# Patient Record
Sex: MALE | Race: WHITE | NOT HISPANIC OR LATINO
[De-identification: names, ages, dates, MRNs, and addresses within clinical notes are randomized per-mention and may not be internally consistent; named-entity substitution may affect disease eponyms.]

---

## 2017-01-06 ENCOUNTER — APPOINTMENT (OUTPATIENT)
Dept: FAMILY MEDICINE | Facility: CLINIC | Age: 67
End: 2017-01-06

## 2017-01-06 VITALS
BODY MASS INDEX: 37.77 KG/M2 | HEIGHT: 66 IN | WEIGHT: 235 LBS | DIASTOLIC BLOOD PRESSURE: 80 MMHG | SYSTOLIC BLOOD PRESSURE: 116 MMHG

## 2017-01-11 ENCOUNTER — MEDICATION RENEWAL (OUTPATIENT)
Age: 67
End: 2017-01-11

## 2017-01-27 ENCOUNTER — NON-APPOINTMENT (OUTPATIENT)
Age: 67
End: 2017-01-27

## 2017-01-27 ENCOUNTER — APPOINTMENT (OUTPATIENT)
Dept: CARDIOLOGY | Facility: CLINIC | Age: 67
End: 2017-01-27

## 2017-01-27 VITALS — SYSTOLIC BLOOD PRESSURE: 105 MMHG | HEART RATE: 75 BPM | DIASTOLIC BLOOD PRESSURE: 76 MMHG | OXYGEN SATURATION: 95 %

## 2017-01-27 VITALS — HEIGHT: 66 IN

## 2017-03-13 ENCOUNTER — APPOINTMENT (OUTPATIENT)
Dept: FAMILY MEDICINE | Facility: CLINIC | Age: 67
End: 2017-03-13

## 2017-03-13 VITALS
DIASTOLIC BLOOD PRESSURE: 68 MMHG | BODY MASS INDEX: 37.77 KG/M2 | HEIGHT: 66 IN | HEART RATE: 88 BPM | WEIGHT: 235 LBS | SYSTOLIC BLOOD PRESSURE: 100 MMHG

## 2017-03-20 ENCOUNTER — MEDICATION RENEWAL (OUTPATIENT)
Age: 67
End: 2017-03-20

## 2017-04-11 ENCOUNTER — NON-APPOINTMENT (OUTPATIENT)
Age: 67
End: 2017-04-11

## 2017-04-11 ENCOUNTER — APPOINTMENT (OUTPATIENT)
Dept: CARDIOLOGY | Facility: CLINIC | Age: 67
End: 2017-04-11

## 2017-04-11 ENCOUNTER — MEDICATION RENEWAL (OUTPATIENT)
Age: 67
End: 2017-04-11

## 2017-04-11 VITALS — BODY MASS INDEX: 35.52 KG/M2 | HEIGHT: 66 IN | WEIGHT: 221 LBS

## 2017-04-11 VITALS — HEART RATE: 73 BPM | SYSTOLIC BLOOD PRESSURE: 126 MMHG | DIASTOLIC BLOOD PRESSURE: 83 MMHG

## 2017-04-11 VITALS — OXYGEN SATURATION: 99 % | HEART RATE: 65 BPM

## 2017-04-12 ENCOUNTER — MEDICATION RENEWAL (OUTPATIENT)
Age: 67
End: 2017-04-12

## 2017-04-13 ENCOUNTER — APPOINTMENT (OUTPATIENT)
Dept: FAMILY MEDICINE | Facility: CLINIC | Age: 67
End: 2017-04-13

## 2017-04-13 VITALS
HEIGHT: 66 IN | SYSTOLIC BLOOD PRESSURE: 110 MMHG | DIASTOLIC BLOOD PRESSURE: 70 MMHG | BODY MASS INDEX: 35.52 KG/M2 | WEIGHT: 221 LBS

## 2017-04-18 ENCOUNTER — MEDICATION RENEWAL (OUTPATIENT)
Age: 67
End: 2017-04-18

## 2017-04-19 ENCOUNTER — MEDICATION RENEWAL (OUTPATIENT)
Age: 67
End: 2017-04-19

## 2017-04-25 ENCOUNTER — MEDICATION RENEWAL (OUTPATIENT)
Age: 67
End: 2017-04-25

## 2017-05-16 ENCOUNTER — MEDICATION RENEWAL (OUTPATIENT)
Age: 67
End: 2017-05-16

## 2017-05-24 ENCOUNTER — MEDICATION RENEWAL (OUTPATIENT)
Age: 67
End: 2017-05-24

## 2017-05-26 ENCOUNTER — APPOINTMENT (OUTPATIENT)
Dept: CARDIOLOGY | Facility: CLINIC | Age: 67
End: 2017-05-26

## 2017-05-26 ENCOUNTER — NON-APPOINTMENT (OUTPATIENT)
Age: 67
End: 2017-05-26

## 2017-05-26 VITALS
WEIGHT: 226 LBS | DIASTOLIC BLOOD PRESSURE: 66 MMHG | OXYGEN SATURATION: 96 % | SYSTOLIC BLOOD PRESSURE: 117 MMHG | HEIGHT: 66 IN | BODY MASS INDEX: 36.32 KG/M2 | HEART RATE: 70 BPM

## 2017-06-09 ENCOUNTER — APPOINTMENT (OUTPATIENT)
Dept: FAMILY MEDICINE | Facility: CLINIC | Age: 67
End: 2017-06-09

## 2017-06-12 ENCOUNTER — APPOINTMENT (OUTPATIENT)
Dept: FAMILY MEDICINE | Facility: CLINIC | Age: 67
End: 2017-06-12

## 2017-06-12 VITALS
BODY MASS INDEX: 34.07 KG/M2 | WEIGHT: 212 LBS | SYSTOLIC BLOOD PRESSURE: 118 MMHG | HEIGHT: 66 IN | DIASTOLIC BLOOD PRESSURE: 68 MMHG

## 2017-06-12 DIAGNOSIS — Z23 ENCOUNTER FOR IMMUNIZATION: ICD-10-CM

## 2017-06-15 ENCOUNTER — NON-APPOINTMENT (OUTPATIENT)
Age: 67
End: 2017-06-15

## 2017-06-15 ENCOUNTER — APPOINTMENT (OUTPATIENT)
Dept: CARDIOLOGY | Facility: CLINIC | Age: 67
End: 2017-06-15

## 2017-06-15 VITALS — HEIGHT: 66 IN | WEIGHT: 215 LBS | OXYGEN SATURATION: 96 % | BODY MASS INDEX: 34.55 KG/M2 | HEART RATE: 47 BPM

## 2017-06-15 VITALS — SYSTOLIC BLOOD PRESSURE: 105 MMHG | OXYGEN SATURATION: 96 % | DIASTOLIC BLOOD PRESSURE: 61 MMHG | HEART RATE: 47 BPM

## 2017-07-03 ENCOUNTER — MEDICATION RENEWAL (OUTPATIENT)
Age: 67
End: 2017-07-03

## 2017-07-25 ENCOUNTER — APPOINTMENT (OUTPATIENT)
Dept: CARDIOLOGY | Facility: CLINIC | Age: 67
End: 2017-07-25

## 2017-07-25 VITALS — HEART RATE: 82 BPM | SYSTOLIC BLOOD PRESSURE: 89 MMHG | DIASTOLIC BLOOD PRESSURE: 61 MMHG | OXYGEN SATURATION: 94 %

## 2017-07-25 VITALS — HEART RATE: 84 BPM | DIASTOLIC BLOOD PRESSURE: 64 MMHG | OXYGEN SATURATION: 98 % | SYSTOLIC BLOOD PRESSURE: 89 MMHG

## 2017-07-25 VITALS — BODY MASS INDEX: 35.03 KG/M2 | HEIGHT: 66 IN | WEIGHT: 218 LBS

## 2017-08-25 ENCOUNTER — APPOINTMENT (OUTPATIENT)
Dept: CARDIOLOGY | Facility: CLINIC | Age: 67
End: 2017-08-25

## 2017-08-28 ENCOUNTER — APPOINTMENT (OUTPATIENT)
Dept: FAMILY MEDICINE | Facility: CLINIC | Age: 67
End: 2017-08-28
Payer: MEDICARE

## 2017-08-28 VITALS
DIASTOLIC BLOOD PRESSURE: 76 MMHG | RESPIRATION RATE: 16 BRPM | SYSTOLIC BLOOD PRESSURE: 104 MMHG | HEART RATE: 64 BPM | WEIGHT: 237 LBS | BODY MASS INDEX: 38.25 KG/M2

## 2017-08-28 DIAGNOSIS — Z87.09 PERSONAL HISTORY OF OTHER DISEASES OF THE RESPIRATORY SYSTEM: ICD-10-CM

## 2017-08-28 DIAGNOSIS — H26.9 UNSPECIFIED CATARACT: ICD-10-CM

## 2017-08-28 LAB
BASOPHILS # BLD AUTO: 0.01 K/UL
BASOPHILS NFR BLD AUTO: 0.2 %
EOSINOPHIL # BLD AUTO: 0.1 K/UL
EOSINOPHIL NFR BLD AUTO: 1.7 %
HCT VFR BLD CALC: 46.4 %
HGB BLD-MCNC: 14.3 G/DL
IMM GRANULOCYTES NFR BLD AUTO: 0.2 %
LYMPHOCYTES # BLD AUTO: 1.08 K/UL
LYMPHOCYTES NFR BLD AUTO: 18.5 %
MAN DIFF?: NORMAL
MCHC RBC-ENTMCNC: 28.7 PG
MCHC RBC-ENTMCNC: 30.8 GM/DL
MCV RBC AUTO: 93 FL
MONOCYTES # BLD AUTO: 0.45 K/UL
MONOCYTES NFR BLD AUTO: 7.7 %
NEUTROPHILS # BLD AUTO: 4.19 K/UL
NEUTROPHILS NFR BLD AUTO: 71.7 %
PLATELET # BLD AUTO: 191 K/UL
RBC # BLD: 4.99 M/UL
RBC # FLD: 16.2 %
WBC # FLD AUTO: 5.84 K/UL

## 2017-08-28 PROCEDURE — 36415 COLL VENOUS BLD VENIPUNCTURE: CPT

## 2017-08-28 PROCEDURE — 99215 OFFICE O/P EST HI 40 MIN: CPT | Mod: 25

## 2017-08-29 LAB
25(OH)D3 SERPL-MCNC: 35.1 NG/ML
ALBUMIN SERPL ELPH-MCNC: 3.8 G/DL
ALP BLD-CCNC: 76 U/L
ALT SERPL-CCNC: 12 U/L
ANION GAP SERPL CALC-SCNC: 15 MMOL/L
AST SERPL-CCNC: 16 U/L
BILIRUB SERPL-MCNC: 0.4 MG/DL
BUN SERPL-MCNC: 15 MG/DL
CALCIUM SERPL-MCNC: 9.4 MG/DL
CHLORIDE SERPL-SCNC: 102 MMOL/L
CO2 SERPL-SCNC: 25 MMOL/L
CREAT SERPL-MCNC: 1.07 MG/DL
FRUCTOSAMINE SERPL-MCNC: 240 UMOL/L
GLUCOSE SERPL-MCNC: 126 MG/DL
HBV SURFACE AB SER QL: NONREACTIVE
POTASSIUM SERPL-SCNC: 4.7 MMOL/L
PROT SERPL-MCNC: 6.9 G/DL
SODIUM SERPL-SCNC: 142 MMOL/L
T PALLIDUM AB SER QL IA: NEGATIVE

## 2017-08-29 RX ORDER — TAMSULOSIN HYDROCHLORIDE 0.4 MG/1
0.4 CAPSULE ORAL
Qty: 90 | Refills: 1 | Status: DISCONTINUED | COMMUNITY
End: 2017-08-29

## 2017-08-29 RX ORDER — AMOXICILLIN AND CLAVULANATE POTASSIUM 875; 125 MG/1; MG/1
875-125 TABLET, COATED ORAL
Qty: 20 | Refills: 0 | Status: COMPLETED | COMMUNITY
Start: 2017-03-08

## 2017-08-29 RX ORDER — TORSEMIDE 20 MG/1
20 TABLET ORAL
Qty: 2 | Refills: 5 | Status: DISCONTINUED | COMMUNITY
End: 2017-08-29

## 2017-08-30 ENCOUNTER — MEDICATION RENEWAL (OUTPATIENT)
Age: 67
End: 2017-08-30

## 2017-09-07 ENCOUNTER — APPOINTMENT (OUTPATIENT)
Dept: CARDIOLOGY | Facility: CLINIC | Age: 67
End: 2017-09-07

## 2017-09-14 ENCOUNTER — APPOINTMENT (OUTPATIENT)
Dept: CARDIOLOGY | Facility: CLINIC | Age: 67
End: 2017-09-14
Payer: MEDICARE

## 2017-09-14 ENCOUNTER — NON-APPOINTMENT (OUTPATIENT)
Age: 67
End: 2017-09-14

## 2017-09-14 VITALS
BODY MASS INDEX: 39.53 KG/M2 | DIASTOLIC BLOOD PRESSURE: 73 MMHG | SYSTOLIC BLOOD PRESSURE: 117 MMHG | WEIGHT: 246 LBS | HEART RATE: 96 BPM | OXYGEN SATURATION: 96 % | HEIGHT: 66 IN

## 2017-09-14 PROCEDURE — 93000 ELECTROCARDIOGRAM COMPLETE: CPT

## 2017-09-14 PROCEDURE — 99214 OFFICE O/P EST MOD 30 MIN: CPT | Mod: 25

## 2017-09-18 ENCOUNTER — APPOINTMENT (OUTPATIENT)
Dept: FAMILY MEDICINE | Facility: CLINIC | Age: 67
End: 2017-09-18
Payer: MEDICARE

## 2017-09-18 VITALS
DIASTOLIC BLOOD PRESSURE: 72 MMHG | BODY MASS INDEX: 39.53 KG/M2 | SYSTOLIC BLOOD PRESSURE: 100 MMHG | WEIGHT: 246 LBS | HEIGHT: 66 IN

## 2017-09-18 PROCEDURE — 99213 OFFICE O/P EST LOW 20 MIN: CPT

## 2017-09-18 RX ORDER — MULTIVITAMIN/IRON/FOLIC ACID 18MG-0.4MG
600-400 TABLET ORAL DAILY
Qty: 30 | Refills: 5 | Status: ACTIVE | OUTPATIENT
Start: 2017-09-18

## 2017-09-26 ENCOUNTER — APPOINTMENT (OUTPATIENT)
Dept: CARDIOLOGY | Facility: CLINIC | Age: 67
End: 2017-09-26
Payer: MEDICARE

## 2017-09-26 ENCOUNTER — NON-APPOINTMENT (OUTPATIENT)
Age: 67
End: 2017-09-26

## 2017-09-26 VITALS
HEART RATE: 75 BPM | DIASTOLIC BLOOD PRESSURE: 83 MMHG | BODY MASS INDEX: 38.57 KG/M2 | OXYGEN SATURATION: 96 % | HEIGHT: 66 IN | SYSTOLIC BLOOD PRESSURE: 126 MMHG | WEIGHT: 240 LBS

## 2017-09-26 DIAGNOSIS — R63.5 ABNORMAL WEIGHT GAIN: ICD-10-CM

## 2017-09-26 PROCEDURE — 99214 OFFICE O/P EST MOD 30 MIN: CPT | Mod: 25

## 2017-09-26 PROCEDURE — 93000 ELECTROCARDIOGRAM COMPLETE: CPT

## 2017-11-13 ENCOUNTER — APPOINTMENT (OUTPATIENT)
Dept: FAMILY MEDICINE | Facility: CLINIC | Age: 67
End: 2017-11-13
Payer: MEDICARE

## 2017-11-13 VITALS
BODY MASS INDEX: 38.57 KG/M2 | HEIGHT: 66 IN | OXYGEN SATURATION: 97 % | DIASTOLIC BLOOD PRESSURE: 70 MMHG | WEIGHT: 240 LBS | SYSTOLIC BLOOD PRESSURE: 112 MMHG | HEART RATE: 82 BPM

## 2017-11-13 VITALS — BODY MASS INDEX: 38.57 KG/M2 | HEIGHT: 66 IN | WEIGHT: 240 LBS

## 2017-11-13 DIAGNOSIS — F15.90 OTHER STIMULANT USE, UNSPECIFIED, UNCOMPLICATED: ICD-10-CM

## 2017-11-13 PROCEDURE — 36415 COLL VENOUS BLD VENIPUNCTURE: CPT

## 2017-11-13 PROCEDURE — 99214 OFFICE O/P EST MOD 30 MIN: CPT | Mod: 25

## 2017-11-13 RX ORDER — CANAGLIFLOZIN 300 MG/1
300 TABLET, FILM COATED ORAL
Qty: 90 | Refills: 0 | Status: DISCONTINUED | COMMUNITY
Start: 2017-09-18 | End: 2017-11-13

## 2017-11-16 ENCOUNTER — NON-APPOINTMENT (OUTPATIENT)
Age: 67
End: 2017-11-16

## 2017-11-16 ENCOUNTER — APPOINTMENT (OUTPATIENT)
Dept: CARDIOLOGY | Facility: CLINIC | Age: 67
End: 2017-11-16
Payer: MEDICARE

## 2017-11-16 VITALS
BODY MASS INDEX: 38.57 KG/M2 | OXYGEN SATURATION: 96 % | SYSTOLIC BLOOD PRESSURE: 103 MMHG | DIASTOLIC BLOOD PRESSURE: 68 MMHG | HEART RATE: 82 BPM | HEIGHT: 66 IN | WEIGHT: 240 LBS

## 2017-11-16 PROCEDURE — 99214 OFFICE O/P EST MOD 30 MIN: CPT | Mod: 25

## 2017-11-16 PROCEDURE — 93000 ELECTROCARDIOGRAM COMPLETE: CPT

## 2017-11-18 ENCOUNTER — MEDICATION RENEWAL (OUTPATIENT)
Age: 67
End: 2017-11-18

## 2017-11-20 ENCOUNTER — APPOINTMENT (OUTPATIENT)
Dept: CARDIOLOGY | Facility: CLINIC | Age: 67
End: 2017-11-20
Payer: MEDICARE

## 2017-11-20 ENCOUNTER — NON-APPOINTMENT (OUTPATIENT)
Age: 67
End: 2017-11-20

## 2017-11-20 ENCOUNTER — APPOINTMENT (OUTPATIENT)
Dept: FAMILY MEDICINE | Facility: CLINIC | Age: 67
End: 2017-11-20
Payer: MEDICARE

## 2017-11-20 VITALS
WEIGHT: 240 LBS | BODY MASS INDEX: 38.57 KG/M2 | DIASTOLIC BLOOD PRESSURE: 70 MMHG | SYSTOLIC BLOOD PRESSURE: 110 MMHG | HEIGHT: 66 IN

## 2017-11-20 VITALS — DIASTOLIC BLOOD PRESSURE: 67 MMHG | HEART RATE: 75 BPM | SYSTOLIC BLOOD PRESSURE: 103 MMHG

## 2017-11-20 DIAGNOSIS — D64.9 ANEMIA, UNSPECIFIED: ICD-10-CM

## 2017-11-20 DIAGNOSIS — E66.9 OBESITY, UNSPECIFIED: ICD-10-CM

## 2017-11-20 PROCEDURE — 93000 ELECTROCARDIOGRAM COMPLETE: CPT

## 2017-11-20 PROCEDURE — 99214 OFFICE O/P EST MOD 30 MIN: CPT | Mod: 25

## 2017-11-20 PROCEDURE — 99213 OFFICE O/P EST LOW 20 MIN: CPT | Mod: 25

## 2017-11-20 PROCEDURE — 36415 COLL VENOUS BLD VENIPUNCTURE: CPT

## 2017-11-20 RX ORDER — APIXABAN 5 MG/1
5 TABLET, FILM COATED ORAL
Qty: 180 | Refills: 2 | Status: DISCONTINUED | COMMUNITY
Start: 2017-11-13 | End: 2017-11-20

## 2017-11-21 LAB
BASOPHILS # BLD AUTO: 0.02 K/UL
BASOPHILS NFR BLD AUTO: 0.3 %
EOSINOPHIL # BLD AUTO: 0.11 K/UL
EOSINOPHIL NFR BLD AUTO: 1.6 %
HBA1C MFR BLD HPLC: 6.5 %
HCT VFR BLD CALC: 38.9 %
HGB BLD-MCNC: 11.9 G/DL
IMM GRANULOCYTES NFR BLD AUTO: 0.1 %
LYMPHOCYTES # BLD AUTO: 0.98 K/UL
LYMPHOCYTES NFR BLD AUTO: 14.2 %
MAN DIFF?: NORMAL
MCHC RBC-ENTMCNC: 28.1 PG
MCHC RBC-ENTMCNC: 30.6 GM/DL
MCV RBC AUTO: 92 FL
MONOCYTES # BLD AUTO: 0.43 K/UL
MONOCYTES NFR BLD AUTO: 6.2 %
NEUTROPHILS # BLD AUTO: 5.34 K/UL
NEUTROPHILS NFR BLD AUTO: 77.6 %
PLATELET # BLD AUTO: 174 K/UL
RBC # BLD: 4.23 M/UL
RBC # FLD: 15.9 %
WBC # FLD AUTO: 6.89 K/UL

## 2017-12-05 ENCOUNTER — NON-APPOINTMENT (OUTPATIENT)
Age: 67
End: 2017-12-05

## 2017-12-05 ENCOUNTER — APPOINTMENT (OUTPATIENT)
Dept: CARDIOLOGY | Facility: CLINIC | Age: 67
End: 2017-12-05
Payer: MEDICARE

## 2017-12-05 VITALS — WEIGHT: 229 LBS | HEIGHT: 66 IN | BODY MASS INDEX: 36.8 KG/M2

## 2017-12-05 VITALS — OXYGEN SATURATION: 91 % | HEART RATE: 79 BPM | SYSTOLIC BLOOD PRESSURE: 111 MMHG | DIASTOLIC BLOOD PRESSURE: 65 MMHG

## 2017-12-05 PROCEDURE — 93000 ELECTROCARDIOGRAM COMPLETE: CPT

## 2017-12-05 PROCEDURE — 99214 OFFICE O/P EST MOD 30 MIN: CPT | Mod: 25

## 2017-12-12 ENCOUNTER — MEDICATION RENEWAL (OUTPATIENT)
Age: 67
End: 2017-12-12

## 2018-01-22 ENCOUNTER — MEDICATION RENEWAL (OUTPATIENT)
Age: 68
End: 2018-01-22

## 2018-02-05 ENCOUNTER — APPOINTMENT (OUTPATIENT)
Dept: FAMILY MEDICINE | Facility: CLINIC | Age: 68
End: 2018-02-05
Payer: MEDICARE

## 2018-02-05 VITALS
HEIGHT: 63 IN | WEIGHT: 311.03 LBS | SYSTOLIC BLOOD PRESSURE: 92 MMHG | BODY MASS INDEX: 55.11 KG/M2 | HEART RATE: 68 BPM | DIASTOLIC BLOOD PRESSURE: 60 MMHG | OXYGEN SATURATION: 92 %

## 2018-02-05 PROCEDURE — 99214 OFFICE O/P EST MOD 30 MIN: CPT

## 2018-02-05 RX ORDER — MULTIVITAMIN
TABLET ORAL DAILY
Refills: 0 | Status: DISCONTINUED | COMMUNITY
End: 2018-02-05

## 2018-03-06 ENCOUNTER — NON-APPOINTMENT (OUTPATIENT)
Age: 68
End: 2018-03-06

## 2018-03-06 ENCOUNTER — APPOINTMENT (OUTPATIENT)
Dept: CARDIOLOGY | Facility: CLINIC | Age: 68
End: 2018-03-06
Payer: MEDICARE

## 2018-03-06 VITALS — WEIGHT: 236 LBS | HEIGHT: 63 IN | BODY MASS INDEX: 41.82 KG/M2

## 2018-03-06 VITALS — SYSTOLIC BLOOD PRESSURE: 136 MMHG | OXYGEN SATURATION: 94 % | DIASTOLIC BLOOD PRESSURE: 93 MMHG | HEART RATE: 75 BPM

## 2018-03-06 VITALS — SYSTOLIC BLOOD PRESSURE: 110 MMHG | DIASTOLIC BLOOD PRESSURE: 80 MMHG

## 2018-03-06 PROCEDURE — 93000 ELECTROCARDIOGRAM COMPLETE: CPT

## 2018-03-06 PROCEDURE — 99214 OFFICE O/P EST MOD 30 MIN: CPT | Mod: 25

## 2018-05-22 ENCOUNTER — MEDICATION RENEWAL (OUTPATIENT)
Age: 68
End: 2018-05-22

## 2018-05-23 ENCOUNTER — APPOINTMENT (OUTPATIENT)
Dept: FAMILY MEDICINE | Facility: CLINIC | Age: 68
End: 2018-05-23
Payer: MEDICARE

## 2018-05-23 VITALS
WEIGHT: 241 LBS | OXYGEN SATURATION: 97 % | BODY MASS INDEX: 34.5 KG/M2 | DIASTOLIC BLOOD PRESSURE: 80 MMHG | TEMPERATURE: 97.9 F | HEART RATE: 86 BPM | HEIGHT: 70 IN | SYSTOLIC BLOOD PRESSURE: 120 MMHG

## 2018-05-23 PROCEDURE — 99214 OFFICE O/P EST MOD 30 MIN: CPT

## 2018-05-23 NOTE — PHYSICAL EXAM
[Well Nourished] : well nourished [Well Developed] : well developed [Regular Rhythm] : with a regular rhythm [Normal S1, S2] : normal S1 and S2 [Non Tender] : non-tender [de-identified] : uses rolling walker [de-identified] : upper lip abrasion, left cheek abrasion

## 2018-05-23 NOTE — ASSESSMENT
[FreeTextEntry1] : reviewed blood work from er\par reviewed CT\par patient shows no signs of neurological issues/deficits\par return to program as previous\par return to program

## 2018-05-23 NOTE — HISTORY OF PRESENT ILLNESS
[FreeTextEntry8] : 68 year old male here after a trip over his walker, went to the ER and was given a CT scan. Patient feeling okay, and reports his muscles hurt a little]. Patients active medications, allergies and issues were all reviewed with the patient at time of visit.\par  [Formal Caregiver] : formal caregiver

## 2018-05-23 NOTE — HEALTH RISK ASSESSMENT
[] : No [No falls in past year] : Patient reported no falls in the past year [0] : 2) Feeling down, depressed, or hopeless: Not at all (0) [[id number]] : 0

## 2018-06-05 ENCOUNTER — APPOINTMENT (OUTPATIENT)
Dept: CARDIOLOGY | Facility: CLINIC | Age: 68
End: 2018-06-05
Payer: MEDICARE

## 2018-06-05 VITALS
SYSTOLIC BLOOD PRESSURE: 120 MMHG | BODY MASS INDEX: 34.07 KG/M2 | OXYGEN SATURATION: 99 % | HEART RATE: 85 BPM | DIASTOLIC BLOOD PRESSURE: 78 MMHG | WEIGHT: 238 LBS | HEIGHT: 70 IN

## 2018-06-05 PROCEDURE — 99214 OFFICE O/P EST MOD 30 MIN: CPT

## 2018-06-26 ENCOUNTER — APPOINTMENT (OUTPATIENT)
Dept: FAMILY MEDICINE | Facility: CLINIC | Age: 68
End: 2018-06-26
Payer: MEDICARE

## 2018-06-26 VITALS
WEIGHT: 237 LBS | DIASTOLIC BLOOD PRESSURE: 80 MMHG | BODY MASS INDEX: 33.93 KG/M2 | SYSTOLIC BLOOD PRESSURE: 120 MMHG | TEMPERATURE: 98.2 F | HEIGHT: 70 IN

## 2018-06-26 PROCEDURE — 99213 OFFICE O/P EST LOW 20 MIN: CPT

## 2018-06-26 NOTE — ASSESSMENT
[FreeTextEntry1] : Patient was advised to take all medications and complete the full course of medications. Patient was advised to take medications with food and water. Patient was advised to hydrate.  Patient was also advised to watch for any signs of infection including increased redness, pain, discharge, fever or vomiting. If any worsening of symptoms occur or no improvement was noticed, return to the office immediately or go directly to the ER.\par fu with surgeon\par finish antibioics\par return to program

## 2018-06-26 NOTE — HISTORY OF PRESENT ILLNESS
[FreeTextEntry8] : 68 year old male here with complaints of right buttock pain. Patients active medications, allergies and issues were all reviewed with the patient at time of visit.\par

## 2018-06-26 NOTE — PHYSICAL EXAM
[Well Nourished] : well nourished [Well Developed] : well developed [Regular Rhythm] : with a regular rhythm [Normal S1, S2] : normal S1 and S2 [Non Tender] : non-tender [de-identified] : uses rolling walker [de-identified] : 1 cm nodule on left inner buttock cheek with surrounding redness, no discharge

## 2018-06-26 NOTE — COUNSELING
[Limited decision making ability] : Limited decision making ability [Good understanding] : Patient has a good understanding of lifestyle changes and the steps needed to achieve self management goals

## 2018-07-17 ENCOUNTER — MEDICATION RENEWAL (OUTPATIENT)
Age: 68
End: 2018-07-17

## 2018-07-25 ENCOUNTER — MEDICATION RENEWAL (OUTPATIENT)
Age: 68
End: 2018-07-25

## 2018-08-30 ENCOUNTER — APPOINTMENT (OUTPATIENT)
Dept: FAMILY MEDICINE | Facility: CLINIC | Age: 68
End: 2018-08-30
Payer: MEDICARE

## 2018-08-30 VITALS
BODY MASS INDEX: 39.37 KG/M2 | TEMPERATURE: 97.3 F | SYSTOLIC BLOOD PRESSURE: 120 MMHG | HEIGHT: 70 IN | DIASTOLIC BLOOD PRESSURE: 80 MMHG | WEIGHT: 275 LBS

## 2018-08-30 PROCEDURE — G0438: CPT

## 2018-08-30 PROCEDURE — 36415 COLL VENOUS BLD VENIPUNCTURE: CPT

## 2018-08-31 LAB
ALBUMIN SERPL ELPH-MCNC: 4.1 G/DL
ALP BLD-CCNC: 89 U/L
ALT SERPL-CCNC: 22 U/L
ANION GAP SERPL CALC-SCNC: 14 MMOL/L
APPEARANCE: ABNORMAL
AST SERPL-CCNC: 28 U/L
BACTERIA: NEGATIVE
BASOPHILS # BLD AUTO: 0.02 K/UL
BASOPHILS NFR BLD AUTO: 0.3 %
BILIRUB SERPL-MCNC: 0.7 MG/DL
BILIRUBIN URINE: NEGATIVE
BLOOD URINE: ABNORMAL
BUN SERPL-MCNC: 23 MG/DL
CALCIUM SERPL-MCNC: 9.9 MG/DL
CHLORIDE SERPL-SCNC: 100 MMOL/L
CHOLEST SERPL-MCNC: 121 MG/DL
CHOLEST/HDLC SERPL: 2.5 RATIO
CO2 SERPL-SCNC: 28 MMOL/L
COLOR: YELLOW
CREAT SERPL-MCNC: 0.9 MG/DL
EOSINOPHIL # BLD AUTO: 0.06 K/UL
EOSINOPHIL NFR BLD AUTO: 0.9 %
GLUCOSE QUALITATIVE U: NEGATIVE MG/DL
GLUCOSE SERPL-MCNC: 161 MG/DL
HBA1C MFR BLD HPLC: 6.6 %
HCT VFR BLD CALC: 42.9 %
HDLC SERPL-MCNC: 49 MG/DL
HGB BLD-MCNC: 13.6 G/DL
HYALINE CASTS: 2 /LPF
IMM GRANULOCYTES NFR BLD AUTO: 0.2 %
KETONES URINE: NEGATIVE
LDLC SERPL CALC-MCNC: 62 MG/DL
LEUKOCYTE ESTERASE URINE: ABNORMAL
LYMPHOCYTES # BLD AUTO: 1.02 K/UL
LYMPHOCYTES NFR BLD AUTO: 15.8 %
MAN DIFF?: NORMAL
MCHC RBC-ENTMCNC: 28.4 PG
MCHC RBC-ENTMCNC: 31.7 GM/DL
MCV RBC AUTO: 89.6 FL
MICROSCOPIC-UA: NORMAL
MONOCYTES # BLD AUTO: 0.45 K/UL
MONOCYTES NFR BLD AUTO: 7 %
NEUTROPHILS # BLD AUTO: 4.88 K/UL
NEUTROPHILS NFR BLD AUTO: 75.8 %
NITRITE URINE: NEGATIVE
PH URINE: 6.5
PLATELET # BLD AUTO: 141 K/UL
POTASSIUM SERPL-SCNC: 4.8 MMOL/L
PROT SERPL-MCNC: 7.4 G/DL
PROTEIN URINE: ABNORMAL MG/DL
RBC # BLD: 4.79 M/UL
RBC # FLD: 16.6 %
RED BLOOD CELLS URINE: 6 /HPF
SODIUM SERPL-SCNC: 142 MMOL/L
SPECIFIC GRAVITY URINE: 1.01
SQUAMOUS EPITHELIAL CELLS: 1 /HPF
TRIGL SERPL-MCNC: 50 MG/DL
TSH SERPL-ACNC: 1 UIU/ML
UROBILINOGEN URINE: NEGATIVE MG/DL
WBC # FLD AUTO: 6.44 K/UL
WHITE BLOOD CELLS URINE: 206 /HPF

## 2018-09-10 ENCOUNTER — MEDICATION RENEWAL (OUTPATIENT)
Age: 68
End: 2018-09-10

## 2018-10-03 ENCOUNTER — MEDICATION RENEWAL (OUTPATIENT)
Age: 68
End: 2018-10-03

## 2018-10-04 ENCOUNTER — MEDICATION RENEWAL (OUTPATIENT)
Age: 68
End: 2018-10-04

## 2018-10-26 ENCOUNTER — APPOINTMENT (OUTPATIENT)
Dept: FAMILY MEDICINE | Facility: CLINIC | Age: 68
End: 2018-10-26
Payer: MEDICARE

## 2018-10-26 VITALS
RESPIRATION RATE: 16 BRPM | SYSTOLIC BLOOD PRESSURE: 122 MMHG | BODY MASS INDEX: 39.8 KG/M2 | OXYGEN SATURATION: 97 % | DIASTOLIC BLOOD PRESSURE: 70 MMHG | HEART RATE: 67 BPM | WEIGHT: 278 LBS | HEIGHT: 70 IN

## 2018-10-26 DIAGNOSIS — S69.90XA UNSPECIFIED INJURY OF UNSPECIFIED WRIST, HAND AND FINGER(S), INITIAL ENCOUNTER: ICD-10-CM

## 2018-10-26 DIAGNOSIS — Z87.898 PERSONAL HISTORY OF OTHER SPECIFIED CONDITIONS: ICD-10-CM

## 2018-10-26 DIAGNOSIS — Z87.2 PERSONAL HISTORY OF DISEASES OF THE SKIN AND SUBCUTANEOUS TISSUE: ICD-10-CM

## 2018-10-26 PROCEDURE — 99213 OFFICE O/P EST LOW 20 MIN: CPT | Mod: 25

## 2018-10-26 PROCEDURE — G0008: CPT

## 2018-10-26 PROCEDURE — 90686 IIV4 VACC NO PRSV 0.5 ML IM: CPT

## 2018-10-26 NOTE — PHYSICAL EXAM
[No Acute Distress] : no acute distress [Well Nourished] : well nourished [Normal Voice/Communication] : normal voice/communication [Normal Sclera/Conjunctiva] : normal sclera/conjunctiva [EOMI] : extraocular movements intact [Normal Outer Ear/Nose] : the outer ears and nose were normal in appearance [No JVD] : no jugular venous distention [No Respiratory Distress] : no respiratory distress  [Clear to Auscultation] : lungs were clear to auscultation bilaterally [No Accessory Muscle Use] : no accessory muscle use [Normal Rate] : normal rate  [Regular Rhythm] : with a regular rhythm [Normal S1, S2] : normal S1 and S2 [Normal Affect] : the affect was normal [Normal Insight/Judgement] : insight and judgment were intact [de-identified] : obese [de-identified] : ambulates w/ walker [de-identified] : no change from baseline

## 2018-11-08 ENCOUNTER — NON-APPOINTMENT (OUTPATIENT)
Age: 68
End: 2018-11-08

## 2018-11-08 ENCOUNTER — APPOINTMENT (OUTPATIENT)
Dept: CARDIOLOGY | Facility: CLINIC | Age: 68
End: 2018-11-08
Payer: MEDICARE

## 2018-11-08 VITALS — WEIGHT: 245 LBS | HEIGHT: 70 IN | BODY MASS INDEX: 35.07 KG/M2

## 2018-11-08 VITALS — DIASTOLIC BLOOD PRESSURE: 82 MMHG | OXYGEN SATURATION: 95 % | HEART RATE: 65 BPM | SYSTOLIC BLOOD PRESSURE: 146 MMHG

## 2018-11-08 VITALS — DIASTOLIC BLOOD PRESSURE: 76 MMHG | SYSTOLIC BLOOD PRESSURE: 130 MMHG

## 2018-11-08 PROCEDURE — 99214 OFFICE O/P EST MOD 30 MIN: CPT | Mod: 25

## 2018-11-08 PROCEDURE — 93000 ELECTROCARDIOGRAM COMPLETE: CPT

## 2018-11-08 RX ORDER — CARBOXYMETHYLCELLULOSE SODIUM 10 MG/ML
SOLUTION/ DROPS OPHTHALMIC
Refills: 0 | Status: ACTIVE | COMMUNITY

## 2018-11-08 RX ORDER — FLUTICASONE PROPIONATE 50 MCG
50 SPRAY, SUSPENSION NASAL DAILY
Refills: 0 | Status: ACTIVE | COMMUNITY

## 2018-11-08 RX ORDER — SITAGLIPTIN 100 MG/1
100 TABLET, FILM COATED ORAL DAILY
Refills: 0 | Status: ACTIVE | COMMUNITY

## 2018-11-08 NOTE — REVIEW OF SYSTEMS
[Chills] : no chills [Eyeglasses] : not currently wearing eyeglasses [Earache] : no earache [Mouth Sores] : no mouth sores [Shortness Of Breath] : no shortness of breath [Chest Pain] : no chest pain [Palpitations] : no palpitations [Cough] : no cough [Abdominal Pain] : no abdominal pain [Urinary Frequency] : no change in urinary frequency [Hematuria] : hematuria [Impotence] : no impotence [Joint Pain] : no joint pain [see HPI] : see HPI [Dizziness] : no dizziness [Confusion] : no confusion was observed [Excessive Thirst] : no polydipsia [Negative] : Heme/Lymph

## 2018-11-08 NOTE — DISCUSSION/SUMMARY
[Atrial Fibrillation] : atrial fibrillation [Cardiomyopathy] : cardiomyopathy [Improving] : improving [Medication Changes Per Orders] : as documented in orders [Hyperlipidemia] : hyperlipidemia [None] : none [Diet Modification] : diet modification [Exercise] : exercise [Hypertension] : hypertension [Stable] : stable [Exercise Regimen] : an exercise regimen [Weight Loss] : weight loss [Sodium Restriction] : sodium restriction [FreeTextEntry1] : \par

## 2018-11-08 NOTE — REASON FOR VISIT
[Follow-Up - Clinic] : a clinic follow-up of [Abnormal ECG] : an abnormal ECG [Atrial Fibrillation] : atrial fibrillation [Cardiomyopathy] : cardiomyopathy [Hyperlipidemia] : hyperlipidemia [Hypertension] : hypertension [Medication Management] : Medication management [Syncope] : syncope [FreeTextEntry1] : anemia ,

## 2018-11-08 NOTE — PHYSICAL EXAM
[General Appearance - Well Developed] : well developed [Normal Conjunctiva] : the conjunctiva exhibited no abnormalities [Normal Oral Mucosa] : normal oral mucosa [] : no respiratory distress [Respiration, Rhythm And Depth] : normal respiratory rhythm and effort [Auscultation Breath Sounds / Voice Sounds] : lungs were clear to auscultation bilaterally [Chest Palpation] : palpation of the chest revealed no abnormalities [Heart Sounds] : normal S1 and S2 [Normal] : the heart rate was normal [Irregularly Irregular] : the rhythm was irregularly irregular [Bowel Sounds] : normal bowel sounds [Abdomen Mass (___ Cm)] : no abdominal mass palpated [Abnormal Walk] : normal gait [Cyanosis, Localized] : no localized cyanosis [FreeTextEntry1] : chronic pigmentation  swollen with pressure stockings on , right foot bandage ,  [No Anxiety] : not feeling anxious [Normal Appearance] : was normal in appearance

## 2018-11-08 NOTE — HISTORY OF PRESENT ILLNESS
[FreeTextEntry1] : Patient with obesity, hypertension, chronic atrial fibrillation , chronic systolic heart failure, chronic right ventricular failure , moderate pulmonary hypertension  morbid  obesity came for follow up  says  he is doing well ,  walks with walker \par \par Patient is not using cpap , now he is using night time oxygen.  patient  able to sleep supine  ,using three pillows . his home blood pressure readings \par \par Edema present in BLLE, and home care has been treating his skin. He is non compliant with edema boots. \par could not go for sleep study as patient is claustrophobic \par Patient denies  chest pain or shortness of breath. \par \par lipid profile within normal range done 10/2018  HbA1c 6.3  ,   normal lipid profile \par \par \par \par \par \par

## 2018-11-19 ENCOUNTER — MEDICATION RENEWAL (OUTPATIENT)
Age: 68
End: 2018-11-19

## 2018-11-27 ENCOUNTER — MEDICATION RENEWAL (OUTPATIENT)
Age: 68
End: 2018-11-27

## 2018-11-27 ENCOUNTER — APPOINTMENT (OUTPATIENT)
Dept: CARDIOLOGY | Facility: CLINIC | Age: 68
End: 2018-11-27
Payer: MEDICARE

## 2018-11-27 PROCEDURE — 93306 TTE W/DOPPLER COMPLETE: CPT

## 2019-02-26 ENCOUNTER — APPOINTMENT (OUTPATIENT)
Dept: FAMILY MEDICINE | Facility: CLINIC | Age: 69
End: 2019-02-26
Payer: MEDICARE

## 2019-02-26 VITALS — SYSTOLIC BLOOD PRESSURE: 120 MMHG | DIASTOLIC BLOOD PRESSURE: 70 MMHG

## 2019-02-26 PROCEDURE — 99213 OFFICE O/P EST LOW 20 MIN: CPT

## 2019-03-01 ENCOUNTER — APPOINTMENT (OUTPATIENT)
Dept: CARDIOLOGY | Facility: CLINIC | Age: 69
End: 2019-03-01
Payer: MEDICARE

## 2019-03-01 ENCOUNTER — NON-APPOINTMENT (OUTPATIENT)
Age: 69
End: 2019-03-01

## 2019-03-01 VITALS — WEIGHT: 238 LBS | BODY MASS INDEX: 34.07 KG/M2 | HEIGHT: 70 IN

## 2019-03-01 VITALS — OXYGEN SATURATION: 96 % | HEART RATE: 71 BPM

## 2019-03-01 VITALS — DIASTOLIC BLOOD PRESSURE: 85 MMHG | SYSTOLIC BLOOD PRESSURE: 130 MMHG

## 2019-03-01 PROCEDURE — 93000 ELECTROCARDIOGRAM COMPLETE: CPT

## 2019-03-01 PROCEDURE — 99214 OFFICE O/P EST MOD 30 MIN: CPT | Mod: 25

## 2019-03-01 RX ORDER — NYSTATIN 100000 [USP'U]/G
100000 CREAM TOPICAL 3 TIMES DAILY
Qty: 2 | Refills: 3 | Status: ACTIVE | COMMUNITY

## 2019-03-01 RX ORDER — TAMSULOSIN HYDROCHLORIDE 0.4 MG/1
0.4 CAPSULE ORAL
Qty: 90 | Refills: 3 | Status: ACTIVE | COMMUNITY

## 2019-03-01 NOTE — CARDIOLOGY SUMMARY
[___] : [unfilled] [LVEF ___%] : LVEF [unfilled]% [Moderate] : moderate pulmonary hypertension [Enlarged] : enlarged LA size [Mild] : mild mitral regurgitation

## 2019-03-01 NOTE — PHYSICAL EXAM
[General Appearance - Well Developed] : well developed [Normal Conjunctiva] : the conjunctiva exhibited no abnormalities [Normal Oral Mucosa] : normal oral mucosa [] : no respiratory distress [Respiration, Rhythm And Depth] : normal respiratory rhythm and effort [Auscultation Breath Sounds / Voice Sounds] : lungs were clear to auscultation bilaterally [Chest Palpation] : palpation of the chest revealed no abnormalities [Heart Sounds] : normal S1 and S2 [Normal] : the heart rate was normal [Irregularly Irregular] : the rhythm was irregularly irregular [Bowel Sounds] : normal bowel sounds [Abdomen Mass (___ Cm)] : no abdominal mass palpated [Abnormal Walk] : normal gait [Cyanosis, Localized] : no localized cyanosis [No Anxiety] : not feeling anxious [Normal Appearance] : was normal in appearance [FreeTextEntry1] : chronic pigmentation  swollen with pressure stockings on , right foot bandage ,

## 2019-03-01 NOTE — REVIEW OF SYSTEMS
[Hematuria] : hematuria [see HPI] : see HPI [Negative] : Heme/Lymph [Chills] : no chills [Eyeglasses] : not currently wearing eyeglasses [Earache] : no earache [Mouth Sores] : no mouth sores [Shortness Of Breath] : no shortness of breath [Chest Pain] : no chest pain [Palpitations] : no palpitations [Cough] : no cough [Abdominal Pain] : no abdominal pain [Urinary Frequency] : no change in urinary frequency [Impotence] : no impotence [Joint Pain] : no joint pain [Dizziness] : no dizziness [Confusion] : no confusion was observed [Excessive Thirst] : no polydipsia

## 2019-03-01 NOTE — HISTORY OF PRESENT ILLNESS
[FreeTextEntry1] : Patient with obesity, hypertension, chronic atrial fibrillation , chronic systolic heart failure, chronic right ventricular failure , moderate pulmonary hypertension  morbid  obesity came for  routine follow up  says  he is doing well ,  walks with walker \par \par Patient is not using cpap , now he is using night time oxygen.  patient  able to sleep supine  ,using two pillows to sleep  .\par  blood pressure readings stable\par \par Edema present in BLLE, and home care has been treating his skin. He is non compliant with edema boots. \par could not go for sleep study as patient is claustrophobic \par Patient denies  chest pain or shortness of breath. \par \par lipid profile within normal range done 10/2018  HbA1c 6.3  ,   normal lipid profile \par \par \par \par \par \par

## 2019-03-04 ENCOUNTER — MEDICATION RENEWAL (OUTPATIENT)
Age: 69
End: 2019-03-04

## 2019-03-04 ENCOUNTER — RX RENEWAL (OUTPATIENT)
Age: 69
End: 2019-03-04

## 2019-03-09 ENCOUNTER — TRANSCRIPTION ENCOUNTER (OUTPATIENT)
Age: 69
End: 2019-03-09

## 2019-03-11 ENCOUNTER — APPOINTMENT (OUTPATIENT)
Dept: FAMILY MEDICINE | Facility: CLINIC | Age: 69
End: 2019-03-11
Payer: MEDICARE

## 2019-03-11 VITALS
SYSTOLIC BLOOD PRESSURE: 130 MMHG | BODY MASS INDEX: 34.36 KG/M2 | OXYGEN SATURATION: 93 % | HEART RATE: 70 BPM | DIASTOLIC BLOOD PRESSURE: 70 MMHG | WEIGHT: 240 LBS | HEIGHT: 70 IN

## 2019-03-11 DIAGNOSIS — Z87.09 PERSONAL HISTORY OF OTHER DISEASES OF THE RESPIRATORY SYSTEM: ICD-10-CM

## 2019-03-11 PROCEDURE — 99213 OFFICE O/P EST LOW 20 MIN: CPT

## 2019-03-11 NOTE — HISTORY OF PRESENT ILLNESS
[FreeTextEntry8] : 68 year old male is here for a followup visit after being diagnosed with the flu 3/5/2019 at urgent care.  brought in by aid.\par  Medications and allergies were reviewed and assessed. \par

## 2019-03-11 NOTE — PHYSICAL EXAM
[Well Nourished] : well nourished [Well Developed] : well developed [Regular Rhythm] : with a regular rhythm [Normal S1, S2] : normal S1 and S2 [Non Tender] : non-tender [de-identified] : uses rolling walker [de-identified] : 1 cm nodule on left inner buttock cheek with surrounding redness, no discharge

## 2019-03-11 NOTE — ASSESSMENT
[FreeTextEntry1] : patient diagnosed with flu on 3/5/2019\par completed tamiflu\par \par was quarinteened for five days\par feeling much better\par \par may return to program

## 2019-03-25 ENCOUNTER — APPOINTMENT (OUTPATIENT)
Dept: FAMILY MEDICINE | Facility: CLINIC | Age: 69
End: 2019-03-25
Payer: MEDICARE

## 2019-03-25 VITALS
HEART RATE: 66 BPM | SYSTOLIC BLOOD PRESSURE: 114 MMHG | RESPIRATION RATE: 18 BRPM | HEIGHT: 70 IN | OXYGEN SATURATION: 98 % | DIASTOLIC BLOOD PRESSURE: 66 MMHG | WEIGHT: 240 LBS | BODY MASS INDEX: 34.36 KG/M2

## 2019-03-25 PROCEDURE — 99213 OFFICE O/P EST LOW 20 MIN: CPT

## 2019-04-16 ENCOUNTER — RX RENEWAL (OUTPATIENT)
Age: 69
End: 2019-04-16

## 2019-04-16 ENCOUNTER — MEDICATION RENEWAL (OUTPATIENT)
Age: 69
End: 2019-04-16

## 2019-06-24 ENCOUNTER — MEDICATION RENEWAL (OUTPATIENT)
Age: 69
End: 2019-06-24

## 2019-07-26 ENCOUNTER — APPOINTMENT (OUTPATIENT)
Dept: CARDIOLOGY | Facility: CLINIC | Age: 69
End: 2019-07-26
Payer: MEDICARE

## 2019-07-26 ENCOUNTER — NON-APPOINTMENT (OUTPATIENT)
Age: 69
End: 2019-07-26

## 2019-07-26 VITALS
SYSTOLIC BLOOD PRESSURE: 120 MMHG | BODY MASS INDEX: 33.64 KG/M2 | HEART RATE: 69 BPM | WEIGHT: 235 LBS | HEIGHT: 70 IN | OXYGEN SATURATION: 95 % | DIASTOLIC BLOOD PRESSURE: 79 MMHG

## 2019-07-26 DIAGNOSIS — E78.5 HYPERLIPIDEMIA, UNSPECIFIED: ICD-10-CM

## 2019-07-26 PROCEDURE — 99214 OFFICE O/P EST MOD 30 MIN: CPT | Mod: 25

## 2019-07-26 PROCEDURE — 93000 ELECTROCARDIOGRAM COMPLETE: CPT

## 2019-07-26 NOTE — REASON FOR VISIT
[Follow-Up - Clinic] : a clinic follow-up of [Abnormal ECG] : an abnormal ECG [Cardiomyopathy] : cardiomyopathy [Atrial Fibrillation] : atrial fibrillation [Hyperlipidemia] : hyperlipidemia [Hypertension] : hypertension [Medication Management] : Medication management [FreeTextEntry1] : anemia , unsteady gait falls  [Syncope] : syncope

## 2019-07-26 NOTE — REVIEW OF SYSTEMS
[Chills] : no chills [Eyeglasses] : not currently wearing eyeglasses [Shortness Of Breath] : no shortness of breath [Earache] : no earache [Mouth Sores] : no mouth sores [Palpitations] : no palpitations [Chest Pain] : no chest pain [Abdominal Pain] : no abdominal pain [Cough] : no cough [Urinary Frequency] : no change in urinary frequency [Impotence] : no impotence [Hematuria] : hematuria [Joint Pain] : no joint pain [see HPI] : see HPI [Excessive Thirst] : no polydipsia [Dizziness] : no dizziness [Confusion] : no confusion was observed [Negative] : Heme/Lymph

## 2019-07-26 NOTE — HISTORY OF PRESENT ILLNESS
[FreeTextEntry1] : Patient with obesity, hypertension, chronic atrial fibrillation , chronic systolic heart failure, chronic right ventricular failure , moderate pulmonary hypertension  morbid  obesity came for  routine follow up  says  he is doing well ,  walks with walker \par \par Patient is not using cpap , now he is using night time oxygen.  patient  able to sleep supine  ,using two pillows to sleep  .\par \par \par  blood pressure readings stable  denies any dizziness \par \par Edema present in BLE, and home care has been treating his skin. He is non compliant with edema boots. \par could not go for sleep study as patient is claustrophobic \par Patient denies  chest pain or shortness of breath. \par \par lipid profile within normal range done 3/2019  Hb A1c 6.7 \par \par \par \par \par \par

## 2019-07-26 NOTE — PHYSICAL EXAM
[General Appearance - Well Developed] : well developed [Normal Oral Mucosa] : normal oral mucosa [Normal Conjunctiva] : the conjunctiva exhibited no abnormalities [] : no respiratory distress [Auscultation Breath Sounds / Voice Sounds] : lungs were clear to auscultation bilaterally [Respiration, Rhythm And Depth] : normal respiratory rhythm and effort [Normal] : the heart rate was normal [Heart Sounds] : normal S1 and S2 [Chest Palpation] : palpation of the chest revealed no abnormalities [Irregularly Irregular] : the rhythm was irregularly irregular [Abdomen Mass (___ Cm)] : no abdominal mass palpated [Bowel Sounds] : normal bowel sounds [Abnormal Walk] : normal gait [Cyanosis, Localized] : no localized cyanosis [FreeTextEntry1] : chronic pigmentation  swollen with pressure stockings on , right foot bandage ,  [Normal Appearance] : was normal in appearance [No Anxiety] : not feeling anxious

## 2019-07-30 ENCOUNTER — MEDICATION RENEWAL (OUTPATIENT)
Age: 69
End: 2019-07-30

## 2019-08-13 ENCOUNTER — APPOINTMENT (OUTPATIENT)
Dept: FAMILY MEDICINE | Facility: CLINIC | Age: 69
End: 2019-08-13
Payer: MEDICARE

## 2019-08-13 VITALS
HEART RATE: 67 BPM | SYSTOLIC BLOOD PRESSURE: 120 MMHG | RESPIRATION RATE: 16 BRPM | WEIGHT: 240 LBS | DIASTOLIC BLOOD PRESSURE: 70 MMHG | BODY MASS INDEX: 34.36 KG/M2 | OXYGEN SATURATION: 95 % | HEIGHT: 70 IN

## 2019-08-13 PROCEDURE — 99213 OFFICE O/P EST LOW 20 MIN: CPT

## 2019-08-13 RX ORDER — OSELTAMIVIR PHOSPHATE 75 MG/1
75 CAPSULE ORAL
Qty: 10 | Refills: 0 | Status: DISCONTINUED | COMMUNITY
Start: 2019-03-05

## 2019-08-13 RX ORDER — MUPIROCIN 20 MG/G
2 OINTMENT TOPICAL
Qty: 22 | Refills: 0 | Status: DISCONTINUED | COMMUNITY
Start: 2019-05-21

## 2019-08-13 RX ORDER — CEFADROXIL 500 MG/1
500 CAPSULE ORAL
Qty: 20 | Refills: 0 | Status: DISCONTINUED | COMMUNITY
Start: 2019-05-21

## 2019-08-13 RX ORDER — CLOTRIMAZOLE AND BETAMETHASONE DIPROPIONATE 10; .5 MG/G; MG/G
1-0.05 CREAM TOPICAL
Qty: 90 | Refills: 0 | Status: DISCONTINUED | COMMUNITY
Start: 2019-04-24

## 2019-08-13 RX ORDER — SILVER SULFADIAZINE 10 G/1000G
1 CREAM TOPICAL
Qty: 400 | Refills: 0 | Status: DISCONTINUED | COMMUNITY
Start: 2019-04-24

## 2019-08-13 RX ORDER — NYSTATIN 100000 [USP'U]/G
100000 POWDER TOPICAL
Qty: 120 | Refills: 0 | Status: DISCONTINUED | COMMUNITY
Start: 2019-04-24

## 2019-08-15 LAB — BACTERIA UR CULT: NORMAL

## 2019-09-11 ENCOUNTER — APPOINTMENT (OUTPATIENT)
Dept: FAMILY MEDICINE | Facility: CLINIC | Age: 69
End: 2019-09-11
Payer: MEDICARE

## 2019-09-11 VITALS
HEIGHT: 70 IN | HEART RATE: 57 BPM | DIASTOLIC BLOOD PRESSURE: 70 MMHG | BODY MASS INDEX: 34.36 KG/M2 | SYSTOLIC BLOOD PRESSURE: 100 MMHG | OXYGEN SATURATION: 100 % | RESPIRATION RATE: 16 BRPM | WEIGHT: 240 LBS

## 2019-09-11 DIAGNOSIS — I89.0 LYMPHEDEMA, NOT ELSEWHERE CLASSIFIED: ICD-10-CM

## 2019-09-11 DIAGNOSIS — Z91.81 HISTORY OF FALLING: ICD-10-CM

## 2019-09-11 DIAGNOSIS — R39.9 UNSPECIFIED SYMPTOMS AND SIGNS INVOLVING THE GENITOURINARY SYSTEM: ICD-10-CM

## 2019-09-11 DIAGNOSIS — R26.81 UNSTEADINESS ON FEET: ICD-10-CM

## 2019-09-11 DIAGNOSIS — Z86.79 PERSONAL HISTORY OF OTHER DISEASES OF THE CIRCULATORY SYSTEM: ICD-10-CM

## 2019-09-11 DIAGNOSIS — M79.89 OTHER SPECIFIED SOFT TISSUE DISORDERS: ICD-10-CM

## 2019-09-11 DIAGNOSIS — Z87.898 PERSONAL HISTORY OF OTHER SPECIFIED CONDITIONS: ICD-10-CM

## 2019-09-11 DIAGNOSIS — Z23 ENCOUNTER FOR IMMUNIZATION: ICD-10-CM

## 2019-09-11 DIAGNOSIS — J31.0 CHRONIC RHINITIS: ICD-10-CM

## 2019-09-11 DIAGNOSIS — Z87.39 PERSONAL HISTORY OF OTHER DISEASES OF THE MUSCULOSKELETAL SYSTEM AND CONNECTIVE TISSUE: ICD-10-CM

## 2019-09-11 PROCEDURE — 90670 PCV13 VACCINE IM: CPT

## 2019-09-11 PROCEDURE — G0009: CPT

## 2019-09-11 PROCEDURE — G0439: CPT

## 2019-09-11 RX ORDER — CIPROFLOXACIN HYDROCHLORIDE 250 MG/1
250 TABLET, FILM COATED ORAL
Qty: 10 | Refills: 0 | Status: DISCONTINUED | COMMUNITY
Start: 2019-08-13 | End: 2019-09-11

## 2019-09-11 NOTE — HISTORY OF PRESENT ILLNESS
[Formal Caregiver] : formal caregiver [FreeTextEntry1] : feels fine [de-identified] : 69 year old male who presents today for a complete physical exam.\par had psa recently drawn\par

## 2019-09-11 NOTE — PHYSICAL EXAM
[No Acute Distress] : no acute distress [Well Nourished] : well nourished [Well Developed] : well developed [Well-Appearing] : well-appearing [Normal Sclera/Conjunctiva] : normal sclera/conjunctiva [PERRL] : pupils equal round and reactive to light [EOMI] : extraocular movements intact [Normal Outer Ear/Nose] : the outer ears and nose were normal in appearance [Normal Oropharynx] : the oropharynx was normal [No Lymphadenopathy] : no lymphadenopathy [No JVD] : no jugular venous distention [Thyroid Normal, No Nodules] : the thyroid was normal and there were no nodules present [No Respiratory Distress] : no respiratory distress  [Supple] : supple [No Accessory Muscle Use] : no accessory muscle use [Clear to Auscultation] : lungs were clear to auscultation bilaterally [Normal Rate] : normal rate  [Normal S1, S2] : normal S1 and S2 [No Abdominal Bruit] : a ~M bruit was not heard ~T in the abdomen [No Murmur] : no murmur heard [No Varicosities] : no varicosities [Pedal Pulses Present] : the pedal pulses are present [No Extremity Clubbing/Cyanosis] : no extremity clubbing/cyanosis [No Palpable Aorta] : no palpable aorta [Soft] : abdomen soft [Non Tender] : non-tender [Non-distended] : non-distended [No Masses] : no abdominal mass palpated [No HSM] : no HSM [Normal Bowel Sounds] : normal bowel sounds [Normal Posterior Cervical Nodes] : no posterior cervical lymphadenopathy [Normal Anterior Cervical Nodes] : no anterior cervical lymphadenopathy [No Spinal Tenderness] : no spinal tenderness [No CVA Tenderness] : no CVA  tenderness [No Joint Swelling] : no joint swelling [Grossly Normal Strength/Tone] : grossly normal strength/tone [No Rash] : no rash [Coordination Grossly Intact] : coordination grossly intact [Normal Gait] : normal gait [No Focal Deficits] : no focal deficits [Normal Affect] : the affect was normal [Normal Insight/Judgement] : insight and judgment were intact [Alert and Oriented x3] : oriented to person, place, and time [de-identified] : morbidly obese [de-identified] : irreg hr [de-identified] : naida [de-identified] : left leg edematous and discolored [de-identified] : protuberant [de-identified] : ambulates w/ walker [FreeTextEntry1] : sees uro

## 2019-09-11 NOTE — HEALTH RISK ASSESSMENT
[Good] : ~his/her~  mood as  good [No] : No [0] : 1) Little interest or pleasure doing things: Not at all (0) [Single] : single [Fully functional (bathing, dressing, toileting, transferring, walking, feeding)] : Fully functional (bathing, dressing, toileting, transferring, walking, feeding) [Feels Safe at Home] : Feels safe at home [Smoke Detector] : smoke detector [Carbon Monoxide Detector] : carbon monoxide detector [Patient declined colonoscopy] : Patient declined colonoscopy [HIV test declined] : HIV test declined [Hepatitis C test declined] : Hepatitis C test declined [Seat Belt] :  uses seat belt [Sunscreen] : uses sunscreen [] : No [de-identified] : cardio [de-identified] : hx of social smoker [Change in mental status noted] : No change in mental status noted [Language] : denies difficulty with language [Sexually Active] : not sexually active [Reports changes in vision] : Reports no changes in vision [High Risk Behavior] : no high risk behavior [Reports changes in dental health] : Reports no changes in dental health [Guns at Home] : no guns at home [Travel to Developing Areas] : does not  travel to developing areas [TB Exposure] : is not being exposed to tuberculosis [de-identified] : receives assistance at the group home [de-identified] : group home

## 2019-09-12 ENCOUNTER — MEDICATION RENEWAL (OUTPATIENT)
Age: 69
End: 2019-09-12

## 2019-10-07 ENCOUNTER — APPOINTMENT (OUTPATIENT)
Dept: FAMILY MEDICINE | Facility: CLINIC | Age: 69
End: 2019-10-07
Payer: MEDICARE

## 2019-10-07 VITALS
HEIGHT: 70 IN | OXYGEN SATURATION: 94 % | HEART RATE: 78 BPM | SYSTOLIC BLOOD PRESSURE: 82 MMHG | RESPIRATION RATE: 18 BRPM | DIASTOLIC BLOOD PRESSURE: 54 MMHG | BODY MASS INDEX: 34.22 KG/M2 | WEIGHT: 239 LBS

## 2019-10-07 DIAGNOSIS — Z23 ENCOUNTER FOR IMMUNIZATION: ICD-10-CM

## 2019-10-07 PROCEDURE — G0008: CPT

## 2019-10-07 PROCEDURE — 90686 IIV4 VACC NO PRSV 0.5 ML IM: CPT

## 2019-10-07 PROCEDURE — 99213 OFFICE O/P EST LOW 20 MIN: CPT | Mod: 25

## 2019-10-07 NOTE — PHYSICAL EXAM
[Well Nourished] : well nourished [Well Developed] : well developed [Regular Rhythm] : with a regular rhythm [Normal S1, S2] : normal S1 and S2 [de-identified] : uses rolling walker [Non Tender] : non-tender [de-identified] : 1 cm nodule on left inner buttock cheek with surrounding redness, no discharge

## 2019-10-07 NOTE — ASSESSMENT
[FreeTextEntry1] : Patient was given a vaccine and was counseled regarding all side affects. Patient was advised to ice the area if necessary if it is tender or red. Patient was told to return to office if has any fever, nausea, vomiting or increased pain.\par flu shot given\par \par right arm discloated - going to PT next week

## 2019-10-07 NOTE — HISTORY OF PRESENT ILLNESS
[FreeTextEntry8] : 68 year old male is here for a followup visit and for a flu shot.  brought in by aid.\par  Medications and allergies were reviewed and assessed. \par

## 2019-12-05 ENCOUNTER — APPOINTMENT (OUTPATIENT)
Dept: CARDIOLOGY | Facility: CLINIC | Age: 69
End: 2019-12-05
Payer: MEDICARE

## 2019-12-05 ENCOUNTER — NON-APPOINTMENT (OUTPATIENT)
Age: 69
End: 2019-12-05

## 2019-12-05 VITALS — OXYGEN SATURATION: 95 % | DIASTOLIC BLOOD PRESSURE: 77 MMHG | SYSTOLIC BLOOD PRESSURE: 118 MMHG | HEART RATE: 90 BPM

## 2019-12-05 VITALS — HEIGHT: 70 IN | BODY MASS INDEX: 34.36 KG/M2 | WEIGHT: 240 LBS

## 2019-12-05 PROCEDURE — 99214 OFFICE O/P EST MOD 30 MIN: CPT

## 2019-12-05 PROCEDURE — 93000 ELECTROCARDIOGRAM COMPLETE: CPT

## 2019-12-05 NOTE — DISCUSSION/SUMMARY
[Atrial Fibrillation] : atrial fibrillation [Cardiomyopathy] : cardiomyopathy [Improving] : improving [Medication Changes Per Orders] : as documented in orders [Hyperlipidemia] : hyperlipidemia [None] : none [Diet Modification] : diet modification [Exercise] : exercise [Hypertension] : hypertension [Stable] : stable [Weight Loss] : weight loss [Exercise Regimen] : an exercise regimen [Sodium Restriction] : sodium restriction [FreeTextEntry1] : \par

## 2019-12-05 NOTE — REVIEW OF SYSTEMS
[Eyeglasses] : currently wearing eyeglasses [see HPI] : see HPI [Negative] : Psychiatric [Earache] : no earache [Chills] : no chills [Chest Pain] : no chest pain [Shortness Of Breath] : no shortness of breath [Mouth Sores] : no mouth sores [Palpitations] : no palpitations [Cough] : no cough [Abdominal Pain] : no abdominal pain [Urinary Frequency] : no change in urinary frequency [Hematuria] : no hematuria [Joint Pain] : no joint pain [Impotence] : no impotence [Dizziness] : no dizziness [Confusion] : no confusion was observed [Excessive Thirst] : no polydipsia

## 2019-12-05 NOTE — REASON FOR VISIT
[Follow-Up - Clinic] : a clinic follow-up of [Cardiomyopathy] : cardiomyopathy [Atrial Fibrillation] : atrial fibrillation [Abnormal ECG] : an abnormal ECG [Hypertension] : hypertension [Medication Management] : Medication management [Hyperlipidemia] : hyperlipidemia [Syncope] : syncope [FreeTextEntry1] : anemia , unsteady gait falls

## 2019-12-05 NOTE — HISTORY OF PRESENT ILLNESS
[FreeTextEntry1] : Patient with obesity, hypertension, chronic atrial fibrillation , chronic systolic heart failure, chronic right ventricular failure , moderate pulmonary hypertension  morbid  obesity came for  routine follow up  says  he is doing well ,  walks with walker \par \par Patient is not using cpap , now he is using night time oxygen PRN  patient  able to sleep supine ,  still using two pillows to sleep  .\par \par  blood pressure readings stable  denies any dizziness \par \par Edema present in BLE, and home care has been treating his skin. He is non compliant with edema boots. Uses the compression stocking \par could not go for sleep study as patient is claustrophobic \par Patient denies  chest pain or shortness of breath. \par \par lipid profile within normal range done 9/2019  Hb A1c 6.6, follows up with endocrine Dr Son\par \par \par \par \par \par

## 2019-12-05 NOTE — PHYSICAL EXAM
[General Appearance - Well Developed] : well developed [Respiration, Rhythm And Depth] : normal respiratory rhythm and effort [] : no respiratory distress [Normal Conjunctiva] : the conjunctiva exhibited no abnormalities [Normal Oral Mucosa] : normal oral mucosa [Heart Sounds] : normal S1 and S2 [Chest Palpation] : palpation of the chest revealed no abnormalities [Auscultation Breath Sounds / Voice Sounds] : lungs were clear to auscultation bilaterally [Irregularly Irregular] : the rhythm was irregularly irregular [Normal] : the heart rate was normal [Abdomen Mass (___ Cm)] : no abdominal mass palpated [Bowel Sounds] : normal bowel sounds [Cyanosis, Localized] : no localized cyanosis [Abnormal Walk] : normal gait [No Anxiety] : not feeling anxious [Normal Appearance] : was normal in appearance [FreeTextEntry1] : chronic pigmentation  swollen with pressure stockings on ,

## 2020-01-01 ENCOUNTER — TRANSCRIPTION ENCOUNTER (OUTPATIENT)
Age: 70
End: 2020-01-01

## 2020-01-01 ENCOUNTER — APPOINTMENT (OUTPATIENT)
Dept: NEPHROLOGY | Facility: CLINIC | Age: 70
End: 2020-01-01
Payer: MEDICARE

## 2020-01-01 ENCOUNTER — APPOINTMENT (OUTPATIENT)
Dept: FAMILY MEDICINE | Facility: CLINIC | Age: 70
End: 2020-01-01

## 2020-01-01 ENCOUNTER — INPATIENT (INPATIENT)
Facility: HOSPITAL | Age: 70
LOS: 5 days | Discharge: HOME | DRG: 872 | End: 2020-11-02
Attending: HOSPITALIST | Admitting: FAMILY MEDICINE
Payer: MEDICARE

## 2020-01-01 ENCOUNTER — RESULT REVIEW (OUTPATIENT)
Age: 70
End: 2020-01-01

## 2020-01-01 ENCOUNTER — APPOINTMENT (OUTPATIENT)
Dept: FAMILY MEDICINE | Facility: CLINIC | Age: 70
End: 2020-01-01
Payer: MEDICARE

## 2020-01-01 ENCOUNTER — APPOINTMENT (OUTPATIENT)
Dept: CARDIOLOGY | Facility: CLINIC | Age: 70
End: 2020-01-01

## 2020-01-01 ENCOUNTER — INPATIENT (INPATIENT)
Facility: HOSPITAL | Age: 70
LOS: 20 days | Discharge: NURSING, REHAB OR LONG-TERM CARE | DRG: 871 | End: 2020-12-29
Attending: FAMILY MEDICINE | Admitting: FAMILY MEDICINE
Payer: MEDICARE

## 2020-01-01 ENCOUNTER — OUTPATIENT (OUTPATIENT)
Dept: OUTPATIENT SERVICES | Facility: HOSPITAL | Age: 70
LOS: 1 days | End: 2020-01-01
Payer: MEDICARE

## 2020-01-01 ENCOUNTER — INPATIENT (INPATIENT)
Facility: HOSPITAL | Age: 70
LOS: 6 days | Discharge: HOME | DRG: 644 | End: 2020-11-12
Attending: FAMILY MEDICINE | Admitting: INTERNAL MEDICINE
Payer: MEDICARE

## 2020-01-01 ENCOUNTER — OUTPATIENT (OUTPATIENT)
Dept: OUTPATIENT SERVICES | Facility: HOSPITAL | Age: 70
LOS: 1 days | End: 2020-01-01

## 2020-01-01 ENCOUNTER — APPOINTMENT (OUTPATIENT)
Dept: CARDIOLOGY | Facility: CLINIC | Age: 70
End: 2020-01-01
Payer: MEDICARE

## 2020-01-01 ENCOUNTER — INPATIENT (INPATIENT)
Facility: HOSPITAL | Age: 70
LOS: 8 days | Discharge: NURSING, REHAB OR LONG-TERM CARE | DRG: 871 | End: 2020-04-24
Attending: FAMILY MEDICINE | Admitting: FAMILY MEDICINE
Payer: MEDICARE

## 2020-01-01 ENCOUNTER — EMERGENCY (EMERGENCY)
Facility: HOSPITAL | Age: 70
LOS: 1 days | Discharge: HOME | End: 2020-01-01
Attending: EMERGENCY MEDICINE
Payer: MEDICARE

## 2020-01-01 ENCOUNTER — INPATIENT (INPATIENT)
Facility: HOSPITAL | Age: 70
LOS: 13 days | Discharge: HOME | DRG: 871 | End: 2020-09-03
Attending: STUDENT IN AN ORGANIZED HEALTH CARE EDUCATION/TRAINING PROGRAM | Admitting: STUDENT IN AN ORGANIZED HEALTH CARE EDUCATION/TRAINING PROGRAM
Payer: MEDICARE

## 2020-01-01 ENCOUNTER — NON-APPOINTMENT (OUTPATIENT)
Age: 70
End: 2020-01-01

## 2020-01-01 ENCOUNTER — INPATIENT (INPATIENT)
Facility: HOSPITAL | Age: 70
LOS: 3 days | Discharge: HOME | DRG: 177 | End: 2020-11-18
Attending: INTERNAL MEDICINE | Admitting: INTERNAL MEDICINE
Payer: MEDICARE

## 2020-01-01 ENCOUNTER — INPATIENT (INPATIENT)
Facility: HOSPITAL | Age: 70
LOS: 10 days | Discharge: NURSING, REHAB OR LONG-TERM CARE | DRG: 682 | End: 2020-09-28
Admitting: INTERNAL MEDICINE
Payer: MEDICARE

## 2020-01-01 ENCOUNTER — LABORATORY RESULT (OUTPATIENT)
Age: 70
End: 2020-01-01

## 2020-01-01 VITALS
OXYGEN SATURATION: 96 % | TEMPERATURE: 98 F | HEART RATE: 94 BPM | RESPIRATION RATE: 18 BRPM | SYSTOLIC BLOOD PRESSURE: 121 MMHG | DIASTOLIC BLOOD PRESSURE: 79 MMHG

## 2020-01-01 VITALS
OXYGEN SATURATION: 98 % | HEART RATE: 67 BPM | WEIGHT: 238 LBS | DIASTOLIC BLOOD PRESSURE: 70 MMHG | HEIGHT: 70 IN | TEMPERATURE: 97.6 F | SYSTOLIC BLOOD PRESSURE: 100 MMHG | BODY MASS INDEX: 34.07 KG/M2

## 2020-01-01 VITALS
WEIGHT: 229.94 LBS | HEIGHT: 69 IN | RESPIRATION RATE: 20 BRPM | OXYGEN SATURATION: 96 % | DIASTOLIC BLOOD PRESSURE: 67 MMHG | HEART RATE: 78 BPM | SYSTOLIC BLOOD PRESSURE: 133 MMHG | TEMPERATURE: 98 F

## 2020-01-01 VITALS
HEIGHT: 69 IN | TEMPERATURE: 98 F | RESPIRATION RATE: 18 BRPM | SYSTOLIC BLOOD PRESSURE: 123 MMHG | OXYGEN SATURATION: 97 % | HEART RATE: 77 BPM | DIASTOLIC BLOOD PRESSURE: 82 MMHG

## 2020-01-01 VITALS
HEART RATE: 78 BPM | OXYGEN SATURATION: 97 % | HEIGHT: 69 IN | TEMPERATURE: 91 F | DIASTOLIC BLOOD PRESSURE: 74 MMHG | RESPIRATION RATE: 19 BRPM | SYSTOLIC BLOOD PRESSURE: 117 MMHG | WEIGHT: 229.94 LBS

## 2020-01-01 VITALS
OXYGEN SATURATION: 98 % | DIASTOLIC BLOOD PRESSURE: 81 MMHG | SYSTOLIC BLOOD PRESSURE: 140 MMHG | HEART RATE: 75 BPM | WEIGHT: 184.97 LBS | HEIGHT: 69 IN | RESPIRATION RATE: 18 BRPM | TEMPERATURE: 98 F

## 2020-01-01 VITALS
WEIGHT: 229.94 LBS | SYSTOLIC BLOOD PRESSURE: 130 MMHG | DIASTOLIC BLOOD PRESSURE: 80 MMHG | RESPIRATION RATE: 16 BRPM | HEIGHT: 69 IN | TEMPERATURE: 97 F | OXYGEN SATURATION: 94 % | HEART RATE: 68 BPM

## 2020-01-01 VITALS
OXYGEN SATURATION: 97 % | HEART RATE: 87 BPM | SYSTOLIC BLOOD PRESSURE: 107 MMHG | TEMPERATURE: 98 F | DIASTOLIC BLOOD PRESSURE: 63 MMHG | RESPIRATION RATE: 18 BRPM

## 2020-01-01 VITALS
TEMPERATURE: 85 F | WEIGHT: 231.93 LBS | SYSTOLIC BLOOD PRESSURE: 73 MMHG | RESPIRATION RATE: 18 BRPM | DIASTOLIC BLOOD PRESSURE: 46 MMHG | HEART RATE: 47 BPM

## 2020-01-01 VITALS — HEIGHT: 70 IN | DIASTOLIC BLOOD PRESSURE: 76 MMHG | SYSTOLIC BLOOD PRESSURE: 102 MMHG

## 2020-01-01 VITALS
DIASTOLIC BLOOD PRESSURE: 79 MMHG | TEMPERATURE: 98 F | SYSTOLIC BLOOD PRESSURE: 126 MMHG | RESPIRATION RATE: 20 BRPM | HEART RATE: 101 BPM

## 2020-01-01 VITALS
OXYGEN SATURATION: 96 % | RESPIRATION RATE: 20 BRPM | HEART RATE: 82 BPM | DIASTOLIC BLOOD PRESSURE: 68 MMHG | TEMPERATURE: 98 F | SYSTOLIC BLOOD PRESSURE: 104 MMHG

## 2020-01-01 VITALS
RESPIRATION RATE: 20 BRPM | HEIGHT: 70 IN | HEART RATE: 85 BPM | WEIGHT: 240 LBS | DIASTOLIC BLOOD PRESSURE: 60 MMHG | OXYGEN SATURATION: 97 % | SYSTOLIC BLOOD PRESSURE: 88 MMHG | BODY MASS INDEX: 34.36 KG/M2

## 2020-01-01 VITALS
OXYGEN SATURATION: 97 % | SYSTOLIC BLOOD PRESSURE: 96 MMHG | DIASTOLIC BLOOD PRESSURE: 63 MMHG | RESPIRATION RATE: 17 BRPM | TEMPERATURE: 98 F | HEART RATE: 65 BPM

## 2020-01-01 VITALS
HEART RATE: 74 BPM | DIASTOLIC BLOOD PRESSURE: 84 MMHG | RESPIRATION RATE: 19 BRPM | TEMPERATURE: 98 F | OXYGEN SATURATION: 92 % | SYSTOLIC BLOOD PRESSURE: 131 MMHG

## 2020-01-01 VITALS
HEART RATE: 64 BPM | DIASTOLIC BLOOD PRESSURE: 82 MMHG | RESPIRATION RATE: 20 BRPM | OXYGEN SATURATION: 95 % | TEMPERATURE: 98 F | SYSTOLIC BLOOD PRESSURE: 130 MMHG

## 2020-01-01 VITALS
BODY MASS INDEX: 34.07 KG/M2 | HEART RATE: 75 BPM | HEIGHT: 70 IN | WEIGHT: 238 LBS | SYSTOLIC BLOOD PRESSURE: 137 MMHG | DIASTOLIC BLOOD PRESSURE: 74 MMHG | OXYGEN SATURATION: 99 %

## 2020-01-01 VITALS
DIASTOLIC BLOOD PRESSURE: 74 MMHG | HEART RATE: 69 BPM | TEMPERATURE: 98 F | RESPIRATION RATE: 18 BRPM | SYSTOLIC BLOOD PRESSURE: 114 MMHG

## 2020-01-01 VITALS
HEART RATE: 84 BPM | TEMPERATURE: 90 F | OXYGEN SATURATION: 100 % | WEIGHT: 184.97 LBS | RESPIRATION RATE: 18 BRPM | DIASTOLIC BLOOD PRESSURE: 84 MMHG | HEIGHT: 69 IN | SYSTOLIC BLOOD PRESSURE: 131 MMHG

## 2020-01-01 VITALS
OXYGEN SATURATION: 96 % | HEART RATE: 91 BPM | WEIGHT: 238 LBS | BODY MASS INDEX: 34.07 KG/M2 | SYSTOLIC BLOOD PRESSURE: 102 MMHG | TEMPERATURE: 98.4 F | DIASTOLIC BLOOD PRESSURE: 82 MMHG | HEIGHT: 70 IN | RESPIRATION RATE: 16 BRPM

## 2020-01-01 VITALS
DIASTOLIC BLOOD PRESSURE: 61 MMHG | TEMPERATURE: 98 F | SYSTOLIC BLOOD PRESSURE: 115 MMHG | RESPIRATION RATE: 18 BRPM | HEART RATE: 85 BPM

## 2020-01-01 VITALS
HEART RATE: 90 BPM | TEMPERATURE: 97.6 F | SYSTOLIC BLOOD PRESSURE: 110 MMHG | OXYGEN SATURATION: 96 % | DIASTOLIC BLOOD PRESSURE: 70 MMHG

## 2020-01-01 VITALS
SYSTOLIC BLOOD PRESSURE: 114 MMHG | RESPIRATION RATE: 16 BRPM | OXYGEN SATURATION: 96 % | DIASTOLIC BLOOD PRESSURE: 82 MMHG | TEMPERATURE: 98 F | HEART RATE: 90 BPM

## 2020-01-01 VITALS
OXYGEN SATURATION: 96 % | HEART RATE: 104 BPM | SYSTOLIC BLOOD PRESSURE: 121 MMHG | DIASTOLIC BLOOD PRESSURE: 89 MMHG | RESPIRATION RATE: 16 BRPM | TEMPERATURE: 98 F

## 2020-01-01 VITALS
OXYGEN SATURATION: 96 % | DIASTOLIC BLOOD PRESSURE: 60 MMHG | SYSTOLIC BLOOD PRESSURE: 105 MMHG | HEART RATE: 61 BPM | RESPIRATION RATE: 16 BRPM | HEIGHT: 70 IN

## 2020-01-01 VITALS
SYSTOLIC BLOOD PRESSURE: 119 MMHG | RESPIRATION RATE: 18 BRPM | TEMPERATURE: 98 F | DIASTOLIC BLOOD PRESSURE: 78 MMHG | OXYGEN SATURATION: 97 % | HEART RATE: 73 BPM

## 2020-01-01 VITALS
RESPIRATION RATE: 16 BRPM | HEIGHT: 69 IN | DIASTOLIC BLOOD PRESSURE: 62 MMHG | HEART RATE: 88 BPM | OXYGEN SATURATION: 99 % | SYSTOLIC BLOOD PRESSURE: 109 MMHG

## 2020-01-01 DIAGNOSIS — I50.9 HEART FAILURE, UNSPECIFIED: ICD-10-CM

## 2020-01-01 DIAGNOSIS — J12.81 PNEUMONIA DUE TO SARS-ASSOCIATED CORONAVIRUS: ICD-10-CM

## 2020-01-01 DIAGNOSIS — D64.9 ANEMIA, UNSPECIFIED: ICD-10-CM

## 2020-01-01 DIAGNOSIS — I48.91 UNSPECIFIED ATRIAL FIBRILLATION: ICD-10-CM

## 2020-01-01 DIAGNOSIS — T68.XXXA HYPOTHERMIA, INITIAL ENCOUNTER: ICD-10-CM

## 2020-01-01 DIAGNOSIS — R78.81 BACTEREMIA: ICD-10-CM

## 2020-01-01 DIAGNOSIS — I10 ESSENTIAL (PRIMARY) HYPERTENSION: ICD-10-CM

## 2020-01-01 DIAGNOSIS — Z09 ENCOUNTER FOR FOLLOW-UP EXAMINATION AFTER COMPLETED TREATMENT FOR CONDITIONS OTHER THAN MALIGNANT NEOPLASM: ICD-10-CM

## 2020-01-01 DIAGNOSIS — E11.65 TYPE 2 DIABETES MELLITUS WITH HYPERGLYCEMIA: ICD-10-CM

## 2020-01-01 DIAGNOSIS — R39.81 FUNCTIONAL URINARY INCONTINENCE: ICD-10-CM

## 2020-01-01 DIAGNOSIS — Z90.49 ACQUIRED ABSENCE OF OTHER SPECIFIED PARTS OF DIGESTIVE TRACT: Chronic | ICD-10-CM

## 2020-01-01 DIAGNOSIS — R79.89 OTHER SPECIFIED ABNORMAL FINDINGS OF BLOOD CHEMISTRY: ICD-10-CM

## 2020-01-01 DIAGNOSIS — D63.8 ANEMIA IN OTHER CHRONIC DISEASES CLASSIFIED ELSEWHERE: ICD-10-CM

## 2020-01-01 DIAGNOSIS — A41.9 SEPSIS, UNSPECIFIED ORGANISM: ICD-10-CM

## 2020-01-01 DIAGNOSIS — I42.9 CARDIOMYOPATHY, UNSPECIFIED: ICD-10-CM

## 2020-01-01 DIAGNOSIS — N39.0 URINARY TRACT INFECTION, SITE NOT SPECIFIED: ICD-10-CM

## 2020-01-01 DIAGNOSIS — E11.9 TYPE 2 DIABETES MELLITUS WITHOUT COMPLICATIONS: ICD-10-CM

## 2020-01-01 DIAGNOSIS — S00.81XA ABRASION OF OTHER PART OF HEAD, INITIAL ENCOUNTER: ICD-10-CM

## 2020-01-01 DIAGNOSIS — N40.0 BENIGN PROSTATIC HYPERPLASIA WITHOUT LOWER URINARY TRACT SYMPMS: ICD-10-CM

## 2020-01-01 DIAGNOSIS — R19.5 OTHER FECAL ABNORMALITIES: ICD-10-CM

## 2020-01-01 DIAGNOSIS — J18.9 PNEUMONIA, UNSPECIFIED ORGANISM: ICD-10-CM

## 2020-01-01 DIAGNOSIS — N19 UNSPECIFIED KIDNEY FAILURE: ICD-10-CM

## 2020-01-01 DIAGNOSIS — D50.9 IRON DEFICIENCY ANEMIA, UNSPECIFIED: ICD-10-CM

## 2020-01-01 DIAGNOSIS — S31.809A UNSPECIFIED OPEN WOUND OF UNSPECIFIED BUTTOCK, INITIAL ENCOUNTER: ICD-10-CM

## 2020-01-01 DIAGNOSIS — N17.9 ACUTE KIDNEY FAILURE, UNSPECIFIED: ICD-10-CM
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
## 2020-01-01 DIAGNOSIS — R53.81 OTHER MALAISE: ICD-10-CM

## 2020-01-01 DIAGNOSIS — Z01.818 ENCOUNTER FOR OTHER PREPROCEDURAL EXAMINATION: ICD-10-CM

## 2020-01-01 DIAGNOSIS — R71.0 PRECIPITOUS DROP IN HEMATOCRIT: ICD-10-CM

## 2020-01-01 DIAGNOSIS — G90.9 DISORDER OF THE AUTONOMIC NERVOUS SYSTEM, UNSPECIFIED: ICD-10-CM

## 2020-01-01 DIAGNOSIS — N40.0 BENIGN PROSTATIC HYPERPLASIA WITHOUT LOWER URINARY TRACT SYMPTOMS: ICD-10-CM

## 2020-01-01 DIAGNOSIS — D41.4 NEOPLASM OF UNCERTAIN BEHAVIOR OF BLADDER: ICD-10-CM

## 2020-01-01 DIAGNOSIS — U07.1 COVID-19: ICD-10-CM

## 2020-01-01 DIAGNOSIS — Z82.49 FAMILY HISTORY OF ISCHEMIC HEART DISEASE AND OTHER DISEASES OF THE CIRCULATORY SYSTEM: ICD-10-CM

## 2020-01-01 DIAGNOSIS — R21 RASH AND OTHER NONSPECIFIC SKIN ERUPTION: ICD-10-CM

## 2020-01-01 DIAGNOSIS — L97.909 VARICOSE VEINS OF UNSPECIFIED LOWER EXTREMITY WITH ULCER OF UNSPECIFIED SITE: ICD-10-CM

## 2020-01-01 DIAGNOSIS — I50.22 CHRONIC SYSTOLIC (CONGESTIVE) HEART FAILURE: ICD-10-CM

## 2020-01-01 DIAGNOSIS — I95.9 HYPOTENSION, UNSPECIFIED: ICD-10-CM

## 2020-01-01 DIAGNOSIS — R31.9 HEMATURIA, UNSPECIFIED: ICD-10-CM

## 2020-01-01 DIAGNOSIS — B34.2 CORONAVIRUS INFECTION, UNSPECIFIED: ICD-10-CM

## 2020-01-01 DIAGNOSIS — L97.509 NON-PRESSURE CHRONIC ULCER OF OTHER PART OF UNSPECIFIED FOOT WITH UNSPECIFIED SEVERITY: ICD-10-CM

## 2020-01-01 DIAGNOSIS — L03.115 CELLULITIS OF RIGHT LOWER LIMB: ICD-10-CM

## 2020-01-01 DIAGNOSIS — Z80.1 FAMILY HISTORY OF MALIGNANT NEOPLASM OF TRACHEA, BRONCHUS AND LUNG: ICD-10-CM

## 2020-01-01 DIAGNOSIS — E11.59 TYPE 2 DIABETES MELLITUS WITH OTHER CIRCULATORY COMPLICATIONS: ICD-10-CM

## 2020-01-01 DIAGNOSIS — N18.32 CHRONIC KIDNEY DISEASE, STAGE 3B: ICD-10-CM

## 2020-01-01 DIAGNOSIS — E11.9 TYPE 2 DIABETES MELLITUS W/OUT COMPLICATIONS: ICD-10-CM

## 2020-01-01 DIAGNOSIS — D69.6 THROMBOCYTOPENIA, UNSPECIFIED: ICD-10-CM

## 2020-01-01 DIAGNOSIS — R31.0 GROSS HEMATURIA: ICD-10-CM

## 2020-01-01 DIAGNOSIS — E87.5 HYPERKALEMIA: ICD-10-CM

## 2020-01-01 DIAGNOSIS — I87.2 VENOUS INSUFFICIENCY (CHRONIC) (PERIPHERAL): ICD-10-CM

## 2020-01-01 DIAGNOSIS — Z00.00 ENCOUNTER FOR GENERAL ADULT MEDICAL EXAMINATION W/OUT ABNORMAL FINDINGS: ICD-10-CM

## 2020-01-01 DIAGNOSIS — E87.2 ACIDOSIS: ICD-10-CM

## 2020-01-01 DIAGNOSIS — S91.109A UNSPECIFIED OPEN WOUND OF UNSPECIFIED TOE(S) W/OUT DAMAGE TO NAIL, INITIAL ENCOUNTER: ICD-10-CM

## 2020-01-01 DIAGNOSIS — I83.009 VARICOSE VEINS OF UNSPECIFIED LOWER EXTREMITY WITH ULCER OF UNSPECIFIED SITE: ICD-10-CM

## 2020-01-01 DIAGNOSIS — N30.90 CYSTITIS, UNSPECIFIED WITHOUT HEMATURIA: ICD-10-CM

## 2020-01-01 LAB
-  AMIKACIN: SIGNIFICANT CHANGE UP
-  AMOXICILLIN/CLAVULANIC ACID: SIGNIFICANT CHANGE UP
-  AMPICILLIN/SULBACTAM: SIGNIFICANT CHANGE UP
-  AMPICILLIN: SIGNIFICANT CHANGE UP
-  AZTREONAM: SIGNIFICANT CHANGE UP
-  CEFAZOLIN: SIGNIFICANT CHANGE UP
-  CEFEPIME: SIGNIFICANT CHANGE UP
-  CEFOXITIN: SIGNIFICANT CHANGE UP
-  CEFTAZIDIME: SIGNIFICANT CHANGE UP
-  CEFTRIAXONE: SIGNIFICANT CHANGE UP
-  CIPROFLOXACIN: SIGNIFICANT CHANGE UP
-  ERTAPENEM: SIGNIFICANT CHANGE UP
-  GENTAMICIN: SIGNIFICANT CHANGE UP
-  IMIPENEM: SIGNIFICANT CHANGE UP
-  IMIPENEM: SIGNIFICANT CHANGE UP
-  LEVOFLOXACIN: SIGNIFICANT CHANGE UP
-  MEROPENEM: SIGNIFICANT CHANGE UP
-  NITROFURANTOIN: SIGNIFICANT CHANGE UP
-  PIPERACILLIN/TAZOBACTAM: SIGNIFICANT CHANGE UP
-  STREPTOCOCCUS SP. (NOT GRP A, B OR S PNEUMONIAE): SIGNIFICANT CHANGE UP
-  TETRACYCLINE: SIGNIFICANT CHANGE UP
-  TOBRAMYCIN: SIGNIFICANT CHANGE UP
-  TRIMETHOPRIM/SULFAMETHOXAZOLE: SIGNIFICANT CHANGE UP
-  VANCOMYCIN: SIGNIFICANT CHANGE UP
A1C WITH ESTIMATED AVERAGE GLUCOSE RESULT: 6.1 % — HIGH (ref 4–5.6)
A1C WITH ESTIMATED AVERAGE GLUCOSE RESULT: 6.2 % — HIGH (ref 4–5.6)
A1C WITH ESTIMATED AVERAGE GLUCOSE RESULT: 7.4 % — HIGH (ref 4–5.6)
A1C WITH ESTIMATED AVERAGE GLUCOSE RESULT: 7.7 % — HIGH (ref 4–5.6)
ACANTHOCYTES BLD QL SMEAR: SLIGHT — SIGNIFICANT CHANGE UP
ACTH SER-ACNC: 24.1 PG/ML — SIGNIFICANT CHANGE UP (ref 7.2–63.3)
ACTH STIM ACTH BASELINE: 15.3 PG/ML — SIGNIFICANT CHANGE UP (ref 7.2–63.3)
ALBUMIN SERPL ELPH-MCNC: 2 G/DL — LOW (ref 3.3–5.2)
ALBUMIN SERPL ELPH-MCNC: 2.1 G/DL — LOW (ref 3.3–5)
ALBUMIN SERPL ELPH-MCNC: 2.1 G/DL — LOW (ref 3.3–5)
ALBUMIN SERPL ELPH-MCNC: 2.3 G/DL — LOW (ref 3.3–5)
ALBUMIN SERPL ELPH-MCNC: 2.3 G/DL — LOW (ref 3.3–5.2)
ALBUMIN SERPL ELPH-MCNC: 2.4 G/DL — LOW (ref 3.3–5)
ALBUMIN SERPL ELPH-MCNC: 2.5 G/DL — LOW (ref 3.3–5)
ALBUMIN SERPL ELPH-MCNC: 2.5 G/DL — LOW (ref 3.3–5.2)
ALBUMIN SERPL ELPH-MCNC: 2.7 G/DL — LOW (ref 3.3–5)
ALBUMIN SERPL ELPH-MCNC: 2.7 G/DL — LOW (ref 3.3–5.2)
ALBUMIN SERPL ELPH-MCNC: 2.8 G/DL — LOW (ref 3.3–5)
ALBUMIN SERPL ELPH-MCNC: 2.9 G/DL — LOW (ref 3.3–5.2)
ALBUMIN SERPL ELPH-MCNC: 2.9 G/DL — LOW (ref 3.3–5.2)
ALBUMIN SERPL ELPH-MCNC: 3 G/DL — LOW (ref 3.3–5.2)
ALBUMIN SERPL ELPH-MCNC: 3.2 G/DL
ALBUMIN SERPL ELPH-MCNC: 3.2 G/DL — LOW (ref 3.3–5.2)
ALP BLD-CCNC: 113 U/L
ALP SERPL-CCNC: 102 U/L — SIGNIFICANT CHANGE UP (ref 40–120)
ALP SERPL-CCNC: 104 U/L — SIGNIFICANT CHANGE UP (ref 40–120)
ALP SERPL-CCNC: 106 U/L — SIGNIFICANT CHANGE UP (ref 40–120)
ALP SERPL-CCNC: 108 U/L — SIGNIFICANT CHANGE UP (ref 40–120)
ALP SERPL-CCNC: 108 U/L — SIGNIFICANT CHANGE UP (ref 40–120)
ALP SERPL-CCNC: 109 U/L — SIGNIFICANT CHANGE UP (ref 40–120)
ALP SERPL-CCNC: 112 U/L — SIGNIFICANT CHANGE UP (ref 40–120)
ALP SERPL-CCNC: 115 U/L — SIGNIFICANT CHANGE UP (ref 40–120)
ALP SERPL-CCNC: 135 U/L — HIGH (ref 40–120)
ALP SERPL-CCNC: 151 U/L — HIGH (ref 40–120)
ALP SERPL-CCNC: 71 U/L — SIGNIFICANT CHANGE UP (ref 40–120)
ALP SERPL-CCNC: 73 U/L — SIGNIFICANT CHANGE UP (ref 40–120)
ALP SERPL-CCNC: 73 U/L — SIGNIFICANT CHANGE UP (ref 40–120)
ALP SERPL-CCNC: 81 U/L — SIGNIFICANT CHANGE UP (ref 40–120)
ALP SERPL-CCNC: 86 U/L — SIGNIFICANT CHANGE UP (ref 40–120)
ALP SERPL-CCNC: 92 U/L — SIGNIFICANT CHANGE UP (ref 40–120)
ALP SERPL-CCNC: 92 U/L — SIGNIFICANT CHANGE UP (ref 40–120)
ALP SERPL-CCNC: 95 U/L — SIGNIFICANT CHANGE UP (ref 40–120)
ALP SERPL-CCNC: 95 U/L — SIGNIFICANT CHANGE UP (ref 40–120)
ALP SERPL-CCNC: 97 U/L — SIGNIFICANT CHANGE UP (ref 40–120)
ALP SERPL-CCNC: 97 U/L — SIGNIFICANT CHANGE UP (ref 40–120)
ALT FLD-CCNC: 14 U/L — SIGNIFICANT CHANGE UP
ALT FLD-CCNC: 16 U/L — SIGNIFICANT CHANGE UP
ALT FLD-CCNC: 16 U/L — SIGNIFICANT CHANGE UP
ALT FLD-CCNC: 17 U/L — SIGNIFICANT CHANGE UP
ALT FLD-CCNC: 21 U/L — SIGNIFICANT CHANGE UP
ALT FLD-CCNC: 22 U/L — SIGNIFICANT CHANGE UP
ALT FLD-CCNC: 22 U/L — SIGNIFICANT CHANGE UP
ALT FLD-CCNC: 24 U/L — SIGNIFICANT CHANGE UP
ALT FLD-CCNC: 24 U/L — SIGNIFICANT CHANGE UP
ALT FLD-CCNC: 27 U/L — SIGNIFICANT CHANGE UP (ref 12–78)
ALT FLD-CCNC: 29 U/L — SIGNIFICANT CHANGE UP
ALT FLD-CCNC: 31 U/L — SIGNIFICANT CHANGE UP (ref 12–78)
ALT FLD-CCNC: 34 U/L — SIGNIFICANT CHANGE UP (ref 12–78)
ALT FLD-CCNC: 45 U/L — SIGNIFICANT CHANGE UP (ref 12–78)
ALT FLD-CCNC: 50 U/L — SIGNIFICANT CHANGE UP (ref 12–78)
ALT FLD-CCNC: 56 U/L — SIGNIFICANT CHANGE UP (ref 12–78)
ALT FLD-CCNC: 56 U/L — SIGNIFICANT CHANGE UP (ref 12–78)
ALT FLD-CCNC: 58 U/L — SIGNIFICANT CHANGE UP (ref 12–78)
ALT FLD-CCNC: 59 U/L — SIGNIFICANT CHANGE UP (ref 12–78)
ALT FLD-CCNC: 8 U/L — SIGNIFICANT CHANGE UP
ALT FLD-CCNC: 9 U/L — SIGNIFICANT CHANGE UP
ALT SERPL-CCNC: 11 U/L
ANION GAP SERPL CALC-SCNC: 10 MMOL/L — SIGNIFICANT CHANGE UP (ref 5–17)
ANION GAP SERPL CALC-SCNC: 11 MMOL/L — SIGNIFICANT CHANGE UP (ref 5–17)
ANION GAP SERPL CALC-SCNC: 12 MMOL/L — SIGNIFICANT CHANGE UP (ref 5–17)
ANION GAP SERPL CALC-SCNC: 13 MMOL/L — SIGNIFICANT CHANGE UP (ref 5–17)
ANION GAP SERPL CALC-SCNC: 14 MMOL/L — SIGNIFICANT CHANGE UP (ref 5–17)
ANION GAP SERPL CALC-SCNC: 15 MMOL/L — SIGNIFICANT CHANGE UP (ref 5–17)
ANION GAP SERPL CALC-SCNC: 16 MMOL/L
ANION GAP SERPL CALC-SCNC: 17 MMOL/L — SIGNIFICANT CHANGE UP (ref 5–17)
ANION GAP SERPL CALC-SCNC: 21 MMOL/L — HIGH (ref 5–17)
ANION GAP SERPL CALC-SCNC: 22 MMOL/L — HIGH (ref 5–17)
ANION GAP SERPL CALC-SCNC: 4 MMOL/L — LOW (ref 5–17)
ANION GAP SERPL CALC-SCNC: 5 MMOL/L — SIGNIFICANT CHANGE UP (ref 5–17)
ANION GAP SERPL CALC-SCNC: 5 MMOL/L — SIGNIFICANT CHANGE UP (ref 5–17)
ANION GAP SERPL CALC-SCNC: 6 MMOL/L — SIGNIFICANT CHANGE UP (ref 5–17)
ANION GAP SERPL CALC-SCNC: 7 MMOL/L — SIGNIFICANT CHANGE UP (ref 5–17)
ANION GAP SERPL CALC-SCNC: 7 MMOL/L — SIGNIFICANT CHANGE UP (ref 5–17)
ANION GAP SERPL CALC-SCNC: 8 MMOL/L — SIGNIFICANT CHANGE UP (ref 5–17)
ANION GAP SERPL CALC-SCNC: 9 MMOL/L — SIGNIFICANT CHANGE UP (ref 5–17)
ANISOCYTOSIS BLD QL: SIGNIFICANT CHANGE UP
ANISOCYTOSIS BLD QL: SIGNIFICANT CHANGE UP
ANISOCYTOSIS BLD QL: SLIGHT — SIGNIFICANT CHANGE UP
APPEARANCE UR: ABNORMAL
APPEARANCE UR: CLEAR — SIGNIFICANT CHANGE UP
APPEARANCE UR: SIGNIFICANT CHANGE UP
APTT BLD: 42 SEC — HIGH (ref 27.5–35.5)
APTT BLD: 45 SEC — HIGH (ref 27.5–35.5)
APTT BLD: 47.1 SEC — HIGH (ref 27.5–35.5)
APTT BLD: 54.4 SEC — HIGH (ref 27.5–35.5)
APTT BLD: 54.5 SEC — HIGH (ref 27.5–35.5)
APTT BLD: 56.2 SEC — HIGH (ref 28.5–37)
AST SERPL-CCNC: 16 U/L — SIGNIFICANT CHANGE UP (ref 15–37)
AST SERPL-CCNC: 17 U/L
AST SERPL-CCNC: 18 U/L — SIGNIFICANT CHANGE UP
AST SERPL-CCNC: 18 U/L — SIGNIFICANT CHANGE UP (ref 15–37)
AST SERPL-CCNC: 19 U/L — SIGNIFICANT CHANGE UP
AST SERPL-CCNC: 21 U/L — SIGNIFICANT CHANGE UP
AST SERPL-CCNC: 21 U/L — SIGNIFICANT CHANGE UP
AST SERPL-CCNC: 21 U/L — SIGNIFICANT CHANGE UP (ref 15–37)
AST SERPL-CCNC: 23 U/L — SIGNIFICANT CHANGE UP
AST SERPL-CCNC: 26 U/L — SIGNIFICANT CHANGE UP (ref 15–37)
AST SERPL-CCNC: 27 U/L — SIGNIFICANT CHANGE UP
AST SERPL-CCNC: 32 U/L — SIGNIFICANT CHANGE UP
AST SERPL-CCNC: 32 U/L — SIGNIFICANT CHANGE UP
AST SERPL-CCNC: 32 U/L — SIGNIFICANT CHANGE UP (ref 15–37)
AST SERPL-CCNC: 39 U/L — SIGNIFICANT CHANGE UP
AST SERPL-CCNC: 42 U/L — HIGH
AST SERPL-CCNC: 54 U/L — HIGH (ref 15–37)
AST SERPL-CCNC: 61 U/L — HIGH (ref 15–37)
AST SERPL-CCNC: 63 U/L — HIGH (ref 15–37)
AST SERPL-CCNC: 64 U/L — HIGH (ref 15–37)
BACTERIA # UR AUTO: ABNORMAL
BASE EXCESS BLDV CALC-SCNC: -0.5 MMOL/L — SIGNIFICANT CHANGE UP (ref -2–2)
BASE EXCESS BLDV CALC-SCNC: -11 MMOL/L — LOW (ref -2–2)
BASOPHILS # BLD AUTO: 0 K/UL — SIGNIFICANT CHANGE UP (ref 0–0.2)
BASOPHILS # BLD AUTO: 0.01 K/UL — SIGNIFICANT CHANGE UP (ref 0–0.2)
BASOPHILS # BLD AUTO: 0.02 K/UL
BASOPHILS # BLD AUTO: 0.02 K/UL — SIGNIFICANT CHANGE UP (ref 0–0.2)
BASOPHILS # BLD AUTO: 0.03 K/UL — SIGNIFICANT CHANGE UP (ref 0–0.2)
BASOPHILS # BLD AUTO: 0.03 K/UL — SIGNIFICANT CHANGE UP (ref 0–0.2)
BASOPHILS # BLD AUTO: 0.04 K/UL — SIGNIFICANT CHANGE UP (ref 0–0.2)
BASOPHILS # BLD AUTO: 0.05 K/UL — SIGNIFICANT CHANGE UP (ref 0–0.2)
BASOPHILS # BLD AUTO: 0.07 K/UL — SIGNIFICANT CHANGE UP (ref 0–0.2)
BASOPHILS # BLD AUTO: 0.1 K/UL — SIGNIFICANT CHANGE UP (ref 0–0.2)
BASOPHILS NFR BLD AUTO: 0 % — SIGNIFICANT CHANGE UP (ref 0–2)
BASOPHILS NFR BLD AUTO: 0.1 % — SIGNIFICANT CHANGE UP (ref 0–2)
BASOPHILS NFR BLD AUTO: 0.2 % — SIGNIFICANT CHANGE UP (ref 0–2)
BASOPHILS NFR BLD AUTO: 0.3 % — SIGNIFICANT CHANGE UP (ref 0–2)
BASOPHILS NFR BLD AUTO: 0.3 % — SIGNIFICANT CHANGE UP (ref 0–2)
BASOPHILS NFR BLD AUTO: 0.4 %
BASOPHILS NFR BLD AUTO: 0.4 % — SIGNIFICANT CHANGE UP (ref 0–2)
BASOPHILS NFR BLD AUTO: 0.5 % — SIGNIFICANT CHANGE UP (ref 0–2)
BASOPHILS NFR BLD AUTO: 0.6 % — SIGNIFICANT CHANGE UP (ref 0–2)
BASOPHILS NFR BLD AUTO: 0.8 % — SIGNIFICANT CHANGE UP (ref 0–2)
BASOPHILS NFR BLD AUTO: 0.9 % — SIGNIFICANT CHANGE UP (ref 0–2)
BASOPHILS NFR BLD AUTO: 0.9 % — SIGNIFICANT CHANGE UP (ref 0–2)
BASOPHILS NFR BLD AUTO: 1 % — SIGNIFICANT CHANGE UP (ref 0–2)
BASOPHILS NFR BLD AUTO: 1.7 % — SIGNIFICANT CHANGE UP (ref 0–2)
BILIRUB DIRECT SERPL-MCNC: 0.1 MG/DL — SIGNIFICANT CHANGE UP (ref 0–0.3)
BILIRUB INDIRECT FLD-MCNC: 0.2 MG/DL — SIGNIFICANT CHANGE UP (ref 0.2–1)
BILIRUB SERPL-MCNC: 0.2 MG/DL — LOW (ref 0.4–2)
BILIRUB SERPL-MCNC: 0.3 MG/DL
BILIRUB SERPL-MCNC: 0.3 MG/DL — LOW (ref 0.4–2)
BILIRUB SERPL-MCNC: 0.3 MG/DL — SIGNIFICANT CHANGE UP (ref 0.2–1.2)
BILIRUB SERPL-MCNC: 0.4 MG/DL — SIGNIFICANT CHANGE UP (ref 0.2–1.2)
BILIRUB SERPL-MCNC: 0.4 MG/DL — SIGNIFICANT CHANGE UP (ref 0.4–2)
BILIRUB SERPL-MCNC: 0.5 MG/DL — SIGNIFICANT CHANGE UP (ref 0.2–1.2)
BILIRUB SERPL-MCNC: 0.6 MG/DL — SIGNIFICANT CHANGE UP (ref 0.2–1.2)
BILIRUB SERPL-MCNC: 0.6 MG/DL — SIGNIFICANT CHANGE UP (ref 0.2–1.2)
BILIRUB SERPL-MCNC: 0.7 MG/DL — SIGNIFICANT CHANGE UP (ref 0.2–1.2)
BILIRUB UR-MCNC: NEGATIVE — SIGNIFICANT CHANGE UP
BLD GP AB SCN SERPL QL: SIGNIFICANT CHANGE UP
BUN SERPL-MCNC: 14 MG/DL — SIGNIFICANT CHANGE UP (ref 7–23)
BUN SERPL-MCNC: 17 MG/DL — SIGNIFICANT CHANGE UP (ref 8–20)
BUN SERPL-MCNC: 17 MG/DL — SIGNIFICANT CHANGE UP (ref 8–20)
BUN SERPL-MCNC: 18 MG/DL — SIGNIFICANT CHANGE UP (ref 7–23)
BUN SERPL-MCNC: 19 MG/DL — SIGNIFICANT CHANGE UP (ref 8–20)
BUN SERPL-MCNC: 21 MG/DL — HIGH (ref 8–20)
BUN SERPL-MCNC: 21 MG/DL — HIGH (ref 8–20)
BUN SERPL-MCNC: 22 MG/DL — HIGH (ref 8–20)
BUN SERPL-MCNC: 25 MG/DL — HIGH (ref 7–23)
BUN SERPL-MCNC: 29 MG/DL — HIGH (ref 8–20)
BUN SERPL-MCNC: 30 MG/DL
BUN SERPL-MCNC: 30 MG/DL — HIGH (ref 8–20)
BUN SERPL-MCNC: 31 MG/DL — HIGH (ref 8–20)
BUN SERPL-MCNC: 31 MG/DL — HIGH (ref 8–20)
BUN SERPL-MCNC: 32 MG/DL — HIGH (ref 8–20)
BUN SERPL-MCNC: 32 MG/DL — HIGH (ref 8–20)
BUN SERPL-MCNC: 34 MG/DL — HIGH (ref 7–23)
BUN SERPL-MCNC: 34 MG/DL — HIGH (ref 8–20)
BUN SERPL-MCNC: 35 MG/DL — HIGH (ref 8–20)
BUN SERPL-MCNC: 36 MG/DL — HIGH (ref 8–20)
BUN SERPL-MCNC: 37 MG/DL — HIGH (ref 8–20)
BUN SERPL-MCNC: 38 MG/DL — HIGH (ref 8–20)
BUN SERPL-MCNC: 39 MG/DL — HIGH (ref 8–20)
BUN SERPL-MCNC: 40 MG/DL — HIGH (ref 7–23)
BUN SERPL-MCNC: 40 MG/DL — HIGH (ref 7–23)
BUN SERPL-MCNC: 40 MG/DL — HIGH (ref 8–20)
BUN SERPL-MCNC: 40 MG/DL — HIGH (ref 8–20)
BUN SERPL-MCNC: 42 MG/DL — HIGH (ref 8–20)
BUN SERPL-MCNC: 42 MG/DL — HIGH (ref 8–20)
BUN SERPL-MCNC: 44 MG/DL — HIGH (ref 8–20)
BUN SERPL-MCNC: 44 MG/DL — HIGH (ref 8–20)
BUN SERPL-MCNC: 45 MG/DL — HIGH (ref 8–20)
BUN SERPL-MCNC: 46 MG/DL — HIGH (ref 8–20)
BUN SERPL-MCNC: 47 MG/DL — HIGH (ref 7–23)
BUN SERPL-MCNC: 47 MG/DL — HIGH (ref 8–20)
BUN SERPL-MCNC: 48 MG/DL — HIGH (ref 7–23)
BUN SERPL-MCNC: 48 MG/DL — HIGH (ref 8–20)
BUN SERPL-MCNC: 49 MG/DL — HIGH (ref 8–20)
BUN SERPL-MCNC: 49 MG/DL — HIGH (ref 8–20)
BUN SERPL-MCNC: 50 MG/DL — HIGH (ref 8–20)
BUN SERPL-MCNC: 51 MG/DL — HIGH (ref 7–23)
BUN SERPL-MCNC: 51 MG/DL — HIGH (ref 8–20)
BUN SERPL-MCNC: 52 MG/DL — HIGH (ref 8–20)
BUN SERPL-MCNC: 52 MG/DL — HIGH (ref 8–20)
BUN SERPL-MCNC: 53 MG/DL — HIGH (ref 7–23)
BUN SERPL-MCNC: 54 MG/DL — HIGH (ref 8–20)
BUN SERPL-MCNC: 55 MG/DL — HIGH (ref 7–23)
BUN SERPL-MCNC: 55 MG/DL — HIGH (ref 8–20)
BUN SERPL-MCNC: 55 MG/DL — HIGH (ref 8–20)
BUN SERPL-MCNC: 58 MG/DL — HIGH (ref 7–23)
BUN SERPL-MCNC: 61 MG/DL — HIGH (ref 8–20)
BUN SERPL-MCNC: 62 MG/DL — HIGH (ref 7–23)
BUN SERPL-MCNC: 62 MG/DL — HIGH (ref 8–20)
BUN SERPL-MCNC: 63 MG/DL — HIGH (ref 7–23)
BUN SERPL-MCNC: 63 MG/DL — HIGH (ref 7–23)
BUN SERPL-MCNC: 65 MG/DL — HIGH (ref 8–20)
BUN SERPL-MCNC: 66 MG/DL — HIGH (ref 8–20)
BUN SERPL-MCNC: 66 MG/DL — HIGH (ref 8–20)
BUN SERPL-MCNC: 70 MG/DL — HIGH (ref 8–20)
BUN SERPL-MCNC: 71 MG/DL — HIGH (ref 8–20)
BUN SERPL-MCNC: 71 MG/DL — HIGH (ref 8–20)
BURR CELLS BLD QL SMEAR: PRESENT — SIGNIFICANT CHANGE UP
CA-I SERPL-SCNC: 1.07 MMOL/L — LOW (ref 1.15–1.33)
CA-I SERPL-SCNC: 1.13 MMOL/L — LOW (ref 1.15–1.33)
CALCIUM SERPL-MCNC: 7.7 MG/DL — LOW (ref 8.6–10.2)
CALCIUM SERPL-MCNC: 7.9 MG/DL — LOW (ref 8.5–10.1)
CALCIUM SERPL-MCNC: 7.9 MG/DL — LOW (ref 8.6–10.2)
CALCIUM SERPL-MCNC: 8 MG/DL — LOW (ref 8.5–10.1)
CALCIUM SERPL-MCNC: 8 MG/DL — LOW (ref 8.5–10.1)
CALCIUM SERPL-MCNC: 8 MG/DL — LOW (ref 8.6–10.2)
CALCIUM SERPL-MCNC: 8.1 MG/DL — LOW (ref 8.5–10.1)
CALCIUM SERPL-MCNC: 8.1 MG/DL — LOW (ref 8.5–10.1)
CALCIUM SERPL-MCNC: 8.1 MG/DL — LOW (ref 8.6–10.2)
CALCIUM SERPL-MCNC: 8.2 MG/DL — LOW (ref 8.5–10.1)
CALCIUM SERPL-MCNC: 8.2 MG/DL — LOW (ref 8.5–10.1)
CALCIUM SERPL-MCNC: 8.2 MG/DL — LOW (ref 8.6–10.2)
CALCIUM SERPL-MCNC: 8.3 MG/DL — LOW (ref 8.5–10.1)
CALCIUM SERPL-MCNC: 8.3 MG/DL — LOW (ref 8.5–10.1)
CALCIUM SERPL-MCNC: 8.3 MG/DL — LOW (ref 8.6–10.2)
CALCIUM SERPL-MCNC: 8.4 MG/DL — LOW (ref 8.5–10.1)
CALCIUM SERPL-MCNC: 8.4 MG/DL — LOW (ref 8.6–10.2)
CALCIUM SERPL-MCNC: 8.5 MG/DL — LOW (ref 8.6–10.2)
CALCIUM SERPL-MCNC: 8.5 MG/DL — SIGNIFICANT CHANGE UP (ref 8.5–10.1)
CALCIUM SERPL-MCNC: 8.5 MG/DL — SIGNIFICANT CHANGE UP (ref 8.5–10.1)
CALCIUM SERPL-MCNC: 8.6 MG/DL — SIGNIFICANT CHANGE UP (ref 8.5–10.1)
CALCIUM SERPL-MCNC: 8.6 MG/DL — SIGNIFICANT CHANGE UP (ref 8.6–10.2)
CALCIUM SERPL-MCNC: 8.7 MG/DL — SIGNIFICANT CHANGE UP (ref 8.5–10.1)
CALCIUM SERPL-MCNC: 8.7 MG/DL — SIGNIFICANT CHANGE UP (ref 8.6–10.2)
CALCIUM SERPL-MCNC: 8.8 MG/DL — SIGNIFICANT CHANGE UP (ref 8.6–10.2)
CALCIUM SERPL-MCNC: 8.9 MG/DL — SIGNIFICANT CHANGE UP (ref 8.6–10.2)
CALCIUM SERPL-MCNC: 9 MG/DL — SIGNIFICANT CHANGE UP (ref 8.6–10.2)
CALCIUM SERPL-MCNC: 9.1 MG/DL — SIGNIFICANT CHANGE UP (ref 8.6–10.2)
CALCIUM SERPL-MCNC: 9.2 MG/DL
CALCIUM SERPL-MCNC: 9.2 MG/DL — SIGNIFICANT CHANGE UP (ref 8.6–10.2)
CALCIUM SERPL-MCNC: 9.3 MG/DL — SIGNIFICANT CHANGE UP (ref 8.5–10.1)
CALCIUM SERPL-MCNC: 9.3 MG/DL — SIGNIFICANT CHANGE UP (ref 8.6–10.2)
CALCIUM SERPL-MCNC: 9.3 MG/DL — SIGNIFICANT CHANGE UP (ref 8.6–10.2)
CALCIUM SERPL-MCNC: 9.4 MG/DL — SIGNIFICANT CHANGE UP (ref 8.6–10.2)
CALCIUM SERPL-MCNC: 9.6 MG/DL — SIGNIFICANT CHANGE UP (ref 8.6–10.2)
CHLORIDE BLDV-SCNC: 107 MMOL/L — SIGNIFICANT CHANGE UP (ref 98–107)
CHLORIDE BLDV-SCNC: 109 MMOL/L — HIGH (ref 98–107)
CHLORIDE SERPL-SCNC: 101 MMOL/L — SIGNIFICANT CHANGE UP (ref 98–107)
CHLORIDE SERPL-SCNC: 101 MMOL/L — SIGNIFICANT CHANGE UP (ref 98–107)
CHLORIDE SERPL-SCNC: 102 MMOL/L
CHLORIDE SERPL-SCNC: 103 MMOL/L — SIGNIFICANT CHANGE UP (ref 98–107)
CHLORIDE SERPL-SCNC: 104 MMOL/L — SIGNIFICANT CHANGE UP (ref 98–107)
CHLORIDE SERPL-SCNC: 105 MMOL/L — SIGNIFICANT CHANGE UP (ref 98–107)
CHLORIDE SERPL-SCNC: 106 MMOL/L — SIGNIFICANT CHANGE UP (ref 98–107)
CHLORIDE SERPL-SCNC: 107 MMOL/L — SIGNIFICANT CHANGE UP (ref 96–108)
CHLORIDE SERPL-SCNC: 107 MMOL/L — SIGNIFICANT CHANGE UP (ref 98–107)
CHLORIDE SERPL-SCNC: 108 MMOL/L — HIGH (ref 98–107)
CHLORIDE SERPL-SCNC: 108 MMOL/L — HIGH (ref 98–107)
CHLORIDE SERPL-SCNC: 109 MMOL/L — HIGH (ref 96–108)
CHLORIDE SERPL-SCNC: 109 MMOL/L — HIGH (ref 98–107)
CHLORIDE SERPL-SCNC: 110 MMOL/L — HIGH (ref 96–108)
CHLORIDE SERPL-SCNC: 110 MMOL/L — HIGH (ref 98–107)
CHLORIDE SERPL-SCNC: 110 MMOL/L — HIGH (ref 98–107)
CHLORIDE SERPL-SCNC: 111 MMOL/L — HIGH (ref 96–108)
CHLORIDE SERPL-SCNC: 111 MMOL/L — HIGH (ref 98–107)
CHLORIDE SERPL-SCNC: 112 MMOL/L — HIGH (ref 96–108)
CHLORIDE SERPL-SCNC: 112 MMOL/L — HIGH (ref 98–107)
CHLORIDE SERPL-SCNC: 112 MMOL/L — HIGH (ref 98–107)
CHLORIDE SERPL-SCNC: 113 MMOL/L — HIGH (ref 96–108)
CHLORIDE SERPL-SCNC: 113 MMOL/L — HIGH (ref 98–107)
CHLORIDE SERPL-SCNC: 114 MMOL/L — HIGH (ref 96–108)
CHLORIDE SERPL-SCNC: 114 MMOL/L — HIGH (ref 98–107)
CHLORIDE SERPL-SCNC: 115 MMOL/L — HIGH (ref 96–108)
CHLORIDE SERPL-SCNC: 115 MMOL/L — HIGH (ref 98–107)
CHLORIDE SERPL-SCNC: 116 MMOL/L — HIGH (ref 98–107)
CHLORIDE SERPL-SCNC: 118 MMOL/L — HIGH (ref 98–107)
CHLORIDE SERPL-SCNC: 119 MMOL/L — HIGH (ref 98–107)
CHLORIDE SERPL-SCNC: 120 MMOL/L — HIGH (ref 98–107)
CHLORIDE SERPL-SCNC: 121 MMOL/L — HIGH (ref 98–107)
CHLORIDE SERPL-SCNC: 124 MMOL/L — HIGH (ref 98–107)
CHOLEST SERPL-MCNC: 100 MG/DL
CHOLEST/HDLC SERPL: 2.5 RATIO
CK SERPL-CCNC: 372 U/L — HIGH (ref 26–308)
CO2 SERPL-SCNC: 12 MMOL/L — LOW (ref 22–29)
CO2 SERPL-SCNC: 13 MMOL/L — LOW (ref 22–29)
CO2 SERPL-SCNC: 15 MMOL/L — LOW (ref 22–29)
CO2 SERPL-SCNC: 18 MMOL/L — LOW (ref 22–29)
CO2 SERPL-SCNC: 19 MMOL/L — LOW (ref 22–29)
CO2 SERPL-SCNC: 19 MMOL/L — LOW (ref 22–29)
CO2 SERPL-SCNC: 20 MMOL/L — LOW (ref 22–29)
CO2 SERPL-SCNC: 21 MMOL/L — LOW (ref 22–29)
CO2 SERPL-SCNC: 22 MMOL/L — SIGNIFICANT CHANGE UP (ref 22–29)
CO2 SERPL-SCNC: 22 MMOL/L — SIGNIFICANT CHANGE UP (ref 22–31)
CO2 SERPL-SCNC: 23 MMOL/L — SIGNIFICANT CHANGE UP (ref 22–29)
CO2 SERPL-SCNC: 23 MMOL/L — SIGNIFICANT CHANGE UP (ref 22–31)
CO2 SERPL-SCNC: 24 MMOL/L
CO2 SERPL-SCNC: 24 MMOL/L — SIGNIFICANT CHANGE UP (ref 22–29)
CO2 SERPL-SCNC: 25 MMOL/L — SIGNIFICANT CHANGE UP (ref 22–29)
CO2 SERPL-SCNC: 25 MMOL/L — SIGNIFICANT CHANGE UP (ref 22–31)
CO2 SERPL-SCNC: 25 MMOL/L — SIGNIFICANT CHANGE UP (ref 22–31)
CO2 SERPL-SCNC: 26 MMOL/L — SIGNIFICANT CHANGE UP (ref 22–29)
CO2 SERPL-SCNC: 26 MMOL/L — SIGNIFICANT CHANGE UP (ref 22–29)
CO2 SERPL-SCNC: 26 MMOL/L — SIGNIFICANT CHANGE UP (ref 22–31)
CO2 SERPL-SCNC: 27 MMOL/L — SIGNIFICANT CHANGE UP (ref 22–29)
CO2 SERPL-SCNC: 27 MMOL/L — SIGNIFICANT CHANGE UP (ref 22–29)
CO2 SERPL-SCNC: 28 MMOL/L — SIGNIFICANT CHANGE UP (ref 22–29)
CO2 SERPL-SCNC: 28 MMOL/L — SIGNIFICANT CHANGE UP (ref 22–31)
CO2 SERPL-SCNC: 29 MMOL/L — SIGNIFICANT CHANGE UP (ref 22–29)
CO2 SERPL-SCNC: 29 MMOL/L — SIGNIFICANT CHANGE UP (ref 22–29)
CO2 SERPL-SCNC: 29 MMOL/L — SIGNIFICANT CHANGE UP (ref 22–31)
CO2 SERPL-SCNC: 30 MMOL/L — HIGH (ref 22–29)
CO2 SERPL-SCNC: 30 MMOL/L — HIGH (ref 22–29)
COLOR SPEC: ABNORMAL
COLOR SPEC: ABNORMAL
COLOR SPEC: SIGNIFICANT CHANGE UP
COLOR SPEC: YELLOW — SIGNIFICANT CHANGE UP
COMMENT - URINE: SIGNIFICANT CHANGE UP
CORTIS AM PEAK SERPL-MCNC: 11 UG/DL — SIGNIFICANT CHANGE UP (ref 6–18.4)
CORTIS AM PEAK SERPL-MCNC: 22.9 UG/DL — HIGH (ref 6–18.4)
CREAT SERPL-MCNC: 0.8 MG/DL — SIGNIFICANT CHANGE UP (ref 0.5–1.3)
CREAT SERPL-MCNC: 1 MG/DL — SIGNIFICANT CHANGE UP (ref 0.5–1.3)
CREAT SERPL-MCNC: 1.13 MG/DL — SIGNIFICANT CHANGE UP (ref 0.5–1.3)
CREAT SERPL-MCNC: 1.14 MG/DL — SIGNIFICANT CHANGE UP (ref 0.5–1.3)
CREAT SERPL-MCNC: 1.21 MG/DL — SIGNIFICANT CHANGE UP (ref 0.5–1.3)
CREAT SERPL-MCNC: 1.23 MG/DL — SIGNIFICANT CHANGE UP (ref 0.5–1.3)
CREAT SERPL-MCNC: 1.3 MG/DL — SIGNIFICANT CHANGE UP (ref 0.5–1.3)
CREAT SERPL-MCNC: 1.4 MG/DL — HIGH (ref 0.5–1.3)
CREAT SERPL-MCNC: 1.42 MG/DL — HIGH (ref 0.5–1.3)
CREAT SERPL-MCNC: 1.43 MG/DL — HIGH (ref 0.5–1.3)
CREAT SERPL-MCNC: 1.47 MG/DL — HIGH (ref 0.5–1.3)
CREAT SERPL-MCNC: 1.52 MG/DL — HIGH (ref 0.5–1.3)
CREAT SERPL-MCNC: 1.55 MG/DL — HIGH (ref 0.5–1.3)
CREAT SERPL-MCNC: 1.57 MG/DL — HIGH (ref 0.5–1.3)
CREAT SERPL-MCNC: 1.6 MG/DL — HIGH (ref 0.5–1.3)
CREAT SERPL-MCNC: 1.64 MG/DL — HIGH (ref 0.5–1.3)
CREAT SERPL-MCNC: 1.67 MG/DL — HIGH (ref 0.5–1.3)
CREAT SERPL-MCNC: 1.7 MG/DL — HIGH (ref 0.5–1.3)
CREAT SERPL-MCNC: 1.75 MG/DL — HIGH (ref 0.5–1.3)
CREAT SERPL-MCNC: 1.75 MG/DL — HIGH (ref 0.5–1.3)
CREAT SERPL-MCNC: 1.8 MG/DL — HIGH (ref 0.5–1.3)
CREAT SERPL-MCNC: 1.83 MG/DL — HIGH (ref 0.5–1.3)
CREAT SERPL-MCNC: 1.85 MG/DL — HIGH (ref 0.5–1.3)
CREAT SERPL-MCNC: 1.85 MG/DL — HIGH (ref 0.5–1.3)
CREAT SERPL-MCNC: 1.9 MG/DL — HIGH (ref 0.5–1.3)
CREAT SERPL-MCNC: 1.95 MG/DL — HIGH (ref 0.5–1.3)
CREAT SERPL-MCNC: 1.95 MG/DL — HIGH (ref 0.5–1.3)
CREAT SERPL-MCNC: 1.98 MG/DL — HIGH (ref 0.5–1.3)
CREAT SERPL-MCNC: 2.03 MG/DL — HIGH (ref 0.5–1.3)
CREAT SERPL-MCNC: 2.07 MG/DL — HIGH (ref 0.5–1.3)
CREAT SERPL-MCNC: 2.08 MG/DL — HIGH (ref 0.5–1.3)
CREAT SERPL-MCNC: 2.1 MG/DL — HIGH (ref 0.5–1.3)
CREAT SERPL-MCNC: 2.13 MG/DL — HIGH (ref 0.5–1.3)
CREAT SERPL-MCNC: 2.15 MG/DL — HIGH (ref 0.5–1.3)
CREAT SERPL-MCNC: 2.17 MG/DL — HIGH (ref 0.5–1.3)
CREAT SERPL-MCNC: 2.18 MG/DL — HIGH (ref 0.5–1.3)
CREAT SERPL-MCNC: 2.21 MG/DL — HIGH (ref 0.5–1.3)
CREAT SERPL-MCNC: 2.28 MG/DL — HIGH (ref 0.5–1.3)
CREAT SERPL-MCNC: 2.29 MG/DL — HIGH (ref 0.5–1.3)
CREAT SERPL-MCNC: 2.3 MG/DL — HIGH (ref 0.5–1.3)
CREAT SERPL-MCNC: 2.31 MG/DL
CREAT SERPL-MCNC: 2.31 MG/DL — HIGH (ref 0.5–1.3)
CREAT SERPL-MCNC: 2.32 MG/DL — HIGH (ref 0.5–1.3)
CREAT SERPL-MCNC: 2.33 MG/DL — HIGH (ref 0.5–1.3)
CREAT SERPL-MCNC: 2.34 MG/DL — HIGH (ref 0.5–1.3)
CREAT SERPL-MCNC: 2.34 MG/DL — HIGH (ref 0.5–1.3)
CREAT SERPL-MCNC: 2.36 MG/DL — HIGH (ref 0.5–1.3)
CREAT SERPL-MCNC: 2.36 MG/DL — HIGH (ref 0.5–1.3)
CREAT SERPL-MCNC: 2.37 MG/DL — HIGH (ref 0.5–1.3)
CREAT SERPL-MCNC: 2.38 MG/DL — HIGH (ref 0.5–1.3)
CREAT SERPL-MCNC: 2.4 MG/DL — HIGH (ref 0.5–1.3)
CREAT SERPL-MCNC: 2.43 MG/DL — HIGH (ref 0.5–1.3)
CREAT SERPL-MCNC: 2.44 MG/DL — HIGH (ref 0.5–1.3)
CREAT SERPL-MCNC: 2.44 MG/DL — HIGH (ref 0.5–1.3)
CREAT SERPL-MCNC: 2.49 MG/DL — HIGH (ref 0.5–1.3)
CREAT SERPL-MCNC: 2.5 MG/DL — HIGH (ref 0.5–1.3)
CREAT SERPL-MCNC: 2.53 MG/DL — HIGH (ref 0.5–1.3)
CREAT SERPL-MCNC: 2.57 MG/DL — HIGH (ref 0.5–1.3)
CREAT SERPL-MCNC: 2.61 MG/DL — HIGH (ref 0.5–1.3)
CREAT SERPL-MCNC: 2.7 MG/DL — HIGH (ref 0.5–1.3)
CREAT SERPL-MCNC: 2.76 MG/DL — HIGH (ref 0.5–1.3)
CREAT SERPL-MCNC: 2.8 MG/DL — HIGH (ref 0.5–1.3)
CREAT SERPL-MCNC: 2.81 MG/DL — HIGH (ref 0.5–1.3)
CREAT SERPL-MCNC: 2.82 MG/DL — HIGH (ref 0.5–1.3)
CREAT SERPL-MCNC: 2.9 MG/DL — HIGH (ref 0.5–1.3)
CREAT SERPL-MCNC: 2.93 MG/DL — HIGH (ref 0.5–1.3)
CREAT SERPL-MCNC: 3.03 MG/DL — HIGH (ref 0.5–1.3)
CREAT SERPL-MCNC: 3.05 MG/DL — HIGH (ref 0.5–1.3)
CREAT SERPL-MCNC: 3.05 MG/DL — HIGH (ref 0.5–1.3)
CREAT SERPL-MCNC: 3.1 MG/DL — HIGH (ref 0.5–1.3)
CREAT SERPL-MCNC: 3.16 MG/DL — HIGH (ref 0.5–1.3)
CREAT SERPL-MCNC: 3.22 MG/DL — HIGH (ref 0.5–1.3)
CREAT SERPL-MCNC: 3.23 MG/DL — HIGH (ref 0.5–1.3)
CREAT SERPL-MCNC: 3.42 MG/DL — HIGH (ref 0.5–1.3)
CREAT SERPL-MCNC: 3.62 MG/DL — HIGH (ref 0.5–1.3)
CREAT SERPL-MCNC: 3.87 MG/DL — HIGH (ref 0.5–1.3)
CREAT SERPL-MCNC: 3.9 MG/DL — HIGH (ref 0.5–1.3)
CRP SERPL-MCNC: 4.72 MG/DL — HIGH (ref 0–0.4)
CULTURE RESULTS: SIGNIFICANT CHANGE UP
D DIMER BLD IA.RAPID-MCNC: 155 NG/ML DDU — SIGNIFICANT CHANGE UP
DACRYOCYTES BLD QL SMEAR: SLIGHT — SIGNIFICANT CHANGE UP
DIFF PNL FLD: ABNORMAL
ELLIPTOCYTES BLD QL SMEAR: SIGNIFICANT CHANGE UP
ELLIPTOCYTES BLD QL SMEAR: SIGNIFICANT CHANGE UP
ELLIPTOCYTES BLD QL SMEAR: SLIGHT — SIGNIFICANT CHANGE UP
EOSINOPHIL # BLD AUTO: 0 K/UL — SIGNIFICANT CHANGE UP (ref 0–0.5)
EOSINOPHIL # BLD AUTO: 0.01 K/UL
EOSINOPHIL # BLD AUTO: 0.03 K/UL — SIGNIFICANT CHANGE UP (ref 0–0.5)
EOSINOPHIL # BLD AUTO: 0.04 K/UL — SIGNIFICANT CHANGE UP (ref 0–0.5)
EOSINOPHIL # BLD AUTO: 0.06 K/UL — SIGNIFICANT CHANGE UP (ref 0–0.5)
EOSINOPHIL # BLD AUTO: 0.07 K/UL — SIGNIFICANT CHANGE UP (ref 0–0.5)
EOSINOPHIL # BLD AUTO: 0.08 K/UL — SIGNIFICANT CHANGE UP (ref 0–0.5)
EOSINOPHIL # BLD AUTO: 0.09 K/UL — SIGNIFICANT CHANGE UP (ref 0–0.5)
EOSINOPHIL # BLD AUTO: 0.1 K/UL — SIGNIFICANT CHANGE UP (ref 0–0.5)
EOSINOPHIL # BLD AUTO: 0.11 K/UL — SIGNIFICANT CHANGE UP (ref 0–0.5)
EOSINOPHIL # BLD AUTO: 0.12 K/UL — SIGNIFICANT CHANGE UP (ref 0–0.5)
EOSINOPHIL # BLD AUTO: 0.12 K/UL — SIGNIFICANT CHANGE UP (ref 0–0.5)
EOSINOPHIL # BLD AUTO: 0.13 K/UL — SIGNIFICANT CHANGE UP (ref 0–0.5)
EOSINOPHIL # BLD AUTO: 0.14 K/UL — SIGNIFICANT CHANGE UP (ref 0–0.5)
EOSINOPHIL # BLD AUTO: 0.15 K/UL — SIGNIFICANT CHANGE UP (ref 0–0.5)
EOSINOPHIL NFR BLD AUTO: 0 % — SIGNIFICANT CHANGE UP (ref 0–6)
EOSINOPHIL NFR BLD AUTO: 0.2 %
EOSINOPHIL NFR BLD AUTO: 0.4 % — SIGNIFICANT CHANGE UP (ref 0–6)
EOSINOPHIL NFR BLD AUTO: 0.5 % — SIGNIFICANT CHANGE UP (ref 0–6)
EOSINOPHIL NFR BLD AUTO: 0.6 % — SIGNIFICANT CHANGE UP (ref 0–6)
EOSINOPHIL NFR BLD AUTO: 0.8 % — SIGNIFICANT CHANGE UP (ref 0–6)
EOSINOPHIL NFR BLD AUTO: 1 % — SIGNIFICANT CHANGE UP (ref 0–6)
EOSINOPHIL NFR BLD AUTO: 1.1 % — SIGNIFICANT CHANGE UP (ref 0–6)
EOSINOPHIL NFR BLD AUTO: 1.2 % — SIGNIFICANT CHANGE UP (ref 0–6)
EOSINOPHIL NFR BLD AUTO: 1.3 % — SIGNIFICANT CHANGE UP (ref 0–6)
EOSINOPHIL NFR BLD AUTO: 1.7 % — SIGNIFICANT CHANGE UP (ref 0–6)
EOSINOPHIL NFR BLD AUTO: 1.7 % — SIGNIFICANT CHANGE UP (ref 0–6)
EOSINOPHIL NFR BLD AUTO: 1.9 % — SIGNIFICANT CHANGE UP (ref 0–6)
EOSINOPHIL NFR BLD AUTO: 2.4 % — SIGNIFICANT CHANGE UP (ref 0–6)
EOSINOPHIL NFR BLD AUTO: 2.5 % — SIGNIFICANT CHANGE UP (ref 0–6)
EOSINOPHIL NFR BLD AUTO: 2.6 % — SIGNIFICANT CHANGE UP (ref 0–6)
EOSINOPHIL NFR BLD AUTO: 2.7 % — SIGNIFICANT CHANGE UP (ref 0–6)
EOSINOPHIL NFR BLD AUTO: 3 % — SIGNIFICANT CHANGE UP (ref 0–6)
EOSINOPHIL NFR BLD AUTO: 3 % — SIGNIFICANT CHANGE UP (ref 0–6)
EPI CELLS # UR: NEGATIVE — SIGNIFICANT CHANGE UP
EPI CELLS # UR: NEGATIVE — SIGNIFICANT CHANGE UP
EPI CELLS # UR: SIGNIFICANT CHANGE UP
ESTIMATED AVERAGE GLUCOSE: 128 MG/DL — HIGH (ref 68–114)
ESTIMATED AVERAGE GLUCOSE: 131 MG/DL — HIGH (ref 68–114)
ESTIMATED AVERAGE GLUCOSE: 148 MG/DL
ESTIMATED AVERAGE GLUCOSE: 166 MG/DL — HIGH (ref 68–114)
ESTIMATED AVERAGE GLUCOSE: 174 MG/DL — HIGH (ref 68–114)
FERRITIN SERPL-MCNC: 164 NG/ML — SIGNIFICANT CHANGE UP (ref 30–400)
FERRITIN SERPL-MCNC: 217 NG/ML — SIGNIFICANT CHANGE UP (ref 30–400)
FERRITIN SERPL-MCNC: 343 NG/ML — SIGNIFICANT CHANGE UP (ref 30–400)
FERRITIN SERPL-MCNC: 911 NG/ML — HIGH (ref 30–400)
GAS PNL BLDV: 137 MMOL/L — SIGNIFICANT CHANGE UP (ref 135–145)
GAS PNL BLDV: 145 MMOL/L — SIGNIFICANT CHANGE UP (ref 135–145)
GAS PNL BLDV: SIGNIFICANT CHANGE UP
GIANT PLATELETS BLD QL SMEAR: PRESENT — SIGNIFICANT CHANGE UP
GLUCOSE BLDC GLUCOMTR-MCNC: 100 MG/DL — HIGH (ref 70–99)
GLUCOSE BLDC GLUCOMTR-MCNC: 101 MG/DL — HIGH (ref 70–99)
GLUCOSE BLDC GLUCOMTR-MCNC: 102 MG/DL — HIGH (ref 70–99)
GLUCOSE BLDC GLUCOMTR-MCNC: 102 MG/DL — HIGH (ref 70–99)
GLUCOSE BLDC GLUCOMTR-MCNC: 103 MG/DL — HIGH (ref 70–99)
GLUCOSE BLDC GLUCOMTR-MCNC: 104 MG/DL — HIGH (ref 70–99)
GLUCOSE BLDC GLUCOMTR-MCNC: 105 MG/DL — HIGH (ref 70–99)
GLUCOSE BLDC GLUCOMTR-MCNC: 106 MG/DL — HIGH (ref 70–99)
GLUCOSE BLDC GLUCOMTR-MCNC: 106 MG/DL — HIGH (ref 70–99)
GLUCOSE BLDC GLUCOMTR-MCNC: 107 MG/DL — HIGH (ref 70–99)
GLUCOSE BLDC GLUCOMTR-MCNC: 109 MG/DL — HIGH (ref 70–99)
GLUCOSE BLDC GLUCOMTR-MCNC: 110 MG/DL — HIGH (ref 70–99)
GLUCOSE BLDC GLUCOMTR-MCNC: 111 MG/DL — HIGH (ref 70–99)
GLUCOSE BLDC GLUCOMTR-MCNC: 112 MG/DL — HIGH (ref 70–99)
GLUCOSE BLDC GLUCOMTR-MCNC: 112 MG/DL — HIGH (ref 70–99)
GLUCOSE BLDC GLUCOMTR-MCNC: 113 MG/DL — HIGH (ref 70–99)
GLUCOSE BLDC GLUCOMTR-MCNC: 114 MG/DL — HIGH (ref 70–99)
GLUCOSE BLDC GLUCOMTR-MCNC: 115 MG/DL — HIGH (ref 70–99)
GLUCOSE BLDC GLUCOMTR-MCNC: 117 MG/DL — HIGH (ref 70–99)
GLUCOSE BLDC GLUCOMTR-MCNC: 118 MG/DL — HIGH (ref 70–99)
GLUCOSE BLDC GLUCOMTR-MCNC: 118 MG/DL — HIGH (ref 70–99)
GLUCOSE BLDC GLUCOMTR-MCNC: 119 MG/DL — HIGH (ref 70–99)
GLUCOSE BLDC GLUCOMTR-MCNC: 120 MG/DL — HIGH (ref 70–99)
GLUCOSE BLDC GLUCOMTR-MCNC: 120 MG/DL — HIGH (ref 70–99)
GLUCOSE BLDC GLUCOMTR-MCNC: 123 MG/DL — HIGH (ref 70–99)
GLUCOSE BLDC GLUCOMTR-MCNC: 124 MG/DL — HIGH (ref 70–99)
GLUCOSE BLDC GLUCOMTR-MCNC: 124 MG/DL — HIGH (ref 70–99)
GLUCOSE BLDC GLUCOMTR-MCNC: 125 MG/DL — HIGH (ref 70–99)
GLUCOSE BLDC GLUCOMTR-MCNC: 126 MG/DL — HIGH (ref 70–99)
GLUCOSE BLDC GLUCOMTR-MCNC: 127 MG/DL — HIGH (ref 70–99)
GLUCOSE BLDC GLUCOMTR-MCNC: 128 MG/DL — HIGH (ref 70–99)
GLUCOSE BLDC GLUCOMTR-MCNC: 129 MG/DL — HIGH (ref 70–99)
GLUCOSE BLDC GLUCOMTR-MCNC: 130 MG/DL — HIGH (ref 70–99)
GLUCOSE BLDC GLUCOMTR-MCNC: 130 MG/DL — HIGH (ref 70–99)
GLUCOSE BLDC GLUCOMTR-MCNC: 131 MG/DL — HIGH (ref 70–99)
GLUCOSE BLDC GLUCOMTR-MCNC: 133 MG/DL — HIGH (ref 70–99)
GLUCOSE BLDC GLUCOMTR-MCNC: 133 MG/DL — HIGH (ref 70–99)
GLUCOSE BLDC GLUCOMTR-MCNC: 134 MG/DL — HIGH (ref 70–99)
GLUCOSE BLDC GLUCOMTR-MCNC: 135 MG/DL — HIGH (ref 70–99)
GLUCOSE BLDC GLUCOMTR-MCNC: 136 MG/DL — HIGH (ref 70–99)
GLUCOSE BLDC GLUCOMTR-MCNC: 137 MG/DL — HIGH (ref 70–99)
GLUCOSE BLDC GLUCOMTR-MCNC: 138 MG/DL — HIGH (ref 70–99)
GLUCOSE BLDC GLUCOMTR-MCNC: 138 MG/DL — HIGH (ref 70–99)
GLUCOSE BLDC GLUCOMTR-MCNC: 139 MG/DL — HIGH (ref 70–99)
GLUCOSE BLDC GLUCOMTR-MCNC: 139 MG/DL — HIGH (ref 70–99)
GLUCOSE BLDC GLUCOMTR-MCNC: 140 MG/DL — HIGH (ref 70–99)
GLUCOSE BLDC GLUCOMTR-MCNC: 141 MG/DL — HIGH (ref 70–99)
GLUCOSE BLDC GLUCOMTR-MCNC: 142 MG/DL — HIGH (ref 70–99)
GLUCOSE BLDC GLUCOMTR-MCNC: 143 MG/DL — HIGH (ref 70–99)
GLUCOSE BLDC GLUCOMTR-MCNC: 144 MG/DL — HIGH (ref 70–99)
GLUCOSE BLDC GLUCOMTR-MCNC: 145 MG/DL — HIGH (ref 70–99)
GLUCOSE BLDC GLUCOMTR-MCNC: 147 MG/DL — HIGH (ref 70–99)
GLUCOSE BLDC GLUCOMTR-MCNC: 148 MG/DL — HIGH (ref 70–99)
GLUCOSE BLDC GLUCOMTR-MCNC: 151 MG/DL — HIGH (ref 70–99)
GLUCOSE BLDC GLUCOMTR-MCNC: 152 MG/DL — HIGH (ref 70–99)
GLUCOSE BLDC GLUCOMTR-MCNC: 153 MG/DL — HIGH (ref 70–99)
GLUCOSE BLDC GLUCOMTR-MCNC: 155 MG/DL — HIGH (ref 70–99)
GLUCOSE BLDC GLUCOMTR-MCNC: 156 MG/DL — HIGH (ref 70–99)
GLUCOSE BLDC GLUCOMTR-MCNC: 157 MG/DL — HIGH (ref 70–99)
GLUCOSE BLDC GLUCOMTR-MCNC: 158 MG/DL — HIGH (ref 70–99)
GLUCOSE BLDC GLUCOMTR-MCNC: 158 MG/DL — HIGH (ref 70–99)
GLUCOSE BLDC GLUCOMTR-MCNC: 159 MG/DL — HIGH (ref 70–99)
GLUCOSE BLDC GLUCOMTR-MCNC: 159 MG/DL — HIGH (ref 70–99)
GLUCOSE BLDC GLUCOMTR-MCNC: 160 MG/DL — HIGH (ref 70–99)
GLUCOSE BLDC GLUCOMTR-MCNC: 162 MG/DL — HIGH (ref 70–99)
GLUCOSE BLDC GLUCOMTR-MCNC: 164 MG/DL — HIGH (ref 70–99)
GLUCOSE BLDC GLUCOMTR-MCNC: 165 MG/DL — HIGH (ref 70–99)
GLUCOSE BLDC GLUCOMTR-MCNC: 166 MG/DL — HIGH (ref 70–99)
GLUCOSE BLDC GLUCOMTR-MCNC: 166 MG/DL — HIGH (ref 70–99)
GLUCOSE BLDC GLUCOMTR-MCNC: 168 MG/DL — HIGH (ref 70–99)
GLUCOSE BLDC GLUCOMTR-MCNC: 170 MG/DL — HIGH (ref 70–99)
GLUCOSE BLDC GLUCOMTR-MCNC: 170 MG/DL — HIGH (ref 70–99)
GLUCOSE BLDC GLUCOMTR-MCNC: 171 MG/DL — HIGH (ref 70–99)
GLUCOSE BLDC GLUCOMTR-MCNC: 174 MG/DL — HIGH (ref 70–99)
GLUCOSE BLDC GLUCOMTR-MCNC: 178 MG/DL — HIGH (ref 70–99)
GLUCOSE BLDC GLUCOMTR-MCNC: 180 MG/DL — HIGH (ref 70–99)
GLUCOSE BLDC GLUCOMTR-MCNC: 180 MG/DL — HIGH (ref 70–99)
GLUCOSE BLDC GLUCOMTR-MCNC: 182 MG/DL — HIGH (ref 70–99)
GLUCOSE BLDC GLUCOMTR-MCNC: 183 MG/DL — HIGH (ref 70–99)
GLUCOSE BLDC GLUCOMTR-MCNC: 184 MG/DL — HIGH (ref 70–99)
GLUCOSE BLDC GLUCOMTR-MCNC: 186 MG/DL — HIGH (ref 70–99)
GLUCOSE BLDC GLUCOMTR-MCNC: 188 MG/DL — HIGH (ref 70–99)
GLUCOSE BLDC GLUCOMTR-MCNC: 189 MG/DL — HIGH (ref 70–99)
GLUCOSE BLDC GLUCOMTR-MCNC: 189 MG/DL — HIGH (ref 70–99)
GLUCOSE BLDC GLUCOMTR-MCNC: 194 MG/DL — HIGH (ref 70–99)
GLUCOSE BLDC GLUCOMTR-MCNC: 194 MG/DL — HIGH (ref 70–99)
GLUCOSE BLDC GLUCOMTR-MCNC: 195 MG/DL — HIGH (ref 70–99)
GLUCOSE BLDC GLUCOMTR-MCNC: 196 MG/DL — HIGH (ref 70–99)
GLUCOSE BLDC GLUCOMTR-MCNC: 199 MG/DL — HIGH (ref 70–99)
GLUCOSE BLDC GLUCOMTR-MCNC: 202 MG/DL — HIGH (ref 70–99)
GLUCOSE BLDC GLUCOMTR-MCNC: 205 MG/DL — HIGH (ref 70–99)
GLUCOSE BLDC GLUCOMTR-MCNC: 209 MG/DL — HIGH (ref 70–99)
GLUCOSE BLDC GLUCOMTR-MCNC: 210 MG/DL — HIGH (ref 70–99)
GLUCOSE BLDC GLUCOMTR-MCNC: 211 MG/DL — HIGH (ref 70–99)
GLUCOSE BLDC GLUCOMTR-MCNC: 211 MG/DL — HIGH (ref 70–99)
GLUCOSE BLDC GLUCOMTR-MCNC: 213 MG/DL — HIGH (ref 70–99)
GLUCOSE BLDC GLUCOMTR-MCNC: 217 MG/DL — HIGH (ref 70–99)
GLUCOSE BLDC GLUCOMTR-MCNC: 218 MG/DL — HIGH (ref 70–99)
GLUCOSE BLDC GLUCOMTR-MCNC: 226 MG/DL — HIGH (ref 70–99)
GLUCOSE BLDC GLUCOMTR-MCNC: 230 MG/DL — HIGH (ref 70–99)
GLUCOSE BLDC GLUCOMTR-MCNC: 231 MG/DL — HIGH (ref 70–99)
GLUCOSE BLDC GLUCOMTR-MCNC: 233 MG/DL — HIGH (ref 70–99)
GLUCOSE BLDC GLUCOMTR-MCNC: 238 MG/DL — HIGH (ref 70–99)
GLUCOSE BLDC GLUCOMTR-MCNC: 242 MG/DL — HIGH (ref 70–99)
GLUCOSE BLDC GLUCOMTR-MCNC: 245 MG/DL — HIGH (ref 70–99)
GLUCOSE BLDC GLUCOMTR-MCNC: 258 MG/DL — HIGH (ref 70–99)
GLUCOSE BLDC GLUCOMTR-MCNC: 260 MG/DL — HIGH (ref 70–99)
GLUCOSE BLDC GLUCOMTR-MCNC: 270 MG/DL — HIGH (ref 70–99)
GLUCOSE BLDC GLUCOMTR-MCNC: 271 MG/DL — HIGH (ref 70–99)
GLUCOSE BLDC GLUCOMTR-MCNC: 277 MG/DL — HIGH (ref 70–99)
GLUCOSE BLDC GLUCOMTR-MCNC: 282 MG/DL — HIGH (ref 70–99)
GLUCOSE BLDC GLUCOMTR-MCNC: 283 MG/DL — HIGH (ref 70–99)
GLUCOSE BLDC GLUCOMTR-MCNC: 286 MG/DL — HIGH (ref 70–99)
GLUCOSE BLDC GLUCOMTR-MCNC: 304 MG/DL — HIGH (ref 70–99)
GLUCOSE BLDC GLUCOMTR-MCNC: 306 MG/DL — HIGH (ref 70–99)
GLUCOSE BLDC GLUCOMTR-MCNC: 309 MG/DL — HIGH (ref 70–99)
GLUCOSE BLDC GLUCOMTR-MCNC: 343 MG/DL — HIGH (ref 70–99)
GLUCOSE BLDC GLUCOMTR-MCNC: 345 MG/DL — HIGH (ref 70–99)
GLUCOSE BLDC GLUCOMTR-MCNC: 352 MG/DL — HIGH (ref 70–99)
GLUCOSE BLDC GLUCOMTR-MCNC: 379 MG/DL — HIGH (ref 70–99)
GLUCOSE BLDC GLUCOMTR-MCNC: 74 MG/DL — SIGNIFICANT CHANGE UP (ref 70–99)
GLUCOSE BLDC GLUCOMTR-MCNC: 76 MG/DL — SIGNIFICANT CHANGE UP (ref 70–99)
GLUCOSE BLDC GLUCOMTR-MCNC: 78 MG/DL — SIGNIFICANT CHANGE UP (ref 70–99)
GLUCOSE BLDC GLUCOMTR-MCNC: 79 MG/DL — SIGNIFICANT CHANGE UP (ref 70–99)
GLUCOSE BLDC GLUCOMTR-MCNC: 79 MG/DL — SIGNIFICANT CHANGE UP (ref 70–99)
GLUCOSE BLDC GLUCOMTR-MCNC: 80 MG/DL — SIGNIFICANT CHANGE UP (ref 70–99)
GLUCOSE BLDC GLUCOMTR-MCNC: 80 MG/DL — SIGNIFICANT CHANGE UP (ref 70–99)
GLUCOSE BLDC GLUCOMTR-MCNC: 81 MG/DL — SIGNIFICANT CHANGE UP (ref 70–99)
GLUCOSE BLDC GLUCOMTR-MCNC: 82 MG/DL — SIGNIFICANT CHANGE UP (ref 70–99)
GLUCOSE BLDC GLUCOMTR-MCNC: 82 MG/DL — SIGNIFICANT CHANGE UP (ref 70–99)
GLUCOSE BLDC GLUCOMTR-MCNC: 86 MG/DL — SIGNIFICANT CHANGE UP (ref 70–99)
GLUCOSE BLDC GLUCOMTR-MCNC: 86 MG/DL — SIGNIFICANT CHANGE UP (ref 70–99)
GLUCOSE BLDC GLUCOMTR-MCNC: 87 MG/DL — SIGNIFICANT CHANGE UP (ref 70–99)
GLUCOSE BLDC GLUCOMTR-MCNC: 89 MG/DL — SIGNIFICANT CHANGE UP (ref 70–99)
GLUCOSE BLDC GLUCOMTR-MCNC: 89 MG/DL — SIGNIFICANT CHANGE UP (ref 70–99)
GLUCOSE BLDC GLUCOMTR-MCNC: 90 MG/DL — SIGNIFICANT CHANGE UP (ref 70–99)
GLUCOSE BLDC GLUCOMTR-MCNC: 90 MG/DL — SIGNIFICANT CHANGE UP (ref 70–99)
GLUCOSE BLDC GLUCOMTR-MCNC: 92 MG/DL — SIGNIFICANT CHANGE UP (ref 70–99)
GLUCOSE BLDC GLUCOMTR-MCNC: 92 MG/DL — SIGNIFICANT CHANGE UP (ref 70–99)
GLUCOSE BLDC GLUCOMTR-MCNC: 93 MG/DL — SIGNIFICANT CHANGE UP (ref 70–99)
GLUCOSE BLDC GLUCOMTR-MCNC: 94 MG/DL — SIGNIFICANT CHANGE UP (ref 70–99)
GLUCOSE BLDC GLUCOMTR-MCNC: 96 MG/DL — SIGNIFICANT CHANGE UP (ref 70–99)
GLUCOSE BLDC GLUCOMTR-MCNC: 97 MG/DL — SIGNIFICANT CHANGE UP (ref 70–99)
GLUCOSE BLDC GLUCOMTR-MCNC: 97 MG/DL — SIGNIFICANT CHANGE UP (ref 70–99)
GLUCOSE BLDC GLUCOMTR-MCNC: 99 MG/DL — SIGNIFICANT CHANGE UP (ref 70–99)
GLUCOSE BLDC GLUCOMTR-MCNC: 99 MG/DL — SIGNIFICANT CHANGE UP (ref 70–99)
GLUCOSE BLDV-MCNC: 100 MG/DL — HIGH (ref 70–99)
GLUCOSE BLDV-MCNC: 168 MG/DL — HIGH (ref 70–99)
GLUCOSE SERPL-MCNC: 100 MG/DL — HIGH (ref 70–99)
GLUCOSE SERPL-MCNC: 101 MG/DL — HIGH (ref 70–99)
GLUCOSE SERPL-MCNC: 103 MG/DL — HIGH (ref 70–99)
GLUCOSE SERPL-MCNC: 104 MG/DL — HIGH (ref 70–99)
GLUCOSE SERPL-MCNC: 105 MG/DL — HIGH (ref 70–99)
GLUCOSE SERPL-MCNC: 107 MG/DL — HIGH (ref 70–99)
GLUCOSE SERPL-MCNC: 108 MG/DL — HIGH (ref 70–99)
GLUCOSE SERPL-MCNC: 111 MG/DL — HIGH (ref 70–99)
GLUCOSE SERPL-MCNC: 115 MG/DL — HIGH (ref 70–99)
GLUCOSE SERPL-MCNC: 116 MG/DL — HIGH (ref 70–99)
GLUCOSE SERPL-MCNC: 118 MG/DL — HIGH (ref 70–99)
GLUCOSE SERPL-MCNC: 120 MG/DL — HIGH (ref 70–99)
GLUCOSE SERPL-MCNC: 123 MG/DL — HIGH (ref 70–99)
GLUCOSE SERPL-MCNC: 123 MG/DL — HIGH (ref 70–99)
GLUCOSE SERPL-MCNC: 126 MG/DL — HIGH (ref 70–99)
GLUCOSE SERPL-MCNC: 126 MG/DL — HIGH (ref 70–99)
GLUCOSE SERPL-MCNC: 127 MG/DL — HIGH (ref 70–99)
GLUCOSE SERPL-MCNC: 128 MG/DL — HIGH (ref 70–99)
GLUCOSE SERPL-MCNC: 129 MG/DL — HIGH (ref 70–99)
GLUCOSE SERPL-MCNC: 130 MG/DL — HIGH (ref 70–99)
GLUCOSE SERPL-MCNC: 134 MG/DL — HIGH (ref 70–99)
GLUCOSE SERPL-MCNC: 134 MG/DL — HIGH (ref 70–99)
GLUCOSE SERPL-MCNC: 139 MG/DL — HIGH (ref 70–99)
GLUCOSE SERPL-MCNC: 142 MG/DL — HIGH (ref 70–99)
GLUCOSE SERPL-MCNC: 147 MG/DL — HIGH (ref 70–99)
GLUCOSE SERPL-MCNC: 149 MG/DL — HIGH (ref 70–99)
GLUCOSE SERPL-MCNC: 154 MG/DL — HIGH (ref 70–99)
GLUCOSE SERPL-MCNC: 161 MG/DL — HIGH (ref 70–99)
GLUCOSE SERPL-MCNC: 162 MG/DL
GLUCOSE SERPL-MCNC: 165 MG/DL — HIGH (ref 70–99)
GLUCOSE SERPL-MCNC: 165 MG/DL — HIGH (ref 70–99)
GLUCOSE SERPL-MCNC: 172 MG/DL — HIGH (ref 70–99)
GLUCOSE SERPL-MCNC: 175 MG/DL — HIGH (ref 70–99)
GLUCOSE SERPL-MCNC: 178 MG/DL — HIGH (ref 70–99)
GLUCOSE SERPL-MCNC: 180 MG/DL — HIGH (ref 70–99)
GLUCOSE SERPL-MCNC: 183 MG/DL — HIGH (ref 70–99)
GLUCOSE SERPL-MCNC: 189 MG/DL — HIGH (ref 70–99)
GLUCOSE SERPL-MCNC: 190 MG/DL — HIGH (ref 70–99)
GLUCOSE SERPL-MCNC: 194 MG/DL — HIGH (ref 70–99)
GLUCOSE SERPL-MCNC: 195 MG/DL — HIGH (ref 70–99)
GLUCOSE SERPL-MCNC: 196 MG/DL — HIGH (ref 70–99)
GLUCOSE SERPL-MCNC: 197 MG/DL — HIGH (ref 70–99)
GLUCOSE SERPL-MCNC: 199 MG/DL — HIGH (ref 70–99)
GLUCOSE SERPL-MCNC: 200 MG/DL — HIGH (ref 70–99)
GLUCOSE SERPL-MCNC: 207 MG/DL — HIGH (ref 70–99)
GLUCOSE SERPL-MCNC: 207 MG/DL — HIGH (ref 70–99)
GLUCOSE SERPL-MCNC: 214 MG/DL — HIGH (ref 70–99)
GLUCOSE SERPL-MCNC: 218 MG/DL — HIGH (ref 70–99)
GLUCOSE SERPL-MCNC: 224 MG/DL — HIGH (ref 70–99)
GLUCOSE SERPL-MCNC: 226 MG/DL — HIGH (ref 70–99)
GLUCOSE SERPL-MCNC: 229 MG/DL — HIGH (ref 70–99)
GLUCOSE SERPL-MCNC: 241 MG/DL — HIGH (ref 70–99)
GLUCOSE SERPL-MCNC: 341 MG/DL — HIGH (ref 70–99)
GLUCOSE SERPL-MCNC: 350 MG/DL — HIGH (ref 70–99)
GLUCOSE SERPL-MCNC: 370 MG/DL — HIGH (ref 70–99)
GLUCOSE SERPL-MCNC: 55 MG/DL — LOW (ref 70–99)
GLUCOSE SERPL-MCNC: 61 MG/DL — LOW (ref 70–99)
GLUCOSE SERPL-MCNC: 64 MG/DL — LOW (ref 70–99)
GLUCOSE SERPL-MCNC: 66 MG/DL — LOW (ref 70–99)
GLUCOSE SERPL-MCNC: 68 MG/DL — LOW (ref 70–99)
GLUCOSE SERPL-MCNC: 69 MG/DL — LOW (ref 70–99)
GLUCOSE SERPL-MCNC: 70 MG/DL — SIGNIFICANT CHANGE UP (ref 70–99)
GLUCOSE SERPL-MCNC: 77 MG/DL — SIGNIFICANT CHANGE UP (ref 70–99)
GLUCOSE SERPL-MCNC: 78 MG/DL — SIGNIFICANT CHANGE UP (ref 70–99)
GLUCOSE SERPL-MCNC: 85 MG/DL — SIGNIFICANT CHANGE UP (ref 70–99)
GLUCOSE SERPL-MCNC: 87 MG/DL — SIGNIFICANT CHANGE UP (ref 70–99)
GLUCOSE SERPL-MCNC: 87 MG/DL — SIGNIFICANT CHANGE UP (ref 70–99)
GLUCOSE SERPL-MCNC: 88 MG/DL — SIGNIFICANT CHANGE UP (ref 70–99)
GLUCOSE SERPL-MCNC: 89 MG/DL — SIGNIFICANT CHANGE UP (ref 70–99)
GLUCOSE SERPL-MCNC: 90 MG/DL — SIGNIFICANT CHANGE UP (ref 70–99)
GLUCOSE SERPL-MCNC: 93 MG/DL — SIGNIFICANT CHANGE UP (ref 70–99)
GLUCOSE SERPL-MCNC: 94 MG/DL — SIGNIFICANT CHANGE UP (ref 70–99)
GLUCOSE SERPL-MCNC: 94 MG/DL — SIGNIFICANT CHANGE UP (ref 70–99)
GLUCOSE SERPL-MCNC: 95 MG/DL — SIGNIFICANT CHANGE UP (ref 70–99)
GLUCOSE SERPL-MCNC: 96 MG/DL — SIGNIFICANT CHANGE UP (ref 70–99)
GLUCOSE SERPL-MCNC: 97 MG/DL — SIGNIFICANT CHANGE UP (ref 70–99)
GLUCOSE SERPL-MCNC: 97 MG/DL — SIGNIFICANT CHANGE UP (ref 70–99)
GLUCOSE SERPL-MCNC: 98 MG/DL — SIGNIFICANT CHANGE UP (ref 70–99)
GLUCOSE SERPL-MCNC: 98 MG/DL — SIGNIFICANT CHANGE UP (ref 70–99)
GLUCOSE UR QL: NEGATIVE MG/DL — SIGNIFICANT CHANGE UP
GLUCOSE UR QL: NEGATIVE — SIGNIFICANT CHANGE UP
GRAM STN FLD: SIGNIFICANT CHANGE UP
GRAM STN FLD: SIGNIFICANT CHANGE UP
HBA1C MFR BLD HPLC: 6.8 %
HCO3 BLDV-SCNC: 16 MMOL/L — LOW (ref 21–29)
HCO3 BLDV-SCNC: 24 MMOL/L — SIGNIFICANT CHANGE UP (ref 21–29)
HCT VFR BLD CALC: 21.8 % — LOW (ref 39–50)
HCT VFR BLD CALC: 23.2 % — LOW (ref 39–50)
HCT VFR BLD CALC: 23.5 % — LOW (ref 39–50)
HCT VFR BLD CALC: 23.8 % — LOW (ref 39–50)
HCT VFR BLD CALC: 23.8 % — LOW (ref 39–50)
HCT VFR BLD CALC: 24.4 % — LOW (ref 39–50)
HCT VFR BLD CALC: 24.5 % — LOW (ref 39–50)
HCT VFR BLD CALC: 24.6 % — LOW (ref 39–50)
HCT VFR BLD CALC: 25 % — LOW (ref 39–50)
HCT VFR BLD CALC: 25 % — LOW (ref 39–50)
HCT VFR BLD CALC: 25.1 % — LOW (ref 39–50)
HCT VFR BLD CALC: 25.3 % — LOW (ref 39–50)
HCT VFR BLD CALC: 25.5 % — LOW (ref 39–50)
HCT VFR BLD CALC: 25.5 % — LOW (ref 39–50)
HCT VFR BLD CALC: 25.6 % — LOW (ref 39–50)
HCT VFR BLD CALC: 25.7 % — LOW (ref 39–50)
HCT VFR BLD CALC: 25.7 % — LOW (ref 39–50)
HCT VFR BLD CALC: 25.8 % — LOW (ref 39–50)
HCT VFR BLD CALC: 25.8 % — LOW (ref 39–50)
HCT VFR BLD CALC: 25.9 % — LOW (ref 39–50)
HCT VFR BLD CALC: 26 % — LOW (ref 39–50)
HCT VFR BLD CALC: 26 % — LOW (ref 39–50)
HCT VFR BLD CALC: 26.1 % — LOW (ref 39–50)
HCT VFR BLD CALC: 26.2 % — LOW (ref 39–50)
HCT VFR BLD CALC: 26.3 % — LOW (ref 39–50)
HCT VFR BLD CALC: 26.4 % — LOW (ref 39–50)
HCT VFR BLD CALC: 26.4 % — LOW (ref 39–50)
HCT VFR BLD CALC: 26.6 % — LOW (ref 39–50)
HCT VFR BLD CALC: 26.7 % — LOW (ref 39–50)
HCT VFR BLD CALC: 26.8 % — LOW (ref 39–50)
HCT VFR BLD CALC: 26.8 % — LOW (ref 39–50)
HCT VFR BLD CALC: 26.9 % — LOW (ref 39–50)
HCT VFR BLD CALC: 27 % — LOW (ref 39–50)
HCT VFR BLD CALC: 27.1 % — LOW (ref 39–50)
HCT VFR BLD CALC: 27.1 % — LOW (ref 39–50)
HCT VFR BLD CALC: 27.2 % — LOW (ref 39–50)
HCT VFR BLD CALC: 27.2 % — LOW (ref 39–50)
HCT VFR BLD CALC: 27.3 % — LOW (ref 39–50)
HCT VFR BLD CALC: 27.4 % — LOW (ref 39–50)
HCT VFR BLD CALC: 27.5 % — LOW (ref 39–50)
HCT VFR BLD CALC: 27.5 % — LOW (ref 39–50)
HCT VFR BLD CALC: 27.7 % — LOW (ref 39–50)
HCT VFR BLD CALC: 27.8 % — LOW (ref 39–50)
HCT VFR BLD CALC: 27.9 % — LOW (ref 39–50)
HCT VFR BLD CALC: 28 % — LOW (ref 39–50)
HCT VFR BLD CALC: 28.1 % — LOW (ref 39–50)
HCT VFR BLD CALC: 28.2 % — LOW (ref 39–50)
HCT VFR BLD CALC: 28.2 % — LOW (ref 39–50)
HCT VFR BLD CALC: 28.3 % — LOW (ref 39–50)
HCT VFR BLD CALC: 28.4 % — LOW (ref 39–50)
HCT VFR BLD CALC: 28.7 % — LOW (ref 39–50)
HCT VFR BLD CALC: 28.8 % — LOW (ref 39–50)
HCT VFR BLD CALC: 28.8 % — LOW (ref 39–50)
HCT VFR BLD CALC: 29 % — LOW (ref 39–50)
HCT VFR BLD CALC: 29 % — LOW (ref 39–50)
HCT VFR BLD CALC: 29.5 % — LOW (ref 39–50)
HCT VFR BLD CALC: 29.9 %
HCT VFR BLD CALC: 30 % — LOW (ref 39–50)
HCT VFR BLD CALC: 30.1 % — LOW (ref 39–50)
HCT VFR BLD CALC: 30.2 % — LOW (ref 39–50)
HCT VFR BLD CALC: 30.2 % — LOW (ref 39–50)
HCT VFR BLD CALC: 30.5 % — LOW (ref 39–50)
HCT VFR BLDA CALC: 25 — LOW (ref 39–50)
HCT VFR BLDA CALC: 25 — LOW (ref 39–50)
HCV AB S/CO SERPL IA: 0.39 S/CO — SIGNIFICANT CHANGE UP (ref 0–0.99)
HCV AB SERPL-IMP: SIGNIFICANT CHANGE UP
HDLC SERPL-MCNC: 41 MG/DL
HGB BLD CALC-MCNC: 8.2 G/DL — LOW (ref 13–17)
HGB BLD CALC-MCNC: 8.2 G/DL — LOW (ref 13–17)
HGB BLD-MCNC: 6.8 G/DL — CRITICAL LOW (ref 13–17)
HGB BLD-MCNC: 7.2 G/DL — LOW (ref 13–17)
HGB BLD-MCNC: 7.4 G/DL — LOW (ref 13–17)
HGB BLD-MCNC: 7.5 G/DL — LOW (ref 13–17)
HGB BLD-MCNC: 7.6 G/DL — LOW (ref 13–17)
HGB BLD-MCNC: 7.7 G/DL — LOW (ref 13–17)
HGB BLD-MCNC: 7.8 G/DL — LOW (ref 13–17)
HGB BLD-MCNC: 7.9 G/DL — LOW (ref 13–17)
HGB BLD-MCNC: 8 G/DL — LOW (ref 13–17)
HGB BLD-MCNC: 8.1 G/DL — LOW (ref 13–17)
HGB BLD-MCNC: 8.2 G/DL — LOW (ref 13–17)
HGB BLD-MCNC: 8.2 G/DL — LOW (ref 13–17)
HGB BLD-MCNC: 8.3 G/DL — LOW (ref 13–17)
HGB BLD-MCNC: 8.4 G/DL — LOW (ref 13–17)
HGB BLD-MCNC: 8.5 G/DL — LOW (ref 13–17)
HGB BLD-MCNC: 8.6 G/DL
HGB BLD-MCNC: 8.6 G/DL — LOW (ref 13–17)
HGB BLD-MCNC: 8.7 G/DL — LOW (ref 13–17)
HGB BLD-MCNC: 8.8 G/DL — LOW (ref 13–17)
HGB BLD-MCNC: 9 G/DL — LOW (ref 13–17)
HGB BLD-MCNC: 9.4 G/DL — LOW (ref 13–17)
HYPOCHROMIA BLD QL: SIGNIFICANT CHANGE UP
HYPOCHROMIA BLD QL: SLIGHT — SIGNIFICANT CHANGE UP
HYPOSEGMENTATION: SIGNIFICANT CHANGE UP
IMM GRANULOCYTES NFR BLD AUTO: 0 %
IMM GRANULOCYTES NFR BLD AUTO: 0.3 % — SIGNIFICANT CHANGE UP (ref 0–1.5)
IMM GRANULOCYTES NFR BLD AUTO: 0.4 % — SIGNIFICANT CHANGE UP (ref 0–1.5)
IMM GRANULOCYTES NFR BLD AUTO: 0.5 % — SIGNIFICANT CHANGE UP (ref 0–1.5)
IMM GRANULOCYTES NFR BLD AUTO: 0.7 % — SIGNIFICANT CHANGE UP (ref 0–1.5)
IMM GRANULOCYTES NFR BLD AUTO: 0.8 % — SIGNIFICANT CHANGE UP (ref 0–1.5)
IMM GRANULOCYTES NFR BLD AUTO: 1.1 % — SIGNIFICANT CHANGE UP (ref 0–1.5)
IMM GRANULOCYTES NFR BLD AUTO: 1.1 % — SIGNIFICANT CHANGE UP (ref 0–1.5)
IMM GRANULOCYTES NFR BLD AUTO: 1.2 % — SIGNIFICANT CHANGE UP (ref 0–1.5)
IMM GRANULOCYTES NFR BLD AUTO: 1.4 % — SIGNIFICANT CHANGE UP (ref 0–1.5)
IMM GRANULOCYTES NFR BLD AUTO: 1.5 % — SIGNIFICANT CHANGE UP (ref 0–1.5)
INR BLD: 1.51 RATIO — HIGH (ref 0.88–1.16)
INR BLD: 1.59 RATIO — HIGH (ref 0.88–1.16)
INR BLD: 1.6 RATIO — HIGH (ref 0.88–1.16)
INR BLD: 1.69 RATIO — HIGH (ref 0.88–1.16)
INR BLD: 1.69 RATIO — HIGH (ref 0.88–1.16)
INR BLD: 1.75 RATIO — HIGH (ref 0.88–1.16)
INR BLD: 2.05 RATIO — HIGH (ref 0.88–1.16)
INR BLD: 2.09 RATIO — HIGH (ref 0.88–1.16)
INR BLD: 2.35 RATIO — HIGH (ref 0.88–1.16)
IRON SATN MFR SERPL: 10 % — LOW (ref 16–55)
IRON SATN MFR SERPL: 18 UG/DL — LOW (ref 59–158)
IRON SATN MFR SERPL: 19 % — SIGNIFICANT CHANGE UP (ref 16–55)
IRON SATN MFR SERPL: 25 UG/DL — LOW (ref 59–158)
IRON SATN MFR SERPL: 37 UG/DL — LOW (ref 59–158)
IRON SATN MFR SERPL: 6 % — LOW (ref 16–55)
KETONES UR-MCNC: ABNORMAL
KETONES UR-MCNC: NEGATIVE — SIGNIFICANT CHANGE UP
LACTATE BLDV-MCNC: 0.8 MMOL/L — SIGNIFICANT CHANGE UP (ref 0.5–2)
LACTATE BLDV-MCNC: 1 MMOL/L — SIGNIFICANT CHANGE UP (ref 0.5–2)
LACTATE BLDV-MCNC: 1.1 MMOL/L — SIGNIFICANT CHANGE UP (ref 0.5–2)
LACTATE BLDV-MCNC: 1.8 MMOL/L — SIGNIFICANT CHANGE UP (ref 0.5–2)
LACTATE BLDV-MCNC: 1.9 MMOL/L — SIGNIFICANT CHANGE UP (ref 0.5–2)
LACTATE BLDV-MCNC: 2 MMOL/L — SIGNIFICANT CHANGE UP (ref 0.5–2)
LACTATE BLDV-MCNC: 2.5 MMOL/L — HIGH (ref 0.5–2)
LACTATE SERPL-SCNC: 1.7 MMOL/L — SIGNIFICANT CHANGE UP (ref 0.5–2)
LACTATE SERPL-SCNC: 2.5 MMOL/L — HIGH (ref 0.7–2)
LACTATE SERPL-SCNC: 4.2 MMOL/L — CRITICAL HIGH (ref 0.7–2)
LDLC SERPL CALC-MCNC: 52 MG/DL
LEUKOCYTE ESTERASE UR-ACNC: ABNORMAL
LG PLATELETS BLD QL AUTO: SLIGHT — SIGNIFICANT CHANGE UP
LG PLATELETS BLD QL AUTO: SLIGHT — SIGNIFICANT CHANGE UP
LYMPHOCYTES # BLD AUTO: 0.17 K/UL — LOW (ref 1–3.3)
LYMPHOCYTES # BLD AUTO: 0.2 K/UL — LOW (ref 1–3.3)
LYMPHOCYTES # BLD AUTO: 0.3 K/UL — LOW (ref 1–3.3)
LYMPHOCYTES # BLD AUTO: 0.31 K/UL — LOW (ref 1–3.3)
LYMPHOCYTES # BLD AUTO: 0.34 K/UL — LOW (ref 1–3.3)
LYMPHOCYTES # BLD AUTO: 0.36 K/UL — LOW (ref 1–3.3)
LYMPHOCYTES # BLD AUTO: 0.38 K/UL — LOW (ref 1–3.3)
LYMPHOCYTES # BLD AUTO: 0.39 K/UL — LOW (ref 1–3.3)
LYMPHOCYTES # BLD AUTO: 0.39 K/UL — LOW (ref 1–3.3)
LYMPHOCYTES # BLD AUTO: 0.42 K/UL — LOW (ref 1–3.3)
LYMPHOCYTES # BLD AUTO: 0.43 K/UL — LOW (ref 1–3.3)
LYMPHOCYTES # BLD AUTO: 0.43 K/UL — LOW (ref 1–3.3)
LYMPHOCYTES # BLD AUTO: 0.44 K/UL
LYMPHOCYTES # BLD AUTO: 0.49 K/UL — LOW (ref 1–3.3)
LYMPHOCYTES # BLD AUTO: 0.5 K/UL — LOW (ref 1–3.3)
LYMPHOCYTES # BLD AUTO: 0.52 K/UL — LOW (ref 1–3.3)
LYMPHOCYTES # BLD AUTO: 0.53 K/UL — LOW (ref 1–3.3)
LYMPHOCYTES # BLD AUTO: 0.53 K/UL — LOW (ref 1–3.3)
LYMPHOCYTES # BLD AUTO: 0.55 K/UL — LOW (ref 1–3.3)
LYMPHOCYTES # BLD AUTO: 0.56 K/UL — LOW (ref 1–3.3)
LYMPHOCYTES # BLD AUTO: 0.56 K/UL — LOW (ref 1–3.3)
LYMPHOCYTES # BLD AUTO: 0.57 K/UL — LOW (ref 1–3.3)
LYMPHOCYTES # BLD AUTO: 0.59 K/UL — LOW (ref 1–3.3)
LYMPHOCYTES # BLD AUTO: 0.65 K/UL — LOW (ref 1–3.3)
LYMPHOCYTES # BLD AUTO: 0.66 K/UL — LOW (ref 1–3.3)
LYMPHOCYTES # BLD AUTO: 0.67 K/UL — LOW (ref 1–3.3)
LYMPHOCYTES # BLD AUTO: 0.79 K/UL — LOW (ref 1–3.3)
LYMPHOCYTES # BLD AUTO: 0.9 K/UL — LOW (ref 1–3.3)
LYMPHOCYTES # BLD AUTO: 10 % — LOW (ref 13–44)
LYMPHOCYTES # BLD AUTO: 10.4 % — LOW (ref 13–44)
LYMPHOCYTES # BLD AUTO: 10.8 % — LOW (ref 13–44)
LYMPHOCYTES # BLD AUTO: 11.3 % — LOW (ref 13–44)
LYMPHOCYTES # BLD AUTO: 11.9 % — LOW (ref 13–44)
LYMPHOCYTES # BLD AUTO: 15 % — SIGNIFICANT CHANGE UP (ref 13–44)
LYMPHOCYTES # BLD AUTO: 16.8 % — SIGNIFICANT CHANGE UP (ref 13–44)
LYMPHOCYTES # BLD AUTO: 17.7 % — SIGNIFICANT CHANGE UP (ref 13–44)
LYMPHOCYTES # BLD AUTO: 2 % — LOW (ref 13–44)
LYMPHOCYTES # BLD AUTO: 2.6 % — LOW (ref 13–44)
LYMPHOCYTES # BLD AUTO: 3.7 % — LOW (ref 13–44)
LYMPHOCYTES # BLD AUTO: 4 % — LOW (ref 13–44)
LYMPHOCYTES # BLD AUTO: 4.2 % — LOW (ref 13–44)
LYMPHOCYTES # BLD AUTO: 4.3 % — LOW (ref 13–44)
LYMPHOCYTES # BLD AUTO: 4.4 % — LOW (ref 13–44)
LYMPHOCYTES # BLD AUTO: 4.4 % — LOW (ref 13–44)
LYMPHOCYTES # BLD AUTO: 4.6 % — LOW (ref 13–44)
LYMPHOCYTES # BLD AUTO: 4.9 % — LOW (ref 13–44)
LYMPHOCYTES # BLD AUTO: 6.5 % — LOW (ref 13–44)
LYMPHOCYTES # BLD AUTO: 6.5 % — LOW (ref 13–44)
LYMPHOCYTES # BLD AUTO: 6.7 % — LOW (ref 13–44)
LYMPHOCYTES # BLD AUTO: 6.8 % — LOW (ref 13–44)
LYMPHOCYTES # BLD AUTO: 7.4 % — LOW (ref 13–44)
LYMPHOCYTES # BLD AUTO: 7.7 % — LOW (ref 13–44)
LYMPHOCYTES # BLD AUTO: 7.8 % — LOW (ref 13–44)
LYMPHOCYTES # BLD AUTO: 8.6 % — LOW (ref 13–44)
LYMPHOCYTES # BLD AUTO: 8.8 % — LOW (ref 13–44)
LYMPHOCYTES # BLD AUTO: 9.2 % — LOW (ref 13–44)
LYMPHOCYTES # BLD AUTO: 9.9 % — LOW (ref 13–44)
LYMPHOCYTES NFR BLD AUTO: 9.7 %
MACROCYTES BLD QL: SIGNIFICANT CHANGE UP
MACROCYTES BLD QL: SLIGHT — SIGNIFICANT CHANGE UP
MAGNESIUM SERPL-MCNC: 1.2 MG/DL — LOW (ref 1.8–2.6)
MAGNESIUM SERPL-MCNC: 1.2 MG/DL — LOW (ref 1.8–2.6)
MAGNESIUM SERPL-MCNC: 1.3 MG/DL — LOW (ref 1.6–2.6)
MAGNESIUM SERPL-MCNC: 1.5 MG/DL — LOW (ref 1.8–2.6)
MAGNESIUM SERPL-MCNC: 1.6 MG/DL — SIGNIFICANT CHANGE UP (ref 1.6–2.6)
MAGNESIUM SERPL-MCNC: 1.7 MG/DL — SIGNIFICANT CHANGE UP (ref 1.6–2.6)
MAGNESIUM SERPL-MCNC: 1.8 MG/DL — SIGNIFICANT CHANGE UP (ref 1.6–2.6)
MAGNESIUM SERPL-MCNC: 1.9 MG/DL — SIGNIFICANT CHANGE UP (ref 1.6–2.6)
MAGNESIUM SERPL-MCNC: 2 MG/DL — SIGNIFICANT CHANGE UP (ref 1.6–2.6)
MAGNESIUM SERPL-MCNC: 2 MG/DL — SIGNIFICANT CHANGE UP (ref 1.8–2.6)
MAGNESIUM SERPL-MCNC: 2.2 MG/DL — SIGNIFICANT CHANGE UP (ref 1.6–2.6)
MAN DIFF?: NORMAL
MANUAL SMEAR VERIFICATION: SIGNIFICANT CHANGE UP
MCHC RBC-ENTMCNC: 25.3 PG — LOW (ref 27–34)
MCHC RBC-ENTMCNC: 25.4 PG — LOW (ref 27–34)
MCHC RBC-ENTMCNC: 25.4 PG — LOW (ref 27–34)
MCHC RBC-ENTMCNC: 25.5 PG — LOW (ref 27–34)
MCHC RBC-ENTMCNC: 25.7 PG — LOW (ref 27–34)
MCHC RBC-ENTMCNC: 25.7 PG — LOW (ref 27–34)
MCHC RBC-ENTMCNC: 25.9 PG — LOW (ref 27–34)
MCHC RBC-ENTMCNC: 26 PG — LOW (ref 27–34)
MCHC RBC-ENTMCNC: 26.1 PG — LOW (ref 27–34)
MCHC RBC-ENTMCNC: 26.2 PG
MCHC RBC-ENTMCNC: 26.2 PG — LOW (ref 27–34)
MCHC RBC-ENTMCNC: 26.3 PG — LOW (ref 27–34)
MCHC RBC-ENTMCNC: 26.4 PG — LOW (ref 27–34)
MCHC RBC-ENTMCNC: 26.5 PG — LOW (ref 27–34)
MCHC RBC-ENTMCNC: 26.6 PG — LOW (ref 27–34)
MCHC RBC-ENTMCNC: 26.7 PG — LOW (ref 27–34)
MCHC RBC-ENTMCNC: 26.8 PG — LOW (ref 27–34)
MCHC RBC-ENTMCNC: 26.9 PG — LOW (ref 27–34)
MCHC RBC-ENTMCNC: 27 PG — SIGNIFICANT CHANGE UP (ref 27–34)
MCHC RBC-ENTMCNC: 27.1 PG — SIGNIFICANT CHANGE UP (ref 27–34)
MCHC RBC-ENTMCNC: 27.2 PG — SIGNIFICANT CHANGE UP (ref 27–34)
MCHC RBC-ENTMCNC: 27.2 PG — SIGNIFICANT CHANGE UP (ref 27–34)
MCHC RBC-ENTMCNC: 27.3 PG — SIGNIFICANT CHANGE UP (ref 27–34)
MCHC RBC-ENTMCNC: 27.3 PG — SIGNIFICANT CHANGE UP (ref 27–34)
MCHC RBC-ENTMCNC: 27.4 PG — SIGNIFICANT CHANGE UP (ref 27–34)
MCHC RBC-ENTMCNC: 27.4 PG — SIGNIFICANT CHANGE UP (ref 27–34)
MCHC RBC-ENTMCNC: 27.5 PG — SIGNIFICANT CHANGE UP (ref 27–34)
MCHC RBC-ENTMCNC: 27.5 PG — SIGNIFICANT CHANGE UP (ref 27–34)
MCHC RBC-ENTMCNC: 27.7 PG — SIGNIFICANT CHANGE UP (ref 27–34)
MCHC RBC-ENTMCNC: 27.8 PG — SIGNIFICANT CHANGE UP (ref 27–34)
MCHC RBC-ENTMCNC: 28 PG — SIGNIFICANT CHANGE UP (ref 27–34)
MCHC RBC-ENTMCNC: 28 PG — SIGNIFICANT CHANGE UP (ref 27–34)
MCHC RBC-ENTMCNC: 28.1 PG — SIGNIFICANT CHANGE UP (ref 27–34)
MCHC RBC-ENTMCNC: 28.2 PG — SIGNIFICANT CHANGE UP (ref 27–34)
MCHC RBC-ENTMCNC: 28.2 PG — SIGNIFICANT CHANGE UP (ref 27–34)
MCHC RBC-ENTMCNC: 28.3 PG — SIGNIFICANT CHANGE UP (ref 27–34)
MCHC RBC-ENTMCNC: 28.8 GM/DL
MCHC RBC-ENTMCNC: 28.8 GM/DL — LOW (ref 32–36)
MCHC RBC-ENTMCNC: 29.1 GM/DL — LOW (ref 32–36)
MCHC RBC-ENTMCNC: 29.2 GM/DL — LOW (ref 32–36)
MCHC RBC-ENTMCNC: 29.2 GM/DL — LOW (ref 32–36)
MCHC RBC-ENTMCNC: 29.4 GM/DL — LOW (ref 32–36)
MCHC RBC-ENTMCNC: 29.5 GM/DL — LOW (ref 32–36)
MCHC RBC-ENTMCNC: 29.6 GM/DL — LOW (ref 32–36)
MCHC RBC-ENTMCNC: 29.6 GM/DL — LOW (ref 32–36)
MCHC RBC-ENTMCNC: 29.7 GM/DL — LOW (ref 32–36)
MCHC RBC-ENTMCNC: 29.8 GM/DL — LOW (ref 32–36)
MCHC RBC-ENTMCNC: 29.9 GM/DL — LOW (ref 32–36)
MCHC RBC-ENTMCNC: 30 GM/DL — LOW (ref 32–36)
MCHC RBC-ENTMCNC: 30.1 GM/DL — LOW (ref 32–36)
MCHC RBC-ENTMCNC: 30.2 GM/DL — LOW (ref 32–36)
MCHC RBC-ENTMCNC: 30.3 GM/DL — LOW (ref 32–36)
MCHC RBC-ENTMCNC: 30.4 GM/DL — LOW (ref 32–36)
MCHC RBC-ENTMCNC: 30.5 GM/DL — LOW (ref 32–36)
MCHC RBC-ENTMCNC: 30.6 GM/DL — LOW (ref 32–36)
MCHC RBC-ENTMCNC: 30.6 GM/DL — LOW (ref 32–36)
MCHC RBC-ENTMCNC: 30.7 GM/DL — LOW (ref 32–36)
MCHC RBC-ENTMCNC: 30.7 GM/DL — LOW (ref 32–36)
MCHC RBC-ENTMCNC: 30.9 GM/DL — LOW (ref 32–36)
MCHC RBC-ENTMCNC: 31 GM/DL — LOW (ref 32–36)
MCHC RBC-ENTMCNC: 31.1 GM/DL — LOW (ref 32–36)
MCHC RBC-ENTMCNC: 31.1 GM/DL — LOW (ref 32–36)
MCHC RBC-ENTMCNC: 31.2 GM/DL — LOW (ref 32–36)
MCHC RBC-ENTMCNC: 31.3 GM/DL — LOW (ref 32–36)
MCHC RBC-ENTMCNC: 31.3 GM/DL — LOW (ref 32–36)
MCHC RBC-ENTMCNC: 31.8 GM/DL — LOW (ref 32–36)
MCHC RBC-ENTMCNC: 31.9 GM/DL — LOW (ref 32–36)
MCHC RBC-ENTMCNC: 32 GM/DL — SIGNIFICANT CHANGE UP (ref 32–36)
MCV RBC AUTO: 81.9 FL — SIGNIFICANT CHANGE UP (ref 80–100)
MCV RBC AUTO: 83 FL — SIGNIFICANT CHANGE UP (ref 80–100)
MCV RBC AUTO: 83.1 FL — SIGNIFICANT CHANGE UP (ref 80–100)
MCV RBC AUTO: 83.8 FL — SIGNIFICANT CHANGE UP (ref 80–100)
MCV RBC AUTO: 84.3 FL — SIGNIFICANT CHANGE UP (ref 80–100)
MCV RBC AUTO: 84.6 FL — SIGNIFICANT CHANGE UP (ref 80–100)
MCV RBC AUTO: 84.6 FL — SIGNIFICANT CHANGE UP (ref 80–100)
MCV RBC AUTO: 84.7 FL — SIGNIFICANT CHANGE UP (ref 80–100)
MCV RBC AUTO: 85.2 FL — SIGNIFICANT CHANGE UP (ref 80–100)
MCV RBC AUTO: 85.8 FL — SIGNIFICANT CHANGE UP (ref 80–100)
MCV RBC AUTO: 86 FL — SIGNIFICANT CHANGE UP (ref 80–100)
MCV RBC AUTO: 86.2 FL — SIGNIFICANT CHANGE UP (ref 80–100)
MCV RBC AUTO: 86.4 FL — SIGNIFICANT CHANGE UP (ref 80–100)
MCV RBC AUTO: 86.6 FL — SIGNIFICANT CHANGE UP (ref 80–100)
MCV RBC AUTO: 86.6 FL — SIGNIFICANT CHANGE UP (ref 80–100)
MCV RBC AUTO: 86.7 FL — SIGNIFICANT CHANGE UP (ref 80–100)
MCV RBC AUTO: 86.9 FL — SIGNIFICANT CHANGE UP (ref 80–100)
MCV RBC AUTO: 86.9 FL — SIGNIFICANT CHANGE UP (ref 80–100)
MCV RBC AUTO: 87.2 FL — SIGNIFICANT CHANGE UP (ref 80–100)
MCV RBC AUTO: 87.3 FL — SIGNIFICANT CHANGE UP (ref 80–100)
MCV RBC AUTO: 87.7 FL — SIGNIFICANT CHANGE UP (ref 80–100)
MCV RBC AUTO: 87.7 FL — SIGNIFICANT CHANGE UP (ref 80–100)
MCV RBC AUTO: 87.8 FL — SIGNIFICANT CHANGE UP (ref 80–100)
MCV RBC AUTO: 88 FL — SIGNIFICANT CHANGE UP (ref 80–100)
MCV RBC AUTO: 88 FL — SIGNIFICANT CHANGE UP (ref 80–100)
MCV RBC AUTO: 88.1 FL — SIGNIFICANT CHANGE UP (ref 80–100)
MCV RBC AUTO: 88.3 FL — SIGNIFICANT CHANGE UP (ref 80–100)
MCV RBC AUTO: 88.3 FL — SIGNIFICANT CHANGE UP (ref 80–100)
MCV RBC AUTO: 88.4 FL — SIGNIFICANT CHANGE UP (ref 80–100)
MCV RBC AUTO: 88.5 FL — SIGNIFICANT CHANGE UP (ref 80–100)
MCV RBC AUTO: 88.5 FL — SIGNIFICANT CHANGE UP (ref 80–100)
MCV RBC AUTO: 88.7 FL — SIGNIFICANT CHANGE UP (ref 80–100)
MCV RBC AUTO: 88.8 FL — SIGNIFICANT CHANGE UP (ref 80–100)
MCV RBC AUTO: 88.8 FL — SIGNIFICANT CHANGE UP (ref 80–100)
MCV RBC AUTO: 88.9 FL — SIGNIFICANT CHANGE UP (ref 80–100)
MCV RBC AUTO: 88.9 FL — SIGNIFICANT CHANGE UP (ref 80–100)
MCV RBC AUTO: 89 FL — SIGNIFICANT CHANGE UP (ref 80–100)
MCV RBC AUTO: 89 FL — SIGNIFICANT CHANGE UP (ref 80–100)
MCV RBC AUTO: 89.1 FL — SIGNIFICANT CHANGE UP (ref 80–100)
MCV RBC AUTO: 89.1 FL — SIGNIFICANT CHANGE UP (ref 80–100)
MCV RBC AUTO: 89.2 FL — SIGNIFICANT CHANGE UP (ref 80–100)
MCV RBC AUTO: 89.2 FL — SIGNIFICANT CHANGE UP (ref 80–100)
MCV RBC AUTO: 89.3 FL — SIGNIFICANT CHANGE UP (ref 80–100)
MCV RBC AUTO: 89.4 FL — SIGNIFICANT CHANGE UP (ref 80–100)
MCV RBC AUTO: 89.5 FL — SIGNIFICANT CHANGE UP (ref 80–100)
MCV RBC AUTO: 89.6 FL — SIGNIFICANT CHANGE UP (ref 80–100)
MCV RBC AUTO: 89.7 FL — SIGNIFICANT CHANGE UP (ref 80–100)
MCV RBC AUTO: 89.7 FL — SIGNIFICANT CHANGE UP (ref 80–100)
MCV RBC AUTO: 90 FL — SIGNIFICANT CHANGE UP (ref 80–100)
MCV RBC AUTO: 90.3 FL — SIGNIFICANT CHANGE UP (ref 80–100)
MCV RBC AUTO: 90.5 FL — SIGNIFICANT CHANGE UP (ref 80–100)
MCV RBC AUTO: 90.9 FL — SIGNIFICANT CHANGE UP (ref 80–100)
MCV RBC AUTO: 91 FL — SIGNIFICANT CHANGE UP (ref 80–100)
MCV RBC AUTO: 91.1 FL — SIGNIFICANT CHANGE UP (ref 80–100)
MCV RBC AUTO: 91.2 FL
MCV RBC AUTO: 91.5 FL — SIGNIFICANT CHANGE UP (ref 80–100)
MCV RBC AUTO: 91.8 FL — SIGNIFICANT CHANGE UP (ref 80–100)
MCV RBC AUTO: 92.1 FL — SIGNIFICANT CHANGE UP (ref 80–100)
MCV RBC AUTO: 92.4 FL — SIGNIFICANT CHANGE UP (ref 80–100)
MCV RBC AUTO: 92.6 FL — SIGNIFICANT CHANGE UP (ref 80–100)
MCV RBC AUTO: 92.8 FL — SIGNIFICANT CHANGE UP (ref 80–100)
MCV RBC AUTO: 93.1 FL — SIGNIFICANT CHANGE UP (ref 80–100)
MCV RBC AUTO: 93.3 FL — SIGNIFICANT CHANGE UP (ref 80–100)
MCV RBC AUTO: 94.4 FL — SIGNIFICANT CHANGE UP (ref 80–100)
METHOD TYPE: SIGNIFICANT CHANGE UP
MICROCYTES BLD QL: SLIGHT — SIGNIFICANT CHANGE UP
MONOCYTES # BLD AUTO: 0.07 K/UL — SIGNIFICANT CHANGE UP (ref 0–0.9)
MONOCYTES # BLD AUTO: 0.07 K/UL — SIGNIFICANT CHANGE UP (ref 0–0.9)
MONOCYTES # BLD AUTO: 0.09 K/UL — SIGNIFICANT CHANGE UP (ref 0–0.9)
MONOCYTES # BLD AUTO: 0.14 K/UL — SIGNIFICANT CHANGE UP (ref 0–0.9)
MONOCYTES # BLD AUTO: 0.17 K/UL — SIGNIFICANT CHANGE UP (ref 0–0.9)
MONOCYTES # BLD AUTO: 0.18 K/UL
MONOCYTES # BLD AUTO: 0.2 K/UL — SIGNIFICANT CHANGE UP (ref 0–0.9)
MONOCYTES # BLD AUTO: 0.24 K/UL — SIGNIFICANT CHANGE UP (ref 0–0.9)
MONOCYTES # BLD AUTO: 0.24 K/UL — SIGNIFICANT CHANGE UP (ref 0–0.9)
MONOCYTES # BLD AUTO: 0.29 K/UL — SIGNIFICANT CHANGE UP (ref 0–0.9)
MONOCYTES # BLD AUTO: 0.3 K/UL — SIGNIFICANT CHANGE UP (ref 0–0.9)
MONOCYTES # BLD AUTO: 0.3 K/UL — SIGNIFICANT CHANGE UP (ref 0–0.9)
MONOCYTES # BLD AUTO: 0.31 K/UL — SIGNIFICANT CHANGE UP (ref 0–0.9)
MONOCYTES # BLD AUTO: 0.31 K/UL — SIGNIFICANT CHANGE UP (ref 0–0.9)
MONOCYTES # BLD AUTO: 0.33 K/UL — SIGNIFICANT CHANGE UP (ref 0–0.9)
MONOCYTES # BLD AUTO: 0.33 K/UL — SIGNIFICANT CHANGE UP (ref 0–0.9)
MONOCYTES # BLD AUTO: 0.34 K/UL — SIGNIFICANT CHANGE UP (ref 0–0.9)
MONOCYTES # BLD AUTO: 0.34 K/UL — SIGNIFICANT CHANGE UP (ref 0–0.9)
MONOCYTES # BLD AUTO: 0.36 K/UL — SIGNIFICANT CHANGE UP (ref 0–0.9)
MONOCYTES # BLD AUTO: 0.36 K/UL — SIGNIFICANT CHANGE UP (ref 0–0.9)
MONOCYTES # BLD AUTO: 0.38 K/UL — SIGNIFICANT CHANGE UP (ref 0–0.9)
MONOCYTES # BLD AUTO: 0.38 K/UL — SIGNIFICANT CHANGE UP (ref 0–0.9)
MONOCYTES # BLD AUTO: 0.43 K/UL — SIGNIFICANT CHANGE UP (ref 0–0.9)
MONOCYTES # BLD AUTO: 0.45 K/UL — SIGNIFICANT CHANGE UP (ref 0–0.9)
MONOCYTES # BLD AUTO: 0.46 K/UL — SIGNIFICANT CHANGE UP (ref 0–0.9)
MONOCYTES # BLD AUTO: 0.52 K/UL — SIGNIFICANT CHANGE UP (ref 0–0.9)
MONOCYTES # BLD AUTO: 0.61 K/UL — SIGNIFICANT CHANGE UP (ref 0–0.9)
MONOCYTES # BLD AUTO: 0.62 K/UL — SIGNIFICANT CHANGE UP (ref 0–0.9)
MONOCYTES # BLD AUTO: 0.68 K/UL — SIGNIFICANT CHANGE UP (ref 0–0.9)
MONOCYTES # BLD AUTO: 0.69 K/UL — SIGNIFICANT CHANGE UP (ref 0–0.9)
MONOCYTES NFR BLD AUTO: 0.9 % — LOW (ref 2–14)
MONOCYTES NFR BLD AUTO: 1 % — LOW (ref 2–14)
MONOCYTES NFR BLD AUTO: 1.7 % — LOW (ref 2–14)
MONOCYTES NFR BLD AUTO: 10 % — SIGNIFICANT CHANGE UP (ref 2–14)
MONOCYTES NFR BLD AUTO: 2.6 % — SIGNIFICANT CHANGE UP (ref 2–14)
MONOCYTES NFR BLD AUTO: 2.8 % — SIGNIFICANT CHANGE UP (ref 2–14)
MONOCYTES NFR BLD AUTO: 3.5 % — SIGNIFICANT CHANGE UP (ref 2–14)
MONOCYTES NFR BLD AUTO: 4 %
MONOCYTES NFR BLD AUTO: 4 % — SIGNIFICANT CHANGE UP (ref 2–14)
MONOCYTES NFR BLD AUTO: 4 % — SIGNIFICANT CHANGE UP (ref 2–14)
MONOCYTES NFR BLD AUTO: 4.3 % — SIGNIFICANT CHANGE UP (ref 2–14)
MONOCYTES NFR BLD AUTO: 4.8 % — SIGNIFICANT CHANGE UP (ref 2–14)
MONOCYTES NFR BLD AUTO: 5.6 % — SIGNIFICANT CHANGE UP (ref 2–14)
MONOCYTES NFR BLD AUTO: 5.8 % — SIGNIFICANT CHANGE UP (ref 2–14)
MONOCYTES NFR BLD AUTO: 5.9 % — SIGNIFICANT CHANGE UP (ref 2–14)
MONOCYTES NFR BLD AUTO: 6 % — SIGNIFICANT CHANGE UP (ref 2–14)
MONOCYTES NFR BLD AUTO: 6.2 % — SIGNIFICANT CHANGE UP (ref 2–14)
MONOCYTES NFR BLD AUTO: 6.3 % — SIGNIFICANT CHANGE UP (ref 2–14)
MONOCYTES NFR BLD AUTO: 6.4 % — SIGNIFICANT CHANGE UP (ref 2–14)
MONOCYTES NFR BLD AUTO: 6.4 % — SIGNIFICANT CHANGE UP (ref 2–14)
MONOCYTES NFR BLD AUTO: 6.7 % — SIGNIFICANT CHANGE UP (ref 2–14)
MONOCYTES NFR BLD AUTO: 6.8 % — SIGNIFICANT CHANGE UP (ref 2–14)
MONOCYTES NFR BLD AUTO: 7.9 % — SIGNIFICANT CHANGE UP (ref 2–14)
MONOCYTES NFR BLD AUTO: 7.9 % — SIGNIFICANT CHANGE UP (ref 2–14)
MONOCYTES NFR BLD AUTO: 8.5 % — SIGNIFICANT CHANGE UP (ref 2–14)
MONOCYTES NFR BLD AUTO: 9.1 % — SIGNIFICANT CHANGE UP (ref 2–14)
MRSA PCR RESULT.: DETECTED
MRSA PCR RESULT.: SIGNIFICANT CHANGE UP
MYELOCYTES NFR BLD: 0.9 % — HIGH (ref 0–0)
MYOGLOBIN UR-MCNC: 401 MCG/L — HIGH
NEUTROPHILS # BLD AUTO: 10.62 K/UL — HIGH (ref 1.8–7.4)
NEUTROPHILS # BLD AUTO: 15.73 K/UL — HIGH (ref 1.8–7.4)
NEUTROPHILS # BLD AUTO: 2.69 K/UL — SIGNIFICANT CHANGE UP (ref 1.8–7.4)
NEUTROPHILS # BLD AUTO: 2.83 K/UL — SIGNIFICANT CHANGE UP (ref 1.8–7.4)
NEUTROPHILS # BLD AUTO: 3.64 K/UL — SIGNIFICANT CHANGE UP (ref 1.8–7.4)
NEUTROPHILS # BLD AUTO: 3.66 K/UL — SIGNIFICANT CHANGE UP (ref 1.8–7.4)
NEUTROPHILS # BLD AUTO: 3.7 K/UL — SIGNIFICANT CHANGE UP (ref 1.8–7.4)
NEUTROPHILS # BLD AUTO: 3.87 K/UL
NEUTROPHILS # BLD AUTO: 4.01 K/UL — SIGNIFICANT CHANGE UP (ref 1.8–7.4)
NEUTROPHILS # BLD AUTO: 4.03 K/UL — SIGNIFICANT CHANGE UP (ref 1.8–7.4)
NEUTROPHILS # BLD AUTO: 4.11 K/UL — SIGNIFICANT CHANGE UP (ref 1.8–7.4)
NEUTROPHILS # BLD AUTO: 4.16 K/UL — SIGNIFICANT CHANGE UP (ref 1.8–7.4)
NEUTROPHILS # BLD AUTO: 4.2 K/UL — SIGNIFICANT CHANGE UP (ref 1.8–7.4)
NEUTROPHILS # BLD AUTO: 4.56 K/UL — SIGNIFICANT CHANGE UP (ref 1.8–7.4)
NEUTROPHILS # BLD AUTO: 4.77 K/UL — SIGNIFICANT CHANGE UP (ref 1.8–7.4)
NEUTROPHILS # BLD AUTO: 4.79 K/UL — SIGNIFICANT CHANGE UP (ref 1.8–7.4)
NEUTROPHILS # BLD AUTO: 4.94 K/UL — SIGNIFICANT CHANGE UP (ref 1.8–7.4)
NEUTROPHILS # BLD AUTO: 4.98 K/UL — SIGNIFICANT CHANGE UP (ref 1.8–7.4)
NEUTROPHILS # BLD AUTO: 5.31 K/UL — SIGNIFICANT CHANGE UP (ref 1.8–7.4)
NEUTROPHILS # BLD AUTO: 5.62 K/UL — SIGNIFICANT CHANGE UP (ref 1.8–7.4)
NEUTROPHILS # BLD AUTO: 6.53 K/UL — SIGNIFICANT CHANGE UP (ref 1.8–7.4)
NEUTROPHILS # BLD AUTO: 6.83 K/UL — SIGNIFICANT CHANGE UP (ref 1.8–7.4)
NEUTROPHILS # BLD AUTO: 6.85 K/UL — SIGNIFICANT CHANGE UP (ref 1.8–7.4)
NEUTROPHILS # BLD AUTO: 7.21 K/UL — SIGNIFICANT CHANGE UP (ref 1.8–7.4)
NEUTROPHILS # BLD AUTO: 7.53 K/UL — HIGH (ref 1.8–7.4)
NEUTROPHILS # BLD AUTO: 7.75 K/UL — HIGH (ref 1.8–7.4)
NEUTROPHILS # BLD AUTO: 7.99 K/UL — HIGH (ref 1.8–7.4)
NEUTROPHILS # BLD AUTO: 8.52 K/UL — HIGH (ref 1.8–7.4)
NEUTROPHILS # BLD AUTO: 9.06 K/UL — HIGH (ref 1.8–7.4)
NEUTROPHILS # BLD AUTO: 9.61 K/UL — HIGH (ref 1.8–7.4)
NEUTROPHILS NFR BLD AUTO: 71 % — SIGNIFICANT CHANGE UP (ref 43–77)
NEUTROPHILS NFR BLD AUTO: 71.8 % — SIGNIFICANT CHANGE UP (ref 43–77)
NEUTROPHILS NFR BLD AUTO: 74.9 % — SIGNIFICANT CHANGE UP (ref 43–77)
NEUTROPHILS NFR BLD AUTO: 76.9 % — SIGNIFICANT CHANGE UP (ref 43–77)
NEUTROPHILS NFR BLD AUTO: 77.4 % — HIGH (ref 43–77)
NEUTROPHILS NFR BLD AUTO: 78.8 % — HIGH (ref 43–77)
NEUTROPHILS NFR BLD AUTO: 79.2 % — HIGH (ref 43–77)
NEUTROPHILS NFR BLD AUTO: 80.2 % — HIGH (ref 43–77)
NEUTROPHILS NFR BLD AUTO: 80.7 % — HIGH (ref 43–77)
NEUTROPHILS NFR BLD AUTO: 83.5 % — HIGH (ref 43–77)
NEUTROPHILS NFR BLD AUTO: 84.3 % — HIGH (ref 43–77)
NEUTROPHILS NFR BLD AUTO: 84.4 % — HIGH (ref 43–77)
NEUTROPHILS NFR BLD AUTO: 84.5 % — HIGH (ref 43–77)
NEUTROPHILS NFR BLD AUTO: 85.1 % — HIGH (ref 43–77)
NEUTROPHILS NFR BLD AUTO: 85.5 % — HIGH (ref 43–77)
NEUTROPHILS NFR BLD AUTO: 85.6 % — HIGH (ref 43–77)
NEUTROPHILS NFR BLD AUTO: 85.7 %
NEUTROPHILS NFR BLD AUTO: 85.7 % — HIGH (ref 43–77)
NEUTROPHILS NFR BLD AUTO: 86.8 % — HIGH (ref 43–77)
NEUTROPHILS NFR BLD AUTO: 87.2 % — HIGH (ref 43–77)
NEUTROPHILS NFR BLD AUTO: 87.4 % — HIGH (ref 43–77)
NEUTROPHILS NFR BLD AUTO: 87.8 % — HIGH (ref 43–77)
NEUTROPHILS NFR BLD AUTO: 89.6 % — HIGH (ref 43–77)
NEUTROPHILS NFR BLD AUTO: 90 % — HIGH (ref 43–77)
NEUTROPHILS NFR BLD AUTO: 90.4 % — HIGH (ref 43–77)
NEUTROPHILS NFR BLD AUTO: 90.4 % — HIGH (ref 43–77)
NEUTROPHILS NFR BLD AUTO: 91 % — HIGH (ref 43–77)
NEUTROPHILS NFR BLD AUTO: 91.2 % — HIGH (ref 43–77)
NEUTROPHILS NFR BLD AUTO: 91.3 % — HIGH (ref 43–77)
NEUTROPHILS NFR BLD AUTO: 91.8 % — HIGH (ref 43–77)
NEUTS BAND # BLD: 1.7 % — SIGNIFICANT CHANGE UP (ref 0–8)
NEUTS BAND # BLD: 2 % — SIGNIFICANT CHANGE UP (ref 0–8)
NEUTS BAND # BLD: 4.4 % — SIGNIFICANT CHANGE UP (ref 0–8)
NEUTS BAND # BLD: 6.1 % — SIGNIFICANT CHANGE UP (ref 0–8)
NITRITE UR-MCNC: NEGATIVE — SIGNIFICANT CHANGE UP
NON-GYNECOLOGICAL CYTOLOGY STUDY: SIGNIFICANT CHANGE UP
NRBC # BLD: 0 /100 WBCS — SIGNIFICANT CHANGE UP (ref 0–0)
NRBC # BLD: 0 — SIGNIFICANT CHANGE UP
NRBC # BLD: SIGNIFICANT CHANGE UP /100 WBCS (ref 0–0)
NRBC # BLD: SIGNIFICANT CHANGE UP /100 WBCS (ref 0–0)
NT-PROBNP SERPL-SCNC: 3063 PG/ML — HIGH (ref 0–125)
NT-PROBNP SERPL-SCNC: 4635 PG/ML — HIGH (ref 0–300)
NT-PROBNP SERPL-SCNC: 4650 PG/ML — HIGH (ref 0–125)
NT-PROBNP SERPL-SCNC: 5492 PG/ML — HIGH (ref 0–300)
NT-PROBNP SERPL-SCNC: 6725 PG/ML — HIGH (ref 0–300)
OB PNL STL: NEGATIVE — SIGNIFICANT CHANGE UP
OB PNL STL: POSITIVE
ORGANISM # SPEC MICROSCOPIC CNT: SIGNIFICANT CHANGE UP
OSMOLALITY UR: 270 MOSM/KG — LOW (ref 300–1000)
OSMOLALITY UR: 316 MOSM/KG — SIGNIFICANT CHANGE UP (ref 300–1000)
OTHER CELLS CSF MANUAL: 11 ML/DL — LOW (ref 18–22)
OTHER CELLS CSF MANUAL: 11 ML/DL — LOW (ref 18–23)
OVALOCYTES BLD QL SMEAR: SIGNIFICANT CHANGE UP
OVALOCYTES BLD QL SMEAR: SLIGHT — SIGNIFICANT CHANGE UP
P AERUGINOSA DNA BLD POS NAA+NON-PROBE: SIGNIFICANT CHANGE UP
PCO2 BLDV: 36 MMHG — SIGNIFICANT CHANGE UP (ref 35–50)
PCO2 BLDV: 39 MMHG — SIGNIFICANT CHANGE UP (ref 35–50)
PH BLDV: 7.22 — LOW (ref 7.32–7.43)
PH BLDV: 7.42 — SIGNIFICANT CHANGE UP (ref 7.32–7.43)
PH UR: 5 — SIGNIFICANT CHANGE UP (ref 5–8)
PH UR: 6 — SIGNIFICANT CHANGE UP (ref 5–8)
PH UR: 7 — SIGNIFICANT CHANGE UP (ref 5–8)
PHOSPHATE SERPL-MCNC: 2.6 MG/DL — SIGNIFICANT CHANGE UP (ref 2.4–4.7)
PHOSPHATE SERPL-MCNC: 3.2 MG/DL — SIGNIFICANT CHANGE UP (ref 2.4–4.7)
PHOSPHATE SERPL-MCNC: 3.3 MG/DL — SIGNIFICANT CHANGE UP (ref 2.4–4.7)
PHOSPHATE SERPL-MCNC: 3.7 MG/DL — SIGNIFICANT CHANGE UP (ref 2.4–4.7)
PHOSPHATE SERPL-MCNC: 3.7 MG/DL — SIGNIFICANT CHANGE UP (ref 2.4–4.7)
PHOSPHATE SERPL-MCNC: 3.9 MG/DL — SIGNIFICANT CHANGE UP (ref 2.4–4.7)
PHOSPHATE SERPL-MCNC: 4 MG/DL — SIGNIFICANT CHANGE UP (ref 2.4–4.7)
PHOSPHATE SERPL-MCNC: 4.6 MG/DL — SIGNIFICANT CHANGE UP (ref 2.4–4.7)
PHOSPHATE SERPL-MCNC: 5.4 MG/DL — HIGH (ref 2.4–4.7)
PHOSPHATE SERPL-MCNC: 5.6 MG/DL — HIGH (ref 2.4–4.7)
PHOSPHATE SERPL-MCNC: 6.1 MG/DL — HIGH (ref 2.5–4.5)
PLAT MORPH BLD: NORMAL — SIGNIFICANT CHANGE UP
PLATELET # BLD AUTO: 100 K/UL — LOW (ref 150–400)
PLATELET # BLD AUTO: 101 K/UL — LOW (ref 150–400)
PLATELET # BLD AUTO: 102 K/UL — LOW (ref 150–400)
PLATELET # BLD AUTO: 102 K/UL — LOW (ref 150–400)
PLATELET # BLD AUTO: 103 K/UL — LOW (ref 150–400)
PLATELET # BLD AUTO: 103 K/UL — LOW (ref 150–400)
PLATELET # BLD AUTO: 104 K/UL — LOW (ref 150–400)
PLATELET # BLD AUTO: 107 K/UL — LOW (ref 150–400)
PLATELET # BLD AUTO: 108 K/UL — LOW (ref 150–400)
PLATELET # BLD AUTO: 110 K/UL — LOW (ref 150–400)
PLATELET # BLD AUTO: 115 K/UL
PLATELET # BLD AUTO: 115 K/UL — LOW (ref 150–400)
PLATELET # BLD AUTO: 118 K/UL — LOW (ref 150–400)
PLATELET # BLD AUTO: 118 K/UL — LOW (ref 150–400)
PLATELET # BLD AUTO: 119 K/UL — LOW (ref 150–400)
PLATELET # BLD AUTO: 119 K/UL — LOW (ref 150–400)
PLATELET # BLD AUTO: 122 K/UL — LOW (ref 150–400)
PLATELET # BLD AUTO: 122 K/UL — LOW (ref 150–400)
PLATELET # BLD AUTO: 125 K/UL — LOW (ref 150–400)
PLATELET # BLD AUTO: 127 K/UL — LOW (ref 150–400)
PLATELET # BLD AUTO: 129 K/UL — LOW (ref 150–400)
PLATELET # BLD AUTO: 133 K/UL — LOW (ref 150–400)
PLATELET # BLD AUTO: 134 K/UL — LOW (ref 150–400)
PLATELET # BLD AUTO: 136 K/UL — LOW (ref 150–400)
PLATELET # BLD AUTO: 136 K/UL — LOW (ref 150–400)
PLATELET # BLD AUTO: 137 K/UL — LOW (ref 150–400)
PLATELET # BLD AUTO: 141 K/UL — LOW (ref 150–400)
PLATELET # BLD AUTO: 145 K/UL — LOW (ref 150–400)
PLATELET # BLD AUTO: 156 K/UL — SIGNIFICANT CHANGE UP (ref 150–400)
PLATELET # BLD AUTO: 162 K/UL — SIGNIFICANT CHANGE UP (ref 150–400)
PLATELET # BLD AUTO: 168 K/UL — SIGNIFICANT CHANGE UP (ref 150–400)
PLATELET # BLD AUTO: 168 K/UL — SIGNIFICANT CHANGE UP (ref 150–400)
PLATELET # BLD AUTO: 173 K/UL — SIGNIFICANT CHANGE UP (ref 150–400)
PLATELET # BLD AUTO: 179 K/UL — SIGNIFICANT CHANGE UP (ref 150–400)
PLATELET # BLD AUTO: 188 K/UL — SIGNIFICANT CHANGE UP (ref 150–400)
PLATELET # BLD AUTO: 192 K/UL — SIGNIFICANT CHANGE UP (ref 150–400)
PLATELET # BLD AUTO: 193 K/UL — SIGNIFICANT CHANGE UP (ref 150–400)
PLATELET # BLD AUTO: 195 K/UL — SIGNIFICANT CHANGE UP (ref 150–400)
PLATELET # BLD AUTO: 196 K/UL — SIGNIFICANT CHANGE UP (ref 150–400)
PLATELET # BLD AUTO: 203 K/UL — SIGNIFICANT CHANGE UP (ref 150–400)
PLATELET # BLD AUTO: 205 K/UL — SIGNIFICANT CHANGE UP (ref 150–400)
PLATELET # BLD AUTO: 223 K/UL — SIGNIFICANT CHANGE UP (ref 150–400)
PLATELET # BLD AUTO: 223 K/UL — SIGNIFICANT CHANGE UP (ref 150–400)
PLATELET # BLD AUTO: 264 K/UL — SIGNIFICANT CHANGE UP (ref 150–400)
PLATELET # BLD AUTO: 65 K/UL — LOW (ref 150–400)
PLATELET # BLD AUTO: 69 K/UL — LOW (ref 150–400)
PLATELET # BLD AUTO: 70 K/UL — LOW (ref 150–400)
PLATELET # BLD AUTO: 72 K/UL — LOW (ref 150–400)
PLATELET # BLD AUTO: 72 K/UL — LOW (ref 150–400)
PLATELET # BLD AUTO: 73 K/UL — LOW (ref 150–400)
PLATELET # BLD AUTO: 73 K/UL — LOW (ref 150–400)
PLATELET # BLD AUTO: 76 K/UL — LOW (ref 150–400)
PLATELET # BLD AUTO: 76 K/UL — LOW (ref 150–400)
PLATELET # BLD AUTO: 78 K/UL — LOW (ref 150–400)
PLATELET # BLD AUTO: 79 K/UL — LOW (ref 150–400)
PLATELET # BLD AUTO: 81 K/UL — LOW (ref 150–400)
PLATELET # BLD AUTO: 82 K/UL — LOW (ref 150–400)
PLATELET # BLD AUTO: 84 K/UL — LOW (ref 150–400)
PLATELET # BLD AUTO: 86 K/UL — LOW (ref 150–400)
PLATELET # BLD AUTO: 86 K/UL — LOW (ref 150–400)
PLATELET # BLD AUTO: 88 K/UL — LOW (ref 150–400)
PLATELET # BLD AUTO: 90 K/UL — LOW (ref 150–400)
PLATELET # BLD AUTO: 91 K/UL — LOW (ref 150–400)
PLATELET # BLD AUTO: 94 K/UL — LOW (ref 150–400)
PLATELET # BLD AUTO: 95 K/UL — LOW (ref 150–400)
PO2 BLDV: 126 MMHG — HIGH (ref 25–45)
PO2 BLDV: 95 MMHG — HIGH (ref 25–45)
POIKILOCYTOSIS BLD QL AUTO: SIGNIFICANT CHANGE UP
POIKILOCYTOSIS BLD QL AUTO: SLIGHT — SIGNIFICANT CHANGE UP
POLYCHROMASIA BLD QL SMEAR: SIGNIFICANT CHANGE UP
POLYCHROMASIA BLD QL SMEAR: SIGNIFICANT CHANGE UP
POLYCHROMASIA BLD QL SMEAR: SLIGHT — SIGNIFICANT CHANGE UP
POTASSIUM BLDV-SCNC: 3.8 MMOL/L — SIGNIFICANT CHANGE UP (ref 3.4–4.5)
POTASSIUM BLDV-SCNC: 4.4 MMOL/L — SIGNIFICANT CHANGE UP (ref 3.4–4.5)
POTASSIUM SERPL-MCNC: 3.1 MMOL/L — LOW (ref 3.5–5.3)
POTASSIUM SERPL-MCNC: 3.2 MMOL/L — LOW (ref 3.5–5.3)
POTASSIUM SERPL-MCNC: 3.3 MMOL/L — LOW (ref 3.5–5.3)
POTASSIUM SERPL-MCNC: 3.3 MMOL/L — LOW (ref 3.5–5.3)
POTASSIUM SERPL-MCNC: 3.4 MMOL/L — LOW (ref 3.5–5.3)
POTASSIUM SERPL-MCNC: 3.5 MMOL/L — SIGNIFICANT CHANGE UP (ref 3.5–5.3)
POTASSIUM SERPL-MCNC: 3.5 MMOL/L — SIGNIFICANT CHANGE UP (ref 3.5–5.3)
POTASSIUM SERPL-MCNC: 3.6 MMOL/L — SIGNIFICANT CHANGE UP (ref 3.5–5.3)
POTASSIUM SERPL-MCNC: 3.7 MMOL/L — SIGNIFICANT CHANGE UP (ref 3.5–5.3)
POTASSIUM SERPL-MCNC: 3.8 MMOL/L — SIGNIFICANT CHANGE UP (ref 3.5–5.3)
POTASSIUM SERPL-MCNC: 3.9 MMOL/L — SIGNIFICANT CHANGE UP (ref 3.5–5.3)
POTASSIUM SERPL-MCNC: 4 MMOL/L — SIGNIFICANT CHANGE UP (ref 3.5–5.3)
POTASSIUM SERPL-MCNC: 4.1 MMOL/L — SIGNIFICANT CHANGE UP (ref 3.5–5.3)
POTASSIUM SERPL-MCNC: 4.2 MMOL/L — SIGNIFICANT CHANGE UP (ref 3.5–5.3)
POTASSIUM SERPL-MCNC: 4.3 MMOL/L — SIGNIFICANT CHANGE UP (ref 3.5–5.3)
POTASSIUM SERPL-MCNC: 4.4 MMOL/L — SIGNIFICANT CHANGE UP (ref 3.5–5.3)
POTASSIUM SERPL-MCNC: 4.5 MMOL/L — SIGNIFICANT CHANGE UP (ref 3.5–5.3)
POTASSIUM SERPL-MCNC: 4.6 MMOL/L — SIGNIFICANT CHANGE UP (ref 3.5–5.3)
POTASSIUM SERPL-MCNC: 4.7 MMOL/L — SIGNIFICANT CHANGE UP (ref 3.5–5.3)
POTASSIUM SERPL-MCNC: 4.8 MMOL/L — SIGNIFICANT CHANGE UP (ref 3.5–5.3)
POTASSIUM SERPL-MCNC: 4.9 MMOL/L — SIGNIFICANT CHANGE UP (ref 3.5–5.3)
POTASSIUM SERPL-MCNC: 5 MMOL/L — SIGNIFICANT CHANGE UP (ref 3.5–5.3)
POTASSIUM SERPL-MCNC: 5.1 MMOL/L — SIGNIFICANT CHANGE UP (ref 3.5–5.3)
POTASSIUM SERPL-MCNC: 5.2 MMOL/L — SIGNIFICANT CHANGE UP (ref 3.5–5.3)
POTASSIUM SERPL-MCNC: 5.2 MMOL/L — SIGNIFICANT CHANGE UP (ref 3.5–5.3)
POTASSIUM SERPL-MCNC: 5.3 MMOL/L — SIGNIFICANT CHANGE UP (ref 3.5–5.3)
POTASSIUM SERPL-MCNC: 5.3 MMOL/L — SIGNIFICANT CHANGE UP (ref 3.5–5.3)
POTASSIUM SERPL-MCNC: 5.4 MMOL/L — HIGH (ref 3.5–5.3)
POTASSIUM SERPL-MCNC: 5.4 MMOL/L — HIGH (ref 3.5–5.3)
POTASSIUM SERPL-MCNC: 5.5 MMOL/L — HIGH (ref 3.5–5.3)
POTASSIUM SERPL-MCNC: 5.8 MMOL/L — HIGH (ref 3.5–5.3)
POTASSIUM SERPL-MCNC: 6 MMOL/L — HIGH (ref 3.5–5.3)
POTASSIUM SERPL-MCNC: 6.1 MMOL/L — HIGH (ref 3.5–5.3)
POTASSIUM SERPL-MCNC: 6.2 MMOL/L — CRITICAL HIGH (ref 3.5–5.3)
POTASSIUM SERPL-MCNC: 6.3 MMOL/L — CRITICAL HIGH (ref 3.5–5.3)
POTASSIUM SERPL-MCNC: 6.5 MMOL/L — CRITICAL HIGH (ref 3.5–5.3)
POTASSIUM SERPL-MCNC: 6.5 MMOL/L — CRITICAL HIGH (ref 3.5–5.3)
POTASSIUM SERPL-MCNC: 6.8 MMOL/L — CRITICAL HIGH (ref 3.5–5.3)
POTASSIUM SERPL-SCNC: 3.1 MMOL/L — LOW (ref 3.5–5.3)
POTASSIUM SERPL-SCNC: 3.2 MMOL/L — LOW (ref 3.5–5.3)
POTASSIUM SERPL-SCNC: 3.3 MMOL/L — LOW (ref 3.5–5.3)
POTASSIUM SERPL-SCNC: 3.3 MMOL/L — LOW (ref 3.5–5.3)
POTASSIUM SERPL-SCNC: 3.4 MMOL/L — LOW (ref 3.5–5.3)
POTASSIUM SERPL-SCNC: 3.5 MMOL/L — SIGNIFICANT CHANGE UP (ref 3.5–5.3)
POTASSIUM SERPL-SCNC: 3.5 MMOL/L — SIGNIFICANT CHANGE UP (ref 3.5–5.3)
POTASSIUM SERPL-SCNC: 3.6 MMOL/L — SIGNIFICANT CHANGE UP (ref 3.5–5.3)
POTASSIUM SERPL-SCNC: 3.7 MMOL/L — SIGNIFICANT CHANGE UP (ref 3.5–5.3)
POTASSIUM SERPL-SCNC: 3.8 MMOL/L — SIGNIFICANT CHANGE UP (ref 3.5–5.3)
POTASSIUM SERPL-SCNC: 3.9 MMOL/L — SIGNIFICANT CHANGE UP (ref 3.5–5.3)
POTASSIUM SERPL-SCNC: 4 MMOL/L — SIGNIFICANT CHANGE UP (ref 3.5–5.3)
POTASSIUM SERPL-SCNC: 4.1 MMOL/L — SIGNIFICANT CHANGE UP (ref 3.5–5.3)
POTASSIUM SERPL-SCNC: 4.2 MMOL/L — SIGNIFICANT CHANGE UP (ref 3.5–5.3)
POTASSIUM SERPL-SCNC: 4.3 MMOL/L — SIGNIFICANT CHANGE UP (ref 3.5–5.3)
POTASSIUM SERPL-SCNC: 4.4 MMOL/L — SIGNIFICANT CHANGE UP (ref 3.5–5.3)
POTASSIUM SERPL-SCNC: 4.5 MMOL/L — SIGNIFICANT CHANGE UP (ref 3.5–5.3)
POTASSIUM SERPL-SCNC: 4.6 MMOL/L — SIGNIFICANT CHANGE UP (ref 3.5–5.3)
POTASSIUM SERPL-SCNC: 4.7 MMOL/L — SIGNIFICANT CHANGE UP (ref 3.5–5.3)
POTASSIUM SERPL-SCNC: 4.8 MMOL/L — SIGNIFICANT CHANGE UP (ref 3.5–5.3)
POTASSIUM SERPL-SCNC: 4.9 MMOL/L — SIGNIFICANT CHANGE UP (ref 3.5–5.3)
POTASSIUM SERPL-SCNC: 5 MMOL/L — SIGNIFICANT CHANGE UP (ref 3.5–5.3)
POTASSIUM SERPL-SCNC: 5.1 MMOL/L
POTASSIUM SERPL-SCNC: 5.1 MMOL/L — SIGNIFICANT CHANGE UP (ref 3.5–5.3)
POTASSIUM SERPL-SCNC: 5.2 MMOL/L — SIGNIFICANT CHANGE UP (ref 3.5–5.3)
POTASSIUM SERPL-SCNC: 5.2 MMOL/L — SIGNIFICANT CHANGE UP (ref 3.5–5.3)
POTASSIUM SERPL-SCNC: 5.3 MMOL/L — SIGNIFICANT CHANGE UP (ref 3.5–5.3)
POTASSIUM SERPL-SCNC: 5.3 MMOL/L — SIGNIFICANT CHANGE UP (ref 3.5–5.3)
POTASSIUM SERPL-SCNC: 5.4 MMOL/L — HIGH (ref 3.5–5.3)
POTASSIUM SERPL-SCNC: 5.4 MMOL/L — HIGH (ref 3.5–5.3)
POTASSIUM SERPL-SCNC: 5.5 MMOL/L — HIGH (ref 3.5–5.3)
POTASSIUM SERPL-SCNC: 5.8 MMOL/L — HIGH (ref 3.5–5.3)
POTASSIUM SERPL-SCNC: 6 MMOL/L — HIGH (ref 3.5–5.3)
POTASSIUM SERPL-SCNC: 6.1 MMOL/L — HIGH (ref 3.5–5.3)
POTASSIUM SERPL-SCNC: 6.2 MMOL/L — CRITICAL HIGH (ref 3.5–5.3)
POTASSIUM SERPL-SCNC: 6.3 MMOL/L — CRITICAL HIGH (ref 3.5–5.3)
POTASSIUM SERPL-SCNC: 6.5 MMOL/L — CRITICAL HIGH (ref 3.5–5.3)
POTASSIUM SERPL-SCNC: 6.5 MMOL/L — CRITICAL HIGH (ref 3.5–5.3)
POTASSIUM SERPL-SCNC: 6.8 MMOL/L — CRITICAL HIGH (ref 3.5–5.3)
POTASSIUM UR-SCNC: 13 MMOL/L — SIGNIFICANT CHANGE UP
PROCALCITONIN SERPL-MCNC: 0.1 NG/ML — HIGH (ref 0–0.04)
PROCALCITONIN SERPL-MCNC: 0.26 NG/ML — HIGH (ref 0.02–0.1)
PROT SERPL-MCNC: 5.6 G/DL — LOW (ref 6.6–8.7)
PROT SERPL-MCNC: 5.7 G/DL — LOW (ref 6.6–8.7)
PROT SERPL-MCNC: 5.8 G/DL — LOW (ref 6.6–8.7)
PROT SERPL-MCNC: 5.8 G/DL — LOW (ref 6.6–8.7)
PROT SERPL-MCNC: 6 G/DL — LOW (ref 6.6–8.7)
PROT SERPL-MCNC: 6.2 G/DL — SIGNIFICANT CHANGE UP (ref 6–8.3)
PROT SERPL-MCNC: 6.3 G/DL
PROT SERPL-MCNC: 6.3 G/DL — LOW (ref 6.6–8.7)
PROT SERPL-MCNC: 6.3 G/DL — SIGNIFICANT CHANGE UP (ref 6–8.3)
PROT SERPL-MCNC: 6.3 G/DL — SIGNIFICANT CHANGE UP (ref 6–8.3)
PROT SERPL-MCNC: 6.4 G/DL — LOW (ref 6.6–8.7)
PROT SERPL-MCNC: 6.4 G/DL — LOW (ref 6.6–8.7)
PROT SERPL-MCNC: 6.4 G/DL — SIGNIFICANT CHANGE UP (ref 6–8.3)
PROT SERPL-MCNC: 6.5 G/DL — SIGNIFICANT CHANGE UP (ref 6–8.3)
PROT SERPL-MCNC: 6.6 G/DL — SIGNIFICANT CHANGE UP (ref 6.6–8.7)
PROT SERPL-MCNC: 6.6 G/DL — SIGNIFICANT CHANGE UP (ref 6–8.3)
PROT SERPL-MCNC: 6.6 G/DL — SIGNIFICANT CHANGE UP (ref 6–8.3)
PROT SERPL-MCNC: 6.7 G/DL — SIGNIFICANT CHANGE UP (ref 6.6–8.7)
PROT SERPL-MCNC: 6.9 G/DL — SIGNIFICANT CHANGE UP (ref 6–8.3)
PROT SERPL-MCNC: 7 G/DL — SIGNIFICANT CHANGE UP (ref 6.6–8.7)
PROT SERPL-MCNC: 7.1 G/DL — SIGNIFICANT CHANGE UP (ref 6.6–8.7)
PROT SERPL-MCNC: 7.3 G/DL — SIGNIFICANT CHANGE UP (ref 6–8.3)
PROT UR-MCNC: 100
PROT UR-MCNC: 100 MG/DL
PROT UR-MCNC: 30 MG/DL
PROT UR-MCNC: 500 MG/DL
PROT UR-MCNC: 500 MG/DL
PROTHROM AB SERPL-ACNC: 17.2 SEC — HIGH (ref 10.6–13.6)
PROTHROM AB SERPL-ACNC: 18 SEC — HIGH (ref 10.6–13.6)
PROTHROM AB SERPL-ACNC: 18.2 SEC — HIGH (ref 10.6–13.6)
PROTHROM AB SERPL-ACNC: 19.1 SEC — HIGH (ref 10.6–13.6)
PROTHROM AB SERPL-ACNC: 19.2 SEC — HIGH (ref 10–12.9)
PROTHROM AB SERPL-ACNC: 19.8 SEC — HIGH (ref 10.6–13.6)
PROTHROM AB SERPL-ACNC: 23 SEC — HIGH (ref 10.6–13.6)
PROTHROM AB SERPL-ACNC: 23.4 SEC — HIGH (ref 10.6–13.6)
PROTHROM AB SERPL-ACNC: 26.2 SEC — HIGH (ref 10.6–13.6)
PSA FLD-MCNC: 0.99 NG/ML — SIGNIFICANT CHANGE UP (ref 0–4)
RAPID RVP RESULT: SIGNIFICANT CHANGE UP
RBC # BLD: 2.56 M/UL — LOW (ref 4.2–5.8)
RBC # BLD: 2.6 M/UL — LOW (ref 4.2–5.8)
RBC # BLD: 2.66 M/UL — LOW (ref 4.2–5.8)
RBC # BLD: 2.68 M/UL — LOW (ref 4.2–5.8)
RBC # BLD: 2.69 M/UL — LOW (ref 4.2–5.8)
RBC # BLD: 2.7 M/UL — LOW (ref 4.2–5.8)
RBC # BLD: 2.77 M/UL — LOW (ref 4.2–5.8)
RBC # BLD: 2.78 M/UL — LOW (ref 4.2–5.8)
RBC # BLD: 2.78 M/UL — LOW (ref 4.2–5.8)
RBC # BLD: 2.8 M/UL — LOW (ref 4.2–5.8)
RBC # BLD: 2.81 M/UL — LOW (ref 4.2–5.8)
RBC # BLD: 2.81 M/UL — LOW (ref 4.2–5.8)
RBC # BLD: 2.83 M/UL — LOW (ref 4.2–5.8)
RBC # BLD: 2.84 M/UL — LOW (ref 4.2–5.8)
RBC # BLD: 2.85 M/UL — LOW (ref 4.2–5.8)
RBC # BLD: 2.85 M/UL — LOW (ref 4.2–5.8)
RBC # BLD: 2.86 M/UL — LOW (ref 4.2–5.8)
RBC # BLD: 2.87 M/UL — LOW (ref 4.2–5.8)
RBC # BLD: 2.89 M/UL — LOW (ref 4.2–5.8)
RBC # BLD: 2.9 M/UL — LOW (ref 4.2–5.8)
RBC # BLD: 2.91 M/UL — LOW (ref 4.2–5.8)
RBC # BLD: 2.91 M/UL — LOW (ref 4.2–5.8)
RBC # BLD: 2.92 M/UL — LOW (ref 4.2–5.8)
RBC # BLD: 2.92 M/UL — LOW (ref 4.2–5.8)
RBC # BLD: 2.96 M/UL — LOW (ref 4.2–5.8)
RBC # BLD: 2.98 M/UL — LOW (ref 4.2–5.8)
RBC # BLD: 2.98 M/UL — LOW (ref 4.2–5.8)
RBC # BLD: 2.99 M/UL — LOW (ref 4.2–5.8)
RBC # BLD: 2.99 M/UL — LOW (ref 4.2–5.8)
RBC # BLD: 3 M/UL — LOW (ref 4.2–5.8)
RBC # BLD: 3.02 M/UL — LOW (ref 4.2–5.8)
RBC # BLD: 3.03 M/UL — LOW (ref 4.2–5.8)
RBC # BLD: 3.04 M/UL — LOW (ref 4.2–5.8)
RBC # BLD: 3.05 M/UL — LOW (ref 4.2–5.8)
RBC # BLD: 3.07 M/UL — LOW (ref 4.2–5.8)
RBC # BLD: 3.08 M/UL — LOW (ref 4.2–5.8)
RBC # BLD: 3.09 M/UL — LOW (ref 4.2–5.8)
RBC # BLD: 3.1 M/UL — LOW (ref 4.2–5.8)
RBC # BLD: 3.12 M/UL — LOW (ref 4.2–5.8)
RBC # BLD: 3.13 M/UL — LOW (ref 4.2–5.8)
RBC # BLD: 3.16 M/UL — LOW (ref 4.2–5.8)
RBC # BLD: 3.16 M/UL — LOW (ref 4.2–5.8)
RBC # BLD: 3.17 M/UL — LOW (ref 4.2–5.8)
RBC # BLD: 3.17 M/UL — LOW (ref 4.2–5.8)
RBC # BLD: 3.19 M/UL — LOW (ref 4.2–5.8)
RBC # BLD: 3.2 M/UL — LOW (ref 4.2–5.8)
RBC # BLD: 3.23 M/UL — LOW (ref 4.2–5.8)
RBC # BLD: 3.27 M/UL — LOW (ref 4.2–5.8)
RBC # BLD: 3.28 M/UL
RBC # BLD: 3.32 M/UL — LOW (ref 4.2–5.8)
RBC # BLD: 3.37 M/UL — LOW (ref 4.2–5.8)
RBC # BLD: 3.39 M/UL — LOW (ref 4.2–5.8)
RBC # BLD: 3.43 M/UL — LOW (ref 4.2–5.8)
RBC # BLD: 3.45 M/UL — LOW (ref 4.2–5.8)
RBC # FLD: 17.2 % — HIGH (ref 10.3–14.5)
RBC # FLD: 17.5 % — HIGH (ref 10.3–14.5)
RBC # FLD: 17.5 % — HIGH (ref 10.3–14.5)
RBC # FLD: 17.7 % — HIGH (ref 10.3–14.5)
RBC # FLD: 17.8 % — HIGH (ref 10.3–14.5)
RBC # FLD: 17.9 % — HIGH (ref 10.3–14.5)
RBC # FLD: 18.1 % — HIGH (ref 10.3–14.5)
RBC # FLD: 18.2 % — HIGH (ref 10.3–14.5)
RBC # FLD: 18.3 % — HIGH (ref 10.3–14.5)
RBC # FLD: 18.4 % — HIGH (ref 10.3–14.5)
RBC # FLD: 18.5 % — HIGH (ref 10.3–14.5)
RBC # FLD: 18.6 % — HIGH (ref 10.3–14.5)
RBC # FLD: 18.7 % — HIGH (ref 10.3–14.5)
RBC # FLD: 18.7 % — HIGH (ref 10.3–14.5)
RBC # FLD: 18.8 % — HIGH (ref 10.3–14.5)
RBC # FLD: 18.9 % — HIGH (ref 10.3–14.5)
RBC # FLD: 19 % — HIGH (ref 10.3–14.5)
RBC # FLD: 19.1 % — HIGH (ref 10.3–14.5)
RBC # FLD: 19.1 % — HIGH (ref 10.3–14.5)
RBC # FLD: 19.2 % — HIGH (ref 10.3–14.5)
RBC # FLD: 19.3 % — HIGH (ref 10.3–14.5)
RBC # FLD: 19.5 % — HIGH (ref 10.3–14.5)
RBC # FLD: 19.6 % — HIGH (ref 10.3–14.5)
RBC # FLD: 19.7 %
RBC # FLD: 19.7 % — HIGH (ref 10.3–14.5)
RBC # FLD: 19.8 % — HIGH (ref 10.3–14.5)
RBC # FLD: 19.8 % — HIGH (ref 10.3–14.5)
RBC # FLD: 19.9 % — HIGH (ref 10.3–14.5)
RBC # FLD: 19.9 % — HIGH (ref 10.3–14.5)
RBC # FLD: 20.1 % — HIGH (ref 10.3–14.5)
RBC # FLD: 20.2 % — HIGH (ref 10.3–14.5)
RBC # FLD: 20.6 % — HIGH (ref 10.3–14.5)
RBC # FLD: 20.9 % — HIGH (ref 10.3–14.5)
RBC # FLD: 21.1 % — HIGH (ref 10.3–14.5)
RBC # FLD: 21.2 % — HIGH (ref 10.3–14.5)
RBC # FLD: 21.6 % — HIGH (ref 10.3–14.5)
RBC # FLD: 21.9 % — HIGH (ref 10.3–14.5)
RBC # FLD: 22.1 % — HIGH (ref 10.3–14.5)
RBC # FLD: 22.3 % — HIGH (ref 10.3–14.5)
RBC # FLD: 23.3 % — HIGH (ref 10.3–14.5)
RBC # FLD: 23.8 % — HIGH (ref 10.3–14.5)
RBC BLD AUTO: ABNORMAL
RBC BLD AUTO: SIGNIFICANT CHANGE UP
RBC CASTS # UR COMP ASSIST: >50 /HPF (ref 0–4)
RBC CASTS # UR COMP ASSIST: >50 /HPF (ref 0–4)
RBC CASTS # UR COMP ASSIST: ABNORMAL /HPF (ref 0–4)
RBC CASTS # UR COMP ASSIST: SIGNIFICANT CHANGE UP /HPF (ref 0–4)
RETICS #: 38.9 K/UL — SIGNIFICANT CHANGE UP (ref 25–125)
RETICS/RBC NFR: 1.3 % — SIGNIFICANT CHANGE UP (ref 0.5–2.5)
S AUREUS DNA NOSE QL NAA+PROBE: DETECTED
S AUREUS DNA NOSE QL NAA+PROBE: SIGNIFICANT CHANGE UP
SAO2 % BLDV: 98 % — SIGNIFICANT CHANGE UP
SAO2 % BLDV: 98 % — SIGNIFICANT CHANGE UP
SARS-COV-2 IGG SERPL QL IA: POSITIVE
SARS-COV-2 IGM SERPL IA-ACNC: 1.48 INDEX — HIGH
SARS-COV-2 IGM SERPL IA-ACNC: 1.73 INDEX — HIGH
SARS-COV-2 IGM SERPL IA-ACNC: 128 INDEX — HIGH
SARS-COV-2 IGM SERPL IA-ACNC: 78.7 INDEX — HIGH
SARS-COV-2 IGM SERPL IA-ACNC: 86 INDEX — HIGH
SARS-COV-2 RNA SPEC QL NAA+PROBE: SIGNIFICANT CHANGE UP
SCHISTOCYTES BLD QL AUTO: SLIGHT — SIGNIFICANT CHANGE UP
SODIUM SERPL-SCNC: 136 MMOL/L — SIGNIFICANT CHANGE UP (ref 135–145)
SODIUM SERPL-SCNC: 137 MMOL/L — SIGNIFICANT CHANGE UP (ref 135–145)
SODIUM SERPL-SCNC: 138 MMOL/L — SIGNIFICANT CHANGE UP (ref 135–145)
SODIUM SERPL-SCNC: 139 MMOL/L — SIGNIFICANT CHANGE UP (ref 135–145)
SODIUM SERPL-SCNC: 140 MMOL/L — SIGNIFICANT CHANGE UP (ref 135–145)
SODIUM SERPL-SCNC: 141 MMOL/L — SIGNIFICANT CHANGE UP (ref 135–145)
SODIUM SERPL-SCNC: 142 MMOL/L
SODIUM SERPL-SCNC: 142 MMOL/L — SIGNIFICANT CHANGE UP (ref 135–145)
SODIUM SERPL-SCNC: 143 MMOL/L — SIGNIFICANT CHANGE UP (ref 135–145)
SODIUM SERPL-SCNC: 144 MMOL/L — SIGNIFICANT CHANGE UP (ref 135–145)
SODIUM SERPL-SCNC: 145 MMOL/L — SIGNIFICANT CHANGE UP (ref 135–145)
SODIUM SERPL-SCNC: 146 MMOL/L — HIGH (ref 135–145)
SODIUM SERPL-SCNC: 147 MMOL/L — HIGH (ref 135–145)
SODIUM SERPL-SCNC: 148 MMOL/L — HIGH (ref 135–145)
SODIUM SERPL-SCNC: 149 MMOL/L — HIGH (ref 135–145)
SODIUM SERPL-SCNC: 152 MMOL/L — HIGH (ref 135–145)
SODIUM SERPL-SCNC: 156 MMOL/L — HIGH (ref 135–145)
SODIUM SERPL-SCNC: 157 MMOL/L — HIGH (ref 135–145)
SODIUM UR-SCNC: 116 MMOL/L — SIGNIFICANT CHANGE UP
SODIUM UR-SCNC: 88 MMOL/L — SIGNIFICANT CHANGE UP
SP GR SPEC: 1 — LOW (ref 1.01–1.02)
SP GR SPEC: 1.01 — SIGNIFICANT CHANGE UP (ref 1.01–1.02)
SPECIMEN SOURCE: SIGNIFICANT CHANGE UP
STOMATOCYTES BLD QL SMEAR: SLIGHT — SIGNIFICANT CHANGE UP
T3 SERPL-MCNC: 64 NG/DL — LOW (ref 80–200)
T3 SERPL-MCNC: 81 NG/DL — SIGNIFICANT CHANGE UP (ref 80–200)
T4 AB SER-ACNC: 6.1 UG/DL — SIGNIFICANT CHANGE UP (ref 4.5–12)
T4 AB SER-ACNC: 6.6 UG/DL — SIGNIFICANT CHANGE UP (ref 4.5–12)
T4 AB SER-ACNC: 6.7 UG/DL — SIGNIFICANT CHANGE UP (ref 4.5–12)
T4 FREE SERPL-MCNC: 1.3 NG/DL — SIGNIFICANT CHANGE UP (ref 0.9–1.8)
TARGETS BLD QL SMEAR: SIGNIFICANT CHANGE UP
TARGETS BLD QL SMEAR: SLIGHT — SIGNIFICANT CHANGE UP
THYROGLOB AB FLD IA-ACNC: <20 IU/ML — SIGNIFICANT CHANGE UP
THYROGLOB AB SERPL-ACNC: <20 IU/ML — SIGNIFICANT CHANGE UP
THYROPEROXIDASE AB SERPL-ACNC: 57.7 IU/ML — HIGH
TIBC SERPL-MCNC: 193 UG/DL — LOW (ref 220–430)
TIBC SERPL-MCNC: 249 UG/DL — SIGNIFICANT CHANGE UP (ref 220–430)
TIBC SERPL-MCNC: 289 UG/DL — SIGNIFICANT CHANGE UP (ref 220–430)
TRANSFERRIN SERPL-MCNC: 135 MG/DL — LOW (ref 180–329)
TRANSFERRIN SERPL-MCNC: 174 MG/DL — LOW (ref 180–329)
TRANSFERRIN SERPL-MCNC: 202 MG/DL — SIGNIFICANT CHANGE UP (ref 180–329)
TRIGL SERPL-MCNC: 41 MG/DL
TROPONIN I SERPL-MCNC: 0.02 NG/ML — SIGNIFICANT CHANGE UP (ref 0.01–0.04)
TROPONIN T SERPL-MCNC: 0.05 NG/ML — SIGNIFICANT CHANGE UP (ref 0–0.06)
TROPONIN T SERPL-MCNC: 0.06 NG/ML — SIGNIFICANT CHANGE UP (ref 0–0.06)
TROPONIN T SERPL-MCNC: 0.08 NG/ML — HIGH (ref 0–0.06)
TSH SERPL-ACNC: 3.64 UIU/ML
TSH SERPL-MCNC: 0.74 UIU/ML — SIGNIFICANT CHANGE UP (ref 0.36–3.74)
TSH SERPL-MCNC: 1.8 UIU/ML — SIGNIFICANT CHANGE UP (ref 0.27–4.2)
TSH SERPL-MCNC: 3.24 UIU/ML — SIGNIFICANT CHANGE UP (ref 0.27–4.2)
TSH SERPL-MCNC: 3.38 UIU/ML — SIGNIFICANT CHANGE UP (ref 0.27–4.2)
TSH SERPL-MCNC: 4.82 UIU/ML — HIGH (ref 0.27–4.2)
URATE SERPL-MCNC: 5.1 MG/DL
URATE SERPL-MCNC: 5.9 MG/DL — SIGNIFICANT CHANGE UP (ref 3.4–8.8)
URATE SERPL-MCNC: 6.7 MG/DL — SIGNIFICANT CHANGE UP (ref 3.4–8.8)
UROBILINOGEN FLD QL: NEGATIVE MG/DL — SIGNIFICANT CHANGE UP
UROBILINOGEN FLD QL: NEGATIVE — SIGNIFICANT CHANGE UP
UUN UR-MCNC: 94 MG/DL — SIGNIFICANT CHANGE UP
VANCOMYCIN FLD-MCNC: 21.9 UG/ML — SIGNIFICANT CHANGE UP
VANCOMYCIN TROUGH SERPL-MCNC: 11.9 UG/ML — SIGNIFICANT CHANGE UP (ref 10–20)
VANCOMYCIN TROUGH SERPL-MCNC: 16.5 UG/ML — SIGNIFICANT CHANGE UP (ref 10–20)
VANCOMYCIN TROUGH SERPL-MCNC: 17.3 UG/ML — SIGNIFICANT CHANGE UP (ref 10–20)
VANCOMYCIN TROUGH SERPL-MCNC: 17.4 UG/ML — SIGNIFICANT CHANGE UP (ref 10–20)
VANCOMYCIN TROUGH SERPL-MCNC: 21 UG/ML — HIGH (ref 10–20)
VARIANT LYMPHS # BLD: 1 % — SIGNIFICANT CHANGE UP (ref 0–6)
VIT B12 SERPL-MCNC: 1380 PG/ML — HIGH (ref 232–1245)
WBC # BLD: 10.44 K/UL — SIGNIFICANT CHANGE UP (ref 3.8–10.5)
WBC # BLD: 10.72 K/UL — HIGH (ref 3.8–10.5)
WBC # BLD: 10.94 K/UL — HIGH (ref 3.8–10.5)
WBC # BLD: 11.58 K/UL — HIGH (ref 3.8–10.5)
WBC # BLD: 11.9 K/UL — HIGH (ref 3.8–10.5)
WBC # BLD: 12.39 K/UL — HIGH (ref 3.8–10.5)
WBC # BLD: 16.3 K/UL — HIGH (ref 3.8–10.5)
WBC # BLD: 16.82 K/UL — HIGH (ref 3.8–10.5)
WBC # BLD: 16.91 K/UL — HIGH (ref 3.8–10.5)
WBC # BLD: 3.31 K/UL — LOW (ref 3.8–10.5)
WBC # BLD: 3.45 K/UL — LOW (ref 3.8–10.5)
WBC # BLD: 3.79 K/UL — LOW (ref 3.8–10.5)
WBC # BLD: 3.85 K/UL — SIGNIFICANT CHANGE UP (ref 3.8–10.5)
WBC # BLD: 3.94 K/UL — SIGNIFICANT CHANGE UP (ref 3.8–10.5)
WBC # BLD: 4.05 K/UL — SIGNIFICANT CHANGE UP (ref 3.8–10.5)
WBC # BLD: 4.24 K/UL — SIGNIFICANT CHANGE UP (ref 3.8–10.5)
WBC # BLD: 4.25 K/UL — SIGNIFICANT CHANGE UP (ref 3.8–10.5)
WBC # BLD: 4.34 K/UL — SIGNIFICANT CHANGE UP (ref 3.8–10.5)
WBC # BLD: 4.44 K/UL — SIGNIFICANT CHANGE UP (ref 3.8–10.5)
WBC # BLD: 4.45 K/UL — SIGNIFICANT CHANGE UP (ref 3.8–10.5)
WBC # BLD: 4.61 K/UL — SIGNIFICANT CHANGE UP (ref 3.8–10.5)
WBC # BLD: 4.62 K/UL — SIGNIFICANT CHANGE UP (ref 3.8–10.5)
WBC # BLD: 4.62 K/UL — SIGNIFICANT CHANGE UP (ref 3.8–10.5)
WBC # BLD: 4.69 K/UL — SIGNIFICANT CHANGE UP (ref 3.8–10.5)
WBC # BLD: 4.69 K/UL — SIGNIFICANT CHANGE UP (ref 3.8–10.5)
WBC # BLD: 4.81 K/UL — SIGNIFICANT CHANGE UP (ref 3.8–10.5)
WBC # BLD: 4.89 K/UL — SIGNIFICANT CHANGE UP (ref 3.8–10.5)
WBC # BLD: 4.89 K/UL — SIGNIFICANT CHANGE UP (ref 3.8–10.5)
WBC # BLD: 4.99 K/UL — SIGNIFICANT CHANGE UP (ref 3.8–10.5)
WBC # BLD: 5.12 K/UL — SIGNIFICANT CHANGE UP (ref 3.8–10.5)
WBC # BLD: 5.14 K/UL — SIGNIFICANT CHANGE UP (ref 3.8–10.5)
WBC # BLD: 5.2 K/UL — SIGNIFICANT CHANGE UP (ref 3.8–10.5)
WBC # BLD: 5.22 K/UL — SIGNIFICANT CHANGE UP (ref 3.8–10.5)
WBC # BLD: 5.3 K/UL — SIGNIFICANT CHANGE UP (ref 3.8–10.5)
WBC # BLD: 5.35 K/UL — SIGNIFICANT CHANGE UP (ref 3.8–10.5)
WBC # BLD: 5.57 K/UL — SIGNIFICANT CHANGE UP (ref 3.8–10.5)
WBC # BLD: 5.59 K/UL — SIGNIFICANT CHANGE UP (ref 3.8–10.5)
WBC # BLD: 5.62 K/UL — SIGNIFICANT CHANGE UP (ref 3.8–10.5)
WBC # BLD: 5.65 K/UL — SIGNIFICANT CHANGE UP (ref 3.8–10.5)
WBC # BLD: 5.66 K/UL — SIGNIFICANT CHANGE UP (ref 3.8–10.5)
WBC # BLD: 5.76 K/UL — SIGNIFICANT CHANGE UP (ref 3.8–10.5)
WBC # BLD: 5.87 K/UL — SIGNIFICANT CHANGE UP (ref 3.8–10.5)
WBC # BLD: 5.97 K/UL — SIGNIFICANT CHANGE UP (ref 3.8–10.5)
WBC # BLD: 5.99 K/UL — SIGNIFICANT CHANGE UP (ref 3.8–10.5)
WBC # BLD: 6.04 K/UL — SIGNIFICANT CHANGE UP (ref 3.8–10.5)
WBC # BLD: 6.07 K/UL — SIGNIFICANT CHANGE UP (ref 3.8–10.5)
WBC # BLD: 6.15 K/UL — SIGNIFICANT CHANGE UP (ref 3.8–10.5)
WBC # BLD: 6.29 K/UL — SIGNIFICANT CHANGE UP (ref 3.8–10.5)
WBC # BLD: 6.33 K/UL — SIGNIFICANT CHANGE UP (ref 3.8–10.5)
WBC # BLD: 6.33 K/UL — SIGNIFICANT CHANGE UP (ref 3.8–10.5)
WBC # BLD: 6.43 K/UL — SIGNIFICANT CHANGE UP (ref 3.8–10.5)
WBC # BLD: 6.65 K/UL — SIGNIFICANT CHANGE UP (ref 3.8–10.5)
WBC # BLD: 6.74 K/UL — SIGNIFICANT CHANGE UP (ref 3.8–10.5)
WBC # BLD: 6.75 K/UL — SIGNIFICANT CHANGE UP (ref 3.8–10.5)
WBC # BLD: 6.94 K/UL — SIGNIFICANT CHANGE UP (ref 3.8–10.5)
WBC # BLD: 6.97 K/UL — SIGNIFICANT CHANGE UP (ref 3.8–10.5)
WBC # BLD: 7.07 K/UL — SIGNIFICANT CHANGE UP (ref 3.8–10.5)
WBC # BLD: 7.12 K/UL — SIGNIFICANT CHANGE UP (ref 3.8–10.5)
WBC # BLD: 7.45 K/UL — SIGNIFICANT CHANGE UP (ref 3.8–10.5)
WBC # BLD: 7.61 K/UL — SIGNIFICANT CHANGE UP (ref 3.8–10.5)
WBC # BLD: 7.64 K/UL — SIGNIFICANT CHANGE UP (ref 3.8–10.5)
WBC # BLD: 7.68 K/UL — SIGNIFICANT CHANGE UP (ref 3.8–10.5)
WBC # BLD: 7.88 K/UL — SIGNIFICANT CHANGE UP (ref 3.8–10.5)
WBC # BLD: 7.93 K/UL — SIGNIFICANT CHANGE UP (ref 3.8–10.5)
WBC # BLD: 8.09 K/UL — SIGNIFICANT CHANGE UP (ref 3.8–10.5)
WBC # BLD: 8.1 K/UL — SIGNIFICANT CHANGE UP (ref 3.8–10.5)
WBC # BLD: 8.36 K/UL — SIGNIFICANT CHANGE UP (ref 3.8–10.5)
WBC # BLD: 8.49 K/UL — SIGNIFICANT CHANGE UP (ref 3.8–10.5)
WBC # BLD: 8.84 K/UL — SIGNIFICANT CHANGE UP (ref 3.8–10.5)
WBC # BLD: 9.95 K/UL — SIGNIFICANT CHANGE UP (ref 3.8–10.5)
WBC # FLD AUTO: 10.44 K/UL — SIGNIFICANT CHANGE UP (ref 3.8–10.5)
WBC # FLD AUTO: 10.72 K/UL — HIGH (ref 3.8–10.5)
WBC # FLD AUTO: 10.94 K/UL — HIGH (ref 3.8–10.5)
WBC # FLD AUTO: 11.58 K/UL — HIGH (ref 3.8–10.5)
WBC # FLD AUTO: 11.9 K/UL — HIGH (ref 3.8–10.5)
WBC # FLD AUTO: 12.39 K/UL — HIGH (ref 3.8–10.5)
WBC # FLD AUTO: 16.3 K/UL — HIGH (ref 3.8–10.5)
WBC # FLD AUTO: 16.82 K/UL — HIGH (ref 3.8–10.5)
WBC # FLD AUTO: 16.91 K/UL — HIGH (ref 3.8–10.5)
WBC # FLD AUTO: 3.31 K/UL — LOW (ref 3.8–10.5)
WBC # FLD AUTO: 3.45 K/UL — LOW (ref 3.8–10.5)
WBC # FLD AUTO: 3.79 K/UL — LOW (ref 3.8–10.5)
WBC # FLD AUTO: 3.85 K/UL — SIGNIFICANT CHANGE UP (ref 3.8–10.5)
WBC # FLD AUTO: 3.94 K/UL — SIGNIFICANT CHANGE UP (ref 3.8–10.5)
WBC # FLD AUTO: 4.05 K/UL — SIGNIFICANT CHANGE UP (ref 3.8–10.5)
WBC # FLD AUTO: 4.24 K/UL — SIGNIFICANT CHANGE UP (ref 3.8–10.5)
WBC # FLD AUTO: 4.25 K/UL — SIGNIFICANT CHANGE UP (ref 3.8–10.5)
WBC # FLD AUTO: 4.34 K/UL — SIGNIFICANT CHANGE UP (ref 3.8–10.5)
WBC # FLD AUTO: 4.44 K/UL — SIGNIFICANT CHANGE UP (ref 3.8–10.5)
WBC # FLD AUTO: 4.45 K/UL — SIGNIFICANT CHANGE UP (ref 3.8–10.5)
WBC # FLD AUTO: 4.52 K/UL
WBC # FLD AUTO: 4.61 K/UL — SIGNIFICANT CHANGE UP (ref 3.8–10.5)
WBC # FLD AUTO: 4.62 K/UL — SIGNIFICANT CHANGE UP (ref 3.8–10.5)
WBC # FLD AUTO: 4.62 K/UL — SIGNIFICANT CHANGE UP (ref 3.8–10.5)
WBC # FLD AUTO: 4.69 K/UL — SIGNIFICANT CHANGE UP (ref 3.8–10.5)
WBC # FLD AUTO: 4.69 K/UL — SIGNIFICANT CHANGE UP (ref 3.8–10.5)
WBC # FLD AUTO: 4.81 K/UL — SIGNIFICANT CHANGE UP (ref 3.8–10.5)
WBC # FLD AUTO: 4.89 K/UL — SIGNIFICANT CHANGE UP (ref 3.8–10.5)
WBC # FLD AUTO: 4.89 K/UL — SIGNIFICANT CHANGE UP (ref 3.8–10.5)
WBC # FLD AUTO: 4.99 K/UL — SIGNIFICANT CHANGE UP (ref 3.8–10.5)
WBC # FLD AUTO: 5.12 K/UL — SIGNIFICANT CHANGE UP (ref 3.8–10.5)
WBC # FLD AUTO: 5.14 K/UL — SIGNIFICANT CHANGE UP (ref 3.8–10.5)
WBC # FLD AUTO: 5.2 K/UL — SIGNIFICANT CHANGE UP (ref 3.8–10.5)
WBC # FLD AUTO: 5.22 K/UL — SIGNIFICANT CHANGE UP (ref 3.8–10.5)
WBC # FLD AUTO: 5.3 K/UL — SIGNIFICANT CHANGE UP (ref 3.8–10.5)
WBC # FLD AUTO: 5.35 K/UL — SIGNIFICANT CHANGE UP (ref 3.8–10.5)
WBC # FLD AUTO: 5.57 K/UL — SIGNIFICANT CHANGE UP (ref 3.8–10.5)
WBC # FLD AUTO: 5.59 K/UL — SIGNIFICANT CHANGE UP (ref 3.8–10.5)
WBC # FLD AUTO: 5.62 K/UL — SIGNIFICANT CHANGE UP (ref 3.8–10.5)
WBC # FLD AUTO: 5.65 K/UL — SIGNIFICANT CHANGE UP (ref 3.8–10.5)
WBC # FLD AUTO: 5.66 K/UL — SIGNIFICANT CHANGE UP (ref 3.8–10.5)
WBC # FLD AUTO: 5.76 K/UL — SIGNIFICANT CHANGE UP (ref 3.8–10.5)
WBC # FLD AUTO: 5.87 K/UL — SIGNIFICANT CHANGE UP (ref 3.8–10.5)
WBC # FLD AUTO: 5.97 K/UL — SIGNIFICANT CHANGE UP (ref 3.8–10.5)
WBC # FLD AUTO: 5.99 K/UL — SIGNIFICANT CHANGE UP (ref 3.8–10.5)
WBC # FLD AUTO: 6.04 K/UL — SIGNIFICANT CHANGE UP (ref 3.8–10.5)
WBC # FLD AUTO: 6.07 K/UL — SIGNIFICANT CHANGE UP (ref 3.8–10.5)
WBC # FLD AUTO: 6.15 K/UL — SIGNIFICANT CHANGE UP (ref 3.8–10.5)
WBC # FLD AUTO: 6.29 K/UL — SIGNIFICANT CHANGE UP (ref 3.8–10.5)
WBC # FLD AUTO: 6.33 K/UL — SIGNIFICANT CHANGE UP (ref 3.8–10.5)
WBC # FLD AUTO: 6.33 K/UL — SIGNIFICANT CHANGE UP (ref 3.8–10.5)
WBC # FLD AUTO: 6.43 K/UL — SIGNIFICANT CHANGE UP (ref 3.8–10.5)
WBC # FLD AUTO: 6.65 K/UL — SIGNIFICANT CHANGE UP (ref 3.8–10.5)
WBC # FLD AUTO: 6.74 K/UL — SIGNIFICANT CHANGE UP (ref 3.8–10.5)
WBC # FLD AUTO: 6.75 K/UL — SIGNIFICANT CHANGE UP (ref 3.8–10.5)
WBC # FLD AUTO: 6.94 K/UL — SIGNIFICANT CHANGE UP (ref 3.8–10.5)
WBC # FLD AUTO: 6.97 K/UL — SIGNIFICANT CHANGE UP (ref 3.8–10.5)
WBC # FLD AUTO: 7.07 K/UL — SIGNIFICANT CHANGE UP (ref 3.8–10.5)
WBC # FLD AUTO: 7.12 K/UL — SIGNIFICANT CHANGE UP (ref 3.8–10.5)
WBC # FLD AUTO: 7.45 K/UL — SIGNIFICANT CHANGE UP (ref 3.8–10.5)
WBC # FLD AUTO: 7.61 K/UL — SIGNIFICANT CHANGE UP (ref 3.8–10.5)
WBC # FLD AUTO: 7.64 K/UL — SIGNIFICANT CHANGE UP (ref 3.8–10.5)
WBC # FLD AUTO: 7.68 K/UL — SIGNIFICANT CHANGE UP (ref 3.8–10.5)
WBC # FLD AUTO: 7.88 K/UL — SIGNIFICANT CHANGE UP (ref 3.8–10.5)
WBC # FLD AUTO: 7.93 K/UL — SIGNIFICANT CHANGE UP (ref 3.8–10.5)
WBC # FLD AUTO: 8.09 K/UL — SIGNIFICANT CHANGE UP (ref 3.8–10.5)
WBC # FLD AUTO: 8.1 K/UL — SIGNIFICANT CHANGE UP (ref 3.8–10.5)
WBC # FLD AUTO: 8.36 K/UL — SIGNIFICANT CHANGE UP (ref 3.8–10.5)
WBC # FLD AUTO: 8.49 K/UL — SIGNIFICANT CHANGE UP (ref 3.8–10.5)
WBC # FLD AUTO: 8.84 K/UL — SIGNIFICANT CHANGE UP (ref 3.8–10.5)
WBC # FLD AUTO: 9.95 K/UL — SIGNIFICANT CHANGE UP (ref 3.8–10.5)
WBC UR QL: >100 /HPF — SIGNIFICANT CHANGE UP (ref 0–5)
WBC UR QL: >50
WBC UR QL: ABNORMAL
WBC UR QL: SIGNIFICANT CHANGE UP /HPF (ref 0–5)

## 2020-01-01 PROCEDURE — 93970 EXTREMITY STUDY: CPT

## 2020-01-01 PROCEDURE — 99232 SBSQ HOSP IP/OBS MODERATE 35: CPT

## 2020-01-01 PROCEDURE — 86901 BLOOD TYPING SEROLOGIC RH(D): CPT

## 2020-01-01 PROCEDURE — 97163 PT EVAL HIGH COMPLEX 45 MIN: CPT

## 2020-01-01 PROCEDURE — 99233 SBSQ HOSP IP/OBS HIGH 50: CPT

## 2020-01-01 PROCEDURE — 84145 PROCALCITONIN (PCT): CPT

## 2020-01-01 PROCEDURE — 73700 CT LOWER EXTREMITY W/O DYE: CPT | Mod: 26,RT

## 2020-01-01 PROCEDURE — 85027 COMPLETE CBC AUTOMATED: CPT

## 2020-01-01 PROCEDURE — 86923 COMPATIBILITY TEST ELECTRIC: CPT

## 2020-01-01 PROCEDURE — 99223 1ST HOSP IP/OBS HIGH 75: CPT

## 2020-01-01 PROCEDURE — 97110 THERAPEUTIC EXERCISES: CPT

## 2020-01-01 PROCEDURE — 36415 COLL VENOUS BLD VENIPUNCTURE: CPT

## 2020-01-01 PROCEDURE — 86769 SARS-COV-2 COVID-19 ANTIBODY: CPT

## 2020-01-01 PROCEDURE — G0103: CPT

## 2020-01-01 PROCEDURE — 87040 BLOOD CULTURE FOR BACTERIA: CPT

## 2020-01-01 PROCEDURE — 36000 PLACE NEEDLE IN VEIN: CPT

## 2020-01-01 PROCEDURE — 99239 HOSP IP/OBS DSCHRG MGMT >30: CPT

## 2020-01-01 PROCEDURE — 82024 ASSAY OF ACTH: CPT

## 2020-01-01 PROCEDURE — 76770 US EXAM ABDO BACK WALL COMP: CPT | Mod: 26

## 2020-01-01 PROCEDURE — 93005 ELECTROCARDIOGRAM TRACING: CPT

## 2020-01-01 PROCEDURE — 99222 1ST HOSP IP/OBS MODERATE 55: CPT

## 2020-01-01 PROCEDURE — 84443 ASSAY THYROID STIM HORMONE: CPT

## 2020-01-01 PROCEDURE — 86900 BLOOD TYPING SEROLOGIC ABO: CPT

## 2020-01-01 PROCEDURE — 70496 CT ANGIOGRAPHY HEAD: CPT | Mod: 26

## 2020-01-01 PROCEDURE — 82533 TOTAL CORTISOL: CPT

## 2020-01-01 PROCEDURE — 83880 ASSAY OF NATRIURETIC PEPTIDE: CPT

## 2020-01-01 PROCEDURE — 74176 CT ABD & PELVIS W/O CONTRAST: CPT

## 2020-01-01 PROCEDURE — 84466 ASSAY OF TRANSFERRIN: CPT

## 2020-01-01 PROCEDURE — 84295 ASSAY OF SERUM SODIUM: CPT

## 2020-01-01 PROCEDURE — 99285 EMERGENCY DEPT VISIT HI MDM: CPT | Mod: CS

## 2020-01-01 PROCEDURE — 76775 US EXAM ABDO BACK WALL LIM: CPT | Mod: 26

## 2020-01-01 PROCEDURE — 96375 TX/PRO/DX INJ NEW DRUG ADDON: CPT

## 2020-01-01 PROCEDURE — 99213 OFFICE O/P EST LOW 20 MIN: CPT

## 2020-01-01 PROCEDURE — 81001 URINALYSIS AUTO W/SCOPE: CPT

## 2020-01-01 PROCEDURE — 70450 CT HEAD/BRAIN W/O DYE: CPT

## 2020-01-01 PROCEDURE — 87070 CULTURE OTHR SPECIMN AEROBIC: CPT

## 2020-01-01 PROCEDURE — 85018 HEMOGLOBIN: CPT

## 2020-01-01 PROCEDURE — U0003: CPT

## 2020-01-01 PROCEDURE — 71045 X-RAY EXAM CHEST 1 VIEW: CPT | Mod: 26

## 2020-01-01 PROCEDURE — 82272 OCCULT BLD FECES 1-3 TESTS: CPT

## 2020-01-01 PROCEDURE — 87186 SC STD MICRODIL/AGAR DIL: CPT

## 2020-01-01 PROCEDURE — 84484 ASSAY OF TROPONIN QUANT: CPT

## 2020-01-01 PROCEDURE — 70498 CT ANGIOGRAPHY NECK: CPT | Mod: 26

## 2020-01-01 PROCEDURE — 87635 SARS-COV-2 COVID-19 AMP PRB: CPT

## 2020-01-01 PROCEDURE — 83735 ASSAY OF MAGNESIUM: CPT

## 2020-01-01 PROCEDURE — 74230 X-RAY XM SWLNG FUNCJ C+: CPT

## 2020-01-01 PROCEDURE — 51702 INSERT TEMP BLADDER CATH: CPT

## 2020-01-01 PROCEDURE — 36430 TRANSFUSION BLD/BLD COMPNT: CPT

## 2020-01-01 PROCEDURE — 83605 ASSAY OF LACTIC ACID: CPT

## 2020-01-01 PROCEDURE — 87086 URINE CULTURE/COLONY COUNT: CPT

## 2020-01-01 PROCEDURE — 70498 CT ANGIOGRAPHY NECK: CPT

## 2020-01-01 PROCEDURE — 82947 ASSAY GLUCOSE BLOOD QUANT: CPT

## 2020-01-01 PROCEDURE — 71045 X-RAY EXAM CHEST 1 VIEW: CPT

## 2020-01-01 PROCEDURE — 83935 ASSAY OF URINE OSMOLALITY: CPT

## 2020-01-01 PROCEDURE — A9540: CPT

## 2020-01-01 PROCEDURE — 99285 EMERGENCY DEPT VISIT HI MDM: CPT | Mod: 25

## 2020-01-01 PROCEDURE — 86850 RBC ANTIBODY SCREEN: CPT

## 2020-01-01 PROCEDURE — P9016: CPT

## 2020-01-01 PROCEDURE — 84100 ASSAY OF PHOSPHORUS: CPT

## 2020-01-01 PROCEDURE — 99232 SBSQ HOSP IP/OBS MODERATE 35: CPT | Mod: CS

## 2020-01-01 PROCEDURE — 99223 1ST HOSP IP/OBS HIGH 75: CPT | Mod: CS

## 2020-01-01 PROCEDURE — 93925 LOWER EXTREMITY STUDY: CPT

## 2020-01-01 PROCEDURE — 73630 X-RAY EXAM OF FOOT: CPT

## 2020-01-01 PROCEDURE — 83615 LACTATE (LD) (LDH) ENZYME: CPT

## 2020-01-01 PROCEDURE — 80048 BASIC METABOLIC PNL TOTAL CA: CPT

## 2020-01-01 PROCEDURE — 82962 GLUCOSE BLOOD TEST: CPT

## 2020-01-01 PROCEDURE — 87150 DNA/RNA AMPLIFIED PROBE: CPT

## 2020-01-01 PROCEDURE — 76775 US EXAM ABDO BACK WALL LIM: CPT

## 2020-01-01 PROCEDURE — 92611 MOTION FLUOROSCOPY/SWALLOW: CPT

## 2020-01-01 PROCEDURE — 70450 CT HEAD/BRAIN W/O DYE: CPT | Mod: 26,59

## 2020-01-01 PROCEDURE — 83550 IRON BINDING TEST: CPT

## 2020-01-01 PROCEDURE — 82435 ASSAY OF BLOOD CHLORIDE: CPT

## 2020-01-01 PROCEDURE — 99223 1ST HOSP IP/OBS HIGH 75: CPT | Mod: AI

## 2020-01-01 PROCEDURE — 84300 ASSAY OF URINE SODIUM: CPT

## 2020-01-01 PROCEDURE — 96374 THER/PROPH/DIAG INJ IV PUSH: CPT | Mod: XU

## 2020-01-01 PROCEDURE — 94660 CPAP INITIATION&MGMT: CPT

## 2020-01-01 PROCEDURE — 87641 MR-STAPH DNA AMP PROBE: CPT

## 2020-01-01 PROCEDURE — 85025 COMPLETE CBC W/AUTO DIFF WBC: CPT

## 2020-01-01 PROCEDURE — G0439: CPT

## 2020-01-01 PROCEDURE — 84436 ASSAY OF TOTAL THYROXINE: CPT

## 2020-01-01 PROCEDURE — 92526 ORAL FUNCTION THERAPY: CPT

## 2020-01-01 PROCEDURE — 97116 GAIT TRAINING THERAPY: CPT

## 2020-01-01 PROCEDURE — 99285 EMERGENCY DEPT VISIT HI MDM: CPT | Mod: GC

## 2020-01-01 PROCEDURE — 71250 CT THORAX DX C-: CPT

## 2020-01-01 PROCEDURE — 86140 C-REACTIVE PROTEIN: CPT

## 2020-01-01 PROCEDURE — 94640 AIRWAY INHALATION TREATMENT: CPT

## 2020-01-01 PROCEDURE — 83036 HEMOGLOBIN GLYCOSYLATED A1C: CPT

## 2020-01-01 PROCEDURE — 86803 HEPATITIS C AB TEST: CPT

## 2020-01-01 PROCEDURE — 82803 BLOOD GASES ANY COMBINATION: CPT

## 2020-01-01 PROCEDURE — 84132 ASSAY OF SERUM POTASSIUM: CPT

## 2020-01-01 PROCEDURE — 85610 PROTHROMBIN TIME: CPT

## 2020-01-01 PROCEDURE — 93010 ELECTROCARDIOGRAM REPORT: CPT

## 2020-01-01 PROCEDURE — 88112 CYTOPATH CELL ENHANCE TECH: CPT | Mod: 26

## 2020-01-01 PROCEDURE — 0225U NFCT DS DNA&RNA 21 SARSCOV2: CPT

## 2020-01-01 PROCEDURE — 83540 ASSAY OF IRON: CPT

## 2020-01-01 PROCEDURE — 78580 LUNG PERFUSION IMAGING: CPT

## 2020-01-01 PROCEDURE — 73630 X-RAY EXAM OF FOOT: CPT | Mod: 26,RT

## 2020-01-01 PROCEDURE — 82728 ASSAY OF FERRITIN: CPT

## 2020-01-01 PROCEDURE — 96374 THER/PROPH/DIAG INJ IV PUSH: CPT

## 2020-01-01 PROCEDURE — 93000 ELECTROCARDIOGRAM COMPLETE: CPT

## 2020-01-01 PROCEDURE — 99214 OFFICE O/P EST MOD 30 MIN: CPT

## 2020-01-01 PROCEDURE — 96372 THER/PROPH/DIAG INJ SC/IM: CPT

## 2020-01-01 PROCEDURE — 96375 TX/PRO/DX INJ NEW DRUG ADDON: CPT | Mod: XU

## 2020-01-01 PROCEDURE — 99283 EMERGENCY DEPT VISIT LOW MDM: CPT | Mod: 25

## 2020-01-01 PROCEDURE — 92610 EVALUATE SWALLOWING FUNCTION: CPT

## 2020-01-01 PROCEDURE — 80053 COMPREHEN METABOLIC PANEL: CPT

## 2020-01-01 PROCEDURE — 87640 STAPH A DNA AMP PROBE: CPT

## 2020-01-01 PROCEDURE — 84439 ASSAY OF FREE THYROXINE: CPT

## 2020-01-01 PROCEDURE — 74230 X-RAY XM SWLNG FUNCJ C+: CPT | Mod: 26

## 2020-01-01 PROCEDURE — 85045 AUTOMATED RETICULOCYTE COUNT: CPT

## 2020-01-01 PROCEDURE — 99291 CRITICAL CARE FIRST HOUR: CPT | Mod: GC

## 2020-01-01 PROCEDURE — 99231 SBSQ HOSP IP/OBS SF/LOW 25: CPT

## 2020-01-01 PROCEDURE — 96365 THER/PROPH/DIAG IV INF INIT: CPT

## 2020-01-01 PROCEDURE — 84550 ASSAY OF BLOOD/URIC ACID: CPT

## 2020-01-01 PROCEDURE — 93306 TTE W/DOPPLER COMPLETE: CPT | Mod: 26

## 2020-01-01 PROCEDURE — 99291 CRITICAL CARE FIRST HOUR: CPT

## 2020-01-01 PROCEDURE — 82330 ASSAY OF CALCIUM: CPT

## 2020-01-01 PROCEDURE — 76770 US EXAM ABDO BACK WALL COMP: CPT

## 2020-01-01 PROCEDURE — 85730 THROMBOPLASTIN TIME PARTIAL: CPT

## 2020-01-01 PROCEDURE — 0042T: CPT

## 2020-01-01 PROCEDURE — 93970 EXTREMITY STUDY: CPT | Mod: 26

## 2020-01-01 PROCEDURE — 78580 LUNG PERFUSION IMAGING: CPT | Mod: 26

## 2020-01-01 PROCEDURE — 74176 CT ABD & PELVIS W/O CONTRAST: CPT | Mod: 26

## 2020-01-01 PROCEDURE — 97162 PT EVAL MOD COMPLEX 30 MIN: CPT

## 2020-01-01 PROCEDURE — 99284 EMERGENCY DEPT VISIT MOD MDM: CPT | Mod: CS

## 2020-01-01 PROCEDURE — 99233 SBSQ HOSP IP/OBS HIGH 50: CPT | Mod: GC

## 2020-01-01 PROCEDURE — 85014 HEMATOCRIT: CPT

## 2020-01-01 PROCEDURE — 97530 THERAPEUTIC ACTIVITIES: CPT

## 2020-01-01 PROCEDURE — 99496 TRANSJ CARE MGMT HIGH F2F 7D: CPT

## 2020-01-01 PROCEDURE — 80202 ASSAY OF VANCOMYCIN: CPT

## 2020-01-01 PROCEDURE — 70496 CT ANGIOGRAPHY HEAD: CPT

## 2020-01-01 PROCEDURE — 73630 X-RAY EXAM OF FOOT: CPT | Mod: 26,50

## 2020-01-01 PROCEDURE — 90686 IIV4 VACC NO PRSV 0.5 ML IM: CPT

## 2020-01-01 PROCEDURE — 71250 CT THORAX DX C-: CPT | Mod: 26

## 2020-01-01 PROCEDURE — 93925 LOWER EXTREMITY STUDY: CPT | Mod: 26

## 2020-01-01 PROCEDURE — 82607 VITAMIN B-12: CPT

## 2020-01-01 PROCEDURE — 85379 FIBRIN DEGRADATION QUANT: CPT

## 2020-01-01 PROCEDURE — 71045 X-RAY EXAM CHEST 1 VIEW: CPT | Mod: 26,77

## 2020-01-01 PROCEDURE — 83874 ASSAY OF MYOGLOBIN: CPT

## 2020-01-01 PROCEDURE — 99232 SBSQ HOSP IP/OBS MODERATE 35: CPT | Mod: GC

## 2020-01-01 PROCEDURE — 82550 ASSAY OF CK (CPK): CPT

## 2020-01-01 PROCEDURE — 96365 THER/PROPH/DIAG IV INF INIT: CPT | Mod: XU

## 2020-01-01 PROCEDURE — 93923 UPR/LXTR ART STDY 3+ LVLS: CPT

## 2020-01-01 PROCEDURE — 80076 HEPATIC FUNCTION PANEL: CPT

## 2020-01-01 PROCEDURE — 99495 TRANSJ CARE MGMT MOD F2F 14D: CPT | Mod: 25

## 2020-01-01 PROCEDURE — 73700 CT LOWER EXTREMITY W/O DYE: CPT

## 2020-01-01 PROCEDURE — 99205 OFFICE O/P NEW HI 60 MIN: CPT

## 2020-01-01 PROCEDURE — 99497 ADVNCD CARE PLAN 30 MIN: CPT

## 2020-01-01 PROCEDURE — 93923 UPR/LXTR ART STDY 3+ LVLS: CPT | Mod: 26

## 2020-01-01 PROCEDURE — 70450 CT HEAD/BRAIN W/O DYE: CPT | Mod: 26

## 2020-01-01 PROCEDURE — 99285 EMERGENCY DEPT VISIT HI MDM: CPT

## 2020-01-01 PROCEDURE — 74176 CT ABD & PELVIS W/O CONTRAST: CPT | Mod: 26,CS

## 2020-01-01 PROCEDURE — 88112 CYTOPATH CELL ENHANCE TECH: CPT

## 2020-01-01 PROCEDURE — 84480 ASSAY TRIIODOTHYRONINE (T3): CPT

## 2020-01-01 PROCEDURE — C8929: CPT

## 2020-01-01 PROCEDURE — 99221 1ST HOSP IP/OBS SF/LOW 40: CPT

## 2020-01-01 PROCEDURE — 12345: CPT | Mod: NC

## 2020-01-01 PROCEDURE — 86376 MICROSOMAL ANTIBODY EACH: CPT

## 2020-01-01 PROCEDURE — 93306 TTE W/DOPPLER COMPLETE: CPT

## 2020-01-01 PROCEDURE — 84540 ASSAY OF URINE/UREA-N: CPT

## 2020-01-01 PROCEDURE — G0444 DEPRESSION SCREEN ANNUAL: CPT | Mod: 59

## 2020-01-01 PROCEDURE — 84133 ASSAY OF URINE POTASSIUM: CPT

## 2020-01-01 PROCEDURE — 93922 UPR/L XTREMITY ART 2 LEVELS: CPT | Mod: 26

## 2020-01-01 PROCEDURE — 87075 CULTR BACTERIA EXCEPT BLOOD: CPT

## 2020-01-01 RX ORDER — GLUCAGON INJECTION, SOLUTION 0.5 MG/.1ML
1 INJECTION, SOLUTION SUBCUTANEOUS ONCE
Refills: 0 | Status: DISCONTINUED | OUTPATIENT
Start: 2020-01-01 | End: 2020-01-01

## 2020-01-01 RX ORDER — MIDODRINE HYDROCHLORIDE 2.5 MG/1
10 TABLET ORAL THREE TIMES A DAY
Refills: 0 | Status: DISCONTINUED | OUTPATIENT
Start: 2020-01-01 | End: 2020-01-01

## 2020-01-01 RX ORDER — POLYETHYLENE GLYCOL 3350 17 G/17G
17 POWDER, FOR SOLUTION ORAL
Qty: 1 | Refills: 0
Start: 2020-01-01 | End: 2020-01-01

## 2020-01-01 RX ORDER — DEXTROSE 50 % IN WATER 50 %
50 SYRINGE (ML) INTRAVENOUS ONCE
Refills: 0 | Status: COMPLETED | OUTPATIENT
Start: 2020-01-01 | End: 2020-01-01

## 2020-01-01 RX ORDER — DEXTROSE 50 % IN WATER 50 %
25 SYRINGE (ML) INTRAVENOUS ONCE
Refills: 0 | Status: DISCONTINUED | OUTPATIENT
Start: 2020-01-01 | End: 2020-01-01

## 2020-01-01 RX ORDER — DEXTROSE 50 % IN WATER 50 %
12.5 SYRINGE (ML) INTRAVENOUS ONCE
Refills: 0 | Status: DISCONTINUED | OUTPATIENT
Start: 2020-01-01 | End: 2020-01-01

## 2020-01-01 RX ORDER — SODIUM CHLORIDE 9 MG/ML
1000 INJECTION, SOLUTION INTRAVENOUS
Refills: 0 | Status: DISCONTINUED | OUTPATIENT
Start: 2020-01-01 | End: 2020-01-01

## 2020-01-01 RX ORDER — NYSTATIN CREAM 100000 [USP'U]/G
1 CREAM TOPICAL
Qty: 1 | Refills: 0
Start: 2020-01-01

## 2020-01-01 RX ORDER — ALPRAZOLAM 0.25 MG
0.5 TABLET ORAL EVERY 8 HOURS
Refills: 0 | Status: DISCONTINUED | OUTPATIENT
Start: 2020-01-01 | End: 2020-01-01

## 2020-01-01 RX ORDER — VANCOMYCIN HCL 1 G
1000 VIAL (EA) INTRAVENOUS ONCE
Refills: 0 | Status: COMPLETED | OUTPATIENT
Start: 2020-01-01 | End: 2020-01-01

## 2020-01-01 RX ORDER — METFORMIN HYDROCHLORIDE 850 MG/1
1 TABLET ORAL
Qty: 0 | Refills: 0 | DISCHARGE

## 2020-01-01 RX ORDER — FINASTERIDE 5 MG/1
1 TABLET, FILM COATED ORAL
Qty: 0 | Refills: 0 | DISCHARGE
Start: 2020-01-01

## 2020-01-01 RX ORDER — FERROUS SULFATE 325(65) MG
325 TABLET ORAL THREE TIMES A DAY
Refills: 0 | Status: DISCONTINUED | OUTPATIENT
Start: 2020-01-01 | End: 2020-01-01

## 2020-01-01 RX ORDER — HEPARIN SODIUM 5000 [USP'U]/ML
5000 INJECTION INTRAVENOUS; SUBCUTANEOUS EVERY 12 HOURS
Refills: 0 | Status: DISCONTINUED | OUTPATIENT
Start: 2020-01-01 | End: 2020-01-01

## 2020-01-01 RX ORDER — INSULIN GLARGINE 100 [IU]/ML
5 INJECTION, SOLUTION SUBCUTANEOUS AT BEDTIME
Refills: 0 | Status: DISCONTINUED | OUTPATIENT
Start: 2020-01-01 | End: 2020-01-01

## 2020-01-01 RX ORDER — SACCHAROMYCES BOULARDII 250 MG
250 POWDER IN PACKET (EA) ORAL
Refills: 0 | Status: DISCONTINUED | OUTPATIENT
Start: 2020-01-01 | End: 2020-01-01

## 2020-01-01 RX ORDER — DEXTROSE 50 % IN WATER 50 %
15 SYRINGE (ML) INTRAVENOUS ONCE
Refills: 0 | Status: DISCONTINUED | OUTPATIENT
Start: 2020-01-01 | End: 2020-01-01

## 2020-01-01 RX ORDER — LACTULOSE 10 G/15ML
30 SOLUTION ORAL
Refills: 0 | Status: DISCONTINUED | OUTPATIENT
Start: 2020-01-01 | End: 2020-01-01

## 2020-01-01 RX ORDER — ATORVASTATIN CALCIUM 80 MG/1
20 TABLET, FILM COATED ORAL AT BEDTIME
Refills: 0 | Status: DISCONTINUED | OUTPATIENT
Start: 2020-01-01 | End: 2020-01-01

## 2020-01-01 RX ORDER — ATORVASTATIN CALCIUM 80 MG/1
1 TABLET, FILM COATED ORAL
Qty: 30 | Refills: 0
Start: 2020-01-01 | End: 2020-01-01

## 2020-01-01 RX ORDER — INSULIN LISPRO 100/ML
VIAL (ML) SUBCUTANEOUS AT BEDTIME
Refills: 0 | Status: DISCONTINUED | OUTPATIENT
Start: 2020-01-01 | End: 2020-01-01

## 2020-01-01 RX ORDER — LOPERAMIDE HYDROCHLORIDE 2 MG/1
2 CAPSULE ORAL 4 TIMES DAILY
Refills: 0 | Status: DISCONTINUED | COMMUNITY
End: 2020-01-01

## 2020-01-01 RX ORDER — LIDOCAINE 4 G/100G
1 CREAM TOPICAL ONCE
Refills: 0 | Status: DISCONTINUED | OUTPATIENT
Start: 2020-01-01 | End: 2020-01-01

## 2020-01-01 RX ORDER — PANTOPRAZOLE SODIUM 20 MG/1
40 TABLET, DELAYED RELEASE ORAL
Refills: 0 | Status: DISCONTINUED | OUTPATIENT
Start: 2020-01-01 | End: 2020-01-01

## 2020-01-01 RX ORDER — FERROUS SULFATE 325(65) MG
325 TABLET ORAL DAILY
Refills: 0 | Status: DISCONTINUED | OUTPATIENT
Start: 2020-01-01 | End: 2020-01-01

## 2020-01-01 RX ORDER — FLUTICASONE PROPIONATE 50 MCG
1 SPRAY, SUSPENSION NASAL
Qty: 0 | Refills: 0 | DISCHARGE

## 2020-01-01 RX ORDER — MEROPENEM 1 G/30ML
500 INJECTION INTRAVENOUS EVERY 12 HOURS
Refills: 0 | Status: DISCONTINUED | OUTPATIENT
Start: 2020-01-01 | End: 2020-01-01

## 2020-01-01 RX ORDER — ALLOPURINOL 300 MG
300 TABLET ORAL DAILY
Refills: 0 | Status: DISCONTINUED | OUTPATIENT
Start: 2020-01-01 | End: 2020-01-01

## 2020-01-01 RX ORDER — ASCORBIC ACID 60 MG
500 TABLET,CHEWABLE ORAL DAILY
Refills: 0 | Status: DISCONTINUED | OUTPATIENT
Start: 2020-01-01 | End: 2020-01-01

## 2020-01-01 RX ORDER — POTASSIUM CHLORIDE 20 MEQ
20 PACKET (EA) ORAL ONCE
Refills: 0 | Status: COMPLETED | OUTPATIENT
Start: 2020-01-01 | End: 2020-01-01

## 2020-01-01 RX ORDER — ONDANSETRON 8 MG/1
4 TABLET, FILM COATED ORAL EVERY 6 HOURS
Refills: 0 | Status: DISCONTINUED | OUTPATIENT
Start: 2020-01-01 | End: 2020-01-01

## 2020-01-01 RX ORDER — ASPIRIN/CALCIUM CARB/MAGNESIUM 324 MG
81 TABLET ORAL DAILY
Refills: 0 | Status: DISCONTINUED | OUTPATIENT
Start: 2020-01-01 | End: 2020-01-01

## 2020-01-01 RX ORDER — SODIUM CHLORIDE 9 MG/ML
1000 INJECTION INTRAMUSCULAR; INTRAVENOUS; SUBCUTANEOUS
Refills: 0 | Status: DISCONTINUED | OUTPATIENT
Start: 2020-01-01 | End: 2020-01-01

## 2020-01-01 RX ORDER — MEROPENEM 1 G/30ML
1000 INJECTION INTRAVENOUS ONCE
Refills: 0 | Status: COMPLETED | OUTPATIENT
Start: 2020-01-01 | End: 2020-01-01

## 2020-01-01 RX ORDER — SODIUM CHLORIDE 9 MG/ML
1000 INJECTION, SOLUTION INTRAVENOUS ONCE
Refills: 0 | Status: COMPLETED | OUTPATIENT
Start: 2020-01-01 | End: 2020-01-01

## 2020-01-01 RX ORDER — SODIUM POLYSTYRENE SULFONATE 4.1 MEQ/G
15 POWDER, FOR SUSPENSION ORAL ONCE
Refills: 0 | Status: DISCONTINUED | OUTPATIENT
Start: 2020-01-01 | End: 2020-01-01

## 2020-01-01 RX ORDER — MIDODRINE HYDROCHLORIDE 2.5 MG/1
10 TABLET ORAL
Refills: 0 | Status: DISCONTINUED | OUTPATIENT
Start: 2020-01-01 | End: 2020-01-01

## 2020-01-01 RX ORDER — SODIUM CHLORIDE 9 MG/ML
1000 INJECTION INTRAMUSCULAR; INTRAVENOUS; SUBCUTANEOUS ONCE
Refills: 0 | Status: COMPLETED | OUTPATIENT
Start: 2020-01-01 | End: 2020-01-01

## 2020-01-01 RX ORDER — LEVOTHYROXINE SODIUM 125 MCG
1 TABLET ORAL
Qty: 30 | Refills: 0
Start: 2020-01-01 | End: 2020-01-01

## 2020-01-01 RX ORDER — NYSTATIN CREAM 100000 [USP'U]/G
1 CREAM TOPICAL THREE TIMES A DAY
Refills: 0 | Status: DISCONTINUED | OUTPATIENT
Start: 2020-01-01 | End: 2020-01-01

## 2020-01-01 RX ORDER — NYSTATIN CREAM 100000 [USP'U]/G
1 CREAM TOPICAL
Qty: 1 | Refills: 0
Start: 2020-01-01 | End: 2020-01-01

## 2020-01-01 RX ORDER — LACTOBACILLUS ACIDOPHILUS 100MM CELL
1 CAPSULE ORAL
Refills: 0 | Status: DISCONTINUED | OUTPATIENT
Start: 2020-01-01 | End: 2020-01-01

## 2020-01-01 RX ORDER — ERYTHROPOIETIN 10000 [IU]/ML
10000 INJECTION, SOLUTION INTRAVENOUS; SUBCUTANEOUS
Refills: 0 | Status: DISCONTINUED | OUTPATIENT
Start: 2020-01-01 | End: 2020-01-01

## 2020-01-01 RX ORDER — NYSTATIN CREAM 100000 [USP'U]/G
1 CREAM TOPICAL
Qty: 0 | Refills: 0 | DISCHARGE
Start: 2020-01-01

## 2020-01-01 RX ORDER — MAGNESIUM SULFATE 500 MG/ML
2 VIAL (ML) INJECTION ONCE
Refills: 0 | Status: COMPLETED | OUTPATIENT
Start: 2020-01-01 | End: 2020-01-01

## 2020-01-01 RX ORDER — PIPERACILLIN AND TAZOBACTAM 4; .5 G/20ML; G/20ML
3.38 INJECTION, POWDER, LYOPHILIZED, FOR SOLUTION INTRAVENOUS EVERY 8 HOURS
Refills: 0 | Status: DISCONTINUED | OUTPATIENT
Start: 2020-01-01 | End: 2020-01-01

## 2020-01-01 RX ORDER — POTASSIUM CHLORIDE 20 MEQ
40 PACKET (EA) ORAL ONCE
Refills: 0 | Status: COMPLETED | OUTPATIENT
Start: 2020-01-01 | End: 2020-01-01

## 2020-01-01 RX ORDER — INSULIN GLARGINE 100 [IU]/ML
0 INJECTION, SOLUTION SUBCUTANEOUS
Qty: 0 | Refills: 0 | DISCHARGE
Start: 2020-01-01

## 2020-01-01 RX ORDER — PIPERACILLIN AND TAZOBACTAM 4; .5 G/20ML; G/20ML
3.38 INJECTION, POWDER, LYOPHILIZED, FOR SOLUTION INTRAVENOUS ONCE
Refills: 0 | Status: DISCONTINUED | OUTPATIENT
Start: 2020-01-01 | End: 2020-01-01

## 2020-01-01 RX ORDER — MIDODRINE HYDROCHLORIDE 2.5 MG/1
1 TABLET ORAL
Qty: 60 | Refills: 0
Start: 2020-01-01 | End: 2020-01-01

## 2020-01-01 RX ORDER — SACCHAROMYCES BOULARDII 250 MG
1 POWDER IN PACKET (EA) ORAL
Qty: 0 | Refills: 0 | DISCHARGE
Start: 2020-01-01

## 2020-01-01 RX ORDER — LACTOSE-REDUCED FOOD
LIQUID (ML) ORAL
Qty: 21 | Refills: 0 | Status: ACTIVE | OUTPATIENT
Start: 2020-01-01

## 2020-01-01 RX ORDER — ACETAMINOPHEN 500 MG
650 TABLET ORAL EVERY 6 HOURS
Refills: 0 | Status: DISCONTINUED | OUTPATIENT
Start: 2020-01-01 | End: 2020-01-01

## 2020-01-01 RX ORDER — TAMSULOSIN HYDROCHLORIDE 0.4 MG/1
0.4 CAPSULE ORAL AT BEDTIME
Refills: 0 | Status: DISCONTINUED | OUTPATIENT
Start: 2020-01-01 | End: 2020-01-01

## 2020-01-01 RX ORDER — INSULIN LISPRO 100/ML
VIAL (ML) SUBCUTANEOUS
Refills: 0 | Status: DISCONTINUED | OUTPATIENT
Start: 2020-01-01 | End: 2020-01-01

## 2020-01-01 RX ORDER — PANTOPRAZOLE SODIUM 20 MG/1
40 TABLET, DELAYED RELEASE ORAL DAILY
Refills: 0 | Status: DISCONTINUED | OUTPATIENT
Start: 2020-01-01 | End: 2020-01-01

## 2020-01-01 RX ORDER — SODIUM CHLORIDE 9 MG/ML
250 INJECTION INTRAMUSCULAR; INTRAVENOUS; SUBCUTANEOUS ONCE
Refills: 0 | Status: COMPLETED | OUTPATIENT
Start: 2020-01-01 | End: 2020-01-01

## 2020-01-01 RX ORDER — ZINC SULFATE TAB 220 MG (50 MG ZINC EQUIVALENT) 220 (50 ZN) MG
220 TAB ORAL DAILY
Refills: 0 | Status: DISCONTINUED | OUTPATIENT
Start: 2020-01-01 | End: 2020-01-01

## 2020-01-01 RX ORDER — OXYBUTYNIN CHLORIDE 5 MG
5 TABLET ORAL
Refills: 0 | Status: DISCONTINUED | OUTPATIENT
Start: 2020-01-01 | End: 2020-01-01

## 2020-01-01 RX ORDER — INSULIN HUMAN 100 [IU]/ML
10 INJECTION, SOLUTION SUBCUTANEOUS ONCE
Refills: 0 | Status: COMPLETED | OUTPATIENT
Start: 2020-01-01 | End: 2020-01-01

## 2020-01-01 RX ORDER — MIDODRINE HYDROCHLORIDE 2.5 MG/1
2.5 TABLET ORAL THREE TIMES A DAY
Refills: 0 | Status: DISCONTINUED | OUTPATIENT
Start: 2020-01-01 | End: 2020-01-01

## 2020-01-01 RX ORDER — SODIUM CHLORIDE 9 MG/ML
500 INJECTION INTRAMUSCULAR; INTRAVENOUS; SUBCUTANEOUS ONCE
Refills: 0 | Status: COMPLETED | OUTPATIENT
Start: 2020-01-01 | End: 2020-01-01

## 2020-01-01 RX ORDER — SODIUM CHLORIDE 9 MG/ML
1000 INJECTION, SOLUTION INTRAVENOUS
Refills: 0 | Status: COMPLETED | OUTPATIENT
Start: 2020-01-01 | End: 2020-01-01

## 2020-01-01 RX ORDER — LEVOTHYROXINE SODIUM 125 MCG
25 TABLET ORAL DAILY
Refills: 0 | Status: DISCONTINUED | OUTPATIENT
Start: 2020-01-01 | End: 2020-01-01

## 2020-01-01 RX ORDER — MEROPENEM 1 G/30ML
INJECTION INTRAVENOUS
Refills: 0 | Status: DISCONTINUED | OUTPATIENT
Start: 2020-01-01 | End: 2020-01-01

## 2020-01-01 RX ORDER — CEFTRIAXONE 500 MG/1
1000 INJECTION, POWDER, FOR SOLUTION INTRAMUSCULAR; INTRAVENOUS EVERY 24 HOURS
Refills: 0 | Status: DISCONTINUED | OUTPATIENT
Start: 2020-01-01 | End: 2020-01-01

## 2020-01-01 RX ORDER — POLYETHYLENE GLYCOL 3350 17 G/17G
17 POWDER, FOR SOLUTION ORAL DAILY
Refills: 0 | Status: DISCONTINUED | OUTPATIENT
Start: 2020-01-01 | End: 2020-01-01

## 2020-01-01 RX ORDER — SODIUM ZIRCONIUM CYCLOSILICATE 10 G/10G
10 POWDER, FOR SUSPENSION ORAL THREE TIMES A DAY
Refills: 0 | Status: COMPLETED | OUTPATIENT
Start: 2020-01-01 | End: 2020-01-01

## 2020-01-01 RX ORDER — SODIUM ZIRCONIUM CYCLOSILICATE 10 G/10G
10 POWDER, FOR SUSPENSION ORAL ONCE
Refills: 0 | Status: COMPLETED | OUTPATIENT
Start: 2020-01-01 | End: 2020-01-01

## 2020-01-01 RX ORDER — APIXABAN 2.5 MG/1
2.5 TABLET, FILM COATED ORAL
Qty: 60 | Refills: 11 | Status: DISCONTINUED | COMMUNITY
Start: 2017-11-18 | End: 2020-01-01

## 2020-01-01 RX ORDER — HEPARIN SODIUM 5000 [USP'U]/ML
5000 INJECTION INTRAVENOUS; SUBCUTANEOUS EVERY 8 HOURS
Refills: 0 | Status: DISCONTINUED | OUTPATIENT
Start: 2020-01-01 | End: 2020-01-01

## 2020-01-01 RX ORDER — OXYBUTYNIN CHLORIDE 5 MG
2 TABLET ORAL
Qty: 0 | Refills: 0 | DISCHARGE
Start: 2020-01-01

## 2020-01-01 RX ORDER — ALBUTEROL 90 UG/1
1 AEROSOL, METERED ORAL ONCE
Refills: 0 | Status: COMPLETED | OUTPATIENT
Start: 2020-01-01 | End: 2020-01-01

## 2020-01-01 RX ORDER — CIPROFLOXACIN LACTATE 400MG/40ML
1 VIAL (ML) INTRAVENOUS
Qty: 0 | Refills: 0 | DISCHARGE
Start: 2020-01-01

## 2020-01-01 RX ORDER — FOLIC ACID 0.8 MG
1 TABLET ORAL DAILY
Refills: 0 | Status: DISCONTINUED | OUTPATIENT
Start: 2020-01-01 | End: 2020-01-01

## 2020-01-01 RX ORDER — MAGNESIUM HYDROXIDE 400 MG/5ML
400 SUSPENSION ORAL
Refills: 0 | Status: ACTIVE | COMMUNITY

## 2020-01-01 RX ORDER — ZINC SULFATE TAB 220 MG (50 MG ZINC EQUIVALENT) 220 (50 ZN) MG
1 TAB ORAL
Qty: 0 | Refills: 0 | DISCHARGE
Start: 2020-01-01

## 2020-01-01 RX ORDER — OXYBUTYNIN CHLORIDE 5 MG
10 TABLET ORAL DAILY
Refills: 0 | Status: DISCONTINUED | OUTPATIENT
Start: 2020-01-01 | End: 2020-01-01

## 2020-01-01 RX ORDER — FUROSEMIDE 40 MG
40 TABLET ORAL ONCE
Refills: 0 | Status: COMPLETED | OUTPATIENT
Start: 2020-01-01 | End: 2020-01-01

## 2020-01-01 RX ORDER — TAMSULOSIN HYDROCHLORIDE 0.4 MG/1
1 CAPSULE ORAL
Qty: 0 | Refills: 0 | DISCHARGE
Start: 2020-01-01

## 2020-01-01 RX ORDER — FINASTERIDE 5 MG/1
5 TABLET, FILM COATED ORAL DAILY
Refills: 0 | Status: DISCONTINUED | OUTPATIENT
Start: 2020-01-01 | End: 2020-01-01

## 2020-01-01 RX ORDER — MEROPENEM 1 G/30ML
500 INJECTION INTRAVENOUS EVERY 12 HOURS
Refills: 0 | Status: COMPLETED | OUTPATIENT
Start: 2020-01-01 | End: 2020-01-01

## 2020-01-01 RX ORDER — MIDODRINE HYDROCHLORIDE 2.5 MG/1
1 TABLET ORAL
Qty: 0 | Refills: 0 | DISCHARGE
Start: 2020-01-01

## 2020-01-01 RX ORDER — SODIUM CHLORIDE 9 MG/ML
2600 INJECTION, SOLUTION INTRAVENOUS ONCE
Refills: 0 | Status: COMPLETED | OUTPATIENT
Start: 2020-01-01 | End: 2020-01-01

## 2020-01-01 RX ORDER — INSULIN LISPRO 100/ML
3 VIAL (ML) SUBCUTANEOUS
Refills: 0 | Status: DISCONTINUED | OUTPATIENT
Start: 2020-01-01 | End: 2020-01-01

## 2020-01-01 RX ORDER — ASPIRIN/CALCIUM CARB/MAGNESIUM 324 MG
1 TABLET ORAL
Qty: 0 | Refills: 0 | DISCHARGE
Start: 2020-01-01

## 2020-01-01 RX ORDER — SODIUM CHLORIDE 9 MG/ML
500 INJECTION INTRAMUSCULAR; INTRAVENOUS; SUBCUTANEOUS ONCE
Refills: 0 | Status: DISCONTINUED | OUTPATIENT
Start: 2020-01-01 | End: 2020-01-01

## 2020-01-01 RX ORDER — MEROPENEM 1 G/30ML
1000 INJECTION INTRAVENOUS ONCE
Refills: 0 | Status: DISCONTINUED | OUTPATIENT
Start: 2020-01-01 | End: 2020-01-01

## 2020-01-01 RX ORDER — HALOPERIDOL DECANOATE 100 MG/ML
2 INJECTION INTRAMUSCULAR EVERY 6 HOURS
Refills: 0 | Status: DISCONTINUED | OUTPATIENT
Start: 2020-01-01 | End: 2020-01-01

## 2020-01-01 RX ORDER — ASCORBIC ACID 60 MG
500 TABLET,CHEWABLE ORAL
Refills: 0 | Status: DISCONTINUED | OUTPATIENT
Start: 2020-01-01 | End: 2020-01-01

## 2020-01-01 RX ORDER — SODIUM POLYSTYRENE SULFONATE 4.1 MEQ/G
30 POWDER, FOR SUSPENSION ORAL ONCE
Refills: 0 | Status: COMPLETED | OUTPATIENT
Start: 2020-01-01 | End: 2020-01-01

## 2020-01-01 RX ORDER — INSULIN LISPRO 100/ML
1 VIAL (ML) SUBCUTANEOUS ONCE
Refills: 0 | Status: DISCONTINUED | OUTPATIENT
Start: 2020-01-01 | End: 2020-01-01

## 2020-01-01 RX ORDER — FUROSEMIDE 40 MG
1 TABLET ORAL
Qty: 30 | Refills: 0
Start: 2020-01-01 | End: 2020-01-01

## 2020-01-01 RX ORDER — ERTAPENEM SODIUM 1 G/1
500 INJECTION, POWDER, LYOPHILIZED, FOR SOLUTION INTRAMUSCULAR; INTRAVENOUS EVERY 24 HOURS
Refills: 0 | Status: DISCONTINUED | OUTPATIENT
Start: 2020-01-01 | End: 2020-01-01

## 2020-01-01 RX ORDER — INSULIN LISPRO 100/ML
5 VIAL (ML) SUBCUTANEOUS
Refills: 0 | Status: DISCONTINUED | OUTPATIENT
Start: 2020-01-01 | End: 2020-01-01

## 2020-01-01 RX ORDER — MULTIVIT-MIN/FOLIC/VIT K/LYCOP 400-300MCG
250 TABLET ORAL DAILY
Refills: 0 | Status: ACTIVE | COMMUNITY

## 2020-01-01 RX ORDER — APIXABAN 2.5 MG/1
2.5 TABLET, FILM COATED ORAL
Refills: 0 | Status: DISCONTINUED | OUTPATIENT
Start: 2020-01-01 | End: 2020-01-01

## 2020-01-01 RX ORDER — ALLOPURINOL 300 MG
100 TABLET ORAL DAILY
Refills: 0 | Status: DISCONTINUED | OUTPATIENT
Start: 2020-01-01 | End: 2020-01-01

## 2020-01-01 RX ORDER — FUROSEMIDE 40 MG
40 TABLET ORAL DAILY
Refills: 0 | Status: DISCONTINUED | OUTPATIENT
Start: 2020-01-01 | End: 2020-01-01

## 2020-01-01 RX ORDER — SODIUM POLYSTYRENE SULFONATE 4.1 MEQ/G
15 POWDER, FOR SUSPENSION ORAL ONCE
Refills: 0 | Status: COMPLETED | OUTPATIENT
Start: 2020-01-01 | End: 2020-01-01

## 2020-01-01 RX ORDER — DUTASTERIDE 0.5 MG/1
1 CAPSULE, LIQUID FILLED ORAL
Qty: 0 | Refills: 0 | DISCHARGE

## 2020-01-01 RX ORDER — MIDODRINE HYDROCHLORIDE 2.5 MG/1
10 TABLET ORAL ONCE
Refills: 0 | Status: COMPLETED | OUTPATIENT
Start: 2020-01-01 | End: 2020-01-01

## 2020-01-01 RX ORDER — SODIUM BICARBONATE 1 MEQ/ML
0.08 SYRINGE (ML) INTRAVENOUS
Qty: 75 | Refills: 0 | Status: DISCONTINUED | OUTPATIENT
Start: 2020-01-01 | End: 2020-01-01

## 2020-01-01 RX ORDER — FUROSEMIDE 40 MG
5 TABLET ORAL ONCE
Refills: 0 | Status: COMPLETED | OUTPATIENT
Start: 2020-01-01 | End: 2020-01-01

## 2020-01-01 RX ORDER — NYSTATIN/TRIAMCINOLONE ACET
1 OINTMENT (GRAM) TOPICAL
Refills: 0 | Status: DISCONTINUED | OUTPATIENT
Start: 2020-01-01 | End: 2020-01-01

## 2020-01-01 RX ORDER — SODIUM POLYSTYRENE SULFONATE 4.1 MEQ/G
30 POWDER, FOR SUSPENSION ORAL ONCE
Refills: 0 | Status: DISCONTINUED | OUTPATIENT
Start: 2020-01-01 | End: 2020-01-01

## 2020-01-01 RX ORDER — FUROSEMIDE 20 MG/1
20 TABLET ORAL
Qty: 90 | Refills: 1 | Status: DISCONTINUED | COMMUNITY
Start: 2019-07-30 | End: 2020-01-01

## 2020-01-01 RX ORDER — SITAGLIPTIN 50 MG/1
1 TABLET, FILM COATED ORAL
Qty: 0 | Refills: 0 | DISCHARGE

## 2020-01-01 RX ORDER — MUPIROCIN 20 MG/G
1 OINTMENT TOPICAL
Refills: 0 | Status: COMPLETED | OUTPATIENT
Start: 2020-01-01 | End: 2020-01-01

## 2020-01-01 RX ORDER — LACTULOSE 10 G/15ML
20 SOLUTION ORAL
Refills: 0 | Status: DISCONTINUED | OUTPATIENT
Start: 2020-01-01 | End: 2020-01-01

## 2020-01-01 RX ORDER — CLOTRIMAZOLE 10 MG/G
1 CREAM TOPICAL TWICE DAILY
Refills: 0 | Status: DISCONTINUED | COMMUNITY
End: 2020-01-01

## 2020-01-01 RX ORDER — MEROPENEM 1 G/30ML
1000 INJECTION INTRAVENOUS EVERY 12 HOURS
Refills: 0 | Status: DISCONTINUED | OUTPATIENT
Start: 2020-01-01 | End: 2020-01-01

## 2020-01-01 RX ORDER — CEFTRIAXONE 500 MG/1
1000 INJECTION, POWDER, FOR SOLUTION INTRAMUSCULAR; INTRAVENOUS ONCE
Refills: 0 | Status: COMPLETED | OUTPATIENT
Start: 2020-01-01 | End: 2020-01-01

## 2020-01-01 RX ORDER — HYDROCORTISONE 20 MG
100 TABLET ORAL EVERY 8 HOURS
Refills: 0 | Status: DISCONTINUED | OUTPATIENT
Start: 2020-01-01 | End: 2020-01-01

## 2020-01-01 RX ORDER — ERTAPENEM SODIUM 1 G/1
500 INJECTION, POWDER, LYOPHILIZED, FOR SOLUTION INTRAMUSCULAR; INTRAVENOUS ONCE
Refills: 0 | Status: COMPLETED | OUTPATIENT
Start: 2020-01-01 | End: 2020-01-01

## 2020-01-01 RX ORDER — ALLOPURINOL 300 MG/1
300 TABLET ORAL
Qty: 90 | Refills: 1 | Status: ACTIVE | COMMUNITY
Start: 2020-01-01 | End: 1900-01-01

## 2020-01-01 RX ORDER — POVIDONE-IODINE 5 %
1 AEROSOL (ML) TOPICAL EVERY 12 HOURS
Refills: 0 | Status: DISCONTINUED | OUTPATIENT
Start: 2020-01-01 | End: 2020-01-01

## 2020-01-01 RX ORDER — INSULIN LISPRO 100/ML
7 VIAL (ML) SUBCUTANEOUS
Refills: 0 | Status: DISCONTINUED | OUTPATIENT
Start: 2020-01-01 | End: 2020-01-01

## 2020-01-01 RX ORDER — IRON SUCROSE 20 MG/ML
200 INJECTION, SOLUTION INTRAVENOUS EVERY 24 HOURS
Refills: 0 | Status: DISCONTINUED | OUTPATIENT
Start: 2020-01-01 | End: 2020-01-01

## 2020-01-01 RX ORDER — NYSTATIN CREAM 100000 [USP'U]/G
1 CREAM TOPICAL
Refills: 0 | Status: DISCONTINUED | OUTPATIENT
Start: 2020-01-01 | End: 2020-01-01

## 2020-01-01 RX ORDER — ALLOPURINOL 300 MG
1 TABLET ORAL
Qty: 0 | Refills: 0 | DISCHARGE
Start: 2020-01-01

## 2020-01-01 RX ORDER — LEVETIRACETAM 15 MG/ML
INJECTION IONTOPHORESIS
Qty: 3 | Refills: 3 | Status: ACTIVE | COMMUNITY
Start: 2020-01-01 | End: 1900-01-01

## 2020-01-01 RX ORDER — METOPROLOL TARTRATE 50 MG
1 TABLET ORAL
Qty: 0 | Refills: 0 | DISCHARGE

## 2020-01-01 RX ORDER — MIRABEGRON 50 MG/1
1 TABLET, EXTENDED RELEASE ORAL
Qty: 0 | Refills: 0 | DISCHARGE

## 2020-01-01 RX ORDER — INFLUENZA VIRUS VACCINE 15; 15; 15; 15 UG/.5ML; UG/.5ML; UG/.5ML; UG/.5ML
0.5 SUSPENSION INTRAMUSCULAR ONCE
Refills: 0 | Status: COMPLETED | OUTPATIENT
Start: 2020-01-01 | End: 2020-01-01

## 2020-01-01 RX ORDER — POTASSIUM CHLORIDE 20 MEQ
40 PACKET (EA) ORAL EVERY 4 HOURS
Refills: 0 | Status: COMPLETED | OUTPATIENT
Start: 2020-01-01 | End: 2020-01-01

## 2020-01-01 RX ORDER — PIPERACILLIN AND TAZOBACTAM 4; .5 G/20ML; G/20ML
3.38 INJECTION, POWDER, LYOPHILIZED, FOR SOLUTION INTRAVENOUS ONCE
Refills: 0 | Status: COMPLETED | OUTPATIENT
Start: 2020-01-01 | End: 2020-01-01

## 2020-01-01 RX ORDER — LEVOTHYROXINE SODIUM 125 MCG
12.5 TABLET ORAL AT BEDTIME
Refills: 0 | Status: DISCONTINUED | OUTPATIENT
Start: 2020-01-01 | End: 2020-01-01

## 2020-01-01 RX ORDER — DIPHENHYDRAMINE HCL 50 MG
12.5 CAPSULE ORAL ONCE
Refills: 0 | Status: COMPLETED | OUTPATIENT
Start: 2020-01-01 | End: 2020-01-01

## 2020-01-01 RX ORDER — SODIUM POLYSTYRENE SULFONATE 4.1 MEQ/G
60 POWDER, FOR SUSPENSION ORAL ONCE
Refills: 0 | Status: COMPLETED | OUTPATIENT
Start: 2020-01-01 | End: 2020-01-01

## 2020-01-01 RX ORDER — INSULIN GLARGINE 100 [IU]/ML
15 INJECTION, SOLUTION SUBCUTANEOUS AT BEDTIME
Refills: 0 | Status: DISCONTINUED | OUTPATIENT
Start: 2020-01-01 | End: 2020-01-01

## 2020-01-01 RX ORDER — COSYNTROPIN 0.25 MG/ML
0.25 INJECTION, SOLUTION INTRAVENOUS ONCE
Refills: 0 | Status: COMPLETED | OUTPATIENT
Start: 2020-01-01 | End: 2020-01-01

## 2020-01-01 RX ORDER — THIAMINE MONONITRATE (VIT B1) 100 MG
100 TABLET ORAL DAILY
Refills: 0 | Status: DISCONTINUED | OUTPATIENT
Start: 2020-01-01 | End: 2020-01-01

## 2020-01-01 RX ORDER — CADEXOMER IODINE 0.9 %
1 PADS, MEDICATED (EA) TOPICAL EVERY 24 HOURS
Refills: 0 | Status: DISCONTINUED | OUTPATIENT
Start: 2020-01-01 | End: 2020-01-01

## 2020-01-01 RX ORDER — FOLIC ACID 0.8 MG
1 TABLET ORAL
Qty: 0 | Refills: 0 | DISCHARGE
Start: 2020-01-01

## 2020-01-01 RX ORDER — IRON SUCROSE 20 MG/ML
200 INJECTION, SOLUTION INTRAVENOUS EVERY 24 HOURS
Refills: 0 | Status: COMPLETED | OUTPATIENT
Start: 2020-01-01 | End: 2020-01-01

## 2020-01-01 RX ORDER — INSULIN LISPRO 100/ML
0 VIAL (ML) SUBCUTANEOUS
Qty: 0 | Refills: 0 | DISCHARGE
Start: 2020-01-01

## 2020-01-01 RX ORDER — INSULIN LISPRO 100/ML
VIAL (ML) SUBCUTANEOUS EVERY 6 HOURS
Refills: 0 | Status: DISCONTINUED | OUTPATIENT
Start: 2020-01-01 | End: 2020-01-01

## 2020-01-01 RX ORDER — INSULIN GLARGINE 100 [IU]/ML
10 INJECTION, SOLUTION SUBCUTANEOUS AT BEDTIME
Refills: 0 | Status: DISCONTINUED | OUTPATIENT
Start: 2020-01-01 | End: 2020-01-01

## 2020-01-01 RX ORDER — CEFPODOXIME PROXETIL 100 MG
200 TABLET ORAL EVERY 12 HOURS
Refills: 0 | Status: DISCONTINUED | OUTPATIENT
Start: 2020-01-01 | End: 2020-01-01

## 2020-01-01 RX ORDER — CEFPODOXIME PROXETIL 100 MG
1 TABLET ORAL
Qty: 12 | Refills: 0
Start: 2020-01-01 | End: 2020-01-01

## 2020-01-01 RX ORDER — CIPROFLOXACIN LACTATE 400MG/40ML
500 VIAL (ML) INTRAVENOUS EVERY 12 HOURS
Refills: 0 | Status: DISCONTINUED | OUTPATIENT
Start: 2020-01-01 | End: 2020-01-01

## 2020-01-01 RX ORDER — SACUBITRIL AND VALSARTAN 24; 26 MG/1; MG/1
24-26 TABLET, FILM COATED ORAL TWICE DAILY
Qty: 60 | Refills: 5 | Status: DISCONTINUED | COMMUNITY
Start: 2017-06-12 | End: 2020-01-01

## 2020-01-01 RX ORDER — LACTULOSE 10 G/15ML
30 SOLUTION ORAL
Qty: 0 | Refills: 0 | DISCHARGE
Start: 2020-01-01

## 2020-01-01 RX ORDER — FUROSEMIDE 40 MG
1 TABLET ORAL
Qty: 0 | Refills: 0 | DISCHARGE

## 2020-01-01 RX ORDER — GUAIFENESIN/DEXTROMETHORPHAN 600MG-30MG
10 TABLET, EXTENDED RELEASE 12 HR ORAL ONCE
Refills: 0 | Status: COMPLETED | OUTPATIENT
Start: 2020-01-01 | End: 2020-01-01

## 2020-01-01 RX ORDER — MIDODRINE HYDROCHLORIDE 2.5 MG/1
5 TABLET ORAL THREE TIMES A DAY
Refills: 0 | Status: DISCONTINUED | OUTPATIENT
Start: 2020-01-01 | End: 2020-01-01

## 2020-01-01 RX ORDER — ALBUTEROL 90 UG/1
2 AEROSOL, METERED ORAL
Qty: 0 | Refills: 0 | DISCHARGE
Start: 2020-01-01

## 2020-01-01 RX ORDER — FLUTICASONE PROPIONATE 50 MCG
1 SPRAY, SUSPENSION NASAL
Refills: 0 | Status: DISCONTINUED | OUTPATIENT
Start: 2020-01-01 | End: 2020-01-01

## 2020-01-01 RX ORDER — FERROUS SULFATE 325(65) MG
1 TABLET ORAL
Qty: 0 | Refills: 0 | DISCHARGE
Start: 2020-01-01

## 2020-01-01 RX ORDER — OXYBUTYNIN CHLORIDE 5 MG
1 TABLET ORAL
Qty: 0 | Refills: 0 | DISCHARGE
Start: 2020-01-01

## 2020-01-01 RX ORDER — MAGNESIUM OXIDE 400 MG ORAL TABLET 241.3 MG
400 TABLET ORAL
Refills: 0 | Status: COMPLETED | OUTPATIENT
Start: 2020-01-01 | End: 2020-01-01

## 2020-01-01 RX ORDER — GLIMEPIRIDE 2 MG/1
2 TABLET ORAL DAILY
Qty: 90 | Refills: 2 | Status: DISCONTINUED | COMMUNITY
End: 2020-01-01

## 2020-01-01 RX ORDER — POTASSIUM CHLORIDE 20 MEQ
20 PACKET (EA) ORAL ONCE
Refills: 0 | Status: DISCONTINUED | OUTPATIENT
Start: 2020-01-01 | End: 2020-01-01

## 2020-01-01 RX ORDER — FERROUS SULFATE 325(65) MG
1 TABLET ORAL
Qty: 90 | Refills: 0
Start: 2020-01-01 | End: 2020-01-01

## 2020-01-01 RX ORDER — ACETAMINOPHEN 500 MG
975 TABLET ORAL ONCE
Refills: 0 | Status: COMPLETED | OUTPATIENT
Start: 2020-01-01 | End: 2020-01-01

## 2020-01-01 RX ORDER — ATORVASTATIN CALCIUM 80 MG/1
1 TABLET, FILM COATED ORAL
Qty: 0 | Refills: 0 | DISCHARGE
Start: 2020-01-01

## 2020-01-01 RX ORDER — PIPERACILLIN AND TAZOBACTAM 4; .5 G/20ML; G/20ML
3.38 INJECTION, POWDER, LYOPHILIZED, FOR SOLUTION INTRAVENOUS EVERY 12 HOURS
Refills: 0 | Status: DISCONTINUED | OUTPATIENT
Start: 2020-01-01 | End: 2020-01-01

## 2020-01-01 RX ORDER — FUROSEMIDE 40 MG
40 TABLET ORAL
Refills: 0 | Status: DISCONTINUED | OUTPATIENT
Start: 2020-01-01 | End: 2020-01-01

## 2020-01-01 RX ORDER — CEFPODOXIME PROXETIL 200 MG/1
200 TABLET, FILM COATED ORAL
Qty: 14 | Refills: 0 | Status: COMPLETED | COMMUNITY
Start: 2020-01-01

## 2020-01-01 RX ORDER — LACTOSE-REDUCED FOOD
LIQUID (ML) ORAL
Qty: 1 | Refills: 3 | Status: ACTIVE | COMMUNITY
Start: 2020-01-01 | End: 1900-01-01

## 2020-01-01 RX ORDER — SENNA PLUS 8.6 MG/1
2 TABLET ORAL AT BEDTIME
Refills: 0 | Status: DISCONTINUED | OUTPATIENT
Start: 2020-01-01 | End: 2020-01-01

## 2020-01-01 RX ORDER — CHLORHEXIDINE GLUCONATE 213 G/1000ML
1 SOLUTION TOPICAL DAILY
Refills: 0 | Status: DISCONTINUED | OUTPATIENT
Start: 2020-01-01 | End: 2020-01-01

## 2020-01-01 RX ORDER — CALCIUM GLUCONATE 100 MG/ML
1 VIAL (ML) INTRAVENOUS ONCE
Refills: 0 | Status: COMPLETED | OUTPATIENT
Start: 2020-01-01 | End: 2020-01-01

## 2020-01-01 RX ORDER — SODIUM CHLORIDE 9 MG/ML
2000 INJECTION INTRAMUSCULAR; INTRAVENOUS; SUBCUTANEOUS ONCE
Refills: 0 | Status: COMPLETED | OUTPATIENT
Start: 2020-01-01 | End: 2020-01-01

## 2020-01-01 RX ORDER — POVIDONE-IODINE 5 %
1 AEROSOL (ML) TOPICAL
Refills: 0 | Status: DISCONTINUED | OUTPATIENT
Start: 2020-01-01 | End: 2020-01-01

## 2020-01-01 RX ORDER — CALAMINE AND ZINC OXIDE AND PHENOL 160; 10 MG/ML; MG/ML
1 LOTION TOPICAL
Refills: 0 | Status: DISCONTINUED | OUTPATIENT
Start: 2020-01-01 | End: 2020-01-01

## 2020-01-01 RX ORDER — METFORMIN HYDROCHLORIDE 1000 MG/1
1000 TABLET, COATED ORAL
Qty: 180 | Refills: 3 | Status: DISCONTINUED | COMMUNITY
End: 2020-01-01

## 2020-01-01 RX ORDER — SODIUM CHLORIDE 9 MG/ML
1000 INJECTION INTRAMUSCULAR; INTRAVENOUS; SUBCUTANEOUS ONCE
Refills: 0 | Status: DISCONTINUED | OUTPATIENT
Start: 2020-01-01 | End: 2020-01-01

## 2020-01-01 RX ORDER — SENNA PLUS 8.6 MG/1
2 TABLET ORAL
Qty: 0 | Refills: 0 | DISCHARGE
Start: 2020-01-01

## 2020-01-01 RX ORDER — INSULIN LISPRO 100/ML
0 VIAL (ML) SUBCUTANEOUS
Qty: 0 | Refills: 0 | DISCHARGE

## 2020-01-01 RX ORDER — ACETAMINOPHEN 500 MG
1000 TABLET ORAL ONCE
Refills: 0 | Status: COMPLETED | OUTPATIENT
Start: 2020-01-01 | End: 2020-01-01

## 2020-01-01 RX ORDER — SODIUM CHLORIDE 0.65 %
2 AEROSOL, SPRAY (ML) NASAL
Qty: 0 | Refills: 0 | DISCHARGE

## 2020-01-01 RX ORDER — LANOLIN ALCOHOL/MO/W.PET/CERES
5 CREAM (GRAM) TOPICAL AT BEDTIME
Refills: 0 | Status: DISCONTINUED | OUTPATIENT
Start: 2020-01-01 | End: 2020-01-01

## 2020-01-01 RX ORDER — ERYTHROPOIETIN 10000 [IU]/ML
10000 INJECTION, SOLUTION INTRAVENOUS; SUBCUTANEOUS ONCE
Refills: 0 | Status: COMPLETED | OUTPATIENT
Start: 2020-01-01 | End: 2020-01-01

## 2020-01-01 RX ORDER — ALLOPURINOL 300 MG/1
300 TABLET ORAL
Qty: 90 | Refills: 1 | Status: DISCONTINUED | COMMUNITY
Start: 2018-11-19 | End: 2020-01-01

## 2020-01-01 RX ORDER — MIRABEGRON 50 MG/1
50 TABLET, FILM COATED, EXTENDED RELEASE ORAL
Refills: 5 | Status: ACTIVE | COMMUNITY

## 2020-01-01 RX ORDER — CALCIUM GLUCONATE 100 MG/ML
2 VIAL (ML) INTRAVENOUS ONCE
Refills: 0 | Status: COMPLETED | OUTPATIENT
Start: 2020-01-01 | End: 2020-01-01

## 2020-01-01 RX ORDER — CADEXOMER IODINE 0.9 %
1 PADS, MEDICATED (EA) TOPICAL
Qty: 0 | Refills: 0 | DISCHARGE
Start: 2020-01-01

## 2020-01-01 RX ORDER — ERYTHROPOIETIN 10000 [IU]/ML
40000 INJECTION, SOLUTION INTRAVENOUS; SUBCUTANEOUS
Refills: 0 | Status: DISCONTINUED | OUTPATIENT
Start: 2020-01-01 | End: 2020-01-01

## 2020-01-01 RX ORDER — LIDOCAINE HCL 20 MG/ML
5 VIAL (ML) INJECTION ONCE
Refills: 0 | Status: COMPLETED | OUTPATIENT
Start: 2020-01-01 | End: 2020-01-01

## 2020-01-01 RX ORDER — ASCORBIC ACID 60 MG
1 TABLET,CHEWABLE ORAL
Qty: 0 | Refills: 0 | DISCHARGE

## 2020-01-01 RX ORDER — DUTASTERIDE 0.5 MG/1
0.5 CAPSULE, LIQUID FILLED ORAL
Qty: 90 | Refills: 3 | Status: DISCONTINUED | COMMUNITY
End: 2020-01-01

## 2020-01-01 RX ORDER — PHYTONADIONE (VIT K1) 5 MG
5 TABLET ORAL ONCE
Refills: 0 | Status: COMPLETED | OUTPATIENT
Start: 2020-01-01 | End: 2020-01-01

## 2020-01-01 RX ORDER — FLUTICASONE PROPIONATE 50 MCG
1 SPRAY, SUSPENSION NASAL
Qty: 0 | Refills: 0 | DISCHARGE
Start: 2020-01-01

## 2020-01-01 RX ORDER — AZITHROMYCIN 500 MG/1
500 TABLET, FILM COATED ORAL ONCE
Refills: 0 | Status: COMPLETED | OUTPATIENT
Start: 2020-01-01 | End: 2020-01-01

## 2020-01-01 RX ORDER — HYDROCORTISONE 20 MG
50 TABLET ORAL EVERY 12 HOURS
Refills: 0 | Status: DISCONTINUED | OUTPATIENT
Start: 2020-01-01 | End: 2020-01-01

## 2020-01-01 RX ORDER — METOPROLOL SUCCINATE 25 MG/1
25 TABLET, EXTENDED RELEASE ORAL DAILY
Qty: 30 | Refills: 5 | Status: DISCONTINUED | COMMUNITY
End: 2020-01-01

## 2020-01-01 RX ORDER — ALBUTEROL 90 UG/1
2 AEROSOL, METERED ORAL
Refills: 0 | Status: DISCONTINUED | OUTPATIENT
Start: 2020-01-01 | End: 2020-01-01

## 2020-01-01 RX ORDER — ATORVASTATIN CALCIUM 80 MG/1
40 TABLET, FILM COATED ORAL AT BEDTIME
Refills: 0 | Status: DISCONTINUED | OUTPATIENT
Start: 2020-01-01 | End: 2020-01-01

## 2020-01-01 RX ORDER — AZITHROMYCIN 500 MG/1
500 TABLET, FILM COATED ORAL EVERY 24 HOURS
Refills: 0 | Status: DISCONTINUED | OUTPATIENT
Start: 2020-01-01 | End: 2020-01-01

## 2020-01-01 RX ORDER — MIDODRINE HYDROCHLORIDE 10 MG/1
10 TABLET ORAL
Qty: 180 | Refills: 0 | Status: ACTIVE | COMMUNITY
Start: 2020-01-01 | End: 1900-01-01

## 2020-01-01 RX ORDER — VANCOMYCIN HCL 1 G
500 VIAL (EA) INTRAVENOUS ONCE
Refills: 0 | Status: COMPLETED | OUTPATIENT
Start: 2020-01-01 | End: 2020-01-01

## 2020-01-01 RX ORDER — BACITRACIN ZINC 500 UNIT/G
1 OINTMENT IN PACKET (EA) TOPICAL
Refills: 0 | Status: DISCONTINUED | OUTPATIENT
Start: 2020-01-01 | End: 2020-01-01

## 2020-01-01 RX ORDER — CHLORHEXIDINE GLUCONATE 213 G/1000ML
1 SOLUTION TOPICAL
Refills: 0 | Status: DISCONTINUED | OUTPATIENT
Start: 2020-01-01 | End: 2020-01-01

## 2020-01-01 RX ORDER — ACETAMINOPHEN 500 MG
650 TABLET ORAL ONCE
Refills: 0 | Status: DISCONTINUED | OUTPATIENT
Start: 2020-01-01 | End: 2020-01-01

## 2020-01-01 RX ORDER — MIDODRINE HYDROCHLORIDE 2.5 MG/1
2.5 TABLET ORAL
Qty: 45 | Refills: 0 | Status: COMPLETED | COMMUNITY
Start: 2020-01-01

## 2020-01-01 RX ORDER — INSULIN HUMAN 100 [IU]/ML
5 INJECTION, SOLUTION SUBCUTANEOUS ONCE
Refills: 0 | Status: COMPLETED | OUTPATIENT
Start: 2020-01-01 | End: 2020-01-01

## 2020-01-01 RX ORDER — SODIUM POLYSTYRENE SULFONATE 4.1 MEQ/G
90 POWDER, FOR SUSPENSION ORAL ONCE
Refills: 0 | Status: COMPLETED | OUTPATIENT
Start: 2020-01-01 | End: 2020-01-01

## 2020-01-01 RX ORDER — DIPHENHYDRAMINE HCL 50 MG
12.5 CAPSULE ORAL EVERY 4 HOURS
Refills: 0 | Status: DISCONTINUED | OUTPATIENT
Start: 2020-01-01 | End: 2020-01-01

## 2020-01-01 RX ADMIN — Medication 1 APPLICATION(S): at 05:24

## 2020-01-01 RX ADMIN — MIDODRINE HYDROCHLORIDE 10 MILLIGRAM(S): 2.5 TABLET ORAL at 05:12

## 2020-01-01 RX ADMIN — Medication 325 MILLIGRAM(S): at 22:14

## 2020-01-01 RX ADMIN — Medication 1 APPLICATION(S): at 05:36

## 2020-01-01 RX ADMIN — IRON SUCROSE 110 MILLIGRAM(S): 20 INJECTION, SOLUTION INTRAVENOUS at 17:13

## 2020-01-01 RX ADMIN — Medication 2: at 22:17

## 2020-01-01 RX ADMIN — Medication 6: at 18:51

## 2020-01-01 RX ADMIN — Medication 325 MILLIGRAM(S): at 12:28

## 2020-01-01 RX ADMIN — Medication 4: at 07:54

## 2020-01-01 RX ADMIN — Medication 650 MILLIGRAM(S): at 22:20

## 2020-01-01 RX ADMIN — Medication 7 UNIT(S): at 12:40

## 2020-01-01 RX ADMIN — Medication 500 MILLIGRAM(S): at 17:22

## 2020-01-01 RX ADMIN — ERYTHROPOIETIN 10000 UNIT(S): 10000 INJECTION, SOLUTION INTRAVENOUS; SUBCUTANEOUS at 06:38

## 2020-01-01 RX ADMIN — MEROPENEM 100 MILLIGRAM(S): 1 INJECTION INTRAVENOUS at 17:08

## 2020-01-01 RX ADMIN — Medication 1 TABLET(S): at 11:35

## 2020-01-01 RX ADMIN — Medication 1 TABLET(S): at 17:11

## 2020-01-01 RX ADMIN — MIDODRINE HYDROCHLORIDE 5 MILLIGRAM(S): 2.5 TABLET ORAL at 12:12

## 2020-01-01 RX ADMIN — Medication 1 TABLET(S): at 11:37

## 2020-01-01 RX ADMIN — MEROPENEM 100 MILLIGRAM(S): 1 INJECTION INTRAVENOUS at 17:11

## 2020-01-01 RX ADMIN — PANTOPRAZOLE SODIUM 40 MILLIGRAM(S): 20 TABLET, DELAYED RELEASE ORAL at 17:03

## 2020-01-01 RX ADMIN — Medication 1 TABLET(S): at 10:50

## 2020-01-01 RX ADMIN — Medication 6: at 12:19

## 2020-01-01 RX ADMIN — Medication 2 TABLET(S): at 05:51

## 2020-01-01 RX ADMIN — Medication 25 MICROGRAM(S): at 05:04

## 2020-01-01 RX ADMIN — HEPARIN SODIUM 5000 UNIT(S): 5000 INJECTION INTRAVENOUS; SUBCUTANEOUS at 06:17

## 2020-01-01 RX ADMIN — Medication 1 APPLICATION(S): at 06:11

## 2020-01-01 RX ADMIN — Medication 10 MILLIGRAM(S): at 11:34

## 2020-01-01 RX ADMIN — MIDODRINE HYDROCHLORIDE 10 MILLIGRAM(S): 2.5 TABLET ORAL at 17:26

## 2020-01-01 RX ADMIN — Medication 50 MILLILITER(S): at 18:46

## 2020-01-01 RX ADMIN — MEROPENEM 100 MILLIGRAM(S): 1 INJECTION INTRAVENOUS at 18:06

## 2020-01-01 RX ADMIN — SODIUM CHLORIDE 75 MILLILITER(S): 9 INJECTION INTRAMUSCULAR; INTRAVENOUS; SUBCUTANEOUS at 08:45

## 2020-01-01 RX ADMIN — Medication 650 MILLIGRAM(S): at 05:47

## 2020-01-01 RX ADMIN — NYSTATIN CREAM 1 APPLICATION(S): 100000 CREAM TOPICAL at 12:54

## 2020-01-01 RX ADMIN — MIDODRINE HYDROCHLORIDE 5 MILLIGRAM(S): 2.5 TABLET ORAL at 06:38

## 2020-01-01 RX ADMIN — FINASTERIDE 5 MILLIGRAM(S): 5 TABLET, FILM COATED ORAL at 12:54

## 2020-01-01 RX ADMIN — MIDODRINE HYDROCHLORIDE 5 MILLIGRAM(S): 2.5 TABLET ORAL at 05:16

## 2020-01-01 RX ADMIN — ALBUTEROL 1 PUFF(S): 90 AEROSOL, METERED ORAL at 05:57

## 2020-01-01 RX ADMIN — FINASTERIDE 5 MILLIGRAM(S): 5 TABLET, FILM COATED ORAL at 11:35

## 2020-01-01 RX ADMIN — Medication 300 MILLIGRAM(S): at 11:56

## 2020-01-01 RX ADMIN — Medication 10 MILLIGRAM(S): at 12:51

## 2020-01-01 RX ADMIN — INSULIN HUMAN 10 UNIT(S): 100 INJECTION, SOLUTION SUBCUTANEOUS at 10:09

## 2020-01-01 RX ADMIN — SODIUM CHLORIDE 100 MILLILITER(S): 9 INJECTION, SOLUTION INTRAVENOUS at 09:12

## 2020-01-01 RX ADMIN — ATORVASTATIN CALCIUM 20 MILLIGRAM(S): 80 TABLET, FILM COATED ORAL at 21:43

## 2020-01-01 RX ADMIN — Medication 10 MILLIGRAM(S): at 11:16

## 2020-01-01 RX ADMIN — Medication 1 APPLICATION(S): at 16:35

## 2020-01-01 RX ADMIN — MIDODRINE HYDROCHLORIDE 10 MILLIGRAM(S): 2.5 TABLET ORAL at 22:07

## 2020-01-01 RX ADMIN — Medication 2 TABLET(S): at 17:36

## 2020-01-01 RX ADMIN — HEPARIN SODIUM 5000 UNIT(S): 5000 INJECTION INTRAVENOUS; SUBCUTANEOUS at 06:13

## 2020-01-01 RX ADMIN — HEPARIN SODIUM 5000 UNIT(S): 5000 INJECTION INTRAVENOUS; SUBCUTANEOUS at 10:03

## 2020-01-01 RX ADMIN — SODIUM CHLORIDE 500 MILLILITER(S): 9 INJECTION, SOLUTION INTRAVENOUS at 20:54

## 2020-01-01 RX ADMIN — Medication 2 TABLET(S): at 18:35

## 2020-01-01 RX ADMIN — NYSTATIN CREAM 1 APPLICATION(S): 100000 CREAM TOPICAL at 21:26

## 2020-01-01 RX ADMIN — MIDODRINE HYDROCHLORIDE 5 MILLIGRAM(S): 2.5 TABLET ORAL at 17:21

## 2020-01-01 RX ADMIN — MEROPENEM 100 MILLIGRAM(S): 1 INJECTION INTRAVENOUS at 17:24

## 2020-01-01 RX ADMIN — NYSTATIN CREAM 1 APPLICATION(S): 100000 CREAM TOPICAL at 06:15

## 2020-01-01 RX ADMIN — Medication 3: at 08:43

## 2020-01-01 RX ADMIN — Medication 100 MILLIGRAM(S): at 00:57

## 2020-01-01 RX ADMIN — MIDODRINE HYDROCHLORIDE 5 MILLIGRAM(S): 2.5 TABLET ORAL at 05:33

## 2020-01-01 RX ADMIN — MEROPENEM 100 MILLIGRAM(S): 1 INJECTION INTRAVENOUS at 05:29

## 2020-01-01 RX ADMIN — Medication 1 APPLICATION(S): at 14:00

## 2020-01-01 RX ADMIN — Medication 500 MILLIGRAM(S): at 13:45

## 2020-01-01 RX ADMIN — Medication 1 TABLET(S): at 16:40

## 2020-01-01 RX ADMIN — Medication 500 MILLIGRAM(S): at 05:16

## 2020-01-01 RX ADMIN — Medication 250 MILLIGRAM(S): at 18:13

## 2020-01-01 RX ADMIN — Medication 1 APPLICATION(S): at 17:19

## 2020-01-01 RX ADMIN — NYSTATIN CREAM 1 APPLICATION(S): 100000 CREAM TOPICAL at 05:17

## 2020-01-01 RX ADMIN — Medication 1 APPLICATION(S): at 17:42

## 2020-01-01 RX ADMIN — Medication 1 TABLET(S): at 11:57

## 2020-01-01 RX ADMIN — Medication 1 APPLICATION(S): at 05:03

## 2020-01-01 RX ADMIN — MIDODRINE HYDROCHLORIDE 5 MILLIGRAM(S): 2.5 TABLET ORAL at 05:18

## 2020-01-01 RX ADMIN — Medication 10 MILLIGRAM(S): at 11:53

## 2020-01-01 RX ADMIN — Medication 2 TABLET(S): at 05:18

## 2020-01-01 RX ADMIN — Medication 325 MILLIGRAM(S): at 11:01

## 2020-01-01 RX ADMIN — SODIUM CHLORIDE 50 MILLILITER(S): 9 INJECTION INTRAMUSCULAR; INTRAVENOUS; SUBCUTANEOUS at 05:25

## 2020-01-01 RX ADMIN — NYSTATIN CREAM 1 APPLICATION(S): 100000 CREAM TOPICAL at 05:20

## 2020-01-01 RX ADMIN — Medication 325 MILLIGRAM(S): at 21:10

## 2020-01-01 RX ADMIN — FINASTERIDE 5 MILLIGRAM(S): 5 TABLET, FILM COATED ORAL at 11:45

## 2020-01-01 RX ADMIN — Medication 325 MILLIGRAM(S): at 22:23

## 2020-01-01 RX ADMIN — FINASTERIDE 5 MILLIGRAM(S): 5 TABLET, FILM COATED ORAL at 11:16

## 2020-01-01 RX ADMIN — Medication 1 APPLICATION(S): at 05:28

## 2020-01-01 RX ADMIN — ONDANSETRON 4 MILLIGRAM(S): 8 TABLET, FILM COATED ORAL at 05:11

## 2020-01-01 RX ADMIN — Medication 1 TABLET(S): at 11:52

## 2020-01-01 RX ADMIN — Medication 2: at 11:51

## 2020-01-01 RX ADMIN — FINASTERIDE 5 MILLIGRAM(S): 5 TABLET, FILM COATED ORAL at 10:26

## 2020-01-01 RX ADMIN — TAMSULOSIN HYDROCHLORIDE 0.4 MILLIGRAM(S): 0.4 CAPSULE ORAL at 22:32

## 2020-01-01 RX ADMIN — NYSTATIN CREAM 1 APPLICATION(S): 100000 CREAM TOPICAL at 22:06

## 2020-01-01 RX ADMIN — Medication 2: at 08:11

## 2020-01-01 RX ADMIN — Medication 12.5 MICROGRAM(S): at 22:28

## 2020-01-01 RX ADMIN — Medication 1 APPLICATION(S): at 05:31

## 2020-01-01 RX ADMIN — Medication 6: at 17:10

## 2020-01-01 RX ADMIN — MEROPENEM 100 MILLIGRAM(S): 1 INJECTION INTRAVENOUS at 05:38

## 2020-01-01 RX ADMIN — MIDODRINE HYDROCHLORIDE 10 MILLIGRAM(S): 2.5 TABLET ORAL at 17:07

## 2020-01-01 RX ADMIN — Medication 1 APPLICATION(S): at 05:19

## 2020-01-01 RX ADMIN — MIDODRINE HYDROCHLORIDE 2.5 MILLIGRAM(S): 2.5 TABLET ORAL at 17:41

## 2020-01-01 RX ADMIN — PIPERACILLIN AND TAZOBACTAM 25 GRAM(S): 4; .5 INJECTION, POWDER, LYOPHILIZED, FOR SOLUTION INTRAVENOUS at 16:40

## 2020-01-01 RX ADMIN — Medication 1 TABLET(S): at 11:51

## 2020-01-01 RX ADMIN — Medication 300 MILLIGRAM(S): at 09:20

## 2020-01-01 RX ADMIN — Medication 325 MILLIGRAM(S): at 11:58

## 2020-01-01 RX ADMIN — Medication 325 MILLIGRAM(S): at 05:47

## 2020-01-01 RX ADMIN — MIDODRINE HYDROCHLORIDE 10 MILLIGRAM(S): 2.5 TABLET ORAL at 06:18

## 2020-01-01 RX ADMIN — NYSTATIN CREAM 1 APPLICATION(S): 100000 CREAM TOPICAL at 21:46

## 2020-01-01 RX ADMIN — Medication 250 MILLIGRAM(S): at 05:18

## 2020-01-01 RX ADMIN — ATORVASTATIN CALCIUM 20 MILLIGRAM(S): 80 TABLET, FILM COATED ORAL at 22:35

## 2020-01-01 RX ADMIN — Medication 2: at 13:05

## 2020-01-01 RX ADMIN — Medication 325 MILLIGRAM(S): at 05:51

## 2020-01-01 RX ADMIN — Medication 6: at 17:15

## 2020-01-01 RX ADMIN — PANTOPRAZOLE SODIUM 40 MILLIGRAM(S): 20 TABLET, DELAYED RELEASE ORAL at 05:34

## 2020-01-01 RX ADMIN — Medication 1 APPLICATION(S): at 17:06

## 2020-01-01 RX ADMIN — ATORVASTATIN CALCIUM 20 MILLIGRAM(S): 80 TABLET, FILM COATED ORAL at 22:25

## 2020-01-01 RX ADMIN — Medication 5 MILLIGRAM(S): at 21:42

## 2020-01-01 RX ADMIN — NYSTATIN CREAM 1 APPLICATION(S): 100000 CREAM TOPICAL at 13:10

## 2020-01-01 RX ADMIN — NYSTATIN CREAM 1 APPLICATION(S): 100000 CREAM TOPICAL at 17:15

## 2020-01-01 RX ADMIN — Medication 250 MILLIGRAM(S): at 11:04

## 2020-01-01 RX ADMIN — Medication 1 TABLET(S): at 11:28

## 2020-01-01 RX ADMIN — MIDODRINE HYDROCHLORIDE 10 MILLIGRAM(S): 2.5 TABLET ORAL at 16:27

## 2020-01-01 RX ADMIN — HEPARIN SODIUM 5000 UNIT(S): 5000 INJECTION INTRAVENOUS; SUBCUTANEOUS at 05:30

## 2020-01-01 RX ADMIN — MIDODRINE HYDROCHLORIDE 5 MILLIGRAM(S): 2.5 TABLET ORAL at 15:38

## 2020-01-01 RX ADMIN — SENNA PLUS 2 TABLET(S): 8.6 TABLET ORAL at 21:43

## 2020-01-01 RX ADMIN — Medication 2: at 08:39

## 2020-01-01 RX ADMIN — Medication 500 MILLIGRAM(S): at 05:36

## 2020-01-01 RX ADMIN — FINASTERIDE 5 MILLIGRAM(S): 5 TABLET, FILM COATED ORAL at 11:05

## 2020-01-01 RX ADMIN — Medication 1 APPLICATION(S): at 17:43

## 2020-01-01 RX ADMIN — Medication 1 MILLIGRAM(S): at 11:46

## 2020-01-01 RX ADMIN — CHLORHEXIDINE GLUCONATE 1 APPLICATION(S): 213 SOLUTION TOPICAL at 05:19

## 2020-01-01 RX ADMIN — MIDODRINE HYDROCHLORIDE 10 MILLIGRAM(S): 2.5 TABLET ORAL at 08:26

## 2020-01-01 RX ADMIN — MEROPENEM 100 MILLIGRAM(S): 1 INJECTION INTRAVENOUS at 16:53

## 2020-01-01 RX ADMIN — PANTOPRAZOLE SODIUM 40 MILLIGRAM(S): 20 TABLET, DELAYED RELEASE ORAL at 12:32

## 2020-01-01 RX ADMIN — LACTULOSE 30 GRAM(S): 10 SOLUTION ORAL at 17:10

## 2020-01-01 RX ADMIN — SODIUM CHLORIDE 50 MILLILITER(S): 9 INJECTION, SOLUTION INTRAVENOUS at 07:57

## 2020-01-01 RX ADMIN — MIDODRINE HYDROCHLORIDE 10 MILLIGRAM(S): 2.5 TABLET ORAL at 12:40

## 2020-01-01 RX ADMIN — Medication 325 MILLIGRAM(S): at 22:28

## 2020-01-01 RX ADMIN — Medication 2 TABLET(S): at 06:40

## 2020-01-01 RX ADMIN — MIDODRINE HYDROCHLORIDE 10 MILLIGRAM(S): 2.5 TABLET ORAL at 21:14

## 2020-01-01 RX ADMIN — Medication 4: at 12:22

## 2020-01-01 RX ADMIN — SODIUM CHLORIDE 1000 MILLILITER(S): 9 INJECTION INTRAMUSCULAR; INTRAVENOUS; SUBCUTANEOUS at 14:47

## 2020-01-01 RX ADMIN — Medication 1 MILLIGRAM(S): at 11:04

## 2020-01-01 RX ADMIN — Medication 100 MILLIGRAM(S): at 11:45

## 2020-01-01 RX ADMIN — MIDODRINE HYDROCHLORIDE 2.5 MILLIGRAM(S): 2.5 TABLET ORAL at 12:56

## 2020-01-01 RX ADMIN — Medication 250 MILLIGRAM(S): at 05:59

## 2020-01-01 RX ADMIN — NYSTATIN CREAM 1 APPLICATION(S): 100000 CREAM TOPICAL at 11:04

## 2020-01-01 RX ADMIN — HEPARIN SODIUM 5000 UNIT(S): 5000 INJECTION INTRAVENOUS; SUBCUTANEOUS at 15:02

## 2020-01-01 RX ADMIN — MIDODRINE HYDROCHLORIDE 10 MILLIGRAM(S): 2.5 TABLET ORAL at 05:28

## 2020-01-01 RX ADMIN — INFLUENZA VIRUS VACCINE 0.5 MILLILITER(S): 15; 15; 15; 15 SUSPENSION INTRAMUSCULAR at 09:18

## 2020-01-01 RX ADMIN — HEPARIN SODIUM 5000 UNIT(S): 5000 INJECTION INTRAVENOUS; SUBCUTANEOUS at 05:54

## 2020-01-01 RX ADMIN — SODIUM POLYSTYRENE SULFONATE 30 GRAM(S): 4.1 POWDER, FOR SUSPENSION ORAL at 21:35

## 2020-01-01 RX ADMIN — Medication 250 MILLIGRAM(S): at 05:00

## 2020-01-01 RX ADMIN — MUPIROCIN 1 APPLICATION(S): 20 OINTMENT TOPICAL at 12:40

## 2020-01-01 RX ADMIN — Medication 10 MILLIGRAM(S): at 11:41

## 2020-01-01 RX ADMIN — FINASTERIDE 5 MILLIGRAM(S): 5 TABLET, FILM COATED ORAL at 11:56

## 2020-01-01 RX ADMIN — Medication 1 TABLET(S): at 11:01

## 2020-01-01 RX ADMIN — Medication 4: at 12:39

## 2020-01-01 RX ADMIN — Medication 1 TABLET(S): at 11:38

## 2020-01-01 RX ADMIN — Medication 500 MILLIGRAM(S): at 06:11

## 2020-01-01 RX ADMIN — FINASTERIDE 5 MILLIGRAM(S): 5 TABLET, FILM COATED ORAL at 09:20

## 2020-01-01 RX ADMIN — MIDODRINE HYDROCHLORIDE 2.5 MILLIGRAM(S): 2.5 TABLET ORAL at 05:53

## 2020-01-01 RX ADMIN — MUPIROCIN 1 APPLICATION(S): 20 OINTMENT TOPICAL at 06:13

## 2020-01-01 RX ADMIN — SENNA PLUS 2 TABLET(S): 8.6 TABLET ORAL at 22:40

## 2020-01-01 RX ADMIN — Medication 250 MILLIGRAM(S): at 17:15

## 2020-01-01 RX ADMIN — Medication 1 TABLET(S): at 13:44

## 2020-01-01 RX ADMIN — Medication 1 TABLET(S): at 10:26

## 2020-01-01 RX ADMIN — Medication 250 MILLIGRAM(S): at 14:47

## 2020-01-01 RX ADMIN — Medication 4: at 11:58

## 2020-01-01 RX ADMIN — MEROPENEM 100 MILLIGRAM(S): 1 INJECTION INTRAVENOUS at 06:18

## 2020-01-01 RX ADMIN — MIDODRINE HYDROCHLORIDE 2.5 MILLIGRAM(S): 2.5 TABLET ORAL at 16:44

## 2020-01-01 RX ADMIN — Medication 5 MILLIGRAM(S): at 17:49

## 2020-01-01 RX ADMIN — HEPARIN SODIUM 5000 UNIT(S): 5000 INJECTION INTRAVENOUS; SUBCUTANEOUS at 17:16

## 2020-01-01 RX ADMIN — Medication 2: at 08:13

## 2020-01-01 RX ADMIN — ATORVASTATIN CALCIUM 40 MILLIGRAM(S): 80 TABLET, FILM COATED ORAL at 22:25

## 2020-01-01 RX ADMIN — Medication 1000 MILLIGRAM(S): at 20:30

## 2020-01-01 RX ADMIN — MIDODRINE HYDROCHLORIDE 5 MILLIGRAM(S): 2.5 TABLET ORAL at 17:03

## 2020-01-01 RX ADMIN — INSULIN GLARGINE 5 UNIT(S): 100 INJECTION, SOLUTION SUBCUTANEOUS at 22:30

## 2020-01-01 RX ADMIN — Medication 325 MILLIGRAM(S): at 05:31

## 2020-01-01 RX ADMIN — Medication 4: at 09:10

## 2020-01-01 RX ADMIN — SODIUM CHLORIDE 60 MILLILITER(S): 9 INJECTION, SOLUTION INTRAVENOUS at 17:22

## 2020-01-01 RX ADMIN — MIDODRINE HYDROCHLORIDE 5 MILLIGRAM(S): 2.5 TABLET ORAL at 11:38

## 2020-01-01 RX ADMIN — TAMSULOSIN HYDROCHLORIDE 0.4 MILLIGRAM(S): 0.4 CAPSULE ORAL at 22:02

## 2020-01-01 RX ADMIN — SODIUM CHLORIDE 100 MILLILITER(S): 9 INJECTION, SOLUTION INTRAVENOUS at 22:40

## 2020-01-01 RX ADMIN — NYSTATIN CREAM 1 APPLICATION(S): 100000 CREAM TOPICAL at 14:01

## 2020-01-01 RX ADMIN — MEROPENEM 100 MILLIGRAM(S): 1 INJECTION INTRAVENOUS at 18:29

## 2020-01-01 RX ADMIN — TAMSULOSIN HYDROCHLORIDE 0.4 MILLIGRAM(S): 0.4 CAPSULE ORAL at 21:09

## 2020-01-01 RX ADMIN — Medication 2 TABLET(S): at 05:04

## 2020-01-01 RX ADMIN — INSULIN GLARGINE 15 UNIT(S): 100 INJECTION, SOLUTION SUBCUTANEOUS at 21:46

## 2020-01-01 RX ADMIN — PANTOPRAZOLE SODIUM 40 MILLIGRAM(S): 20 TABLET, DELAYED RELEASE ORAL at 12:57

## 2020-01-01 RX ADMIN — Medication 8: at 12:42

## 2020-01-01 RX ADMIN — Medication 250 MILLIGRAM(S): at 05:51

## 2020-01-01 RX ADMIN — Medication 1 MILLIGRAM(S): at 11:20

## 2020-01-01 RX ADMIN — Medication 1 TABLET(S): at 06:20

## 2020-01-01 RX ADMIN — TAMSULOSIN HYDROCHLORIDE 0.4 MILLIGRAM(S): 0.4 CAPSULE ORAL at 22:24

## 2020-01-01 RX ADMIN — HEPARIN SODIUM 5000 UNIT(S): 5000 INJECTION INTRAVENOUS; SUBCUTANEOUS at 21:42

## 2020-01-01 RX ADMIN — Medication 1 TABLET(S): at 18:11

## 2020-01-01 RX ADMIN — NYSTATIN CREAM 1 APPLICATION(S): 100000 CREAM TOPICAL at 12:36

## 2020-01-01 RX ADMIN — HEPARIN SODIUM 5000 UNIT(S): 5000 INJECTION INTRAVENOUS; SUBCUTANEOUS at 06:40

## 2020-01-01 RX ADMIN — ALBUTEROL 2 PUFF(S): 90 AEROSOL, METERED ORAL at 05:30

## 2020-01-01 RX ADMIN — FINASTERIDE 5 MILLIGRAM(S): 5 TABLET, FILM COATED ORAL at 17:17

## 2020-01-01 RX ADMIN — PANTOPRAZOLE SODIUM 40 MILLIGRAM(S): 20 TABLET, DELAYED RELEASE ORAL at 10:06

## 2020-01-01 RX ADMIN — SODIUM CHLORIDE 100 MILLILITER(S): 9 INJECTION, SOLUTION INTRAVENOUS at 12:29

## 2020-01-01 RX ADMIN — Medication 300 MILLIGRAM(S): at 11:35

## 2020-01-01 RX ADMIN — Medication 10 MILLIGRAM(S): at 10:26

## 2020-01-01 RX ADMIN — MIDODRINE HYDROCHLORIDE 10 MILLIGRAM(S): 2.5 TABLET ORAL at 05:44

## 2020-01-01 RX ADMIN — Medication 4: at 11:46

## 2020-01-01 RX ADMIN — INSULIN GLARGINE 5 UNIT(S): 100 INJECTION, SOLUTION SUBCUTANEOUS at 22:18

## 2020-01-01 RX ADMIN — Medication 1 TABLET(S): at 07:59

## 2020-01-01 RX ADMIN — SODIUM POLYSTYRENE SULFONATE 15 GRAM(S): 4.1 POWDER, FOR SUSPENSION ORAL at 21:30

## 2020-01-01 RX ADMIN — HEPARIN SODIUM 5000 UNIT(S): 5000 INJECTION INTRAVENOUS; SUBCUTANEOUS at 05:33

## 2020-01-01 RX ADMIN — MIDODRINE HYDROCHLORIDE 10 MILLIGRAM(S): 2.5 TABLET ORAL at 17:31

## 2020-01-01 RX ADMIN — FINASTERIDE 5 MILLIGRAM(S): 5 TABLET, FILM COATED ORAL at 11:37

## 2020-01-01 RX ADMIN — Medication 325 MILLIGRAM(S): at 11:16

## 2020-01-01 RX ADMIN — Medication 1 TABLET(S): at 12:15

## 2020-01-01 RX ADMIN — TAMSULOSIN HYDROCHLORIDE 0.4 MILLIGRAM(S): 0.4 CAPSULE ORAL at 22:06

## 2020-01-01 RX ADMIN — Medication 40 MILLIEQUIVALENT(S): at 16:17

## 2020-01-01 RX ADMIN — Medication 1 TABLET(S): at 06:13

## 2020-01-01 RX ADMIN — Medication 325 MILLIGRAM(S): at 21:03

## 2020-01-01 RX ADMIN — MEROPENEM 100 MILLIGRAM(S): 1 INJECTION INTRAVENOUS at 05:28

## 2020-01-01 RX ADMIN — Medication 300 MILLIGRAM(S): at 11:28

## 2020-01-01 RX ADMIN — Medication 1 MILLIGRAM(S): at 11:38

## 2020-01-01 RX ADMIN — MEROPENEM 100 MILLIGRAM(S): 1 INJECTION INTRAVENOUS at 17:15

## 2020-01-01 RX ADMIN — FINASTERIDE 5 MILLIGRAM(S): 5 TABLET, FILM COATED ORAL at 11:52

## 2020-01-01 RX ADMIN — HEPARIN SODIUM 5000 UNIT(S): 5000 INJECTION INTRAVENOUS; SUBCUTANEOUS at 17:26

## 2020-01-01 RX ADMIN — Medication 650 MILLIGRAM(S): at 22:55

## 2020-01-01 RX ADMIN — Medication 1 TABLET(S): at 11:41

## 2020-01-01 RX ADMIN — TAMSULOSIN HYDROCHLORIDE 0.4 MILLIGRAM(S): 0.4 CAPSULE ORAL at 21:43

## 2020-01-01 RX ADMIN — Medication 4: at 17:37

## 2020-01-01 RX ADMIN — Medication 4: at 16:17

## 2020-01-01 RX ADMIN — MEROPENEM 100 MILLIGRAM(S): 1 INJECTION INTRAVENOUS at 05:30

## 2020-01-01 RX ADMIN — Medication 1 APPLICATION(S): at 05:23

## 2020-01-01 RX ADMIN — Medication 1000 MILLIGRAM(S): at 16:47

## 2020-01-01 RX ADMIN — ZINC SULFATE TAB 220 MG (50 MG ZINC EQUIVALENT) 220 MILLIGRAM(S): 220 (50 ZN) TAB at 18:11

## 2020-01-01 RX ADMIN — ALBUTEROL 2 PUFF(S): 90 AEROSOL, METERED ORAL at 21:18

## 2020-01-01 RX ADMIN — TAMSULOSIN HYDROCHLORIDE 0.4 MILLIGRAM(S): 0.4 CAPSULE ORAL at 22:42

## 2020-01-01 RX ADMIN — Medication 1 TABLET(S): at 11:54

## 2020-01-01 RX ADMIN — HEPARIN SODIUM 5000 UNIT(S): 5000 INJECTION INTRAVENOUS; SUBCUTANEOUS at 01:28

## 2020-01-01 RX ADMIN — Medication 1 TABLET(S): at 18:00

## 2020-01-01 RX ADMIN — HEPARIN SODIUM 5000 UNIT(S): 5000 INJECTION INTRAVENOUS; SUBCUTANEOUS at 17:11

## 2020-01-01 RX ADMIN — SENNA PLUS 2 TABLET(S): 8.6 TABLET ORAL at 21:47

## 2020-01-01 RX ADMIN — Medication 25 MICROGRAM(S): at 05:00

## 2020-01-01 RX ADMIN — FINASTERIDE 5 MILLIGRAM(S): 5 TABLET, FILM COATED ORAL at 08:23

## 2020-01-01 RX ADMIN — NYSTATIN CREAM 1 APPLICATION(S): 100000 CREAM TOPICAL at 13:36

## 2020-01-01 RX ADMIN — MIDODRINE HYDROCHLORIDE 2.5 MILLIGRAM(S): 2.5 TABLET ORAL at 13:03

## 2020-01-01 RX ADMIN — Medication 250 MILLIGRAM(S): at 05:04

## 2020-01-01 RX ADMIN — PANTOPRAZOLE SODIUM 40 MILLIGRAM(S): 20 TABLET, DELAYED RELEASE ORAL at 06:05

## 2020-01-01 RX ADMIN — TAMSULOSIN HYDROCHLORIDE 0.4 MILLIGRAM(S): 0.4 CAPSULE ORAL at 22:35

## 2020-01-01 RX ADMIN — MEROPENEM 100 MILLIGRAM(S): 1 INJECTION INTRAVENOUS at 18:00

## 2020-01-01 RX ADMIN — Medication 10 MILLIGRAM(S): at 11:21

## 2020-01-01 RX ADMIN — Medication 2: at 08:44

## 2020-01-01 RX ADMIN — Medication 1 TABLET(S): at 12:00

## 2020-01-01 RX ADMIN — Medication 500 MILLIGRAM(S): at 17:26

## 2020-01-01 RX ADMIN — Medication 500 MILLIGRAM(S): at 05:33

## 2020-01-01 RX ADMIN — Medication 1 MILLIGRAM(S): at 12:59

## 2020-01-01 RX ADMIN — NYSTATIN CREAM 1 APPLICATION(S): 100000 CREAM TOPICAL at 21:43

## 2020-01-01 RX ADMIN — TAMSULOSIN HYDROCHLORIDE 0.4 MILLIGRAM(S): 0.4 CAPSULE ORAL at 21:14

## 2020-01-01 RX ADMIN — Medication 10: at 13:23

## 2020-01-01 RX ADMIN — MIDODRINE HYDROCHLORIDE 10 MILLIGRAM(S): 2.5 TABLET ORAL at 09:22

## 2020-01-01 RX ADMIN — Medication 10 MILLIGRAM(S): at 11:56

## 2020-01-01 RX ADMIN — Medication 1 TABLET(S): at 11:20

## 2020-01-01 RX ADMIN — Medication 300 MILLIGRAM(S): at 12:51

## 2020-01-01 RX ADMIN — Medication 1 APPLICATION(S): at 17:23

## 2020-01-01 RX ADMIN — Medication 250 MILLIGRAM(S): at 17:09

## 2020-01-01 RX ADMIN — Medication 1 TABLET(S): at 06:26

## 2020-01-01 RX ADMIN — NYSTATIN CREAM 1 APPLICATION(S): 100000 CREAM TOPICAL at 05:56

## 2020-01-01 RX ADMIN — Medication 1 TABLET(S): at 17:31

## 2020-01-01 RX ADMIN — MEROPENEM 100 MILLIGRAM(S): 1 INJECTION INTRAVENOUS at 06:14

## 2020-01-01 RX ADMIN — Medication 500 MILLIGRAM(S): at 06:05

## 2020-01-01 RX ADMIN — Medication 1: at 23:21

## 2020-01-01 RX ADMIN — PIPERACILLIN AND TAZOBACTAM 25 GRAM(S): 4; .5 INJECTION, POWDER, LYOPHILIZED, FOR SOLUTION INTRAVENOUS at 01:12

## 2020-01-01 RX ADMIN — Medication 1 TABLET(S): at 08:58

## 2020-01-01 RX ADMIN — TAMSULOSIN HYDROCHLORIDE 0.4 MILLIGRAM(S): 0.4 CAPSULE ORAL at 21:10

## 2020-01-01 RX ADMIN — Medication 2: at 17:17

## 2020-01-01 RX ADMIN — Medication 100 MILLIGRAM(S): at 11:41

## 2020-01-01 RX ADMIN — TAMSULOSIN HYDROCHLORIDE 0.4 MILLIGRAM(S): 0.4 CAPSULE ORAL at 21:46

## 2020-01-01 RX ADMIN — MEROPENEM 100 MILLIGRAM(S): 1 INJECTION INTRAVENOUS at 17:31

## 2020-01-01 RX ADMIN — HEPARIN SODIUM 5000 UNIT(S): 5000 INJECTION INTRAVENOUS; SUBCUTANEOUS at 21:08

## 2020-01-01 RX ADMIN — Medication 7 UNIT(S): at 17:37

## 2020-01-01 RX ADMIN — FINASTERIDE 5 MILLIGRAM(S): 5 TABLET, FILM COATED ORAL at 18:11

## 2020-01-01 RX ADMIN — Medication 325 MILLIGRAM(S): at 06:05

## 2020-01-01 RX ADMIN — Medication 325 MILLIGRAM(S): at 12:27

## 2020-01-01 RX ADMIN — NYSTATIN CREAM 1 APPLICATION(S): 100000 CREAM TOPICAL at 23:29

## 2020-01-01 RX ADMIN — Medication 250 MILLIGRAM(S): at 05:22

## 2020-01-01 RX ADMIN — MIDODRINE HYDROCHLORIDE 10 MILLIGRAM(S): 2.5 TABLET ORAL at 12:20

## 2020-01-01 RX ADMIN — Medication 8: at 16:12

## 2020-01-01 RX ADMIN — MIDODRINE HYDROCHLORIDE 10 MILLIGRAM(S): 2.5 TABLET ORAL at 00:41

## 2020-01-01 RX ADMIN — HEPARIN SODIUM 5000 UNIT(S): 5000 INJECTION INTRAVENOUS; SUBCUTANEOUS at 05:51

## 2020-01-01 RX ADMIN — Medication 81 MILLIGRAM(S): at 11:36

## 2020-01-01 RX ADMIN — ATORVASTATIN CALCIUM 20 MILLIGRAM(S): 80 TABLET, FILM COATED ORAL at 22:14

## 2020-01-01 RX ADMIN — NYSTATIN CREAM 1 APPLICATION(S): 100000 CREAM TOPICAL at 21:06

## 2020-01-01 RX ADMIN — Medication 325 MILLIGRAM(S): at 12:51

## 2020-01-01 RX ADMIN — MIDODRINE HYDROCHLORIDE 10 MILLIGRAM(S): 2.5 TABLET ORAL at 08:23

## 2020-01-01 RX ADMIN — ERYTHROPOIETIN 10000 UNIT(S): 10000 INJECTION, SOLUTION INTRAVENOUS; SUBCUTANEOUS at 18:59

## 2020-01-01 RX ADMIN — HEPARIN SODIUM 5000 UNIT(S): 5000 INJECTION INTRAVENOUS; SUBCUTANEOUS at 05:12

## 2020-01-01 RX ADMIN — Medication 1 APPLICATION(S): at 06:19

## 2020-01-01 RX ADMIN — Medication 7 UNIT(S): at 11:48

## 2020-01-01 RX ADMIN — Medication 1 TABLET(S): at 17:43

## 2020-01-01 RX ADMIN — NYSTATIN CREAM 1 APPLICATION(S): 100000 CREAM TOPICAL at 22:33

## 2020-01-01 RX ADMIN — MIDODRINE HYDROCHLORIDE 10 MILLIGRAM(S): 2.5 TABLET ORAL at 08:17

## 2020-01-01 RX ADMIN — Medication 325 MILLIGRAM(S): at 12:13

## 2020-01-01 RX ADMIN — MEROPENEM 100 MILLIGRAM(S): 1 INJECTION INTRAVENOUS at 05:25

## 2020-01-01 RX ADMIN — CEFTRIAXONE 100 MILLIGRAM(S): 500 INJECTION, POWDER, FOR SOLUTION INTRAMUSCULAR; INTRAVENOUS at 16:57

## 2020-01-01 RX ADMIN — Medication 325 MILLIGRAM(S): at 12:15

## 2020-01-01 RX ADMIN — HEPARIN SODIUM 5000 UNIT(S): 5000 INJECTION INTRAVENOUS; SUBCUTANEOUS at 21:54

## 2020-01-01 RX ADMIN — MIDODRINE HYDROCHLORIDE 5 MILLIGRAM(S): 2.5 TABLET ORAL at 05:59

## 2020-01-01 RX ADMIN — NYSTATIN CREAM 1 APPLICATION(S): 100000 CREAM TOPICAL at 05:30

## 2020-01-01 RX ADMIN — CHLORHEXIDINE GLUCONATE 1 APPLICATION(S): 213 SOLUTION TOPICAL at 06:11

## 2020-01-01 RX ADMIN — Medication 25 MICROGRAM(S): at 05:27

## 2020-01-01 RX ADMIN — Medication 1 TABLET(S): at 12:04

## 2020-01-01 RX ADMIN — Medication 6: at 12:35

## 2020-01-01 RX ADMIN — FINASTERIDE 5 MILLIGRAM(S): 5 TABLET, FILM COATED ORAL at 12:12

## 2020-01-01 RX ADMIN — Medication 1 TABLET(S): at 12:54

## 2020-01-01 RX ADMIN — Medication 1 APPLICATION(S): at 17:18

## 2020-01-01 RX ADMIN — CHLORHEXIDINE GLUCONATE 1 APPLICATION(S): 213 SOLUTION TOPICAL at 09:21

## 2020-01-01 RX ADMIN — TAMSULOSIN HYDROCHLORIDE 0.4 MILLIGRAM(S): 0.4 CAPSULE ORAL at 21:55

## 2020-01-01 RX ADMIN — Medication 10 MILLIGRAM(S): at 09:20

## 2020-01-01 RX ADMIN — MIDODRINE HYDROCHLORIDE 10 MILLIGRAM(S): 2.5 TABLET ORAL at 21:42

## 2020-01-01 RX ADMIN — NYSTATIN CREAM 1 APPLICATION(S): 100000 CREAM TOPICAL at 22:24

## 2020-01-01 RX ADMIN — Medication 2: at 08:46

## 2020-01-01 RX ADMIN — FINASTERIDE 5 MILLIGRAM(S): 5 TABLET, FILM COATED ORAL at 11:20

## 2020-01-01 RX ADMIN — Medication 81 MILLIGRAM(S): at 11:19

## 2020-01-01 RX ADMIN — NYSTATIN CREAM 1 APPLICATION(S): 100000 CREAM TOPICAL at 22:14

## 2020-01-01 RX ADMIN — MEROPENEM 100 MILLIGRAM(S): 1 INJECTION INTRAVENOUS at 17:26

## 2020-01-01 RX ADMIN — Medication 1 APPLICATION(S): at 05:44

## 2020-01-01 RX ADMIN — TAMSULOSIN HYDROCHLORIDE 0.4 MILLIGRAM(S): 0.4 CAPSULE ORAL at 22:25

## 2020-01-01 RX ADMIN — HEPARIN SODIUM 5000 UNIT(S): 5000 INJECTION INTRAVENOUS; SUBCUTANEOUS at 18:51

## 2020-01-01 RX ADMIN — MIDODRINE HYDROCHLORIDE 5 MILLIGRAM(S): 2.5 TABLET ORAL at 05:04

## 2020-01-01 RX ADMIN — INSULIN GLARGINE 10 UNIT(S): 100 INJECTION, SOLUTION SUBCUTANEOUS at 21:58

## 2020-01-01 RX ADMIN — Medication 300 MILLIGRAM(S): at 18:11

## 2020-01-01 RX ADMIN — Medication 100 MILLIGRAM(S): at 11:20

## 2020-01-01 RX ADMIN — Medication 1 TABLET(S): at 09:47

## 2020-01-01 RX ADMIN — FINASTERIDE 5 MILLIGRAM(S): 5 TABLET, FILM COATED ORAL at 12:27

## 2020-01-01 RX ADMIN — TAMSULOSIN HYDROCHLORIDE 0.4 MILLIGRAM(S): 0.4 CAPSULE ORAL at 22:47

## 2020-01-01 RX ADMIN — NYSTATIN CREAM 1 APPLICATION(S): 100000 CREAM TOPICAL at 15:39

## 2020-01-01 RX ADMIN — Medication 300 MILLIGRAM(S): at 10:26

## 2020-01-01 RX ADMIN — MAGNESIUM OXIDE 400 MG ORAL TABLET 400 MILLIGRAM(S): 241.3 TABLET ORAL at 13:47

## 2020-01-01 RX ADMIN — HEPARIN SODIUM 5000 UNIT(S): 5000 INJECTION INTRAVENOUS; SUBCUTANEOUS at 05:44

## 2020-01-01 RX ADMIN — Medication 325 MILLIGRAM(S): at 22:48

## 2020-01-01 RX ADMIN — Medication 3: at 12:32

## 2020-01-01 RX ADMIN — TAMSULOSIN HYDROCHLORIDE 0.4 MILLIGRAM(S): 0.4 CAPSULE ORAL at 21:42

## 2020-01-01 RX ADMIN — MIDODRINE HYDROCHLORIDE 5 MILLIGRAM(S): 2.5 TABLET ORAL at 11:37

## 2020-01-01 RX ADMIN — MEROPENEM 100 MILLIGRAM(S): 1 INJECTION INTRAVENOUS at 17:03

## 2020-01-01 RX ADMIN — TAMSULOSIN HYDROCHLORIDE 0.4 MILLIGRAM(S): 0.4 CAPSULE ORAL at 22:11

## 2020-01-01 RX ADMIN — HEPARIN SODIUM 5000 UNIT(S): 5000 INJECTION INTRAVENOUS; SUBCUTANEOUS at 18:29

## 2020-01-01 RX ADMIN — LACTULOSE 30 GRAM(S): 10 SOLUTION ORAL at 05:17

## 2020-01-01 RX ADMIN — Medication 100 MILLIGRAM(S): at 08:50

## 2020-01-01 RX ADMIN — MUPIROCIN 1 APPLICATION(S): 20 OINTMENT TOPICAL at 06:18

## 2020-01-01 RX ADMIN — NYSTATIN CREAM 1 APPLICATION(S): 100000 CREAM TOPICAL at 11:47

## 2020-01-01 RX ADMIN — PANTOPRAZOLE SODIUM 40 MILLIGRAM(S): 20 TABLET, DELAYED RELEASE ORAL at 12:08

## 2020-01-01 RX ADMIN — Medication 650 MILLIGRAM(S): at 06:47

## 2020-01-01 RX ADMIN — Medication 10: at 08:48

## 2020-01-01 RX ADMIN — MIDODRINE HYDROCHLORIDE 10 MILLIGRAM(S): 2.5 TABLET ORAL at 12:43

## 2020-01-01 RX ADMIN — NYSTATIN CREAM 1 APPLICATION(S): 100000 CREAM TOPICAL at 05:22

## 2020-01-01 RX ADMIN — NYSTATIN CREAM 1 APPLICATION(S): 100000 CREAM TOPICAL at 13:06

## 2020-01-01 RX ADMIN — HEPARIN SODIUM 5000 UNIT(S): 5000 INJECTION INTRAVENOUS; SUBCUTANEOUS at 05:04

## 2020-01-01 RX ADMIN — MEROPENEM 100 MILLIGRAM(S): 1 INJECTION INTRAVENOUS at 17:49

## 2020-01-01 RX ADMIN — SODIUM CHLORIDE 75 MILLILITER(S): 9 INJECTION, SOLUTION INTRAVENOUS at 17:22

## 2020-01-01 RX ADMIN — Medication 500 MILLIGRAM(S): at 17:40

## 2020-01-01 RX ADMIN — MIDODRINE HYDROCHLORIDE 10 MILLIGRAM(S): 2.5 TABLET ORAL at 10:00

## 2020-01-01 RX ADMIN — Medication 500 MILLIGRAM(S): at 11:36

## 2020-01-01 RX ADMIN — Medication 25 MICROGRAM(S): at 05:58

## 2020-01-01 RX ADMIN — INSULIN GLARGINE 5 UNIT(S): 100 INJECTION, SOLUTION SUBCUTANEOUS at 21:28

## 2020-01-01 RX ADMIN — MIDODRINE HYDROCHLORIDE 5 MILLIGRAM(S): 2.5 TABLET ORAL at 18:35

## 2020-01-01 RX ADMIN — Medication 300 MILLIGRAM(S): at 13:42

## 2020-01-01 RX ADMIN — Medication 2: at 16:39

## 2020-01-01 RX ADMIN — Medication 1 TABLET(S): at 12:41

## 2020-01-01 RX ADMIN — Medication 10 MILLIGRAM(S): at 11:45

## 2020-01-01 RX ADMIN — Medication 2: at 12:32

## 2020-01-01 RX ADMIN — Medication 12.5 MICROGRAM(S): at 21:02

## 2020-01-01 RX ADMIN — SODIUM CHLORIDE 75 MILLILITER(S): 9 INJECTION, SOLUTION INTRAVENOUS at 11:41

## 2020-01-01 RX ADMIN — MIDODRINE HYDROCHLORIDE 2.5 MILLIGRAM(S): 2.5 TABLET ORAL at 12:32

## 2020-01-01 RX ADMIN — Medication 2: at 12:57

## 2020-01-01 RX ADMIN — Medication 2: at 12:10

## 2020-01-01 RX ADMIN — Medication 1: at 12:56

## 2020-01-01 RX ADMIN — NYSTATIN CREAM 1 APPLICATION(S): 100000 CREAM TOPICAL at 13:14

## 2020-01-01 RX ADMIN — MIDODRINE HYDROCHLORIDE 5 MILLIGRAM(S): 2.5 TABLET ORAL at 12:28

## 2020-01-01 RX ADMIN — MIDODRINE HYDROCHLORIDE 5 MILLIGRAM(S): 2.5 TABLET ORAL at 17:24

## 2020-01-01 RX ADMIN — CHLORHEXIDINE GLUCONATE 1 APPLICATION(S): 213 SOLUTION TOPICAL at 05:33

## 2020-01-01 RX ADMIN — HEPARIN SODIUM 5000 UNIT(S): 5000 INJECTION INTRAVENOUS; SUBCUTANEOUS at 15:30

## 2020-01-01 RX ADMIN — MEROPENEM 100 MILLIGRAM(S): 1 INJECTION INTRAVENOUS at 06:36

## 2020-01-01 RX ADMIN — Medication 1 APPLICATION(S): at 17:10

## 2020-01-01 RX ADMIN — Medication 105 MEQ/KG/HR: at 08:23

## 2020-01-01 RX ADMIN — Medication 10 MILLIGRAM(S): at 12:28

## 2020-01-01 RX ADMIN — NYSTATIN CREAM 1 APPLICATION(S): 100000 CREAM TOPICAL at 12:12

## 2020-01-01 RX ADMIN — Medication 1 APPLICATION(S): at 18:37

## 2020-01-01 RX ADMIN — Medication 250 MILLIGRAM(S): at 17:40

## 2020-01-01 RX ADMIN — MIDODRINE HYDROCHLORIDE 5 MILLIGRAM(S): 2.5 TABLET ORAL at 11:19

## 2020-01-01 RX ADMIN — Medication 1 APPLICATION(S): at 16:40

## 2020-01-01 RX ADMIN — Medication 975 MILLIGRAM(S): at 00:34

## 2020-01-01 RX ADMIN — PANTOPRAZOLE SODIUM 40 MILLIGRAM(S): 20 TABLET, DELAYED RELEASE ORAL at 05:46

## 2020-01-01 RX ADMIN — SODIUM CHLORIDE 75 MILLILITER(S): 9 INJECTION, SOLUTION INTRAVENOUS at 21:41

## 2020-01-01 RX ADMIN — ALBUTEROL 2 PUFF(S): 90 AEROSOL, METERED ORAL at 17:00

## 2020-01-01 RX ADMIN — ATORVASTATIN CALCIUM 20 MILLIGRAM(S): 80 TABLET, FILM COATED ORAL at 21:02

## 2020-01-01 RX ADMIN — NYSTATIN CREAM 1 APPLICATION(S): 100000 CREAM TOPICAL at 22:35

## 2020-01-01 RX ADMIN — MEROPENEM 100 MILLIGRAM(S): 1 INJECTION INTRAVENOUS at 06:23

## 2020-01-01 RX ADMIN — INSULIN HUMAN 5 UNIT(S): 100 INJECTION, SOLUTION SUBCUTANEOUS at 03:58

## 2020-01-01 RX ADMIN — NYSTATIN CREAM 1 APPLICATION(S): 100000 CREAM TOPICAL at 05:00

## 2020-01-01 RX ADMIN — TAMSULOSIN HYDROCHLORIDE 0.4 MILLIGRAM(S): 0.4 CAPSULE ORAL at 22:04

## 2020-01-01 RX ADMIN — CHLORHEXIDINE GLUCONATE 1 APPLICATION(S): 213 SOLUTION TOPICAL at 08:59

## 2020-01-01 RX ADMIN — Medication 250 MILLIGRAM(S): at 17:23

## 2020-01-01 RX ADMIN — LACTULOSE 20 GRAM(S): 10 SOLUTION ORAL at 16:01

## 2020-01-01 RX ADMIN — Medication 300 MILLIGRAM(S): at 08:14

## 2020-01-01 RX ADMIN — MIDODRINE HYDROCHLORIDE 10 MILLIGRAM(S): 2.5 TABLET ORAL at 21:58

## 2020-01-01 RX ADMIN — Medication 81 MILLIGRAM(S): at 11:51

## 2020-01-01 RX ADMIN — Medication 2 TABLET(S): at 17:21

## 2020-01-01 RX ADMIN — MIDODRINE HYDROCHLORIDE 10 MILLIGRAM(S): 2.5 TABLET ORAL at 17:57

## 2020-01-01 RX ADMIN — Medication 40 MILLIEQUIVALENT(S): at 12:36

## 2020-01-01 RX ADMIN — MIDODRINE HYDROCHLORIDE 10 MILLIGRAM(S): 2.5 TABLET ORAL at 11:50

## 2020-01-01 RX ADMIN — SODIUM CHLORIDE 75 MILLILITER(S): 9 INJECTION, SOLUTION INTRAVENOUS at 15:29

## 2020-01-01 RX ADMIN — HEPARIN SODIUM 5000 UNIT(S): 5000 INJECTION INTRAVENOUS; SUBCUTANEOUS at 10:25

## 2020-01-01 RX ADMIN — Medication 81 MILLIGRAM(S): at 11:39

## 2020-01-01 RX ADMIN — Medication 5 MILLIGRAM(S): at 22:35

## 2020-01-01 RX ADMIN — Medication 250 MILLIGRAM(S): at 06:05

## 2020-01-01 RX ADMIN — Medication 325 MILLIGRAM(S): at 11:53

## 2020-01-01 RX ADMIN — MIDODRINE HYDROCHLORIDE 10 MILLIGRAM(S): 2.5 TABLET ORAL at 13:25

## 2020-01-01 RX ADMIN — Medication 325 MILLIGRAM(S): at 05:59

## 2020-01-01 RX ADMIN — Medication 1 APPLICATION(S): at 17:17

## 2020-01-01 RX ADMIN — Medication 1 APPLICATION(S): at 06:20

## 2020-01-01 RX ADMIN — MIDODRINE HYDROCHLORIDE 5 MILLIGRAM(S): 2.5 TABLET ORAL at 11:35

## 2020-01-01 RX ADMIN — PANTOPRAZOLE SODIUM 40 MILLIGRAM(S): 20 TABLET, DELAYED RELEASE ORAL at 17:22

## 2020-01-01 RX ADMIN — Medication 10 MILLIGRAM(S): at 08:59

## 2020-01-01 RX ADMIN — MEROPENEM 100 MILLIGRAM(S): 1 INJECTION INTRAVENOUS at 05:48

## 2020-01-01 RX ADMIN — TAMSULOSIN HYDROCHLORIDE 0.4 MILLIGRAM(S): 0.4 CAPSULE ORAL at 21:58

## 2020-01-01 RX ADMIN — Medication 325 MILLIGRAM(S): at 05:33

## 2020-01-01 RX ADMIN — ATORVASTATIN CALCIUM 20 MILLIGRAM(S): 80 TABLET, FILM COATED ORAL at 21:01

## 2020-01-01 RX ADMIN — MEROPENEM 100 MILLIGRAM(S): 1 INJECTION INTRAVENOUS at 05:34

## 2020-01-01 RX ADMIN — Medication 500 MILLIGRAM(S): at 12:00

## 2020-01-01 RX ADMIN — Medication 4: at 08:30

## 2020-01-01 RX ADMIN — Medication 100 MILLIGRAM(S): at 11:53

## 2020-01-01 RX ADMIN — Medication 325 MILLIGRAM(S): at 16:01

## 2020-01-01 RX ADMIN — Medication 1 APPLICATION(S): at 17:31

## 2020-01-01 RX ADMIN — SODIUM CHLORIDE 60 MILLILITER(S): 9 INJECTION, SOLUTION INTRAVENOUS at 18:12

## 2020-01-01 RX ADMIN — TAMSULOSIN HYDROCHLORIDE 0.4 MILLIGRAM(S): 0.4 CAPSULE ORAL at 21:28

## 2020-01-01 RX ADMIN — MIDODRINE HYDROCHLORIDE 10 MILLIGRAM(S): 2.5 TABLET ORAL at 21:46

## 2020-01-01 RX ADMIN — TAMSULOSIN HYDROCHLORIDE 0.4 MILLIGRAM(S): 0.4 CAPSULE ORAL at 21:13

## 2020-01-01 RX ADMIN — NYSTATIN CREAM 1 APPLICATION(S): 100000 CREAM TOPICAL at 05:31

## 2020-01-01 RX ADMIN — Medication 2: at 08:45

## 2020-01-01 RX ADMIN — MIDODRINE HYDROCHLORIDE 5 MILLIGRAM(S): 2.5 TABLET ORAL at 05:51

## 2020-01-01 RX ADMIN — Medication 20 MILLIGRAM(S): at 21:41

## 2020-01-01 RX ADMIN — PIPERACILLIN AND TAZOBACTAM 25 GRAM(S): 4; .5 INJECTION, POWDER, LYOPHILIZED, FOR SOLUTION INTRAVENOUS at 10:22

## 2020-01-01 RX ADMIN — Medication 1 SPRAY(S): at 18:10

## 2020-01-01 RX ADMIN — Medication 0.5 MILLIGRAM(S): at 17:02

## 2020-01-01 RX ADMIN — MEROPENEM 100 MILLIGRAM(S): 1 INJECTION INTRAVENOUS at 05:12

## 2020-01-01 RX ADMIN — SODIUM CHLORIDE 500 MILLILITER(S): 9 INJECTION INTRAMUSCULAR; INTRAVENOUS; SUBCUTANEOUS at 00:40

## 2020-01-01 RX ADMIN — Medication 2 TABLET(S): at 16:30

## 2020-01-01 RX ADMIN — MIDODRINE HYDROCHLORIDE 10 MILLIGRAM(S): 2.5 TABLET ORAL at 05:36

## 2020-01-01 RX ADMIN — CHLORHEXIDINE GLUCONATE 1 APPLICATION(S): 213 SOLUTION TOPICAL at 05:39

## 2020-01-01 RX ADMIN — SODIUM CHLORIDE 100 MILLILITER(S): 9 INJECTION, SOLUTION INTRAVENOUS at 22:37

## 2020-01-01 RX ADMIN — MIDODRINE HYDROCHLORIDE 10 MILLIGRAM(S): 2.5 TABLET ORAL at 22:11

## 2020-01-01 RX ADMIN — Medication 325 MILLIGRAM(S): at 13:36

## 2020-01-01 RX ADMIN — MIDODRINE HYDROCHLORIDE 10 MILLIGRAM(S): 2.5 TABLET ORAL at 05:31

## 2020-01-01 RX ADMIN — HEPARIN SODIUM 5000 UNIT(S): 5000 INJECTION INTRAVENOUS; SUBCUTANEOUS at 21:41

## 2020-01-01 RX ADMIN — Medication 2: at 16:49

## 2020-01-01 RX ADMIN — ATORVASTATIN CALCIUM 20 MILLIGRAM(S): 80 TABLET, FILM COATED ORAL at 22:42

## 2020-01-01 RX ADMIN — Medication 5 MILLIGRAM(S): at 22:42

## 2020-01-01 RX ADMIN — PIPERACILLIN AND TAZOBACTAM 25 GRAM(S): 4; .5 INJECTION, POWDER, LYOPHILIZED, FOR SOLUTION INTRAVENOUS at 08:51

## 2020-01-01 RX ADMIN — MEROPENEM 100 MILLIGRAM(S): 1 INJECTION INTRAVENOUS at 05:42

## 2020-01-01 RX ADMIN — Medication 1 TABLET(S): at 12:51

## 2020-01-01 RX ADMIN — ZINC SULFATE TAB 220 MG (50 MG ZINC EQUIVALENT) 220 MILLIGRAM(S): 220 (50 ZN) TAB at 11:35

## 2020-01-01 RX ADMIN — Medication 1 TABLET(S): at 11:04

## 2020-01-01 RX ADMIN — Medication 100 MILLIGRAM(S): at 12:28

## 2020-01-01 RX ADMIN — SODIUM CHLORIDE 500 MILLILITER(S): 9 INJECTION INTRAMUSCULAR; INTRAVENOUS; SUBCUTANEOUS at 19:00

## 2020-01-01 RX ADMIN — MIDODRINE HYDROCHLORIDE 10 MILLIGRAM(S): 2.5 TABLET ORAL at 08:12

## 2020-01-01 RX ADMIN — INSULIN GLARGINE 15 UNIT(S): 100 INJECTION, SOLUTION SUBCUTANEOUS at 22:03

## 2020-01-01 RX ADMIN — TAMSULOSIN HYDROCHLORIDE 0.4 MILLIGRAM(S): 0.4 CAPSULE ORAL at 21:41

## 2020-01-01 RX ADMIN — Medication 500 MILLIGRAM(S): at 05:51

## 2020-01-01 RX ADMIN — Medication 325 MILLIGRAM(S): at 23:29

## 2020-01-01 RX ADMIN — Medication 1 TABLET(S): at 16:47

## 2020-01-01 RX ADMIN — ATORVASTATIN CALCIUM 20 MILLIGRAM(S): 80 TABLET, FILM COATED ORAL at 22:08

## 2020-01-01 RX ADMIN — FINASTERIDE 5 MILLIGRAM(S): 5 TABLET, FILM COATED ORAL at 11:57

## 2020-01-01 RX ADMIN — MEROPENEM 100 MILLIGRAM(S): 1 INJECTION INTRAVENOUS at 05:32

## 2020-01-01 RX ADMIN — Medication 1 APPLICATION(S): at 05:06

## 2020-01-01 RX ADMIN — PANTOPRAZOLE SODIUM 40 MILLIGRAM(S): 20 TABLET, DELAYED RELEASE ORAL at 12:43

## 2020-01-01 RX ADMIN — Medication 1 TABLET(S): at 11:45

## 2020-01-01 RX ADMIN — Medication 300 MILLIGRAM(S): at 09:22

## 2020-01-01 RX ADMIN — Medication 50 MILLIGRAM(S): at 17:11

## 2020-01-01 RX ADMIN — NYSTATIN CREAM 1 APPLICATION(S): 100000 CREAM TOPICAL at 06:05

## 2020-01-01 RX ADMIN — Medication 1 TABLET(S): at 11:44

## 2020-01-01 RX ADMIN — NYSTATIN CREAM 1 APPLICATION(S): 100000 CREAM TOPICAL at 05:36

## 2020-01-01 RX ADMIN — Medication 1 MILLIGRAM(S): at 12:12

## 2020-01-01 RX ADMIN — Medication 1 TABLET(S): at 12:12

## 2020-01-01 RX ADMIN — Medication 325 MILLIGRAM(S): at 21:45

## 2020-01-01 RX ADMIN — MIDODRINE HYDROCHLORIDE 2.5 MILLIGRAM(S): 2.5 TABLET ORAL at 04:05

## 2020-01-01 RX ADMIN — Medication 1 MILLIGRAM(S): at 11:35

## 2020-01-01 RX ADMIN — MEROPENEM 100 MILLIGRAM(S): 1 INJECTION INTRAVENOUS at 18:22

## 2020-01-01 RX ADMIN — Medication 1 APPLICATION(S): at 17:26

## 2020-01-01 RX ADMIN — HEPARIN SODIUM 5000 UNIT(S): 5000 INJECTION INTRAVENOUS; SUBCUTANEOUS at 15:05

## 2020-01-01 RX ADMIN — ATORVASTATIN CALCIUM 20 MILLIGRAM(S): 80 TABLET, FILM COATED ORAL at 22:05

## 2020-01-01 RX ADMIN — Medication 325 MILLIGRAM(S): at 12:00

## 2020-01-01 RX ADMIN — MIDODRINE HYDROCHLORIDE 10 MILLIGRAM(S): 2.5 TABLET ORAL at 17:43

## 2020-01-01 RX ADMIN — NYSTATIN CREAM 1 APPLICATION(S): 100000 CREAM TOPICAL at 12:30

## 2020-01-01 RX ADMIN — Medication 10 MILLIGRAM(S): at 09:21

## 2020-01-01 RX ADMIN — Medication 25 MICROGRAM(S): at 05:51

## 2020-01-01 RX ADMIN — Medication 1 TABLET(S): at 09:06

## 2020-01-01 RX ADMIN — TAMSULOSIN HYDROCHLORIDE 0.4 MILLIGRAM(S): 0.4 CAPSULE ORAL at 23:07

## 2020-01-01 RX ADMIN — MIDODRINE HYDROCHLORIDE 5 MILLIGRAM(S): 2.5 TABLET ORAL at 05:17

## 2020-01-01 RX ADMIN — Medication 10 MILLILITER(S): at 23:40

## 2020-01-01 RX ADMIN — Medication 25 MICROGRAM(S): at 05:19

## 2020-01-01 RX ADMIN — HEPARIN SODIUM 5000 UNIT(S): 5000 INJECTION INTRAVENOUS; SUBCUTANEOUS at 05:31

## 2020-01-01 RX ADMIN — Medication 250 MILLIGRAM(S): at 19:38

## 2020-01-01 RX ADMIN — Medication 300 MILLIGRAM(S): at 08:23

## 2020-01-01 RX ADMIN — MEROPENEM 100 MILLIGRAM(S): 1 INJECTION INTRAVENOUS at 17:02

## 2020-01-01 RX ADMIN — IRON SUCROSE 110 MILLIGRAM(S): 20 INJECTION, SOLUTION INTRAVENOUS at 17:55

## 2020-01-01 RX ADMIN — PANTOPRAZOLE SODIUM 40 MILLIGRAM(S): 20 TABLET, DELAYED RELEASE ORAL at 19:39

## 2020-01-01 RX ADMIN — Medication 250 MILLIGRAM(S): at 23:11

## 2020-01-01 RX ADMIN — Medication 325 MILLIGRAM(S): at 11:47

## 2020-01-01 RX ADMIN — Medication 25 MICROGRAM(S): at 06:40

## 2020-01-01 RX ADMIN — INSULIN GLARGINE 15 UNIT(S): 100 INJECTION, SOLUTION SUBCUTANEOUS at 21:40

## 2020-01-01 RX ADMIN — HEPARIN SODIUM 5000 UNIT(S): 5000 INJECTION INTRAVENOUS; SUBCUTANEOUS at 13:26

## 2020-01-01 RX ADMIN — MIDODRINE HYDROCHLORIDE 10 MILLIGRAM(S): 2.5 TABLET ORAL at 17:23

## 2020-01-01 RX ADMIN — IRON SUCROSE 110 MILLIGRAM(S): 20 INJECTION, SOLUTION INTRAVENOUS at 18:37

## 2020-01-01 RX ADMIN — ATORVASTATIN CALCIUM 20 MILLIGRAM(S): 80 TABLET, FILM COATED ORAL at 22:04

## 2020-01-01 RX ADMIN — MEROPENEM 100 MILLIGRAM(S): 1 INJECTION INTRAVENOUS at 18:02

## 2020-01-01 RX ADMIN — Medication 600 MILLIGRAM(S): at 05:22

## 2020-01-01 RX ADMIN — Medication 2 TABLET(S): at 05:14

## 2020-01-01 RX ADMIN — MIDODRINE HYDROCHLORIDE 10 MILLIGRAM(S): 2.5 TABLET ORAL at 16:19

## 2020-01-01 RX ADMIN — NYSTATIN CREAM 1 APPLICATION(S): 100000 CREAM TOPICAL at 05:27

## 2020-01-01 RX ADMIN — Medication 1 TABLET(S): at 08:11

## 2020-01-01 RX ADMIN — CHLORHEXIDINE GLUCONATE 1 APPLICATION(S): 213 SOLUTION TOPICAL at 09:55

## 2020-01-01 RX ADMIN — NYSTATIN CREAM 1 APPLICATION(S): 100000 CREAM TOPICAL at 05:04

## 2020-01-01 RX ADMIN — POLYETHYLENE GLYCOL 3350 17 GRAM(S): 17 POWDER, FOR SOLUTION ORAL at 11:03

## 2020-01-01 RX ADMIN — ONDANSETRON 4 MILLIGRAM(S): 8 TABLET, FILM COATED ORAL at 15:15

## 2020-01-01 RX ADMIN — SODIUM POLYSTYRENE SULFONATE 60 GRAM(S): 4.1 POWDER, FOR SUSPENSION ORAL at 10:33

## 2020-01-01 RX ADMIN — Medication 25 MICROGRAM(S): at 05:45

## 2020-01-01 RX ADMIN — SODIUM CHLORIDE 100 MILLILITER(S): 9 INJECTION, SOLUTION INTRAVENOUS at 11:04

## 2020-01-01 RX ADMIN — FINASTERIDE 5 MILLIGRAM(S): 5 TABLET, FILM COATED ORAL at 11:40

## 2020-01-01 RX ADMIN — MIDODRINE HYDROCHLORIDE 2.5 MILLIGRAM(S): 2.5 TABLET ORAL at 05:32

## 2020-01-01 RX ADMIN — Medication 2: at 12:18

## 2020-01-01 RX ADMIN — Medication 500 MILLIGRAM(S): at 05:00

## 2020-01-01 RX ADMIN — FINASTERIDE 5 MILLIGRAM(S): 5 TABLET, FILM COATED ORAL at 11:41

## 2020-01-01 RX ADMIN — Medication 1 APPLICATION(S): at 05:35

## 2020-01-01 RX ADMIN — SODIUM CHLORIDE 2600 MILLILITER(S): 9 INJECTION, SOLUTION INTRAVENOUS at 15:12

## 2020-01-01 RX ADMIN — MIDODRINE HYDROCHLORIDE 10 MILLIGRAM(S): 2.5 TABLET ORAL at 13:24

## 2020-01-01 RX ADMIN — MIDODRINE HYDROCHLORIDE 10 MILLIGRAM(S): 2.5 TABLET ORAL at 08:57

## 2020-01-01 RX ADMIN — Medication 250 MILLIGRAM(S): at 17:35

## 2020-01-01 RX ADMIN — Medication 2: at 12:38

## 2020-01-01 RX ADMIN — TAMSULOSIN HYDROCHLORIDE 0.4 MILLIGRAM(S): 0.4 CAPSULE ORAL at 21:02

## 2020-01-01 RX ADMIN — Medication 1: at 11:14

## 2020-01-01 RX ADMIN — Medication 40 MILLIGRAM(S): at 05:51

## 2020-01-01 RX ADMIN — Medication 1 TABLET(S): at 06:35

## 2020-01-01 RX ADMIN — Medication 325 MILLIGRAM(S): at 16:26

## 2020-01-01 RX ADMIN — Medication 250 MILLIGRAM(S): at 05:47

## 2020-01-01 RX ADMIN — PIPERACILLIN AND TAZOBACTAM 25 GRAM(S): 4; .5 INJECTION, POWDER, LYOPHILIZED, FOR SOLUTION INTRAVENOUS at 18:45

## 2020-01-01 RX ADMIN — MEROPENEM 100 MILLIGRAM(S): 1 INJECTION INTRAVENOUS at 17:22

## 2020-01-01 RX ADMIN — SODIUM CHLORIDE 500 MILLILITER(S): 9 INJECTION INTRAMUSCULAR; INTRAVENOUS; SUBCUTANEOUS at 18:30

## 2020-01-01 RX ADMIN — MEROPENEM 100 MILLIGRAM(S): 1 INJECTION INTRAVENOUS at 16:51

## 2020-01-01 RX ADMIN — Medication 81 MILLIGRAM(S): at 13:41

## 2020-01-01 RX ADMIN — MUPIROCIN 1 APPLICATION(S): 20 OINTMENT TOPICAL at 05:33

## 2020-01-01 RX ADMIN — TAMSULOSIN HYDROCHLORIDE 0.4 MILLIGRAM(S): 0.4 CAPSULE ORAL at 22:15

## 2020-01-01 RX ADMIN — HEPARIN SODIUM 5000 UNIT(S): 5000 INJECTION INTRAVENOUS; SUBCUTANEOUS at 13:08

## 2020-01-01 RX ADMIN — MEROPENEM 100 MILLIGRAM(S): 1 INJECTION INTRAVENOUS at 18:20

## 2020-01-01 RX ADMIN — HEPARIN SODIUM 5000 UNIT(S): 5000 INJECTION INTRAVENOUS; SUBCUTANEOUS at 13:24

## 2020-01-01 RX ADMIN — Medication 1 APPLICATION(S): at 17:08

## 2020-01-01 RX ADMIN — Medication 8: at 09:14

## 2020-01-01 RX ADMIN — Medication 1 TABLET(S): at 15:56

## 2020-01-01 RX ADMIN — Medication 500 MILLIGRAM(S): at 17:06

## 2020-01-01 RX ADMIN — HEPARIN SODIUM 5000 UNIT(S): 5000 INJECTION INTRAVENOUS; SUBCUTANEOUS at 16:47

## 2020-01-01 RX ADMIN — MEROPENEM 100 MILLIGRAM(S): 1 INJECTION INTRAVENOUS at 05:55

## 2020-01-01 RX ADMIN — NYSTATIN CREAM 1 APPLICATION(S): 100000 CREAM TOPICAL at 17:07

## 2020-01-01 RX ADMIN — MIDODRINE HYDROCHLORIDE 10 MILLIGRAM(S): 2.5 TABLET ORAL at 18:51

## 2020-01-01 RX ADMIN — MIDODRINE HYDROCHLORIDE 5 MILLIGRAM(S): 2.5 TABLET ORAL at 12:04

## 2020-01-01 RX ADMIN — MEROPENEM 100 MILLIGRAM(S): 1 INJECTION INTRAVENOUS at 05:07

## 2020-01-01 RX ADMIN — Medication 300 MILLIGRAM(S): at 12:12

## 2020-01-01 RX ADMIN — MIDODRINE HYDROCHLORIDE 10 MILLIGRAM(S): 2.5 TABLET ORAL at 08:43

## 2020-01-01 RX ADMIN — MIDODRINE HYDROCHLORIDE 5 MILLIGRAM(S): 2.5 TABLET ORAL at 11:51

## 2020-01-01 RX ADMIN — MAGNESIUM OXIDE 400 MG ORAL TABLET 400 MILLIGRAM(S): 241.3 TABLET ORAL at 08:49

## 2020-01-01 RX ADMIN — Medication 250 MILLIGRAM(S): at 15:31

## 2020-01-01 RX ADMIN — Medication 10 MILLIGRAM(S): at 18:11

## 2020-01-01 RX ADMIN — HEPARIN SODIUM 5000 UNIT(S): 5000 INJECTION INTRAVENOUS; SUBCUTANEOUS at 05:36

## 2020-01-01 RX ADMIN — Medication 6: at 12:20

## 2020-01-01 RX ADMIN — ALBUTEROL 1 PUFF(S): 90 AEROSOL, METERED ORAL at 16:41

## 2020-01-01 RX ADMIN — MUPIROCIN 1 APPLICATION(S): 20 OINTMENT TOPICAL at 16:40

## 2020-01-01 RX ADMIN — Medication 1 APPLICATION(S): at 21:37

## 2020-01-01 RX ADMIN — Medication 500 MILLIGRAM(S): at 05:18

## 2020-01-01 RX ADMIN — NYSTATIN CREAM 1 APPLICATION(S): 100000 CREAM TOPICAL at 13:13

## 2020-01-01 RX ADMIN — Medication 5 MILLIGRAM(S): at 22:25

## 2020-01-01 RX ADMIN — MIDODRINE HYDROCHLORIDE 5 MILLIGRAM(S): 2.5 TABLET ORAL at 13:41

## 2020-01-01 RX ADMIN — Medication 6: at 17:42

## 2020-01-01 RX ADMIN — Medication 325 MILLIGRAM(S): at 06:14

## 2020-01-01 RX ADMIN — Medication 2: at 13:06

## 2020-01-01 RX ADMIN — HEPARIN SODIUM 5000 UNIT(S): 5000 INJECTION INTRAVENOUS; SUBCUTANEOUS at 05:24

## 2020-01-01 RX ADMIN — Medication 250 MILLIGRAM(S): at 06:40

## 2020-01-01 RX ADMIN — NYSTATIN CREAM 1 APPLICATION(S): 100000 CREAM TOPICAL at 05:51

## 2020-01-01 RX ADMIN — Medication 1 TABLET(S): at 11:05

## 2020-01-01 RX ADMIN — Medication 1 APPLICATION(S): at 13:51

## 2020-01-01 RX ADMIN — Medication 250 MILLIGRAM(S): at 05:45

## 2020-01-01 RX ADMIN — Medication 10 MILLIGRAM(S): at 12:16

## 2020-01-01 RX ADMIN — HEPARIN SODIUM 5000 UNIT(S): 5000 INJECTION INTRAVENOUS; SUBCUTANEOUS at 22:04

## 2020-01-01 RX ADMIN — SODIUM CHLORIDE 80 MILLILITER(S): 9 INJECTION INTRAMUSCULAR; INTRAVENOUS; SUBCUTANEOUS at 00:21

## 2020-01-01 RX ADMIN — Medication 40 MILLIEQUIVALENT(S): at 11:35

## 2020-01-01 RX ADMIN — MEROPENEM 100 MILLIGRAM(S): 1 INJECTION INTRAVENOUS at 05:51

## 2020-01-01 RX ADMIN — Medication 81 MILLIGRAM(S): at 12:01

## 2020-01-01 RX ADMIN — PIPERACILLIN AND TAZOBACTAM 200 GRAM(S): 4; .5 INJECTION, POWDER, LYOPHILIZED, FOR SOLUTION INTRAVENOUS at 19:15

## 2020-01-01 RX ADMIN — CHLORHEXIDINE GLUCONATE 1 APPLICATION(S): 213 SOLUTION TOPICAL at 05:38

## 2020-01-01 RX ADMIN — Medication 4: at 12:11

## 2020-01-01 RX ADMIN — SENNA PLUS 2 TABLET(S): 8.6 TABLET ORAL at 23:29

## 2020-01-01 RX ADMIN — NYSTATIN CREAM 1 APPLICATION(S): 100000 CREAM TOPICAL at 05:34

## 2020-01-01 RX ADMIN — Medication 105 MEQ/KG/HR: at 21:56

## 2020-01-01 RX ADMIN — Medication 325 MILLIGRAM(S): at 08:12

## 2020-01-01 RX ADMIN — NYSTATIN CREAM 1 APPLICATION(S): 100000 CREAM TOPICAL at 05:54

## 2020-01-01 RX ADMIN — INSULIN HUMAN 10 UNIT(S): 100 INJECTION, SOLUTION SUBCUTANEOUS at 23:14

## 2020-01-01 RX ADMIN — Medication 2 TABLET(S): at 17:24

## 2020-01-01 RX ADMIN — Medication 0.5 MILLIGRAM(S): at 20:10

## 2020-01-01 RX ADMIN — Medication 325 MILLIGRAM(S): at 05:26

## 2020-01-01 RX ADMIN — Medication 5 UNIT(S): at 16:18

## 2020-01-01 RX ADMIN — TAMSULOSIN HYDROCHLORIDE 0.4 MILLIGRAM(S): 0.4 CAPSULE ORAL at 21:31

## 2020-01-01 RX ADMIN — Medication 2: at 19:15

## 2020-01-01 RX ADMIN — Medication 50 MILLIGRAM(S): at 05:36

## 2020-01-01 RX ADMIN — MIDODRINE HYDROCHLORIDE 5 MILLIGRAM(S): 2.5 TABLET ORAL at 06:40

## 2020-01-01 RX ADMIN — Medication 500 MILLIGRAM(S): at 05:45

## 2020-01-01 RX ADMIN — Medication 10 MILLIGRAM(S): at 11:04

## 2020-01-01 RX ADMIN — Medication 50 MILLIGRAM(S): at 05:27

## 2020-01-01 RX ADMIN — NYSTATIN CREAM 1 APPLICATION(S): 100000 CREAM TOPICAL at 11:39

## 2020-01-01 RX ADMIN — SODIUM CHLORIDE 75 MILLILITER(S): 9 INJECTION, SOLUTION INTRAVENOUS at 16:55

## 2020-01-01 RX ADMIN — MEROPENEM 100 MILLIGRAM(S): 1 INJECTION INTRAVENOUS at 05:43

## 2020-01-01 RX ADMIN — Medication 12.5 MICROGRAM(S): at 21:00

## 2020-01-01 RX ADMIN — Medication 250 MILLIGRAM(S): at 05:27

## 2020-01-01 RX ADMIN — Medication 100 MILLIGRAM(S): at 11:28

## 2020-01-01 RX ADMIN — SODIUM POLYSTYRENE SULFONATE 90 GRAM(S): 4.1 POWDER, FOR SUSPENSION ORAL at 18:48

## 2020-01-01 RX ADMIN — SODIUM CHLORIDE 500 MILLILITER(S): 9 INJECTION INTRAMUSCULAR; INTRAVENOUS; SUBCUTANEOUS at 12:25

## 2020-01-01 RX ADMIN — Medication 600 MILLIGRAM(S): at 17:18

## 2020-01-01 RX ADMIN — PIPERACILLIN AND TAZOBACTAM 25 GRAM(S): 4; .5 INJECTION, POWDER, LYOPHILIZED, FOR SOLUTION INTRAVENOUS at 06:42

## 2020-01-01 RX ADMIN — MEROPENEM 100 MILLIGRAM(S): 1 INJECTION INTRAVENOUS at 05:03

## 2020-01-01 RX ADMIN — Medication 2: at 08:31

## 2020-01-01 RX ADMIN — Medication 2 TABLET(S): at 06:05

## 2020-01-01 RX ADMIN — ATORVASTATIN CALCIUM 20 MILLIGRAM(S): 80 TABLET, FILM COATED ORAL at 22:23

## 2020-01-01 RX ADMIN — ALBUTEROL 2 PUFF(S): 90 AEROSOL, METERED ORAL at 20:37

## 2020-01-01 RX ADMIN — MIDODRINE HYDROCHLORIDE 5 MILLIGRAM(S): 2.5 TABLET ORAL at 05:13

## 2020-01-01 RX ADMIN — TAMSULOSIN HYDROCHLORIDE 0.4 MILLIGRAM(S): 0.4 CAPSULE ORAL at 22:08

## 2020-01-01 RX ADMIN — MEROPENEM 100 MILLIGRAM(S): 1 INJECTION INTRAVENOUS at 06:19

## 2020-01-01 RX ADMIN — Medication 8: at 12:10

## 2020-01-01 RX ADMIN — Medication 1 MILLIGRAM(S): at 12:03

## 2020-01-01 RX ADMIN — MIDODRINE HYDROCHLORIDE 10 MILLIGRAM(S): 2.5 TABLET ORAL at 22:25

## 2020-01-01 RX ADMIN — MIDODRINE HYDROCHLORIDE 10 MILLIGRAM(S): 2.5 TABLET ORAL at 05:19

## 2020-01-01 RX ADMIN — Medication 325 MILLIGRAM(S): at 13:14

## 2020-01-01 RX ADMIN — SODIUM CHLORIDE 83 MILLILITER(S): 9 INJECTION, SOLUTION INTRAVENOUS at 16:49

## 2020-01-01 RX ADMIN — MUPIROCIN 1 APPLICATION(S): 20 OINTMENT TOPICAL at 06:19

## 2020-01-01 RX ADMIN — Medication 100 MILLIGRAM(S): at 11:14

## 2020-01-01 RX ADMIN — MIDODRINE HYDROCHLORIDE 10 MILLIGRAM(S): 2.5 TABLET ORAL at 05:23

## 2020-01-01 RX ADMIN — ATORVASTATIN CALCIUM 20 MILLIGRAM(S): 80 TABLET, FILM COATED ORAL at 22:40

## 2020-01-01 RX ADMIN — HEPARIN SODIUM 5000 UNIT(S): 5000 INJECTION INTRAVENOUS; SUBCUTANEOUS at 18:10

## 2020-01-01 RX ADMIN — HEPARIN SODIUM 5000 UNIT(S): 5000 INJECTION INTRAVENOUS; SUBCUTANEOUS at 06:10

## 2020-01-01 RX ADMIN — Medication 300 MILLIGRAM(S): at 12:54

## 2020-01-01 RX ADMIN — HEPARIN SODIUM 5000 UNIT(S): 5000 INJECTION INTRAVENOUS; SUBCUTANEOUS at 05:22

## 2020-01-01 RX ADMIN — Medication 5 MILLIGRAM(S): at 03:04

## 2020-01-01 RX ADMIN — NYSTATIN CREAM 1 APPLICATION(S): 100000 CREAM TOPICAL at 05:23

## 2020-01-01 RX ADMIN — Medication 100 MILLIGRAM(S): at 00:33

## 2020-01-01 RX ADMIN — Medication 325 MILLIGRAM(S): at 05:42

## 2020-01-01 RX ADMIN — Medication 2: at 16:51

## 2020-01-01 RX ADMIN — ATORVASTATIN CALCIUM 20 MILLIGRAM(S): 80 TABLET, FILM COATED ORAL at 23:29

## 2020-01-01 RX ADMIN — ATORVASTATIN CALCIUM 20 MILLIGRAM(S): 80 TABLET, FILM COATED ORAL at 21:42

## 2020-01-01 RX ADMIN — FINASTERIDE 5 MILLIGRAM(S): 5 TABLET, FILM COATED ORAL at 11:14

## 2020-01-01 RX ADMIN — Medication 1 TABLET(S): at 12:03

## 2020-01-01 RX ADMIN — HEPARIN SODIUM 5000 UNIT(S): 5000 INJECTION INTRAVENOUS; SUBCUTANEOUS at 06:11

## 2020-01-01 RX ADMIN — MAGNESIUM OXIDE 400 MG ORAL TABLET 400 MILLIGRAM(S): 241.3 TABLET ORAL at 18:12

## 2020-01-01 RX ADMIN — MEROPENEM 100 MILLIGRAM(S): 1 INJECTION INTRAVENOUS at 05:24

## 2020-01-01 RX ADMIN — SODIUM CHLORIDE 40 MILLILITER(S): 9 INJECTION INTRAMUSCULAR; INTRAVENOUS; SUBCUTANEOUS at 03:04

## 2020-01-01 RX ADMIN — SODIUM CHLORIDE 75 MILLILITER(S): 9 INJECTION, SOLUTION INTRAVENOUS at 05:14

## 2020-01-01 RX ADMIN — Medication 1 APPLICATION(S): at 17:07

## 2020-01-01 RX ADMIN — NYSTATIN CREAM 1 APPLICATION(S): 100000 CREAM TOPICAL at 05:07

## 2020-01-01 RX ADMIN — Medication 325 MILLIGRAM(S): at 05:00

## 2020-01-01 RX ADMIN — SODIUM CHLORIDE 75 MILLILITER(S): 9 INJECTION, SOLUTION INTRAVENOUS at 20:00

## 2020-01-01 RX ADMIN — Medication 325 MILLIGRAM(S): at 16:44

## 2020-01-01 RX ADMIN — NYSTATIN CREAM 1 APPLICATION(S): 100000 CREAM TOPICAL at 21:02

## 2020-01-01 RX ADMIN — MIDODRINE HYDROCHLORIDE 10 MILLIGRAM(S): 2.5 TABLET ORAL at 18:18

## 2020-01-01 RX ADMIN — MEROPENEM 100 MILLIGRAM(S): 1 INJECTION INTRAVENOUS at 17:07

## 2020-01-01 RX ADMIN — Medication 5 MILLIGRAM(S): at 21:58

## 2020-01-01 RX ADMIN — HEPARIN SODIUM 5000 UNIT(S): 5000 INJECTION INTRAVENOUS; SUBCUTANEOUS at 22:51

## 2020-01-01 RX ADMIN — Medication 25 MICROGRAM(S): at 05:12

## 2020-01-01 RX ADMIN — Medication 20 MILLIEQUIVALENT(S): at 10:22

## 2020-01-01 RX ADMIN — Medication 300 MILLIGRAM(S): at 11:39

## 2020-01-01 RX ADMIN — Medication 300 MILLIGRAM(S): at 11:45

## 2020-01-01 RX ADMIN — HEPARIN SODIUM 5000 UNIT(S): 5000 INJECTION INTRAVENOUS; SUBCUTANEOUS at 14:25

## 2020-01-01 RX ADMIN — TAMSULOSIN HYDROCHLORIDE 0.4 MILLIGRAM(S): 0.4 CAPSULE ORAL at 22:05

## 2020-01-01 RX ADMIN — Medication 325 MILLIGRAM(S): at 13:42

## 2020-01-01 RX ADMIN — Medication 325 MILLIGRAM(S): at 21:47

## 2020-01-01 RX ADMIN — HEPARIN SODIUM 5000 UNIT(S): 5000 INJECTION INTRAVENOUS; SUBCUTANEOUS at 21:40

## 2020-01-01 RX ADMIN — FINASTERIDE 5 MILLIGRAM(S): 5 TABLET, FILM COATED ORAL at 12:03

## 2020-01-01 RX ADMIN — Medication 10 MILLIGRAM(S): at 08:23

## 2020-01-01 RX ADMIN — Medication 250 MILLIGRAM(S): at 22:47

## 2020-01-01 RX ADMIN — Medication 1 APPLICATION(S): at 06:18

## 2020-01-01 RX ADMIN — MEROPENEM 100 MILLIGRAM(S): 1 INJECTION INTRAVENOUS at 17:01

## 2020-01-01 RX ADMIN — Medication 5 MILLIGRAM(S): at 05:24

## 2020-01-01 RX ADMIN — FINASTERIDE 5 MILLIGRAM(S): 5 TABLET, FILM COATED ORAL at 09:46

## 2020-01-01 RX ADMIN — LACTULOSE 20 GRAM(S): 10 SOLUTION ORAL at 05:46

## 2020-01-01 RX ADMIN — Medication 1 TABLET(S): at 06:19

## 2020-01-01 RX ADMIN — Medication 1 TABLET(S): at 11:39

## 2020-01-01 RX ADMIN — Medication 250 MILLIGRAM(S): at 17:03

## 2020-01-01 RX ADMIN — Medication 300 MILLIGRAM(S): at 12:03

## 2020-01-01 RX ADMIN — HEPARIN SODIUM 5000 UNIT(S): 5000 INJECTION INTRAVENOUS; SUBCUTANEOUS at 21:13

## 2020-01-01 RX ADMIN — MIDODRINE HYDROCHLORIDE 2.5 MILLIGRAM(S): 2.5 TABLET ORAL at 05:36

## 2020-01-01 RX ADMIN — ZINC SULFATE TAB 220 MG (50 MG ZINC EQUIVALENT) 220 MILLIGRAM(S): 220 (50 ZN) TAB at 11:16

## 2020-01-01 RX ADMIN — MEROPENEM 100 MILLIGRAM(S): 1 INJECTION INTRAVENOUS at 06:42

## 2020-01-01 RX ADMIN — ATORVASTATIN CALCIUM 20 MILLIGRAM(S): 80 TABLET, FILM COATED ORAL at 22:11

## 2020-01-01 RX ADMIN — MIDODRINE HYDROCHLORIDE 10 MILLIGRAM(S): 2.5 TABLET ORAL at 17:37

## 2020-01-01 RX ADMIN — POLYETHYLENE GLYCOL 3350 17 GRAM(S): 17 POWDER, FOR SOLUTION ORAL at 12:14

## 2020-01-01 RX ADMIN — Medication 325 MILLIGRAM(S): at 05:45

## 2020-01-01 RX ADMIN — SODIUM CHLORIDE 80 MILLILITER(S): 9 INJECTION INTRAMUSCULAR; INTRAVENOUS; SUBCUTANEOUS at 11:07

## 2020-01-01 RX ADMIN — PANTOPRAZOLE SODIUM 40 MILLIGRAM(S): 20 TABLET, DELAYED RELEASE ORAL at 12:35

## 2020-01-01 RX ADMIN — Medication 8: at 16:51

## 2020-01-01 RX ADMIN — HEPARIN SODIUM 5000 UNIT(S): 5000 INJECTION INTRAVENOUS; SUBCUTANEOUS at 22:06

## 2020-01-01 RX ADMIN — Medication 250 MILLIGRAM(S): at 05:35

## 2020-01-01 RX ADMIN — Medication 500 MILLIGRAM(S): at 17:04

## 2020-01-01 RX ADMIN — MIDODRINE HYDROCHLORIDE 5 MILLIGRAM(S): 2.5 TABLET ORAL at 05:31

## 2020-01-01 RX ADMIN — MIDODRINE HYDROCHLORIDE 5 MILLIGRAM(S): 2.5 TABLET ORAL at 22:21

## 2020-01-01 RX ADMIN — ATORVASTATIN CALCIUM 20 MILLIGRAM(S): 80 TABLET, FILM COATED ORAL at 22:24

## 2020-01-01 RX ADMIN — MEROPENEM 100 MILLIGRAM(S): 1 INJECTION INTRAVENOUS at 17:44

## 2020-01-01 RX ADMIN — Medication 6: at 17:24

## 2020-01-01 RX ADMIN — Medication 325 MILLIGRAM(S): at 05:04

## 2020-01-01 RX ADMIN — Medication 1 TABLET(S): at 05:42

## 2020-01-01 RX ADMIN — TAMSULOSIN HYDROCHLORIDE 0.4 MILLIGRAM(S): 0.4 CAPSULE ORAL at 22:27

## 2020-01-01 RX ADMIN — SODIUM CHLORIDE 1000 MILLILITER(S): 9 INJECTION INTRAMUSCULAR; INTRAVENOUS; SUBCUTANEOUS at 17:44

## 2020-01-01 RX ADMIN — Medication 1 TABLET(S): at 11:53

## 2020-01-01 RX ADMIN — HEPARIN SODIUM 5000 UNIT(S): 5000 INJECTION INTRAVENOUS; SUBCUTANEOUS at 06:36

## 2020-01-01 RX ADMIN — SODIUM CHLORIDE 50 MILLILITER(S): 9 INJECTION, SOLUTION INTRAVENOUS at 09:50

## 2020-01-01 RX ADMIN — Medication 100 MILLIGRAM(S): at 13:39

## 2020-01-01 RX ADMIN — SODIUM CHLORIDE 1000 MILLILITER(S): 9 INJECTION INTRAMUSCULAR; INTRAVENOUS; SUBCUTANEOUS at 16:16

## 2020-01-01 RX ADMIN — Medication 300 MILLIGRAM(S): at 17:16

## 2020-01-01 RX ADMIN — SODIUM CHLORIDE 50 MILLILITER(S): 9 INJECTION, SOLUTION INTRAVENOUS at 22:02

## 2020-01-01 RX ADMIN — NYSTATIN CREAM 1 APPLICATION(S): 100000 CREAM TOPICAL at 22:28

## 2020-01-01 RX ADMIN — Medication 81 MILLIGRAM(S): at 12:03

## 2020-01-01 RX ADMIN — Medication 325 MILLIGRAM(S): at 05:18

## 2020-01-01 RX ADMIN — Medication 250 MILLIGRAM(S): at 17:43

## 2020-01-01 RX ADMIN — NYSTATIN CREAM 1 APPLICATION(S): 100000 CREAM TOPICAL at 12:10

## 2020-01-01 RX ADMIN — Medication 1 APPLICATION(S): at 10:59

## 2020-01-01 RX ADMIN — MEROPENEM 100 MILLIGRAM(S): 1 INJECTION INTRAVENOUS at 17:37

## 2020-01-01 RX ADMIN — Medication 1 TABLET(S): at 11:17

## 2020-01-01 RX ADMIN — Medication 1 TABLET(S): at 05:33

## 2020-01-01 RX ADMIN — Medication 7 UNIT(S): at 12:19

## 2020-01-01 RX ADMIN — TAMSULOSIN HYDROCHLORIDE 0.4 MILLIGRAM(S): 0.4 CAPSULE ORAL at 23:01

## 2020-01-01 RX ADMIN — TAMSULOSIN HYDROCHLORIDE 0.4 MILLIGRAM(S): 0.4 CAPSULE ORAL at 22:40

## 2020-01-01 RX ADMIN — TAMSULOSIN HYDROCHLORIDE 0.4 MILLIGRAM(S): 0.4 CAPSULE ORAL at 22:29

## 2020-01-01 RX ADMIN — Medication 250 MILLIGRAM(S): at 05:42

## 2020-01-01 RX ADMIN — Medication 25 MICROGRAM(S): at 06:14

## 2020-01-01 RX ADMIN — MEROPENEM 100 MILLIGRAM(S): 1 INJECTION INTRAVENOUS at 13:58

## 2020-01-01 RX ADMIN — Medication 100 MILLIGRAM(S): at 17:43

## 2020-01-01 RX ADMIN — Medication 1 TABLET(S): at 12:27

## 2020-01-01 RX ADMIN — Medication 2 TABLET(S): at 17:19

## 2020-01-01 RX ADMIN — Medication 12: at 17:56

## 2020-01-01 RX ADMIN — Medication 40 MILLIGRAM(S): at 17:49

## 2020-01-01 RX ADMIN — ATORVASTATIN CALCIUM 20 MILLIGRAM(S): 80 TABLET, FILM COATED ORAL at 21:06

## 2020-01-01 RX ADMIN — SODIUM CHLORIDE 75 MILLILITER(S): 9 INJECTION, SOLUTION INTRAVENOUS at 13:28

## 2020-01-01 RX ADMIN — PIPERACILLIN AND TAZOBACTAM 200 GRAM(S): 4; .5 INJECTION, POWDER, LYOPHILIZED, FOR SOLUTION INTRAVENOUS at 17:44

## 2020-01-01 RX ADMIN — MIDODRINE HYDROCHLORIDE 10 MILLIGRAM(S): 2.5 TABLET ORAL at 08:50

## 2020-01-01 RX ADMIN — Medication 325 MILLIGRAM(S): at 11:51

## 2020-01-01 RX ADMIN — MEROPENEM 100 MILLIGRAM(S): 1 INJECTION INTRAVENOUS at 05:27

## 2020-01-01 RX ADMIN — TAMSULOSIN HYDROCHLORIDE 0.4 MILLIGRAM(S): 0.4 CAPSULE ORAL at 23:29

## 2020-01-01 RX ADMIN — MEROPENEM 100 MILLIGRAM(S): 1 INJECTION INTRAVENOUS at 17:17

## 2020-01-01 RX ADMIN — MEROPENEM 100 MILLIGRAM(S): 1 INJECTION INTRAVENOUS at 17:36

## 2020-01-01 RX ADMIN — Medication 250 MILLIGRAM(S): at 17:24

## 2020-01-01 RX ADMIN — HEPARIN SODIUM 5000 UNIT(S): 5000 INJECTION INTRAVENOUS; SUBCUTANEOUS at 06:18

## 2020-01-01 RX ADMIN — Medication 40 MILLIGRAM(S): at 09:06

## 2020-01-01 RX ADMIN — Medication 325 MILLIGRAM(S): at 10:26

## 2020-01-01 RX ADMIN — Medication 40 MILLIGRAM(S): at 05:56

## 2020-01-01 RX ADMIN — NYSTATIN CREAM 1 APPLICATION(S): 100000 CREAM TOPICAL at 11:42

## 2020-01-01 RX ADMIN — MIDODRINE HYDROCHLORIDE 2.5 MILLIGRAM(S): 2.5 TABLET ORAL at 16:59

## 2020-01-01 RX ADMIN — Medication 325 MILLIGRAM(S): at 11:40

## 2020-01-01 RX ADMIN — Medication 81 MILLIGRAM(S): at 17:12

## 2020-01-01 RX ADMIN — MUPIROCIN 1 APPLICATION(S): 20 OINTMENT TOPICAL at 18:00

## 2020-01-01 RX ADMIN — Medication 40 MILLIGRAM(S): at 07:04

## 2020-01-01 RX ADMIN — HEPARIN SODIUM 5000 UNIT(S): 5000 INJECTION INTRAVENOUS; SUBCUTANEOUS at 05:37

## 2020-01-01 RX ADMIN — HEPARIN SODIUM 5000 UNIT(S): 5000 INJECTION INTRAVENOUS; SUBCUTANEOUS at 05:32

## 2020-01-01 RX ADMIN — Medication 100 MILLIGRAM(S): at 17:23

## 2020-01-01 RX ADMIN — TAMSULOSIN HYDROCHLORIDE 0.4 MILLIGRAM(S): 0.4 CAPSULE ORAL at 20:47

## 2020-01-01 RX ADMIN — Medication 2: at 16:57

## 2020-01-01 RX ADMIN — Medication 325 MILLIGRAM(S): at 21:42

## 2020-01-01 RX ADMIN — COSYNTROPIN 0.25 MILLIGRAM(S): 0.25 INJECTION, SOLUTION INTRAVENOUS at 12:30

## 2020-01-01 RX ADMIN — Medication 1 TABLET(S): at 11:56

## 2020-01-01 RX ADMIN — Medication 1 APPLICATION(S): at 17:12

## 2020-01-01 RX ADMIN — Medication 100 GRAM(S): at 14:28

## 2020-01-01 RX ADMIN — Medication 40 MILLIGRAM(S): at 04:19

## 2020-01-01 RX ADMIN — FINASTERIDE 5 MILLIGRAM(S): 5 TABLET, FILM COATED ORAL at 11:28

## 2020-01-01 RX ADMIN — ZINC SULFATE TAB 220 MG (50 MG ZINC EQUIVALENT) 220 MILLIGRAM(S): 220 (50 ZN) TAB at 11:20

## 2020-01-01 RX ADMIN — Medication 300 MILLIGRAM(S): at 11:04

## 2020-01-01 RX ADMIN — FINASTERIDE 5 MILLIGRAM(S): 5 TABLET, FILM COATED ORAL at 12:52

## 2020-01-01 RX ADMIN — CHLORHEXIDINE GLUCONATE 1 APPLICATION(S): 213 SOLUTION TOPICAL at 05:23

## 2020-01-01 RX ADMIN — MEROPENEM 100 MILLIGRAM(S): 1 INJECTION INTRAVENOUS at 18:11

## 2020-01-01 RX ADMIN — Medication 25 MICROGRAM(S): at 05:22

## 2020-01-01 RX ADMIN — HEPARIN SODIUM 5000 UNIT(S): 5000 INJECTION INTRAVENOUS; SUBCUTANEOUS at 21:46

## 2020-01-01 RX ADMIN — Medication 300 MILLIGRAM(S): at 08:58

## 2020-01-01 RX ADMIN — Medication 4: at 12:28

## 2020-01-01 RX ADMIN — PIPERACILLIN AND TAZOBACTAM 25 GRAM(S): 4; .5 INJECTION, POWDER, LYOPHILIZED, FOR SOLUTION INTRAVENOUS at 09:47

## 2020-01-01 RX ADMIN — Medication 650 MILLIGRAM(S): at 06:05

## 2020-01-01 RX ADMIN — Medication 300 MILLIGRAM(S): at 11:51

## 2020-01-01 RX ADMIN — MIDODRINE HYDROCHLORIDE 10 MILLIGRAM(S): 2.5 TABLET ORAL at 11:41

## 2020-01-01 RX ADMIN — INSULIN GLARGINE 5 UNIT(S): 100 INJECTION, SOLUTION SUBCUTANEOUS at 21:41

## 2020-01-01 RX ADMIN — Medication 1 MILLIGRAM(S): at 12:28

## 2020-01-01 RX ADMIN — ERTAPENEM SODIUM 100 MILLIGRAM(S): 1 INJECTION, POWDER, LYOPHILIZED, FOR SOLUTION INTRAMUSCULAR; INTRAVENOUS at 13:03

## 2020-01-01 RX ADMIN — ZINC SULFATE TAB 220 MG (50 MG ZINC EQUIVALENT) 220 MILLIGRAM(S): 220 (50 ZN) TAB at 11:53

## 2020-01-01 RX ADMIN — LACTULOSE 20 GRAM(S): 10 SOLUTION ORAL at 05:59

## 2020-01-01 RX ADMIN — SODIUM CHLORIDE 500 MILLILITER(S): 9 INJECTION INTRAMUSCULAR; INTRAVENOUS; SUBCUTANEOUS at 09:26

## 2020-01-01 RX ADMIN — INSULIN GLARGINE 5 UNIT(S): 100 INJECTION, SOLUTION SUBCUTANEOUS at 22:22

## 2020-01-01 RX ADMIN — FINASTERIDE 5 MILLIGRAM(S): 5 TABLET, FILM COATED ORAL at 09:22

## 2020-01-01 RX ADMIN — Medication 1 TABLET(S): at 11:58

## 2020-01-01 RX ADMIN — Medication 325 MILLIGRAM(S): at 08:23

## 2020-01-01 RX ADMIN — TAMSULOSIN HYDROCHLORIDE 0.4 MILLIGRAM(S): 0.4 CAPSULE ORAL at 21:26

## 2020-01-01 RX ADMIN — HEPARIN SODIUM 5000 UNIT(S): 5000 INJECTION INTRAVENOUS; SUBCUTANEOUS at 17:43

## 2020-01-01 RX ADMIN — MEROPENEM 100 MILLIGRAM(S): 1 INJECTION INTRAVENOUS at 06:10

## 2020-01-01 RX ADMIN — SODIUM CHLORIDE 75 MILLILITER(S): 9 INJECTION, SOLUTION INTRAVENOUS at 08:07

## 2020-01-01 RX ADMIN — Medication 325 MILLIGRAM(S): at 11:45

## 2020-01-01 RX ADMIN — MIDODRINE HYDROCHLORIDE 5 MILLIGRAM(S): 2.5 TABLET ORAL at 11:05

## 2020-01-01 RX ADMIN — MEROPENEM 100 MILLIGRAM(S): 1 INJECTION INTRAVENOUS at 17:14

## 2020-01-01 RX ADMIN — Medication 2: at 16:45

## 2020-01-01 RX ADMIN — Medication 300 MILLIGRAM(S): at 11:01

## 2020-01-01 RX ADMIN — HEPARIN SODIUM 5000 UNIT(S): 5000 INJECTION INTRAVENOUS; SUBCUTANEOUS at 22:35

## 2020-01-01 RX ADMIN — Medication 5 MILLILITER(S): at 03:18

## 2020-01-01 RX ADMIN — Medication 200 GRAM(S): at 11:32

## 2020-01-01 RX ADMIN — HEPARIN SODIUM 5000 UNIT(S): 5000 INJECTION INTRAVENOUS; SUBCUTANEOUS at 17:49

## 2020-01-01 RX ADMIN — Medication 5 UNIT(S): at 08:32

## 2020-01-01 RX ADMIN — MIDODRINE HYDROCHLORIDE 5 MILLIGRAM(S): 2.5 TABLET ORAL at 17:40

## 2020-01-01 RX ADMIN — NYSTATIN CREAM 1 APPLICATION(S): 100000 CREAM TOPICAL at 22:57

## 2020-01-01 RX ADMIN — Medication 325 MILLIGRAM(S): at 05:16

## 2020-01-01 RX ADMIN — MIDODRINE HYDROCHLORIDE 10 MILLIGRAM(S): 2.5 TABLET ORAL at 11:53

## 2020-01-01 RX ADMIN — HEPARIN SODIUM 5000 UNIT(S): 5000 INJECTION INTRAVENOUS; SUBCUTANEOUS at 22:02

## 2020-01-01 RX ADMIN — MEROPENEM 100 MILLIGRAM(S): 1 INJECTION INTRAVENOUS at 05:39

## 2020-01-01 RX ADMIN — PIPERACILLIN AND TAZOBACTAM 3.38 GRAM(S): 4; .5 INJECTION, POWDER, LYOPHILIZED, FOR SOLUTION INTRAVENOUS at 19:43

## 2020-01-01 RX ADMIN — INSULIN GLARGINE 5 UNIT(S): 100 INJECTION, SOLUTION SUBCUTANEOUS at 21:22

## 2020-01-01 RX ADMIN — NYSTATIN CREAM 1 APPLICATION(S): 100000 CREAM TOPICAL at 05:09

## 2020-01-01 RX ADMIN — Medication 1 APPLICATION(S): at 06:31

## 2020-01-01 RX ADMIN — Medication 300 MILLIGRAM(S): at 11:34

## 2020-01-01 RX ADMIN — TAMSULOSIN HYDROCHLORIDE 0.4 MILLIGRAM(S): 0.4 CAPSULE ORAL at 21:40

## 2020-01-01 RX ADMIN — NYSTATIN CREAM 1 APPLICATION(S): 100000 CREAM TOPICAL at 17:02

## 2020-01-01 RX ADMIN — NYSTATIN CREAM 1 APPLICATION(S): 100000 CREAM TOPICAL at 17:23

## 2020-01-01 RX ADMIN — ATORVASTATIN CALCIUM 20 MILLIGRAM(S): 80 TABLET, FILM COATED ORAL at 22:47

## 2020-01-01 RX ADMIN — SODIUM CHLORIDE 83 MILLILITER(S): 9 INJECTION INTRAMUSCULAR; INTRAVENOUS; SUBCUTANEOUS at 10:40

## 2020-01-01 RX ADMIN — Medication 325 MILLIGRAM(S): at 13:21

## 2020-01-01 RX ADMIN — Medication 50 GRAM(S): at 11:06

## 2020-01-01 RX ADMIN — CEFTRIAXONE 100 MILLIGRAM(S): 500 INJECTION, POWDER, FOR SOLUTION INTRAMUSCULAR; INTRAVENOUS at 14:50

## 2020-01-01 RX ADMIN — HEPARIN SODIUM 5000 UNIT(S): 5000 INJECTION INTRAVENOUS; SUBCUTANEOUS at 17:31

## 2020-01-01 RX ADMIN — ZINC SULFATE TAB 220 MG (50 MG ZINC EQUIVALENT) 220 MILLIGRAM(S): 220 (50 ZN) TAB at 11:05

## 2020-01-01 RX ADMIN — MIDODRINE HYDROCHLORIDE 5 MILLIGRAM(S): 2.5 TABLET ORAL at 05:45

## 2020-01-01 RX ADMIN — MAGNESIUM OXIDE 400 MG ORAL TABLET 400 MILLIGRAM(S): 241.3 TABLET ORAL at 13:42

## 2020-01-01 RX ADMIN — ATORVASTATIN CALCIUM 20 MILLIGRAM(S): 80 TABLET, FILM COATED ORAL at 21:26

## 2020-01-01 RX ADMIN — Medication 325 MILLIGRAM(S): at 13:06

## 2020-01-01 RX ADMIN — Medication 100 GRAM(S): at 08:42

## 2020-01-01 RX ADMIN — SODIUM CHLORIDE 60 MILLILITER(S): 9 INJECTION, SOLUTION INTRAVENOUS at 11:34

## 2020-01-01 RX ADMIN — Medication 500 MILLIGRAM(S): at 05:58

## 2020-01-01 RX ADMIN — Medication 2 TABLET(S): at 18:12

## 2020-01-01 RX ADMIN — FINASTERIDE 5 MILLIGRAM(S): 5 TABLET, FILM COATED ORAL at 11:53

## 2020-01-01 RX ADMIN — Medication 500 MILLIGRAM(S): at 17:12

## 2020-01-01 RX ADMIN — CHLORHEXIDINE GLUCONATE 1 APPLICATION(S): 213 SOLUTION TOPICAL at 10:02

## 2020-01-01 RX ADMIN — HEPARIN SODIUM 5000 UNIT(S): 5000 INJECTION INTRAVENOUS; SUBCUTANEOUS at 05:19

## 2020-01-01 RX ADMIN — NYSTATIN CREAM 1 APPLICATION(S): 100000 CREAM TOPICAL at 13:44

## 2020-01-01 RX ADMIN — ATORVASTATIN CALCIUM 20 MILLIGRAM(S): 80 TABLET, FILM COATED ORAL at 21:57

## 2020-01-01 RX ADMIN — Medication 100 MILLIGRAM(S): at 11:35

## 2020-01-01 RX ADMIN — MIDODRINE HYDROCHLORIDE 10 MILLIGRAM(S): 2.5 TABLET ORAL at 12:18

## 2020-01-01 RX ADMIN — HEPARIN SODIUM 5000 UNIT(S): 5000 INJECTION INTRAVENOUS; SUBCUTANEOUS at 15:56

## 2020-01-01 RX ADMIN — HEPARIN SODIUM 5000 UNIT(S): 5000 INJECTION INTRAVENOUS; SUBCUTANEOUS at 14:11

## 2020-01-01 RX ADMIN — Medication 105 MEQ/KG/HR: at 08:13

## 2020-01-01 RX ADMIN — Medication 2: at 08:19

## 2020-01-01 RX ADMIN — Medication 4: at 17:15

## 2020-01-01 RX ADMIN — PIPERACILLIN AND TAZOBACTAM 25 GRAM(S): 4; .5 INJECTION, POWDER, LYOPHILIZED, FOR SOLUTION INTRAVENOUS at 18:14

## 2020-01-01 RX ADMIN — Medication 0.5 MILLIGRAM(S): at 00:56

## 2020-01-01 RX ADMIN — CHLORHEXIDINE GLUCONATE 1 APPLICATION(S): 213 SOLUTION TOPICAL at 05:45

## 2020-01-01 RX ADMIN — Medication 1 MILLIGRAM(S): at 09:46

## 2020-01-01 RX ADMIN — HEPARIN SODIUM 5000 UNIT(S): 5000 INJECTION INTRAVENOUS; SUBCUTANEOUS at 05:27

## 2020-01-01 RX ADMIN — INSULIN GLARGINE 15 UNIT(S): 100 INJECTION, SOLUTION SUBCUTANEOUS at 21:54

## 2020-01-01 RX ADMIN — MEROPENEM 100 MILLIGRAM(S): 1 INJECTION INTRAVENOUS at 16:40

## 2020-01-01 RX ADMIN — HEPARIN SODIUM 5000 UNIT(S): 5000 INJECTION INTRAVENOUS; SUBCUTANEOUS at 17:17

## 2020-01-01 RX ADMIN — HALOPERIDOL DECANOATE 2 MILLIGRAM(S): 100 INJECTION INTRAMUSCULAR at 21:00

## 2020-01-01 RX ADMIN — MIDODRINE HYDROCHLORIDE 10 MILLIGRAM(S): 2.5 TABLET ORAL at 22:42

## 2020-01-01 RX ADMIN — SODIUM CHLORIDE 75 MILLILITER(S): 9 INJECTION INTRAMUSCULAR; INTRAVENOUS; SUBCUTANEOUS at 17:03

## 2020-01-01 RX ADMIN — Medication 300 MILLIGRAM(S): at 12:01

## 2020-01-01 RX ADMIN — MUPIROCIN 1 APPLICATION(S): 20 OINTMENT TOPICAL at 17:16

## 2020-01-01 RX ADMIN — Medication 105 MEQ/KG/HR: at 22:55

## 2020-01-01 RX ADMIN — MIDODRINE HYDROCHLORIDE 10 MILLIGRAM(S): 2.5 TABLET ORAL at 11:45

## 2020-01-01 RX ADMIN — PIPERACILLIN AND TAZOBACTAM 25 GRAM(S): 4; .5 INJECTION, POWDER, LYOPHILIZED, FOR SOLUTION INTRAVENOUS at 10:50

## 2020-01-01 RX ADMIN — FINASTERIDE 5 MILLIGRAM(S): 5 TABLET, FILM COATED ORAL at 12:15

## 2020-01-01 RX ADMIN — Medication 650 MILLIGRAM(S): at 23:38

## 2020-01-01 RX ADMIN — Medication 250 MILLIGRAM(S): at 17:18

## 2020-01-01 RX ADMIN — HEPARIN SODIUM 5000 UNIT(S): 5000 INJECTION INTRAVENOUS; SUBCUTANEOUS at 16:59

## 2020-01-01 RX ADMIN — FINASTERIDE 5 MILLIGRAM(S): 5 TABLET, FILM COATED ORAL at 11:34

## 2020-01-01 RX ADMIN — HEPARIN SODIUM 5000 UNIT(S): 5000 INJECTION INTRAVENOUS; SUBCUTANEOUS at 22:11

## 2020-01-01 RX ADMIN — MIDODRINE HYDROCHLORIDE 10 MILLIGRAM(S): 2.5 TABLET ORAL at 08:25

## 2020-01-01 RX ADMIN — FINASTERIDE 5 MILLIGRAM(S): 5 TABLET, FILM COATED ORAL at 08:58

## 2020-01-01 RX ADMIN — PANTOPRAZOLE SODIUM 40 MILLIGRAM(S): 20 TABLET, DELAYED RELEASE ORAL at 11:55

## 2020-01-01 RX ADMIN — Medication 8: at 12:31

## 2020-01-01 RX ADMIN — SODIUM CHLORIDE 75 MILLILITER(S): 9 INJECTION INTRAMUSCULAR; INTRAVENOUS; SUBCUTANEOUS at 17:16

## 2020-01-01 RX ADMIN — Medication 1 TABLET(S): at 16:01

## 2020-01-01 RX ADMIN — Medication 1000 MILLIGRAM(S): at 23:01

## 2020-01-01 RX ADMIN — ATORVASTATIN CALCIUM 20 MILLIGRAM(S): 80 TABLET, FILM COATED ORAL at 21:10

## 2020-01-01 RX ADMIN — SODIUM CHLORIDE 50 MILLILITER(S): 9 INJECTION, SOLUTION INTRAVENOUS at 23:19

## 2020-01-01 RX ADMIN — HEPARIN SODIUM 5000 UNIT(S): 5000 INJECTION INTRAVENOUS; SUBCUTANEOUS at 06:33

## 2020-01-01 RX ADMIN — ATORVASTATIN CALCIUM 20 MILLIGRAM(S): 80 TABLET, FILM COATED ORAL at 22:33

## 2020-01-01 RX ADMIN — Medication 300 MILLIGRAM(S): at 11:20

## 2020-01-01 RX ADMIN — ATORVASTATIN CALCIUM 20 MILLIGRAM(S): 80 TABLET, FILM COATED ORAL at 21:58

## 2020-01-01 RX ADMIN — SODIUM CHLORIDE 75 MILLILITER(S): 9 INJECTION, SOLUTION INTRAVENOUS at 22:58

## 2020-01-01 RX ADMIN — NYSTATIN CREAM 1 APPLICATION(S): 100000 CREAM TOPICAL at 05:19

## 2020-01-01 RX ADMIN — SENNA PLUS 2 TABLET(S): 8.6 TABLET ORAL at 21:03

## 2020-01-01 RX ADMIN — NYSTATIN CREAM 1 APPLICATION(S): 100000 CREAM TOPICAL at 01:29

## 2020-01-01 RX ADMIN — Medication 1 TABLET(S): at 17:07

## 2020-01-01 RX ADMIN — Medication 300 MILLIGRAM(S): at 10:05

## 2020-01-01 RX ADMIN — Medication 1 TABLET(S): at 06:11

## 2020-01-01 RX ADMIN — Medication 50 GRAM(S): at 14:40

## 2020-01-01 RX ADMIN — SODIUM CHLORIDE 100 MILLILITER(S): 9 INJECTION, SOLUTION INTRAVENOUS at 12:19

## 2020-01-01 RX ADMIN — Medication 25 MICROGRAM(S): at 05:24

## 2020-01-01 RX ADMIN — PANTOPRAZOLE SODIUM 40 MILLIGRAM(S): 20 TABLET, DELAYED RELEASE ORAL at 05:16

## 2020-01-01 RX ADMIN — TAMSULOSIN HYDROCHLORIDE 0.4 MILLIGRAM(S): 0.4 CAPSULE ORAL at 21:23

## 2020-01-01 RX ADMIN — TAMSULOSIN HYDROCHLORIDE 0.4 MILLIGRAM(S): 0.4 CAPSULE ORAL at 21:57

## 2020-01-01 RX ADMIN — HEPARIN SODIUM 5000 UNIT(S): 5000 INJECTION INTRAVENOUS; SUBCUTANEOUS at 06:42

## 2020-01-01 RX ADMIN — Medication 1 SPRAY(S): at 17:17

## 2020-01-01 RX ADMIN — TAMSULOSIN HYDROCHLORIDE 0.4 MILLIGRAM(S): 0.4 CAPSULE ORAL at 21:06

## 2020-01-01 RX ADMIN — Medication 100 MILLIGRAM(S): at 05:42

## 2020-01-01 RX ADMIN — SODIUM CHLORIDE 75 MILLILITER(S): 9 INJECTION, SOLUTION INTRAVENOUS at 18:43

## 2020-01-01 RX ADMIN — PIPERACILLIN AND TAZOBACTAM 25 GRAM(S): 4; .5 INJECTION, POWDER, LYOPHILIZED, FOR SOLUTION INTRAVENOUS at 16:59

## 2020-01-01 RX ADMIN — MAGNESIUM OXIDE 400 MG ORAL TABLET 400 MILLIGRAM(S): 241.3 TABLET ORAL at 17:37

## 2020-01-01 RX ADMIN — MIDODRINE HYDROCHLORIDE 10 MILLIGRAM(S): 2.5 TABLET ORAL at 08:21

## 2020-01-01 RX ADMIN — HEPARIN SODIUM 5000 UNIT(S): 5000 INJECTION INTRAVENOUS; SUBCUTANEOUS at 13:03

## 2020-01-01 RX ADMIN — Medication 10 MILLIGRAM(S): at 10:04

## 2020-01-01 RX ADMIN — Medication 1 SPRAY(S): at 06:20

## 2020-01-01 RX ADMIN — Medication 10 MILLIGRAM(S): at 11:28

## 2020-01-01 RX ADMIN — MIDODRINE HYDROCHLORIDE 10 MILLIGRAM(S): 2.5 TABLET ORAL at 09:47

## 2020-01-01 RX ADMIN — HEPARIN SODIUM 5000 UNIT(S): 5000 INJECTION INTRAVENOUS; SUBCUTANEOUS at 06:14

## 2020-01-01 RX ADMIN — HEPARIN SODIUM 5000 UNIT(S): 5000 INJECTION INTRAVENOUS; SUBCUTANEOUS at 14:00

## 2020-01-01 RX ADMIN — Medication 1 APPLICATION(S): at 16:14

## 2020-01-01 RX ADMIN — ZINC SULFATE TAB 220 MG (50 MG ZINC EQUIVALENT) 220 MILLIGRAM(S): 220 (50 ZN) TAB at 11:14

## 2020-01-01 RX ADMIN — SENNA PLUS 2 TABLET(S): 8.6 TABLET ORAL at 21:10

## 2020-01-01 RX ADMIN — Medication 4: at 07:50

## 2020-01-01 RX ADMIN — MEROPENEM 100 MILLIGRAM(S): 1 INJECTION INTRAVENOUS at 23:07

## 2020-01-01 RX ADMIN — MIDODRINE HYDROCHLORIDE 10 MILLIGRAM(S): 2.5 TABLET ORAL at 18:29

## 2020-01-01 RX ADMIN — ATORVASTATIN CALCIUM 20 MILLIGRAM(S): 80 TABLET, FILM COATED ORAL at 22:09

## 2020-01-01 RX ADMIN — INSULIN HUMAN 10 UNIT(S): 100 INJECTION, SOLUTION SUBCUTANEOUS at 18:50

## 2020-01-01 RX ADMIN — Medication 325 MILLIGRAM(S): at 15:38

## 2020-01-01 RX ADMIN — MIDODRINE HYDROCHLORIDE 5 MILLIGRAM(S): 2.5 TABLET ORAL at 05:00

## 2020-01-01 RX ADMIN — Medication 25 MICROGRAM(S): at 05:17

## 2020-01-01 RX ADMIN — Medication 10 MILLIGRAM(S): at 11:51

## 2020-01-01 RX ADMIN — MIDODRINE HYDROCHLORIDE 5 MILLIGRAM(S): 2.5 TABLET ORAL at 11:44

## 2020-01-01 RX ADMIN — Medication 25 MICROGRAM(S): at 05:42

## 2020-01-01 RX ADMIN — NYSTATIN CREAM 1 APPLICATION(S): 100000 CREAM TOPICAL at 13:08

## 2020-01-01 RX ADMIN — SODIUM CHLORIDE 75 MILLILITER(S): 9 INJECTION INTRAMUSCULAR; INTRAVENOUS; SUBCUTANEOUS at 11:07

## 2020-01-01 RX ADMIN — NYSTATIN CREAM 1 APPLICATION(S): 100000 CREAM TOPICAL at 05:52

## 2020-01-01 RX ADMIN — ATORVASTATIN CALCIUM 20 MILLIGRAM(S): 80 TABLET, FILM COATED ORAL at 22:02

## 2020-01-01 RX ADMIN — Medication 50 MILLILITER(S): at 03:01

## 2020-01-01 RX ADMIN — Medication 325 MILLIGRAM(S): at 22:40

## 2020-01-01 RX ADMIN — Medication 100 MILLIGRAM(S): at 01:08

## 2020-01-01 RX ADMIN — SODIUM CHLORIDE 1000 MILLILITER(S): 9 INJECTION, SOLUTION INTRAVENOUS at 09:02

## 2020-01-01 RX ADMIN — HEPARIN SODIUM 5000 UNIT(S): 5000 INJECTION INTRAVENOUS; SUBCUTANEOUS at 16:02

## 2020-01-01 RX ADMIN — Medication 500 MILLIGRAM(S): at 11:04

## 2020-01-01 RX ADMIN — Medication 50 MILLIGRAM(S): at 18:02

## 2020-01-01 RX ADMIN — SODIUM CHLORIDE 1000 MILLILITER(S): 9 INJECTION INTRAMUSCULAR; INTRAVENOUS; SUBCUTANEOUS at 23:25

## 2020-01-01 RX ADMIN — FINASTERIDE 5 MILLIGRAM(S): 5 TABLET, FILM COATED ORAL at 12:00

## 2020-01-01 RX ADMIN — Medication 1 TABLET(S): at 17:15

## 2020-01-01 RX ADMIN — Medication 250 MILLIGRAM(S): at 17:06

## 2020-01-01 RX ADMIN — Medication 600 MILLIGRAM(S): at 12:11

## 2020-01-01 RX ADMIN — FINASTERIDE 5 MILLIGRAM(S): 5 TABLET, FILM COATED ORAL at 11:51

## 2020-01-01 RX ADMIN — CHLORHEXIDINE GLUCONATE 1 APPLICATION(S): 213 SOLUTION TOPICAL at 05:32

## 2020-01-01 RX ADMIN — HEPARIN SODIUM 5000 UNIT(S): 5000 INJECTION INTRAVENOUS; SUBCUTANEOUS at 21:01

## 2020-01-01 RX ADMIN — Medication 50 MILLILITER(S): at 10:11

## 2020-01-01 RX ADMIN — MIDODRINE HYDROCHLORIDE 2.5 MILLIGRAM(S): 2.5 TABLET ORAL at 12:43

## 2020-01-01 RX ADMIN — MIDODRINE HYDROCHLORIDE 10 MILLIGRAM(S): 2.5 TABLET ORAL at 09:19

## 2020-01-01 RX ADMIN — CHLORHEXIDINE GLUCONATE 1 APPLICATION(S): 213 SOLUTION TOPICAL at 12:15

## 2020-01-01 RX ADMIN — MEROPENEM 100 MILLIGRAM(S): 1 INJECTION INTRAVENOUS at 05:54

## 2020-01-01 RX ADMIN — ZINC SULFATE TAB 220 MG (50 MG ZINC EQUIVALENT) 220 MILLIGRAM(S): 220 (50 ZN) TAB at 11:28

## 2020-01-01 RX ADMIN — Medication 81 MILLIGRAM(S): at 12:14

## 2020-01-01 RX ADMIN — FINASTERIDE 5 MILLIGRAM(S): 5 TABLET, FILM COATED ORAL at 12:29

## 2020-01-01 RX ADMIN — Medication 300 MILLIGRAM(S): at 09:46

## 2020-01-01 RX ADMIN — Medication 250 MILLIGRAM(S): at 05:14

## 2020-01-01 RX ADMIN — MEROPENEM 100 MILLIGRAM(S): 1 INJECTION INTRAVENOUS at 06:16

## 2020-01-01 RX ADMIN — Medication 105 MEQ/KG/HR: at 06:11

## 2020-01-01 RX ADMIN — SODIUM CHLORIDE 50 MILLILITER(S): 9 INJECTION, SOLUTION INTRAVENOUS at 21:18

## 2020-01-01 RX ADMIN — NYSTATIN CREAM 1 APPLICATION(S): 100000 CREAM TOPICAL at 06:18

## 2020-01-01 RX ADMIN — ALBUTEROL 2 PUFF(S): 90 AEROSOL, METERED ORAL at 17:07

## 2020-01-01 RX ADMIN — Medication 325 MILLIGRAM(S): at 05:22

## 2020-01-01 RX ADMIN — Medication 500 MILLIGRAM(S): at 17:19

## 2020-01-01 RX ADMIN — PIPERACILLIN AND TAZOBACTAM 25 GRAM(S): 4; .5 INJECTION, POWDER, LYOPHILIZED, FOR SOLUTION INTRAVENOUS at 01:03

## 2020-01-01 RX ADMIN — Medication 6: at 17:43

## 2020-01-01 RX ADMIN — NYSTATIN CREAM 1 APPLICATION(S): 100000 CREAM TOPICAL at 12:13

## 2020-01-01 RX ADMIN — SENNA PLUS 2 TABLET(S): 8.6 TABLET ORAL at 21:01

## 2020-01-01 RX ADMIN — Medication 5 MILLIGRAM(S): at 17:32

## 2020-01-01 RX ADMIN — MIDODRINE HYDROCHLORIDE 10 MILLIGRAM(S): 2.5 TABLET ORAL at 12:21

## 2020-01-01 RX ADMIN — ERYTHROPOIETIN 40000 UNIT(S): 10000 INJECTION, SOLUTION INTRAVENOUS; SUBCUTANEOUS at 16:01

## 2020-01-01 RX ADMIN — FINASTERIDE 5 MILLIGRAM(S): 5 TABLET, FILM COATED ORAL at 08:12

## 2020-01-01 RX ADMIN — MIDODRINE HYDROCHLORIDE 10 MILLIGRAM(S): 2.5 TABLET ORAL at 08:44

## 2020-01-01 RX ADMIN — HEPARIN SODIUM 5000 UNIT(S): 5000 INJECTION INTRAVENOUS; SUBCUTANEOUS at 20:47

## 2020-01-01 RX ADMIN — Medication 100 MILLIGRAM(S): at 09:14

## 2020-01-01 RX ADMIN — NYSTATIN CREAM 1 APPLICATION(S): 100000 CREAM TOPICAL at 05:03

## 2020-01-01 RX ADMIN — NYSTATIN CREAM 1 APPLICATION(S): 100000 CREAM TOPICAL at 13:55

## 2020-01-01 RX ADMIN — Medication 100 MILLIGRAM(S): at 11:04

## 2020-01-01 RX ADMIN — TAMSULOSIN HYDROCHLORIDE 0.4 MILLIGRAM(S): 0.4 CAPSULE ORAL at 22:14

## 2020-01-01 RX ADMIN — MEROPENEM 100 MILLIGRAM(S): 1 INJECTION INTRAVENOUS at 05:36

## 2020-01-01 RX ADMIN — Medication 2 TABLET(S): at 05:45

## 2020-01-01 RX ADMIN — MIDODRINE HYDROCHLORIDE 10 MILLIGRAM(S): 2.5 TABLET ORAL at 11:57

## 2020-01-01 RX ADMIN — Medication 250 MILLIGRAM(S): at 17:14

## 2020-01-01 RX ADMIN — MIDODRINE HYDROCHLORIDE 2.5 MILLIGRAM(S): 2.5 TABLET ORAL at 12:08

## 2020-01-01 RX ADMIN — Medication 250 MILLIGRAM(S): at 18:29

## 2020-01-01 RX ADMIN — SODIUM POLYSTYRENE SULFONATE 60 GRAM(S): 4.1 POWDER, FOR SUSPENSION ORAL at 16:40

## 2020-01-01 RX ADMIN — HEPARIN SODIUM 5000 UNIT(S): 5000 INJECTION INTRAVENOUS; SUBCUTANEOUS at 13:07

## 2020-01-01 RX ADMIN — Medication 1 TABLET(S): at 17:16

## 2020-01-01 RX ADMIN — MAGNESIUM OXIDE 400 MG ORAL TABLET 400 MILLIGRAM(S): 241.3 TABLET ORAL at 10:04

## 2020-01-01 RX ADMIN — NYSTATIN CREAM 1 APPLICATION(S): 100000 CREAM TOPICAL at 11:41

## 2020-01-01 RX ADMIN — Medication 4: at 11:22

## 2020-01-01 RX ADMIN — Medication 50 GRAM(S): at 07:45

## 2020-01-01 RX ADMIN — ATORVASTATIN CALCIUM 20 MILLIGRAM(S): 80 TABLET, FILM COATED ORAL at 21:28

## 2020-01-01 RX ADMIN — HEPARIN SODIUM 5000 UNIT(S): 5000 INJECTION INTRAVENOUS; SUBCUTANEOUS at 13:33

## 2020-01-01 RX ADMIN — HEPARIN SODIUM 5000 UNIT(S): 5000 INJECTION INTRAVENOUS; SUBCUTANEOUS at 18:09

## 2020-01-01 RX ADMIN — Medication 1 TABLET(S): at 08:23

## 2020-01-01 RX ADMIN — Medication 1 TABLET(S): at 11:34

## 2020-01-01 RX ADMIN — IRON SUCROSE 110 MILLIGRAM(S): 20 INJECTION, SOLUTION INTRAVENOUS at 17:05

## 2020-01-01 RX ADMIN — Medication 1: at 16:49

## 2020-01-01 RX ADMIN — Medication 1 TABLET(S): at 16:51

## 2020-01-01 RX ADMIN — MIDODRINE HYDROCHLORIDE 5 MILLIGRAM(S): 2.5 TABLET ORAL at 17:22

## 2020-01-01 RX ADMIN — SODIUM CHLORIDE 500 MILLILITER(S): 9 INJECTION INTRAMUSCULAR; INTRAVENOUS; SUBCUTANEOUS at 20:15

## 2020-01-01 RX ADMIN — Medication 50 GRAM(S): at 09:27

## 2020-01-01 RX ADMIN — Medication 50 MILLILITER(S): at 23:20

## 2020-01-01 RX ADMIN — MIDODRINE HYDROCHLORIDE 10 MILLIGRAM(S): 2.5 TABLET ORAL at 22:14

## 2020-01-01 RX ADMIN — CHLORHEXIDINE GLUCONATE 1 APPLICATION(S): 213 SOLUTION TOPICAL at 05:51

## 2020-01-01 RX ADMIN — MIDODRINE HYDROCHLORIDE 2.5 MILLIGRAM(S): 2.5 TABLET ORAL at 17:06

## 2020-01-01 RX ADMIN — Medication 1: at 17:33

## 2020-01-01 RX ADMIN — Medication 5 MILLIGRAM(S): at 18:29

## 2020-01-01 RX ADMIN — NYSTATIN CREAM 1 APPLICATION(S): 100000 CREAM TOPICAL at 21:44

## 2020-01-01 RX ADMIN — Medication 105 MEQ/KG/HR: at 21:34

## 2020-01-01 RX ADMIN — Medication 325 MILLIGRAM(S): at 11:56

## 2020-01-01 RX ADMIN — Medication 0.5 MILLIGRAM(S): at 00:26

## 2020-01-01 RX ADMIN — Medication 325 MILLIGRAM(S): at 18:11

## 2020-01-01 RX ADMIN — HEPARIN SODIUM 5000 UNIT(S): 5000 INJECTION INTRAVENOUS; SUBCUTANEOUS at 06:21

## 2020-01-01 RX ADMIN — Medication 250 MILLIGRAM(S): at 05:39

## 2020-01-01 RX ADMIN — PIPERACILLIN AND TAZOBACTAM 25 GRAM(S): 4; .5 INJECTION, POWDER, LYOPHILIZED, FOR SOLUTION INTRAVENOUS at 00:04

## 2020-01-01 RX ADMIN — Medication 975 MILLIGRAM(S): at 23:34

## 2020-01-01 RX ADMIN — Medication 1 TABLET(S): at 16:27

## 2020-01-01 RX ADMIN — MIDODRINE HYDROCHLORIDE 5 MILLIGRAM(S): 2.5 TABLET ORAL at 17:19

## 2020-01-01 RX ADMIN — NYSTATIN CREAM 1 APPLICATION(S): 100000 CREAM TOPICAL at 21:11

## 2020-01-01 RX ADMIN — HEPARIN SODIUM 5000 UNIT(S): 5000 INJECTION INTRAVENOUS; SUBCUTANEOUS at 22:25

## 2020-01-01 RX ADMIN — Medication 325 MILLIGRAM(S): at 21:26

## 2020-01-01 RX ADMIN — HEPARIN SODIUM 5000 UNIT(S): 5000 INJECTION INTRAVENOUS; SUBCUTANEOUS at 17:07

## 2020-01-01 RX ADMIN — ATORVASTATIN CALCIUM 20 MILLIGRAM(S): 80 TABLET, FILM COATED ORAL at 21:15

## 2020-01-01 RX ADMIN — Medication 2: at 11:50

## 2020-01-01 RX ADMIN — PIPERACILLIN AND TAZOBACTAM 25 GRAM(S): 4; .5 INJECTION, POWDER, LYOPHILIZED, FOR SOLUTION INTRAVENOUS at 00:07

## 2020-01-01 RX ADMIN — PIPERACILLIN AND TAZOBACTAM 3.38 GRAM(S): 4; .5 INJECTION, POWDER, LYOPHILIZED, FOR SOLUTION INTRAVENOUS at 19:27

## 2020-01-01 RX ADMIN — MIDODRINE HYDROCHLORIDE 10 MILLIGRAM(S): 2.5 TABLET ORAL at 09:20

## 2020-01-01 RX ADMIN — NYSTATIN CREAM 1 APPLICATION(S): 100000 CREAM TOPICAL at 23:07

## 2020-01-01 RX ADMIN — Medication 300 MILLIGRAM(S): at 11:53

## 2020-01-01 RX ADMIN — Medication 12.5 MICROGRAM(S): at 01:28

## 2020-01-01 RX ADMIN — SENNA PLUS 2 TABLET(S): 8.6 TABLET ORAL at 23:07

## 2020-01-01 RX ADMIN — MIDODRINE HYDROCHLORIDE 10 MILLIGRAM(S): 2.5 TABLET ORAL at 10:01

## 2020-01-01 RX ADMIN — MIDODRINE HYDROCHLORIDE 10 MILLIGRAM(S): 2.5 TABLET ORAL at 18:10

## 2020-01-01 RX ADMIN — Medication 50 MILLILITER(S): at 16:10

## 2020-01-01 RX ADMIN — Medication 325 MILLIGRAM(S): at 09:20

## 2020-01-01 RX ADMIN — Medication 1 APPLICATION(S): at 17:14

## 2020-01-01 RX ADMIN — Medication 325 MILLIGRAM(S): at 21:01

## 2020-01-01 RX ADMIN — Medication 325 MILLIGRAM(S): at 06:40

## 2020-01-01 RX ADMIN — SODIUM CHLORIDE 500 MILLILITER(S): 9 INJECTION INTRAMUSCULAR; INTRAVENOUS; SUBCUTANEOUS at 08:43

## 2020-01-01 RX ADMIN — NYSTATIN CREAM 1 APPLICATION(S): 100000 CREAM TOPICAL at 06:35

## 2020-01-01 RX ADMIN — Medication 1 APPLICATION(S): at 17:35

## 2020-01-01 RX ADMIN — MIDODRINE HYDROCHLORIDE 10 MILLIGRAM(S): 2.5 TABLET ORAL at 06:19

## 2020-01-01 RX ADMIN — Medication 1 TABLET(S): at 13:41

## 2020-01-01 RX ADMIN — PANTOPRAZOLE SODIUM 40 MILLIGRAM(S): 20 TABLET, DELAYED RELEASE ORAL at 10:23

## 2020-01-01 RX ADMIN — MEROPENEM 100 MILLIGRAM(S): 1 INJECTION INTRAVENOUS at 06:33

## 2020-01-01 RX ADMIN — SODIUM CHLORIDE 60 MILLILITER(S): 9 INJECTION, SOLUTION INTRAVENOUS at 18:07

## 2020-01-01 RX ADMIN — Medication 1 TABLET(S): at 11:16

## 2020-01-01 RX ADMIN — SODIUM CHLORIDE 250 MILLILITER(S): 9 INJECTION INTRAMUSCULAR; INTRAVENOUS; SUBCUTANEOUS at 11:33

## 2020-01-01 RX ADMIN — MIDODRINE HYDROCHLORIDE 10 MILLIGRAM(S): 2.5 TABLET ORAL at 05:27

## 2020-01-01 RX ADMIN — Medication 2: at 12:26

## 2020-01-01 RX ADMIN — Medication 2 TABLET(S): at 05:59

## 2020-01-01 RX ADMIN — Medication 250 MILLIGRAM(S): at 17:31

## 2020-01-01 RX ADMIN — ERTAPENEM SODIUM 100 MILLIGRAM(S): 1 INJECTION, POWDER, LYOPHILIZED, FOR SOLUTION INTRAMUSCULAR; INTRAVENOUS at 05:16

## 2020-01-01 RX ADMIN — TAMSULOSIN HYDROCHLORIDE 0.4 MILLIGRAM(S): 0.4 CAPSULE ORAL at 22:28

## 2020-01-01 RX ADMIN — Medication 8: at 12:57

## 2020-01-01 RX ADMIN — Medication 2 TABLET(S): at 05:00

## 2020-01-01 RX ADMIN — INSULIN GLARGINE 15 UNIT(S): 100 INJECTION, SOLUTION SUBCUTANEOUS at 22:04

## 2020-01-01 RX ADMIN — SODIUM CHLORIDE 50 MILLILITER(S): 9 INJECTION, SOLUTION INTRAVENOUS at 20:42

## 2020-01-01 RX ADMIN — MIDODRINE HYDROCHLORIDE 2.5 MILLIGRAM(S): 2.5 TABLET ORAL at 18:14

## 2020-01-01 RX ADMIN — Medication 250 MILLIGRAM(S): at 05:16

## 2020-01-01 RX ADMIN — Medication 40 MILLIGRAM(S): at 18:19

## 2020-01-01 RX ADMIN — Medication 1 APPLICATION(S): at 18:17

## 2020-01-01 RX ADMIN — Medication 325 MILLIGRAM(S): at 22:05

## 2020-01-01 RX ADMIN — NYSTATIN CREAM 1 APPLICATION(S): 100000 CREAM TOPICAL at 21:51

## 2020-01-01 RX ADMIN — SODIUM CHLORIDE 60 MILLILITER(S): 9 INJECTION, SOLUTION INTRAVENOUS at 02:26

## 2020-01-01 RX ADMIN — TAMSULOSIN HYDROCHLORIDE 0.4 MILLIGRAM(S): 0.4 CAPSULE ORAL at 21:01

## 2020-01-01 RX ADMIN — Medication 40 MILLIEQUIVALENT(S): at 18:41

## 2020-01-01 RX ADMIN — Medication 40 MILLIGRAM(S): at 16:27

## 2020-01-01 RX ADMIN — HEPARIN SODIUM 5000 UNIT(S): 5000 INJECTION INTRAVENOUS; SUBCUTANEOUS at 05:26

## 2020-01-01 RX ADMIN — Medication 250 MILLIGRAM(S): at 05:31

## 2020-01-01 RX ADMIN — NYSTATIN CREAM 1 APPLICATION(S): 100000 CREAM TOPICAL at 22:48

## 2020-01-01 RX ADMIN — MIDODRINE HYDROCHLORIDE 10 MILLIGRAM(S): 2.5 TABLET ORAL at 05:04

## 2020-01-01 RX ADMIN — POLYETHYLENE GLYCOL 3350 17 GRAM(S): 17 POWDER, FOR SOLUTION ORAL at 11:20

## 2020-01-01 RX ADMIN — ALBUTEROL 2 PUFF(S): 90 AEROSOL, METERED ORAL at 05:05

## 2020-01-01 RX ADMIN — PANTOPRAZOLE SODIUM 40 MILLIGRAM(S): 20 TABLET, DELAYED RELEASE ORAL at 05:19

## 2020-01-01 RX ADMIN — NYSTATIN CREAM 1 APPLICATION(S): 100000 CREAM TOPICAL at 05:45

## 2020-01-01 RX ADMIN — Medication 1 APPLICATION(S): at 05:55

## 2020-01-01 RX ADMIN — Medication 100 MILLIGRAM(S): at 11:57

## 2020-01-01 RX ADMIN — Medication 500 MILLIGRAM(S): at 16:01

## 2020-01-01 RX ADMIN — MIDODRINE HYDROCHLORIDE 10 MILLIGRAM(S): 2.5 TABLET ORAL at 05:06

## 2020-01-01 RX ADMIN — HEPARIN SODIUM 5000 UNIT(S): 5000 INJECTION INTRAVENOUS; SUBCUTANEOUS at 14:07

## 2020-01-01 RX ADMIN — ATORVASTATIN CALCIUM 20 MILLIGRAM(S): 80 TABLET, FILM COATED ORAL at 21:23

## 2020-01-01 RX ADMIN — Medication 1 TABLET(S): at 12:28

## 2020-01-01 RX ADMIN — Medication 325 MILLIGRAM(S): at 22:09

## 2020-01-01 RX ADMIN — LACTULOSE 30 GRAM(S): 10 SOLUTION ORAL at 05:19

## 2020-01-01 RX ADMIN — Medication 40 MILLIGRAM(S): at 09:27

## 2020-01-01 RX ADMIN — Medication 8: at 13:26

## 2020-01-01 RX ADMIN — Medication 1 APPLICATION(S): at 17:37

## 2020-01-01 RX ADMIN — NYSTATIN CREAM 1 APPLICATION(S): 100000 CREAM TOPICAL at 21:03

## 2020-01-01 RX ADMIN — NYSTATIN CREAM 1 APPLICATION(S): 100000 CREAM TOPICAL at 23:01

## 2020-01-01 RX ADMIN — CHLORHEXIDINE GLUCONATE 1 APPLICATION(S): 213 SOLUTION TOPICAL at 05:07

## 2020-01-01 RX ADMIN — MIDODRINE HYDROCHLORIDE 10 MILLIGRAM(S): 2.5 TABLET ORAL at 11:49

## 2020-01-01 RX ADMIN — Medication 50 MILLIGRAM(S): at 18:51

## 2020-01-01 RX ADMIN — MIDODRINE HYDROCHLORIDE 10 MILLIGRAM(S): 2.5 TABLET ORAL at 17:11

## 2020-01-01 RX ADMIN — Medication 81 MILLIGRAM(S): at 12:30

## 2020-01-01 RX ADMIN — ALBUTEROL 2 PUFF(S): 90 AEROSOL, METERED ORAL at 06:00

## 2020-01-01 RX ADMIN — MEROPENEM 100 MILLIGRAM(S): 1 INJECTION INTRAVENOUS at 16:52

## 2020-01-01 RX ADMIN — MEROPENEM 100 MILLIGRAM(S): 1 INJECTION INTRAVENOUS at 18:51

## 2020-01-01 RX ADMIN — HEPARIN SODIUM 5000 UNIT(S): 5000 INJECTION INTRAVENOUS; SUBCUTANEOUS at 05:42

## 2020-01-01 RX ADMIN — ALBUTEROL 2 PUFF(S): 90 AEROSOL, METERED ORAL at 04:07

## 2020-01-01 RX ADMIN — ERYTHROPOIETIN 40000 UNIT(S): 10000 INJECTION, SOLUTION INTRAVENOUS; SUBCUTANEOUS at 13:35

## 2020-01-01 RX ADMIN — Medication 2: at 17:14

## 2020-01-01 RX ADMIN — MIDODRINE HYDROCHLORIDE 2.5 MILLIGRAM(S): 2.5 TABLET ORAL at 17:18

## 2020-01-01 RX ADMIN — HEPARIN SODIUM 5000 UNIT(S): 5000 INJECTION INTRAVENOUS; SUBCUTANEOUS at 18:19

## 2020-01-01 RX ADMIN — Medication 1 TABLET(S): at 11:46

## 2020-01-01 RX ADMIN — HEPARIN SODIUM 5000 UNIT(S): 5000 INJECTION INTRAVENOUS; SUBCUTANEOUS at 13:36

## 2020-01-01 RX ADMIN — Medication 325 MILLIGRAM(S): at 14:00

## 2020-01-01 RX ADMIN — MIDODRINE HYDROCHLORIDE 5 MILLIGRAM(S): 2.5 TABLET ORAL at 17:36

## 2020-01-01 RX ADMIN — HEPARIN SODIUM 5000 UNIT(S): 5000 INJECTION INTRAVENOUS; SUBCUTANEOUS at 05:03

## 2020-01-01 RX ADMIN — ALBUTEROL 2 PUFF(S): 90 AEROSOL, METERED ORAL at 17:20

## 2020-01-01 RX ADMIN — Medication 325 MILLIGRAM(S): at 21:43

## 2020-01-01 RX ADMIN — NYSTATIN CREAM 1 APPLICATION(S): 100000 CREAM TOPICAL at 22:42

## 2020-01-01 RX ADMIN — MEROPENEM 1000 MILLIGRAM(S): 1 INJECTION INTRAVENOUS at 17:31

## 2020-01-01 RX ADMIN — Medication 6: at 09:11

## 2020-01-01 RX ADMIN — Medication 81 MILLIGRAM(S): at 12:54

## 2020-01-01 RX ADMIN — Medication 650 MILLIGRAM(S): at 00:38

## 2020-01-01 RX ADMIN — MIDODRINE HYDROCHLORIDE 10 MILLIGRAM(S): 2.5 TABLET ORAL at 05:54

## 2020-01-01 RX ADMIN — Medication 1 TABLET(S): at 10:04

## 2020-01-01 RX ADMIN — Medication 1 TABLET(S): at 08:12

## 2020-01-01 RX ADMIN — NYSTATIN CREAM 1 APPLICATION(S): 100000 CREAM TOPICAL at 05:42

## 2020-01-01 RX ADMIN — Medication 1 TABLET(S): at 09:20

## 2020-01-01 RX ADMIN — Medication 4: at 13:20

## 2020-01-01 RX ADMIN — Medication 7 UNIT(S): at 12:39

## 2020-01-01 RX ADMIN — MEROPENEM 100 MILLIGRAM(S): 1 INJECTION INTRAVENOUS at 16:26

## 2020-01-01 RX ADMIN — NYSTATIN CREAM 1 APPLICATION(S): 100000 CREAM TOPICAL at 17:08

## 2020-01-01 RX ADMIN — Medication 325 MILLIGRAM(S): at 21:02

## 2020-01-01 RX ADMIN — CHLORHEXIDINE GLUCONATE 1 APPLICATION(S): 213 SOLUTION TOPICAL at 06:20

## 2020-01-01 RX ADMIN — MIDODRINE HYDROCHLORIDE 2.5 MILLIGRAM(S): 2.5 TABLET ORAL at 11:50

## 2020-01-01 RX ADMIN — Medication 8: at 17:23

## 2020-01-01 RX ADMIN — Medication 300 MILLIGRAM(S): at 11:16

## 2020-01-01 RX ADMIN — Medication 325 MILLIGRAM(S): at 11:38

## 2020-01-01 RX ADMIN — Medication 25 MICROGRAM(S): at 05:18

## 2020-01-01 RX ADMIN — MIDODRINE HYDROCHLORIDE 10 MILLIGRAM(S): 2.5 TABLET ORAL at 15:12

## 2020-01-01 RX ADMIN — PIPERACILLIN AND TAZOBACTAM 25 GRAM(S): 4; .5 INJECTION, POWDER, LYOPHILIZED, FOR SOLUTION INTRAVENOUS at 01:58

## 2020-01-01 RX ADMIN — POLYETHYLENE GLYCOL 3350 17 GRAM(S): 17 POWDER, FOR SOLUTION ORAL at 12:30

## 2020-01-01 RX ADMIN — Medication 50 MILLIGRAM(S): at 06:16

## 2020-01-01 RX ADMIN — Medication 325 MILLIGRAM(S): at 11:22

## 2020-01-01 RX ADMIN — SODIUM CHLORIDE 1000 MILLILITER(S): 9 INJECTION INTRAMUSCULAR; INTRAVENOUS; SUBCUTANEOUS at 18:08

## 2020-01-01 RX ADMIN — Medication 10 MILLIGRAM(S): at 08:12

## 2020-01-01 RX ADMIN — Medication 1 TABLET(S): at 18:10

## 2020-01-01 RX ADMIN — NYSTATIN CREAM 1 APPLICATION(S): 100000 CREAM TOPICAL at 05:59

## 2020-01-01 RX ADMIN — Medication 5 MILLIGRAM(S): at 05:42

## 2020-01-01 RX ADMIN — HEPARIN SODIUM 5000 UNIT(S): 5000 INJECTION INTRAVENOUS; SUBCUTANEOUS at 05:41

## 2020-01-01 RX ADMIN — Medication 250 MILLIGRAM(S): at 06:14

## 2020-01-01 RX ADMIN — Medication 250 MILLIGRAM(S): at 17:36

## 2020-01-01 RX ADMIN — Medication 500 MILLIGRAM(S): at 05:04

## 2020-01-01 RX ADMIN — SODIUM CHLORIDE 75 MILLILITER(S): 9 INJECTION, SOLUTION INTRAVENOUS at 21:02

## 2020-01-01 RX ADMIN — Medication 6: at 11:55

## 2020-01-01 RX ADMIN — Medication 2 TABLET(S): at 17:40

## 2020-01-01 RX ADMIN — SODIUM CHLORIDE 50 MILLILITER(S): 9 INJECTION, SOLUTION INTRAVENOUS at 12:32

## 2020-01-01 RX ADMIN — Medication 12.5 MICROGRAM(S): at 22:35

## 2020-01-01 RX ADMIN — Medication 325 MILLIGRAM(S): at 11:41

## 2020-01-01 RX ADMIN — NYSTATIN CREAM 1 APPLICATION(S): 100000 CREAM TOPICAL at 22:02

## 2020-01-01 RX ADMIN — NYSTATIN CREAM 1 APPLICATION(S): 100000 CREAM TOPICAL at 11:22

## 2020-01-01 RX ADMIN — ATORVASTATIN CALCIUM 20 MILLIGRAM(S): 80 TABLET, FILM COATED ORAL at 21:47

## 2020-01-01 RX ADMIN — ATORVASTATIN CALCIUM 20 MILLIGRAM(S): 80 TABLET, FILM COATED ORAL at 21:45

## 2020-01-01 RX ADMIN — Medication 1 APPLICATION(S): at 18:51

## 2020-01-01 RX ADMIN — Medication 1 APPLICATION(S): at 18:41

## 2020-01-01 RX ADMIN — Medication 81 MILLIGRAM(S): at 11:06

## 2020-01-01 RX ADMIN — TAMSULOSIN HYDROCHLORIDE 0.4 MILLIGRAM(S): 0.4 CAPSULE ORAL at 21:47

## 2020-01-01 RX ADMIN — Medication 2 TABLET(S): at 16:02

## 2020-01-01 RX ADMIN — Medication 7 UNIT(S): at 08:46

## 2020-01-01 RX ADMIN — MIDODRINE HYDROCHLORIDE 5 MILLIGRAM(S): 2.5 TABLET ORAL at 06:05

## 2020-01-01 RX ADMIN — Medication 100 MILLIGRAM(S): at 08:24

## 2020-01-01 RX ADMIN — MEROPENEM 100 MILLIGRAM(S): 1 INJECTION INTRAVENOUS at 18:59

## 2020-01-01 RX ADMIN — HEPARIN SODIUM 5000 UNIT(S): 5000 INJECTION INTRAVENOUS; SUBCUTANEOUS at 06:06

## 2020-01-01 RX ADMIN — Medication 1 MILLIGRAM(S): at 11:52

## 2020-01-01 RX ADMIN — CHLORHEXIDINE GLUCONATE 1 APPLICATION(S): 213 SOLUTION TOPICAL at 06:16

## 2020-01-01 RX ADMIN — ALBUTEROL 2 PUFF(S): 90 AEROSOL, METERED ORAL at 08:11

## 2020-01-01 RX ADMIN — MIDODRINE HYDROCHLORIDE 5 MILLIGRAM(S): 2.5 TABLET ORAL at 16:01

## 2020-01-01 RX ADMIN — Medication 6: at 17:06

## 2020-01-01 RX ADMIN — Medication 250 MILLIGRAM(S): at 16:01

## 2020-01-01 RX ADMIN — Medication 300 MILLIGRAM(S): at 11:36

## 2020-01-01 RX ADMIN — PIPERACILLIN AND TAZOBACTAM 25 GRAM(S): 4; .5 INJECTION, POWDER, LYOPHILIZED, FOR SOLUTION INTRAVENOUS at 08:20

## 2020-01-01 RX ADMIN — Medication 1 TABLET(S): at 08:38

## 2020-01-01 RX ADMIN — Medication 325 MILLIGRAM(S): at 23:07

## 2020-01-01 RX ADMIN — Medication 2: at 07:59

## 2020-01-01 RX ADMIN — SODIUM CHLORIDE 50 MILLILITER(S): 9 INJECTION INTRAMUSCULAR; INTRAVENOUS; SUBCUTANEOUS at 18:11

## 2020-01-01 RX ADMIN — Medication 325 MILLIGRAM(S): at 11:05

## 2020-01-01 RX ADMIN — Medication 12.5 MILLIGRAM(S): at 22:58

## 2020-01-01 RX ADMIN — MIDODRINE HYDROCHLORIDE 10 MILLIGRAM(S): 2.5 TABLET ORAL at 12:27

## 2020-01-01 RX ADMIN — SENNA PLUS 2 TABLET(S): 8.6 TABLET ORAL at 21:42

## 2020-01-01 RX ADMIN — NYSTATIN CREAM 1 APPLICATION(S): 100000 CREAM TOPICAL at 16:15

## 2020-01-01 RX ADMIN — Medication 500 MILLIGRAM(S): at 05:53

## 2020-01-01 RX ADMIN — MIDODRINE HYDROCHLORIDE 2.5 MILLIGRAM(S): 2.5 TABLET ORAL at 05:56

## 2020-01-01 RX ADMIN — ATORVASTATIN CALCIUM 20 MILLIGRAM(S): 80 TABLET, FILM COATED ORAL at 23:07

## 2020-01-01 RX ADMIN — Medication 7 UNIT(S): at 17:10

## 2020-01-01 RX ADMIN — Medication 1 APPLICATION(S): at 17:56

## 2020-01-01 RX ADMIN — Medication 40 MILLIEQUIVALENT(S): at 13:35

## 2020-01-01 RX ADMIN — Medication 500 MILLIGRAM(S): at 17:21

## 2020-01-01 RX ADMIN — MEROPENEM 100 MILLIGRAM(S): 1 INJECTION INTRAVENOUS at 05:23

## 2020-01-01 RX ADMIN — Medication 81 MILLIGRAM(S): at 11:45

## 2020-01-01 RX ADMIN — INSULIN HUMAN 10 UNIT(S): 100 INJECTION, SOLUTION SUBCUTANEOUS at 16:06

## 2020-01-01 RX ADMIN — Medication 500 MILLIGRAM(S): at 17:09

## 2020-01-01 RX ADMIN — Medication 81 MILLIGRAM(S): at 11:35

## 2020-01-01 RX ADMIN — AZITHROMYCIN 255 MILLIGRAM(S): 500 TABLET, FILM COATED ORAL at 13:50

## 2020-01-01 RX ADMIN — Medication 300 MILLIGRAM(S): at 12:28

## 2020-01-01 RX ADMIN — Medication 12.5 MICROGRAM(S): at 21:41

## 2020-01-01 RX ADMIN — ATORVASTATIN CALCIUM 20 MILLIGRAM(S): 80 TABLET, FILM COATED ORAL at 21:03

## 2020-01-01 RX ADMIN — SODIUM CHLORIDE 50 MILLILITER(S): 9 INJECTION INTRAMUSCULAR; INTRAVENOUS; SUBCUTANEOUS at 21:30

## 2020-01-01 RX ADMIN — Medication 0: at 22:01

## 2020-01-01 RX ADMIN — FINASTERIDE 5 MILLIGRAM(S): 5 TABLET, FILM COATED ORAL at 11:01

## 2020-01-01 RX ADMIN — Medication 1 APPLICATION(S): at 16:53

## 2020-01-01 RX ADMIN — Medication 1 TABLET(S): at 09:22

## 2020-01-01 RX ADMIN — POLYETHYLENE GLYCOL 3350 17 GRAM(S): 17 POWDER, FOR SOLUTION ORAL at 11:35

## 2020-01-01 RX ADMIN — Medication 250 MILLIGRAM(S): at 05:33

## 2020-01-01 RX ADMIN — Medication 2: at 08:51

## 2020-01-01 RX ADMIN — Medication 100 MILLIGRAM(S): at 23:00

## 2020-01-01 RX ADMIN — NYSTATIN CREAM 1 APPLICATION(S): 100000 CREAM TOPICAL at 13:22

## 2020-01-01 RX ADMIN — Medication 2: at 12:35

## 2020-01-01 RX ADMIN — NYSTATIN CREAM 1 APPLICATION(S): 100000 CREAM TOPICAL at 21:48

## 2020-01-01 RX ADMIN — Medication 10 MILLIGRAM(S): at 11:57

## 2020-01-01 RX ADMIN — Medication 1 APPLICATION(S): at 22:05

## 2020-01-01 RX ADMIN — PANTOPRAZOLE SODIUM 40 MILLIGRAM(S): 20 TABLET, DELAYED RELEASE ORAL at 05:18

## 2020-01-01 RX ADMIN — Medication 6: at 12:46

## 2020-01-01 RX ADMIN — ATORVASTATIN CALCIUM 20 MILLIGRAM(S): 80 TABLET, FILM COATED ORAL at 21:30

## 2020-01-01 RX ADMIN — POLYETHYLENE GLYCOL 3350 17 GRAM(S): 17 POWDER, FOR SOLUTION ORAL at 12:05

## 2020-01-01 RX ADMIN — TAMSULOSIN HYDROCHLORIDE 0.4 MILLIGRAM(S): 0.4 CAPSULE ORAL at 22:58

## 2020-01-01 RX ADMIN — FINASTERIDE 5 MILLIGRAM(S): 5 TABLET, FILM COATED ORAL at 10:03

## 2020-01-01 RX ADMIN — HEPARIN SODIUM 5000 UNIT(S): 5000 INJECTION INTRAVENOUS; SUBCUTANEOUS at 05:06

## 2020-01-01 RX ADMIN — HEPARIN SODIUM 5000 UNIT(S): 5000 INJECTION INTRAVENOUS; SUBCUTANEOUS at 14:34

## 2020-01-01 RX ADMIN — SODIUM POLYSTYRENE SULFONATE 15 GRAM(S): 4.1 POWDER, FOR SUSPENSION ORAL at 04:20

## 2020-01-01 RX ADMIN — HEPARIN SODIUM 5000 UNIT(S): 5000 INJECTION INTRAVENOUS; SUBCUTANEOUS at 21:58

## 2020-01-01 RX ADMIN — Medication 1 APPLICATION(S): at 21:54

## 2020-01-01 RX ADMIN — Medication 4: at 17:19

## 2020-01-01 RX ADMIN — Medication 100 MILLIGRAM(S): at 11:54

## 2020-01-01 RX ADMIN — CHLORHEXIDINE GLUCONATE 1 APPLICATION(S): 213 SOLUTION TOPICAL at 06:31

## 2020-01-01 RX ADMIN — MIDODRINE HYDROCHLORIDE 5 MILLIGRAM(S): 2.5 TABLET ORAL at 17:14

## 2020-01-01 RX ADMIN — Medication 105 MEQ/KG/HR: at 08:29

## 2020-01-01 RX ADMIN — Medication 500 MILLIGRAM(S): at 16:44

## 2020-01-01 RX ADMIN — MEROPENEM 100 MILLIGRAM(S): 1 INJECTION INTRAVENOUS at 05:11

## 2020-01-01 RX ADMIN — ZINC SULFATE TAB 220 MG (50 MG ZINC EQUIVALENT) 220 MILLIGRAM(S): 220 (50 ZN) TAB at 12:00

## 2020-01-01 RX ADMIN — CHLORHEXIDINE GLUCONATE 1 APPLICATION(S): 213 SOLUTION TOPICAL at 11:57

## 2020-01-01 RX ADMIN — Medication 25 MICROGRAM(S): at 06:05

## 2020-01-01 RX ADMIN — Medication 10 MILLIGRAM(S): at 09:47

## 2020-01-01 RX ADMIN — Medication 325 MILLIGRAM(S): at 08:58

## 2020-01-01 RX ADMIN — Medication 300 MILLIGRAM(S): at 11:14

## 2020-01-01 RX ADMIN — NYSTATIN CREAM 1 APPLICATION(S): 100000 CREAM TOPICAL at 06:40

## 2020-01-01 RX ADMIN — Medication 25 MICROGRAM(S): at 05:33

## 2020-01-01 RX ADMIN — MEROPENEM 100 MILLIGRAM(S): 1 INJECTION INTRAVENOUS at 18:19

## 2020-01-01 RX ADMIN — MIDODRINE HYDROCHLORIDE 10 MILLIGRAM(S): 2.5 TABLET ORAL at 22:35

## 2020-01-01 RX ADMIN — CHLORHEXIDINE GLUCONATE 1 APPLICATION(S): 213 SOLUTION TOPICAL at 05:28

## 2020-01-01 RX ADMIN — Medication 4: at 09:11

## 2020-01-01 RX ADMIN — Medication 325 MILLIGRAM(S): at 10:02

## 2020-01-01 RX ADMIN — PIPERACILLIN AND TAZOBACTAM 25 GRAM(S): 4; .5 INJECTION, POWDER, LYOPHILIZED, FOR SOLUTION INTRAVENOUS at 17:41

## 2020-01-01 RX ADMIN — Medication 400 MILLIGRAM(S): at 22:31

## 2020-01-01 RX ADMIN — Medication 2 TABLET(S): at 06:14

## 2020-01-01 RX ADMIN — FINASTERIDE 5 MILLIGRAM(S): 5 TABLET, FILM COATED ORAL at 13:44

## 2020-01-01 RX ADMIN — NYSTATIN CREAM 1 APPLICATION(S): 100000 CREAM TOPICAL at 22:36

## 2020-01-01 RX ADMIN — HEPARIN SODIUM 5000 UNIT(S): 5000 INJECTION INTRAVENOUS; SUBCUTANEOUS at 22:42

## 2020-01-01 RX ADMIN — Medication 2: at 11:53

## 2020-01-01 RX ADMIN — Medication 1: at 21:03

## 2020-01-01 RX ADMIN — MEROPENEM 100 MILLIGRAM(S): 1 INJECTION INTRAVENOUS at 05:52

## 2020-01-01 RX ADMIN — Medication 7 UNIT(S): at 08:43

## 2020-01-01 RX ADMIN — TAMSULOSIN HYDROCHLORIDE 0.4 MILLIGRAM(S): 0.4 CAPSULE ORAL at 21:45

## 2020-01-01 RX ADMIN — HEPARIN SODIUM 5000 UNIT(S): 5000 INJECTION INTRAVENOUS; SUBCUTANEOUS at 11:55

## 2020-01-01 RX ADMIN — Medication 100 GRAM(S): at 17:02

## 2020-01-01 RX ADMIN — ATORVASTATIN CALCIUM 20 MILLIGRAM(S): 80 TABLET, FILM COATED ORAL at 22:28

## 2020-01-01 RX ADMIN — Medication 100 MILLIGRAM(S): at 12:53

## 2020-01-01 RX ADMIN — Medication 250 MILLIGRAM(S): at 19:48

## 2020-01-01 RX ADMIN — NYSTATIN CREAM 1 APPLICATION(S): 100000 CREAM TOPICAL at 05:39

## 2020-01-01 RX ADMIN — INSULIN GLARGINE 5 UNIT(S): 100 INJECTION, SOLUTION SUBCUTANEOUS at 21:53

## 2020-01-01 RX ADMIN — MIDODRINE HYDROCHLORIDE 10 MILLIGRAM(S): 2.5 TABLET ORAL at 21:41

## 2020-01-01 RX ADMIN — Medication 105 MEQ/KG/HR: at 11:36

## 2020-01-01 RX ADMIN — ATORVASTATIN CALCIUM 20 MILLIGRAM(S): 80 TABLET, FILM COATED ORAL at 22:58

## 2020-01-01 RX ADMIN — MUPIROCIN 1 APPLICATION(S): 20 OINTMENT TOPICAL at 17:17

## 2020-01-01 RX ADMIN — Medication 10 MILLIGRAM(S): at 11:15

## 2020-01-01 RX ADMIN — Medication 2: at 08:24

## 2020-01-01 RX ADMIN — Medication 2: at 16:59

## 2020-01-01 RX ADMIN — Medication 1 TABLET(S): at 08:49

## 2020-01-01 RX ADMIN — SODIUM CHLORIDE 1000 MILLILITER(S): 9 INJECTION INTRAMUSCULAR; INTRAVENOUS; SUBCUTANEOUS at 13:50

## 2020-01-01 RX ADMIN — Medication 1 TABLET(S): at 17:26

## 2020-01-21 ENCOUNTER — APPOINTMENT (OUTPATIENT)
Dept: FAMILY MEDICINE | Facility: CLINIC | Age: 70
End: 2020-01-21
Payer: MEDICARE

## 2020-01-21 VITALS
HEIGHT: 70 IN | WEIGHT: 240 LBS | OXYGEN SATURATION: 97 % | HEART RATE: 89 BPM | DIASTOLIC BLOOD PRESSURE: 70 MMHG | SYSTOLIC BLOOD PRESSURE: 110 MMHG | BODY MASS INDEX: 34.36 KG/M2

## 2020-01-21 DIAGNOSIS — I50.9 HEART FAILURE, UNSPECIFIED: ICD-10-CM

## 2020-01-21 DIAGNOSIS — M10.9 GOUT, UNSPECIFIED: ICD-10-CM

## 2020-01-21 DIAGNOSIS — I27.29 OTHER SECONDARY PULMONARY HYPERTENSION: ICD-10-CM

## 2020-01-21 DIAGNOSIS — I50.810 OTHER SECONDARY PULMONARY HYPERTENSION: ICD-10-CM

## 2020-01-21 PROCEDURE — 99214 OFFICE O/P EST MOD 30 MIN: CPT | Mod: 25

## 2020-01-21 PROCEDURE — 36415 COLL VENOUS BLD VENIPUNCTURE: CPT

## 2020-01-21 RX ORDER — MIRABEGRON 50 MG/1
50 TABLET, FILM COATED, EXTENDED RELEASE ORAL
Qty: 30 | Refills: 0 | Status: COMPLETED | COMMUNITY
Start: 2019-08-27

## 2020-01-21 RX ORDER — OXYBUTYNIN CHLORIDE 10 MG/1
10 TABLET, EXTENDED RELEASE ORAL
Qty: 30 | Refills: 0 | Status: ACTIVE | COMMUNITY
Start: 2020-01-07

## 2020-01-21 NOTE — PHYSICAL EXAM
[Well Nourished] : well nourished [Well Developed] : well developed [Regular Rhythm] : with a regular rhythm [Normal S1, S2] : normal S1 and S2 [Normal Affect] : the affect was normal [Non Tender] : non-tender [de-identified] : uses rolling walker [de-identified] : uses rolling walker

## 2020-01-21 NOTE — ASSESSMENT
[FreeTextEntry1] : cholesterol.\par The diagnosis of high cholesterol is established and patient is currently taking medications. Blood work was drawn in office and results will be reviewed and followed. The patient was counseled on a low cholesterol diet and on medication compliance. Patient was advised to generally limit foods fried in oil, high in saturated fat, cheese, eggs and red meat. Patient was advised to continue on current medications and to followup in office for necessary blood work in three months.\par \par heart failure/afib\par sees cardiologist\par \par diabetes\par The diagnosis of diabetes is established with this patient.  Blood work was drawn in office and results will be reviewed and followed. The patient was counseled on using a low sugar and carbohydrate diet.  Patient was advised to eat small meals and exercise regularly. Patient as advised to take all medications as prescribed and to follow up with yearly podiatry and opthalmology visits. Patient was advised to call office  or go to the ER immediately if experiences any nausea, lightheadedness or for any other issues.\par \par gout\par check uric acid

## 2020-01-21 NOTE — HISTORY OF PRESENT ILLNESS
[FreeTextEntry8] : 69 year old male is here for a followup visit. Patient is here for medication renewals and for blood work discussion. Medications and allergies were reviewed and assessed.  There has been no new medications since the last visit. Patient is feeling well with no active changes or issues since His last visit.\par \par brought in by aid, odalis

## 2020-01-22 LAB
ALBUMIN SERPL ELPH-MCNC: 3.9 G/DL
ALP BLD-CCNC: 82 U/L
ALT SERPL-CCNC: 19 U/L
ANION GAP SERPL CALC-SCNC: 13 MMOL/L
AST SERPL-CCNC: 20 U/L
BASOPHILS # BLD AUTO: 0.03 K/UL
BASOPHILS NFR BLD AUTO: 0.4 %
BILIRUB SERPL-MCNC: 0.5 MG/DL
BUN SERPL-MCNC: 21 MG/DL
CALCIUM SERPL-MCNC: 9.3 MG/DL
CHLORIDE SERPL-SCNC: 104 MMOL/L
CHOLEST SERPL-MCNC: 104 MG/DL
CHOLEST/HDLC SERPL: 2.5 RATIO
CO2 SERPL-SCNC: 25 MMOL/L
CREAT SERPL-MCNC: 1 MG/DL
EOSINOPHIL # BLD AUTO: 0.07 K/UL
EOSINOPHIL NFR BLD AUTO: 0.9 %
ESTIMATED AVERAGE GLUCOSE: 169 MG/DL
GLUCOSE SERPL-MCNC: 209 MG/DL
HBA1C MFR BLD HPLC: 7.5 %
HCT VFR BLD CALC: 40.5 %
HDLC SERPL-MCNC: 41 MG/DL
HGB BLD-MCNC: 12.7 G/DL
IMM GRANULOCYTES NFR BLD AUTO: 0.3 %
LDLC SERPL CALC-MCNC: 48 MG/DL
LYMPHOCYTES # BLD AUTO: 0.64 K/UL
LYMPHOCYTES NFR BLD AUTO: 8.2 %
MAN DIFF?: NORMAL
MCHC RBC-ENTMCNC: 29.1 PG
MCHC RBC-ENTMCNC: 31.4 GM/DL
MCV RBC AUTO: 92.7 FL
MONOCYTES # BLD AUTO: 0.51 K/UL
MONOCYTES NFR BLD AUTO: 6.6 %
NEUTROPHILS # BLD AUTO: 6.49 K/UL
NEUTROPHILS NFR BLD AUTO: 83.6 %
PLATELET # BLD AUTO: 162 K/UL
POTASSIUM SERPL-SCNC: 4.3 MMOL/L
PROT SERPL-MCNC: 6.6 G/DL
RBC # BLD: 4.37 M/UL
RBC # FLD: 14.6 %
SODIUM SERPL-SCNC: 142 MMOL/L
TRIGL SERPL-MCNC: 75 MG/DL
TSH SERPL-ACNC: 1.25 UIU/ML
URATE SERPL-MCNC: 5.7 MG/DL
WBC # FLD AUTO: 7.76 K/UL

## 2020-02-03 PROBLEM — L03.115 CELLULITIS OF RIGHT LOWER LEG: Status: ACTIVE | Noted: 2020-01-01

## 2020-02-03 NOTE — ASSESSMENT
[FreeTextEntry1] : right lower extremity cellulitis\par agree with diagnosis, finish abx\par if no improvement, wound care\par \par cholesterol.\par The diagnosis of high cholesterol is established and patient is currently taking medications. Blood work was drawn in office and results will be reviewed and followed. The patient was counseled on a low cholesterol diet and on medication compliance. Patient was advised to generally limit foods fried in oil, high in saturated fat, cheese, eggs and red meat. Patient was advised to continue on current medications and to followup in office for necessary blood work in three months.\par \par heart failure/afib\par sees cardiologist\par \par diabetes\par The diagnosis of diabetes is established with this patient.  Blood work was drawn in office and results will be reviewed and followed. The patient was counseled on using a low sugar and carbohydrate diet.  Patient was advised to eat small meals and exercise regularly. Patient as advised to take all medications as prescribed and to follow up with yearly podiatry and opthalmology visits. Patient was advised to call office  or go to the ER immediately if experiences any nausea, lightheadedness or for any other issues.\par \par gout\par check uric acid

## 2020-02-03 NOTE — PHYSICAL EXAM
[Well Nourished] : well nourished [Regular Rhythm] : with a regular rhythm [Well Developed] : well developed [Normal S1, S2] : normal S1 and S2 [Non Tender] : non-tender [Normal Affect] : the affect was normal [de-identified] : uses rolling walker [de-identified] : right le - 2 cm superficial wound, with ozzing, chronic venous insufficiency with skin changes [de-identified] : uses rolling walker

## 2020-02-03 NOTE — HISTORY OF PRESENT ILLNESS
[FreeTextEntry8] : 69 year old male is here for a followup visit. Patient is here for medication renewals and for blood work discussion. Medications and allergies were reviewed and assessed.  There has been no new medications since the last visit.\par here to fu for left leg cellulitis - diagnosed yesterday at urgent care\par \par brought in by aid, odalis

## 2020-03-05 PROBLEM — R31.9 HEMATURIA: Status: ACTIVE | Noted: 2017-03-13

## 2020-03-05 NOTE — HISTORY OF PRESENT ILLNESS
[FreeTextEntry1] : Patient with obesity, hypertension, chronic atrial fibrillation , chronic systolic heart failure, chronic right ventricular failure , moderate pulmonary hypertension  morbid  obesity came with complain that  he was in ER with gross hematuria for 2 days, his anticoagulation was held ,Patient was noted to have UTI , bladder thickening and anticipating to have cystoscopy , \par \par Patient says he is breathing well , denies chest pain \par \par Patient is not using cpap , now he is using night time oxygen PRN  patient  able to sleep supine ,  still using two pillows to sleep  .\par \par  blood pressure readings stable  denies any dizziness \par \par Edema present in BLE, and home care has been treating his skin. He is non compliant with edema boots. Uses the compression stocking \par could not go for sleep study as patient is claustrophobic \par \par \par lipid profile within normal range done jan 2020   Hb A1c 7.5 , follows up with endocrine Dr Son\par \par \par \par \par \par

## 2020-03-05 NOTE — REASON FOR VISIT
[Follow-Up - Clinic] : a clinic follow-up of [Abnormal ECG] : an abnormal ECG [Atrial Fibrillation] : atrial fibrillation [Cardiomyopathy] : cardiomyopathy [Hyperlipidemia] : hyperlipidemia [Hypertension] : hypertension [Medication Management] : Medication management [Syncope] : syncope [FreeTextEntry1] : anemia , unsteady gait falls

## 2020-03-05 NOTE — PHYSICAL EXAM
[General Appearance - Well Developed] : well developed [Normal Conjunctiva] : the conjunctiva exhibited no abnormalities [Normal Oral Mucosa] : normal oral mucosa [] : no respiratory distress [Respiration, Rhythm And Depth] : normal respiratory rhythm and effort [Auscultation Breath Sounds / Voice Sounds] : lungs were clear to auscultation bilaterally [Chest Palpation] : palpation of the chest revealed no abnormalities [Heart Sounds] : normal S1 and S2 [Normal] : the heart rate was normal [Irregularly Irregular] : the rhythm was irregularly irregular [Bowel Sounds] : normal bowel sounds [Abdomen Mass (___ Cm)] : no abdominal mass palpated [Abnormal Walk] : normal gait [Cyanosis, Localized] : no localized cyanosis [FreeTextEntry1] : chronic pigmentation  swollen with pressure stockings on ,  [No Anxiety] : not feeling anxious [Normal Appearance] : was normal in appearance

## 2020-03-05 NOTE — REVIEW OF SYSTEMS
[Chills] : no chills [Eyeglasses] : currently wearing eyeglasses [Earache] : no earache [Mouth Sores] : no mouth sores [Shortness Of Breath] : no shortness of breath [Chest Pain] : no chest pain [Palpitations] : no palpitations [Cough] : no cough [Abdominal Pain] : no abdominal pain [Urinary Frequency] : no change in urinary frequency [Hematuria] : no hematuria [Impotence] : no impotence [Joint Pain] : no joint pain [see HPI] : see HPI [Dizziness] : no dizziness [Confusion] : no confusion was observed [Excessive Thirst] : no polydipsia [Negative] : Heme/Lymph

## 2020-03-10 PROBLEM — Z01.818 PREOP EXAMINATION: Status: ACTIVE | Noted: 2020-01-01

## 2020-03-10 NOTE — HISTORY OF PRESENT ILLNESS
[Atrial Fibrillation] : atrial fibrillation [Asthma] : no asthma [COPD] : no COPD [Sleep Apnea] : sleep apnea [Smoker] : not a smoker [Diabetes] : diabetes [(Patient denies any chest pain, claudication, dyspnea on exertion, orthopnea, palpitations or syncope)] : Patient denies any chest pain, claudication, dyspnea on exertion, orthopnea, palpitations or syncope [FreeTextEntry1] : cyst, biopsy with possible turp [FreeTextEntry2] : 3/13/2020 [FreeTextEntry3] : Dr. Marques [FreeTextEntry4] : 69 year old male  is here for medical clearance for an upcoming surgery.  All medical problems and medicines were documented and reviewed with the patient. \par Patient was counseled to stop any advil/alieve/aspirin 7 days prior to surgery and was advised to have nothing by mouth from 11 pm the night prior to surgery. \par Medications were reviewed with patient and patient was directed on which medications to be taken and not to be taken prior to surgery.\par Labs were reviewed with the patient.\par

## 2020-03-10 NOTE — ASSESSMENT
[Patient Optimized for Surgery] : Patient optimized for surgery [No Further Testing Recommended] : no further testing recommended [As per surgery] : as per surgery [FreeTextEntry4] : 69 year old male  is here for medical clearance for an upcoming surgery.  All medical problems and medicines were documented and reviewed with the patient. \par Patient was counseled to stop any advil/alieve/aspirin 7 days prior to surgery and was advised to have nothing by mouth from 11 pm the night prior to surgery. \par Medications were reviewed with patient and patient was directed on which medications to be taken and not to be taken prior to surgery.\par Labs were reviewed with the patient and aid and medications reviewed\par hold all am medications on 3/13/2020 - except metoprolol\par resume pm meds on 3/13/2020\par eliquis to be evaluated by cardio\par 3/12/2020 - hold metformin

## 2020-03-10 NOTE — PHYSICAL EXAM
[Well Nourished] : well nourished [Clear to Auscultation] : lungs were clear to auscultation bilaterally [Regular Rhythm] : with a regular rhythm [Normal S1, S2] : normal S1 and S2 [Normal Affect] : the affect was normal [Normal Insight/Judgement] : insight and judgment were intact [de-identified] : needs rolling walker

## 2020-04-15 NOTE — H&P ADULT - ATTENDING COMMENTS
I discussed case with dr canales-icu  patient may deteriorate within the next hour(s)  I called cardiology   nephrology and infectious disease I discussed case with dr canales-icu  patient may deteriorate within the next hour(s)  I called cardiology   nephrology and infectious disease  I spoke to patient sister Avani at length  she wants dnr/dni  I spoke to avani,our ,,,will speak to Margarito,[facility] ,in the morning to draw papers  I explained to sister ,,his overall prognosis grim

## 2020-04-15 NOTE — CONSULT NOTE ADULT - SUBJECTIVE AND OBJECTIVE BOX
[facility] Cardiology    CHIEF COMPLAINT: Patient is a 69y old  Male who presents with a chief complaint of covid19+ dx 3/30  sent for hypoxia (15 Apr 2020 16:40)      HPI:  s/p treatment at [facility] end march for covid19 and gross hematuria  they stopped his eliquis and entresto (15 Apr 2020 16:40)    PAST MEDICAL & SURGICAL HISTORY:  Heart failure  Afib  CHF (congestive heart failure)  Gout  Hiatal hernia  BPH (benign prostatic hyperplasia)  Cognitive impairment  Cardiomyopathy  HTN (hypertension)  DM (diabetes mellitus)    SOCIAL HISTORY: no tobacco  FAMILY HISTORY:   HTN  MEDICATIONS  (STANDING):  azithromycin  IVPB 500 milliGRAM(s) IV Intermittent every 24 hours  cefTRIAXone   IVPB 1000 milliGRAM(s) IV Intermittent every 24 hours  dextrose 5%. 1000 milliLiter(s) (50 mL/Hr) IV Continuous <Continuous>  dextrose 50% Injectable 12.5 Gram(s) IV Push once  dextrose 50% Injectable 25 Gram(s) IV Push once  dextrose 50% Injectable 25 Gram(s) IV Push once  insulin lispro (HumaLOG) corrective regimen sliding scale   SubCutaneous three times a day before meals  midodrine. 2.5 milliGRAM(s) Oral three times a day  pantoprazole  Injectable 40 milliGRAM(s) IV Push daily  sodium chloride 0.9%. 1000 milliLiter(s) (40 mL/Hr) IV Continuous <Continuous>    MEDICATIONS  (PRN):  dextrose 40% Gel 15 Gram(s) Oral once PRN Blood Glucose LESS THAN 70 milliGRAM(s)/deciliter  glucagon  Injectable 1 milliGRAM(s) IntraMuscular once PRN Glucose LESS THAN 70 milligrams/deciliter    Allergies    No Known Allergies    Intolerances        REVIEW OF SYSTEMS:  CONSTITUTIONAL: weakness, no fevers   EYES/ENT: No visual changes  NECK: No pain or stiffness  RESPIRATORY: shortness of breath  CARDIOVASCULAR: No chest pain or palpitations  GASTROINTESTINAL: No abdominal pain  GENITOURINARY: No hematuria  NEUROLOGICAL: No weakness  SKIN: No rash  All other review of systems is negative unless indicated above    VITAL SIGNS:   Vital Signs Last 24 Hrs  T(C): 30.4 (15 Apr 2020 13:20), Max: 36 (15 Apr 2020 12:55)  T(F): 86.8 (15 Apr 2020 13:20), Max: 96.8 (15 Apr 2020 12:55)  HR: 63 (15 Apr 2020 17:39) (55 - 86)  BP: 105/74 (15 Apr 2020 17:39) (78/54 - 130/80)  BP(mean): --  RR: 16 (15 Apr 2020 17:39) (16 - 16)  SpO2: 100% (15 Apr 2020 17:39) (94% - 100%)  I&O's Summary    PHYSICAL EXAM:  Constitutional: NAD  Neurological: Alert   HEENT: EOMI, no JVD  Cardiovascular: S1 and S2, no murmur  Pulmonary: breath sounds bilaterally  Gastrointestinal: Bowel Sounds present, soft, nontender  Ext: no peripheral edema neg, chronic venous stasis changes B/L LE  Skin: No rashes, No cyanosis.  Psych:  Mood calm    LABS: All Labs Reviewed:                        9.4    7.45  )-----------( 129      ( 15 Apr 2020 14:06 )             30.2     04-15    140  |  107  |  47<H>  ----------------------------<  229<H>  5.4<H>   |  23  |  2.30<H>    Ca    9.3      15 Apr 2020 14:06    TPro  7.3  /  Alb  2.8<L>  /  TBili  0.3  /  DBili  x   /  AST  64<H>  /  ALT  58  /  AlkPhos  106  04-15    PT/INR - ( 15 Apr 2020 14:06 )   PT: 19.2 sec;   INR: 1.69 ratio         PTT - ( 15 Apr 2020 14:06 )  PTT:56.2 sec    69 male cognitive impairment, DM, HTN, CHF by chart a/w COVID +  B/L PNA, hypothermia, hypotension, sepsis    SBP now 100s (at baseline)  Cr 2.3  OK to hold Entresto, hold lasix  agree with gentle IVF @ 40 cc/hr. Hx of CMP/CHF, prior EF likely low  Azithromycin,  Repeat EKG ordered for am 4/16-Check QTC  will follow

## 2020-04-15 NOTE — H&P ADULT - NSICDXPASTMEDICALHX_GEN_ALL_CORE_FT
PAST MEDICAL HISTORY:  Afib     BPH (benign prostatic hyperplasia)     Cardiomyopathy     CHF (congestive heart failure)     Cognitive impairment     DM (diabetes mellitus)     Gout     Heart failure     Hiatal hernia     HTN (hypertension)

## 2020-04-15 NOTE — H&P ADULT - NSHPLABSRESULTS_GEN_ALL_CORE
< from: Xray Chest 1 View AP/PA (04.15.20 @ 14:07) >      EXAM:  XR CHEST AP OR PA 1V                            PROCEDURE DATE:  04/15/2020          INTERPRETATION:  PROCEDURE: AP view of the chest.    CLINICAL INFORMATION: Cough and fever.    COMPARISON: None.    FINDINGS:    Lungs: There are bilateral lower lung opacities.  Heart: The heart is top normal in size.  Mediastinum: The mediastinum is within normal limits.    IMPRESSION:    Findings of bilateral lower lung opacities are suspicious for pneumonia.      < end of copied text > < from: Xray Chest 1 View AP/PA (04.15.20 @ 14:07) >      EXAM:  XR CHEST AP OR PA 1V                            PROCEDURE DATE:  04/15/2020          INTERPRETATION:  PROCEDURE: AP view of the chest.    CLINICAL INFORMATION: Cough and fever.    COMPARISON: None.    FINDINGS:    Lungs: There are bilateral lower lung opacities.  Heart: The heart is top normal in size.  Mediastinum: The mediastinum is within normal limits.    IMPRESSION:    Findings of bilateral lower lung opacities are suspicious for pneumonia.      < end of copied text >    cr 2.5  hg 9.0  lactate 4.0

## 2020-04-15 NOTE — ED ADULT NURSE NOTE - NSIMPLEMENTINTERV_GEN_ALL_ED
Implemented All Fall Risk Interventions:  [place] to call system. Call bell, personal items and telephone within reach. Instruct patient to call for assistance. Room bathroom lighting operational. Non-slip footwear when patient is off stretcher. Physically safe environment: no spills, clutter or unnecessary equipment. Stretcher in lowest position, wheels locked, appropriate side rails in place. Provide visual cue, wrist band, yellow gown, etc. Monitor gait and stability. Monitor for mental status changes and reorient to person, place, and time. Review medications for side effects contributing to fall risk. Reinforce activity limits and safety measures with patient and family.

## 2020-04-15 NOTE — CONSULT NOTE ADULT - ASSESSMENT
69 y male BIBEMS from ACLD, + coronavirus, poor historian, arrived with wrist restraints in useAfib    BPH (benign prostatic hyperplasia)    Cardiomyopathy    CHF (congestive heart failure)    Cognitive impairment    DM (diabetes mellitus)    Gout    Heart failure    Hiatal hernia    HTN (hypertension).

## 2020-04-15 NOTE — ED ADULT NURSE NOTE - OBJECTIVE STATEMENT
Pt presents to the ED via ambulance from ACLD weakness, (+) COVID, EKG done, pt placed on cardiac monitor. Pt has bilat lower extremity discoloration, swelling. Pt' left  foot 2 nd and 3rd  toe yellowish dried coating noted. Pt has redness in the groin area, wears a diaper.. Pt responds when he hears his name called, awake, alert.. Pt presents to the ED via ambulance from ACLD weakness, (+) COVID, EKG done, pt placed on cardiac monitor. Pt has bilat lower extremity discoloration, swelling. Pt' left  foot 2 nd and 3rd  toe yellowish dried coating noted. Pt has redness in the groin area, wears a diaper.. Pt responds when he hears his name called, awake, alert.. Pt presents with bilat wrist restraints.

## 2020-04-15 NOTE — PROVIDER CONTACT NOTE (CRITICAL VALUE NOTIFICATION) - ACTION/TREATMENT ORDERED:
Kayexalate 15 gm via rectum (enema) Kayexalate 45gm via rectum (enema) Kayexalate 15gm via rectum (enema)

## 2020-04-15 NOTE — ED PROVIDER NOTE - PROGRESS NOTE DETAILS
spoke with Dr Arreola, case discussed, results discussed, requested midodrine 10 mg, solumedrol 40 iv, consult Dr Jacobson, Cardio, ID Dr Palmer, will admit patient spoke with Dr Arreola, case discussed, results discussed, requested midodrine 10 mg, solumedrol 40 iv, requested consult Dr Jacobson, Cardio, ID Dr Palmer, call out to Dr Jacobson and Dr Palmer, will admit patient

## 2020-04-15 NOTE — ED ADULT NURSE REASSESSMENT NOTE - NS ED NURSE REASSESS COMMENT FT1
2025: Spoke with Dr. Pilo Canales re: repeat lactate= 2.5, Potassium = 6, rectal temp 93.5 ( pt still on the warming blanket). Pt to be eval by ICU.

## 2020-04-15 NOTE — CONSULT NOTE ADULT - PROBLEM SELECTOR RECOMMENDATION 9
ACUTE RENAL FAILURE: continue with iv fluid   Serum creatinine is  increased      , approximating GFR at   ml/min.   There is no progression . No uremic symptoms  No evidence of anemia .  Fluid status stable.  Will continue to avoid nephrotoxic drugs.  Patient remains asymptomatic.   Continue current therapy.  hold  diuretic.  hold   ACE inhibitor.  hold   ARB.  Additional evaluation:   ECG,    echocardiogram,     CXR,  will obtained recent   renal ultrasound to evalaute kidney size and possible stones ,

## 2020-04-15 NOTE — H&P ADULT - HISTORY OF PRESENT ILLNESS
s/p treatment at [facility] end march s/p treatment at [facility] end march for covid19 and gross hematuria  they stopped his eliquis and entresto

## 2020-04-15 NOTE — ED ADULT NURSE NOTE - PMH
Afib    BPH (benign prostatic hyperplasia)    Cardiomyopathy    CHF (congestive heart failure)    Cognitive impairment    DM (diabetes mellitus)    Gout    Heart failure    Hiatal hernia    HTN (hypertension)

## 2020-04-15 NOTE — ED PROVIDER NOTE - CARE PLAN
Principal Discharge DX:	Pneumonia  Secondary Diagnosis:	Coronavirus infection  Secondary Diagnosis:	UTI (urinary tract infection)  Secondary Diagnosis:	Hypothermia, initial encounter

## 2020-04-15 NOTE — ED PROVIDER NOTE - OBJECTIVE STATEMENT
69 y male BIBEMS from ACLD, 69 y male BIBEMS from ACLD, + coronavirus, poor historian, arrived with wrist restraints in use.

## 2020-04-15 NOTE — CONSULT NOTE ADULT - SUBJECTIVE AND OBJECTIVE BOX
Patient is a 69y Male whom presented to the hospital with ckd and barbra , hyperkalemia     PAST MEDICAL & SURGICAL HISTORY:  Heart failure  Afib  CHF (congestive heart failure)  Gout  Hiatal hernia  BPH (benign prostatic hyperplasia)  Cognitive impairment  Cardiomyopathy  HTN (hypertension)  DM (diabetes mellitus)      MEDICATIONS  (STANDING):  azithromycin  IVPB 500 milliGRAM(s) IV Intermittent every 24 hours  cefTRIAXone   IVPB 1000 milliGRAM(s) IV Intermittent every 24 hours  dextrose 5%. 1000 milliLiter(s) (50 mL/Hr) IV Continuous <Continuous>  dextrose 50% Injectable 12.5 Gram(s) IV Push once  dextrose 50% Injectable 25 Gram(s) IV Push once  dextrose 50% Injectable 25 Gram(s) IV Push once  insulin lispro (HumaLOG) corrective regimen sliding scale   SubCutaneous three times a day before meals  midodrine. 2.5 milliGRAM(s) Oral three times a day  pantoprazole  Injectable 40 milliGRAM(s) IV Push daily  sodium chloride 0.9%. 1000 milliLiter(s) (40 mL/Hr) IV Continuous <Continuous>  sodium polystyrene sulfonate Enema 15 Gram(s) Rectal once      Allergies    No Known Allergies    Intolerances        SOCIAL HISTORY:  Denies ETOh,Smoking,     FAMILY HISTORY:  family history is review and there is  no family  history of kidney disease in family , father and mother  No Pertinent Family History in first degree relatives of: as per available medical records.  Non-contributary for premature coronary disease or sudden cardiac death      REVIEW OF SYSTEMS:    unable to obtained a good review system    VITAL:  T(C): , Max: 36 (04-15-20 @ 12:55)  T(F): , Max: 96.8 (04-15-20 @ 12:55)  HR: 63 (04-15-20 @ 17:39)  BP: 105/74 (04-15-20 @ 17:39)  BP(mean): --  RR: 16 (04-15-20 @ 17:39)  SpO2: 100% (04-15-20 @ 17:39)  Wt(kg): --    I and O's:    Height (cm): 175.26 (04-15 @ 12:55)  Weight (kg): 104.3 (04-15 @ 12:55)  BMI (kg/m2): 34 (04-15 @ 12:55)  BSA (m2): 2.19 (04-15 @ 12:55)    PHYSICAL EXAM:    Constitutional: NAD  HEENT: conjunctive   clear   Neck:  No JVD  Respiratory: decrease bs b/l   Cardiovascular: S1 and S2  Gastrointestinal: BS+, soft, NT/ND  Extremities: No peripheral edema  Skin: dry   Access: Not applicable    LABS:                        9.4    7.45  )-----------( 129      ( 15 Apr 2020 14:06 )             30.2     04-15    x   |  x   |  x   ----------------------------<  x   6.0<H>   |  x   |  x     Ca    9.3      15 Apr 2020 14:06    TPro  7.3  /  Alb  2.8<L>  /  TBili  0.3  /  DBili  x   /  AST  64<H>  /  ALT  58  /  AlkPhos  106  04-15      Urine Studies:  Urinalysis Basic - ( 15 Apr 2020 14:06 )    Color: Yellow / Appearance: Turbid / S.015 / pH: x  Gluc: x / Ketone: Trace  / Bili: Negative / Urobili: Negative   Blood: x / Protein: 100 / Nitrite: Negative   Leuk Esterase: Moderate / RBC: 11-25 /HPF / WBC >50   Sq Epi: x / Non Sq Epi: Few / Bacteria: Few            RADIOLOGY & ADDITIONAL STUDIES:

## 2020-04-15 NOTE — ED PROVIDER NOTE - CLINICAL SUMMARY MEDICAL DECISION MAKING FREE TEXT BOX
+ coronavirus, mentally challenged male, ACLD, hx of afib, found to have pna, uti, hypothermia, will obtain labs, xray , ekg, ivf sparingly, hx of chf, warming blanket, iv abx,  admission

## 2020-04-16 NOTE — CONSULT NOTE ADULT - SUBJECTIVE AND OBJECTIVE BOX
[facility], Division of Infectious Diseases  TERRENCE Viera  [phone]    KIMBERLY MARKS  69y, Male  [id number]    HPI--  HPI: pt nonverbal unable to give history  history per chart.  69M with cognitive dysfucntion, dm, htn, afib sent from facility with weakness    pt was at   [facility] end march for covid19 and gross hematuria  they stopped his eliquis and entresto (15 Apr 2020 16:40)      PMH/PSH--  Heart failure  Afib  CHF (congestive heart failure)  Gout  Hiatal hernia  BPH (benign prostatic hyperplasia)  Cognitive impairment  Cardiomyopathy  HTN (hypertension)  DM (diabetes mellitus)      Allergies--NKDA      Medications--  Antibiotics: azithromycin  IVPB 500 milliGRAM(s) IV Intermittent every 24 hours  cefTRIAXone   IVPB 1000 milliGRAM(s) IV Intermittent every 24 hours    Immunologic:   Other: dextrose 40% Gel PRN  dextrose 5%.  dextrose 50% Injectable  dextrose 50% Injectable  dextrose 50% Injectable  glucagon  Injectable PRN  insulin lispro (HumaLOG) corrective regimen sliding scale  methylPREDNISolone sodium succinate Injectable  midodrine.  pantoprazole  Injectable  sodium chloride 0.9%.  sodium chloride 0.9%.      Social History--  unable to obtatin  Family/Marital History--  nc    Remainder not relevant to clinical concern.    Travel/Environmental/Occupational History:  [place]    Review of Systems:  A >=10-point review of systems was obtained.     unable to obtain    Review of systems otherwise negative except as previously noted.    Physical Exam--  Vital Signs: T(F): 97.3 (04-16-20 @ 08:30), Max: 97.9 (04-15-20 @ 22:54)  HR: 69 (04-16-20 @ 08:30)  BP: 122/78 (04-16-20 @ 08:30)  RR: 22 (04-16-20 @ 08:30)  SpO2: 98% (04-16-20 @ 08:30)  Wt(kg): --  General: Nontoxic-appearing Male in no acute distress.  HEENT: AT/NC. Anicteric.   Neck: Not rigid. No sense of mass.  Nodes: None palpable.  Lungs: noncompliant with exam  Heart: Regular rate and rhythm.  Abdomen: Bowel sounds present and normoactive. Soft. Nondistended.   Extremities: No cyanosis or clubbing. No edema.   Skin: Warm. Dry. Good turgor. No rash. No vasculitic stigmata.  Psychiatric: confused         Laboratory & Imaging Data--  CBC                        8.6    16.82 )-----------( 110      ( 16 Apr 2020 10:23 )             27.5       Chemistries  04-16    140  |  109<H>  |  55<H>  ----------------------------<  226<H>  6.8<HH>   |  23  |  2.80<H>    Ca    8.6      16 Apr 2020 10:23  Phos  6.1     04-16  Mg     2.0     04-16    TPro  6.6  /  Alb  2.5<L>  /  TBili  0.4  /  DBili  x   /  AST  63<H>  /  ALT  56  /  AlkPhos  92  04-16      Culture Data    Culture - Blood (collected 15 Apr 2020 19:00)  Source: .Blood Blood-Peripheral  Gram Stain (16 Apr 2020 13:24):    Growth in aerobic bottle: Gram Negative Rods  Preliminary Report (16 Apr 2020 13:24):    Growth in aerobic bottle: Gram Negative Rods    "Due to technical problems, Proteus sp. will Not be reported as part of    the BCID panel until further notice" ***Blood Panel PCR results on this    specimen are available    approximately 3 hours after the Gram stain result.***    Gram stain, PCR, and/or culture results may not always    correspond due to difference in methodologies.    ************************************************************    This PCR assay was performed using [employer].    The following targets are tested for: Enterococcus,    vancomycin resistant enterococci, Listeria monocytogenes,    coagulase negative staphylococci, S. aureus,    methicillin resistant S. aureus, Streptococcus agalactiae    (Group B), S. pneumoniae, S. pyogenes (Group A),    Acinetobacter baumannii, Enterobacter cloacae, E. coli,    Klebsiella oxytoca, K. pneumoniae, Proteus sp.,    Serratia marcescens, Haemophilus influenzae,    Neisseria meningitidis, Pseudomonas aeruginosa, Candida    albicans, C. glabrata, C krusei, C parapsilosis,    C. tropicalis and the KPC resistance gene.  Organism: Blood Culture PCR (16 Apr 2020 13:29)  Organism: Blood Culture PCR (16 Apr 2020 13:29)        < from: Xray Chest 1 View AP/PA (04.15.20 @ 14:07) >  XAM:  XR CHEST AP OR PA 1V                            PROCEDURE DATE:  04/15/2020          INTERPRETATION:  PROCEDURE: AP view of the chest.    CLINICAL INFORMATION: Cough and fever.    COMPARISON: None.    FINDINGS:    Lungs: There are bilateral lower lung opacities.  Heart: The heart is top normal in size.  Mediastinum: The mediastinum is within normal limits.    IMPRESSION:    Findings of bilateral lower lung opacities are suspicious for pneumonia.      < end of copied text >

## 2020-04-16 NOTE — CONSULT NOTE ADULT - ASSESSMENT
69m with cognitive dysfunction, htn, dm admitted with   recent covid  weakness  ++ pseudomonas bacteremia 69m with cognitive dysfunction, htn, dm admitted with   recent covid  weakness  ++ pseudomonas bacteremia  hyperkalemia  ckd v barbra

## 2020-04-16 NOTE — CONSULT NOTE ADULT - PROBLEM SELECTOR RECOMMENDATION 9
pseudomonas isolated  f/u sensitivities  change to zosyn  f/u surviellance cx-- ordered  etiology-- ?? pna ,   urine cx -- ordered

## 2020-04-16 NOTE — PROGRESS NOTE ADULT - SUBJECTIVE AND OBJECTIVE BOX
Patient is a 69y Male whom presented to the hospital with ckd and barbra , hyperkalemia     PAST MEDICAL & SURGICAL HISTORY:  Heart failure  Afib  CHF (congestive heart failure)  Gout  Hiatal hernia  BPH (benign prostatic hyperplasia)  Cognitive impairment  Cardiomyopathy  HTN (hypertension)  DM (diabetes mellitus)      MEDICATIONS  (STANDING):  azithromycin  IVPB 500 milliGRAM(s) IV Intermittent every 24 hours  cefTRIAXone   IVPB 1000 milliGRAM(s) IV Intermittent every 24 hours  dextrose 5%. 1000 milliLiter(s) (50 mL/Hr) IV Continuous <Continuous>  dextrose 50% Injectable 12.5 Gram(s) IV Push once  dextrose 50% Injectable 25 Gram(s) IV Push once  dextrose 50% Injectable 25 Gram(s) IV Push once  insulin lispro (HumaLOG) corrective regimen sliding scale   SubCutaneous three times a day before meals  midodrine. 2.5 milliGRAM(s) Oral three times a day  pantoprazole  Injectable 40 milliGRAM(s) IV Push daily  sodium chloride 0.9%. 1000 milliLiter(s) (40 mL/Hr) IV Continuous <Continuous>  sodium polystyrene sulfonate Enema 15 Gram(s) Rectal once      Allergies    No Known Allergies    Intolerances        SOCIAL HISTORY:  Denies ETOh,Smoking,     FAMILY HISTORY:  family history is review and there is  no family  history of kidney disease in family , father and mother  No Pertinent Family History in first degree relatives of: as per available medical records.  Non-contributary for premature coronary disease or sudden cardiac death      REVIEW OF SYSTEMS:    unable to obtained a good review system                          8.6    16.82 )-----------( 110      ( 2020 10:23 )             27.5       CBC Full  -  ( 2020 10:23 )  WBC Count : 16.82 K/uL  RBC Count : 3.04 M/uL  Hemoglobin : 8.6 g/dL  Hematocrit : 27.5 %  Platelet Count - Automated : 110 K/uL  Mean Cell Volume : 90.5 fl  Mean Cell Hemoglobin : 28.3 pg  Mean Cell Hemoglobin Concentration : 31.3 gm/dL  Auto Neutrophil # : x  Auto Lymphocyte # : x  Auto Monocyte # : x  Auto Eosinophil # : x  Auto Basophil # : x  Auto Neutrophil % : x  Auto Lymphocyte % : x  Auto Monocyte % : x  Auto Eosinophil % : x  Auto Basophil % : x      04-16    140  |  109<H>  |  55<H>  ----------------------------<  226<H>  6.8<HH>   |  23  |  2.80<H>    Ca    8.6      2020 10:23  Phos  6.1     04-16  Mg     2.0     04-16    TPro  6.6  /  Alb  2.5<L>  /  TBili  0.4  /  DBili  x   /  AST  63<H>  /  ALT  56  /  AlkPhos  92  04-16      CAPILLARY BLOOD GLUCOSE      POCT Blood Glucose.: 197 mg/dL (2020 16:28)  POCT Blood Glucose.: 186 mg/dL (2020 12:13)  POCT Blood Glucose.: 284 mg/dL (2020 08:13)      Vital Signs Last 24 Hrs  T(C): 36.2 (2020 18:30), Max: 36.6 (15 Apr 2020 22:54)  T(F): 97.2 (2020 18:30), Max: 97.9 (15 Apr 2020 22:54)  HR: 74 (2020 18:30) (69 - 80)  BP: 121/76 (2020 18:30) (103/60 - 129/71)  BP(mean): --  RR: 22 (2020 18:30) (2 - 22)  SpO2: 97% (2020 18:30) (97% - 100%)    Urinalysis Basic - ( 15 Apr 2020 14:06 )    Color: Yellow / Appearance: Turbid / S.015 / pH: x  Gluc: x / Ketone: Trace  / Bili: Negative / Urobili: Negative   Blood: x / Protein: 100 / Nitrite: Negative   Leuk Esterase: Moderate / RBC: 11-25 /HPF / WBC >50   Sq Epi: x / Non Sq Epi: Few / Bacteria: Few        PT/INR - ( 15 Apr 2020 14:06 )   PT: 19.2 sec;   INR: 1.69 ratio         PTT - ( 15 Apr 2020 14:06 )  PTT:56.2 sec    PHYSICAL EXAM:    Constitutional: NAD  HEENT: conjunctive   clear   Neck:  No JVD  Respiratory: decrease bs b/l   Cardiovascular: S1 and S2  Gastrointestinal: BS+, soft, NT/ND  Extremities: No peripheral edema  Skin: dry   Access: Not applicable    LABS:                        9.4    7.45  )-----------( 129      ( 15 Apr 2020 14:06 )             30.2     04-15    x   |  x   |  x   ----------------------------<  x   6.0<H>   |  x   |  x     Ca    9.3      15 Apr 2020 14:06    TPro  7.3  /  Alb  2.8<L>  /  TBili  0.3  /  DBili  x   /  AST  64<H>  /  ALT  58  /  AlkPhos  106  04-15      Urine Studies:  Urinalysis Basic - ( 15 Apr 2020 14:06 )    Color: Yellow / Appearance: Turbid / S.015 / pH: x  Gluc: x / Ketone: Trace  / Bili: Negative / Urobili: Negative   Blood: x / Protein: 100 / Nitrite: Negative   Leuk Esterase: Moderate / RBC: 11-25 /HPF / WBC >50   Sq Epi: x / Non Sq Epi: Few / Bacteria: Few            RADIOLOGY & ADDITIONAL STUDIES:

## 2020-04-16 NOTE — PROGRESS NOTE ADULT - SUBJECTIVE AND OBJECTIVE BOX
PAST MEDICAL & SURGICAL HISTORY:  Heart failure  Afib  CHF (congestive heart failure)  Gout  Hiatal hernia  BPH (benign prostatic hyperplasia)  Cognitive impairment  Cardiomyopathy  HTN (hypertension)  DM (diabetes mellitus)      MEDICATIONS  (STANDING):  ALBUTerol    90 MICROgram(s) HFA Inhaler 1 Puff(s) Inhalation once  dextrose 5%. 1000 milliLiter(s) (50 mL/Hr) IV Continuous <Continuous>  dextrose 50% Injectable 12.5 Gram(s) IV Push once  dextrose 50% Injectable 25 Gram(s) IV Push once  dextrose 50% Injectable 25 Gram(s) IV Push once  insulin lispro (HumaLOG) corrective regimen sliding scale   SubCutaneous three times a day before meals  insulin regular  human recombinant 10 Unit(s) IV Push once  methylPREDNISolone sodium succinate Injectable 40 milliGRAM(s) IV Push daily  midodrine. 2.5 milliGRAM(s) Oral three times a day  pantoprazole  Injectable 40 milliGRAM(s) IV Push daily  piperacillin/tazobactam IVPB.. 3.375 Gram(s) IV Intermittent every 8 hours  sodium chloride 0.45%. 1000 milliLiter(s) (75 mL/Hr) IV Continuous <Continuous>  sodium chloride 0.9%. 1000 milliLiter(s) (500 mL/Hr) IV Continuous <Continuous>  sodium zirconium cyclosilicate 10 Gram(s) Oral three times a day    MEDICATIONS  (PRN):  dextrose 40% Gel 15 Gram(s) Oral once PRN Blood Glucose LESS THAN 70 milliGRAM(s)/deciliter  glucagon  Injectable 1 milliGRAM(s) IntraMuscular once PRN Glucose LESS THAN 70 milligrams/deciliter      Patient is a 69y old  Male who presents with a chief complaint of covid19+ dx 3/30  sent for hypoxia (2020 15:26)      Vital Signs Last 24 Hrs  T(C): 36.3 (2020 08:30), Max: 36.6 (15 Apr 2020 22:54)  T(F): 97.3 (2020 08:30), Max: 97.9 (15 Apr 2020 22:54)  HR: 69 (2020 08:30) (69 - 80)  BP: 122/78 (2020 08:30) (103/60 - 129/71)  BP(mean): --  RR: 22 (2020 08:30) (2 - 22)  SpO2: 98% (2020 08:30) (97% - 100%)          15 @ 07:01  -  -16 @ 07:00  --------------------------------------------------------  IN: 0 mL / OUT: 150 mL / NET: -150 mL        REVIEW OF SYSTEMS:    Constitutional: No fever, weight loss or fatigue  Eyes: No eye pain, visual disturbances, or discharge  ENT:  No difficulty hearing, tinnitus, vertigo; No sinus or throat pain  Neck: No pain or stiffness  Breasts: No pain, masses or nipple discharge  Respiratory: No cough, wheezing, chills or hemoptysis  Cardiovascular: No chest pain, palpitations, shortness of breath, dizziness or leg swelling  Gastrointestinal: No abdominal or epigastric pain. No nausea, vomiting or hematemesis; No diarrhea or constipation. No melena or hematochezia.  Genitourinary: No dysuria, frequency, hematuria or incontinence  Rectal: No pain, hemorrhoids or incontinence  Neurological: No headaches, memory loss, loss of strength, numbness or tremors  Skin: No itching, burning, rashes or lesions   Lymph Nodes: No enlarged glands  Endocrine: No heat or cold intolerance; No hair loss  Musculoskeletal: No joint pain or swelling; No muscle, back or extremity pain  Psychiatric: No depression, anxiety, mood swings or difficulty sleeping  Heme/Lymph: No easy bruising or bleeding gums  Allergy and Immunologic: No hives or eczema    PHYSICAL EXAM:    Constitutional: NAD,   Respiratory: dec bs   Cardiovascular:irreg  Gastrointestinal: BS+, soft, NT/ND  Extremities: + peripheral edema  Neurological: A, no focal deficits  Skin: No rashes      decubiti: none                          8.6    16.82 )-----------( 110      ( 2020 10:23 )             27.5     04-16    140  |  109<H>  |  55<H>  ----------------------------<  226<H>  6.8<HH>   |  23  |  2.80<H>    Ca    8.6      2020 10:23  Phos  6.1     04-16  Mg     2.0         TPro  6.6  /  Alb  2.5<L>  /  TBili  0.4  /  DBili  x   /  AST  63<H>  /  ALT  56  /  AlkPhos  92  -    PT/INR - ( 15 Apr 2020 14:06 )   PT: 19.2 sec;   INR: 1.69 ratio         PTT - ( 15 Apr 2020 14:06 )  PTT:56.2 sec  Urinalysis Basic - ( 15 Apr 2020 14:06 )    Color: Yellow / Appearance: Turbid / S.015 / pH: x  Gluc: x / Ketone: Trace  / Bili: Negative / Urobili: Negative   Blood: x / Protein: 100 / Nitrite: Negative   Leuk Esterase: Moderate / RBC: 11-25 /HPF / WBC >50   Sq Epi: x / Non Sq Epi: Few / Bacteria: Few      CARDIAC MARKERS ( 2020 10:23 )  .019 ng/mL / x     / x     / x     / x      CARDIAC MARKERS ( 2020 07:13 )  .018 ng/mL / x     / x     / x     / x      CARDIAC MARKERS ( 2020 01:32 )  .023 ng/mL / x     / x     / x     / x      CARDIAC MARKERS ( 15 Apr 2020 18:41 )  .016 ng/mL / x     / 372 U/L / x     / x           @ 10:23    --  Troponin:  .019        BNP:  4650--  Troponin:  .018    -  Troponin:  .023  .Blood Blood-Peripheral  04-15 @ 19:00   Growth in aerobic bottle: Gram Negative Rods  "Due to technical problems, Proteus sp. will Not be reported as part of  the BCID panel until further notice" ***Blood Panel PCR results on this  specimen are available  approximately 3 hours after the Gram stain result.***  Gram stain, PCR, and/or culture results may not always  correspond due to difference in methodologies.  ************************************************************  This PCR assay was performed using [employer].  The following targets are tested for: Enterococcus,  vancomycin resistant enterococci, Listeria monocytogenes,  coagulase negative staphylococci, S. aureus,  methicillin resistant S. aureus, Streptococcus agalactiae  (Group B), S. pneumoniae, S. pyogenes (Group A),  Acinetobacter baumannii, Enterobacter cloacae, E. coli,  Klebsiella oxytoca, K. pneumoniae, Proteus sp.,  Serratia marcescens, Haemophilus influenzae,  Neisseria meningitidis, Pseudomonas aeruginosa, Candida  albicans, C. glabrata, C krusei, C parapsilosis,  C. tropicalis and the KPC resistance gene.  --  Blood Culture PCR        Urine Culture:  04-15 @ 19:00    --        Growth in aerobic bottle: Gram Negative Rods  "Due to technical problems, Proteus sp. will Not be reported as part of  the BCID panel until further notice" ***Blood Panel PCR results on this  specimen are available  approximately 3 hours after the Gram stain result.***  Gram stain, PCR, and/or culture results may not always  correspond due to difference in methodologies.  ************************************************************  This PCR assay was performed using [employer].  The following targets are tested for: Enterococcus,  vancomycin resistant enterococci, Listeria monocytogenes,  coagulase negative staphylococci, S. aureus,  methicillin resistant S. aureus, Streptococcus agalactiae  (Group B), S. pneumoniae, S. pyogenes (Group A),  Acinetobacter baumannii, Enterobacter cloacae, E. coli,  Klebsiella oxytoca, K. pneumoniae, Proteus sp.,  Serratia marcescens, Haemophilus influenzae,  Neisseria meningitidis, Pseudomonas aeruginosa, Candida  albicans, C. glabrata, C krusei, C parapsilosis,  C. tropicalis and the KPC resistance gene.        Radiology:

## 2020-04-16 NOTE — PROGRESS NOTE ADULT - SUBJECTIVE AND OBJECTIVE BOX
[facility] Cardiology    CHIEF COMPLAINT: Patient is a 69y old  Male who presents with a chief complaint of covid19+ dx 3/30  sent for hypoxia (15 Apr 2020 18:32)      Follow Up: [ ] Chest Pain      [ ] Dyspnea     [ ] Palpitations    [ ] Atrial Fibrillation     [ ] Ventricular Dysrhythmia    [ ] Abnormal EKG                      [ ] Abnormal Cardiac Enzymes     [ ] Valvular Disease    HPI:  s/p treatment at [facility] end march for covid19 and gross hematuria  they stopped his eliquis and entresto (15 Apr 2020 16:40)    PAST MEDICAL & SURGICAL HISTORY:  Heart failure  Afib  CHF (congestive heart failure)  Gout  Hiatal hernia  BPH (benign prostatic hyperplasia)  Cognitive impairment  Cardiomyopathy  HTN (hypertension)  DM (diabetes mellitus)    MEDICATIONS  (STANDING):  azithromycin  IVPB 500 milliGRAM(s) IV Intermittent every 24 hours  cefTRIAXone   IVPB 1000 milliGRAM(s) IV Intermittent every 24 hours  dextrose 5%. 1000 milliLiter(s) (50 mL/Hr) IV Continuous <Continuous>  dextrose 50% Injectable 12.5 Gram(s) IV Push once  dextrose 50% Injectable 25 Gram(s) IV Push once  dextrose 50% Injectable 25 Gram(s) IV Push once  insulin lispro (HumaLOG) corrective regimen sliding scale   SubCutaneous three times a day before meals  methylPREDNISolone sodium succinate Injectable 40 milliGRAM(s) IV Push daily  midodrine. 2.5 milliGRAM(s) Oral three times a day  pantoprazole  Injectable 40 milliGRAM(s) IV Push daily  sodium chloride 0.9%. 1000 milliLiter(s) (40 mL/Hr) IV Continuous <Continuous>  sodium chloride 0.9%. 1000 milliLiter(s) (500 mL/Hr) IV Continuous <Continuous>    MEDICATIONS  (PRN):  dextrose 40% Gel 15 Gram(s) Oral once PRN Blood Glucose LESS THAN 70 milliGRAM(s)/deciliter  glucagon  Injectable 1 milliGRAM(s) IntraMuscular once PRN Glucose LESS THAN 70 milligrams/deciliter    Allergies    No Known Allergies    Intolerances        REVIEW OF SYSTEMS:    CONSTITUTIONAL: No weakness, fevers or chills.   EYES/ENT: No visual changes;    NECK: No pain or stiffness  RESPIRATORY: No cough, wheezing, No shortness of breath  CARDIOVASCULAR: No chest pain or palpitations  GASTROINTESTINAL: No abdominal pain, or hematochezia.  GENITOURINARY: No dysuria orhematuria  NEUROLOGICAL: No numbness or weakness  SKIN: No itching, burning, rashes  All other review of systems is negative unless indicated above    Vital Signs Last 24 Hrs  T(C): 36.3 (16 Apr 2020 08:30), Max: 36.6 (15 Apr 2020 22:54)  T(F): 97.3 (16 Apr 2020 08:30), Max: 97.9 (15 Apr 2020 22:54)  HR: 69 (16 Apr 2020 08:30) (55 - 86)  BP: 122/78 (16 Apr 2020 08:30) (78/54 - 130/80)  BP(mean): --  RR: 22 (16 Apr 2020 08:30) (2 - 22)  SpO2: 98% (16 Apr 2020 08:30) (94% - 100%)  I&O's Summary    15 Apr 2020 07:01  -  16 Apr 2020 07:00  --------------------------------------------------------  IN: 0 mL / OUT: 150 mL / NET: -150 mL        PHYSICAL EXAM:      Constitutional: NAD  Neurological: Alert   HEENT: EOMI, no JVD  Cardiovascular: S1 and S2, no murmur  Pulmonary: breath sounds bilaterally  Gastrointestinal: Bowel Sounds present, soft, nontender  Ext: no peripheral edema neg, chronic venous stasis changes B/L LE  Skin: No rashes, No cyanosis.  Psych:  Mood calm      LABS: All Labs Reviewed:                          8.6    16.82 )-----------( 110      ( 16 Apr 2020 10:23 )             27.5     04-16    140  |  109<H>  |  55<H>  ----------------------------<  226<H>  6.8<HH>   |  23  |  2.80<H>    Ca    8.6      16 Apr 2020 10:23  Phos  6.1     04-16  Mg     2.0     04-16    TPro  6.6  /  Alb  2.5<L>  /  TBili  0.4  /  DBili  x   /  AST  63<H>  /  ALT  56  /  AlkPhos  92  04-16    PT/INR - ( 15 Apr 2020 14:06 )   PT: 19.2 sec;   INR: 1.69 ratio         PTT - ( 15 Apr 2020 14:06 )  PTT:56.2 sec  CARDIAC MARKERS ( 16 Apr 2020 10:23 )  .019 ng/mL / x     / x     / x     / x      CARDIAC MARKERS ( 16 Apr 2020 07:13 )  .018 ng/mL / x     / x     / x     / x      CARDIAC MARKERS ( 16 Apr 2020 01:32 )  .023 ng/mL / x     / x     / x     / x      CARDIAC MARKERS ( 15 Apr 2020 18:41 )  .016 ng/mL / x     / 372 U/L / x     / x        69 male cognitive impairment, DM, HTN, CHF by chart a/w COVID +  B/L PNA, hypothermia, hypotension, sepsis    SBP now stable  Cr 2.3, 2.8  off Entresto, hold lasix  Anicoagulation recently d/c's secondary to anemia and hematuria  s/p gentle IVF @ 40 cc/hr. Hx of CMP/CHF, prior EF likely low  Azithromycin,  Repeat EKG today 4/16-Check QTC-pending  will follow

## 2020-04-16 NOTE — PROVIDER CONTACT NOTE (CRITICAL VALUE NOTIFICATION) - ASSESSMENT
VSS. Pt alert, responsive, in no acute distress. VSS. Pt alert, responsive to sternal rub & name but seems to be mouth breathing with stiff facial expression & appears to be mouth breathing.

## 2020-04-16 NOTE — PROVIDER CONTACT NOTE (CRITICAL VALUE NOTIFICATION) - ACTION/TREATMENT ORDERED:
Put call out to Dr. Valerio. Awaiting to hear call back. Dr. Valerio notified & is going to order kayexalate & STAT ABG. Ordered to notify nephrologist as well, Dr. Oviedo. Put call out to Dr. Oviedo, awaiting to hear call back.

## 2020-04-16 NOTE — PROVIDER CONTACT NOTE (CRITICAL VALUE NOTIFICATION) - BACKGROUND
Pt admitted w/ COVID19. Pt admitted w/ COVID19. Pt was hyperkalemic in ER. Pt received kayexalate enema & has not yet had a BM.

## 2020-04-16 NOTE — CHART NOTE - NSCHARTNOTEFT_GEN_A_CORE
patient blood cx gram neg rods  potassium level worse after multiple kayexelate insulin d50 by myself and dr rodriguez  I left message with dr rodriguez  I spoke to dr alexander to give calcium gluconate   state approved dnr/dni

## 2020-04-16 NOTE — CHART NOTE - NSCHARTNOTEFT_GEN_A_CORE
Patient seen and examined at bedside for assessment. Patient is sleeping and is sternal rubbed awake. Patient does not respond to questioning and is somnolent. Does not appear to be in respiratory distress. Vital signs are stable.   -Spoke to Dr Arreola regarding assessment and patient to receive STAT ABG, insulin 5 units, kayexalate 15 grams, amp D50, lasix 5 mg IV.   -Awaiting alex back from Dr Oviedo  -Will continue to monitor. RN to call with any changes. Patient seen and examined at bedside for assessment. Patient is sleeping and is sternal rubbed awake. Patient does not respond to questioning and is somnolent. Does not appear to be in respiratory distress. Vital signs are stable.   -Spoke to Dr Arreola regarding assessment and patient to receive STAT ABG, insulin 5 units, kayexalate 15 grams, amp D50, lasix 5 mg IV.   -Awaiting call back from Dr Oviedo  -Call placed to respiratory to perform ABG   -Will continue to monitor. RN to call with any changes.

## 2020-04-16 NOTE — PROGRESS NOTE ADULT - PROBLEM SELECTOR PLAN 3
I spoke to cardiology and nephrology  received multiple doses kayexelate insulin d5w  new calcium gluconate  lokelma and kayexelate per nephrology cardiology  state approved dnr/dni  renal functions worsening

## 2020-04-17 NOTE — CHART NOTE - NSCHARTNOTEFT_GEN_A_CORE
Notified by RN that patient had 4.1 second pause on tele. Unable to obtain ROS from patient. Spoke to Dr Valerio via telephone, will f/u BMP and magnesium.   -Will continue to monitor. RN to call if any changes Notified by RN that patient had 4.1 second pause on tele. Unable to obtain ROS from patient. Patient is sleeping in bed and appears somnolent. Spoke to Dr Valerio via telephone, will f/u BMP and magnesium.   -Will continue to monitor. RN to call if any changes

## 2020-04-17 NOTE — PROVIDER CONTACT NOTE (CRITICAL VALUE NOTIFICATION) - TEST AND RESULT REPORTED:
Bld cx from 4/16/2020 - growth in aerobic bottle gram negative rods. growth in anaerobic bottle gram positive cocci in pairs and chains

## 2020-04-17 NOTE — PROGRESS NOTE ADULT - ASSESSMENT
69 male cognitive impairment, DM, HTN, CHF by chart a/w COVID +  B/L PNA, hypothermia, hypotension, sepsis    SBP now stable  Cr 2.3, 2.8  off Entresto, hold lasix  Anicoagulation recently d/c's secondary to anemia and hematuria  s/p gentle IVF @ 40 cc/hr. Hx of CMP/CHF, prior EF likely low.  MARSHAL now with elevated potassium. Give Delores exalate  Azithromycin,  Repeat EKG 4/16- normal QTc  will follow as needed 69M with MRDD, cognitive impairment, DM, HTN, hyperlipidemia and prior CHF admitted with COVID 19 PNA, hypoxemia, hypothermia and hypotension now with better blood pressures but with afib on tele monitor with 4 second pauses and hyperkalemia on repeat blood work.     Suggest:    k 6.2 - Kayexalate ordered.  Cr 2.3, 2.8  off Entresto, hold lasix  Anicoagulation recently d/c 'd secondary to severe anemia and hematuria  s/p gentle IVF @ 40 cc/hr. Hx of CMP/CHF, prior EF likely low.  MARSHAL now with elevated potassium.   Azithromycin,  Repeat EKG 4/16- normal QTc  will follow as needed

## 2020-04-17 NOTE — PROGRESS NOTE ADULT - SUBJECTIVE AND OBJECTIVE BOX
infectious diseases progress note:    KIMBERLY MARKS is a 69y y. o. Male patient    No concerning overnight events    Allergies    No Known Allergies    Intolerances        ANTIBIOTICS/RELEVANT:  antimicrobials  piperacillin/tazobactam IVPB.. 3.375 Gram(s) IV Intermittent every 8 hours    immunologic:    OTHER:  ALBUTerol    90 MICROgram(s) HFA Inhaler 2 Puff(s) Inhalation two times a day  ALBUTerol    90 MICROgram(s) HFA Inhaler 1 Puff(s) Inhalation once  dextrose 40% Gel 15 Gram(s) Oral once PRN  dextrose 5%. 1000 milliLiter(s) IV Continuous <Continuous>  dextrose 50% Injectable 12.5 Gram(s) IV Push once  dextrose 50% Injectable 25 Gram(s) IV Push once  dextrose 50% Injectable 25 Gram(s) IV Push once  glucagon  Injectable 1 milliGRAM(s) IntraMuscular once PRN  insulin lispro (HumaLOG) corrective regimen sliding scale   SubCutaneous three times a day before meals  insulin lispro (HumaLOG) corrective regimen sliding scale   SubCutaneous at bedtime  methylPREDNISolone sodium succinate Injectable 40 milliGRAM(s) IV Push daily  midodrine. 2.5 milliGRAM(s) Oral three times a day  pantoprazole  Injectable 40 milliGRAM(s) IV Push daily  sodium chloride 0.45%. 1000 milliLiter(s) IV Continuous <Continuous>  sodium chloride 0.9%. 1000 milliLiter(s) IV Continuous <Continuous>      Objective:  Vital Signs Last 24 Hrs  T(C): 36.4 (17 Apr 2020 07:55), Max: 36.4 (17 Apr 2020 04:09)  T(F): 97.6 (17 Apr 2020 07:55), Max: 97.6 (17 Apr 2020 07:55)  HR: 80 (17 Apr 2020 12:30) (73 - 80)  BP: 117/61 (17 Apr 2020 12:30) (117/61 - 129/80)  BP(mean): --  RR: 20 (17 Apr 2020 07:55) (20 - 24)  SpO2: 98% (17 Apr 2020 07:55) (90% - 98%)    T(C): 36.4 (04-17-20 @ 07:55), Max: 36.6 (04-15-20 @ 22:54)  T(C): 36.4 (04-17-20 @ 07:55), Max: 36.6 (04-15-20 @ 22:54)  T(C): 36.4 (04-17-20 @ 07:55), Max: 36.6 (04-15-20 @ 22:54)    PHYSICAL EXAM:  HEENT: NC atramautic  Neck: supple  Respiratory: no accessory muscle use, breathing comfortably  Cardiovascular: distant  Gastrointestinal: normal appearing, nondistended  Extremities: no clubbing, no cyanosis,      LABS:                          8.6    16.30 )-----------( 108      ( 17 Apr 2020 08:37 )             27.0       16.30 04-17 @ 08:37  16.82 04-16 @ 10:23  16.91 04-16 @ 07:13  7.45 04-15 @ 14:06      04-17    144  |  113<H>  |  63<H>  ----------------------------<  224<H>  5.8<H>   |  22  |  2.80<H>    Ca    8.3<L>      17 Apr 2020 16:04  Phos  6.1     04-16  Mg     2.1     04-17    TPro  6.6  /  Alb  2.5<L>  /  TBili  0.4  /  DBili  x   /  AST  63<H>  /  ALT  56  /  AlkPhos  92  04-16      Creatinine, Serum: 2.80 mg/dL (04-17-20 @ 16:04)  Creatinine, Serum: 2.90 mg/dL (04-17-20 @ 08:37)  Creatinine, Serum: 2.80 mg/dL (04-16-20 @ 10:23)  Creatinine, Serum: 2.70 mg/dL (04-16-20 @ 07:13)  Creatinine, Serum: 2.50 mg/dL (04-16-20 @ 01:31)  Creatinine, Serum: 2.30 mg/dL (04-15-20 @ 14:06)                INFLAMMATORY MARKERS  Auto Neutrophil #: 15.73 K/uL (04-16-20 @ 07:13)  Auto Lymphocyte #: 0.34 K/uL (04-16-20 @ 07:13)  Auto Neutrophil #: 6.85 K/uL (04-15-20 @ 14:06)  Auto Lymphocyte #: 0.30 K/uL (04-15-20 @ 14:06)    Lactate, Blood: 2.5 mmol/L (04-15-20 @ 19:39)  Lactate, Blood: 4.2 mmol/L (04-15-20 @ 14:06)    Auto Eosinophil #: 0.00 K/uL (04-16-20 @ 07:13)  Auto Eosinophil #: 0.07 K/uL (04-15-20 @ 14:06)        Procalcitonin, Serum: 0.10 ng/mL (04-15-20 @ 14:06)    Troponin I, Serum: .019 ng/mL (04-16-20 @ 10:23)  Troponin I, Serum: .018 ng/mL (04-16-20 @ 07:13)  Troponin I, Serum: .023 ng/mL (04-16-20 @ 01:32)  Troponin I, Serum: .016 ng/mL (04-15-20 @ 18:41)    Creatine Kinase, Serum: 372 U/L (04-15-20 @ 18:41)        Ferritin, Serum: 343 ng/mL (04-15-20 @ 23:46)        INR: 1.69 ratio (04-15-20 @ 14:06)  Activated Partial Thromboplastin Time: 56.2 sec (04-15-20 @ 14:06)    D-Dimer Assay, Quantitative: 155 ng/mL DDU (04-15-20 @ 18:41)        MICROBIOLOGY:    Culture - Blood (04.15.20 @ 19:00)    -  Pseudomonas aeruginosa: Detec    -  Streptococcus sp. (Not Grp A, B or S pneumoniae): Detec    Gram Stain:   Growth in aerobic bottle: Gram Negative Rods  Growth in anaerobic bottle: Gram Positive Cocci in Pairs and Chains    Specimen Source: .Blood Blood-Peripheral    Organism: Blood Culture PCR    Organism: Blood Culture PCR    Culture Results:   Growth in aerobic bottle: Pseudomonas aeruginosa  Growth in anaerobic bottle: Gram Positive Cocci in Pairs and Chains      RADIOLOGY & ADDITIONAL STUDIES:

## 2020-04-17 NOTE — PROGRESS NOTE ADULT - SUBJECTIVE AND OBJECTIVE BOX
PAST MEDICAL & SURGICAL HISTORY:  Heart failure  Afib  CHF (congestive heart failure)  Gout  Hiatal hernia  BPH (benign prostatic hyperplasia)  Cognitive impairment  Cardiomyopathy  HTN (hypertension)  DM (diabetes mellitus)      MEDICATIONS  (STANDING):  ALBUTerol    90 MICROgram(s) HFA Inhaler 1 Puff(s) Inhalation once  ALBUTerol    90 MICROgram(s) HFA Inhaler 2 Puff(s) Inhalation two times a day  dextrose 5%. 1000 milliLiter(s) (50 mL/Hr) IV Continuous <Continuous>  dextrose 50% Injectable 12.5 Gram(s) IV Push once  dextrose 50% Injectable 25 Gram(s) IV Push once  dextrose 50% Injectable 25 Gram(s) IV Push once  insulin lispro (HumaLOG) corrective regimen sliding scale   SubCutaneous three times a day before meals  insulin lispro (HumaLOG) corrective regimen sliding scale   SubCutaneous at bedtime  methylPREDNISolone sodium succinate Injectable 40 milliGRAM(s) IV Push daily  midodrine. 2.5 milliGRAM(s) Oral three times a day  pantoprazole  Injectable 40 milliGRAM(s) IV Push daily  piperacillin/tazobactam IVPB.. 3.375 Gram(s) IV Intermittent every 8 hours  sodium chloride 0.45%. 1000 milliLiter(s) (75 mL/Hr) IV Continuous <Continuous>  sodium chloride 0.9%. 1000 milliLiter(s) (500 mL/Hr) IV Continuous <Continuous>  sodium polystyrene sulfonate Enema 30 Gram(s) Rectal once    MEDICATIONS  (PRN):  dextrose 40% Gel 15 Gram(s) Oral once PRN Blood Glucose LESS THAN 70 milliGRAM(s)/deciliter  glucagon  Injectable 1 milliGRAM(s) IntraMuscular once PRN Glucose LESS THAN 70 milligrams/deciliter      Patient is a 69y old  Male who presents with a chief complaint of covid19+ dx 3/30  sent for hypoxia (17 Apr 2020 08:04)      Vital Signs Last 24 Hrs  T(C): 36.4 (17 Apr 2020 07:55), Max: 36.4 (17 Apr 2020 04:09)  T(F): 97.6 (17 Apr 2020 07:55), Max: 97.6 (17 Apr 2020 07:55)  HR: 80 (17 Apr 2020 12:30) (73 - 80)  BP: 117/61 (17 Apr 2020 12:30) (117/61 - 129/80)  BP(mean): --  RR: 20 (17 Apr 2020 07:55) (20 - 24)  SpO2: 98% (17 Apr 2020 07:55) (90% - 98%)            REVIEW OF SYSTEMS:    Constitutional: No fever, weight loss or fatigue  Eyes: No eye pain, visual disturbances, or discharge  ENT:  No difficulty hearing, tinnitus, vertigo; No sinus or throat pain  Neck: No pain or stiffness  Breasts: No pain, masses or nipple discharge  Respiratory: No cough, wheezing, chills or hemoptysis  Cardiovascular: No chest pain, palpitations, shortness of breath, dizziness or leg swelling  Gastrointestinal: No abdominal or epigastric pain. No nausea, vomiting or hematemesis; No diarrhea or constipation. No melena or hematochezia.  Genitourinary: No dysuria, frequency, hematuria or incontinence  Rectal: No pain, hemorrhoids or incontinence  Neurological: No headaches, memory loss, loss of strength, numbness or tremors  Skin: No itching, burning, rashes or lesions   Lymph Nodes: No enlarged glands  Endocrine: No heat or cold intolerance; No hair loss  Musculoskeletal: No joint pain or swelling; No muscle, back or extremity pain  Psychiatric: No depression, anxiety, mood swings or difficulty sleeping  Heme/Lymph: No easy bruising or bleeding gums  Allergy and Immunologic: No hives or eczema    PHYSICAL EXAM:  responds to deep stimuli  sleeping  Constitutional: NAD,   Respiratory: dec breath sounds   Cardiovascular: irregular  Gastrointestinal: BS+, soft, NT/ND  Extremities+ 1 edema  + venous chronic changes,,,old    decubiti: none                          8.6    16.30 )-----------( 108      ( 17 Apr 2020 08:37 )             27.0     04-17    144  |  112<H>  |  62<H>  ----------------------------<  214<H>  6.2<HH>   |  23  |  2.90<H>    Ca    8.7      17 Apr 2020 08:37  Phos  6.1     04-16  Mg     2.1     04-17    TPro  6.6  /  Alb  2.5<L>  /  TBili  0.4  /  DBili  x   /  AST  63<H>  /  ALT  56  /  AlkPhos  92  04-16        CARDIAC MARKERS ( 16 Apr 2020 10:23 )  .019 ng/mL / x     / x     / x     / x      CARDIAC MARKERS ( 16 Apr 2020 07:13 )  .018 ng/mL / x     / x     / x     / x      CARDIAC MARKERS ( 16 Apr 2020 01:32 )  .023 ng/mL / x     / x     / x     / x      CARDIAC MARKERS ( 15 Apr 2020 18:41 )  .016 ng/mL / x     / 372 U/L / x     / x            .Blood Blood-Peripheral  04-15 @ 19:00   Growth in aerobic bottle: Pseudomonas aeruginosa  Growth in anaerobic bottle: Gram Positive Cocci in Pairs and Chains  "Due to technical problems, Proteus sp. will Not be reported as part of  the BCID panel until further notice"  ***Blood Panel PCRresults on this specimen are available  approximately 3 hours after the Gram stain result.***  Gram stain, PCR, and/or culture results may not always  correspond due to difference in methodologies.  ************************************************************  This PCR assay was performed using [employer].  The following targets are tested for: Enterococcus,  vancomycin resistant enterococci, Listeria monocytogenes,  coagulase negative staphylococci, S. aureus,  methicillin resistant S. aureus, Streptococcus agalactiae  (Group B), S. pneumoniae, S. pyogenes (Group A),  Acinetobacter baumannii, Enterobacter cloacae, E. coli,  Klebsiella oxytoca, K. pneumoniae, Proteus sp.,  Serratia marcescens, Haemophilus influenzae,  Neisseria meningitidis, Pseudomonas aeruginosa, Candida  albicans, C. glabrata, C krusei, C parapsilosis,  C. tropicalis and the KPC resistance gene.  --  Blood Culture PCR  Blood Culture PCR        Urine Culture:  04-15 @ 19:00    --        Growth in aerobic bottle: Pseudomonas aeruginosa  Growth in anaerobic bottle: Gram Positive Cocci in Pairs and Chains  "Due to technical problems, Proteus sp. will Not be reported as part of  the BCID panel until further notice"  ***Blood Panel PCRresults on this specimen are available  approximately 3 hours after the Gram stain result.***  Gram stain, PCR, and/or culture results may not always  correspond due to difference in methodologies.  ************************************************************  This PCR assay was performed using [employer].  The following targets are tested for: Enterococcus,  vancomycin resistant enterococci, Listeria monocytogenes,  coagulase negative staphylococci, S. aureus,  methicillin resistant S. aureus, Streptococcus agalactiae  (Group B), S. pneumoniae, S. pyogenes (Group A),  Acinetobacter baumannii, Enterobacter cloacae, E. coli,  Klebsiella oxytoca, K. pneumoniae, Proteus sp.,  Serratia marcescens, Haemophilus influenzae,  Neisseria meningitidis, Pseudomonas aeruginosa, Candida  albicans, C. glabrata, C krusei, C parapsilosis,  C. tropicalis and the KPC resistance gene.        Radiology:

## 2020-04-17 NOTE — PROGRESS NOTE ADULT - SUBJECTIVE AND OBJECTIVE BOX
Patient is a 69y Male whom presented to the hospital with ckd and barbra , hyperkalemia     PAST MEDICAL & SURGICAL HISTORY:  Heart failure  Afib  CHF (congestive heart failure)  Gout  Hiatal hernia  BPH (benign prostatic hyperplasia)  Cognitive impairment  Cardiomyopathy  HTN (hypertension)  DM (diabetes mellitus)      MEDICATIONS  (STANDING):  azithromycin  IVPB 500 milliGRAM(s) IV Intermittent every 24 hours  cefTRIAXone   IVPB 1000 milliGRAM(s) IV Intermittent every 24 hours  dextrose 5%. 1000 milliLiter(s) (50 mL/Hr) IV Continuous <Continuous>  dextrose 50% Injectable 12.5 Gram(s) IV Push once  dextrose 50% Injectable 25 Gram(s) IV Push once  dextrose 50% Injectable 25 Gram(s) IV Push once  insulin lispro (HumaLOG) corrective regimen sliding scale   SubCutaneous three times a day before meals  midodrine. 2.5 milliGRAM(s) Oral three times a day  pantoprazole  Injectable 40 milliGRAM(s) IV Push daily  sodium chloride 0.9%. 1000 milliLiter(s) (40 mL/Hr) IV Continuous <Continuous>  sodium polystyrene sulfonate Enema 15 Gram(s) Rectal once      Allergies    No Known Allergies    Intolerances        SOCIAL HISTORY:  Denies ETOh,Smoking,     FAMILY HISTORY:  family history is review and there is  no family  history of kidney disease in family , father and mother  No Pertinent Family History in first degree relatives of: as per available medical records.  Non-contributary for premature coronary disease or sudden cardiac death      REVIEW OF SYSTEMS:    unable to obtained a good review system                                            8.6    16.30 )-----------( 108      ( 17 Apr 2020 08:37 )             27.0       CBC Full  -  ( 17 Apr 2020 08:37 )  WBC Count : 16.30 K/uL  RBC Count : 3.05 M/uL  Hemoglobin : 8.6 g/dL  Hematocrit : 27.0 %  Platelet Count - Automated : 108 K/uL  Mean Cell Volume : 88.5 fl  Mean Cell Hemoglobin : 28.2 pg  Mean Cell Hemoglobin Concentration : 31.9 gm/dL  Auto Neutrophil # : x  Auto Lymphocyte # : x  Auto Monocyte # : x  Auto Eosinophil # : x  Auto Basophil # : x  Auto Neutrophil % : x  Auto Lymphocyte % : x  Auto Monocyte % : x  Auto Eosinophil % : x  Auto Basophil % : x      04-17    145  |  114<H>  |  63<H>  ----------------------------<  180<H>  5.3   |  22  |  2.80<H>    Ca    8.3<L>      17 Apr 2020 18:23  Phos  6.1     04-16  Mg     2.1     04-17    TPro  6.6  /  Alb  2.5<L>  /  TBili  0.4  /  DBili  x   /  AST  63<H>  /  ALT  56  /  AlkPhos  92  04-16      CAPILLARY BLOOD GLUCOSE      POCT Blood Glucose.: 313 mg/dL (17 Apr 2020 16:43)  POCT Blood Glucose.: 258 mg/dL (17 Apr 2020 16:04)  POCT Blood Glucose.: 264 mg/dL (17 Apr 2020 12:10)  POCT Blood Glucose.: 244 mg/dL (17 Apr 2020 08:38)      Vital Signs Last 24 Hrs  T(C): 36.4 (17 Apr 2020 15:48), Max: 36.4 (17 Apr 2020 04:09)  T(F): 97.5 (17 Apr 2020 15:48), Max: 97.6 (17 Apr 2020 07:55)  HR: 82 (17 Apr 2020 15:48) (73 - 82)  BP: 118/60 (17 Apr 2020 15:48) (117/61 - 129/80)  BP(mean): --  RR: 20 (17 Apr 2020 15:48) (20 - 24)  SpO2: 98% (17 Apr 2020 15:48) (90% - 98%)          PHYSICAL EXAM:    Constitutional: NAD  HEENT: conjunctive   clear   Neck:  No JVD  Respiratory: decrease bs b/l   Cardiovascular: S1 and S2  Gastrointestinal: BS+, soft, NT/ND  Extremities: No peripheral edema  Skin: dry   Access: Not applicable

## 2020-04-17 NOTE — PROGRESS NOTE ADULT - SUBJECTIVE AND OBJECTIVE BOX
[facility] Progress Note [phone] (Dr. Yang, Whitley, Kavon, Ever[place])    CHIEF COMPLAINT: Patient is a 69y old  Male who presents with a chief complaint of covid19+ dx 3/30  sent for hypoxia (16 Apr 2020 18:50)      Follow Up Today: The patient denies any chest discomfort or shortness of breath.    HPI:  s/p treatment at [facility] end march for covid19 and gross hematuria  they stopped his eliquis and entresto (15 Apr 2020 16:40)      PAST MEDICAL & SURGICAL HISTORY:  Heart failure  Afib  CHF (congestive heart failure)  Gout  Hiatal hernia  BPH (benign prostatic hyperplasia)  Cognitive impairment  Cardiomyopathy  HTN (hypertension)  DM (diabetes mellitus)      MEDICATIONS  (STANDING):  ALBUTerol    90 MICROgram(s) HFA Inhaler 1 Puff(s) Inhalation once  ALBUTerol    90 MICROgram(s) HFA Inhaler 2 Puff(s) Inhalation two times a day  dextrose 5%. 1000 milliLiter(s) (50 mL/Hr) IV Continuous <Continuous>  dextrose 50% Injectable 12.5 Gram(s) IV Push once  dextrose 50% Injectable 25 Gram(s) IV Push once  dextrose 50% Injectable 25 Gram(s) IV Push once  insulin lispro (HumaLOG) corrective regimen sliding scale   SubCutaneous three times a day before meals  methylPREDNISolone sodium succinate Injectable 40 milliGRAM(s) IV Push daily  midodrine. 2.5 milliGRAM(s) Oral three times a day  pantoprazole  Injectable 40 milliGRAM(s) IV Push daily  piperacillin/tazobactam IVPB.. 3.375 Gram(s) IV Intermittent every 8 hours  sodium chloride 0.45%. 1000 milliLiter(s) (75 mL/Hr) IV Continuous <Continuous>  sodium chloride 0.9%. 1000 milliLiter(s) (500 mL/Hr) IV Continuous <Continuous>  sodium zirconium cyclosilicate 10 Gram(s) Oral three times a day    MEDICATIONS  (PRN):  dextrose 40% Gel 15 Gram(s) Oral once PRN Blood Glucose LESS THAN 70 milliGRAM(s)/deciliter  glucagon  Injectable 1 milliGRAM(s) IntraMuscular once PRN Glucose LESS THAN 70 milligrams/deciliter      Allergies    No Known Allergies    Intolerances        REVIEW OF SYSTEMS:    All other review of systems is negative unless indicated above    Vital Signs Last 24 Hrs  T(C): 36.4 (17 Apr 2020 07:55), Max: 36.4 (17 Apr 2020 04:09)  T(F): 97.6 (17 Apr 2020 07:55), Max: 97.6 (17 Apr 2020 07:55)  HR: 73 (17 Apr 2020 07:55) (69 - 80)  BP: 122/54 (17 Apr 2020 07:55) (121/76 - 129/80)  BP(mean): --  RR: 20 (17 Apr 2020 07:55) (20 - 24)  SpO2: 98% (17 Apr 2020 07:55) (90% - 98%)    I&O's Summary      PHYSICAL EXAM:    Constitutional: NAD, awake and alert, well-developed  Eyes:  EOMI,  Pupils round, No oral cyanosis.  HEENT: No exudate or erythema  Pulmonary: Non-labored, breath sounds are clear bilaterally, No wheezing, rales or rhonchi  Cardiovascular: Regular, S1 and S2, No murmurs, rubs, gallops oir clicks  Gastrointestinal: Bowel Sounds present, soft, nontender.   Ext: No significant LE edema with good pulses x 4  Neurological: Alert, no gross focal motor deficits  Skin: No rashes.  Psych:  Mood & affect appropriate    LABS: All Labs Reviewed:                        8.6    16.82 )-----------( 110      ( 16 Apr 2020 10:23 )             27.5                         8.5    16.91 )-----------( 122      ( 16 Apr 2020 07:13 )             26.7                         9.4    7.45  )-----------( 129      ( 15 Apr 2020 14:06 )             30.2     16 Apr 2020 21:29    x      |  x      |  x      ----------------------------<  x      6.1     |  x      |  x      16 Apr 2020 10:23    140    |  109    |  55     ----------------------------<  226    6.8     |  23     |  2.80   16 Apr 2020 07:13    141    |  110    |  53     ----------------------------<  207    6.5     |  22     |  2.70     Ca    8.6        16 Apr 2020 10:23  Ca    8.5        16 Apr 2020 07:13  Ca    8.5        16 Apr 2020 01:31  Phos  6.1       16 Apr 2020 10:23  Mg     2.0       16 Apr 2020 10:23    TPro  6.6    /  Alb  2.5    /  TBili  0.4    /  DBili  x      /  AST  63     /  ALT  56     /  AlkPhos  92     16 Apr 2020 07:13  TPro  6.9    /  Alb  2.7    /  TBili  0.4    /  DBili  x      /  AST  61     /  ALT  59     /  AlkPhos  97     16 Apr 2020 01:31  TPro  7.3    /  Alb  2.8    /  TBili  0.3    /  DBili  x      /  AST  64     /  ALT  58     /  AlkPhos  106    15 Apr 2020 14:06    PT/INR - ( 15 Apr 2020 14:06 )   PT: 19.2 sec;   INR: 1.69 ratio         PTT - ( 15 Apr 2020 14:06 )  PTT:56.2 sec  CARDIAC MARKERS ( 16 Apr 2020 10:23 )  .019 ng/mL / x     / x     / x     / x      CARDIAC MARKERS ( 16 Apr 2020 07:13 )  .018 ng/mL / x     / x     / x     / x      CARDIAC MARKERS ( 16 Apr 2020 01:32 )  .023 ng/mL / x     / x     / x     / x      CARDIAC MARKERS ( 15 Apr 2020 18:41 )  .016 ng/mL / x     / 372 U/L / x     / x          Blood Culture: Organism Blood Culture PCR  Gram Stain Blood -- Gram Stain   Growth in aerobic bottle: Gram Negative Rods  Growth in anaerobic bottle: Gram Positive Cocci in Pairs and Chains  Specimen Source .Blood Blood-Peripheral  Culture-Blood --      04-16 @ 07:13  Pro Bnp [id number]  04-15 @ 18:41  Pro Bnp [id number]    04-16 @ 10:23  TSH: 0.74      RADIOLOGY/EKG:    Attending Attestation:   20 minutes spent on total encounter; more than 50% of the visit was spent counseling and/or coordinating care by the attending physician.     ASSESSMENT AND PLAN [facility] Progress Note [phone] (Dr. Yang, Whitley, Kavon, [place])    CHIEF COMPLAINT: Patient is a 69y old  Male who presents with a chief complaint of covid19+ dx 3/30  sent for hypoxia (16 Apr 2020 18:50)      Follow Up Today: The patient was given ativan overnight for severe agitation and is now lethargic.    HPI:  s/p treatment at [facility] end march for covid19 and gross hematuria  they stopped his eliquis and entresto (15 Apr 2020 16:40)      PAST MEDICAL & SURGICAL HISTORY:  Heart failure  Afib  CHF (congestive heart failure)  Gout  Hiatal hernia  BPH (benign prostatic hyperplasia)  Cognitive impairment  Cardiomyopathy  HTN (hypertension)  DM (diabetes mellitus)      MEDICATIONS  (STANDING):  ALBUTerol    90 MICROgram(s) HFA Inhaler 1 Puff(s) Inhalation once  ALBUTerol    90 MICROgram(s) HFA Inhaler 2 Puff(s) Inhalation two times a day  dextrose 5%. 1000 milliLiter(s) (50 mL/Hr) IV Continuous <Continuous>  dextrose 50% Injectable 12.5 Gram(s) IV Push once  dextrose 50% Injectable 25 Gram(s) IV Push once  dextrose 50% Injectable 25 Gram(s) IV Push once  insulin lispro (HumaLOG) corrective regimen sliding scale   SubCutaneous three times a day before meals  methylPREDNISolone sodium succinate Injectable 40 milliGRAM(s) IV Push daily  midodrine. 2.5 milliGRAM(s) Oral three times a day  pantoprazole  Injectable 40 milliGRAM(s) IV Push daily  piperacillin/tazobactam IVPB.. 3.375 Gram(s) IV Intermittent every 8 hours  sodium chloride 0.45%. 1000 milliLiter(s) (75 mL/Hr) IV Continuous <Continuous>  sodium chloride 0.9%. 1000 milliLiter(s) (500 mL/Hr) IV Continuous <Continuous>  sodium zirconium cyclosilicate 10 Gram(s) Oral three times a day    MEDICATIONS  (PRN):  dextrose 40% Gel 15 Gram(s) Oral once PRN Blood Glucose LESS THAN 70 milliGRAM(s)/deciliter  glucagon  Injectable 1 milliGRAM(s) IntraMuscular once PRN Glucose LESS THAN 70 milligrams/deciliter      Allergies    No Known Allergies    Intolerances        REVIEW OF SYSTEMS:    All other review of systems is negative unless indicated above    Vital Signs Last 24 Hrs  T(C): 36.4 (17 Apr 2020 07:55), Max: 36.4 (17 Apr 2020 04:09)  T(F): 97.6 (17 Apr 2020 07:55), Max: 97.6 (17 Apr 2020 07:55)  HR: 73 (17 Apr 2020 07:55) (69 - 80)  BP: 122/54 (17 Apr 2020 07:55) (121/76 - 129/80)  BP(mean): --  RR: 20 (17 Apr 2020 07:55) (20 - 24)  SpO2: 98% (17 Apr 2020 07:55) (90% - 98%)    I&O's Summary      PHYSICAL EXAM:    Constitutional: lethargic  Eyes:  EOMI  HEENT: No exudate or erythema  Pulmonary: Non-labored, breath sounds are clear bilaterally  Cardiovascular: Regular, S1 and S2, No murmurs  Gastrointestinal: Bowel Sounds present, soft, nontender.   Ext: No significant LE edema   Neurological: Not assessable   Skin: No rashes.  Psych:  Lethargic    LABS: All Labs Reviewed:                        8.6    16.82 )-----------( 110      ( 16 Apr 2020 10:23 )             27.5                         8.5    16.91 )-----------( 122      ( 16 Apr 2020 07:13 )             26.7                         9.4    7.45  )-----------( 129      ( 15 Apr 2020 14:06 )             30.2     16 Apr 2020 21:29    x      |  x      |  x      ----------------------------<  x      6.1     |  x      |  x      16 Apr 2020 10:23    140    |  109    |  55     ----------------------------<  226    6.8     |  23     |  2.80   16 Apr 2020 07:13    141    |  110    |  53     ----------------------------<  207    6.5     |  22     |  2.70     Ca    8.6        16 Apr 2020 10:23  Ca    8.5        16 Apr 2020 07:13  Ca    8.5        16 Apr 2020 01:31  Phos  6.1       16 Apr 2020 10:23  Mg     2.0       16 Apr 2020 10:23    TPro  6.6    /  Alb  2.5    /  TBili  0.4    /  DBili  x      /  AST  63     /  ALT  56     /  AlkPhos  92     16 Apr 2020 07:13  TPro  6.9    /  Alb  2.7    /  TBili  0.4    /  DBili  x      /  AST  61     /  ALT  59     /  AlkPhos  97     16 Apr 2020 01:31  TPro  7.3    /  Alb  2.8    /  TBili  0.3    /  DBili  x      /  AST  64     /  ALT  58     /  AlkPhos  106    15 Apr 2020 14:06    PT/INR - ( 15 Apr 2020 14:06 )   PT: 19.2 sec;   INR: 1.69 ratio         PTT - ( 15 Apr 2020 14:06 )  PTT:56.2 sec  CARDIAC MARKERS ( 16 Apr 2020 10:23 )  .019 ng/mL / x     / x     / x     / x      CARDIAC MARKERS ( 16 Apr 2020 07:13 )  .018 ng/mL / x     / x     / x     / x      CARDIAC MARKERS ( 16 Apr 2020 01:32 )  .023 ng/mL / x     / x     / x     / x      CARDIAC MARKERS ( 15 Apr 2020 18:41 )  .016 ng/mL / x     / 372 U/L / x     / x          Blood Culture: Organism Blood Culture PCR  Gram Stain Blood -- Gram Stain   Growth in aerobic bottle: Gram Negative Rods  Growth in anaerobic bottle: Gram Positive Cocci in Pairs and Chains  Specimen Source .Blood Blood-Peripheral  Culture-Blood --      04-16 @ 07:13  Pro Bnp [id number]  04-15 @ 18:41  Pro Bnp [id number]    04-16 @ 10:23  TSH: 0.74      RADIOLOGY/EKG:    Attending Attestation:   20 minutes spent on total encounter; more than 50% of the visit was spent counseling and/or coordinating care by the attending physician.     ASSESSMENT AND PLAN

## 2020-04-17 NOTE — CHART NOTE - NSCHARTNOTEFT_GEN_A_CORE
I had long discussion with patient sister Avani  she nguyen not want dialysis under any circumstances  states her  was on HD and understands the under take  stated ,,she does not want patient to suffer

## 2020-04-17 NOTE — PROGRESS NOTE ADULT - ASSESSMENT
69m with cognitive dysfunction, htn, dm admitted with   recent covid  weakness  ++ pseudomonas bacteremia w PNA as suggested course  hyperkalemia  ckd v barbra

## 2020-04-18 NOTE — PROGRESS NOTE ADULT - SUBJECTIVE AND OBJECTIVE BOX
PAST MEDICAL & SURGICAL HISTORY:  Heart failure  Afib  CHF (congestive heart failure)  Gout  Hiatal hernia  BPH (benign prostatic hyperplasia)  Cognitive impairment  Cardiomyopathy  HTN (hypertension)  DM (diabetes mellitus)      MEDICATIONS  (STANDING):  ALBUTerol    90 MICROgram(s) HFA Inhaler 2 Puff(s) Inhalation two times a day  ALBUTerol    90 MICROgram(s) HFA Inhaler 1 Puff(s) Inhalation once  dextrose 5%. 1000 milliLiter(s) (50 mL/Hr) IV Continuous <Continuous>  dextrose 50% Injectable 12.5 Gram(s) IV Push once  dextrose 50% Injectable 25 Gram(s) IV Push once  dextrose 50% Injectable 25 Gram(s) IV Push once  insulin lispro (HumaLOG) corrective regimen sliding scale   SubCutaneous three times a day before meals  insulin lispro (HumaLOG) corrective regimen sliding scale   SubCutaneous at bedtime  methylPREDNISolone sodium succinate Injectable 40 milliGRAM(s) IV Push daily  midodrine. 2.5 milliGRAM(s) Oral three times a day  pantoprazole  Injectable 40 milliGRAM(s) IV Push daily  piperacillin/tazobactam IVPB.. 3.375 Gram(s) IV Intermittent every 8 hours  sodium chloride 0.45%. 1000 milliLiter(s) (50 mL/Hr) IV Continuous <Continuous>  sodium chloride 0.9%. 1000 milliLiter(s) (500 mL/Hr) IV Continuous <Continuous>    MEDICATIONS  (PRN):  dextrose 40% Gel 15 Gram(s) Oral once PRN Blood Glucose LESS THAN 70 milliGRAM(s)/deciliter  glucagon  Injectable 1 milliGRAM(s) IntraMuscular once PRN Glucose LESS THAN 70 milligrams/deciliter      Patient is a 69y old  Male who presents with a chief complaint of covid19+ dx 3/30  sent for hypoxia (18 Apr 2020 12:19)      Vital Signs Last 24 Hrs  T(C): 36.6 (18 Apr 2020 13:12), Max: 36.6 (18 Apr 2020 08:00)  T(F): 97.8 (18 Apr 2020 13:12), Max: 97.8 (18 Apr 2020 08:00)  HR: 69 (18 Apr 2020 13:12) (69 - 74)  BP: 91/38 (18 Apr 2020 13:12) (91/38 - 129/66)  BP(mean): --  RR: 21 (18 Apr 2020 13:12) (20 - 22)  SpO2: 97% (18 Apr 2020 13:12) (97% - 98%)          04-17 @ 07:01  -  04-18 @ 07:00  --------------------------------------------------------  IN: 550 mL / OUT: 0 mL / NET: 550 mL        REVIEW OF SYSTEMS:    Constitutional: No fever, weight loss or fatigue  Eyes: No eye pain, visual disturbances, or discharge  ENT:  No difficulty hearing, tinnitus, vertigo; No sinus or throat pain  Neck: No pain or stiffness  Breasts: No pain, masses or nipple discharge  Respiratory: No cough, wheezing, chills or hemoptysis  Cardiovascular: No chest pain, palpitations, shortness of breath, dizziness or leg swelling  Gastrointestinal: No abdominal or epigastric pain. No nausea, vomiting or hematemesis; No diarrhea or constipation. No melena or hematochezia.  Genitourinary: No dysuria, frequency, hematuria or incontinence  Rectal: No pain, hemorrhoids or incontinence  Neurological: No headaches, memory loss, loss of strength, numbness or tremors  Skin: No itching, burning, rashes or lesions   Lymph Nodes: No enlarged glands  Endocrine: No heat or cold intolerance; No hair loss  Musculoskeletal: No joint pain or swelling; No muscle, back or extremity pain  Psychiatric: No depression, anxiety, mood swings or difficulty sleeping  Heme/Lymph: No easy bruising or bleeding gums  Allergy and Immunologic: No hives or eczema    PHYSICAL EXAM:  responds to deep stimuli  Constitutional: NAD,   Respiratory: dec bs   Cardiovascular: irreg  Gastrointestinal: soft  Extremities: No peripheral edema  Skin: chronic venous changes  no induration/warmth/ulceration      decubiti: none                          8.1    11.58 )-----------( 88       ( 18 Apr 2020 08:35 )             25.9     04-18    143  |  110<H>  |  58<H>  ----------------------------<  241<H>  5.3   |  22  |  2.40<H>    Ca    8.2<L>      18 Apr 2020 08:35  Mg     2.1     04-17    TPro  6.3  /  Alb  2.4<L>  /  TBili  0.4  /  DBili  x   /  AST  54<H>  /  ALT  56  /  AlkPhos  86  04-18              .Blood Blood-Peripheral  04-16 @ 22:07   No growth to date.  --  --      .Blood Blood-Peripheral  04-15 @ 19:00   Growth in aerobic bottle: Pseudomonas aeruginosa  Growth in anaerobic bottle: Streptococcus parasanguinis  Alpha hemolytic strep  (not Strep. pneumoniae or Enterococcus)  Single set isolate, possible contaminant. Contact  Microbiology if susceptibility testing clinically  indicated.  "Due to technical problems, Proteus sp. will Not be reported as part of  the BCID panel until further notice"  ***Blood Panel PCR results on this specimen are available  approximately 3 hours after the Gram stain result.***  Gram stain, PCR, and/or culture results may not always  correspond due to difference in methodologies.  ************************************************************  This PCR assay was performed using [employer].  The following targets are tested for: Enterococcus,  vancomycin resistant enterococci, Listeria monocytogenes,  coagulase negative staphylococci, S. aureus,  methicillin resistant S. aureus, Streptococcus agalactiae  (Group B), S. pneumoniae, S. pyogenes (Group A),  Acinetobacter baumannii, Enterobacter cloacae, E. coli,  Klebsiella oxytoca, K. pneumoniae, Proteus sp.,  Serratia marcescens, Haemophilus influenzae,  Neisseria meningitidis, Pseudomonas aeruginosa, Candida  albicans, C. glabrata, C krusei, C parapsilosis,  C. tropicalis and the KPC resistance gene.  --  Blood Culture PCR  Pseudomonas aeruginosa  Blood Culture PCR        Urine Culture:  04-16 @ 22:07    --        No growth to date.  Urine Culture:  04-15 @ 19:00    --        Growth in aerobic bottle: Pseudomonas aeruginosa  Growth in anaerobic bottle: Streptococcus parasanguinis  Alpha hemolytic strep  (not Strep. pneumoniae or Enterococcus)  Single set isolate, possible contaminant. Contact  Microbiology if susceptibility testing clinically  indicated.  "Due to technical problems, Proteus sp. will Not be reported as part of  the BCID panel until further notice"  ***Blood Panel PCR results on this specimen are available  approximately 3 hours after the Gram stain result.***  Gram stain, PCR, and/or culture results may not always  correspond due to difference in methodologies.  ************************************************************  This PCR assay was performed using [employer].  The following targets are tested for: Enterococcus,  vancomycin resistant enterococci, Listeria monocytogenes,  coagulase negative staphylococci, S. aureus,  methicillin resistant S. aureus, Streptococcus agalactiae  (Group B), S. pneumoniae, S. pyogenes (Group A),  Acinetobacter baumannii, Enterobacter cloacae, E. coli,  Klebsiella oxytoca, K. pneumoniae, Proteus sp.,  Serratia marcescens, Haemophilus influenzae,  Neisseria meningitidis, Pseudomonas aeruginosa, Candida  albicans, C. glabrata, C krusei, C parapsilosis,  C. tropicalis and the KPC resistance gene.        Radiology:

## 2020-04-18 NOTE — PROGRESS NOTE ADULT - ASSESSMENT
69m with cognitive dysfunction, htn, dm admitted with   recent covid  weakness  ++ pseudomonas bacteremia w PNA as suggested course  hyperkalemia  ckd v barbra  4/16 Zosyn started

## 2020-04-18 NOTE — PROGRESS NOTE ADULT - SUBJECTIVE AND OBJECTIVE BOX
infectious diseases progress note:    KIMBERLY MARKS is a 69y y. o. Male patient    COVID Patient    Allergies    No Known Allergies    Intolerances        ANTIBIOTICS/RELEVANT:  antimicrobials  piperacillin/tazobactam IVPB.. 3.375 Gram(s) IV Intermittent every 8 hours    immunologic:    OTHER:  ALBUTerol    90 MICROgram(s) HFA Inhaler 2 Puff(s) Inhalation two times a day  ALBUTerol    90 MICROgram(s) HFA Inhaler 1 Puff(s) Inhalation once  dextrose 40% Gel 15 Gram(s) Oral once PRN  dextrose 5%. 1000 milliLiter(s) IV Continuous <Continuous>  dextrose 50% Injectable 12.5 Gram(s) IV Push once  dextrose 50% Injectable 25 Gram(s) IV Push once  dextrose 50% Injectable 25 Gram(s) IV Push once  glucagon  Injectable 1 milliGRAM(s) IntraMuscular once PRN  insulin lispro (HumaLOG) corrective regimen sliding scale   SubCutaneous three times a day before meals  insulin lispro (HumaLOG) corrective regimen sliding scale   SubCutaneous at bedtime  methylPREDNISolone sodium succinate Injectable 40 milliGRAM(s) IV Push daily  midodrine. 2.5 milliGRAM(s) Oral three times a day  pantoprazole  Injectable 40 milliGRAM(s) IV Push daily  sodium chloride 0.45%. 1000 milliLiter(s) IV Continuous <Continuous>  sodium chloride 0.9%. 1000 milliLiter(s) IV Continuous <Continuous>      Objective:  Vital Signs Last 24 Hrs  T(C): 36.6 (18 Apr 2020 08:00), Max: 36.6 (18 Apr 2020 08:00)  T(F): 97.8 (18 Apr 2020 08:00), Max: 97.8 (18 Apr 2020 08:00)  HR: 69 (18 Apr 2020 08:00) (69 - 82)  BP: 129/66 (18 Apr 2020 08:00) (117/61 - 129/66)  BP(mean): --  RR: 22 (18 Apr 2020 08:00) (20 - 22)  SpO2: 98% (18 Apr 2020 08:00) (97% - 98%)    T(C): 36.6 (04-18-20 @ 08:00), Max: 36.6 (04-18-20 @ 08:00)  T(C): 36.6 (04-18-20 @ 08:00), Max: 36.6 (04-15-20 @ 22:54)  T(C): 36.6 (04-18-20 @ 08:00), Max: 36.6 (04-15-20 @ 22:54)    PHYSICAL EXAM:  HEENT: NC atraumatic  Neck: supple  Respiratory: no accessory muscle use, breathing comfortably  Cardiovascular: distant  Gastrointestinal: normal appearing, nondistended  Extremities: no clubbing, no cyanosis,      LABS:                          8.1    11.58 )-----------( 88       ( 18 Apr 2020 08:35 )             25.9       11.58 04-18 @ 08:35  16.30 04-17 @ 08:37  16.82 04-16 @ 10:23  16.91 04-16 @ 07:13  7.45 04-15 @ 14:06      04-18    143  |  110<H>  |  58<H>  ----------------------------<  241<H>  5.3   |  22  |  2.40<H>    Ca    8.2<L>      18 Apr 2020 08:35  Mg     2.1     04-17    TPro  6.3  /  Alb  2.4<L>  /  TBili  0.4  /  DBili  x   /  AST  54<H>  /  ALT  56  /  AlkPhos  86  04-18      Creatinine, Serum: 2.40 mg/dL (04-18-20 @ 08:35)  Creatinine, Serum: 2.80 mg/dL (04-17-20 @ 18:23)  Creatinine, Serum: 2.80 mg/dL (04-17-20 @ 16:04)  Creatinine, Serum: 2.90 mg/dL (04-17-20 @ 08:37)  Creatinine, Serum: 2.80 mg/dL (04-16-20 @ 10:23)  Creatinine, Serum: 2.70 mg/dL (04-16-20 @ 07:13)  Creatinine, Serum: 2.50 mg/dL (04-16-20 @ 01:31)  Creatinine, Serum: 2.30 mg/dL (04-15-20 @ 14:06)                COVID RISK SCORE  Auto Neutrophil #: 10.62 K/uL (04-18-20 @ 08:35)  Auto Lymphocyte #: 0.49 K/uL (04-18-20 @ 08:35)  Auto Neutrophil #: 15.73 K/uL (04-16-20 @ 07:13)  Auto Lymphocyte #: 0.34 K/uL (04-16-20 @ 07:13)  Auto Neutrophil #: 6.85 K/uL (04-15-20 @ 14:06)  Auto Lymphocyte #: 0.30 K/uL (04-15-20 @ 14:06)    Lactate, Blood: 2.5 mmol/L (04-15-20 @ 19:39)  Lactate, Blood: 4.2 mmol/L (04-15-20 @ 14:06)    Auto Eosinophil #: 0.00 K/uL (04-18-20 @ 08:35)  Auto Eosinophil #: 0.00 K/uL (04-16-20 @ 07:13)  Auto Eosinophil #: 0.07 K/uL (04-15-20 @ 14:06)        Procalcitonin, Serum: 0.10 ng/mL (04-15-20 @ 14:06)    Troponin I, Serum: .019 ng/mL (04-16-20 @ 10:23)  Troponin I, Serum: .018 ng/mL (04-16-20 @ 07:13)  Troponin I, Serum: .023 ng/mL (04-16-20 @ 01:32)  Troponin I, Serum: .016 ng/mL (04-15-20 @ 18:41)    Creatine Kinase, Serum: 372 U/L (04-15-20 @ 18:41)        Ferritin, Serum: 343 ng/mL (04-15-20 @ 23:46)        INR: 1.69 ratio (04-15-20 @ 14:06)  Activated Partial Thromboplastin Time: 56.2 sec (04-15-20 @ 14:06)    D-Dimer Assay, Quantitative: 155 ng/mL DDU (04-15-20 @ 18:41)        MICROBIOLOGY:    Culture - Blood (04.16.20 @ 22:07)    Specimen Source: .Blood Blood-Venous    Culture Results:   No growth to date.    Culture - Blood (04.15.20 @ 19:00)    -  Pseudomonas aeruginosa: Detec    -  Streptococcus sp. (Not Grp A, B or S pneumoniae): Detec    Gram Stain:   Growth in aerobic bottle: Gram Negative Rods  Growth in anaerobic bottle: Gram Positive Cocci in Pairs and Chains    -  Amikacin: S <=16    -  Aztreonam: S <=4    -  Cefepime: S 8    -  Ceftazidime: S <=1    -  Ciprofloxacin: S <=0.25    -  Gentamicin: S 4    -  Imipenem: S <=1    -  Levofloxacin: S 1    -  Meropenem: S <=1    -  Piperacillin/Tazobactam: S <=8    -  Tobramycin: S <=2    Specimen Source: .Blood Blood-Peripheral    Organism: Blood Culture PCR    Organism: Pseudomonas aeruginosa    Organism: Blood Culture PCR    Culture Results:   Growth in aerobic bottle: Pseudomonas aeruginosa  Growth in anaerobic bottle: Streptococcus parasanguinis  Alpha hemolytic strep  (not Strep. pneumoniae or Enterococcus)  Single set isolate, possible contaminant. Contact  Microbiology if susceptibility testing clinically  indicated.      RADIOLOGY & ADDITIONAL STUDIES:

## 2020-04-18 NOTE — PROGRESS NOTE ADULT - SUBJECTIVE AND OBJECTIVE BOX
Patient is a 69y Male whom presented to the hospital with ckd and barbra , hyperkalemia     PAST MEDICAL & SURGICAL HISTORY:  Heart failure  Afib  CHF (congestive heart failure)  Gout  Hiatal hernia  BPH (benign prostatic hyperplasia)  Cognitive impairment  Cardiomyopathy  HTN (hypertension)  DM (diabetes mellitus)      MEDICATIONS  (STANDING):  azithromycin  IVPB 500 milliGRAM(s) IV Intermittent every 24 hours  cefTRIAXone   IVPB 1000 milliGRAM(s) IV Intermittent every 24 hours  dextrose 5%. 1000 milliLiter(s) (50 mL/Hr) IV Continuous <Continuous>  dextrose 50% Injectable 12.5 Gram(s) IV Push once  dextrose 50% Injectable 25 Gram(s) IV Push once  dextrose 50% Injectable 25 Gram(s) IV Push once  insulin lispro (HumaLOG) corrective regimen sliding scale   SubCutaneous three times a day before meals  midodrine. 2.5 milliGRAM(s) Oral three times a day  pantoprazole  Injectable 40 milliGRAM(s) IV Push daily  sodium chloride 0.9%. 1000 milliLiter(s) (40 mL/Hr) IV Continuous <Continuous>  sodium polystyrene sulfonate Enema 15 Gram(s) Rectal once      Allergies    No Known Allergies    Intolerances        SOCIAL HISTORY:  Denies ETOh,Smoking,     FAMILY HISTORY:  family history is review and there is  no family  history of kidney disease in family , father and mother  No Pertinent Family History in first degree relatives of: as per available medical records.  Non-contributary for premature coronary disease or sudden cardiac death      REVIEW OF SYSTEMS:    unable to obtained a good review system                                                           8.1    11.58 )-----------( 88       ( 18 Apr 2020 08:35 )             25.9       CBC Full  -  ( 18 Apr 2020 08:35 )  WBC Count : 11.58 K/uL  RBC Count : 2.91 M/uL  Hemoglobin : 8.1 g/dL  Hematocrit : 25.9 %  Platelet Count - Automated : 88 K/uL  Mean Cell Volume : 89.0 fl  Mean Cell Hemoglobin : 27.8 pg  Mean Cell Hemoglobin Concentration : 31.3 gm/dL  Auto Neutrophil # : 10.62 K/uL  Auto Lymphocyte # : 0.49 K/uL  Auto Monocyte # : 0.33 K/uL  Auto Eosinophil # : 0.00 K/uL  Auto Basophil # : 0.01 K/uL  Auto Neutrophil % : 91.8 %  Auto Lymphocyte % : 4.2 %  Auto Monocyte % : 2.8 %  Auto Eosinophil % : 0.0 %  Auto Basophil % : 0.1 %      04-18    143  |  110<H>  |  58<H>  ----------------------------<  241<H>  5.3   |  22  |  2.40<H>    Ca    8.2<L>      18 Apr 2020 08:35  Mg     2.1     04-17    TPro  6.3  /  Alb  2.4<L>  /  TBili  0.4  /  DBili  x   /  AST  54<H>  /  ALT  56  /  AlkPhos  86  04-18      CAPILLARY BLOOD GLUCOSE      POCT Blood Glucose.: 256 mg/dL (18 Apr 2020 17:39)  POCT Blood Glucose.: 316 mg/dL (18 Apr 2020 12:54)  POCT Blood Glucose.: 280 mg/dL (18 Apr 2020 11:36)  POCT Blood Glucose.: 271 mg/dL (18 Apr 2020 08:22)  POCT Blood Glucose.: 159 mg/dL (17 Apr 2020 22:05)      Vital Signs Last 24 Hrs  T(C): 36.6 (18 Apr 2020 13:12), Max: 36.6 (18 Apr 2020 08:00)  T(F): 97.8 (18 Apr 2020 13:12), Max: 97.8 (18 Apr 2020 08:00)  HR: 67 (18 Apr 2020 17:04) (67 - 74)  BP: 117/70 (18 Apr 2020 17:04) (91/38 - 129/66)  BP(mean): --  RR: 20 (18 Apr 2020 17:04) (20 - 22)  SpO2: 98% (18 Apr 2020 17:04) (97% - 98%)            PHYSICAL EXAM:    Constitutional: NAD  HEENT: conjunctive   clear   Neck:  No JVD  Respiratory: decrease bs b/l   Cardiovascular: S1 and S2  Gastrointestinal: BS+, soft, NT/ND  Extremities: No peripheral edema  Skin: dry   Access: Not applicable

## 2020-04-19 NOTE — PROGRESS NOTE ADULT - SUBJECTIVE AND OBJECTIVE BOX
Patient is a 69y Male whom presented to the hospital with ckd and barbra , hyperkalemia     PAST MEDICAL & SURGICAL HISTORY:  Heart failure  Afib  CHF (congestive heart failure)  Gout  Hiatal hernia  BPH (benign prostatic hyperplasia)  Cognitive impairment  Cardiomyopathy  HTN (hypertension)  DM (diabetes mellitus)      MEDICATIONS  (STANDING):  azithromycin  IVPB 500 milliGRAM(s) IV Intermittent every 24 hours  cefTRIAXone   IVPB 1000 milliGRAM(s) IV Intermittent every 24 hours  dextrose 5%. 1000 milliLiter(s) (50 mL/Hr) IV Continuous <Continuous>  dextrose 50% Injectable 12.5 Gram(s) IV Push once  dextrose 50% Injectable 25 Gram(s) IV Push once  dextrose 50% Injectable 25 Gram(s) IV Push once  insulin lispro (HumaLOG) corrective regimen sliding scale   SubCutaneous three times a day before meals  midodrine. 2.5 milliGRAM(s) Oral three times a day  pantoprazole  Injectable 40 milliGRAM(s) IV Push daily  sodium chloride 0.9%. 1000 milliLiter(s) (40 mL/Hr) IV Continuous <Continuous>  sodium polystyrene sulfonate Enema 15 Gram(s) Rectal once      Allergies    No Known Allergies    Intolerances        SOCIAL HISTORY:  Denies ETOh,Smoking,     FAMILY HISTORY:  family history is review and there is  no family  history of kidney disease in family , father and mother  No Pertinent Family History in first degree relatives of: as per available medical records.  Non-contributary for premature coronary disease or sudden cardiac death      REVIEW OF SYSTEMS:    unable to obtained a good review system                                                                           8.1    11.58 )-----------( 88       ( 2020 08:35 )             25.9       CBC Full  -  ( 2020 08:35 )  WBC Count : 11.58 K/uL  RBC Count : 2.91 M/uL  Hemoglobin : 8.1 g/dL  Hematocrit : 25.9 %  Platelet Count - Automated : 88 K/uL  Mean Cell Volume : 89.0 fl  Mean Cell Hemoglobin : 27.8 pg  Mean Cell Hemoglobin Concentration : 31.3 gm/dL  Auto Neutrophil # : 10.62 K/uL  Auto Lymphocyte # : 0.49 K/uL  Auto Monocyte # : 0.33 K/uL  Auto Eosinophil # : 0.00 K/uL  Auto Basophil # : 0.01 K/uL  Auto Neutrophil % : 91.8 %  Auto Lymphocyte % : 4.2 %  Auto Monocyte % : 2.8 %  Auto Eosinophil % : 0.0 %  Auto Basophil % : 0.1 %          143  |  110<H>  |  58<H>  ----------------------------<  241<H>  5.3   |  22  |  2.40<H>    Ca    8.2<L>      2020 08:35    TPro  6.3  /  Alb  2.4<L>  /  TBili  0.4  /  DBili  x   /  AST  54<H>  /  ALT  56  /  AlkPhos  86        CAPILLARY BLOOD GLUCOSE      POCT Blood Glucose.: 316 mg/dL (2020 12:41)  POCT Blood Glucose.: 303 mg/dL (2020 12:16)  POCT Blood Glucose.: 478 mg/dL (2020 12:14)  POCT Blood Glucose.: 202 mg/dL (2020 09:08)  POCT Blood Glucose.: 238 mg/dL (2020 21:45)  POCT Blood Glucose.: 256 mg/dL (2020 17:39)      Vital Signs Last 24 Hrs  T(C): 36.7 (2020 08:00), Max: 36.9 (2020 06:25)  T(F): 98.1 (2020 08:00), Max: 98.5 (2020 06:25)  HR: 86 (2020 12:00) (59 - 102)  BP: 120/74 (2020 12:00) (117/70 - 123/70)  BP(mean): --  RR: 20 (2020 17:04) (20 - 20)  SpO2: 90% (2020 08:00) (90% - 99%)    Urinalysis Basic - ( 2020 21:44 )    Color: Yellow / Appearance: Slightly Turbid / S.020 / pH: x  Gluc: x / Ketone: Negative  / Bili: Negative / Urobili: Negative   Blood: x / Protein: 30 mg/dL / Nitrite: Negative   Leuk Esterase: Moderate / RBC: 6-10 /HPF / WBC >50   Sq Epi: x / Non Sq Epi: Few / Bacteria: Few                PHYSICAL EXAM:    Constitutional: NAD  HEENT: conjunctive   clear   Neck:  No JVD  Respiratory: decrease bs b/l   Cardiovascular: S1 and S2  Gastrointestinal: BS+, soft, NT/ND  Extremities: No peripheral edema  Skin: dry   Access: Not applicable

## 2020-04-19 NOTE — PROGRESS NOTE ADULT - ASSESSMENT
persistent a fib- rate controlled on no rate controlling meds  No further pauses  BP stable   continue present care

## 2020-04-19 NOTE — PROGRESS NOTE ADULT - SUBJECTIVE AND OBJECTIVE BOX
PAST MEDICAL & SURGICAL HISTORY:  Heart failure  Afib  CHF (congestive heart failure)  Gout  Hiatal hernia  BPH (benign prostatic hyperplasia)  Cognitive impairment  Cardiomyopathy  HTN (hypertension)  DM (diabetes mellitus)      MEDICATIONS  (STANDING):  ALBUTerol    90 MICROgram(s) HFA Inhaler 2 Puff(s) Inhalation two times a day  dextrose 5%. 1000 milliLiter(s) (50 mL/Hr) IV Continuous <Continuous>  dextrose 50% Injectable 12.5 Gram(s) IV Push once  dextrose 50% Injectable 25 Gram(s) IV Push once  dextrose 50% Injectable 25 Gram(s) IV Push once  insulin lispro (HumaLOG) corrective regimen sliding scale   SubCutaneous three times a day before meals  insulin lispro (HumaLOG) corrective regimen sliding scale   SubCutaneous at bedtime  midodrine. 2.5 milliGRAM(s) Oral three times a day  pantoprazole  Injectable 40 milliGRAM(s) IV Push daily  piperacillin/tazobactam IVPB.. 3.375 Gram(s) IV Intermittent every 8 hours  sodium chloride 0.45%. 1000 milliLiter(s) (50 mL/Hr) IV Continuous <Continuous>  sodium chloride 0.9%. 1000 milliLiter(s) (500 mL/Hr) IV Continuous <Continuous>    MEDICATIONS  (PRN):  dextrose 40% Gel 15 Gram(s) Oral once PRN Blood Glucose LESS THAN 70 milliGRAM(s)/deciliter  glucagon  Injectable 1 milliGRAM(s) IntraMuscular once PRN Glucose LESS THAN 70 milligrams/deciliter      Patient is a 69y old  Male who presents with a chief complaint of covid19+ dx 3/30  sent for hypoxia (2020 10:56)      Vital Signs Last 24 Hrs  T(C): 36.7 (2020 08:00), Max: 36.9 (2020 06:25)  T(F): 98.1 (2020 08:00), Max: 98.5 (2020 06:25)  HR: 86 (2020 12:00) (59 - 102)  BP: 120/74 (2020 12:00) (117/70 - 123/70)  BP(mean): --  RR: 20 (2020 17:04) (20 - 20)  SpO2: 90% (2020 08:00) (90% - 99%)          -18 @ 07:01  -  04- @ 07:00  --------------------------------------------------------  IN: 100 mL / OUT: 0 mL / NET: 100 mL        REVIEW OF SYSTEMS:    Constitutional: No fever, weight loss or fatigue  Eyes: No eye pain, visual disturbances, or discharge  ENT:  No difficulty hearing, tinnitus, vertigo; No sinus or throat pain  Neck: No pain or stiffness  Breasts: No pain, masses or nipple discharge  Respiratory: No cough, wheezing, chills or hemoptysis  Cardiovascular: No chest pain, palpitations, shortness of breath, dizziness or leg swelling  Gastrointestinal: No abdominal or epigastric pain. No nausea, vomiting or hematemesis; No diarrhea or constipation. No melena or hematochezia.  Genitourinary: No dysuria, frequency, hematuria or incontinence  Rectal: No pain, hemorrhoids or incontinence  Neurological: No headaches, memory loss, loss of strength, numbness or tremors  Skin: No itching, burning, rashes or lesions   Lymph Nodes: No enlarged glands  Endocrine: No heat or cold intolerance; No hair loss  Musculoskeletal: No joint pain or swelling; No muscle, back or extremity pain  Psychiatric: No depression, anxiety, mood swings or difficulty sleeping  Heme/Lymph: No easy bruising or bleeding gums  Allergy and Immunologic: No hives or eczema    PHYSICAL EXAM:    Constitutional: NAD, well-groomed, well-developed  HEENT: PERRLA, EOMI, Normal Hearing, MMM  Neck: No LAD, No JVD  Back: Normal spine flexure, No CVA tenderness  Respiratory: CTAB/L   Cardiovascular: S1 and S2, RRR, no M/G/R  Gastrointestinal: BS+, soft, NT/ND  Extremities: No peripheral edema  Vascular: 2+ peripheral pulses  Neurological: A/O x 3, no focal deficits  Skin: No rashes      decubiti:                           8.1    11.58 )-----------( 88       ( 2020 08:35 )             25.9     04-18    143  |  110<H>  |  58<H>  ----------------------------<  241<H>  5.3   |  22  |  2.40<H>    Ca    8.2<L>      2020 08:35    TPro  6.3  /  Alb  2.4<L>  /  TBili  0.4  /  DBili  x   /  AST  54<H>  /  ALT  56  /  AlkPhos  86        Urinalysis Basic - ( 2020 21:44 )    Color: Yellow / Appearance: Slightly Turbid / S.020 / pH: x  Gluc: x / Ketone: Negative  / Bili: Negative / Urobili: Negative   Blood: x / Protein: 30 mg/dL / Nitrite: Negative   Leuk Esterase: Moderate / RBC: 6-10 /HPF / WBC >50   Sq Epi: x / Non Sq Epi: Few / Bacteria: Few            .Blood Blood-Peripheral   @ 22:07   No growth to date.  --  --      .Blood Blood-Peripheral  04-15 @ 19:00   Growth in aerobic bottle: Pseudomonas aeruginosa  Growth in anaerobic bottle: Streptococcus parasanguinis  Alpha hemolytic strep  (not Strep. pneumoniae or Enterococcus)  Single set isolate, possible contaminant. Contact  Microbiology if susceptibility testing clinically  indicated.  "Due to technical problems, Proteus sp. will Not be reported as part of  the BCID panel until further notice"  ***Blood Panel PCR results on this specimen are available  approximately 3 hours after the Gram stain result.***  Gram stain, PCR, and/or culture results may not always  correspond due to difference in methodologies.  ************************************************************  This PCR assay was performed using [employer].  The following targets are tested for: Enterococcus,  vancomycin resistant enterococci, Listeria monocytogenes,  coagulase negative staphylococci, S. aureus,  methicillin resistant S. aureus, Streptococcus agalactiae  (Group B), S. pneumoniae, S. pyogenes (Group A),  Acinetobacter baumannii, Enterobacter cloacae, E. coli,  Klebsiella oxytoca, K. pneumoniae, Proteus sp.,  Serratia marcescens, Haemophilus influenzae,  Neisseria meningitidis, Pseudomonas aeruginosa, Candida  albicans, C. glabrata, C krusei, C parapsilosis,  C. tropicalis and the KPC resistance gene.  --  Blood Culture PCR  Pseudomonas aeruginosa  Blood Culture PCR        Urine Culture:   @ 22:07    --        No growth to date.  Urine Culture:  04-15 @ 19:00    --        Growth in aerobic bottle: Pseudomonas aeruginosa  Growth in anaerobic bottle: Streptococcus parasanguinis  Alpha hemolytic strep  (not Strep. pneumoniae or Enterococcus)  Single set isolate, possible contaminant. Contact  Microbiology if susceptibility testing clinically  indicated.  "Due to technical problems, Proteus sp. will Not be reported as part of  the BCID panel until further notice"  ***Blood Panel PCR results on this specimen are available  approximately 3 hours after the Gram stain result.***  Gram stain, PCR, and/or culture results may not always  correspond due to difference in methodologies.  ************************************************************  This PCR assay was performed using [employer].  The following targets are tested for: Enterococcus,  vancomycin resistant enterococci, Listeria monocytogenes,  coagulase negative staphylococci, S. aureus,  methicillin resistant S. aureus, Streptococcus agalactiae  (Group B), S. pneumoniae, S. pyogenes (Group A),  Acinetobacter baumannii, Enterobacter cloacae, E. coli,  Klebsiella oxytoca, K. pneumoniae, Proteus sp.,  Serratia marcescens, Haemophilus influenzae,  Neisseria meningitidis, Pseudomonas aeruginosa, Candida  albicans, C. glabrata, C krusei, C parapsilosis,  C. tropicalis and the KPC resistance gene.        Radiology:    PAST MEDICAL & SURGICAL HISTORY:  Heart failure  Afib  CHF (congestive heart failure)  Gout  Hiatal hernia  BPH (benign prostatic hyperplasia)  Cognitive impairment  Cardiomyopathy  HTN (hypertension)  DM (diabetes mellitus)      MEDICATIONS  (STANDING):  ALBUTerol    90 MICROgram(s) HFA Inhaler 2 Puff(s) Inhalation two times a day  dextrose 5%. 1000 milliLiter(s) (50 mL/Hr) IV Continuous <Continuous>  dextrose 50% Injectable 12.5 Gram(s) IV Push once  dextrose 50% Injectable 25 Gram(s) IV Push once  dextrose 50% Injectable 25 Gram(s) IV Push once  insulin lispro (HumaLOG) corrective regimen sliding scale   SubCutaneous three times a day before meals  insulin lispro (HumaLOG) corrective regimen sliding scale   SubCutaneous at bedtime  midodrine. 2.5 milliGRAM(s) Oral three times a day  pantoprazole  Injectable 40 milliGRAM(s) IV Push daily  piperacillin/tazobactam IVPB.. 3.375 Gram(s) IV Intermittent every 8 hours  sodium chloride 0.45%. 1000 milliLiter(s) (50 mL/Hr) IV Continuous <Continuous>  sodium chloride 0.9%. 1000 milliLiter(s) (500 mL/Hr) IV Continuous <Continuous>    MEDICATIONS  (PRN):  dextrose 40% Gel 15 Gram(s) Oral once PRN Blood Glucose LESS THAN 70 milliGRAM(s)/deciliter  glucagon  Injectable 1 milliGRAM(s) IntraMuscular once PRN Glucose LESS THAN 70 milligrams/deciliter      Patient is a 69y old  Male who presents with a chief complaint of covid19+ dx 3/30  sent for hypoxia (2020 10:56)      Vital Signs Last 24 Hrs  T(C): 36.7 (2020 08:00), Max: 36.9 (2020 06:25)  T(F): 98.1 (2020 08:00), Max: 98.5 (2020 06:25)  HR: 86 (2020 12:00) (59 - 102)  BP: 120/74 (2020 12:00) (117/70 - 123/70)  BP(mean): --  RR: 20 (2020 17:04) (20 - 20)  SpO2: 90% (2020 08:00) (90% - 99%)          -18 @ 07:01  -  -19 @ 07:00  --------------------------------------------------------  IN: 100 mL / OUT: 0 mL / NET: 100 mL        REVIEW OF SYSTEMS:    Constitutional: No fever, weight loss or fatigue  Eyes: No eye pain, visual disturbances, or discharge  ENT:  No difficulty hearing, tinnitus, vertigo; No sinus or throat pain  Neck: No pain or stiffness  Breasts: No pain, masses or nipple discharge  Respiratory: No cough, wheezing, chills or hemoptysis  Cardiovascular: No chest pain, palpitations, shortness of breath, dizziness or leg swelling  Gastrointestinal: No abdominal or epigastric pain. No nausea, vomiting or hematemesis; No diarrhea or constipation. No melena or hematochezia.  Genitourinary: No dysuria, frequency, hematuria or incontinence  Rectal: No pain, hemorrhoids or incontinence  Neurological: No headaches, memory loss, loss of strength, numbness or tremors  Skin: No itching, burning, rashes or lesions   Lymph Nodes: No enlarged glands  Endocrine: No heat or cold intolerance; No hair loss  Musculoskeletal: No joint pain or swelling; No muscle, back or extremity pain  Psychiatric: No depression, anxiety, mood swings or difficulty sleeping  Heme/Lymph: No easy bruising or bleeding gums  Allergy and Immunologic: No hives or eczema    PHYSICAL EXAM:  fully alert today  interactive  woke up  ate all his meals  Constitutional: NAD,  Respiratory: CTAB/L   Cardiovascular: S1 and S2, RRR, no M/G/R  Gastrointestinal: BS+, soft, NT/ND  Extremities:  + peripheral edema  Neurological: A, no focal deficits  Skin: chronic venous changes      decubiti: none                          8.1    11.58 )-----------( 88       ( 2020 08:35 )             25.9         143  |  110<H>  |  58<H>  ----------------------------<  241<H>  5.3   |  22  |  2.40<H>    Ca    8.2<L>      2020 08:35    TPro  6.3  /  Alb  2.4<L>  /  TBili  0.4  /  DBili  x   /  AST  54<H>  /  ALT  56  /  AlkPhos  86        Urinalysis Basic - ( 2020 21:44 )    Color: Yellow / Appearance: Slightly Turbid / S.020 / pH: x  Gluc: x / Ketone: Negative  / Bili: Negative / Urobili: Negative   Blood: x / Protein: 30 mg/dL / Nitrite: Negative   Leuk Esterase: Moderate / RBC: 6-10 /HPF / WBC >50   Sq Epi: x / Non Sq Epi: Few / Bacteria: Few            .Blood Blood-Peripheral  04-16 @ 22:07   No growth to date.  --  --      .Blood Blood-Peripheral  04-15 @ 19:00   Growth in aerobic bottle: Pseudomonas aeruginosa  Growth in anaerobic bottle: Streptococcus parasanguinis  Alpha hemolytic strep  (not Strep. pneumoniae or Enterococcus)  Single set isolate, possible contaminant. Contact  Microbiology if susceptibility testing clinically  indicated.  "Due to technical problems, Proteus sp. will Not be reported as part of  the BCID panel until further notice"  ***Blood Panel PCR results on this specimen are available  approximately 3 hours after the Gram stain result.***  Gram stain, PCR, and/or culture results may not always  correspond due to difference in methodologies.  ************************************************************  This PCR assay was performed using [employer].  The following targets are tested for: Enterococcus,  vancomycin resistant enterococci, Listeria monocytogenes,  coagulase negative staphylococci, S. aureus,  methicillin resistant S. aureus, Streptococcus agalactiae  (Group B), S. pneumoniae, S. pyogenes (Group A),  Acinetobacter baumannii, Enterobacter cloacae, E. coli,  Klebsiella oxytoca, K. pneumoniae, Proteus sp.,  Serratia marcescens, Haemophilus influenzae,  Neisseria meningitidis, Pseudomonas aeruginosa, Candida  albicans, C. glabrata, C krusei, C parapsilosis,  C. tropicalis and the KPC resistance gene.  --  Blood Culture PCR  Pseudomonas aeruginosa  Blood Culture PCR        Urine Culture:   @ 22:07    --        No growth to date.  Urine Culture:  04-15 @ 19:00    --        Growth in aerobic bottle: Pseudomonas aeruginosa  Growth in anaerobic bottle: Streptococcus parasanguinis  Alpha hemolytic strep  (not Strep. pneumoniae or Enterococcus)  Single set isolate, possible contaminant. Contact  Microbiology if susceptibility testing clinically  indicated.  "Due to technical problems, Proteus sp. will Not be reported as part of  the BCID panel until further notice"  ***Blood Panel PCR results on this specimen are available  approximately 3 hours after the Gram stain result.***  Gram stain, PCR, and/or culture results may not always  correspond due to difference in methodologies.  ************************************************************  This PCR assay was performed using [employer].  The following targets are tested for: Enterococcus,  vancomycin resistant enterococci, Listeria monocytogenes,  coagulase negative staphylococci, S. aureus,  methicillin resistant S. aureus, Streptococcus agalactiae  (Group B), S. pneumoniae, S. pyogenes (Group A),  Acinetobacter baumannii, Enterobacter cloacae, E. coli,  Klebsiella oxytoca, K. pneumoniae, Proteus sp.,  Serratia marcescens, Haemophilus influenzae,  Neisseria meningitidis, Pseudomonas aeruginosa, Candida  albicans, C. glabrata, C krusei, C parapsilosis,  C. tropicalis and the KPC resistance gene.        Radiology:

## 2020-04-19 NOTE — PROGRESS NOTE ADULT - SUBJECTIVE AND OBJECTIVE BOX
CHIEF COMPLAINT: Patient is a 69y old  Male who presents with a chief complaint of covid19+ dx 3/30  sent for hypoxia (18 Apr 2020 16:39)      Follow Up: [ ] Chest Pain      [ ] Dyspnea     [ ] Palpitations    [ x] Atrial Fibrillation     [ ] Ventricular Dysrhythmia    [ ] Abnormal EKG                      [ ] Abnormal Cardiac Enzymes     [ ] Valvular Disease   [ ] CAD  [ ] Hypertension [ ] CHF      HPI:  s/p treatment at [facility] end march for covid19 and gross hematuria  they stopped his eliquis and entresto (15 Apr 2020 16:40)      PAST MEDICAL & SURGICAL HISTORY:  Heart failure  Afib  CHF (congestive heart failure)  Gout  Hiatal hernia  BPH (benign prostatic hyperplasia)  Cognitive impairment  Cardiomyopathy  HTN (hypertension)  DM (diabetes mellitus)      MEDICATIONS  (STANDING):  ALBUTerol    90 MICROgram(s) HFA Inhaler 2 Puff(s) Inhalation two times a day  dextrose 5%. 1000 milliLiter(s) (50 mL/Hr) IV Continuous <Continuous>  dextrose 50% Injectable 12.5 Gram(s) IV Push once  dextrose 50% Injectable 25 Gram(s) IV Push once  dextrose 50% Injectable 25 Gram(s) IV Push once  insulin lispro (HumaLOG) corrective regimen sliding scale   SubCutaneous three times a day before meals  insulin lispro (HumaLOG) corrective regimen sliding scale   SubCutaneous at bedtime  midodrine. 2.5 milliGRAM(s) Oral three times a day  pantoprazole  Injectable 40 milliGRAM(s) IV Push daily  piperacillin/tazobactam IVPB.. 3.375 Gram(s) IV Intermittent every 8 hours  sodium chloride 0.45%. 1000 milliLiter(s) (50 mL/Hr) IV Continuous <Continuous>  sodium chloride 0.9%. 1000 milliLiter(s) (500 mL/Hr) IV Continuous <Continuous>    MEDICATIONS  (PRN):  dextrose 40% Gel 15 Gram(s) Oral once PRN Blood Glucose LESS THAN 70 milliGRAM(s)/deciliter  glucagon  Injectable 1 milliGRAM(s) IntraMuscular once PRN Glucose LESS THAN 70 milligrams/deciliter      Allergies    No Known Allergies    Intolerances        REVIEW OF SYSTEMS:    CONSTITUTIONAL:  weakness,     RESPIRATORY: decreased congestion No shortness of breath  CARDIOVASCULAR: No chest pain or palpitations  GASTROINTESTINAL: No abdominal or epigastric pain. No nausea, vomiting, or hematemesis; No diarrhea or constipation. No melena or hematochezia.    NEUROLOGICAL: No numbness or weakness    All other review of systems is negative unless indicated above    Vital Signs Last 24 Hrs  T(C): 36.7 (19 Apr 2020 08:00), Max: 36.9 (19 Apr 2020 06:25)  T(F): 98.1 (19 Apr 2020 08:00), Max: 98.5 (19 Apr 2020 06:25)  HR: 88 (19 Apr 2020 08:00) (59 - 102)  BP: 120/83 (19 Apr 2020 08:00) (91/38 - 123/70)  BP(mean): --  RR: 20 (18 Apr 2020 17:04) (20 - 21)  SpO2: 90% (19 Apr 2020 08:00) (90% - 99%)    I&O's Summary    18 Apr 2020 07:01  -  19 Apr 2020 07:00  --------------------------------------------------------  IN: 100 mL / OUT: 0 mL / NET: 100 mL        PHYSICAL EXAM:    Constitutional: NAD, awake and alert, well-developed    Pulmonary: Non-labored, breath sounds are clear bilaterally, No wheezing, rales or rhonchi  Cardiovascular: irregular, S1 and S2, No murmurs, rubs, gallops or clicks  Gastrointestinal: Bowel Sounds present, soft, nontender.   Extremities: No lower extremity clubbing cyanosis or edema  Neurological: Alert, no focal deficits  Skin: No rashes.  Psych:  Mood & affect appropriate    LABS: All Labs Reviewed:                        8.1    11.58 )-----------( 88       ( 18 Apr 2020 08:35 )             25.9                         8.6    16.30 )-----------( 108      ( 17 Apr 2020 08:37 )             27.0     18 Apr 2020 08:35    143    |  110    |  58     ----------------------------<  241    5.3     |  22     |  2.40   17 Apr 2020 18:23    145    |  114    |  63     ----------------------------<  180    5.3     |  22     |  2.80   17 Apr 2020 16:04    144    |  113    |  63     ----------------------------<  224    5.8     |  22     |  2.80     Ca    8.2        18 Apr 2020 08:35  Ca    8.3        17 Apr 2020 18:23  Ca    8.3        17 Apr 2020 16:04  Mg     2.1       17 Apr 2020 08:37    TPro  6.3    /  Alb  2.4    /  TBili  0.4    /  DBili  x      /  AST  54     /  ALT  56     /  AlkPhos  86     18 Apr 2020 08:35          Blood Culture: Organism --  Gram Stain Blood -- Gram Stain --  Specimen Source .Blood Blood-Peripheral  Culture-Blood --    Organism Blood Culture PCR  Gram Stain Blood -- Gram Stain   Growth in aerobic bottle: Gram Negative Rods  Growth in anaerobic bottle: Gram Positive Cocci in Pairs and Chains  Specimen Source .Blood Blood-Peripheral  Culture-Blood --

## 2020-04-20 NOTE — PROGRESS NOTE ADULT - SUBJECTIVE AND OBJECTIVE BOX
PAST MEDICAL & SURGICAL HISTORY:  Heart failure  Afib  CHF (congestive heart failure)  Gout  Hiatal hernia  BPH (benign prostatic hyperplasia)  Cognitive impairment  Cardiomyopathy  HTN (hypertension)  DM (diabetes mellitus)      MEDICATIONS  (STANDING):  ALBUTerol    90 MICROgram(s) HFA Inhaler 2 Puff(s) Inhalation two times a day  dextrose 5%. 1000 milliLiter(s) (50 mL/Hr) IV Continuous <Continuous>  dextrose 50% Injectable 12.5 Gram(s) IV Push once  dextrose 50% Injectable 25 Gram(s) IV Push once  dextrose 50% Injectable 25 Gram(s) IV Push once  insulin glargine Injectable (LANTUS) 5 Unit(s) SubCutaneous at bedtime  insulin lispro (HumaLOG) corrective regimen sliding scale   SubCutaneous three times a day before meals  insulin lispro (HumaLOG) corrective regimen sliding scale   SubCutaneous at bedtime  midodrine. 2.5 milliGRAM(s) Oral three times a day  pantoprazole  Injectable 40 milliGRAM(s) IV Push daily  piperacillin/tazobactam IVPB.. 3.375 Gram(s) IV Intermittent every 8 hours  sodium chloride 0.45%. 1000 milliLiter(s) (50 mL/Hr) IV Continuous <Continuous>  sodium chloride 0.9%. 1000 milliLiter(s) (500 mL/Hr) IV Continuous <Continuous>    MEDICATIONS  (PRN):  dextrose 40% Gel 15 Gram(s) Oral once PRN Blood Glucose LESS THAN 70 milliGRAM(s)/deciliter  glucagon  Injectable 1 milliGRAM(s) IntraMuscular once PRN Glucose LESS THAN 70 milligrams/deciliter      Patient is a 69y old  Male who presents with a chief complaint of covid19+ dx 3/30  sent for hypoxia (2020 15:28)      Vital Signs Last 24 Hrs  T(C): 36.4 (2020 07:14), Max: 36.4 (2020 16:31)  T(F): 97.6 (2020 07:14), Max: 97.6 (2020 22:13)  HR: 78 (2020 12:32) (75 - 82)  BP: 120/74 (2020 12:32) (109/71 - 123/78)  BP(mean): --  RR: 20 (2020 07:14) (19 - 20)  SpO2: 100% (2020 07:14) (98% - 100%)           @ 07: @ 07:00  --------------------------------------------------------  IN: 750 mL / OUT: 2000 mL / NET: -1250 mL     @ 07: @ 13:32  --------------------------------------------------------  IN: 580 mL / OUT: 500 mL / NET: 80 mL        REVIEW OF SYSTEMS:    Constitutional: No fever, weight loss or fatigue  Eyes: No eye pain, visual disturbances, or discharge  ENT:  No difficulty hearing, tinnitus, vertigo; No sinus or throat pain  Neck: No pain or stiffness  Breasts: No pain, masses or nipple discharge  Respiratory: No cough, wheezing, chills or hemoptysis  Cardiovascular: No chest pain, palpitations, shortness of breath, dizziness or leg swelling  Gastrointestinal: No abdominal or epigastric pain. No nausea, vomiting or hematemesis; No diarrhea or constipation. No melena or hematochezia.  Genitourinary: No dysuria, frequency, hematuria or incontinence  Rectal: No pain, hemorrhoids or incontinence  Neurological: No headaches, memory loss, loss of strength, numbness or tremors  Skin: No itching, burning, rashes or lesions   Lymph Nodes: No enlarged glands  Endocrine: No heat or cold intolerance; No hair loss  Musculoskeletal: No joint pain or swelling; No muscle, back or extremity pain  Psychiatric: No depression, anxiety, mood swings or difficulty sleeping  Heme/Lymph: No easy bruising or bleeding gums  Allergy and Immunologic: No hives or eczema    PHYSICAL EXAM:  alert  sitting up  eating his lunch by himself  answering questions appropriately  Constitutional: NAD,   Respiratory: CTAB/L   Cardiovascular: irreg  Gastrointestinal: BS+, soft, NT/ND  Extremities: No peripheral edema  Neurological: A/O  no focal deficits  Skin: chronic venous changes      decubiti: none                          7.9    9.95  )-----------( 107      ( 2020 18:04 )             25.9         144  |  113<H>  |  48<H>  ----------------------------<  350<H>  5.4<H>   |  25  |  1.90<H>    Ca    8.0<L>      2020 18:04    TPro  6.5  /  Alb  2.5<L>  /  TBili  0.4  /  DBili  x   /  AST  32  /  ALT  50  /  AlkPhos  95        Urinalysis Basic - ( 2020 21:44 )    Color: Yellow / Appearance: Slightly Turbid / S.020 / pH: x  Gluc: x / Ketone: Negative  / Bili: Negative / Urobili: Negative   Blood: x / Protein: 30 mg/dL / Nitrite: Negative   Leuk Esterase: Moderate / RBC: 6-10 /HPF / WBC >50   Sq Epi: x / Non Sq Epi: Few / Bacteria: Few            .Urine Catheterized   @ 00:26   <10,000 CFU/ml of other organisms  --  --      .Blood Blood-Peripheral  16 @ 22:07   No growth to date.  --  --      .Blood Blood-Peripheral  04-15 @ 19:00   Growth in aerobic bottle: Pseudomonas aeruginosa  Growth in anaerobic bottle: Streptococcus parasanguinis  Alpha hemolytic strep  (not Strep. pneumoniae or Enterococcus)  Single set isolate, possible contaminant. Contact  Microbiology if susceptibility testing clinically  indicated.  "Due to technical problems, Proteus sp. will Not be reported as part of  the BCID panel until further notice"  ***Blood Panel PCR results on this specimen are available  approximately 3 hours after the Gram stain result.***  Gram stain, PCR, and/or culture results may not always  correspond due to difference in methodologies.  ************************************************************  This PCR assay was performed using [employer].  The following targets are tested for: Enterococcus,  vancomycin resistant enterococci, Listeria monocytogenes,  coagulase negative staphylococci, S. aureus,  methicillin resistant S. aureus, Streptococcus agalactiae  (Group B), S. pneumoniae, S. pyogenes (Group A),  Acinetobacter baumannii, Enterobacter cloacae, E. coli,  Klebsiella oxytoca, K. pneumoniae, Proteus sp.,  Serratia marcescens, Haemophilus influenzae,  Neisseria meningitidis, Pseudomonas aeruginosa, Candida  albicans, C. glabrata, C krusei, C parapsilosis,  C. tropicalis and the KPC resistance gene.  --  Blood Culture PCR  Pseudomonas aeruginosa  Blood Culture PCR        Urine Culture:   @ 00:26    --        <10,000 CFU/ml of other organisms  Urine Culture:   @ 22:07    --        No growth to date.  Urine Culture:  04-15 @ 19:00    --        Growth in aerobic bottle: Pseudomonas aeruginosa  Growth in anaerobic bottle: Streptococcus parasanguinis  Alpha hemolytic strep  (not Strep. pneumoniae or Enterococcus)  Single set isolate, possible contaminant. Contact  Microbiology if susceptibility testing clinically  indicated.  "Due to technical problems, Proteus sp. will Not be reported as part of  the BCID panel until further notice"  ***Blood Panel PCR results on this specimen are available  approximately 3 hours after the Gram stain result.***  Gram stain, PCR, and/or culture results may not always  correspond due to difference in methodologies.  ************************************************************  This PCR assay was performed using [employer].  The following targets are tested for: Enterococcus,  vancomycin resistant enterococci, Listeria monocytogenes,  coagulase negative staphylococci, S. aureus,  methicillin resistant S. aureus, Streptococcus agalactiae  (Group B), S. pneumoniae, S. pyogenes (Group A),  Acinetobacter baumannii, Enterobacter cloacae, E. coli,  Klebsiella oxytoca, K. pneumoniae, Proteus sp.,  Serratia marcescens, Haemophilus influenzae,  Neisseria meningitidis, Pseudomonas aeruginosa, Candida  albicans, C. glabrata, C krusei, C parapsilosis,  C. tropicalis and the KPC resistance gene.        Radiology:

## 2020-04-20 NOTE — PROGRESS NOTE ADULT - SUBJECTIVE AND OBJECTIVE BOX
Patient is a 69y Male whom presented to the hospital with ckd and barbra , hyperkalemia     PAST MEDICAL & SURGICAL HISTORY:  Heart failure  Afib  CHF (congestive heart failure)  Gout  Hiatal hernia  BPH (benign prostatic hyperplasia)  Cognitive impairment  Cardiomyopathy  HTN (hypertension)  DM (diabetes mellitus)      MEDICATIONS  (STANDING):  azithromycin  IVPB 500 milliGRAM(s) IV Intermittent every 24 hours  cefTRIAXone   IVPB 1000 milliGRAM(s) IV Intermittent every 24 hours  dextrose 5%. 1000 milliLiter(s) (50 mL/Hr) IV Continuous <Continuous>  dextrose 50% Injectable 12.5 Gram(s) IV Push once  dextrose 50% Injectable 25 Gram(s) IV Push once  dextrose 50% Injectable 25 Gram(s) IV Push once  insulin lispro (HumaLOG) corrective regimen sliding scale   SubCutaneous three times a day before meals  midodrine. 2.5 milliGRAM(s) Oral three times a day  pantoprazole  Injectable 40 milliGRAM(s) IV Push daily  sodium chloride 0.9%. 1000 milliLiter(s) (40 mL/Hr) IV Continuous <Continuous>  sodium polystyrene sulfonate Enema 15 Gram(s) Rectal once      Allergies    No Known Allergies    Intolerances        SOCIAL HISTORY:  Denies ETOh,Smoking,     FAMILY HISTORY:  family history is review and there is  no family  history of kidney disease in family , father and mother  No Pertinent Family History in first degree relatives of: as per available medical records.  Non-contributary for premature coronary disease or sudden cardiac death      REVIEW OF SYSTEMS:    unable to obtained a good review system                                                           8.6    10.72 )-----------( x        ( 2020 14:47 )             28.2       CBC Full  -  ( 2020 14:47 )  WBC Count : 10.72 K/uL  RBC Count : 3.10 M/uL  Hemoglobin : 8.6 g/dL  Hematocrit : 28.2 %  Platelet Count - Automated : x  Mean Cell Volume : 91.0 fl  Mean Cell Hemoglobin : 27.7 pg  Mean Cell Hemoglobin Concentration : 30.5 gm/dL  Auto Neutrophil # : 9.06 K/uL  Auto Lymphocyte # : 0.79 K/uL  Auto Monocyte # : 0.69 K/uL  Auto Eosinophil # : 0.04 K/uL  Auto Basophil # : 0.01 K/uL  Auto Neutrophil % : 84.5 %  Auto Lymphocyte % : 7.4 %  Auto Monocyte % : 6.4 %  Auto Eosinophil % : 0.4 %  Auto Basophil % : 0.1 %          144  |  113<H>  |  48<H>  ----------------------------<  350<H>  5.4<H>   |  25  |  1.90<H>    Ca    8.0<L>      2020 18:04    TPro  6.5  /  Alb  2.5<L>  /  TBili  0.4  /  DBili  x   /  AST  32  /  ALT  50  /  AlkPhos  95        CAPILLARY BLOOD GLUCOSE      POCT Blood Glucose.: 311 mg/dL (2020 12:08)  POCT Blood Glucose.: 182 mg/dL (2020 08:13)  POCT Blood Glucose.: 268 mg/dL (2020 21:41)  POCT Blood Glucose.: 472 mg/dL (2020 17:06)      Vital Signs Last 24 Hrs  T(C): 36.4 (2020 07:14), Max: 36.4 (2020 16:31)  T(F): 97.6 (2020 07:14), Max: 97.6 (2020 22:13)  HR: 78 (2020 12:32) (75 - 82)  BP: 120/74 (2020 12:32) (109/71 - 123/78)  BP(mean): --  RR: 20 (2020 07:14) (19 - 20)  SpO2: 100% (2020 07:14) (98% - 100%)    Urinalysis Basic - ( 2020 21:44 )    Color: Yellow / Appearance: Slightly Turbid / S.020 / pH: x  Gluc: x / Ketone: Negative  / Bili: Negative / Urobili: Negative   Blood: x / Protein: 30 mg/dL / Nitrite: Negative   Leuk Esterase: Moderate / RBC: 6-10 /HPF / WBC >50   Sq Epi: x / Non Sq Epi: Few / Bacteria: Few                  PHYSICAL EXAM:    Constitutional: NAD  HEENT: conjunctive   clear   Neck:  No JVD  Respiratory: decrease bs b/l   Cardiovascular: S1 and S2  Gastrointestinal: BS+, soft, NT/ND  Extremities: No peripheral edema  Skin: dry   Access: Not applicable

## 2020-04-20 NOTE — PROGRESS NOTE ADULT - SUBJECTIVE AND OBJECTIVE BOX
infectious diseases progress note:    KIMBERLY MARKS is a 69y y. o. Male patient    COVID Patient    Allergies    No Known Allergies    Intolerances        ANTIBIOTICS/RELEVANT:  antimicrobials  piperacillin/tazobactam IVPB.. 3.375 Gram(s) IV Intermittent every 8 hours    immunologic:    OTHER:  ALBUTerol    90 MICROgram(s) HFA Inhaler 2 Puff(s) Inhalation two times a day  dextrose 40% Gel 15 Gram(s) Oral once PRN  dextrose 5%. 1000 milliLiter(s) IV Continuous <Continuous>  dextrose 50% Injectable 12.5 Gram(s) IV Push once  dextrose 50% Injectable 25 Gram(s) IV Push once  dextrose 50% Injectable 25 Gram(s) IV Push once  glucagon  Injectable 1 milliGRAM(s) IntraMuscular once PRN  insulin glargine Injectable (LANTUS) 5 Unit(s) SubCutaneous at bedtime  insulin lispro (HumaLOG) corrective regimen sliding scale   SubCutaneous three times a day before meals  insulin lispro (HumaLOG) corrective regimen sliding scale   SubCutaneous at bedtime  midodrine. 2.5 milliGRAM(s) Oral three times a day  pantoprazole  Injectable 40 milliGRAM(s) IV Push daily  sodium chloride 0.45%. 1000 milliLiter(s) IV Continuous <Continuous>  sodium chloride 0.9%. 1000 milliLiter(s) IV Continuous <Continuous>      Objective:  Vital Signs Last 24 Hrs  T(C): 36.4 (2020 07:14), Max: 36.4 (2020 16:31)  T(F): 97.6 (2020 07:14), Max: 97.6 (2020 22:13)  HR: 78 (2020 12:32) (75 - 82)  BP: 120/74 (2020 12:32) (109/71 - 123/78)  BP(mean): --  RR: 20 (2020 07:14) (19 - 20)  SpO2: 100% (2020 07:14) (98% - 100%)    T(C): 36.4 (20 @ 07:14), Max: 36.9 (20 @ 06:25)  T(C): 36.4 (20 @ 07:14), Max: 36.9 (20 @ 06:25)  T(C): 36.4 (20 @ 07:14), Max: 36.9 (20 @ 06:25)    PHYSICAL EXAM:  HEENT: NC atraumatic  Neck: supple  Respiratory: no accessory muscle use, breathing comfortably  Cardiovascular: distant  Gastrointestinal: normal appearing, nondistended  Extremities: no clubbing, no cyanosis,      LABS:                          7.9    9.95  )-----------( 107      ( 2020 18:04 )             25.9       9.95  @ 18:04  11.58  @ 08:35  16.30  @ 08:37  16.82  @ 10:23  16.91  @ 07:13  7.45 04-15 @ 14:06      04    144  |  113<H>  |  48<H>  ----------------------------<  350<H>  5.4<H>   |  25  |  1.90<H>    Ca    8.0<L>      2020 18:04    TPro  6.5  /  Alb  2.5<L>  /  TBili  0.4  /  DBili  x   /  AST  32  /  ALT  50  /  AlkPhos  95        Creatinine, Serum: 1.90 mg/dL (20 @ 18:04)  Creatinine, Serum: 2.40 mg/dL (20 @ 08:35)  Creatinine, Serum: 2.80 mg/dL (20 @ 18:23)  Creatinine, Serum: 2.80 mg/dL (20 @ 16:04)  Creatinine, Serum: 2.90 mg/dL (20 @ 08:37)  Creatinine, Serum: 2.80 mg/dL (20 @ 10:23)  Creatinine, Serum: 2.70 mg/dL (20 @ 07:13)  Creatinine, Serum: 2.50 mg/dL (20 @ 01:31)  Creatinine, Serum: 2.30 mg/dL (04-15-20 @ 14:06)        Urinalysis Basic - ( 2020 21:44 )    Color: Yellow / Appearance: Slightly Turbid / S.020 / pH: x  Gluc: x / Ketone: Negative  / Bili: Negative / Urobili: Negative   Blood: x / Protein: 30 mg/dL / Nitrite: Negative   Leuk Esterase: Moderate / RBC: 6-10 /HPF / WBC >50   Sq Epi: x / Non Sq Epi: Few / Bacteria: Few            COVID RISK SCORE  Auto Neutrophil #: 8.52 K/uL (20 @ 18:04)  Auto Lymphocyte #: 0.65 K/uL (20 @ 18:04)  Auto Neutrophil #: 10.62 K/uL (20 @ 08:35)  Auto Lymphocyte #: 0.49 K/uL (20 @ 08:35)  Auto Neutrophil #: 15.73 K/uL (20 @ 07:13)  Auto Lymphocyte #: 0.34 K/uL (20 @ 07:13)  Auto Neutrophil #: 6.85 K/uL (04-15-20 @ 14:06)  Auto Lymphocyte #: 0.30 K/uL (04-15-20 @ 14:06)    Lactate, Blood: 2.5 mmol/L (04-15-20 @ 19:39)  Lactate, Blood: 4.2 mmol/L (04-15-20 @ 14:06)    Auto Eosinophil #: 0.00 K/uL (20 @ 18:04)  Auto Eosinophil #: 0.00 K/uL (20 @ 08:35)  Auto Eosinophil #: 0.00 K/uL (20 @ 07:13)  Auto Eosinophil #: 0.07 K/uL (04-15-20 @ 14:06)        Procalcitonin, Serum: 0.10 ng/mL (04-15-20 @ 14:06)    Troponin I, Serum: .019 ng/mL (20 @ 10:23)  Troponin I, Serum: .018 ng/mL (20 @ 07:13)  Troponin I, Serum: .023 ng/mL (20 @ 01:32)  Troponin I, Serum: .016 ng/mL (04-15-20 @ 18:41)    Creatine Kinase, Serum: 372 U/L (04-15-20 @ 18:41)        Ferritin, Serum: 343 ng/mL (04-15-20 @ 23:46)        INR: 1.69 ratio (04-15-20 @ 14:06)  Activated Partial Thromboplastin Time: 56.2 sec (04-15-20 @ 14:06)    D-Dimer Assay, Quantitative: 155 ng/mL DDU (04-15-20 @ 18:41)    eGFR if Non African American: 35 mL/min/1.73M2 (20 @ 18:04)        MICROBIOLOGY:    Culture - Blood (04.15.20 @ 19:00)    Gram Stain:   Growth in aerobic bottle: Gram Negative Rods  Growth in anaerobic bottle: Gram Positive Cocci in Pairs and Chains    -  Amikacin: S <=16    -  Aztreonam: S <=4    -  Cefepime: S 8    -  Ceftazidime: S <=1    -  Ciprofloxacin: S <=0.25    -  Gentamicin: S 4    -  Imipenem: S <=1    -  Levofloxacin: S 1    -  Meropenem: S <=1    -  Piperacillin/Tazobactam: S <=8    -  Tobramycin: S <=2    -  Pseudomonas aeruginosa: Detec    -  Streptococcus sp. (Not Grp A, B or S pneumoniae): Detec    Specimen Source: .Blood Blood-Peripheral    Organism: Blood Culture PCR    Organism: Pseudomonas aeruginosa    Organism: Blood Culture PCR    Culture Results:   Growth in aerobic bottle: Pseudomonas aeruginosa  Growth in anaerobic bottle: Streptococcus parasanguinis  Alpha hemolytic strep  (not Strep. pneumoniae or Enterococcus)  Single set isolate, possible contaminant. Contact  Microbiology if susceptibility testing clinically  indicated.    RADIOLOGY & ADDITIONAL STUDIES:

## 2020-04-20 NOTE — PROGRESS NOTE ADULT - PROBLEM SELECTOR PLAN 3
improved renal functions  pending hg  may need transfusion improved renal functions  pending hg  may need transfusion  consent placed on chart  sister approved by phone,,,nurse signed as witness

## 2020-04-20 NOTE — PROGRESS NOTE ADULT - SUBJECTIVE AND OBJECTIVE BOX
[facility] Cardiology    CHIEF COMPLAINT: Patient is a 69y old  Male who presents with a chief complaint of covid19+ dx 3/30  sent for hypoxia (20 Apr 2020 13:32)      Follow Up: [ ] Chest Pain      [ ] Dyspnea     [ ] Palpitations    [ ] Atrial Fibrillation     [ ] Ventricular Dysrhythmia    [ ] Abnormal EKG                      [ ] Abnormal Cardiac Enzymes     [ ] Valvular Disease    HPI:  s/p treatment at [facility] end march for covid19 and gross hematuria  they stopped his eliquis and entresto (15 Apr 2020 16:40)    PAST MEDICAL & SURGICAL HISTORY:  Heart failure  Afib  CHF (congestive heart failure)  Gout  Hiatal hernia  BPH (benign prostatic hyperplasia)  Cognitive impairment  Cardiomyopathy  HTN (hypertension)  DM (diabetes mellitus)    MEDICATIONS  (STANDING):  ALBUTerol    90 MICROgram(s) HFA Inhaler 2 Puff(s) Inhalation two times a day  dextrose 5%. 1000 milliLiter(s) (50 mL/Hr) IV Continuous <Continuous>  dextrose 50% Injectable 12.5 Gram(s) IV Push once  dextrose 50% Injectable 25 Gram(s) IV Push once  dextrose 50% Injectable 25 Gram(s) IV Push once  insulin glargine Injectable (LANTUS) 5 Unit(s) SubCutaneous at bedtime  insulin lispro (HumaLOG) corrective regimen sliding scale   SubCutaneous three times a day before meals  insulin lispro (HumaLOG) corrective regimen sliding scale   SubCutaneous at bedtime  midodrine. 2.5 milliGRAM(s) Oral three times a day  pantoprazole  Injectable 40 milliGRAM(s) IV Push daily  piperacillin/tazobactam IVPB.. 3.375 Gram(s) IV Intermittent every 8 hours  sodium chloride 0.45%. 1000 milliLiter(s) (50 mL/Hr) IV Continuous <Continuous>  sodium chloride 0.9%. 1000 milliLiter(s) (500 mL/Hr) IV Continuous <Continuous>    MEDICATIONS  (PRN):  dextrose 40% Gel 15 Gram(s) Oral once PRN Blood Glucose LESS THAN 70 milliGRAM(s)/deciliter  glucagon  Injectable 1 milliGRAM(s) IntraMuscular once PRN Glucose LESS THAN 70 milligrams/deciliter    Allergies    No Known Allergies    Intolerances        REVIEW OF SYSTEMS:    CONSTITUTIONAL: No weakness, fevers or chills.   EYES/ENT: No visual changes;    NECK: No pain or stiffness  RESPIRATORY: No cough, wheezing, No shortness of breath  CARDIOVASCULAR: No chest pain or palpitations  GASTROINTESTINAL: No abdominal pain, or hematochezia.  GENITOURINARY: No dysuria orhematuria  NEUROLOGICAL: No numbness or weakness  SKIN: No itching, burning, rashes  All other review of systems is negative unless indicated above    Vital Signs Last 24 Hrs  T(C): 36.4 (20 Apr 2020 07:14), Max: 36.4 (19 Apr 2020 16:31)  T(F): 97.6 (20 Apr 2020 07:14), Max: 97.6 (19 Apr 2020 22:13)  HR: 78 (20 Apr 2020 12:32) (75 - 82)  BP: 120/74 (20 Apr 2020 12:32) (109/71 - 123/78)  BP(mean): --  RR: 20 (20 Apr 2020 07:14) (19 - 20)  SpO2: 100% (20 Apr 2020 07:14) (98% - 100%)  I&O's Summary    19 Apr 2020 07:01  -  20 Apr 2020 07:00  --------------------------------------------------------  IN: 750 mL / OUT: 2000 mL / NET: -1250 mL    20 Apr 2020 07:01  -  20 Apr 2020 14:36  --------------------------------------------------------  IN: 580 mL / OUT: 500 mL / NET: 80 mL        PHYSICAL EXAM:  Constitutional: NAD  Neurological: Alert, no focal deficits  HEENT: no JVD, EOMI  Cardiovascular: Regular, S1 and S2, no murmur  Pulmonary: breath sounds bilaterally  Gastrointestinal: Bowel Sounds present, soft, nontender  EXT:  no peripheral edema  Skin: No rashes.  Psych:  Mood calm  LABS: All Labs Reviewed:                          7.9    9.95  )-----------( 107      ( 19 Apr 2020 18:04 )             25.9     04-19    144  |  113<H>  |  48<H>  ----------------------------<  350<H>  5.4<H>   |  25  |  1.90<H>    Ca    8.0<L>      19 Apr 2020 18:04    TPro  6.5  /  Alb  2.5<L>  /  TBili  0.4  /  DBili  x   /  AST  32  /  ALT  50  /  AlkPhos  95  04-19    69 male cognitive impairment, DM, HTN, CHF by chart a/w COVID +  B/L PNA, hypothermia, hypotension, sepsis    SBP now stable  Cr 2.3, 2.8  no Entresto, hold lasix  Anicoagulation recently d/c's secondary to anemia and hematuria  s/p gentle IVF @ 40 cc/hr. Hx of CMP/CHF, prior EF likely low  s/p Azithromycin,  Repeat EKG  4/16-QTC-369 ms stable  will follow

## 2020-04-21 NOTE — DISCHARGE NOTE PROVIDER - NSDCCPCAREPLAN_GEN_ALL_CORE_FT
PRINCIPAL DISCHARGE DIAGNOSIS  Diagnosis: Hypothermia, initial encounter  Assessment and Plan of Treatment: due to covid 19 sars pn  pseudomonas in the blood stream  acute renal failure  hyperkalemia hypernatremia  complete cipro 10 days  renal functions improved  lytes normalised  dnr/dni/no dialysis per family wishe and state approval; letter  for diabetes  no ,metformin  for now sliding scale  lantus small dose qhs  must have finger stick 4x day/before meals and insulin  oxygen prn but for now keep 3 litres for 2 weeks  keep above 90      SECONDARY DISCHARGE DIAGNOSES  Diagnosis: Type 2 diabetes mellitus with other circulatory complication, without long-term current use of insulin  Assessment and Plan of Treatment: Type 2 diabetes mellitus with other circulatory complication, without long-term current use of insulin  humalog coverage and lantus  fs 4x day and before insulin    Diagnosis: MARSHAL (acute kidney injury)  Assessment and Plan of Treatment: MARSHAL (acute kidney injury)  near resolved  family does not want dialysis  state approved dnr/dni/no dialysis    Diagnosis: Bacteremia due to Pseudomonas  Assessment and Plan of Treatment: Bacteremia due to Pseudomonafinish cipro

## 2020-04-21 NOTE — PROGRESS NOTE ADULT - ASSESSMENT
69 male cognitive impairment, DM, HTN, CHF by chart a/w COVID +  B/L PNA, hypothermia, hypotension, sepsis    SBP now stable  Cr 2.3, 2.8, 1.3.   no Entresto, hold lasix  Anicoagulation recently d/c's secondary to anemia and hematuria  s/p gentle IVF @ 40 cc/hr. Hx of CMP/CHF, prior EF likely low  s/p Azithromycin,  Repeat EKG  4/16-QTC-369 ms stable  will follow

## 2020-04-21 NOTE — PROGRESS NOTE ADULT - SUBJECTIVE AND OBJECTIVE BOX
PAST MEDICAL & SURGICAL HISTORY:  Heart failure  Afib  CHF (congestive heart failure)  Gout  Hiatal hernia  BPH (benign prostatic hyperplasia)  Cognitive impairment  Cardiomyopathy  HTN (hypertension)  DM (diabetes mellitus)      MEDICATIONS  (STANDING):  ALBUTerol    90 MICROgram(s) HFA Inhaler 2 Puff(s) Inhalation two times a day  ciprofloxacin     Tablet 500 milliGRAM(s) Oral every 12 hours  dextrose 5%. 1000 milliLiter(s) (50 mL/Hr) IV Continuous <Continuous>  dextrose 50% Injectable 12.5 Gram(s) IV Push once  dextrose 50% Injectable 25 Gram(s) IV Push once  dextrose 50% Injectable 25 Gram(s) IV Push once  insulin glargine Injectable (LANTUS) 5 Unit(s) SubCutaneous at bedtime  insulin lispro (HumaLOG) corrective regimen sliding scale   SubCutaneous three times a day before meals  insulin lispro (HumaLOG) corrective regimen sliding scale   SubCutaneous at bedtime  midodrine. 2.5 milliGRAM(s) Oral three times a day  pantoprazole  Injectable 40 milliGRAM(s) IV Push daily    MEDICATIONS  (PRN):  dextrose 40% Gel 15 Gram(s) Oral once PRN Blood Glucose LESS THAN 70 milliGRAM(s)/deciliter  glucagon  Injectable 1 milliGRAM(s) IntraMuscular once PRN Glucose LESS THAN 70 milligrams/deciliter      Patient is a 69y old  Male who presents with a chief complaint of covid19+ dx 3/30  sent for hypoxia (21 Apr 2020 13:46)      Vital Signs Last 24 Hrs  T(C): 36.7 (21 Apr 2020 07:14), Max: 36.7 (21 Apr 2020 07:14)  T(F): 98.1 (21 Apr 2020 07:14), Max: 98.1 (21 Apr 2020 07:14)  HR: 84 (21 Apr 2020 12:30) (70 - 84)  BP: 142/78 (21 Apr 2020 12:30) (120/78 - 142/78)  BP(mean): --  RR: 18 (21 Apr 2020 11:20) (18 - 20)  SpO2: 93% (21 Apr 2020 11:20) (93% - 100%)          04-20 @ 07:01  -  04-21 @ 07:00  --------------------------------------------------------  IN: 2030 mL / OUT: 1700 mL / NET: 330 mL        REVIEW OF SYSTEMS:    Constitutional: No fever, weight loss or fatigue  Eyes: No eye pain, visual disturbances, or discharge  ENT:  No difficulty hearing, tinnitus, vertigo; No sinus or throat pain  Neck: No pain or stiffness  Breasts: No pain, masses or nipple discharge  Respiratory: No cough, wheezing, chills or hemoptysis  Cardiovascular: No chest pain, palpitations, shortness of breath, dizziness or leg swelling  Gastrointestinal: No abdominal or epigastric pain. No nausea, vomiting or hematemesis; No diarrhea or constipation. No melena or hematochezia.  Genitourinary: No dysuria, frequency, hematuria or incontinence  Rectal: No pain, hemorrhoids or incontinence  Neurological: No headaches, memory loss, loss of strength, numbness or tremors  Skin: No itching, burning, rashes or lesions   Lymph Nodes: No enlarged glands  Endocrine: No heat or cold intolerance; No hair loss  Musculoskeletal: No joint pain or swelling; No muscle, back or extremity pain  Psychiatric: No depression, anxiety, mood swings or difficulty sleeping  Heme/Lymph: No easy bruising or bleeding gums  Allergy and Immunologic: No hives or eczema    PHYSICAL EXAM:  alert  interactive  sitting up  juxt completed full meal,,,on his own  Constitutional: NAD,  Respiratory: CTAB/L   Cardiovascular: S1 and S2, RRR, no M/G/R  Gastrointestinal: BS+, soft, NT/ND  Extremities: No peripheral edema  Neurological: A/O , no focal deficits  Skin: No rashes  moving all ext  chronic venous changes    decubiti: none                          8.6    10.72 )-----------( 100      ( 20 Apr 2020 14:47 )             28.2     04-21    146<H>  |  114<H>  |  34<H>  ----------------------------<  189<H>  4.5   |  28  |  1.30    Ca    8.1<L>      21 Apr 2020 07:50    TPro  6.6  /  Alb  2.5<L>  /  TBili  0.5  /  DBili  x   /  AST  26  /  ALT  45  /  AlkPhos  92  04-20              .Urine Catheterized  04-19 @ 00:26   <10,000 CFU/ml of other organisms  --  --      .Blood Blood-Peripheral  04-16 @ 22:07   No growth to date.  --  --      .Blood Blood-Peripheral  04-15 @ 19:00   Growth in aerobic bottle: Pseudomonas aeruginosa  Growth in anaerobic bottle: Streptococcus parasanguinis  Alpha hemolytic strep  (not Strep. pneumoniae or Enterococcus)  Single set isolate, possible contaminant. Contact  Microbiology if susceptibility testing clinically  indicated.  "Due to technical problems, Proteus sp. will Not be reported as part of  the BCID panel until further notice"  ***Blood Panel PCR results on this specimen are available  approximately 3 hours after the Gram stain result.***  Gram stain, PCR, and/or culture results may not always  correspond due to difference in methodologies.  ************************************************************  This PCR assay was performed using [employer].  The following targets are tested for: Enterococcus,  vancomycin resistant enterococci, Listeria monocytogenes,  coagulase negative staphylococci, S. aureus,  methicillin resistant S. aureus, Streptococcus agalactiae  (Group B), S. pneumoniae, S. pyogenes (Group A),  Acinetobacter baumannii, Enterobacter cloacae, E. coli,  Klebsiella oxytoca, K. pneumoniae, Proteus sp.,  Serratia marcescens, Haemophilus influenzae,  Neisseria meningitidis, Pseudomonas aeruginosa, Candida  albicans, C. glabrata, C krusei, C parapsilosis,  C. tropicalis and the KPC resistance gene.  --  Blood Culture PCR  Pseudomonas aeruginosa  Blood Culture PCR        Urine Culture:  04-19 @ 00:26    --        <10,000 CFU/ml of other organisms  Urine Culture:  04-16 @ 22:07    --        No growth to date.  Urine Culture:  04-15 @ 19:00    --        Growth in aerobic bottle: Pseudomonas aeruginosa  Growth in anaerobic bottle: Streptococcus parasanguinis  Alpha hemolytic strep  (not Strep. pneumoniae or Enterococcus)  Single set isolate, possible contaminant. Contact  Microbiology if susceptibility testing clinically  indicated.  "Due to technical problems, Proteus sp. will Not be reported as part of  the BCID panel until further notice"  ***Blood Panel PCR results on this specimen are available  approximately 3 hours after the Gram stain result.***  Gram stain, PCR, and/or culture results may not always  correspond due to difference in methodologies.  ************************************************************  This PCR assay was performed using [employer].  The following targets are tested for: Enterococcus,  vancomycin resistant enterococci, Listeria monocytogenes,  coagulase negative staphylococci, S. aureus,  methicillin resistant S. aureus, Streptococcus agalactiae  (Group B), S. pneumoniae, S. pyogenes (Group A),  Acinetobacter baumannii, Enterobacter cloacae, E. coli,  Klebsiella oxytoca, K. pneumoniae, Proteus sp.,  Serratia marcescens, Haemophilus influenzae,  Neisseria meningitidis, Pseudomonas aeruginosa, Candida  albicans, C. glabrata, C krusei, C parapsilosis,  C. tropicalis and the KPC resistance gene.        Radiology:

## 2020-04-21 NOTE — PHYSICAL THERAPY INITIAL EVALUATION ADULT - GENERAL OBSERVATIONS, REHAB EVAL
Patient received semi salcedo in bed, no apparent distress, IV, external cath and cardiac monitor in place.  Patient soiled, nursing made aware and patient cleaned.

## 2020-04-21 NOTE — PHYSICAL THERAPY INITIAL EVALUATION ADULT - RANGE OF MOTION EXAMINATION, REHAB EVAL
bilateral lower extremity ROM was WFL (within functional limits)/bilateral upper extremity ROM was WFL (within functional limits)/bilateral flat feet

## 2020-04-21 NOTE — PHYSICAL THERAPY INITIAL EVALUATION ADULT - CRITERIA FOR SKILLED THERAPEUTIC INTERVENTIONS
risk reduction/prevention/rehab potential/impairments found/therapy frequency/anticipated equipment needs at discharge/anticipated discharge recommendation/predicted duration of therapy intervention/functional limitations in following categories

## 2020-04-21 NOTE — DISCHARGE NOTE PROVIDER - CARE PROVIDER_API CALL
Laury Irene)  Internal Medicine  [address] [place] [address]  Phone: [phone]  Fax: [phone]  Follow Up Time: 1 month

## 2020-04-21 NOTE — PROGRESS NOTE ADULT - SUBJECTIVE AND OBJECTIVE BOX
Patient is a 69y Male with a known history of :  Bacteremia due to Pseudomonas (R78.81)  Hypothermia, initial encounter (T68.XXXA)  Type 2 diabetes mellitus with other circulatory complication, without long-term current use of insulin (E11.59)  SARS virus pneumonia (J12.81)  Bacteremia due to Pseudomonas (R78.81)  Hypothermia, initial encounter (T68.XXXA)  Coronavirus infection (B34.2)  Bacteremia (R78.81)  Hyperkalemia (E87.5)  HTN (hypertension) (I10)  CHF (congestive heart failure) (I50.9)  Pneumonia (J18.9)  MARSHAL (acute kidney injury) (N17.9)    HPI:  s/p treatment at [facility] end march for covid19 and gross hematuria  they stopped his eliquis and entresto (15 Apr 2020 16:40)      REVIEW OF SYSTEMS:  CONSTITUTIONAL: No weakness, fevers or chills.   EYES/ENT: No visual changes;    NECK: No pain or stiffness  RESPIRATORY: No cough, wheezing, No shortness of breath  CARDIOVASCULAR: No chest pain or palpitations  GASTROINTESTINAL: No abdominal pain, or hematochezia.  GENITOURINARY: No dysuria orhematuria  NEUROLOGICAL: No numbness or weakness  SKIN: No itching, burning, rashes  All other review of systems is negative unless indicated above      MEDICATIONS  (STANDING):  ALBUTerol    90 MICROgram(s) HFA Inhaler 2 Puff(s) Inhalation two times a day  ciprofloxacin     Tablet 500 milliGRAM(s) Oral every 12 hours  dextrose 5%. 1000 milliLiter(s) (50 mL/Hr) IV Continuous <Continuous>  dextrose 50% Injectable 12.5 Gram(s) IV Push once  dextrose 50% Injectable 25 Gram(s) IV Push once  dextrose 50% Injectable 25 Gram(s) IV Push once  insulin glargine Injectable (LANTUS) 5 Unit(s) SubCutaneous at bedtime  insulin lispro (HumaLOG) corrective regimen sliding scale   SubCutaneous three times a day before meals  insulin lispro (HumaLOG) corrective regimen sliding scale   SubCutaneous at bedtime  midodrine. 2.5 milliGRAM(s) Oral three times a day  pantoprazole  Injectable 40 milliGRAM(s) IV Push daily    MEDICATIONS  (PRN):  dextrose 40% Gel 15 Gram(s) Oral once PRN Blood Glucose LESS THAN 70 milliGRAM(s)/deciliter  glucagon  Injectable 1 milliGRAM(s) IntraMuscular once PRN Glucose LESS THAN 70 milligrams/deciliter      ALLERGIES: No Known Allergies      FAMILY HISTORY:      PHYSICAL EXAMINATION:  -----------------------------  T(C): 36.6 (04-21-20 @ 15:43), Max: 36.7 (04-21-20 @ 07:14)  HR: 67 (04-21-20 @ 16:51) (67 - 84)  BP: 108/68 (04-21-20 @ 16:51) (108/68 - 142/78)  RR: 18 (04-21-20 @ 15:43) (18 - 20)  SpO2: 92% (04-21-20 @ 15:43) (92% - 100%)  Wt(kg): --    04-20 @ 07:01  -  04-21 @ 07:00  --------------------------------------------------------  IN:    Oral Fluid: 580 mL    sodium chloride 0.45%: 1150 mL    Solution: 300 mL  Total IN: 2030 mL    OUT:    Voided: 1700 mL  Total OUT: 1700 mL    Total NET: 330 mL      04-21 @ 07:01  -  04-21 @ 17:14  --------------------------------------------------------  IN:  Total IN: 0 mL    OUT:    Voided: 1300 mL  Total OUT: 1300 mL    Total NET: -1300 mL          LABS:   --------  04-21    146<H>  |  114<H>  |  34<H>  ----------------------------<  189<H>  4.5   |  28  |  1.30    Ca    8.1<L>      21 Apr 2020 07:50    TPro  6.6  /  Alb  2.5<L>  /  TBili  0.5  /  DBili  x   /  AST  26  /  ALT  45  /  AlkPhos  92  04-20                         8.6    10.72 )-----------( 100      ( 20 Apr 2020 14:47 )             28.2

## 2020-04-21 NOTE — PROGRESS NOTE ADULT - SUBJECTIVE AND OBJECTIVE BOX
Patient is a 69y Male whom presented to the hospital with ckd and barbra , hyperkalemia     PAST MEDICAL & SURGICAL HISTORY:  Heart failure  Afib  CHF (congestive heart failure)  Gout  Hiatal hernia  BPH (benign prostatic hyperplasia)  Cognitive impairment  Cardiomyopathy  HTN (hypertension)  DM (diabetes mellitus)      MEDICATIONS  (STANDING):  azithromycin  IVPB 500 milliGRAM(s) IV Intermittent every 24 hours  cefTRIAXone   IVPB 1000 milliGRAM(s) IV Intermittent every 24 hours  dextrose 5%. 1000 milliLiter(s) (50 mL/Hr) IV Continuous <Continuous>  dextrose 50% Injectable 12.5 Gram(s) IV Push once  dextrose 50% Injectable 25 Gram(s) IV Push once  dextrose 50% Injectable 25 Gram(s) IV Push once  insulin lispro (HumaLOG) corrective regimen sliding scale   SubCutaneous three times a day before meals  midodrine. 2.5 milliGRAM(s) Oral three times a day  pantoprazole  Injectable 40 milliGRAM(s) IV Push daily  sodium chloride 0.9%. 1000 milliLiter(s) (40 mL/Hr) IV Continuous <Continuous>  sodium polystyrene sulfonate Enema 15 Gram(s) Rectal once      Allergies    No Known Allergies    Intolerances        SOCIAL HISTORY:  Denies ETOh,Smoking,     FAMILY HISTORY:  family history is review and there is  no family  history of kidney disease in family , father and mother  No Pertinent Family History in first degree relatives of: as per available medical records.  Non-contributary for premature coronary disease or sudden cardiac death      REVIEW OF SYSTEMS:    unable to obtained a good review system                                                           8.6    10.72 )-----------( 100      ( 20 Apr 2020 14:47 )             28.2       CBC Full  -  ( 20 Apr 2020 14:47 )  WBC Count : 10.72 K/uL  RBC Count : 3.10 M/uL  Hemoglobin : 8.6 g/dL  Hematocrit : 28.2 %  Platelet Count - Automated : 100 K/uL  Mean Cell Volume : 91.0 fl  Mean Cell Hemoglobin : 27.7 pg  Mean Cell Hemoglobin Concentration : 30.5 gm/dL  Auto Neutrophil # : 9.06 K/uL  Auto Lymphocyte # : 0.79 K/uL  Auto Monocyte # : 0.69 K/uL  Auto Eosinophil # : 0.04 K/uL  Auto Basophil # : 0.01 K/uL  Auto Neutrophil % : 84.5 %  Auto Lymphocyte % : 7.4 %  Auto Monocyte % : 6.4 %  Auto Eosinophil % : 0.4 %  Auto Basophil % : 0.1 %      04-21    146<H>  |  114<H>  |  34<H>  ----------------------------<  189<H>  4.5   |  28  |  1.30    Ca    8.1<L>      21 Apr 2020 07:50    TPro  6.6  /  Alb  2.5<L>  /  TBili  0.5  /  DBili  x   /  AST  26  /  ALT  45  /  AlkPhos  92  04-20      CAPILLARY BLOOD GLUCOSE      POCT Blood Glucose.: 224 mg/dL (21 Apr 2020 16:38)  POCT Blood Glucose.: 290 mg/dL (21 Apr 2020 11:52)  POCT Blood Glucose.: 222 mg/dL (21 Apr 2020 07:50)  POCT Blood Glucose.: 199 mg/dL (20 Apr 2020 21:38)      Vital Signs Last 24 Hrs  T(C): 36.6 (21 Apr 2020 15:43), Max: 36.7 (21 Apr 2020 07:14)  T(F): 97.9 (21 Apr 2020 15:43), Max: 98.1 (21 Apr 2020 07:14)  HR: 67 (21 Apr 2020 16:51) (67 - 84)  BP: 108/68 (21 Apr 2020 16:51) (108/68 - 142/78)  BP(mean): --  RR: 18 (21 Apr 2020 15:43) (18 - 20)  SpO2: 92% (21 Apr 2020 15:43) (92% - 100%)                      PHYSICAL EXAM:    Constitutional: NAD  HEENT: conjunctive   clear   Neck:  No JVD  Respiratory: decrease bs b/l   Cardiovascular: S1 and S2  Gastrointestinal: BS+, soft, NT/ND  Extremities: No peripheral edema  Skin: dry   Access: Not applicable

## 2020-04-21 NOTE — DISCHARGE NOTE PROVIDER - HOSPITAL COURSE
patient presented with temp 89    on 100nrb    sodium 160    potassium high 5-6    cr 2.9    sister wanted dnr/dni/no dialysis    he was seen by state and approved with letter    hypotensive    he was resuscitated with fluids solumedrolp plaquenil iv antabiotics    given multiple multiple medciations to lower potasium    highest level was 6.8    he was given kayexelate lokelma d50 insulin calcium gluconate    blood cultures were + gram negative rods    SS to cipro orally,,,approved by id    currently his renal functions are recovering at 1.4    potassium normal    eating drinking on his own    po cipro which he will need for 10 days    sodium wnl acceptable numbers    on lantus 5units qhs with coverage prn    he cannot be on metformin    oxygen requirements 0-3 litres nasal canula keep o2 above 90    pending physical therapy eval patient presented with temp 89    on 100nrb    sodium 160    potassium high 5-6    cr 2.9    sister wanted dnr/dni/no dialysis    he was seen by state and approved with letter    hypotensive    he was resuscitated with fluids solumedrolp plaquenil iv antabiotics    given multiple multiple medciations to lower potasium    highest level was 6.8 with cr 2.9    he was given kayexelate lokelma d50 insulin calcium gluconate    blood cultures were + gram negative rods    SS to cipro orally,,,approved by id    currently his renal functions are recovering at 1.0    potassium normal    eating drinking on his own    po cipro which he will need for 10 days    sodium wnl acceptable numbers    on lantus 5units qhs with coverage prn    he cannot be on metformin or any nephrotoxic drugs    [id number] advised to stay off anticoagulation due to chronic hematuria    and stay off cardiac meds for now ,not able to support due to bp not high enough    oxygen requirements 0-3 litres nasal canula keep o2 above 90    patient needs rehabilitation    unable to walk few steps    he was seen by neurology,,,its all deconditioning,,,,,not central in nature    able to move all ext    + chronic hematuria    + rodriguez    as of today urine all clear    seen by urology,,,advised to f/u with his urologist as outpatient for cysto and bx bladder ,,,which he refused at [facility] in march    right now he is not medically cleared due to covid19 and s/p septick shock with renal failure    cleared for dc today patient presented with temp 89    on 100nrb    sodium 160    potassium high 5-6    cr 2.9    sister wanted dnr/dni/no dialysis    he was seen by state and approved with letter    hypotensive    he was resuscitated with fluids solumedrolp plaquenil iv antabiotics    given multiple multiple medications to lower potasium    highest level was 6.8 with cr 2.9    he was given kayexelate lokelma d50 insulin calcium gluconate    blood cultures were + gram negative rods    SS to cipro orally,,,approved by id    currently his renal functions are recovering at 1.0    potassium normal    eating drinking on his own    po cipro which he will need for 10 days    sodium wnl acceptable numbers    on lantus 5units qhs with coverage prn    he cannot be on metformin or any nephrotoxic drugs    [id number] advised to stay off anticoagulation due to chronic hematuria    and stay off cardiac meds for now ,not able to support due to bp not high enough    oxygen requirements 0-3 litres nasal canula keep o2 above 90    patient needs rehabilitation    unable to walk few steps    he was seen by neurology,,,its all deconditioning,,,,,not central in nature    able to move all ext    + chronic hematuria    + rodriguez    as of today urine all clear    seen by urology,,,advised to f/u with his urologist as outpatient for cysto and bx bladder ,,,which he refused at [facility] in march    right now he is not medically cleared due to covid19 and s/p septick shock with renal failure    cleared for dc today

## 2020-04-21 NOTE — DISCHARGE NOTE PROVIDER - NSDCMRMEDTOKEN_GEN_ALL_CORE_FT
albuterol 90 mcg/inh inhalation aerosol: 2 puff(s) inhaled 2 times a day  ciprofloxacin 500 mg oral tablet: 1 tab(s) orally every 12 hours 10 days  HumaLOG 100 units/mL injectable solution: pen subcutaneous  3x day with a meal  must finger stick before a meal  follow sliding scale  0-200 no insulin  201-250 2units  251-300 4units  301-350 6units  351-400 8 units  over 400 10units and call nurse  insulin glargine: 5units sq qhs  must finger stick before  if finger stcick 85 or less,,,hold  midodrine 2.5 mg oral tablet: 1 tab(s) orally 3 times a day hsbp over 135,,,hold heart rate less 60 albuterol 90 mcg/inh inhalation aerosol: 2 puff(s) inhaled 2 times a day  ciprofloxacin 500 mg oral tablet: 1 tab(s) orally every 12 hours 10 days  HumaLOG 100 units/mL injectable solution: pen subcutaneous  3x day with a meal  must finger stick before a meal  follow sliding scale  0-200 no insulin  201-250 2units  251-300 4units  301-350 6units  351-400 8 units  over 400 10units and call nurse  insulin glargine: 5units sq qhs  must finger stick before  if finger stcick 85 or less,,,hold  midodrine 2.5 mg oral tablet: 1 tab(s) orally 3 times a day hsbp over 135,,,hold heart rate less 60  tamsulosin 0.4 mg oral capsule: 1 cap(s) orally once a day (at bedtime) hsbp less 125

## 2020-04-21 NOTE — PHYSICAL THERAPY INITIAL EVALUATION ADULT - ADDITIONAL COMMENTS
Patient lives in group home with a room mate, no stairs, uses rolling walker for ambulation.  Patient states he walks to [address].  Patient independent with functional mobility and ADL's.

## 2020-04-22 NOTE — CONSULT NOTE ADULT - SUBJECTIVE AND OBJECTIVE BOX
CHIEF COMPLAINT:    HPI: 70yo male w/ mult med problems recently tx'd at [facility] end march for covid19 and gross hematuria  they stopped his eliquis and entresto but pt refused cysto. Admitted to [facility] w/ progressive sx's of Covid19. This morning noted w/ mild hematuria. Pt w/ profound MR, unable to give hx. Hx obtained from chart and Dr Pilo Canales.      PAST MEDICAL & SURGICAL HISTORY:  Heart failure  Afib  CHF (congestive heart failure)  Gout  Hiatal hernia  BPH (benign prostatic hyperplasia)  Cognitive impairment  Cardiomyopathy  HTN (hypertension)  DM (diabetes mellitus)      REVIEW OF SYSTEMS:  unable to obtain    MEDICATIONS  (STANDING):  ALBUTerol    90 MICROgram(s) HFA Inhaler 2 Puff(s) Inhalation two times a day  ciprofloxacin     Tablet 500 milliGRAM(s) Oral every 12 hours  dextrose 5% + sodium chloride 0.45%. 1000 milliLiter(s) (50 mL/Hr) IV Continuous <Continuous>  dextrose 5%. 1000 milliLiter(s) (50 mL/Hr) IV Continuous <Continuous>  dextrose 50% Injectable 12.5 Gram(s) IV Push once  dextrose 50% Injectable 25 Gram(s) IV Push once  dextrose 50% Injectable 25 Gram(s) IV Push once  insulin glargine Injectable (LANTUS) 5 Unit(s) SubCutaneous at bedtime  insulin lispro (HumaLOG) corrective regimen sliding scale   SubCutaneous three times a day before meals  insulin lispro (HumaLOG) corrective regimen sliding scale   SubCutaneous at bedtime  midodrine. 2.5 milliGRAM(s) Oral three times a day  pantoprazole  Injectable 40 milliGRAM(s) IV Push daily  tamsulosin 0.4 milliGRAM(s) Oral at bedtime    MEDICATIONS  (PRN):  dextrose 40% Gel 15 Gram(s) Oral once PRN Blood Glucose LESS THAN 70 milliGRAM(s)/deciliter  glucagon  Injectable 1 milliGRAM(s) IntraMuscular once PRN Glucose LESS THAN 70 milligrams/deciliter      Allergies    No Known Allergies    Intolerances        Social History:  Alcohol: Denied  Smoking: Nonsmoker  Drug Use: Denied  Marital Status:     FAMILY HISTORY:      Vital Signs Last 24 Hrs  T(C): 36.7 (22 Apr 2020 11:22), Max: 36.7 (22 Apr 2020 11:22)  T(F): 98 (22 Apr 2020 11:22), Max: 98 (22 Apr 2020 11:22)  HR: 89 (22 Apr 2020 11:22) (66 - 89)  BP: 111/76 (22 Apr 2020 11:22) (108/68 - 151/89)  BP(mean): --  RR: 18 (22 Apr 2020 11:22) (18 - 18)  SpO2: 97% (22 Apr 2020 11:22) (92% - 100%)      LABS:                        8.5    10.94 )-----------( 90       ( 22 Apr 2020 07:41 )             28.1     04-22    145  |  111<H>  |  25<H>  ----------------------------<  172<H>  4.4   |  28  |  1.40<H>    Ca    7.9<L>      22 Apr 2020 07:41  Phos  2.1     04-22  Mg     1.6     04-22    TPro  6.4  /  Alb  2.3<L>  /  TBili  0.7  /  DBili  x   /  AST  21  /  ALT  34  /  AlkPhos  81  04-22        Urine Culture:   Blood Cultures  04-19 @ 00:26   <10,000 CFU/ml of other organisms  --  --      RADIOLOGY & ADDITIONAL STUDIES: CHIEF COMPLAINT:    HPI: 70yo male w/ mult med problems. Pt of Han Marques MD - . Recently tx'd at [facility] end march for covid19 and gross hematuria. CT AP revealed ?ureteral mass.  they stopped his eliquis and entresto but pt refused URS/bx.  Admitted to [facility] w/ progressive sx's of Covid19. This morning noted w/ mild hematuria. Pt w/ profound MR, unable to give hx. Hx obtained from chart and Dr iPlo Canales.      PAST MEDICAL & SURGICAL HISTORY:  Heart failure  Afib  CHF (congestive heart failure)  Gout  Hiatal hernia  BPH (benign prostatic hyperplasia)  Cognitive impairment  Cardiomyopathy  HTN (hypertension)  DM (diabetes mellitus)      REVIEW OF SYSTEMS:  unable to obtain    MEDICATIONS  (STANDING):  ALBUTerol    90 MICROgram(s) HFA Inhaler 2 Puff(s) Inhalation two times a day  ciprofloxacin     Tablet 500 milliGRAM(s) Oral every 12 hours  dextrose 5% + sodium chloride 0.45%. 1000 milliLiter(s) (50 mL/Hr) IV Continuous <Continuous>  dextrose 5%. 1000 milliLiter(s) (50 mL/Hr) IV Continuous <Continuous>  dextrose 50% Injectable 12.5 Gram(s) IV Push once  dextrose 50% Injectable 25 Gram(s) IV Push once  dextrose 50% Injectable 25 Gram(s) IV Push once  insulin glargine Injectable (LANTUS) 5 Unit(s) SubCutaneous at bedtime  insulin lispro (HumaLOG) corrective regimen sliding scale   SubCutaneous three times a day before meals  insulin lispro (HumaLOG) corrective regimen sliding scale   SubCutaneous at bedtime  midodrine. 2.5 milliGRAM(s) Oral three times a day  pantoprazole  Injectable 40 milliGRAM(s) IV Push daily  tamsulosin 0.4 milliGRAM(s) Oral at bedtime    MEDICATIONS  (PRN):  dextrose 40% Gel 15 Gram(s) Oral once PRN Blood Glucose LESS THAN 70 milliGRAM(s)/deciliter  glucagon  Injectable 1 milliGRAM(s) IntraMuscular once PRN Glucose LESS THAN 70 milligrams/deciliter      Allergies    No Known Allergies    Intolerances        Social History:  Alcohol: Denied  Smoking: Nonsmoker  Drug Use: Denied  Marital Status:     FAMILY HISTORY:      Vital Signs Last 24 Hrs  T(C): 36.7 (22 Apr 2020 11:22), Max: 36.7 (22 Apr 2020 11:22)  T(F): 98 (22 Apr 2020 11:22), Max: 98 (22 Apr 2020 11:22)  HR: 89 (22 Apr 2020 11:22) (66 - 89)  BP: 111/76 (22 Apr 2020 11:22) (108/68 - 151/89)  BP(mean): --  RR: 18 (22 Apr 2020 11:22) (18 - 18)  SpO2: 97% (22 Apr 2020 11:22) (92% - 100%)      LABS:                        8.5    10.94 )-----------( 90       ( 22 Apr 2020 07:41 )             28.1     04-22    145  |  111<H>  |  25<H>  ----------------------------<  172<H>  4.4   |  28  |  1.40<H>    Ca    7.9<L>      22 Apr 2020 07:41  Phos  2.1     04-22  Mg     1.6     04-22    TPro  6.4  /  Alb  2.3<L>  /  TBili  0.7  /  DBili  x   /  AST  21  /  ALT  34  /  AlkPhos  81  04-22        Urine Culture:   Blood Cultures  04-19 @ 00:26   <10,000 CFU/ml of other organisms  --  --      RADIOLOGY & ADDITIONAL STUDIES:

## 2020-04-22 NOTE — CONSULT NOTE ADULT - ASSESSMENT
70yo male w/ mult comorbidities, Covid19 PNA, recurrent hematuria. Neg Ucx.      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 Neg Ucx.      Cont rodriguez  Send Urine cytology  Hold anticoags  cysto/URS/bx w/ Dr Marques post recovery from COVID19.

## 2020-04-22 NOTE — PROGRESS NOTE ADULT - SUBJECTIVE AND OBJECTIVE BOX
[facility] Cardiology    CHIEF COMPLAINT: Patient is a 69y old  Male who presents with a chief complaint of covid19+ dx 3/30  sent for hypoxia (21 Apr 2020 17:13)      Follow Up: [ ] Chest Pain      [ ] Dyspnea     [ ] Palpitations    [ ] Atrial Fibrillation     [ ] Ventricular Dysrhythmia    [ ] Abnormal EKG                      [ ] Abnormal Cardiac Enzymes     [ ] Valvular Disease    HPI:  s/p treatment at [facility] end march for covid19 and gross hematuria  they stopped his eliquis and entresto (15 Apr 2020 16:40)    PAST MEDICAL & SURGICAL HISTORY:  Heart failure  Afib  CHF (congestive heart failure)  Gout  Hiatal hernia  BPH (benign prostatic hyperplasia)  Cognitive impairment  Cardiomyopathy  HTN (hypertension)  DM (diabetes mellitus)    MEDICATIONS  (STANDING):  ALBUTerol    90 MICROgram(s) HFA Inhaler 2 Puff(s) Inhalation two times a day  ciprofloxacin     Tablet 500 milliGRAM(s) Oral every 12 hours  dextrose 5% + sodium chloride 0.45%. 1000 milliLiter(s) (50 mL/Hr) IV Continuous <Continuous>  dextrose 5%. 1000 milliLiter(s) (50 mL/Hr) IV Continuous <Continuous>  dextrose 50% Injectable 12.5 Gram(s) IV Push once  dextrose 50% Injectable 25 Gram(s) IV Push once  dextrose 50% Injectable 25 Gram(s) IV Push once  insulin glargine Injectable (LANTUS) 5 Unit(s) SubCutaneous at bedtime  insulin lispro (HumaLOG) corrective regimen sliding scale   SubCutaneous three times a day before meals  insulin lispro (HumaLOG) corrective regimen sliding scale   SubCutaneous at bedtime  midodrine. 2.5 milliGRAM(s) Oral three times a day  pantoprazole  Injectable 40 milliGRAM(s) IV Push daily  tamsulosin 0.4 milliGRAM(s) Oral at bedtime    MEDICATIONS  (PRN):  dextrose 40% Gel 15 Gram(s) Oral once PRN Blood Glucose LESS THAN 70 milliGRAM(s)/deciliter  glucagon  Injectable 1 milliGRAM(s) IntraMuscular once PRN Glucose LESS THAN 70 milligrams/deciliter    Allergies    No Known Allergies    Intolerances        REVIEW OF SYSTEMS:    CONSTITUTIONAL: No weakness, fevers or chills.   EYES/ENT: No visual changes;    NECK: No pain or stiffness  RESPIRATORY: No cough, wheezing, No shortness of breath  CARDIOVASCULAR: No chest pain or palpitations  GASTROINTESTINAL: No abdominal pain, or hematochezia.  GENITOURINARY: No dysuria orhematuria  NEUROLOGICAL: No numbness or weakness  SKIN: No itching, burning, rashes  All other review of systems is negative unless indicated above    Vital Signs Last 24 Hrs  T(C): 36.7 (22 Apr 2020 11:22), Max: 36.7 (22 Apr 2020 11:22)  T(F): 98 (22 Apr 2020 11:22), Max: 98 (22 Apr 2020 11:22)  HR: 89 (22 Apr 2020 11:22) (66 - 89)  BP: 111/76 (22 Apr 2020 11:22) (108/68 - 151/89)  BP(mean): --  RR: 18 (22 Apr 2020 11:22) (18 - 18)  SpO2: 97% (22 Apr 2020 11:22) (92% - 100%)  I&O's Summary    21 Apr 2020 07:01  -  22 Apr 2020 07:00  --------------------------------------------------------  IN: 1120 mL / OUT: 2200 mL / NET: -1080 mL        PHYSICAL EXAM:  Constitutional: NAD  Neurological: Alert, no focal deficits  HEENT: no JVD, EOMI  Cardiovascular: Regular, S1 and S2, no murmur  Pulmonary: breath sounds bilaterally  Gastrointestinal: Bowel Sounds present, soft, nontender  EXT:  no peripheral edema  Skin: No rashes.  Psych:  Mood calm  LABS: All Labs Reviewed:                          8.5    10.94 )-----------( 90       ( 22 Apr 2020 07:41 )             28.1     04-22    145  |  111<H>  |  25<H>  ----------------------------<  172<H>  4.4   |  28  |  1.40<H>    Ca    7.9<L>      22 Apr 2020 07:41  Phos  2.1     04-22  Mg     1.6     04-22    TPro  6.4  /  Alb  2.3<L>  /  TBili  0.7  /  DBili  x   /  AST  21  /  ALT  34  /  AlkPhos  81  04-22    · Assessment		  69 male cognitive impairment, DM, HTN, CHF by chart a/w COVID +  B/L PNA, hypothermia, hypotension, sepsis    SBP  stable  Cr 2.3, 2.8, 1.3. 1.4  no Entresto, hold lasix  Anicoagulation recently d/c's secondary to anemia and hematuria  s/p gentle IVF @ 40 cc/hr. Hx of CMP/CHF, prior EF likely low  s/p Azithromycin,  midodrine 2.5 TID  Repeat EKG  4/16-QTC-369 ms stable  will follow PRN

## 2020-04-22 NOTE — DIETITIAN INITIAL EVALUATION ADULT. - OTHER INFO
69 year old male seen for 7 day LOS dx PN + COVID 19 history MRDD DM HTN GOUT CHF  with 50% of meal recorded taken in flow sheet X1. unable to reach patient or RN at this time. from group home no food allergies noted. K now WNL hyperkalemia at admit. patient is DNR DNI. 4/22 BM + 1 edema Phos low follow for supplement

## 2020-04-22 NOTE — GOALS OF CARE CONVERSATION - ADVANCED CARE PLANNING - CONVERSATION DETAILS
Spoke to pt sister Avani and she agrees to DNR/DNI.  Letter from [facility] agreeing with this decision on medical record

## 2020-04-22 NOTE — PROGRESS NOTE ADULT - SUBJECTIVE AND OBJECTIVE BOX
Patient is a 69y Male whom presented to the hospital with ckd and barbra , hyperkalemia     PAST MEDICAL & SURGICAL HISTORY:  Heart failure  Afib  CHF (congestive heart failure)  Gout  Hiatal hernia  BPH (benign prostatic hyperplasia)  Cognitive impairment  Cardiomyopathy  HTN (hypertension)  DM (diabetes mellitus)      MEDICATIONS  (STANDING):  azithromycin  IVPB 500 milliGRAM(s) IV Intermittent every 24 hours  cefTRIAXone   IVPB 1000 milliGRAM(s) IV Intermittent every 24 hours  dextrose 5%. 1000 milliLiter(s) (50 mL/Hr) IV Continuous <Continuous>  dextrose 50% Injectable 12.5 Gram(s) IV Push once  dextrose 50% Injectable 25 Gram(s) IV Push once  dextrose 50% Injectable 25 Gram(s) IV Push once  insulin lispro (HumaLOG) corrective regimen sliding scale   SubCutaneous three times a day before meals  midodrine. 2.5 milliGRAM(s) Oral three times a day  pantoprazole  Injectable 40 milliGRAM(s) IV Push daily  sodium chloride 0.9%. 1000 milliLiter(s) (40 mL/Hr) IV Continuous <Continuous>  sodium polystyrene sulfonate Enema 15 Gram(s) Rectal once      Allergies    No Known Allergies    Intolerances        SOCIAL HISTORY:  Denies ETOh,Smoking,     FAMILY HISTORY:  family history is review and there is  no family  history of kidney disease in family , father and mother  No Pertinent Family History in first degree relatives of: as per available medical records.  Non-contributary for premature coronary disease or sudden cardiac death      REVIEW OF SYSTEMS:    unable to obtained a good review system                                                                            8.5    10.94 )-----------( 90       ( 22 Apr 2020 07:41 )             28.1       CBC Full  -  ( 22 Apr 2020 07:41 )  WBC Count : 10.94 K/uL  RBC Count : 3.04 M/uL  Hemoglobin : 8.5 g/dL  Hematocrit : 28.1 %  Platelet Count - Automated : 90 K/uL  Mean Cell Volume : 92.4 fl  Mean Cell Hemoglobin : 28.0 pg  Mean Cell Hemoglobin Concentration : 30.2 gm/dL  Auto Neutrophil # : 9.61 K/uL  Auto Lymphocyte # : 0.50 K/uL  Auto Monocyte # : 0.61 K/uL  Auto Eosinophil # : 0.06 K/uL  Auto Basophil # : 0.01 K/uL  Auto Neutrophil % : 87.8 %  Auto Lymphocyte % : 4.6 %  Auto Monocyte % : 5.6 %  Auto Eosinophil % : 0.5 %  Auto Basophil % : 0.1 %      04-22    145  |  111<H>  |  25<H>  ----------------------------<  172<H>  4.4   |  28  |  1.40<H>    Ca    7.9<L>      22 Apr 2020 07:41  Phos  2.1     04-22  Mg     1.6     04-22    TPro  6.4  /  Alb  2.3<L>  /  TBili  0.7  /  DBili  x   /  AST  21  /  ALT  34  /  AlkPhos  81  04-22      CAPILLARY BLOOD GLUCOSE      POCT Blood Glucose.: 264 mg/dL (22 Apr 2020 11:53)  POCT Blood Glucose.: 172 mg/dL (22 Apr 2020 08:03)  POCT Blood Glucose.: 158 mg/dL (21 Apr 2020 21:16)  POCT Blood Glucose.: 224 mg/dL (21 Apr 2020 16:38)      Vital Signs Last 24 Hrs  T(C): 36.7 (22 Apr 2020 11:22), Max: 36.7 (22 Apr 2020 11:22)  T(F): 98 (22 Apr 2020 11:22), Max: 98 (22 Apr 2020 11:22)  HR: 89 (22 Apr 2020 11:22) (66 - 89)  BP: 111/76 (22 Apr 2020 11:22) (108/68 - 151/89)  BP(mean): --  RR: 18 (22 Apr 2020 11:22) (18 - 18)  SpO2: 97% (22 Apr 2020 11:22) (97% - 100%)                      PHYSICAL EXAM:    Constitutional: NAD  HEENT: conjunctive   clear   Neck:  No JVD  Respiratory: decrease bs b/l   Cardiovascular: S1 and S2  Gastrointestinal: BS+, soft, NT/ND  Extremities: No peripheral edema  Skin: dry   Access: Not applicable

## 2020-04-22 NOTE — PROGRESS NOTE ADULT - SUBJECTIVE AND OBJECTIVE BOX
PAST MEDICAL & SURGICAL HISTORY:  Heart failure  Afib  CHF (congestive heart failure)  Gout  Hiatal hernia  BPH (benign prostatic hyperplasia)  Cognitive impairment  Cardiomyopathy  HTN (hypertension)  DM (diabetes mellitus)      MEDICATIONS  (STANDING):  ALBUTerol    90 MICROgram(s) HFA Inhaler 2 Puff(s) Inhalation two times a day  ciprofloxacin     Tablet 500 milliGRAM(s) Oral every 12 hours  dextrose 5%. 1000 milliLiter(s) (50 mL/Hr) IV Continuous <Continuous>  dextrose 50% Injectable 12.5 Gram(s) IV Push once  dextrose 50% Injectable 25 Gram(s) IV Push once  dextrose 50% Injectable 25 Gram(s) IV Push once  insulin glargine Injectable (LANTUS) 5 Unit(s) SubCutaneous at bedtime  insulin lispro (HumaLOG) corrective regimen sliding scale   SubCutaneous three times a day before meals  insulin lispro (HumaLOG) corrective regimen sliding scale   SubCutaneous at bedtime  midodrine. 2.5 milliGRAM(s) Oral three times a day  pantoprazole  Injectable 40 milliGRAM(s) IV Push daily  tamsulosin 0.4 milliGRAM(s) Oral at bedtime    MEDICATIONS  (PRN):  dextrose 40% Gel 15 Gram(s) Oral once PRN Blood Glucose LESS THAN 70 milliGRAM(s)/deciliter  glucagon  Injectable 1 milliGRAM(s) IntraMuscular once PRN Glucose LESS THAN 70 milligrams/deciliter      Patient is a 69y old  Male who presents with a chief complaint of covid19+ dx 3/30  sent for hypoxia (22 Apr 2020 13:42)      Vital Signs Last 24 Hrs  T(C): 36.7 (22 Apr 2020 11:22), Max: 36.7 (22 Apr 2020 11:22)  T(F): 98 (22 Apr 2020 11:22), Max: 98 (22 Apr 2020 11:22)  HR: 89 (22 Apr 2020 11:22) (66 - 89)  BP: 111/76 (22 Apr 2020 11:22) (108/68 - 151/89)  BP(mean): --  RR: 18 (22 Apr 2020 11:22) (18 - 18)  SpO2: 97% (22 Apr 2020 11:22) (92% - 100%)          04-21 @ 07:01  -  04-22 @ 07:00  --------------------------------------------------------  IN: 1120 mL / OUT: 2200 mL / NET: -1080 mL        REVIEW OF SYSTEMS:    Constitutional: No fever, weight loss or fatigue  Eyes: No eye pain, visual disturbances, or discharge  ENT:  No difficulty hearing, tinnitus, vertigo; No sinus or throat pain  Neck: No pain or stiffness  Breasts: No pain, masses or nipple discharge  Respiratory: No cough, wheezing, chills or hemoptysis  Cardiovascular: No chest pain, palpitations, shortness of breath, dizziness or leg swelling  Gastrointestinal: No abdominal or epigastric pain. No nausea, vomiting or hematemesis; No diarrhea or constipation. No melena or hematochezia.  Genitourinary: No dysuria, frequency, hematuria or incontinence  Rectal: No pain, hemorrhoids or incontinence  Neurological: No headaches, memory loss, loss of strength, numbness or tremors  Skin: No itching, burning, rashes or lesions   Lymph Nodes: No enlarged glands  Endocrine: No heat or cold intolerance; No hair loss  Musculoskeletal: No joint pain or swelling; No muscle, back or extremity pain  Psychiatric: No depression, anxiety, mood swings or difficulty sleeping  Heme/Lymph: No easy bruising or bleeding gums  Allergy and Immunologic: No hives or eczema    PHYSICAL EXAM:  alert  responds to questions appropriately   neurology in the room as well  dr fabrizio rodriguez blood tinged  Constitutional: NAD,   Respiratory: CTAB/L   Cardiovascular:irreg  Gastrointestinal: BS+, soft, NT/ND  Extremities: No peripheral edema  was able to lift both lower ext  moved upper ext appropriately  responded well to sensation bl  upper lower ext  responds to questions  able to carrty conversation  Skin:  chronic venous changes skin lower ext      decubiti: none  tiny scrape buttock                          8.5    10.94 )-----------( 90       ( 22 Apr 2020 07:41 )             28.1     04-22    145  |  111<H>  |  25<H>  ----------------------------<  172<H>  4.4   |  28  |  1.40<H>    Ca    7.9<L>      22 Apr 2020 07:41  Phos  2.1     04-22  Mg     1.6     04-22    TPro  6.4  /  Alb  2.3<L>  /  TBili  0.7  /  DBili  x   /  AST  21  /  ALT  34  /  AlkPhos  81  04-22              .Urine Catheterized  04-19 @ 00:26   <10,000 CFU/ml of other organisms  --  --      .Blood Blood-Peripheral  04-16 @ 22:07   No Growth Final  --  --      .Blood Blood-Peripheral  04-15 @ 19:00   Growth in aerobic bottle: Pseudomonas aeruginosa  Growth in anaerobic bottle: Streptococcus parasanguinis  Alpha hemolytic strep  (not Strep. pneumoniae or Enterococcus)  Single set isolate, possible contaminant. Contact  Microbiology if susceptibility testing clinically  indicated.  "Due to technical problems, Proteus sp. will Not be reported as part of  the BCID panel until further notice"  ***Blood Panel PCR results on this specimen are available  approximately 3 hours after the Gram stain result.***  Gram stain, PCR, and/or culture results may not always  correspond due to difference in methodologies.  ************************************************************  This PCR assay was performed using [employer].  The following targets are tested for: Enterococcus,  vancomycin resistant enterococci, Listeria monocytogenes,  coagulase negative staphylococci, S. aureus,  methicillin resistant S. aureus, Streptococcus agalactiae  (Group B), S. pneumoniae, S. pyogenes (Group A),  Acinetobacter baumannii, Enterobacter cloacae, E. coli,  Klebsiella oxytoca, K. pneumoniae, Proteus sp.,  Serratia marcescens, Haemophilus influenzae,  Neisseria meningitidis, Pseudomonas aeruginosa, Candida  albicans, C. glabrata, C krusei, C parapsilosis,  C. tropicalis and the KPC resistance gene.  --  Blood Culture PCR  Pseudomonas aeruginosa  Blood Culture PCR        Urine Culture:  04-19 @ 00:26    --        <10,000 CFU/ml of other organisms  Urine Culture:  04-16 @ 22:07    --        No Growth Final  Urine Culture:  04-15 @ 19:00    --        Growth in aerobic bottle: Pseudomonas aeruginosa  Growth in anaerobic bottle: Streptococcus parasanguinis  Alpha hemolytic strep  (not Strep. pneumoniae or Enterococcus)  Single set isolate, possible contaminant. Contact  Microbiology if susceptibility testing clinically  indicated.  "Due to technical problems, Proteus sp. will Not be reported as part of  the BCID panel until further notice"  ***Blood Panel PCR results on this specimen are available  approximately 3 hours after the Gram stain result.***  Gram stain, PCR, and/or culture results may not always  correspond due to difference in methodologies.  ************************************************************  This PCR assay was performed using [employer].  The following targets are tested for: Enterococcus,  vancomycin resistant enterococci, Listeria monocytogenes,  coagulase negative staphylococci, S. aureus,  methicillin resistant S. aureus, Streptococcus agalactiae  (Group B), S. pneumoniae, S. pyogenes (Group A),  Acinetobacter baumannii, Enterobacter cloacae, E. coli,  Klebsiella oxytoca, K. pneumoniae, Proteus sp.,  Serratia marcescens, Haemophilus influenzae,  Neisseria meningitidis, Pseudomonas aeruginosa, Candida  albicans, C. glabrata, C krusei, C parapsilosis,  C. tropicalis and the KPC resistance gene.        Radiology:

## 2020-04-23 NOTE — PROGRESS NOTE ADULT - SUBJECTIVE AND OBJECTIVE BOX
Neurology follow up note    KIMBERLY MARKS69yMale      Interval History:    Patient feels ok no new complaints having breakfast .    MEDICATIONS    ALBUTerol    90 MICROgram(s) HFA Inhaler 2 Puff(s) Inhalation two times a day  ciprofloxacin     Tablet 500 milliGRAM(s) Oral every 12 hours  dextrose 40% Gel 15 Gram(s) Oral once PRN  dextrose 5%. 1000 milliLiter(s) IV Continuous <Continuous>  dextrose 50% Injectable 12.5 Gram(s) IV Push once  dextrose 50% Injectable 25 Gram(s) IV Push once  dextrose 50% Injectable 25 Gram(s) IV Push once  glucagon  Injectable 1 milliGRAM(s) IntraMuscular once PRN  insulin glargine Injectable (LANTUS) 5 Unit(s) SubCutaneous at bedtime  insulin lispro (HumaLOG) corrective regimen sliding scale   SubCutaneous three times a day before meals  insulin lispro (HumaLOG) corrective regimen sliding scale   SubCutaneous at bedtime  midodrine. 2.5 milliGRAM(s) Oral three times a day  pantoprazole  Injectable 40 milliGRAM(s) IV Push daily  tamsulosin 0.4 milliGRAM(s) Oral at bedtime      Allergies    No Known Allergies    Intolerances            Vital Signs Last 24 Hrs  T(C): 36.6 (23 Apr 2020 07:40), Max: 36.8 (22 Apr 2020 20:46)  T(F): 97.8 (23 Apr 2020 07:40), Max: 98.2 (22 Apr 2020 20:46)  HR: 64 (23 Apr 2020 10:09) (59 - 89)  BP: 137/80 (23 Apr 2020 10:09) (111/76 - 137/80)  BP(mean): 97 (22 Apr 2020 20:46) (97 - 97)  RR: 20 (23 Apr 2020 10:09) (16 - 20)  SpO2: 100% (23 Apr 2020 10:09) (96% - 100%)      REVIEW OF SYSTEMS:  Constitutional:  The patient denies fever, chills, or night sweats.  Head:  No headaches.  Eyes:  No double vision or blurry vision.  Ears:  No ringing in the ears.  Neck:  No neck pain.  Respiratory:  No shortness of breath.  Cardiovascular:  No chest pain.  Abdomen:  No nausea, vomiting, or abdominal pain.  Extremities/Neurological:  No numbness or tingling.  Musculoskeletal:  No joint pain.  General:  No history of any type of back pain.    PHYSICAL EXAMINATION:    HEENT:  Head:  Normocephalic, atraumatic.  Eyes:  No scleral icterus.  Ears:  Hearing bilaterally intact.  NECK:  Supple.  RESPIRATORY:  Good air entry bilaterally.  CARDIOVASCULAR:  S1 and S2 heard.  ABDOMEN:  Soft and nontender.  EXTREMITIES:  No clubbing or cyanosis were noted.      NEUROLOGIC:  The patient is awake and alert.  Location was hospital, was able to name simple objects.  Extraocular movements were intact.  Pupils were equal, round, and reactive bilaterally 3 mm to 2 mm.  Speech was fluent.  Motor:  The patient has decreased range of motion of bilateral shoulders, suspect underlying shoulder injuries, a rotator cuff, triceps, biceps, hand grasp were 4/5, bilateral lower extremities were 3+/5.  Sensory:  Bilateral upper and lower intact to light touch.  No back pain.            LABS:  CBC Full  -  ( 22 Apr 2020 07:41 )  WBC Count : 10.94 K/uL  RBC Count : 3.04 M/uL  Hemoglobin : 8.5 g/dL  Hematocrit : 28.1 %  Platelet Count - Automated : 90 K/uL  Mean Cell Volume : 92.4 fl  Mean Cell Hemoglobin : 28.0 pg  Mean Cell Hemoglobin Concentration : 30.2 gm/dL  Auto Neutrophil # : 9.61 K/uL  Auto Lymphocyte # : 0.50 K/uL  Auto Monocyte # : 0.61 K/uL  Auto Eosinophil # : 0.06 K/uL  Auto Basophil # : 0.01 K/uL  Auto Neutrophil % : 87.8 %  Auto Lymphocyte % : 4.6 %  Auto Monocyte % : 5.6 %  Auto Eosinophil % : 0.5 %  Auto Basophil % : 0.1 %      04-22    145  |  111<H>  |  25<H>  ----------------------------<  172<H>  4.4   |  28  |  1.40<H>    Ca    7.9<L>      22 Apr 2020 07:41  Phos  2.1     04-22  Mg     1.6     04-22    TPro  6.4  /  Alb  2.3<L>  /  TBili  0.7  /  DBili  x   /  AST  21  /  ALT  34  /  AlkPhos  81  04-22    Hemoglobin A1C:     LIVER FUNCTIONS - ( 22 Apr 2020 07:41 )  Alb: 2.3 g/dL / Pro: 6.4 g/dL / ALK PHOS: 81 U/L / ALT: 34 U/L / AST: 21 U/L / GGT: x           Vitamin B12         RADIOLOGY    ANALYSIS AND PLAN:  This is a 69-year-old with ataxia and altered mental status.  1.	For ataxia, suspect most likely secondary to age-related changes, deconditioning, unusual for cerebrovascular accident at present with bilateral leg issues.  Also, the patient has underlying spinal disc disease, back pain, to suggest this come from the lumbar spine.  2.	Fall precaution.  3.	For history of altered mental status most likely metabolic encephalopathy secondary to a history of COVID virus and respiratory issues.  4.	Monitor respiratory status.  5.	Greater than 45 minutes of time was spent with the patient, plan of care, reviewed data, speaking to the multidisciplinary team.    Physical therapy evaluation if possible and as tolerated.  OOB to chair/ambulation with assistance only if possible.

## 2020-04-23 NOTE — CONSULT NOTE ADULT - SUBJECTIVE AND OBJECTIVE BOX
Patient is a 69y old  Male who presents with a chief complaint of covid19+ dx 3/30  sent for hypoxia (23 Apr 2020 18:04)      HPI:  s/p treatment at [facility] end march for covid19 and gross hematuria  they stopped his eliquis and entresto (15 Apr 2020 16:40)       ROS:  Negative except for:    PAST MEDICAL & SURGICAL HISTORY:  Heart failure  Afib  CHF (congestive heart failure)  Gout  Hiatal hernia  BPH (benign prostatic hyperplasia)  Cognitive impairment  Cardiomyopathy  HTN (hypertension)  DM (diabetes mellitus)      SOCIAL HISTORY:    FAMILY HISTORY:      MEDICATIONS  (STANDING):  ALBUTerol    90 MICROgram(s) HFA Inhaler 2 Puff(s) Inhalation two times a day  ciprofloxacin     Tablet 500 milliGRAM(s) Oral every 12 hours  dextrose 5%. 1000 milliLiter(s) (50 mL/Hr) IV Continuous <Continuous>  dextrose 50% Injectable 12.5 Gram(s) IV Push once  dextrose 50% Injectable 25 Gram(s) IV Push once  dextrose 50% Injectable 25 Gram(s) IV Push once  insulin glargine Injectable (LANTUS) 5 Unit(s) SubCutaneous at bedtime  insulin lispro (HumaLOG) corrective regimen sliding scale   SubCutaneous three times a day before meals  insulin lispro (HumaLOG) corrective regimen sliding scale   SubCutaneous at bedtime  midodrine. 2.5 milliGRAM(s) Oral three times a day  pantoprazole  Injectable 40 milliGRAM(s) IV Push daily  tamsulosin 0.4 milliGRAM(s) Oral at bedtime    MEDICATIONS  (PRN):  dextrose 40% Gel 15 Gram(s) Oral once PRN Blood Glucose LESS THAN 70 milliGRAM(s)/deciliter  glucagon  Injectable 1 milliGRAM(s) IntraMuscular once PRN Glucose LESS THAN 70 milligrams/deciliter      Allergies    No Known Allergies    Intolerances        Vital Signs Last 24 Hrs  T(C): 36.6 (23 Apr 2020 15:24), Max: 36.8 (22 Apr 2020 20:46)  T(F): 97.8 (23 Apr 2020 15:24), Max: 98.2 (22 Apr 2020 20:46)  HR: 61 (23 Apr 2020 15:24) (59 - 67)  BP: 117/79 (23 Apr 2020 15:24) (117/79 - 137/80)  BP(mean): 97 (22 Apr 2020 20:46) (97 - 97)  RR: 18 (23 Apr 2020 15:24) (16 - 20)  SpO2: 100% (23 Apr 2020 15:24) (96% - 100%)    PHYSICAL EXAM (exam as per co treatment team)    LABS:    CBC Full  -  ( 23 Apr 2020 13:10 )  WBC Count : 7.88 K/uL  RBC Count : 3.05 M/uL  Hemoglobin : 8.6 g/dL  Hematocrit : 28.3 %  Platelet Count - Automated : 73 K/uL  Mean Cell Volume : 92.8 fl  Mean Cell Hemoglobin : 28.2 pg  Mean Cell Hemoglobin Concentration : 30.4 gm/dL  Auto Neutrophil # : 6.83 K/uL  Auto Lymphocyte # : 0.39 K/uL  Auto Monocyte # : 0.46 K/uL  Auto Eosinophil # : 0.10 K/uL  Auto Basophil # : 0.01 K/uL  Auto Neutrophil % : 86.8 %  Auto Lymphocyte % : 4.9 %  Auto Monocyte % : 5.8 %  Auto Eosinophil % : 1.3 %  Auto Basophil % : 0.1 %    04-23    147<H>  |  113<H>  |  18  ----------------------------<  190<H>  3.8   |  29  |  1.00    Ca    8.0<L>      23 Apr 2020 13:10  Phos  2.1     04-22  Mg     1.6     04-22    TPro  6.2  /  Alb  2.1<L>  /  TBili  0.6  /  DBili  x   /  AST  18  /  ALT  31  /  AlkPhos  73  04-23    D-Dimer Assay, Quantitative (04.15.20 @ 18:41)    D-Dimer Assay, Quantitative: 155 ng/mL DDU              BLOOD SMEAR INTERPRETATION:    RADIOLOGY & ADDITIONAL STUDIES:  < from: Xray Chest 1 View AP/PA (04.15.20 @ 14:07) >  EXAM:  XR CHEST AP OR PA 1V                            PROCEDURE DATE:  04/15/2020          INTERPRETATION:  PROCEDURE: AP view of the chest.    CLINICAL INFORMATION: Cough and fever.    COMPARISON: None.    FINDINGS:    Lungs: There are bilateral lower lung opacities.  Heart: The heart is top normal in size.  Mediastinum: The mediastinum is within normal limits.    IMPRESSION:    Findings of bilateral lower lung opacities are suspicious for pneumonia.                ZOLTAN POP M.D., ATTENDING RADIOLOGIST  This document has been electronically signed. Apr 15 2020  2:46PM        < end of copied text >

## 2020-04-23 NOTE — PROGRESS NOTE ADULT - SUBJECTIVE AND OBJECTIVE BOX
[facility] Cardiology    CHIEF COMPLAINT: Patient is a 69y old  Male who presents with a chief complaint of covid19+ dx 3/30  sent for hypoxia (23 Apr 2020 10:39)      Follow Up: [ ] Chest Pain      [ ] Dyspnea     [ ] Palpitations    [ ] Atrial Fibrillation     [ ] Ventricular Dysrhythmia    [ ] Abnormal EKG                      [ ] Abnormal Cardiac Enzymes     [ ] Valvular Disease    HPI:  s/p treatment at [facility] end march for covid19 and gross hematuria  they stopped his eliquis and entresto (15 Apr 2020 16:40)    PAST MEDICAL & SURGICAL HISTORY:  Heart failure  Afib  CHF (congestive heart failure)  Gout  Hiatal hernia  BPH (benign prostatic hyperplasia)  Cognitive impairment  Cardiomyopathy  HTN (hypertension)  DM (diabetes mellitus)    MEDICATIONS  (STANDING):  ALBUTerol    90 MICROgram(s) HFA Inhaler 2 Puff(s) Inhalation two times a day  ciprofloxacin     Tablet 500 milliGRAM(s) Oral every 12 hours  dextrose 5%. 1000 milliLiter(s) (50 mL/Hr) IV Continuous <Continuous>  dextrose 50% Injectable 12.5 Gram(s) IV Push once  dextrose 50% Injectable 25 Gram(s) IV Push once  dextrose 50% Injectable 25 Gram(s) IV Push once  insulin glargine Injectable (LANTUS) 5 Unit(s) SubCutaneous at bedtime  insulin lispro (HumaLOG) corrective regimen sliding scale   SubCutaneous three times a day before meals  insulin lispro (HumaLOG) corrective regimen sliding scale   SubCutaneous at bedtime  midodrine. 2.5 milliGRAM(s) Oral three times a day  pantoprazole  Injectable 40 milliGRAM(s) IV Push daily  tamsulosin 0.4 milliGRAM(s) Oral at bedtime    MEDICATIONS  (PRN):  dextrose 40% Gel 15 Gram(s) Oral once PRN Blood Glucose LESS THAN 70 milliGRAM(s)/deciliter  glucagon  Injectable 1 milliGRAM(s) IntraMuscular once PRN Glucose LESS THAN 70 milligrams/deciliter    Allergies    No Known Allergies    Intolerances        REVIEW OF SYSTEMS:    CONSTITUTIONAL: No weakness, fevers or chills.   EYES/ENT: No visual changes;    NECK: No pain or stiffness  RESPIRATORY: No cough, wheezing, No shortness of breath  CARDIOVASCULAR: No chest pain or palpitations  GASTROINTESTINAL: No abdominal pain, or hematochezia.  GENITOURINARY: No dysuria orhematuria  NEUROLOGICAL: No numbness or weakness  SKIN: No itching, burning, rashes  All other review of systems is negative unless indicated above    Vital Signs Last 24 Hrs  T(C): 36.6 (23 Apr 2020 07:40), Max: 36.8 (22 Apr 2020 20:46)  T(F): 97.8 (23 Apr 2020 07:40), Max: 98.2 (22 Apr 2020 20:46)  HR: 64 (23 Apr 2020 10:09) (59 - 68)  BP: 137/80 (23 Apr 2020 10:09) (120/76 - 137/80)  BP(mean): 97 (22 Apr 2020 20:46) (97 - 97)  RR: 20 (23 Apr 2020 10:09) (16 - 20)  SpO2: 100% (23 Apr 2020 10:09) (96% - 100%)  I&O's Summary    22 Apr 2020 07:01  -  23 Apr 2020 07:00  --------------------------------------------------------  IN: 0 mL / OUT: 2350 mL / NET: -2350 mL        PHYSICAL EXAM:  per medical team  LABS: All Labs Reviewed:                          8.5    10.94 )-----------( 90       ( 22 Apr 2020 07:41 )             28.1     04-22    145  |  111<H>  |  25<H>  ----------------------------<  172<H>  4.4   |  28  |  1.40<H>    Ca    7.9<L>      22 Apr 2020 07:41  Phos  2.1     04-22  Mg     1.6     04-22    TPro  6.4  /  Alb  2.3<L>  /  TBili  0.7  /  DBili  x   /  AST  21  /  ALT  34  /  AlkPhos  81  04-22    · Assessment		  69 male cognitive impairment, DM, HTN, CHF by chart a/w COVID +  B/L PNA, hypothermia, hypotension, sepsis    SBP  stable  Cr 2.3, 2.8, 1.3. 1.4  no Entresto, hold lasix  Anicoagulation recently d/c's secondary to anemia and hematuria  s/p gentle IVF @ 40 cc/hr. Hx of CMP/CHF, prior EF likely low  s/p Azithromycin,  midodrine 2.5 TID  Repeat EKG  4/16-QTC-[id number] ms stable  will follow PRN

## 2020-04-23 NOTE — PROGRESS NOTE ADULT - SUBJECTIVE AND OBJECTIVE BOX
Patient is a 69y Male whom presented to the hospital with ckd and barbra , hyperkalemia     PAST MEDICAL & SURGICAL HISTORY:  Heart failure  Afib  CHF (congestive heart failure)  Gout  Hiatal hernia  BPH (benign prostatic hyperplasia)  Cognitive impairment  Cardiomyopathy  HTN (hypertension)  DM (diabetes mellitus)      MEDICATIONS  (STANDING):  azithromycin  IVPB 500 milliGRAM(s) IV Intermittent every 24 hours  cefTRIAXone   IVPB 1000 milliGRAM(s) IV Intermittent every 24 hours  dextrose 5%. 1000 milliLiter(s) (50 mL/Hr) IV Continuous <Continuous>  dextrose 50% Injectable 12.5 Gram(s) IV Push once  dextrose 50% Injectable 25 Gram(s) IV Push once  dextrose 50% Injectable 25 Gram(s) IV Push once  insulin lispro (HumaLOG) corrective regimen sliding scale   SubCutaneous three times a day before meals  midodrine. 2.5 milliGRAM(s) Oral three times a day  pantoprazole  Injectable 40 milliGRAM(s) IV Push daily  sodium chloride 0.9%. 1000 milliLiter(s) (40 mL/Hr) IV Continuous <Continuous>  sodium polystyrene sulfonate Enema 15 Gram(s) Rectal once      Allergies    No Known Allergies    Intolerances        SOCIAL HISTORY:  Denies ETOh,Smoking,     FAMILY HISTORY:  family history is review and there is  no family  history of kidney disease in family , father and mother  No Pertinent Family History in first degree relatives of: as per available medical records.  Non-contributary for premature coronary disease or sudden cardiac death      REVIEW OF SYSTEMS:    unable to obtained a good review system                                                                                            8.6    7.88  )-----------( 73       ( 23 Apr 2020 13:10 )             28.3       CBC Full  -  ( 23 Apr 2020 13:10 )  WBC Count : 7.88 K/uL  RBC Count : 3.05 M/uL  Hemoglobin : 8.6 g/dL  Hematocrit : 28.3 %  Platelet Count - Automated : 73 K/uL  Mean Cell Volume : 92.8 fl  Mean Cell Hemoglobin : 28.2 pg  Mean Cell Hemoglobin Concentration : 30.4 gm/dL  Auto Neutrophil # : 6.83 K/uL  Auto Lymphocyte # : 0.39 K/uL  Auto Monocyte # : 0.46 K/uL  Auto Eosinophil # : 0.10 K/uL  Auto Basophil # : 0.01 K/uL  Auto Neutrophil % : 86.8 %  Auto Lymphocyte % : 4.9 %  Auto Monocyte % : 5.8 %  Auto Eosinophil % : 1.3 %  Auto Basophil % : 0.1 %      04-23    147<H>  |  113<H>  |  18  ----------------------------<  190<H>  3.8   |  29  |  1.00    Ca    8.0<L>      23 Apr 2020 13:10  Phos  2.1     04-22  Mg     1.6     04-22    TPro  6.2  /  Alb  2.1<L>  /  TBili  0.6  /  DBili  x   /  AST  18  /  ALT  31  /  AlkPhos  73  04-23      CAPILLARY BLOOD GLUCOSE      POCT Blood Glucose.: 192 mg/dL (23 Apr 2020 16:31)  POCT Blood Glucose.: 200 mg/dL (23 Apr 2020 11:36)  POCT Blood Glucose.: 239 mg/dL (23 Apr 2020 08:25)  POCT Blood Glucose.: 181 mg/dL (23 Apr 2020 06:57)  POCT Blood Glucose.: 193 mg/dL (23 Apr 2020 00:36)  POCT Blood Glucose.: 235 mg/dL (22 Apr 2020 21:09)      Vital Signs Last 24 Hrs  T(C): 36.6 (23 Apr 2020 15:24), Max: 36.8 (22 Apr 2020 20:46)  T(F): 97.8 (23 Apr 2020 15:24), Max: 98.2 (22 Apr 2020 20:46)  HR: 61 (23 Apr 2020 15:24) (59 - 67)  BP: 117/79 (23 Apr 2020 15:24) (117/79 - 137/80)  BP(mean): 97 (22 Apr 2020 20:46) (97 - 97)  RR: 18 (23 Apr 2020 15:24) (16 - 20)  SpO2: 100% (23 Apr 2020 15:24) (96% - 100%)            PHYSICAL EXAM:    Constitutional: NAD  HEENT: conjunctive   clear   Neck:  No JVD  Respiratory: decrease bs b/l   Cardiovascular: S1 and S2  Gastrointestinal: BS+, soft, NT/ND  Extremities: No peripheral edema  Skin: dry   Access: Not applicable

## 2020-04-23 NOTE — CONSULT NOTE ADULT - REASON FOR ADMISSION
covid19+ dx 3/30  sent for hypoxia

## 2020-04-23 NOTE — PROGRESS NOTE ADULT - SUBJECTIVE AND OBJECTIVE BOX
PAST MEDICAL & SURGICAL HISTORY:  Heart failure  Afib  CHF (congestive heart failure)  Gout  Hiatal hernia  BPH (benign prostatic hyperplasia)  Cognitive impairment  Cardiomyopathy  HTN (hypertension)  DM (diabetes mellitus)      MEDICATIONS  (STANDING):  ALBUTerol    90 MICROgram(s) HFA Inhaler 2 Puff(s) Inhalation two times a day  ciprofloxacin     Tablet 500 milliGRAM(s) Oral every 12 hours  dextrose 5%. 1000 milliLiter(s) (50 mL/Hr) IV Continuous <Continuous>  dextrose 50% Injectable 12.5 Gram(s) IV Push once  dextrose 50% Injectable 25 Gram(s) IV Push once  dextrose 50% Injectable 25 Gram(s) IV Push once  insulin glargine Injectable (LANTUS) 5 Unit(s) SubCutaneous at bedtime  insulin lispro (HumaLOG) corrective regimen sliding scale   SubCutaneous three times a day before meals  insulin lispro (HumaLOG) corrective regimen sliding scale   SubCutaneous at bedtime  midodrine. 2.5 milliGRAM(s) Oral three times a day  pantoprazole  Injectable 40 milliGRAM(s) IV Push daily  tamsulosin 0.4 milliGRAM(s) Oral at bedtime    MEDICATIONS  (PRN):  dextrose 40% Gel 15 Gram(s) Oral once PRN Blood Glucose LESS THAN 70 milliGRAM(s)/deciliter  glucagon  Injectable 1 milliGRAM(s) IntraMuscular once PRN Glucose LESS THAN 70 milligrams/deciliter      Patient is a 69y old  Male who presents with a chief complaint of covid19+ dx 3/30  sent for hypoxia (23 Apr 2020 13:13)      Vital Signs Last 24 Hrs  T(C): 36.6 (23 Apr 2020 15:24), Max: 36.8 (22 Apr 2020 20:46)  T(F): 97.8 (23 Apr 2020 15:24), Max: 98.2 (22 Apr 2020 20:46)  HR: 61 (23 Apr 2020 15:24) (59 - 67)  BP: 117/79 (23 Apr 2020 15:24) (117/79 - 137/80)  BP(mean): 97 (22 Apr 2020 20:46) (97 - 97)  RR: 18 (23 Apr 2020 15:24) (16 - 20)  SpO2: 100% (23 Apr 2020 15:24) (96% - 100%)          04-22 @ 07:01  -  04-23 @ 07:00  --------------------------------------------------------  IN: 0 mL / OUT: 2350 mL / NET: -2350 mL    04-23 @ 07:01  -  04-23 @ 17:43  --------------------------------------------------------  IN: 0 mL / OUT: 550 mL / NET: -550 mL        REVIEW OF SYSTEMS:    Constitutional: No fever, weight loss or fatigue  Eyes: No eye pain, visual disturbances, or discharge  ENT:  No difficulty hearing, tinnitus, vertigo; No sinus or throat pain  Neck: No pain or stiffness  Breasts: No pain, masses or nipple discharge  Respiratory: No cough, wheezing, chills or hemoptysis  Cardiovascular: No chest pain, palpitations, shortness of breath, dizziness or leg swelling  Gastrointestinal: No abdominal or epigastric pain. No nausea, vomiting or hematemesis; No diarrhea or constipation. No melena or hematochezia.  Genitourinary: No dysuria, frequency, hematuria or incontinence  Rectal: No pain, hemorrhoids or incontinence  Neurological: No headaches, memory loss, loss of strength, numbness or tremors  Skin: No itching, burning, rashes or lesions   Lymph Nodes: No enlarged glands  Endocrine: No heat or cold intolerance; No hair loss  Musculoskeletal: No joint pain or swelling; No muscle, back or extremity pain  Psychiatric: No depression, anxiety, mood swings or difficulty sleeping  Heme/Lymph: No easy bruising or bleeding gums  Allergy and Immunologic: No hives or eczema    PHYSICAL EXAM:  rodriguez urine nearly cleared hematuria  eating dinner  carrying conversation  Constitutional: NAd,Respiratory: CTAB/L   Cardiovascular: irreg  Gastrointestinal: BS+, soft, NT/ND  Extremities: No peripheral edema  Neurological: A/O , no focal deficits  Skin: chronic venous changes lower ext      decubiti: no                          8.6    7.88  )-----------( 73       ( 23 Apr 2020 13:10 )             28.3     04-23    147<H>  |  113<H>  |  18  ----------------------------<  190<H>  3.8   |  29  |  1.00    Ca    8.0<L>      23 Apr 2020 13:10  Phos  2.1     04-22  Mg     1.6     04-22    TPro  6.2  /  Alb  2.1<L>  /  TBili  0.6  /  DBili  x   /  AST  18  /  ALT  31  /  AlkPhos  73  04-23              .Urine Catheterized  04-22 @ 01:11   <10,000 CFU/mL Normal Urogenital Nola  --  --      .Urine Catheterized  04-19 @ 00:26   <10,000 CFU/ml of other organisms  --  --      .Blood Blood-Peripheral  04-16 @ 22:07   No Growth Final  --  --        Urine Culture:  04-22 @ 01:11    --        <10,000 CFU/mL Normal Urogenital Nola  Urine Culture:  04-19 @ 00:26    --        <10,000 CFU/ml of other organisms  Urine Culture:  04-16 @ 22:07    --        No Growth Final        Radiology:  < from: CT Abdomen and Pelvis No Cont (04.22.20 @ 15:10) >  EXAM:  CT ABDOMEN AND PELVIS                            PROCEDURE DATE:  04/22/2020          INTERPRETATION:  CLINICAL INFORMATION: Hematuria. Covid 19 positive.    COMPARISON: None.    PROCEDURE:   CT of the Abdomen and Pelvis was performed without intravenous contrast.   Intravenous contrast: None.  Oral contrast: None.  Sagittal and coronal reformats were performed.    FINDINGS:    LOWER CHEST: Bibasilar parenchymal infiltrate especially at the left lung base with only patchy infiltrate noted at the right lung base. Cardiomegaly.    LIVER: Scattered calcified granulomata.  BILE DUCTS: Normal caliber.  GALLBLADDER: Within normal limits.  SPLEEN: The spleen is mildly enlarged measuring 16.5 cm. there is a 1.9 cm low-attenuation focus of the upper pole of uncertain significance. Scattered adjacent calcifications possibly calcified granulomata.  PANCREAS: Within normal limits.  ADRENALS: Within normal limits.  KIDNEYS/URETERS: Scattered calcified nonobstructive calculi. Two distal left ureteral calculi the largest measuring measuring 5 mm (2:104). There is columnization of both distal ureters but no charlotte hydronephrosis.  Mild streaky perinephric stranding greater on the right than the left.    BLADDER: The bladder is decompressed bya Rodriguez catheter. The bladder wall is thickened,partially due to its decompressed state, however there are streaky perivesicular reactive changes. Please correlate for cystitis. Other bladder wall pathology is not excluded.  REPRODUCTIVE ORGANS: Mildprostatic enlargement    BOWEL: No bowel obstruction. Appendix is not visualized. No evidence of inflammation in the pericecal region.  PERITONEUM: No ascites.  VESSELS: Moderate atheromatous disease of the abdominal aorta and iliac arteries.  RETROPERITONEUM/LYMPH NODES: No lymphadenopathy.    ABDOMINAL WALL: Mild soft tissue anasarca.  BONES: Moderate degenerative changes of spine.    IMPRESSION:     Scattered small nonobstructive calculi the largest in the midpole right kidney measuring 4 mm.  Columnization of the distal ureters without charlotte hydronephrosis. There are two distal left ureteral calculi present.  Thickening of the bladder wall with perinephric reactive changes.  Perinephric reactive changes are also demonstrated.  These findings are suspicious for cystitis with possible pyelonephritis.  These findings were relayed to Clair at the answering service for Dr. Daigle at the conclusion of today's evaluation.          < end of copied text >

## 2020-04-23 NOTE — PROGRESS NOTE ADULT - ATTENDING COMMENTS
I spoke to sister this morning  I spoke to house nurse this morning  I spoke to dr wilkerson-urology  I spoke to neurology at length
I have spoken to family social work at length today
I spoke to family at length  state and social workers  [id number] and nephrology  family understands overall prognosis poor
I spoke to social workers home care team many many times today  discharge note written so that house head nurse Jodie zamorano
I spoke to social workers at length today  pending rehab  cleared for tomorrow

## 2020-04-23 NOTE — CONSULT NOTE ADULT - ASSESSMENT
Anemia and thrombocytopenia secondary to acute illness ie. covid and pseudomonas bactermia.  with normal didimer and comorbidites including hematuria would not discharge on anticoagulation    Recommendations:  1.  hematologically stable for discharge. will require followup CBC to follow counts but with current labs and comorbidites does not require anticoagulation on discharge  discussed with Dr. Harris

## 2020-04-24 NOTE — PROGRESS NOTE ADULT - PROBLEM SELECTOR PLAN 3
resolved  cr now 1.0 resolved  cr now 1.0  off all nephrotoxic meds  approved by cardiology and nephrology

## 2020-04-24 NOTE — PROGRESS NOTE ADULT - PROBLEM SELECTOR PROBLEM 3
Acute renal failure, unspecified acute renal failure type
Hyperkalemia
Acute renal failure, unspecified acute renal failure type
Bacteremia due to Pseudomonas
Hyperkalemia
MARSHAL (acute kidney injury)
CHF (congestive heart failure)

## 2020-04-24 NOTE — DISCHARGE NOTE NURSING/CASE MANAGEMENT/SOCIAL WORK - PATIENT PORTAL LINK FT
You can access the FollowMyHealth Patient Portal offered by [facility] by registering at the following website: [email or web address]. By joining [facility]’s FollowMyHealth portal, you will also be able to view your health information using other applications (apps) compatible with our system.

## 2020-04-24 NOTE — PROGRESS NOTE ADULT - PROBLEM SELECTOR PLAN 1
Info to Pt,  
chronic  sp suazo at [facility] over 1 month ago  patient refused bx/cysto  seen by dr wilkerson  to f/u with his urologist for bx once cleared from covid in the next 3 months  s/p 2 urine cx negative  to complete cipro 10 days sp bacteremia,,,resolved  approved by ID
due to covid19 sars pn  gram negative rods blood/uti  progressive deterioration renal functions with electrolytes abn  state approved no dialysis dnr dni  I had spoken to family at length this morning  family aware overall prognosis grim
due to sepsis  blood cx from the ER + gram neg rods  pending urine  hx end march at [facility] for uti and hematuria  +covid19
resolved  multifactorial  covid19/pseudomonas bacteremia/acute renal failure/hyperkalemia to 6.8  complete cipro 10 days  family wanted dnr/dni/no dialysis  approved with letter by state  pending physical therapy
resolved  to complete cipro per id
temp normal  repeat blood cx per ID,,negative  on iv anatbiotics  will switch to cipro orally
ACUTE RENAL FAILURE:   Serum creatinine is  at   2.4  , approximating GFR at   ml/min.   There is no progression . No uremic symptoms  No evidence of anemia .  Fluid status stable.  Will continue to avoid nephrotoxic drugs.  Patient remains asymptomatic.   Continue current therapy.  hold  diuretic.  hold   ACE inhibitor.  hold   ARB.
ACUTE RENAL FAILURE:   Serum creatinine is  at   2.8  , approximating GFR at   ml/min.   There is no progression . No uremic symptoms  No evidence of anemia .  Fluid status stable.  Will continue to avoid nephrotoxic drugs.  Patient remains asymptomatic.   Continue current therapy.  hold  diuretic.  hold   ACE inhibitor.  hold   ARB.
ACUTE RENAL FAILURE: improved   Serum creatinine is  at   1.4  , approximating GFR at   ml/min.   There is no progression . No uremic symptoms  No evidence of anemia .  Fluid status stable.  Will continue to avoid nephrotoxic drugs.  Patient remains asymptomatic.   Continue current therapy.  hold  diuretic.  hold   ACE inhibitor.  hold   ARB.
suspect pulm as source, cont current abx with anticipated 14 day duration so until 4/29 but may finish course with cipro 250mg PO BID with last day 4/29 if pt able to reliably take PO
suspect pulm as source, cont current abx with anticipated 14 day duration so until 4/29 but potential for oral options such as cipro to finish course
suspect pulm as source, cont current abx with further recs based on micro and results of repeat blood cultures
patient had w/u at [facility] 1 month ago  he refused cysto/bx  seen by dr rock rodriguez  continue antabiotics  continue gabriele as out patient for bx

## 2020-04-24 NOTE — PROGRESS NOTE ADULT - PROBLEM SELECTOR PLAN 2
cipro 10 days
continue abxs per id
continue iv antabiotics fro now then switch to cipro
gram neg rods  pending urine  on rocephin
renal functions normalised  off metformin
resolved bacteremia  complete cipro  resolved renal failure  resolved hyperkalemia  resolved hypothermia at 89  resolved psudomonas in the blood  was made dnr/dni/no dialysis by sister and state approval with letter
Admit for septic workup and ID evaluation,send blood and urine cx,serial lactate levels,monitor vitals closley,ivfs hydration,monitor urine output and renal profile,iv abx as per id cons
Admit for septic workup and ID evaluation,send blood and urine cx,serial lactate levels,monitor vitals closley,ivfs hydration,monitor urine output and renal profile,iv abx as per id cons
Thank you for consulting us and involving us in the management of this most interesting and challenging case. I certainly appreciate the challenges, stress and sacrifice during these difficult and challenging times.  Please call with any further questions or changes in clinical status for which ID input would be helpful
f/u  blood and urine cx,serial lactate levels,monitor vitals closley,ivfs hydration,monitor urine output and renal profile,iv abx as per id cons

## 2020-04-24 NOTE — PROGRESS NOTE ADULT - PROBLEM SELECTOR PROBLEM 4
Type 2 diabetes mellitus with other circulatory complication, without long-term current use of insulin
Hematuria, unspecified type
Hyperkalemia
Hyperkalemia
MARSHAL (acute kidney injury)
Type 2 diabetes mellitus with other circulatory complication, without long-term current use of insulin
HTN (hypertension)
Atrial fibrillation, unspecified type

## 2020-04-24 NOTE — PROGRESS NOTE ADULT - PROBLEM SELECTOR PROBLEM 5
Atrial fibrillation, unspecified type
Type 2 diabetes mellitus with other circulatory complication, without long-term current use of insulin
Hyperkalemia
Type 2 diabetes mellitus with other circulatory complication, without long-term current use of insulin

## 2020-04-24 NOTE — DISCHARGE NOTE NURSING/CASE MANAGEMENT/SOCIAL WORK - NSDPFAC_GEN_ALL_CORE
Patient to be discharged to [facility] at [facility]. # #[phone]

## 2020-04-24 NOTE — PROGRESS NOTE ADULT - SUBJECTIVE AND OBJECTIVE BOX
Neurology follow up note    KIMBERLY MARKS69yMale      Interval History:    Patient feels ok no new complaints.    MEDICATIONS    ALBUTerol    90 MICROgram(s) HFA Inhaler 2 Puff(s) Inhalation two times a day  ciprofloxacin     Tablet 500 milliGRAM(s) Oral every 12 hours  dextrose 40% Gel 15 Gram(s) Oral once PRN  dextrose 5%. 1000 milliLiter(s) IV Continuous <Continuous>  dextrose 50% Injectable 12.5 Gram(s) IV Push once  dextrose 50% Injectable 25 Gram(s) IV Push once  dextrose 50% Injectable 25 Gram(s) IV Push once  glucagon  Injectable 1 milliGRAM(s) IntraMuscular once PRN  insulin glargine Injectable (LANTUS) 5 Unit(s) SubCutaneous at bedtime  insulin lispro (HumaLOG) corrective regimen sliding scale   SubCutaneous three times a day before meals  insulin lispro (HumaLOG) corrective regimen sliding scale   SubCutaneous at bedtime  midodrine. 2.5 milliGRAM(s) Oral three times a day  pantoprazole  Injectable 40 milliGRAM(s) IV Push daily  tamsulosin 0.4 milliGRAM(s) Oral at bedtime      Allergies    No Known Allergies    Intolerances            Vital Signs Last 24 Hrs  T(C): 36.5 (24 Apr 2020 08:26), Max: 37 (24 Apr 2020 05:36)  T(F): 97.7 (24 Apr 2020 08:26), Max: 98.6 (24 Apr 2020 05:36)  HR: 74 (24 Apr 2020 08:26) (61 - 74)  BP: 131/84 (24 Apr 2020 08:26) (117/79 - 131/84)  BP(mean): --  RR: 19 (24 Apr 2020 08:26) (18 - 19)  SpO2: 92% (24 Apr 2020 08:26) (92% - 100%)    REVIEW OF SYSTEMS:  Constitutional:  The patient denies fever, chills, or night sweats.  Head:  No headaches.  Eyes:  No double vision or blurry vision.  Ears:  No ringing in the ears.  Neck:  No neck pain.  Respiratory:  No shortness of breath.  Cardiovascular:  No chest pain.  Abdomen:  No nausea, vomiting, or abdominal pain.  Extremities/Neurological:  No numbness or tingling.  Musculoskeletal:  No joint pain.  General:  No history of any type of back pain.    PHYSICAL EXAMINATION:    HEENT:  Head:  Normocephalic, atraumatic.  Eyes:  No scleral icterus.  Ears:  Hearing bilaterally intact.  NECK:  Supple.  RESPIRATORY:  Good air entry bilaterally.  CARDIOVASCULAR:  S1 and S2 heard.  ABDOMEN:  Soft and nontender.  EXTREMITIES:  No clubbing or cyanosis were noted.      NEUROLOGIC:  The patient is awake and alert.  Location was hospital, was able to name simple objects.  Extraocular movements were intact.  Pupils were equal, round, and reactive bilaterally 3 mm to 2 mm.  Speech was fluent.  Motor:  The patient has decreased range of motion of bilateral shoulders, suspect underlying shoulder injuries, a rotator cuff, triceps, biceps, hand grasp were 4/5, bilateral lower extremities were 3+/5.  Sensory:  Bilateral upper and lower intact to light touch.  No back pain.                 LABS:  CBC Full  -  ( 24 Apr 2020 10:04 )  WBC Count : 6.29 K/uL  RBC Count : 3.16 M/uL  Hemoglobin : 8.8 g/dL  Hematocrit : 29.0 %  Platelet Count - Automated : 76 K/uL  Mean Cell Volume : 91.8 fl  Mean Cell Hemoglobin : 27.8 pg  Mean Cell Hemoglobin Concentration : 30.3 gm/dL  Auto Neutrophil # : 5.31 K/uL  Auto Lymphocyte # : 0.42 K/uL  Auto Monocyte # : 0.43 K/uL  Auto Eosinophil # : 0.08 K/uL  Auto Basophil # : 0.00 K/uL  Auto Neutrophil % : 84.4 %  Auto Lymphocyte % : 6.7 %  Auto Monocyte % : 6.8 %  Auto Eosinophil % : 1.3 %  Auto Basophil % : 0.0 %      04-24    146<H>  |  113<H>  |  14  ----------------------------<  197<H>  3.8   |  28  |  0.80    Ca    8.4<L>      24 Apr 2020 10:04    TPro  6.3  /  Alb  2.1<L>  /  TBili  0.6  /  DBili  x   /  AST  16  /  ALT  27  /  AlkPhos  71  04-24    Hemoglobin A1C:     LIVER FUNCTIONS - ( 24 Apr 2020 10:04 )  Alb: 2.1 g/dL / Pro: 6.3 g/dL / ALK PHOS: 71 U/L / ALT: 27 U/L / AST: 16 U/L / GGT: x           Vitamin B12         RADIOLOGY    ANALYSIS AND PLAN:  This is a 69-year-old with ataxia and altered mental status.  1.	For ataxia, suspect most likely secondary to age-related changes, deconditioning, unusual for cerebrovascular accident at present with bilateral leg issues.  Also, the patient has underlying spinal disc disease, back pain, to suggest this come from the lumbar spine.  2.	Fall precaution.  3.	For history of altered mental status most likely metabolic encephalopathy secondary to a history of COVID virus and respiratory issues.  4.	Monitor respiratory status.  5.	Greater than 45 minutes of time was spent with the patient, plan of care, reviewed data, speaking to the multidisciplinary team.    Physical therapy evaluation if possible and as tolerated.  OOB to chair/ambulation with assistance only if possible.

## 2020-04-24 NOTE — PROGRESS NOTE ADULT - SUBJECTIVE AND OBJECTIVE BOX
PAST MEDICAL & SURGICAL HISTORY:  Heart failure  Afib  CHF (congestive heart failure)  Gout  Hiatal hernia  BPH (benign prostatic hyperplasia)  Cognitive impairment  Cardiomyopathy  HTN (hypertension)  DM (diabetes mellitus)      MEDICATIONS  (STANDING):  ALBUTerol    90 MICROgram(s) HFA Inhaler 2 Puff(s) Inhalation two times a day  ciprofloxacin     Tablet 500 milliGRAM(s) Oral every 12 hours  dextrose 5%. 1000 milliLiter(s) (50 mL/Hr) IV Continuous <Continuous>  dextrose 50% Injectable 12.5 Gram(s) IV Push once  dextrose 50% Injectable 25 Gram(s) IV Push once  dextrose 50% Injectable 25 Gram(s) IV Push once  insulin glargine Injectable (LANTUS) 5 Unit(s) SubCutaneous at bedtime  insulin lispro (HumaLOG) corrective regimen sliding scale   SubCutaneous three times a day before meals  insulin lispro (HumaLOG) corrective regimen sliding scale   SubCutaneous at bedtime  midodrine. 2.5 milliGRAM(s) Oral three times a day  pantoprazole  Injectable 40 milliGRAM(s) IV Push daily  tamsulosin 0.4 milliGRAM(s) Oral at bedtime    MEDICATIONS  (PRN):  dextrose 40% Gel 15 Gram(s) Oral once PRN Blood Glucose LESS THAN 70 milliGRAM(s)/deciliter  glucagon  Injectable 1 milliGRAM(s) IntraMuscular once PRN Glucose LESS THAN 70 milligrams/deciliter      Patient is a 69y old  Male who presents with a chief complaint of covid19+ dx 3/30  sent for hypoxia (23 Apr 2020 18:10)      Vital Signs Last 24 Hrs  T(C): 36.5 (24 Apr 2020 08:26), Max: 37 (24 Apr 2020 05:36)  T(F): 97.7 (24 Apr 2020 08:26), Max: 98.6 (24 Apr 2020 05:36)  HR: 74 (24 Apr 2020 08:26) (61 - 74)  BP: 131/84 (24 Apr 2020 08:26) (117/79 - 131/84)  BP(mean): --  RR: 19 (24 Apr 2020 08:26) (18 - 19)  SpO2: 92% (24 Apr 2020 08:26) (92% - 100%)          04-23 @ 07:01  -  04-24 @ 07:00  --------------------------------------------------------  IN: 0 mL / OUT: 1050 mL / NET: -1050 mL        REVIEW OF SYSTEMS:    Constitutional: No fever, weight loss or fatigue  Eyes: No eye pain, visual disturbances, or discharge  ENT:  No difficulty hearing, tinnitus, vertigo; No sinus or throat pain  Neck: No pain or stiffness  Breasts: No pain, masses or nipple discharge  Respiratory: No cough, wheezing, chills or hemoptysis  Cardiovascular: No chest pain, palpitations, shortness of breath, dizziness or leg swelling  Gastrointestinal: No abdominal or epigastric pain. No nausea, vomiting or hematemesis; No diarrhea or constipation. No melena or hematochezia.  Genitourinary: No dysuria, frequency, hematuria or incontinence  Rectal: No pain, hemorrhoids or incontinence  Neurological: No headaches, memory loss, loss of strength, numbness or tremors  Skin: No itching, burning, rashes or lesions   Lymph Nodes: No enlarged glands  Endocrine: No heat or cold intolerance; No hair loss  Musculoskeletal: No joint pain or swelling; No muscle, back or extremity pain  Psychiatric: No depression, anxiety, mood swings or difficulty sleeping  Heme/Lymph: No easy bruising or bleeding gums  Allergy and Immunologic: No hives or eczema    PHYSICAL EXAM:  alert  interactive  carries full conversation  responds appropriately  rodriguez in place and totally clear this morning  Constitutional: NAD,  HEENT: PERRLA, EOMI, Normal Hearing, MMM  Neck: No LAD, No JVD  Back: Normal spine flexure, No CVA tenderness  Respiratory: CTAB/L   Cardiovascular: S1 and S2, RRR, no M/G/R  Gastrointestinal: BS+, soft, NT/ND  Extremities: No peripheral edema  Neurological: A/O able to move all ext  was able to move lower ext up/down  same with upper ext  Skin:chronic venous changes lower ext      decubiti: none                          8.8    6.29  )-----------( 76       ( 24 Apr 2020 10:04 )             29.0     04-24    146<H>  |  113<H>  |  14  ----------------------------<  197<H>  3.8   |  28  |  0.80    Ca    8.4<L>      24 Apr 2020 10:04    TPro  6.3  /  Alb  2.1<L>  /  TBili  0.6  /  DBili  x   /  AST  16  /  ALT  27  /  AlkPhos  71  04-24              .Urine Catheterized  04-22 @ 01:11   <10,000 CFU/mL Normal Urogenital Nola  --  --      .Urine Catheterized  04-19 @ 00:26   <10,000 CFU/ml of other organisms  --  --        Urine Culture:  04-22 @ 01:11    --        <10,000 CFU/mL Normal Urogenital Nola  Urine Culture:  04-19 @ 00:26    --        <10,000 CFU/ml of other organisms        Radiology: PAST MEDICAL & SURGICAL HISTORY:  Heart failure  Afib  CHF (congestive heart failure)  Gout  Hiatal hernia  BPH (benign prostatic hyperplasia)  Cognitive impairment  Cardiomyopathy  HTN (hypertension)  DM (diabetes mellitus)      MEDICATIONS  (STANDING):  ALBUTerol    90 MICROgram(s) HFA Inhaler 2 Puff(s) Inhalation two times a day  ciprofloxacin     Tablet 500 milliGRAM(s) Oral every 12 hours  dextrose 5%. 1000 milliLiter(s) (50 mL/Hr) IV Continuous <Continuous>  dextrose 50% Injectable 12.5 Gram(s) IV Push once  dextrose 50% Injectable 25 Gram(s) IV Push once  dextrose 50% Injectable 25 Gram(s) IV Push once  insulin glargine Injectable (LANTUS) 5 Unit(s) SubCutaneous at bedtime  insulin lispro (HumaLOG) corrective regimen sliding scale   SubCutaneous three times a day before meals  insulin lispro (HumaLOG) corrective regimen sliding scale   SubCutaneous at bedtime  midodrine. 2.5 milliGRAM(s) Oral three times a day  pantoprazole  Injectable 40 milliGRAM(s) IV Push daily  tamsulosin 0.4 milliGRAM(s) Oral at bedtime    MEDICATIONS  (PRN):  dextrose 40% Gel 15 Gram(s) Oral once PRN Blood Glucose LESS THAN 70 milliGRAM(s)/deciliter  glucagon  Injectable 1 milliGRAM(s) IntraMuscular once PRN Glucose LESS THAN 70 milligrams/deciliter      Patient is a 69y old  Male who presents with a chief complaint of covid19+ dx 3/30  sent for hypoxia (23 Apr 2020 18:10)      Vital Signs Last 24 Hrs  T(C): 36.5 (24 Apr 2020 08:26), Max: 37 (24 Apr 2020 05:36)  T(F): 97.7 (24 Apr 2020 08:26), Max: 98.6 (24 Apr 2020 05:36)  HR: 74 (24 Apr 2020 08:26) (61 - 74)  BP: 131/84 (24 Apr 2020 08:26) (117/79 - 131/84)  BP(mean): --  RR: 19 (24 Apr 2020 08:26) (18 - 19)  SpO2: 92% (24 Apr 2020 08:26) (92% - 100%)          04-23 @ 07:01  -  04-24 @ 07:00  --------------------------------------------------------  IN: 0 mL / OUT: 1050 mL / NET: -1050 mL        REVIEW OF SYSTEMS:    Constitutional: No fever, weight loss or fatigue  Eyes: No eye pain, visual disturbances, or discharge  ENT:  No difficulty hearing, tinnitus, vertigo; No sinus or throat pain  Neck: No pain or stiffness  Breasts: No pain, masses or nipple discharge  Respiratory: No cough, wheezing, chills or hemoptysis  Cardiovascular: No chest pain, palpitations, shortness of breath, dizziness or leg swelling  Gastrointestinal: No abdominal or epigastric pain. No nausea, vomiting or hematemesis; No diarrhea or constipation. No melena or hematochezia.  Genitourinary: No dysuria, frequency, hematuria or incontinence  Rectal: No pain, hemorrhoids or incontinence  Neurological: No headaches, memory loss, loss of strength, numbness or tremors  Skin: No itching, burning, rashes or lesions   Lymph Nodes: No enlarged glands  Endocrine: No heat or cold intolerance; No hair loss  Musculoskeletal: No joint pain or swelling; No muscle, back or extremity pain  Psychiatric: No depression, anxiety, mood swings or difficulty sleeping  Heme/Lymph: No easy bruising or bleeding gums  Allergy and Immunologic: No hives or eczema    PHYSICAL EXAM:  alert  interactive  carries full conversation  responds appropriately  rodriguez in place and totally clear this morning  Constitutional: NAD,  HEENT: PERRLA, EOMI, Normal Hearing, MMM  Neck: No LAD, No JVD  Back: Normal spine flexure, No CVA tenderness  Respiratory: CTAB/L   Cardiovascularirreg  Gastrointestinal: BS+, soft, NT/ND  Extremities: No peripheral edema  Neurological: A/O able to move all ext  was able to move lower ext up/down  same with upper ext  Skin:chronic venous changes lower ext      decubiti: none                          8.8    6.29  )-----------( 76       ( 24 Apr 2020 10:04 )             29.0     04-24    146<H>  |  113<H>  |  14  ----------------------------<  197<H>  3.8   |  28  |  0.80    Ca    8.4<L>      24 Apr 2020 10:04    TPro  6.3  /  Alb  2.1<L>  /  TBili  0.6  /  DBili  x   /  AST  16  /  ALT  27  /  AlkPhos  71  04-24              .Urine Catheterized  04-22 @ 01:11   <10,000 CFU/mL Normal Urogenital Nola  --  --      .Urine Catheterized  04-19 @ 00:26   <10,000 CFU/ml of other organisms  --  --        Urine Culture:  04-22 @ 01:11    --        <10,000 CFU/mL Normal Urogenital Nola  Urine Culture:  04-19 @ 00:26    --        <10,000 CFU/ml of other organisms        Radiology:

## 2020-04-24 NOTE — PROGRESS NOTE ADULT - SUBJECTIVE AND OBJECTIVE BOX
Patient is a 69y Male whom presented to the hospital with ckd and barbra , hyperkalemia     PAST MEDICAL & SURGICAL HISTORY:  Heart failure  Afib  CHF (congestive heart failure)  Gout  Hiatal hernia  BPH (benign prostatic hyperplasia)  Cognitive impairment  Cardiomyopathy  HTN (hypertension)  DM (diabetes mellitus)      MEDICATIONS  (STANDING):  azithromycin  IVPB 500 milliGRAM(s) IV Intermittent every 24 hours  cefTRIAXone   IVPB 1000 milliGRAM(s) IV Intermittent every 24 hours  dextrose 5%. 1000 milliLiter(s) (50 mL/Hr) IV Continuous <Continuous>  dextrose 50% Injectable 12.5 Gram(s) IV Push once  dextrose 50% Injectable 25 Gram(s) IV Push once  dextrose 50% Injectable 25 Gram(s) IV Push once  insulin lispro (HumaLOG) corrective regimen sliding scale   SubCutaneous three times a day before meals  midodrine. 2.5 milliGRAM(s) Oral three times a day  pantoprazole  Injectable 40 milliGRAM(s) IV Push daily  sodium chloride 0.9%. 1000 milliLiter(s) (40 mL/Hr) IV Continuous <Continuous>  sodium polystyrene sulfonate Enema 15 Gram(s) Rectal once      Allergies    No Known Allergies    Intolerances        SOCIAL HISTORY:  Denies ETOh,Smoking,     FAMILY HISTORY:  family history is review and there is  no family  history of kidney disease in family , father and mother  No Pertinent Family History in first degree relatives of: as per available medical records.  Non-contributary for premature coronary disease or sudden cardiac death      REVIEW OF SYSTEMS:    unable to obtained a good review system                                                                            8.5    10.94 )-----------( 90       ( 22 Apr 2020 07:41 )             28.1       CBC Full  -  ( 22 Apr 2020 07:41 )  WBC Count : 10.94 K/uL  RBC Count : 3.04 M/uL  Hemoglobin : 8.5 g/dL  Hematocrit : 28.1 %  Platelet Count - Automated : 90 K/uL  Mean Cell Volume : 92.4 fl  Mean Cell Hemoglobin : 28.0 pg  Mean Cell Hemoglobin Concentration : 30.2 gm/dL  Auto Neutrophil # : 9.61 K/uL  Auto Lymphocyte # : 0.50 K/uL  Auto Monocyte # : 0.61 K/uL  Auto Eosinophil # : 0.06 K/uL  Auto Basophil # : 0.01 K/uL  Auto Neutrophil % : 87.8 %  Auto Lymphocyte % : 4.6 %  Auto Monocyte % : 5.6 %  Auto Eosinophil % : 0.5 %  Auto Basophil % : 0.1 %                          8.8    6.29  )-----------( 76       ( 24 Apr 2020 10:04 )             29.0       CBC Full  -  ( 24 Apr 2020 10:04 )  WBC Count : 6.29 K/uL  RBC Count : 3.16 M/uL  Hemoglobin : 8.8 g/dL  Hematocrit : 29.0 %  Platelet Count - Automated : 76 K/uL  Mean Cell Volume : 91.8 fl  Mean Cell Hemoglobin : 27.8 pg  Mean Cell Hemoglobin Concentration : 30.3 gm/dL  Auto Neutrophil # : 5.31 K/uL  Auto Lymphocyte # : 0.42 K/uL  Auto Monocyte # : 0.43 K/uL  Auto Eosinophil # : 0.08 K/uL  Auto Basophil # : 0.00 K/uL  Auto Neutrophil % : 84.4 %  Auto Lymphocyte % : 6.7 %  Auto Monocyte % : 6.8 %  Auto Eosinophil % : 1.3 %  Auto Basophil % : 0.0 %      04-24    146<H>  |  113<H>  |  14  ----------------------------<  197<H>  3.8   |  28  |  0.80    Ca    8.4<L>      24 Apr 2020 10:04    TPro  6.3  /  Alb  2.1<L>  /  TBili  0.6  /  DBili  x   /  AST  16  /  ALT  27  /  AlkPhos  71  04-24      CAPILLARY BLOOD GLUCOSE      POCT Blood Glucose.: 235 mg/dL (24 Apr 2020 12:11)  POCT Blood Glucose.: 184 mg/dL (24 Apr 2020 07:53)  POCT Blood Glucose.: 201 mg/dL (24 Apr 2020 06:51)  POCT Blood Glucose.: 216 mg/dL (23 Apr 2020 22:26)      Vital Signs Last 24 Hrs  T(C): 36.5 (24 Apr 2020 08:26), Max: 37 (24 Apr 2020 05:36)  T(F): 97.7 (24 Apr 2020 08:26), Max: 98.6 (24 Apr 2020 05:36)  HR: 74 (24 Apr 2020 08:26) (69 - 74)  BP: 131/84 (24 Apr 2020 08:26) (126/85 - 131/84)  BP(mean): --  RR: 19 (24 Apr 2020 08:26) (18 - 19)  SpO2: 92% (24 Apr 2020 08:26) (92% - 99%)          04-22    145  |  111<H>  |  25<H>  ----------------------------<  172<H>  4.4   |  28  |  1.40<H>    Ca    7.9<L>      22 Apr 2020 07:41  Phos  2.1     04-22  Mg     1.6     04-22    TPro  6.4  /  Alb  2.3<L>  /  TBili  0.7  /  DBili  x   /  AST  21  /  ALT  34  /  AlkPhos  81  04-22      CAPILLARY BLOOD GLUCOSE      POCT Blood Glucose.: 264 mg/dL (22 Apr 2020 11:53)  POCT Blood Glucose.: 172 mg/dL (22 Apr 2020 08:03)  POCT Blood Glucose.: 158 mg/dL (21 Apr 2020 21:16)  POCT Blood Glucose.: 224 mg/dL (21 Apr 2020 16:38)      Vital Signs Last 24 Hrs  T(C): 36.7 (22 Apr 2020 11:22), Max: 36.7 (22 Apr 2020 11:22)  T(F): 98 (22 Apr 2020 11:22), Max: 98 (22 Apr 2020 11:22)  HR: 89 (22 Apr 2020 11:22) (66 - 89)  BP: 111/76 (22 Apr 2020 11:22) (108/68 - 151/89)  BP(mean): --  RR: 18 (22 Apr 2020 11:22) (18 - 18)  SpO2: 97% (22 Apr 2020 11:22) (97% - 100%)                      PHYSICAL EXAM:    Constitutional: NAD  HEENT: conjunctive   clear   Neck:  No JVD  Respiratory: decrease bs b/l   Cardiovascular: S1 and S2  Gastrointestinal: BS+, soft, NT/ND  Extremities: No peripheral edema  Skin: dry   Access: Not applicable

## 2020-04-24 NOTE — PROGRESS NOTE ADULT - PROBLEM SELECTOR PROBLEM 2
Bacteremia due to Pseudomonas
Septic shock
Acute renal failure, unspecified acute renal failure type
Bacteremia
Bacteremia
Bacteremia due to Pseudomonas
Bacteremia due to Pseudomonas
Septic shock
Pneumonia
SARS virus pneumonia

## 2020-04-24 NOTE — PROGRESS NOTE ADULT - REASON FOR ADMISSION
covid19+ dx 3/30  sent for hypoxia

## 2020-04-24 NOTE — PROGRESS NOTE ADULT - PROBLEM SELECTOR PLAN 5
seen by cardio  no cardiac meds  no anticoagulation due to hematuria,,,agreed by cardio
started small dose lantus last night since patient eating more past 24 hours and sugars climbing
improved   kayexalate prn
kayexalate   d50 and insuline   calcium gluconate   albuteral
kayexalate   d50 and insuline   calcium gluconate   albuteral
kayexalate prn
lab is pending   kayexalate prn
sugars much improved on coverage and lantus  patient was on metformin as out patient  came in renal failure with cr up to 2.9  I would abstain from all nephrotoxic drugs

## 2020-04-24 NOTE — PROGRESS NOTE ADULT - PROVIDER SPECIALTY LIST ADULT
Cardiology
Infectious Disease
Internal Medicine
Nephrology
Neurology
Neurology
Cardiology

## 2020-04-24 NOTE — PROGRESS NOTE ADULT - PROBLEM SELECTOR PLAN 4
continue gentle hydration  seen by cardiology and nepfrology
continue present treatments  family understands its due to worsening kidneys  sister understand his overrall prognosis grim  state approved dnr/dni with no dialysis
insulin sliding scale and long acting insulin small dose 5u qhs
pending today level
seen by urology  keep rodriguez  fu out patient in the next few months for cysto and bx  complete cipro sp bacteremia  2 urine cx now negative
BP monitoring,continue current antihypertensive meds, low salt diet,followup with PMD in 1-2 weeks
no anticogulation due to hx chronic hematuria  no entresto/lasix/beta blocker due to sp septic shock renal failure  seen by cardiology and advised to stay off for now

## 2020-04-24 NOTE — PROGRESS NOTE ADULT - PROBLEM SELECTOR PROBLEM 1
Hematuria, unspecified type
Hematuria, unspecified type
Hypothermia, initial encounter
R/O Hypothermia, initial encounter
R/O Hypothermia, initial encounter
Septic shock
Bacteremia due to Pseudomonas
MARSHAL (acute kidney injury)

## 2020-05-12 PROBLEM — M10.9 GOUT, UNSPECIFIED: Chronic | Status: ACTIVE | Noted: 2020-01-01

## 2020-05-12 PROBLEM — N40.0 BENIGN PROSTATIC HYPERPLASIA WITHOUT LOWER URINARY TRACT SYMPTOMS: Chronic | Status: ACTIVE | Noted: 2020-01-01

## 2020-05-12 PROBLEM — I48.91 UNSPECIFIED ATRIAL FIBRILLATION: Chronic | Status: ACTIVE | Noted: 2020-01-01

## 2020-05-12 PROBLEM — R41.89 OTHER SYMPTOMS AND SIGNS INVOLVING COGNITIVE FUNCTIONS AND AWARENESS: Chronic | Status: ACTIVE | Noted: 2020-01-01

## 2020-05-12 PROBLEM — I10 ESSENTIAL (PRIMARY) HYPERTENSION: Chronic | Status: ACTIVE | Noted: 2020-01-01

## 2020-05-12 PROBLEM — E11.9 TYPE 2 DIABETES MELLITUS WITHOUT COMPLICATIONS: Chronic | Status: ACTIVE | Noted: 2020-01-01

## 2020-05-12 PROBLEM — I42.9 CARDIOMYOPATHY, UNSPECIFIED: Chronic | Status: ACTIVE | Noted: 2020-01-01

## 2020-05-12 PROBLEM — I50.9 HEART FAILURE, UNSPECIFIED: Chronic | Status: ACTIVE | Noted: 2020-01-01

## 2020-05-12 PROBLEM — K44.9 DIAPHRAGMATIC HERNIA WITHOUT OBSTRUCTION OR GANGRENE: Chronic | Status: ACTIVE | Noted: 2020-01-01

## 2020-07-18 PROBLEM — Z87.448 PERSONAL HISTORY OF OTHER DISEASES OF URINARY SYSTEM: Chronic | Status: ACTIVE | Noted: 2020-01-01

## 2020-07-18 PROBLEM — Z51.89 ENCOUNTER FOR OTHER SPECIFIED AFTERCARE: Chronic | Status: ACTIVE | Noted: 2020-01-01

## 2020-07-18 PROBLEM — D64.9 ANEMIA, UNSPECIFIED: Chronic | Status: ACTIVE | Noted: 2020-01-01
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
## 2020-07-18 PROBLEM — J15.9 UNSPECIFIED BACTERIAL PNEUMONIA: Chronic | Status: ACTIVE | Noted: 2020-01-01

## 2020-07-18 PROBLEM — Z86.79 PERSONAL HISTORY OF OTHER DISEASES OF THE CIRCULATORY SYSTEM: Chronic | Status: ACTIVE | Noted: 2020-01-01

## 2020-07-18 PROBLEM — U07.1 COVID-19: Chronic | Status: ACTIVE | Noted: 2020-01-01

## 2020-07-18 PROBLEM — M62.81 MUSCLE WEAKNESS (GENERALIZED): Chronic | Status: ACTIVE | Noted: 2020-01-01

## 2020-08-05 PROBLEM — Z09 HOSPITAL DISCHARGE FOLLOW-UP: Status: ACTIVE | Noted: 2020-01-01

## 2020-08-05 NOTE — PHYSICAL EXAM
[Well Developed] : well developed [Regular Rhythm] : with a regular rhythm [Well Nourished] : well nourished [Normal S1, S2] : normal S1 and S2 [de-identified] : poor conditioning, cannot stand, ambulate or transfer independently [Normal Affect] : the affect was normal [de-identified] : miki

## 2020-08-05 NOTE — ASSESSMENT
[FreeTextEntry1] : s/p hospital admission\par pna, discharged 7/31/2020\par extremely deconditioned\par \par spent 30 minutes reviewing hospital records/meds/course\par \par cholesterol.\par The diagnosis of high cholesterol is established and patient is currently taking medications. The patient was counseled on a low cholesterol diet and on medication compliance. Patient was advised to generally limit foods fried in oil, high in saturated fat, cheese, eggs and red meat. Patient was advised to continue on current medications and to followup in office for necessary blood work in three months.\par taken off by NH, restart\par \par heart failure/afib\par sees cardiologist\par \par diabetes\par The diagnosis of diabetes is established with this patient.  Blood work was drawn in office and results will be reviewed and followed. The patient was counseled on using a low sugar and carbohydrate diet.  Patient was advised to eat small meals and exercise regularly. Patient as advised to take all medications as prescribed and to follow up with yearly podiatry and opthalmology visits. Patient was advised to call office  or go to the ER immediately if experiences any nausea, lightheadedness or for any other issues.\par was taken of glimepiride, fu in 2 weeks\par fasting sugars is 191\par \par gout\par was taken off meds, will recheck in 2 weeks

## 2020-08-05 NOTE — HISTORY OF PRESENT ILLNESS
[FreeTextEntry8] : 70 year old male is here for a followup visit. Patient is here for medication renewals and for blood work discussion. Medications and allergies were reviewed and assessed.  There has been no new medications since the last visit. Patient is feeling well with no active changes or issues since His last visit.\par \par hospital admission for pna, then rehab, discharged 7/31/2020\par brought in by yoan thomas

## 2020-08-06 PROBLEM — R39.81 URINARY INCONTINENCE DUE TO COGNITIVE IMPAIRMENT: Status: ACTIVE | Noted: 2020-01-01

## 2020-08-20 NOTE — ED ADULT NURSE NOTE - OBJECTIVE STATEMENT
as per EMS sent from nursing home for AMS with unknown last know well, states staff noticed patient off baseline around noon. at this time patient having difficulty completing NIH score, brought straight to CT    EMS states Hx of developmental delays, unable to provide clear Hx    found to be hypothermic and hypotensive, brought to CC for resuscitation

## 2020-08-20 NOTE — H&P ADULT - PROBLEM SELECTOR PLAN 5
Unknown ventricular function. Has significant chronic bilateral LE edema. Does not appear overly volume overloaded at this time. Will order formal TTE as none are available in his records. Unknown ventricular function. Has significant chronic bilateral LE edema. Does not appear overtly volume overloaded at this time. Will order formal TTE as none are available in his records.

## 2020-08-20 NOTE — ED PROVIDER NOTE - PMH
2019 novel coronavirus disease (COVID-19)    Afib    Anemia    Bacterial pneumonia    BPH (benign prostatic hyperplasia)    Cardiomyopathy    CHF (congestive heart failure)    Cognitive impairment    DM (diabetes mellitus)    Encounter for blood transfusion    Falls    Gout    Heart failure    Hiatal hernia    History of gross hematuria    History of orthostatic hypotension    HTN (hypertension)    Muscle weakness (generalized)

## 2020-08-20 NOTE — ED ADULT NURSE NOTE - AS SC BRADEN NUTRITION
Has 230pm WELL today  He had RSV 3 weeks ago  Yesterday he started breathing tight and fast  He is wheezing  He will not eat solids  No fever  He is drinking bottles of milk  I told mom she could take him to the ER NOW BUT SHE REFUSED   SHE WILL GIVE HIM A BATH AND A BREATHING Rx and bring him here for 230pm  (2) probably inadequate

## 2020-08-20 NOTE — ED PROVIDER NOTE - PROGRESS NOTE DETAILS
Given the significant and immediate threats to this patient based on initial presentation, the benefits of emergency contrast-enhanced CT imaging without obtaining GFR/creatinine serum level results greatly outweigh the potential risk of harm due to contrast-induced nephropathy. Pt evaluated by MICU and accepted to s/o Dr. Pryor

## 2020-08-20 NOTE — ED PROVIDER NOTE - OBJECTIVE STATEMENT
70M h/o afib, anemia, BPH, cardiomyopathy, DM, CHF, LE edema, HTN presents from group home noted to be weaker around noon with possible L sided facial droop. Unknown last known well, but symptoms noted around noon. Patient hypotensive to 70s/40s with EMS. Patient with left facial droop and R hand weakness on exam, taken straight to CT scan.

## 2020-08-20 NOTE — H&P ADULT - PROBLEM SELECTOR PLAN 2
Likely related to sepsis. Reportedly became very hypothermic during admission for COVID-19. Gradually rewarming with hyperthermia blanket and heated IVF. Treat underlying issue as above.

## 2020-08-20 NOTE — H&P ADULT - MENTAL STATUS
lethargic, mumbling and grunting, responding appropriately to some simple questions, follows simple commands, moves all extremities spontaneously, no facial droop

## 2020-08-20 NOTE — ED PROVIDER NOTE - CLINICAL SUMMARY MEDICAL DECISION MAKING FREE TEXT BOX
Patient with new facial droop and possible RUE weakness. Also with marked hypotension, hypothermia and bradycardia on exam. Plan for CTH, CTA head/neck, CT perfusion. Labs, Warm IVF< idris hugger, thyroid panel, blood cultures, lactate, abx, reassess.

## 2020-08-20 NOTE — H&P ADULT - NSICDXPASTMEDICALHX_GEN_ALL_CORE_FT
PAST MEDICAL HISTORY:  2019 novel coronavirus disease (COVID-19)     Afib     Anemia     Bacterial pneumonia     BPH (benign prostatic hyperplasia)     Cardiomyopathy     CHF (congestive heart failure)     Cognitive impairment     DM (diabetes mellitus)     Encounter for blood transfusion     Falls     Gout     Heart failure     Hiatal hernia     History of gross hematuria     History of orthostatic hypotension     HTN (hypertension)     Muscle weakness (generalized)

## 2020-08-20 NOTE — ED ADULT NURSE NOTE - INTERVENTIONS DEFINITIONS
Stretcher in lowest position, wheels locked, appropriate side rails in place/Physically safe environment: no spills, clutter or unnecessary equipment/Monitor for mental status changes and reorient to person, place, and time/Reinforce activity limits and safety measures with patient and family/Monitor gait and stability

## 2020-08-20 NOTE — H&P ADULT - PROBLEM SELECTOR PLAN 4
AGMA secondary to uremia. Initial lactate level was not measured in ED. Treat underlying causes as above. Trend serum bicarb.

## 2020-08-20 NOTE — H&P ADULT - PROBLEM SELECTOR PLAN 3
MARSHAL on CKD. Recent COVID-19 related renal failure for which HD was refused, however kidneys recovered. Now with ischemic ATN. Continue to optimize end-organ perfusion with volume loading and IV vasopressor therapy as necessary. Trend BUN/Cr. Monitor lytes. Place rodriguez catheter for precise UOP measurement. Renally dose medications.

## 2020-08-20 NOTE — ED ADULT TRIAGE NOTE - CHIEF COMPLAINT QUOTE
Pt from [facility] group home with c/o "AMS" last known well is unknown but staff state at NOON pt was noted to be off his baseline. Pt unable to express himself verbally and is noted to be weak, although body habitus makes it hard for pt to hold limbs. Left side facial droop is noted and possibly aphasic. MD Rangel called to jaun. Priority CT called.

## 2020-08-20 NOTE — H&P ADULT - ASSESSMENT
69 y/o M with a h/o developmental disability, CHF, HTN, DM, AF (apixaban recently stopped due to anemia/hematuria), CKD (related to recent COVID-19 infection, refused hemodialysis), BPH, with septic shock, UTI, profound hypothermia, bradycardia, MARSHAL, metabolic acidosis.    Admit to MICU for further management. Case discussed in detail with MICU physician, Dr. Pryor.      CRITICAL CARE TIME SPENT: 55 mins  Time spent evaluating/treating patient with medical issues that pose a high risk for life threatening deterioration and/or end-organ damage, reviewing data/labs/imaging, discussing case with multidisciplinary team, discussing plan/goals of care with patient/family. Non-inclusive of procedure time.

## 2020-08-20 NOTE — H&P ADULT - PROBLEM SELECTOR PLAN 1
Secondary to urosepsis. Bolus additional 1L warmed LR. Hemodynamics respondedto volume resuscitation but are downtrending again. Start norepinephrine infusion as necessary to maintain a MAP > 65. Escalate coverage to meropenem given recent IV abx and pseudomonas bacteremia. Blood and urine cultures sent. Secondary to urosepsis. Bolus additional 1L warmed LR. Hemodynamics responded to volume resuscitation but are downtrending again. Start norepinephrine infusion as necessary to maintain a MAP > 65. Escalate coverage to meropenem given recent IV abx and pseudomonas bacteremia. Blood and urine cultures sent.

## 2020-08-20 NOTE — H&P ADULT - PROBLEM SELECTOR PLAN 6
Recently taken off apixaban for anemia/hematuria. No indication for rate control agents at this time.

## 2020-08-20 NOTE — ED ADULT NURSE REASSESSMENT NOTE - NS ED NURSE REASSESS COMMENT FT1
ICU JULIÁN Lai at bedside for eval. patient becoming more responsive with re-warming, BP improving. wctm

## 2020-08-20 NOTE — H&P ADULT - HISTORY OF PRESENT ILLNESS
69 y/o M with a h/o developmental disability, CHF, HTN, DM, AF (apixaban recently stopped due to anemia/hematuria), CKD (related to recent COVID-19 infection, refused hemodialysis), BPH, presents to the ED from NH with lethargy, weakness, and left sided facial droop. He was found to be profoundly hypothermic (TLow: 84'F), bradycardic, and hypotensive. UA (+). MARSHAL on CKD with significant metabolic acidosis. CT head neg for acute pathology. Patient reports that he does not feel well but is unable to specify. His sister and surrogate decision maker, Avani, is providing additional history. He had recent pseudomonas bacteremia and just completed a course of antibiotics. She would like his existing DNR/DNI advanced directives reinstated, does not wish to pursue hemodialysis if necessary, but is okay with IV vasopressor therapy. 69 y/o M with a h/o developmental disability, CHF, HTN, DM, AF (apixaban recently stopped due to anemia/hematuria), CKD (related to recent COVID-19 infection, refused hemodialysis), BPH, presents to the ED from group home with lethargy, weakness, and left sided facial droop (?). He was found to be profoundly hypothermic (TLow: 84'F), bradycardic, and hypotensive. UA (+). MARSHAL on CKD with significant metabolic acidosis. CT head neg for acute pathology. Patient reports that he does not feel well but is unable to specify. His sister and surrogate decision maker, Avani, is providing additional history. He had recent pseudomonas bacteremia and just completed a course of antibiotics. She would like his existing DNR/DNI advanced directives reinstated, does not wish to pursue hemodialysis if necessary, but is okay with IV vasopressor therapy.

## 2020-08-20 NOTE — ED PROVIDER NOTE - PHYSICAL EXAMINATION
Gen: Well appearing in NAD, unable to cooperate with full neurologic exam.   Head: NC/AT  Neck: trachea midline  Resp:  No distress, CTAB  CV: bradycardic  GI: soft, NTND. +reducible ventral hernia  Ext: no deformities, + bilateral pitting LE edema with skin hyperpigmentation  Neuro:  Alert and oriented to self and place. + L facial droop. Difficulty elevating RUE. No sensory deficits. EOMI, PERRL.   Skin:  Warm and dry as visualized  Psych:  Normal affect and mood

## 2020-08-21 NOTE — PROGRESS NOTE ADULT - ASSESSMENT
69 y/o M with a h/o developmental disability, CHF, HTN, DM, AF (apixaban recently stopped due to anemia/hematuria), CKD (related to recent COVID-19 infection, refused hemodialysis), BPH, with septic shock, UTI, profound hypothermia, bradycardia, MARSHAL, metabolic acidosis.      #Septic shock 2/2 urosepsis.   - s/p 1L warmed LR bolus.  - Start norepinephrine infusion as necessary to maintain a MAP > 65.   - continue meropenem given recent IV abx and pseudomonas bacteremia.   - Blood and urine cultures pending    #Hypothermia.  Plan:   - Likely related to sepsis. Reportedly became very hypothermic during admission for COVID-19.  - Gradually rewarming with hyperthermia blanket and heated IVF.     #MARSHAL on CKD.   - Recent COVID-19 related renal failure for which HD was refused, however kidneys recovered. Now with ischemic ATN. Continue to optimize end-organ perfusion with volume loading and IV vasopressor therapy as necessary. Trend BUN/Cr. Monitor lytes. I'S and O's. Renally dose medications.     #Metabolic acidosis.  Plan: AGMA secondary to uremia. Initial lactate level was not measured in ED. Treat underlying causes as above. Trend serum bicarb.     #Congestive heart failure (CHF).  Plan: Unknown ventricular function. Has significant chronic bilateral LE edema. Does not appear overtly volume overloaded at this time.   - TTE pending, no prior records available    #Afib. Plan: Recently taken off apixaban for anemia/hematuria. No indication for rate control agents at this time.    #DM   - Start sliding scale insulin coverage.     DVT ppx: SCDs 71 y/o M with a h/o developmental disability, CHF, HTN, DM, AF (apixaban recently stopped due to anemia/hematuria), CKD (related to recent COVID-19 infection, refused hemodialysis), BPH, with septic shock, UTI, profound hypothermia, bradycardia, MARSHAL, metabolic acidosis.    #Septic shock 2/2 urosepsis.   - s/p 1L warmed LR bolus.  - continue meropenem given recent IV abx and pseudomonas bacteremia.   - Blood and urine cultures pending    #Hypothermia   - Likely related to sepsis. Reportedly became very hypothermic during admission for COVID-19.  - Gradually rewarming with hyperthermia blanket and heated IVF. Will monitor    #MARSHAL on CKD.   - Recent COVID-19 related renal failure for which HD was refused, however kidneys recovered. Now with ischemic ATN.   - Trend BUN/Cr. Monitor lytes. I'S and O's. Renally dose medications.   - Family declines dialysis, will check with SW if state approved withholding of treatment    #AG Metabolic acidosis 2/2 uremia.   -anion gap stable  -Trend serum bicarb.     #Congestive heart failure  - Has significant chronic bilateral LE edema. Does not appear overtly volume overloaded at this time.   - TTE shows severely enlarged atria, severe tricuspid regurgitation, mod reduced RV systolic function, evidence of pulmonary hypertension. no prior records available  - will consult cardiology in AM for management    #Afib.  - Recently taken off apixaban for anemia/hematuria. No indication for rate control agents at this time.    #DM   - A1c 7.4%  - Start sliding scale insulin coverage.     DVT ppx: SCDs, avoiding AC    DNR/DNI. As per palliative, will consult social work when transferred to floor to review if family order is valid with state given MRDD

## 2020-08-21 NOTE — GOALS OF CARE CONVERSATION - ADVANCED CARE PLANNING - CONVERSATION DETAILS
SW reviewed new referral as patient resides at [facility] under OPWDD. Clinicals reviewed and SW aware that [place] physicians implemented directives of DNR/DNI as well were discussing no dialysis as per sisters request. patients sister is patient surrogate but any end of life decision making must follow [place] protocols . CHADD contacted DR Prakash to review current orders and educate regarding guidelines to implement orders and checklist protocols. CHADD contacted [facility] and spoke with RN reba who reports unfortunately not knowing patient well since just recently transferred to new residence since needs were more dependent and could not longer ambulate stairs at prior residence. Sw reviewed surrogate as wel as sisters requests to reimplement directives. SW inquired where directives were done previously in order to obtain records and ensure checklist completed therefore enabling current MOLST to remain in effect. If checklist and steps with [place] were not followed then DR Prakash aware he needs to discontinue directives in chart and process must e initiated form start. Reba at [facility] stated she had MOLST but would try to obtain records if there were from prior residence since MOLST process started at [facility] in April during COVID.  CHADD also contacted [employer] to inquire id they had legal letter supporting sisters requests on file in case [facility] was unable to obtain copies.   SW later today obtained copy of letter of no objection from [employer] and checklist which was completed on 4/16/20. Legal letter stated no objection to DNR, DNI and no dialysis. Current MOLST on chart initiated in ICU reflects same orders.
I discussed the case with Avani and a member of [facility] staff. They understand the diagnosis of sepsis, profound hypothermia, and acute on chronic renal failure. They also understand the management plan moving forward. She would like to reinstate the existing DNR/DNI advanced directives and does not wish to pursue hemodialysis if this becomes necessary again. She is okay with using other life support modalities such as IV vasopressor therapy. MOLST completed and placed in chart. DNR/DNI orders placed in EMR.

## 2020-08-21 NOTE — PROGRESS NOTE ADULT - ASSESSMENT
69 y/o male with pmhx of developmental disability, CHF, HTN, DM, afib (recently stopped eliquis due to anemia/hematuria), CKD (covid related, family refused HD and patient improved on his own), BPH, pseudomonas bacteremia now with severe sepsis due to UTI.    NEURO: CT,CTP,CTA negative.   CVS: HD stable off of pressors. add midodrine once swallow eval done  PULM: no active issues  GI: speech and swallow eval pending.  RENAL: Cr rising, CT abd/pelv to r/o obstructive uropathy. making urine, but on lower side of goal. bolus additional liter. lytes stable.   ID: meropenem. cultures pending.  ENDO: accuechecks q6 hours while npo  HEME: heparin for dvt prophylaxis  DISPO: DNR/DNI, no HD. trial pressors. sister is primary contact    stabel for transfer to monitored bed. sign out given to dr galloway.

## 2020-08-21 NOTE — PROGRESS NOTE ADULT - SUBJECTIVE AND OBJECTIVE BOX
Patient is a 70y old  Male who presents with a chief complaint of septic shock, UTI, hypothermia (20 Aug 2020 20:22)      BRIEF HOSPITAL COURSE: 71 y/o male with pmhx of developmental disability, CHF, HTN, DM, afib (recently stopped eliquis due to anemia/hematuria), CKD (covid related, family refused HD and patient improved on his own), BPH who presnted from [facility] on  with lethargy and weakness with possible left sided facial droop foudn to be hypothermic (84 F), bradycardic, and hypotensive. UA +, cellutic changes on b/l LE extremities, MARSHAL on CKD with metabolic acidosis. CT head, CTP, CTA negative. Hx of recent pseudomonas bacteremia.     Events last 24 hours: never started on pressors, remained hemodyncamically stable. temp improving, remains on warming blanket.     PAST MEDICAL & SURGICAL HISTORY:  Encounter for blood transfusion  Anemia  History of gross hematuria  History of orthostatic hypotension  Bacterial pneumonia  Muscle weakness (generalized)  2019 novel coronavirus disease (COVID-19)  Falls  Heart failure  Afib  CHF (congestive heart failure)  Gout  Hiatal hernia  BPH (benign prostatic hyperplasia)  Cognitive impairment  Cardiomyopathy  HTN (hypertension)  DM (diabetes mellitus)      Review of Systems:  unable to accurately obtain due to AMS      Medications:  meropenem  IVPB 1000 milliGRAM(s) IV Intermittent every 12 hours  meropenem  IVPB        tamsulosin 0.4 milliGRAM(s) Oral at bedtime          heparin   Injectable 5000 Unit(s) SubCutaneous every 8 hours        dextrose 40% Gel 15 Gram(s) Oral once PRN  dextrose 50% Injectable 12.5 Gram(s) IV Push once  dextrose 50% Injectable 25 Gram(s) IV Push once  dextrose 50% Injectable 25 Gram(s) IV Push once  glucagon  Injectable 1 milliGRAM(s) IntraMuscular once PRN  insulin lispro (HumaLOG) corrective regimen sliding scale   SubCutaneous three times a day before meals    dextrose 5%. 1000 milliLiter(s) IV Continuous <Continuous>      chlorhexidine 2% Cloths 1 Application(s) Topical <User Schedule>            ICU Vital Signs Last 24 Hrs  T(C): 35.4 (21 Aug 2020 07:41), Max: 35.4 (21 Aug 2020 07:41)  T(F): 95.7 (21 Aug 2020 07:41), Max: 95.7 (21 Aug 2020 07:41)  HR: 77 (21 Aug 2020 07:46) (44 - 80)  BP: 106/61 (21 Aug 2020 07:46) (73/46 - 151/58)  BP(mean): 74 (21 Aug 2020 07:46) (66 - 83)  ABP: --  ABP(mean): --  RR: 31 (21 Aug 2020 07:46) (18 - 50)  SpO2: 97% (21 Aug 2020 07:46) (96% - 100%)          I&O's Detail    20 Aug 2020 07:01  -  21 Aug 2020 07:00  --------------------------------------------------------  IN:  Total IN: 0 mL    OUT:    Indwelling Catheter - Urethral: 90 mL  Total OUT: 90 mL    Total NET: -90 mL            LABS:                        7.8    6.33  )-----------( 129      ( 21 Aug 2020 05:42 )             24.4     08-21    138  |  106  |  70.0<H>  ----------------------------<  126<H>  5.1   |  15.0<L>  |  2.76<H>    Ca    8.3<L>      21 Aug 2020 05:42  Phos  5.4     08-21  Mg     1.3     08-21    TPro  7.0  /  Alb  3.2<L>  /  TBili  0.2<L>  /  DBili  x   /  AST  32  /  ALT  22  /  AlkPhos  73  08-20      CARDIAC MARKERS ( 20 Aug 2020 18:07 )  x     / 0.06 ng/mL / x     / x     / x          CAPILLARY BLOOD GLUCOSE      POCT Blood Glucose.: 136 mg/dL (21 Aug 2020 05:34)    PT/INR - ( 20 Aug 2020 18:07 )   PT: 19.1 sec;   INR: 1.69 ratio         PTT - ( 20 Aug 2020 18:07 )  PTT:54.4 sec  Urinalysis Basic - ( 20 Aug 2020 19:01 )    Color: Yellow / Appearance: Turbid / S.015 / pH: x  Gluc: x / Ketone: Trace  / Bili: Negative / Urobili: Negative   Blood: x / Protein: 500 mg/dL / Nitrite: Negative   Leuk Esterase: Moderate / RBC: 6-10 /HPF / WBC >50   Sq Epi: x / Non Sq Epi: Occasional / Bacteria: Few      CULTURES:      Physical Examination:    General: No acute distress.      HEENT: Pupils equal, reactive to light.  Symmetric.    PULM: Clear to auscultation bilaterally, no significant sputum production    NECK: Supple, no lymphadenopathy, trachea midline    CVS: Regular rate and rhythm, no murmurs, rubs, or gallops    ABD: Soft, nondistended, nontender, normoactive bowel sounds, no masses    EXT: No edema, nontender    SKIN: Warm and well perfused, no rashes noted.    NEURO: Alert, oriented, interactive, nonfocal    DEVICES:     RADIOLOGY: ***    CRITICAL CARE TIME SPENT: *** Patient is a 70y old  Male who presents with a chief complaint of septic shock, UTI, hypothermia (20 Aug 2020 20:22)      BRIEF HOSPITAL COURSE: 69 y/o male with pmhx of developmental disability, CHF, HTN, DM, afib (recently stopped eliquis due to anemia/hematuria), CKD (covid related, family refused HD and patient improved on his own), BPH who presnted from [facility] on  with lethargy and weakness with possible left sided facial droop foudn to be hypothermic (84 F), bradycardic, and hypotensive. UA +, cellutic changes on b/l LE extremities, MARSHAL on CKD with metabolic acidosis. CT head, CTP, CTA negative. Hx of recent pseudomonas bacteremia.     Events last 24 hours: never started on pressors, remained hemodyncamically stable. temp improving, remains on warming blanket.     PAST MEDICAL & SURGICAL HISTORY:  Encounter for blood transfusion  Anemia  History of gross hematuria  History of orthostatic hypotension  Bacterial pneumonia  Muscle weakness (generalized)  2019 novel coronavirus disease (COVID-19)  Falls  Heart failure  Afib  CHF (congestive heart failure)  Gout  Hiatal hernia  BPH (benign prostatic hyperplasia)  Cognitive impairment  Cardiomyopathy  HTN (hypertension)  DM (diabetes mellitus)      Review of Systems:  unable to accurately obtain due to AMS      Medications:  meropenem  IVPB 1000 milliGRAM(s) IV Intermittent every 12 hours  meropenem  IVPB        tamsulosin 0.4 milliGRAM(s) Oral at bedtime          heparin   Injectable 5000 Unit(s) SubCutaneous every 8 hours        dextrose 40% Gel 15 Gram(s) Oral once PRN  dextrose 50% Injectable 12.5 Gram(s) IV Push once  dextrose 50% Injectable 25 Gram(s) IV Push once  dextrose 50% Injectable 25 Gram(s) IV Push once  glucagon  Injectable 1 milliGRAM(s) IntraMuscular once PRN  insulin lispro (HumaLOG) corrective regimen sliding scale   SubCutaneous three times a day before meals    dextrose 5%. 1000 milliLiter(s) IV Continuous <Continuous>      chlorhexidine 2% Cloths 1 Application(s) Topical <User Schedule>            ICU Vital Signs Last 24 Hrs  T(C): 35.4 (21 Aug 2020 07:41), Max: 35.4 (21 Aug 2020 07:41)  T(F): 95.7 (21 Aug 2020 07:41), Max: 95.7 (21 Aug 2020 07:41)  HR: 77 (21 Aug 2020 07:46) (44 - 80)  BP: 106/61 (21 Aug 2020 07:46) (73/46 - 151/58)  BP(mean): 74 (21 Aug 2020 07:46) (66 - 83)  ABP: --  ABP(mean): --  RR: 31 (21 Aug 2020 07:46) (18 - 50)  SpO2: 97% (21 Aug 2020 07:46) (96% - 100%)          I&O's Detail    20 Aug 2020 07:01  -  21 Aug 2020 07:00  --------------------------------------------------------  IN:  Total IN: 0 mL    OUT:    Indwelling Catheter - Urethral: 90 mL  Total OUT: 90 mL    Total NET: -90 mL            LABS:                        7.8    6.33  )-----------( 129      ( 21 Aug 2020 05:42 )             24.4     08-21    138  |  106  |  70.0<H>  ----------------------------<  126<H>  5.1   |  15.0<L>  |  2.76<H>    Ca    8.3<L>      21 Aug 2020 05:42  Phos  5.4     08-21  Mg     1.3     08-21    TPro  7.0  /  Alb  3.2<L>  /  TBili  0.2<L>  /  DBili  x   /  AST  32  /  ALT  22  /  AlkPhos  73  08-20      CARDIAC MARKERS ( 20 Aug 2020 18:07 )  x     / 0.06 ng/mL / x     / x     / x          CAPILLARY BLOOD GLUCOSE      POCT Blood Glucose.: 136 mg/dL (21 Aug 2020 05:34)    PT/INR - ( 20 Aug 2020 18:07 )   PT: 19.1 sec;   INR: 1.69 ratio         PTT - ( 20 Aug 2020 18:07 )  PTT:54.4 sec  Urinalysis Basic - ( 20 Aug 2020 19:01 )    Color: Yellow / Appearance: Turbid / S.015 / pH: x  Gluc: x / Ketone: Trace  / Bili: Negative / Urobili: Negative   Blood: x / Protein: 500 mg/dL / Nitrite: Negative   Leuk Esterase: Moderate / RBC: 6-10 /HPF / WBC >50   Sq Epi: x / Non Sq Epi: Occasional / Bacteria: Few      CULTURES:      Physical Examination:    General: No acute distress.      HEENT: Pupils equal, reactive to light.  Symmetric.    PULM: Clear to auscultation bilaterally, no significant sputum production    NECK: Supple, no lymphadenopathy, trachea midline    CVS: Regular rate and rhythm, no murmurs, rubs, or gallops    ABD: Soft, nondistended, nontender, normoactive bowel sounds, no masses    EXT: No edema, nontender    SKIN: Warm and well perfused, no rashes noted.    NEURO: awake. does not follow commands. moving all extremties. mild left sided facial droop/smile asymmetry     DEVICES: rodriguez    RADIOLOGY: reviewed

## 2020-08-21 NOTE — PROGRESS NOTE ADULT - SUBJECTIVE AND OBJECTIVE BOX
Patient is a 70y old  Male who presents with a chief complaint of septic shock, UTI, hypothermia (21 Aug 2020 07:57)      Admission HPI:  69 y/o M with a h/o developmental disability, CHF, HTN, DM, AF (apixaban recently stopped due to anemia/hematuria), CKD (related to recent COVID-19 infection, refused hemodialysis), BPH, presents to the ED from group home with lethargy, weakness, and left sided facial droop (?). He was found to be profoundly hypothermic (TLow: 84'F), bradycardic, and hypotensive. UA (+). MARSHAL on CKD with significant metabolic acidosis. CT head neg for acute pathology. Patient reports that he does not feel well but is unable to specify. His sister and surrogate decision maker, Avani, is providing additional history. He had recent pseudomonas bacteremia and just completed a course of antibiotics. She would like his existing DNR/DNI advanced directives reinstated, does not wish to pursue hemodialysis if necessary, but is okay with IV vasopressor therapy.        OVERNIGHT EVENTS:  Admitted to MICU for further management. Bolus additional 1L warmed LR. Escalate coverage to meropenem given recent IV abx and pseudomonas bacteremia. Blood and urine cultures sent. Gradually rewarming with hyperthermia blanket and heated IVF.            MEDICATIONS  (STANDING):  chlorhexidine 2% Cloths 1 Application(s) Topical <User Schedule>  dextrose 5%. 1000 milliLiter(s) (50 mL/Hr) IV Continuous <Continuous>  dextrose 50% Injectable 12.5 Gram(s) IV Push once  dextrose 50% Injectable 25 Gram(s) IV Push once  dextrose 50% Injectable 25 Gram(s) IV Push once  heparin   Injectable 5000 Unit(s) SubCutaneous every 8 hours  insulin lispro (HumaLOG) corrective regimen sliding scale   SubCutaneous three times a day before meals  meropenem  IVPB 1000 milliGRAM(s) IV Intermittent every 12 hours  meropenem  IVPB      tamsulosin 0.4 milliGRAM(s) Oral at bedtime    MEDICATIONS  (PRN):  dextrose 40% Gel 15 Gram(s) Oral once PRN Blood Glucose LESS THAN 70 milliGRAM(s)/deciliter  glucagon  Injectable 1 milliGRAM(s) IntraMuscular once PRN Glucose LESS THAN 70 milligrams/deciliter      Allergies    No Known Allergies    Intolerances        REVIEW OF SYSTEMS:  CONSTITUTIONAL: No fever, weight loss, or fatigue  EYES: No eye pain, visual disturbances, or discharge  ENMT:  No difficulty hearing, tinnitus, vertigo; No sinus or throat pain  NECK: No pain or stiffness  RESPIRATORY: No cough, wheezing, chills or hemoptysis; No shortness of breath  CARDIOVASCULAR: No chest pain, palpitations, or lightheadedness  GASTROINTESTINAL: No abdominal or epigastric pain. No nausea, vomiting, or hematemesis; No diarrhea or constipation. No melena or hematochezia.  GENITOURINARY: No dysuria, frequency, hematuria, or incontinence  NEUROLOGICAL: No headaches, vertigo, memory loss, loss of strength, numbness, or tremors  SKIN: No itching, burning, rashes, or lesions   LYMPH NODES: No enlarged glands  ENDOCRINE: No heat or cold intolerance; No hair loss; No polydipsia or polyuria  MUSCULOSKELETAL: No back pain  PSYCHIATRIC: No depression, anxiety, or mood swings  HEME/LYMPH: No easy bruising, or bleeding gums  ALLERGY AND IMMUNOLOGIC: No hives or eczema    PHYSICAL EXAM:  GENERAL: NAD, well-groomed, well-developed  HEAD:  Atraumatic, Normocephalic  EYES: EOMI, PERRLA, conjunctiva and sclera clear  ENMT: Moist mucous membranes, Good dentition, No lesions; No tonsillar erythema, exudates, or enlargement   NECK: Supple, No JVD appreciated, Normal thyroid  NERVOUS SYSTEM:  Alert & Oriented X3, Good concentration; Bilateral LE mobile, sensation to light touch intact  CHEST/LUNG: Clear to auscultation bilaterally; No rales, rhonchi, wheezing, or rubs  HEART: Regular rate and rhythm; No murmurs, rubs, or gallops  ABDOMEN: Soft, Nontender, Nondistended; Bowel sounds present  EXTREMITIES:  2+ Peripheral Pulses, No clubbing or cyanosis  LYMPH: No lymphadenopathy noted  SKIN: No rashes or lesions  INCISION:  Dressing dry and intact    Vital Signs Last 24 Hrs  T(C): 35.4 (21 Aug 2020 07:46), Max: 35.4 (21 Aug 2020 07:41)  T(F): 95.7 (21 Aug 2020 07:46), Max: 95.7 (21 Aug 2020 07:41)  HR: 72 (21 Aug 2020 08:00) (44 - 80)  BP: 86/39 (21 Aug 2020 08:00) (73/46 - 151/58)  BP(mean): 56 (21 Aug 2020 08:00) (56 - 83)  RR: 30 (21 Aug 2020 08:00) (18 - 50)  SpO2: 98% (21 Aug 2020 08:00) (96% - 100%)    LABS:                        7.8    6.33  )-----------( 129      ( 21 Aug 2020 05:42 )             24.4     21 Aug 2020 05:42    138    |  106    |  70.0   ----------------------------<  126    5.1     |  15.0   |  2.76     Ca    8.3        21 Aug 2020 05:42  Phos  5.4       21 Aug 2020 05:42  Mg     1.3       21 Aug 2020 05:42    TPro  7.0    /  Alb  3.2    /  TBili  0.2    /  DBili  x      /  AST  32     /  ALT  22     /  AlkPhos  73     20 Aug 2020 18:07    PT/INR - ( 20 Aug 2020 18:07 )   PT: 19.1 sec;   INR: 1.69 ratio         PTT - ( 20 Aug 2020 18:07 )  PTT:54.4 sec  Urinalysis Basic - ( 20 Aug 2020 19:01 )    Color: Yellow / Appearance: Turbid / S.015 / pH: x  Gluc: x / Ketone: Trace  / Bili: Negative / Urobili: Negative   Blood: x / Protein: 500 mg/dL / Nitrite: Negative   Leuk Esterase: Moderate / RBC: 6-10 /HPF / WBC >50   Sq Epi: x / Non Sq Epi: Occasional / Bacteria: Few      CAPILLARY BLOOD GLUCOSE      POCT Blood Glucose.: 136 mg/dL (21 Aug 2020 05:34)  POCT Blood Glucose.: 186 mg/dL (20 Aug 2020 17:50)           [x] Consultant(s) Notes Reviewed Patient is a 70y old  Male who presents with a chief complaint of septic shock, UTI, hypothermia (21 Aug 2020 07:57)    Admission HPI:  71 y/o M with a h/o developmental disability, CHF, HTN, DM, AF (apixaban recently stopped due to anemia/hematuria), CKD (related to recent COVID-19 infection, refused hemodialysis), BPH, presents to the ED from group home with lethargy, weakness, and left sided facial droop (?). He was found to be profoundly hypothermic (TLow: 84'F), bradycardic, and hypotensive. UA (+). MARSHAL on CKD with significant metabolic acidosis. CT head neg for acute pathology. Patient reports that he does not feel well but is unable to specify. His sister and surrogate decision maker, Avani, is providing additional history. He had recent pseudomonas bacteremia and just completed a course of antibiotics. She would like his existing DNR/DNI advanced directives reinstated, does not wish to pursue hemodialysis if necessary, but is okay with IV vasopressor therapy.        OVERNIGHT EVENTS:  Admitted to MICU for further management. Bolus additional 1L warmed LR. Escalate coverage to meropenem given recent IV abx and pseudomonas bacteremia. Blood and urine cultures sent. Gradually rewarming with hyperthermia blanket and heated IVF.            MEDICATIONS  (STANDING):  chlorhexidine 2% Cloths 1 Application(s) Topical <User Schedule>  dextrose 5%. 1000 milliLiter(s) (50 mL/Hr) IV Continuous <Continuous>  dextrose 50% Injectable 12.5 Gram(s) IV Push once  dextrose 50% Injectable 25 Gram(s) IV Push once  dextrose 50% Injectable 25 Gram(s) IV Push once  heparin   Injectable 5000 Unit(s) SubCutaneous every 8 hours  insulin lispro (HumaLOG) corrective regimen sliding scale   SubCutaneous three times a day before meals  meropenem  IVPB 1000 milliGRAM(s) IV Intermittent every 12 hours  meropenem  IVPB      tamsulosin 0.4 milliGRAM(s) Oral at bedtime    MEDICATIONS  (PRN):  dextrose 40% Gel 15 Gram(s) Oral once PRN Blood Glucose LESS THAN 70 milliGRAM(s)/deciliter  glucagon  Injectable 1 milliGRAM(s) IntraMuscular once PRN Glucose LESS THAN 70 milligrams/deciliter      Allergies    No Known Allergies    Intolerances      REVIEW OF SYSTEMS unable to be performed given altered mental status.    PHYSICAL EXAM:  GENERAL: Adult male, well-groomed, well-developed, stuporous with eyes closed and constantly shifting in bed.  HEAD:  Atraumatic, Normocephalic  ENMT: Moist mucous membranes, Good dentition, No lesions  NECK: Supple, No JVD appreciated  NERVOUS SYSTEM:  Moves all limbs  CHEST/LUNG: Clear to auscultation bilaterally; No rales, rhonchi, wheezing, or rubs  HEART: Regular rate and rhythm; No murmurs, rubs, or gallops  ABDOMEN: Soft, Nontender, Nondistended  EXTREMITIES:  2+ Peripheral Pulses, No clubbing or cyanosis  LYMPH: No lymphadenopathy noted  SKIN: No rashes or lesions      Vital Signs Last 24 Hrs  T(C): 35.4 (21 Aug 2020 07:46), Max: 35.4 (21 Aug 2020 07:41)  T(F): 95.7 (21 Aug 2020 07:46), Max: 95.7 (21 Aug 2020 07:41)  HR: 72 (21 Aug 2020 08:00) (44 - 80)  BP: 86/39 (21 Aug 2020 08:00) (73/46 - 151/58)  BP(mean): 56 (21 Aug 2020 08:00) (56 - 83)  RR: 30 (21 Aug 2020 08:00) (18 - 50)  SpO2: 98% (21 Aug 2020 08:00) (96% - 100%)    LABS:                        7.8    6.33  )-----------( 129      ( 21 Aug 2020 05:42 )             24.4     21 Aug 2020 05:42    138    |  106    |  70.0   ----------------------------<  126    5.1     |  15.0   |  2.76     Ca    8.3        21 Aug 2020 05:42  Phos  5.4       21 Aug 2020 05:42  Mg     1.3       21 Aug 2020 05:42    TPro  7.0    /  Alb  3.2    /  TBili  0.2    /  DBili  x      /  AST  32     /  ALT  22     /  AlkPhos  73     20 Aug 2020 18:07    PT/INR - ( 20 Aug 2020 18:07 )   PT: 19.1 sec;   INR: 1.69 ratio         PTT - ( 20 Aug 2020 18:07 )  PTT:54.4 sec  Urinalysis Basic - ( 20 Aug 2020 19:01 )    Color: Yellow / Appearance: Turbid / S.015 / pH: x  Gluc: x / Ketone: Trace  / Bili: Negative / Urobili: Negative   Blood: x / Protein: 500 mg/dL / Nitrite: Negative   Leuk Esterase: Moderate / RBC: 6-10 /HPF / WBC >50   Sq Epi: x / Non Sq Epi: Occasional / Bacteria: Few      CAPILLARY BLOOD GLUCOSE      POCT Blood Glucose.: 136 mg/dL (21 Aug 2020 05:34)  POCT Blood Glucose.: 186 mg/dL (20 Aug 2020 17:50)           [x] Consultant(s) Notes Reviewed

## 2020-08-22 NOTE — DIETITIAN INITIAL EVALUATION ADULT. - SIGNS/SYMPTOMS
as evidenced by mild muscle/fat loss, probably 10.9% wt loss x ~4 mo, likely meeting <75% needs as evidenced by mild muscle/fat loss, probable 10.9% wt loss x ~4 mo, likely meeting <75% needs

## 2020-08-22 NOTE — DIETITIAN INITIAL EVALUATION ADULT. - PROBLEM SELECTOR PLAN 1
Secondary to urosepsis. Bolus additional 1L warmed LR. Hemodynamics responded to volume resuscitation but are downtrending again. Start norepinephrine infusion as necessary to maintain a MAP > 65. Escalate coverage to meropenem given recent IV abx and pseudomonas bacteremia. Blood and urine cultures sent.

## 2020-08-22 NOTE — CHART NOTE - NSCHARTNOTEFT_GEN_A_CORE
Upon Nutritional Assessment by the Registered Dietitian your patient was determined to meet criteria / has evidence of the following diagnosis/diagnoses:          [ ]  Mild Protein Calorie Malnutrition        [x ]  Moderate Protein Calorie Malnutrition        [ ] Severe Protein Calorie Malnutrition        [ ] Unspecified Protein Calorie Malnutrition        [ ] Underweight / BMI <19        [ ] Morbid Obesity / BMI > 40    Pt presents at high nutrition risk secondary to malnutrition (moderate, acute on chronic) related to inability to meet sufficient protein-energy in setting of CHF, septic shock 2/2 urosepsis, recent COVID infection, and dysphagia as evidenced by mild muscle loss of temples, clavicles, shoulders; mild fat loss of orbitals, probable 10.9% wt loss x ~4 month, likely meeting <75% nutrient needs.    Findings as based on:  •  Comprehensive nutrition assessment and consultation  •  Calorie counts (nutrient intake analysis)  •  Food acceptance and intake status from observations by staff  •  Follow up  •  Patient education  •  Intervention secondary to interdisciplinary rounds  •   concerns      Treatment:    The following has been recommended:  1) As medically feasible, initiate Glucerna 1.5 @ 20 ml/hr and advance 10 ml/hr q4 hrs until goal rate of 65 ml/hr (x20 hrs) to provide 1300 ml, 1950 kcal, 108g protein, 987 ml free water, and >100% of RDIs for vitamins/minerals; additional free water per MD discretion.  2) Rx: MVI daily.   3) Monitor tube feed tolerance.   4) Obtain daily weights to monitor trends.    PROVIDER Section:     By signing this assessment you are acknowledging and agree with the diagnosis/diagnoses assigned by the Registered Dietitian    Comments: Upon Nutritional Assessment by the Registered Dietitian your patient was determined to meet criteria / has evidence of the following diagnosis/diagnoses:          [ ]  Mild Protein Calorie Malnutrition        [x ]  Moderate Protein Calorie Malnutrition        [ ] Severe Protein Calorie Malnutrition        [ ] Unspecified Protein Calorie Malnutrition        [ ] Underweight / BMI <19        [ ] Morbid Obesity / BMI > 40    Pt presents at high nutrition risk secondary to malnutrition (moderate, acute on chronic) related to inability to meet sufficient protein-energy in setting of CHF, septic shock 2/2 urosepsis, recent COVID infection, and dysphagia as evidenced by mild muscle loss of temples, clavicles, shoulders; mild fat loss of orbitals, probable 10.9% wt loss x ~4 month, likely meeting <75% nutrient needs.    Findings as based on:  •  Comprehensive nutrition assessment and consultation  •  Calorie counts (nutrient intake analysis)  •  Food acceptance and intake status from observations by staff  •  Follow up  •  Patient education  •  Intervention secondary to interdisciplinary rounds  •   concerns      Treatment:    The following has been recommended:  1)     PROVIDER Section:     By signing this assessment you are acknowledging and agree with the diagnosis/diagnoses assigned by the Registered Dietitian    Comments: Upon Nutritional Assessment by the Registered Dietitian your patient was determined to meet criteria / has evidence of the following diagnosis/diagnoses:          [ ]  Mild Protein Calorie Malnutrition        [x ]  Moderate Protein Calorie Malnutrition        [ ] Severe Protein Calorie Malnutrition        [ ] Unspecified Protein Calorie Malnutrition        [ ] Underweight / BMI <19        [ ] Morbid Obesity / BMI > 40    Pt presents at high nutrition risk secondary to malnutrition (moderate, acute on chronic) related to inability to meet sufficient protein-energy in setting of CHF, septic shock 2/2 urosepsis, recent COVID infection, and dysphagia as evidenced by mild muscle loss of temples, clavicles, shoulders; mild fat loss of orbitals, probable 10.9% wt loss x ~4 month, likely meeting <75% nutrient needs.    Findings as based on:  •  Comprehensive nutrition assessment and consultation  •  Calorie counts (nutrient intake analysis)  •  Food acceptance and intake status from observations by staff  •  Follow up  •  Patient education  •  Intervention secondary to interdisciplinary rounds  •   concerns      Treatment:    The following has been recommended:  1) Increase tube feeds to goal rate of 50 ml/hr (x20 hrs) to provide 1000 ml, 1800 kcal, 81g protein, 727 ml free water, and 100% of RDIs for vitamins/minerals; additional free water per MD discretion.  2) Rx: MVI daily.   3) Monitor tube feed tolerance.   4) Obtain daily weights to monitor trends.      PROVIDER Section:     By signing this assessment you are acknowledging and agree with the diagnosis/diagnoses assigned by the Registered Dietitian    Comments:

## 2020-08-22 NOTE — DIETITIAN INITIAL EVALUATION ADULT. - PROBLEM SELECTOR PLAN 5
Unknown ventricular function. Has significant chronic bilateral LE edema. Does not appear overtly volume overloaded at this time. Will order formal TTE as none are available in his records.

## 2020-08-22 NOTE — DIETITIAN INITIAL EVALUATION ADULT. - PERTINENT MEDS FT
MEDICATIONS  (STANDING):  chlorhexidine 2% Cloths 1 Application(s) Topical <User Schedule>  dextrose 5%. 1000 milliLiter(s) (50 mL/Hr) IV Continuous <Continuous>  dextrose 50% Injectable 12.5 Gram(s) IV Push once  dextrose 50% Injectable 25 Gram(s) IV Push once  dextrose 50% Injectable 25 Gram(s) IV Push once  heparin   Injectable 5000 Unit(s) SubCutaneous every 8 hours  insulin lispro (HumaLOG) corrective regimen sliding scale   SubCutaneous three times a day before meals  meropenem  IVPB 1000 milliGRAM(s) IV Intermittent every 12 hours  meropenem  IVPB      midodrine. 10 milliGRAM(s) Oral three times a day  nystatin/triamcinolone Ointment 1 Application(s) Topical two times a day  tamsulosin 0.4 milliGRAM(s) Oral at bedtime    MEDICATIONS  (PRN):  dextrose 40% Gel 15 Gram(s) Oral once PRN Blood Glucose LESS THAN 70 milliGRAM(s)/deciliter  glucagon  Injectable 1 milliGRAM(s) IntraMuscular once PRN Glucose LESS THAN 70 milligrams/deciliter

## 2020-08-22 NOTE — DIETITIAN INITIAL EVALUATION ADULT. - PHYSICAL APPEARANCE
obese/BMI 30.1 (based on current weight 204 lbs and height of 69" obtained from previous RD assessment)

## 2020-08-22 NOTE — DIETITIAN INITIAL EVALUATION ADULT. - ENTERAL
as medically feasible, initiate Glucerna 1.5 @ 20 ml/hr and advance 10 ml/hr q4 hrs until goal rate of 65 ml/hr (x20 hrs) to provide 1300 ml, 1950 kcal, 108g protein, 987 ml free water, and >100% of RDIs for vitamins/minerals; additional free water per MD discretion. goal rate of 50 ml/hr (x20 hrs) to provide 1000 ml, 1800 kcal, 81g protein, 727 ml free water, and 100% of RDIs for vitamins/minerals; additional free water per MD discretion.

## 2020-08-22 NOTE — DIETITIAN INITIAL EVALUATION ADULT. - MALNUTRITION
Limited NFPE: mild muscle loss of temples, clavicles, shoulders; mild fat loss of orbitals moderate, acute on chronic

## 2020-08-22 NOTE — DIETITIAN INITIAL EVALUATION ADULT. - OTHER INFO
70 year old male with a h/o developmental disability, CHF, HTN, DM, AF (apixaban recently stopped due to anemia/hematuria), CKD (related to recent COVID-19 infection, refused hemodialysis), BPH, with septic shock, UTI, profound hypothermia, bradycardia, MARSHAL, metabolic acidosis. Pt non-verbal, unable to provide any nutrition hx. Per EMR review, pts weight in 4/2020 was 229 lbs; current weight is 204 lbs and possible indicative of a 25 lb wt loss x ~4 months. Consult received for decreased po intake PTA and enteral nutrition. Aware SLP attempted to assess pt, however, pt lethargic and not appropriate at this time. NGT now placed for feeds. Noted with 3+ b/l leg edema. 70 year old male with a h/o developmental disability, CHF, HTN, DM, AF (apixaban recently stopped due to anemia/hematuria), CKD (related to recent COVID-19 infection, refused hemodialysis), BPH, with septic shock, UTI, profound hypothermia, bradycardia, MARSHAL, metabolic acidosis. Pt non-verbal, unable to provide any nutrition hx. Per EMR review, pts weight in 4/2020 was 229 lbs; current weight is 204 lbs and possible indicative of a 25 lb wt loss x ~4 months. Consult received for decreased po intake PTA and enteral nutrition. Aware SLP attempted to assess pt, however, pt lethargic and not appropriate at this time. NGT now placed for feeds- Nepro, goal rate of 40 ml/hr now ordered. Noted with 3+ b/l leg edema.

## 2020-08-22 NOTE — PROGRESS NOTE ADULT - SUBJECTIVE AND OBJECTIVE BOX
CC: septic shock, UTI, hypothermia (21 Aug 2020 08:26)    INTERVAL HPI/OVERNIGHT EVENTS:    Vital Signs Last 24 Hrs  T(C): 35.7 (22 Aug 2020 04:43), Max: 36.5 (21 Aug 2020 16:39)  T(F): 96.3 (22 Aug 2020 04:43), Max: 97.7 (21 Aug 2020 16:39)  HR: 72 (22 Aug 2020 12:00) (59 - 78)  BP: 90/57 (22 Aug 2020 12:00) (74/50 - 148/76)  BP(mean): 79 (22 Aug 2020 01:38) (68 - 98)  RR: 23 (22 Aug 2020 12:00) (20 - 36)  SpO2: 99% (22 Aug 2020 12:00) (96% - 100%)    PHYSICAL EXAM:  General: Well developed; well nourished; in no acute distress  Eyes: PERRLA, EOMI; conjunctiva and sclera clear  Head: Normocephalic; atraumatic  ENMT: NGT in place at time of exam; dry oral mucosa  Neck: Supple; non tender; no masses  Respiratory: No wheezes, rales or rhonchi  Cardiovascular: Regular rate and rhythm  Gastrointestinal: Soft; doesn't appear to be tender; non-distended; Normal bowel sounds  Extremities: Normal range of motion, No clubbing, cyanosis or edema  Vascular: Peripheral pulses palpable 2+ bilaterally  Neurological: responsive but makes non-purposeful movement  Skin: Warm and dry. No acute rash  Psychiatric: Cooperative and appropriate    I&O's Detail    21 Aug 2020 07:01  -  22 Aug 2020 07:00  --------------------------------------------------------  IN:    Lactated Ringers IV Bolus: 1000 mL    Solution: 50 mL  Total IN: 1050 mL    OUT:    Indwelling Catheter - Urethral: 1550 mL  Total OUT: 1550 mL    Total NET: -500 mL    CARDIAC MARKERS ( 20 Aug 2020 18:07 )  x     / 0.06 ng/mL / x     / x     / x                            7.6    6.74  )-----------( 102      ( 22 Aug 2020 06:33 )             24.6     22 Aug 2020 06:33    140    |  105    |  66.0   ----------------------------<  95     5.2     |  13.0   |  3.05     Ca    8.1        22 Aug 2020 06:33  Phos  5.6       22 Aug 2020 06:33  Mg     1.6       22 Aug 2020 06:33    TPro  7.0    /  Alb  3.2    /  TBili  0.2    /  DBili  x      /  AST  32     /  ALT  22     /  AlkPhos  73     20 Aug 2020 18:07    PT/INR - ( 20 Aug 2020 18:07 )   PT: 19.1 sec;   INR: 1.69 ratio      PTT - ( 20 Aug 2020 18:07 )  PTT:54.4 sec    CAPILLARY BLOOD GLUCOSE  POCT Blood Glucose.: 118 mg/dL (22 Aug 2020 12:19)  POCT Blood Glucose.: 104 mg/dL (22 Aug 2020 08:15)  POCT Blood Glucose.: 114 mg/dL (22 Aug 2020 03:09)  POCT Blood Glucose.: 135 mg/dL (21 Aug 2020 21:36)  POCT Blood Glucose.: 145 mg/dL (21 Aug 2020 17:29)    LIVER FUNCTIONS - ( 20 Aug 2020 18:07 )  Alb: 3.2 g/dL / Pro: 7.0 g/dL / ALK PHOS: 73 U/L / ALT: 22 U/L / AST: 32 U/L / GGT: x           Urinalysis Basic - ( 20 Aug 2020 19:01 )    Color: Yellow / Appearance: Turbid / S.015 / pH: x  Gluc: x / Ketone: Trace  / Bili: Negative / Urobili: Negative   Blood: x / Protein: 500 mg/dL / Nitrite: Negative   Leuk Esterase: Moderate / RBC: 6-10 /HPF / WBC >50   Sq Epi: x / Non Sq Epi: Occasional / Bacteria: Few    MEDICATIONS  (STANDING):  chlorhexidine 2% Cloths 1 Application(s) Topical <User Schedule>  dextrose 5%. 1000 milliLiter(s) (50 mL/Hr) IV Continuous <Continuous>  dextrose 50% Injectable 12.5 Gram(s) IV Push once  dextrose 50% Injectable 25 Gram(s) IV Push once  dextrose 50% Injectable 25 Gram(s) IV Push once  heparin   Injectable 5000 Unit(s) SubCutaneous every 8 hours  insulin lispro (HumaLOG) corrective regimen sliding scale   SubCutaneous three times a day before meals  meropenem  IVPB 1000 milliGRAM(s) IV Intermittent every 12 hours  meropenem  IVPB      midodrine. 10 milliGRAM(s) Oral three times a day  nystatin/triamcinolone Ointment 1 Application(s) Topical two times a day  tamsulosin 0.4 milliGRAM(s) Oral at bedtime    MEDICATIONS  (PRN):  dextrose 40% Gel 15 Gram(s) Oral once PRN Blood Glucose LESS THAN 70 milliGRAM(s)/deciliter  glucagon  Injectable 1 milliGRAM(s) IntraMuscular once PRN Glucose LESS THAN 70 milligrams/deciliter    RADIOLOGY & ADDITIONAL TESTS:

## 2020-08-22 NOTE — PROGRESS NOTE ADULT - ASSESSMENT
69 y/o M with a h/o developmental disability, CHF, HTN, DM, AF (apixaban recently stopped due to anemia/hematuria), CKD (related to recent COVID-19 infection, refused hemodialysis), BPH, with septic shock, UTI, profound hypothermia, bradycardia, MARSHAL, metabolic acidosis.    #Septic shock 2/2 bilateral pyelonephritis based on UA and CT abdomen  - s/p 1L warmed LR bolus.  - continue meropenem given recent IV abx and pseudomonas bacteremia  - Blood cultures pending  - urine culture shows GNR    #Hypothermia - improved  - Likely related to sepsis. Reportedly became very hypothermic during admission for COVID-19.  - Gradually rewarming with hyperthermia blanket and heated IVF. Will monitor    #acute metabolic encephalopathy - with dysphagia  - may be related to UTI  - NGT in place  - start tube feeds  - speech to follow    #MARSHAL on CKD stage III  - Recent COVID-19 related renal failure for which HD was refused, however kidneys recovered. Now with ischemic ATN.   - Trend BUN/Cr. Monitor lytes. I'S and O's. Renally dose medications.   - Family declines dialysis    #AG Metabolic acidosis 2/2 uremia.   - anion gap stable  - Trend serum bicarb.     #Congestive heart failure  - Has significant chronic bilateral LE edema. Does not appear overtly volume overloaded at this time.   - TTE shows severely enlarged atria, severe tricuspid regurgitation, mod reduced RV systolic function, evidence of pulmonary hypertension. no prior records available  - VQ scan to rule out PE  - cardiology consult    #Afib.  - Recently taken off apixaban for anemia/hematuria. No indication for rate control agents at this time.    #DM   - A1c 7.4%  - Start sliding scale insulin coverage.     DVT ppx: SCDs, avoiding AC    DNR/DNI. As per palliative, will consult social work when transferred to floor to review if family order is valid with state given MRDD

## 2020-08-22 NOTE — CHART NOTE - NSCHARTNOTEFT_GEN_A_CORE
multiple calls overnight by RN pertaining to pt's low BP. Pt was admitted for septic shock due to urosepsis s/p pressor support in MICU now downgraded. Pt with labile BP and hypothermia, unable to give midodrine due to NPO status pending speech swallow evaluation. Pt also with oxygen requirement overnight and TTE with severe heart failure, unable to IVF bolus. Pt was seen at bedside, nonverbal but at baseline mental status and otherwise clinically stable. charts reviewed pt is on Meropenum, given persisted hypotension, MICU consulted.

## 2020-08-22 NOTE — DIETITIAN INITIAL EVALUATION ADULT. - ETIOLOGY
related to inability to meet sufficient protein-energy in setting of CHF, septic shock 2/2 urosepsis, recent COVID infection, and dysphagia

## 2020-08-22 NOTE — DIETITIAN INITIAL EVALUATION ADULT. - PERTINENT LABORATORY DATA
08-22 Na140 mmol/L Glu 95 mg/dL K+ 5.2 mmol/L Cr  3.05 mg/dL<H> BUN 66.0 mg/dL<H> Phos 5.6 mg/dL<H> Alb n/a   PAB n/a

## 2020-08-23 NOTE — CHART NOTE - NSCHARTNOTEFT_GEN_A_CORE
Notified by Nurse Patient with cont. hypothermia, hypotension, intermittent bradycardia. Chart reviewed. Concern for possible adrenal insufficiency based on clinical scenario. Will cont. warming blanket, give NS bolus, Midodrine x 1 now, and start on Solucortef 100mg IV Q 8.

## 2020-08-23 NOTE — PROGRESS NOTE ADULT - ASSESSMENT
69 y/o M with a h/o developmental disability, CHF, HTN, DM, AF (apixaban recently stopped due to anemia/hematuria), CKD (related to recent COVID-19 infection, refused hemodialysis), BPH, with septic shock, UTI, profound hypothermia, bradycardia, MARSHAL, metabolic acidosis.    #Septic shock 2/2 bilateral pyelonephritis based on UA and CT abdomen - may have component of adrenal insufficiency as well based on hypothermia/bradycardia  - seems to be responding to stress dose steroids overnight  - s/p 1L warmed LR bolus.  - continue meropenem given recent IV abx and pseudomonas bacteremia  - Blood cultures pending  - urine culture shows GNR    #Hypothermia - recurred overnight and started on stress dose steroids  - monitor temperature  - treatment as above    #acute metabolic encephalopathy - with dysphagia  - may be related to UTI  - NGT in place  - start tube feeds  - speech to follow    #MARSHAL on CKD stage III  - Recent COVID-19 related renal failure for which HD was refused, however kidneys recovered. Now with ischemic ATN.   - Trend BUN/Cr. Monitor lytes. I'S and O's. Renally dose medications.   - Family declines dialysis    #AG Metabolic acidosis 2/2 uremia.   - anion gap stable  - Trend serum bicarb.     #Congestive heart failure  - Has significant chronic bilateral LE edema. Does not appear overtly volume overloaded at this time.   - TTE shows severely enlarged atria, severe tricuspid regurgitation, mod reduced RV systolic function, evidence of pulmonary hypertension. no prior records available  - VQ scan to rule out PE  - cardiology consult    #Afib.  - Recently taken off apixaban for anemia/hematuria. No indication for rate control agents at this time.    #DM   - A1c 7.4%  - Start sliding scale insulin coverage.     #DVT ppx: SCDs, avoiding AC    #Code status: DNR/DNI. 71 y/o M with a h/o developmental disability, CHF, HTN, DM, AF (apixaban recently stopped due to anemia/hematuria), CKD (related to recent COVID-19 infection, refused hemodialysis), BPH, with septic shock, UTI, profound hypothermia, bradycardia, MARSHAL, metabolic acidosis.    #Septic shock 2/2 bilateral pyelonephritis based on UA and CT abdomen - may have component of adrenal insufficiency as well based on hypothermia/bradycardia  - seems to be responding to stress dose steroids overnight  - s/p 1L warmed LR bolus.  - continue meropenem given recent IV abx and pseudomonas bacteremia  - Blood cultures pending  - urine culture shows GNR    #Hypothermia - recurred overnight and started on stress dose steroids  - monitor temperature  - treatment as above    #acute metabolic encephalopathy - with dysphagia  - may be related to UTI  - NGT in place  - start tube feeds  - speech to follow    #MARSHAL on CKD stage III  - Recent COVID-19 related renal failure for which HD was refused, however kidneys recovered. Now with ischemic ATN.   - Trend BUN/Cr. Monitor lytes. I'S and O's. Renally dose medications.   - Family declines dialysis    #AG Metabolic acidosis 2/2 uremia.   - anion gap stable  - Trend serum bicarb.     #Congestive heart failure  - Has significant chronic bilateral LE edema. Does not appear overtly volume overloaded at this time.   - TTE shows severely enlarged atria, severe tricuspid regurgitation, mod reduced RV systolic function, evidence of pulmonary hypertension. no prior records available  - VQ scan to rule out PE    #Afib.  - Recently taken off apixaban for anemia/hematuria. No indication for rate control agents at this time.    #DM   - A1c 7.4%  - Start sliding scale insulin coverage.     #DVT ppx: SCDs, avoiding AC    #Code status: DNR/DNI.

## 2020-08-23 NOTE — PROGRESS NOTE ADULT - SUBJECTIVE AND OBJECTIVE BOX
CC: septic shock, UTI, hypothermia (21 Aug 2020 08:26)    INTERVAL HPI/OVERNIGHT EVENTS:  patient able to follow some commands  appears lethargic  was more active in the afternoon yesterday  BP appears improved after starting on stress dose steroids    Vital Signs Last 24 Hrs  T(C): 37.1 (23 Aug 2020 10:00), Max: 37.3 (23 Aug 2020 05:46)  T(F): 98.7 (23 Aug 2020 10:00), Max: 99.1 (23 Aug 2020 05:46)  HR: 69 (23 Aug 2020 08:00) (35 - 87)  BP: 98/54 (23 Aug 2020 08:00) (88/44 - 111/48)  BP(mean): 64 (23 Aug 2020 08:00) (64 - 64)  RR: 27 (23 Aug 2020 08:00) (16 - 27)  SpO2: 99% (23 Aug 2020 08:00) (98% - 100%)    PHYSICAL EXAM:  General: in no acute distress; decreased mentation  Head: Normocephalic; atraumatic  ENMT: NGT in place at time of exam; dry oral mucosa  Neck: Supple; non tender; no masses  Respiratory: No wheezes, rales or rhonchi  Cardiovascular: Regular rate and rhythm  Gastrointestinal: Soft; doesn't appear to be tender; non-distended; Normal bowel sounds  Extremities: Normal range of motion, No clubbing, cyanosis or edema  Vascular: Peripheral pulses palpable 2+ bilaterally  Neurological: responsive but makes non-purposeful movement; does follow commands when asked; can answer yes/no questions to pain  Skin: Warm and dry. No acute rash  Psychiatric: Cooperative and appropriate    I&O's Detail    21 Aug 2020 07:01  -  22 Aug 2020 07:00  --------------------------------------------------------  IN:    Lactated Ringers IV Bolus: 1000 mL    Solution: 50 mL  Total IN: 1050 mL    OUT:    Indwelling Catheter - Urethral: 1550 mL  Total OUT: 1550 mL    Total NET: -500 mL    CARDIAC MARKERS ( 20 Aug 2020 18:07 )  x     / 0.06 ng/mL / x     / x     / x                            7.6    6.74  )-----------( 102      ( 22 Aug 2020 06:33 )             24.6     22 Aug 2020 06:33    140    |  105    |  66.0   ----------------------------<  95     5.2     |  13.0   |  3.05     Ca    8.1        22 Aug 2020 06:33  Phos  5.6       22 Aug 2020 06:33  Mg     1.6       22 Aug 2020 06:33    TPro  7.0    /  Alb  3.2    /  TBili  0.2    /  DBili  x      /  AST  32     /  ALT  22     /  AlkPhos  73     20 Aug 2020 18:07    PT/INR - ( 20 Aug 2020 18:07 )   PT: 19.1 sec;   INR: 1.69 ratio      PTT - ( 20 Aug 2020 18:07 )  PTT:54.4 sec    CAPILLARY BLOOD GLUCOSE  POCT Blood Glucose.: 118 mg/dL (22 Aug 2020 12:19)  POCT Blood Glucose.: 104 mg/dL (22 Aug 2020 08:15)  POCT Blood Glucose.: 114 mg/dL (22 Aug 2020 03:09)  POCT Blood Glucose.: 135 mg/dL (21 Aug 2020 21:36)  POCT Blood Glucose.: 145 mg/dL (21 Aug 2020 17:29)    LIVER FUNCTIONS - ( 20 Aug 2020 18:07 )  Alb: 3.2 g/dL / Pro: 7.0 g/dL / ALK PHOS: 73 U/L / ALT: 22 U/L / AST: 32 U/L / GGT: x           Urinalysis Basic - ( 20 Aug 2020 19:01 )    Color: Yellow / Appearance: Turbid / S.015 / pH: x  Gluc: x / Ketone: Trace  / Bili: Negative / Urobili: Negative   Blood: x / Protein: 500 mg/dL / Nitrite: Negative   Leuk Esterase: Moderate / RBC: 6-10 /HPF / WBC >50   Sq Epi: x / Non Sq Epi: Occasional / Bacteria: Few    MEDICATIONS  (STANDING):  chlorhexidine 2% Cloths 1 Application(s) Topical <User Schedule>  dextrose 5%. 1000 milliLiter(s) (50 mL/Hr) IV Continuous <Continuous>  dextrose 50% Injectable 12.5 Gram(s) IV Push once  dextrose 50% Injectable 25 Gram(s) IV Push once  dextrose 50% Injectable 25 Gram(s) IV Push once  heparin   Injectable 5000 Unit(s) SubCutaneous every 8 hours  insulin lispro (HumaLOG) corrective regimen sliding scale   SubCutaneous three times a day before meals  meropenem  IVPB 1000 milliGRAM(s) IV Intermittent every 12 hours  meropenem  IVPB      midodrine. 10 milliGRAM(s) Oral three times a day  nystatin/triamcinolone Ointment 1 Application(s) Topical two times a day  tamsulosin 0.4 milliGRAM(s) Oral at bedtime    MEDICATIONS  (PRN):  dextrose 40% Gel 15 Gram(s) Oral once PRN Blood Glucose LESS THAN 70 milliGRAM(s)/deciliter  glucagon  Injectable 1 milliGRAM(s) IntraMuscular once PRN Glucose LESS THAN 70 milligrams/deciliter    RADIOLOGY & ADDITIONAL TESTS:

## 2020-08-24 NOTE — PROGRESS NOTE ADULT - ASSESSMENT
69 y/o M with a h/o developmental disability, CHF, HTN, DM, AF (apixaban recently stopped due to anemia/hematuria), CKD (related to recent COVID-19 infection, refused hemodialysis), BPH, with septic shock, UTI, profound hypothermia, bradycardia, MARSHAL, metabolic acidosis.    #Septic shock 2/2 bilateral pyelonephritis based on UA and CT abdomen - may have component of adrenal insufficiency as well based on hypothermia/bradycardia - improved  - seems to be responding to stress dose steroids overnight  - s/p 1L warmed LR bolus.  - continue meropenem given recent IV abx and pseudomonas bacteremia  - Blood cultures negative  - urine culture shows GNR    #Hypothermia - recurred overnight and started on stress dose steroids  - monitor temperature  - treatment as above    #acute metabolic encephalopathy - with dysphagia  - mentation improved  - still with some coughing on introducing foods  - NGT in place - continue for now  - speech and swallow to follow    #MARSHAL on CKD stage III  - Recent COVID-19 related renal failure for which HD was refused, however kidneys recovered. Now with ischemic ATN.   - Trend BUN/Cr. Monitor lytes. I'S and O's. Renally dose medications.   - Family declines dialysis    #AG Metabolic acidosis 2/2 uremia.   - anion gap stable  - Trend serum bicarb.     #Congestive heart failure  - Has significant chronic bilateral LE edema. Does not appear overtly volume overloaded at this time.   - TTE shows severely enlarged atria, severe tricuspid regurgitation, mod reduced RV systolic function, evidence of pulmonary hypertension. no prior records available  - VQ scan to rule out PE - follow up results today    #Afib.  - Recently taken off apixaban for anemia/hematuria. No indication for rate control agents at this time.    #DM   - A1c 7.4%  - Start sliding scale insulin coverage.     #DVT ppx: SCDs, avoiding AC    #Code status: DNR/DNI.

## 2020-08-24 NOTE — SWALLOW BEDSIDE ASSESSMENT ADULT - DIET PRIOR TO ADMI
unknown; pt reports eating "fish and pizza" and notes that he does not drink water, but takes medication with orange juice (questionable historian, however)

## 2020-08-24 NOTE — PROGRESS NOTE ADULT - SUBJECTIVE AND OBJECTIVE BOX
CC: septic shock, UTI, hypothermia (21 Aug 2020 08:26)    INTERVAL HPI/OVERNIGHT EVENTS:  patient able to follow some commands  more alert this morning  BP stable and no hypothermia  eager to have NGT removed  discussed over phone with sister  would like to perform VQ scan today  will follow up results as well as outcome with speech therapy    Vital Signs Last 24 Hrs  T(C): 35.5 (24 Aug 2020 10:37), Max: 36.4 (23 Aug 2020 15:27)  T(F): 95.9 (24 Aug 2020 10:37), Max: 97.6 (23 Aug 2020 15:27)  HR: 59 (24 Aug 2020 08:00) (56 - 68)  BP: 103/44 (24 Aug 2020 08:56) (88/54 - 107/52)  BP(mean): 66 (24 Aug 2020 08:56) (56 - 66)  RR: 17 (24 Aug 2020 08:00) (16 - 17)  SpO2: 100% (24 Aug 2020 08:00) (99% - 100%)    PHYSICAL EXAM:  General: in no acute distress; improved mentation  Head: Normocephalic; atraumatic  ENMT: NGT in place at time of exam; dry oral mucosa  Neck: Supple; non tender; no masses  Respiratory: No wheezes, rales or rhonchi  Cardiovascular: Regular rate and rhythm  Gastrointestinal: Soft; doesn't appear to be tender; non-distended; Normal bowel sounds  Extremities: Normal range of motion, No clubbing, cyanosis or edema  Vascular: Peripheral pulses palpable 2+ bilaterally  Neurological: responsive mostly oriented to person, place and time; follows commands when asked  Skin: Warm and dry. No acute rash  Psychiatric: Cooperative and appropriate    I&O's Detail    21 Aug 2020 07:01  -  22 Aug 2020 07:00  --------------------------------------------------------  IN:    Lactated Ringers IV Bolus: 1000 mL    Solution: 50 mL  Total IN: 1050 mL    OUT:    Indwelling Catheter - Urethral: 1550 mL  Total OUT: 1550 mL    Total NET: -500 mL    CARDIAC MARKERS ( 20 Aug 2020 18:07 )  x     / 0.06 ng/mL / x     / x     / x                            7.6    6.74  )-----------( 102      ( 22 Aug 2020 06:33 )             24.6     22 Aug 2020 06:33    140    |  105    |  66.0   ----------------------------<  95     5.2     |  13.0   |  3.05     Ca    8.1        22 Aug 2020 06:33  Phos  5.6       22 Aug 2020 06:33  Mg     1.6       22 Aug 2020 06:33    TPro  7.0    /  Alb  3.2    /  TBili  0.2    /  DBili  x      /  AST  32     /  ALT  22     /  AlkPhos  73     20 Aug 2020 18:07    PT/INR - ( 20 Aug 2020 18:07 )   PT: 19.1 sec;   INR: 1.69 ratio      PTT - ( 20 Aug 2020 18:07 )  PTT:54.4 sec    CAPILLARY BLOOD GLUCOSE  POCT Blood Glucose.: 118 mg/dL (22 Aug 2020 12:19)  POCT Blood Glucose.: 104 mg/dL (22 Aug 2020 08:15)  POCT Blood Glucose.: 114 mg/dL (22 Aug 2020 03:09)  POCT Blood Glucose.: 135 mg/dL (21 Aug 2020 21:36)  POCT Blood Glucose.: 145 mg/dL (21 Aug 2020 17:29)    LIVER FUNCTIONS - ( 20 Aug 2020 18:07 )  Alb: 3.2 g/dL / Pro: 7.0 g/dL / ALK PHOS: 73 U/L / ALT: 22 U/L / AST: 32 U/L / GGT: x           Urinalysis Basic - ( 20 Aug 2020 19:01 )    Color: Yellow / Appearance: Turbid / S.015 / pH: x  Gluc: x / Ketone: Trace  / Bili: Negative / Urobili: Negative   Blood: x / Protein: 500 mg/dL / Nitrite: Negative   Leuk Esterase: Moderate / RBC: 6-10 /HPF / WBC >50   Sq Epi: x / Non Sq Epi: Occasional / Bacteria: Few    MEDICATIONS  (STANDING):  chlorhexidine 2% Cloths 1 Application(s) Topical <User Schedule>  dextrose 5%. 1000 milliLiter(s) (50 mL/Hr) IV Continuous <Continuous>  dextrose 50% Injectable 12.5 Gram(s) IV Push once  dextrose 50% Injectable 25 Gram(s) IV Push once  dextrose 50% Injectable 25 Gram(s) IV Push once  heparin   Injectable 5000 Unit(s) SubCutaneous every 8 hours  insulin lispro (HumaLOG) corrective regimen sliding scale   SubCutaneous three times a day before meals  meropenem  IVPB 1000 milliGRAM(s) IV Intermittent every 12 hours  meropenem  IVPB      midodrine. 10 milliGRAM(s) Oral three times a day  nystatin/triamcinolone Ointment 1 Application(s) Topical two times a day  tamsulosin 0.4 milliGRAM(s) Oral at bedtime    MEDICATIONS  (PRN):  dextrose 40% Gel 15 Gram(s) Oral once PRN Blood Glucose LESS THAN 70 milliGRAM(s)/deciliter  glucagon  Injectable 1 milliGRAM(s) IntraMuscular once PRN Glucose LESS THAN 70 milligrams/deciliter    RADIOLOGY & ADDITIONAL TESTS:

## 2020-08-24 NOTE — SWALLOW BEDSIDE ASSESSMENT ADULT - ORAL PHASE
mildly delayed oral transit time, mildly reduced attention to bolus midly delayed oral transit time, mildly reduced attention to bolus

## 2020-08-24 NOTE — SWALLOW BEDSIDE ASSESSMENT ADULT - PHARYNGEAL PHASE
Delayed cough post oral intake/Multiple swallows/Throat clear post oral intake/Pt with poor tolerance of laryngeal palaption during swallow, lowering  chin to prevent palpation and pushing clinician hand away. Throat clear post oral intake/Multiple swallows/Pt with poor tolerance of laryngeal palaption during swallow, lowering  chin to prevent palpation and pushing clinician hand away.

## 2020-08-25 NOTE — PHYSICAL THERAPY INITIAL EVALUATION ADULT - GENERAL OBSERVATIONS, REHAB EVAL
Pt received in bed, + IV Loc, +Tele, + NG tube + PRAFOs + rodriguez + bilateral mitten restraints, in NAD, in 0/10 pain, agreeable to PT evaluation

## 2020-08-25 NOTE — PHYSICAL THERAPY INITIAL EVALUATION ADULT - LEVEL OF INDEPENDENCE: SUPINE/SIT, REHAB EVAL
Pt demonstrating poor sitting balance, was pushing posteriorly despite verbal & tactile cues to shift weight anteriorly. Pt reporting he is fearful of falling. Support given./maximum assist (25% patients effort)

## 2020-08-25 NOTE — PHYSICAL THERAPY INITIAL EVALUATION ADULT - IMPAIRMENTS CONTRIBUTING IMPAIRED BED MOBILITY, REHAB EVAL
decreased flexibility/decreased ROM/impaired postural control/cognition/impaired balance/decreased strength

## 2020-08-25 NOTE — ADVANCED PRACTICE NURSE CONSULT - RECOMMEDATIONS
after chart review pls consider start lantus 15 units qhs and  7 units pre meals + ss, pt is on steroids

## 2020-08-25 NOTE — PHYSICAL THERAPY INITIAL EVALUATION ADULT - PERTINENT HX OF CURRENT PROBLEM, REHAB EVAL
70M with developmental disability from Group home, HTN, DM, Afib (off AC due to bleeding), CKD, CHF, admitted with sepsis secondary to ESBL Proteus secondary to UTI/pyelonpehritis, barbra on CKD, now with dysphagia.

## 2020-08-25 NOTE — PROGRESS NOTE ADULT - SUBJECTIVE AND OBJECTIVE BOX
CC: septic shock, UTI, hypothermia (21 Aug 2020 08:26)    INTERVAL HPI/OVERNIGHT EVENTS:  patient able to follow some commands  continseu to remain alert    Vital Signs Last 24 Hrs  T(C): 37.2 (25 Aug 2020 05:32), Max: 37.2 (25 Aug 2020 05:32)  T(F): 99 (25 Aug 2020 05:32), Max: 99 (25 Aug 2020 05:32)  HR: 80 (25 Aug 2020 08:00) (58 - 82)  BP: 97/47 (25 Aug 2020 08:00) (96/55 - 113/60)  BP(mean): 60 (25 Aug 2020 08:00) (60 - 64)  RR: 11 (25 Aug 2020 08:00) (11 - 16)  SpO2: 100% (25 Aug 2020 08:00) (97% - 100%)    PHYSICAL EXAM:  General: in no acute distress; improved mentation  Head: Normocephalic; atraumatic  ENMT: NGT in place at time of exam; dry oral mucosa  Neck: Supple; non tender; no masses  Respiratory: No wheezes, rales or rhonchi  Cardiovascular: Regular rate and rhythm  Gastrointestinal: Soft; doesn't appear to be tender; non-distended; Normal bowel sounds  Extremities: Normal range of motion, No clubbing, cyanosis or edema  Vascular: Peripheral pulses palpable 2+ bilaterally  Neurological: responsive mostly oriented to person, place and time; follows commands when asked  Skin: Warm and dry. No acute rash  Psychiatric: Cooperative and appropriate                        7.9    8.09  )-----------( 103      ( 25 Aug 2020 07:11 )             26.3     08-25    152<H>  |  116<H>  |  52.0<H>  ----------------------------<  370<H>  3.7   |  22.0  |  1.80<H>    Ca    8.9      25 Aug 2020 07:11    < from: [facility] Pulmonary Perfusion Scan (08.24.20 @ 12:13) >  IMPRESSION: Very low probability of pulmonary embolus    < end of copied text >    MEDICATIONS  (STANDING):  chlorhexidine 2% Cloths 1 Application(s) Topical <User Schedule>  dextrose 5%. 1000 milliLiter(s) (50 mL/Hr) IV Continuous <Continuous>  dextrose 50% Injectable 12.5 Gram(s) IV Push once  dextrose 50% Injectable 25 Gram(s) IV Push once  dextrose 50% Injectable 25 Gram(s) IV Push once  heparin   Injectable 5000 Unit(s) SubCutaneous every 8 hours  insulin lispro (HumaLOG) corrective regimen sliding scale   SubCutaneous three times a day before meals  meropenem  IVPB 1000 milliGRAM(s) IV Intermittent every 12 hours  meropenem  IVPB      midodrine. 10 milliGRAM(s) Oral three times a day  nystatin/triamcinolone Ointment 1 Application(s) Topical two times a day  tamsulosin 0.4 milliGRAM(s) Oral at bedtime    MEDICATIONS  (PRN):  dextrose 40% Gel 15 Gram(s) Oral once PRN Blood Glucose LESS THAN 70 milliGRAM(s)/deciliter  glucagon  Injectable 1 milliGRAM(s) IntraMuscular once PRN Glucose LESS THAN 70 milligrams/deciliter    RADIOLOGY & ADDITIONAL TESTS:

## 2020-08-25 NOTE — PROGRESS NOTE ADULT - ASSESSMENT
71 y/o M with a h/o developmental disability, CHF, HTN, DM, AF (apixaban recently stopped due to anemia/hematuria), CKD (related to recent COVID-19 infection, refused hemodialysis), BPH, with septic shock, UTI, profound hypothermia, bradycardia, MARSHAL, metabolic acidosis.    #Septic shock 2/2 bilateral pyelonephritis based on UA and CT abdomen - may have component of adrenal insufficiency as well based on hypothermia/bradycardia - improved  - seems to be responding to stress dose steroids overnight  - s/p 1L warmed LR bolus.  - continue meropenem - will treat for 7 days  - urine culture shows ESBL proteus mirabilis  - Blood cultures negative    #Hypothermia - recurred overnight and started on stress dose steroids  - monitor temperature  - treatment as above  - decrease steroids to 50mg BID  - will taper off tomorrow if continues to do well    #acute metabolic encephalopathy - with dysphagia  - mentation improved  - still with some coughing on introducing foods  - NGT in place - continue for now  - speech and swallow to follow    #MARSHAL on CKD stage III  - Recent COVID-19 related renal failure for which HD was refused, however kidneys recovered. Now with ischemic ATN.   - Trend BUN/Cr. Monitor lytes. I'S and O's. Renally dose medications.   - Family declines dialysis    #AG Metabolic acidosis 2/2 uremia.   - anion gap stable  - Trend serum bicarb.     #Congestive heart failure  - Has significant chronic bilateral LE edema. Does not appear overtly volume overloaded at this time.   - TTE shows severely enlarged atria, severe tricuspid regurgitation, mod reduced RV systolic function, evidence of pulmonary hypertension. no prior records available  - VQ scan to rule out PE - follow up results today    #Afib.  - Recently taken off apixaban for anemia/hematuria. No indication for rate control agents at this time.    #DM   - A1c 7.4%  - Start sliding scale insulin coverage.   - will add lantus 10  - change FS to q6    #DVT ppx: SCDs, avoiding AC    #Code status: DNR/DNI. Will readdress with OPPD regarding NGT as necessary 71 y/o M with a h/o developmental disability, CHF, HTN, DM, AF (apixaban recently stopped due to anemia/hematuria), CKD (related to recent COVID-19 infection, refused hemodialysis), BPH, with septic shock, UTI, profound hypothermia, bradycardia, MARSHAL, metabolic acidosis.    #Septic shock 2/2 bilateral pyelonephritis based on UA and CT abdomen - may have component of adrenal insufficiency as well based on hypothermia/bradycardia - improved  - seems to be responding to stress dose steroids overnight  - s/p 1L warmed LR bolus.  - continue meropenem - will treat for 7 days  - urine culture shows ESBL proteus mirabilis  - Blood cultures negative    #Hypothermia - recurred overnight and started on stress dose steroids  - monitor temperature  - treatment as above  - decrease steroids to 50mg BID  - will taper off tomorrow if continues to do well    #acute metabolic encephalopathy - with dysphagia  - mentation improved  - still with some coughing on introducing foods  - NGT in place - continue for now  - speech and swallow to follow    #MARSHAL on CKD stage III  - Recent COVID-19 related renal failure for which HD was refused, however kidneys recovered. Now with ischemic ATN.   - Trend BUN/Cr. Monitor lytes. I'S and O's. Renally dose medications.   - Family declines dialysis    #AG Metabolic acidosis 2/2 uremia.   - anion gap stable  - Trend serum bicarb.     #Congestive heart failure  - Has significant chronic bilateral LE edema. Does not appear overtly volume overloaded at this time.   - TTE shows severely enlarged atria, severe tricuspid regurgitation, mod reduced RV systolic function, evidence of pulmonary hypertension. no prior records available  - VQ scan to rule out PE - follow up results today    #Afib.  - Recently taken off apixaban for anemia/hematuria. No indication for rate control agents at this time.    #DM   - A1c 7.4%  - Start sliding scale insulin coverage.   - will add lantus 10  - change FS to q6    #DVT ppx: SCDs, avoiding AC    #Code status: DNR/DNI. Will readdress with OPPD regarding PEG as necessary

## 2020-08-25 NOTE — CHART NOTE - NSCHARTNOTEFT_GEN_A_CORE
Source: Patient [ ]  Family [ ]   other [x ]    Current Diet: Diet, NPO with Tube Feed:   Tube Feeding Modality: Nasogastric  Nepro  Total Volume for 24 Hours (mL): 960  Continuous  Starting Tube Feed Rate {mL per Hour}: 10  Increase Tube Feed Rate by (mL): 10     Every 8 hours  Until Goal Tube Feed Rate (mL per Hour): 40  Tube Feed Duration (in Hours): 24  Tube Feed Start Time: 12:00 (08-22-20 @ 11:52)    Enteral Nutrition: Pt currently tolerating Nepro 40ml/hr x 20 hrs daily (800ml, 1440kcal, 65g protein)    Current Weight:   (8/23) 196 lbs  (8/20) 204 lbs    % Weight Change - possible wt loss since admission, will continue to monitor.  Aware pt with 1+ trace edema B/L legs.    Pertinent Medications: MEDICATIONS  (STANDING):  chlorhexidine 2% Cloths 1 Application(s) Topical <User Schedule>  dextrose 5%. 1000 milliLiter(s) (50 mL/Hr) IV Continuous <Continuous>  dextrose 50% Injectable 12.5 Gram(s) IV Push once  dextrose 50% Injectable 25 Gram(s) IV Push once  dextrose 50% Injectable 25 Gram(s) IV Push once  heparin   Injectable 5000 Unit(s) SubCutaneous every 8 hours  hydrocortisone sodium succinate Injectable 50 milliGRAM(s) IV Push every 12 hours  insulin glargine Injectable (LANTUS) 10 Unit(s) SubCutaneous at bedtime  insulin lispro (HumaLOG) corrective regimen sliding scale   SubCutaneous three times a day before meals  meropenem  IVPB 1000 milliGRAM(s) IV Intermittent every 12 hours  meropenem  IVPB      midodrine. 10 milliGRAM(s) Oral three times a day  nystatin/triamcinolone Ointment 1 Application(s) Topical two times a day  tamsulosin 0.4 milliGRAM(s) Oral at bedtime    MEDICATIONS  (PRN):  ALPRAZolam 0.5 milliGRAM(s) Oral every 8 hours PRN anxiety/agitation  dextrose 40% Gel 15 Gram(s) Oral once PRN Blood Glucose LESS THAN 70 milliGRAM(s)/deciliter  glucagon  Injectable 1 milliGRAM(s) IntraMuscular once PRN Glucose LESS THAN 70 milligrams/deciliter    Pertinent Labs: CBC Full  -  ( 25 Aug 2020 07:11 )  WBC Count : 8.09 K/uL  RBC Count : 3.12 M/uL  Hemoglobin : 7.9 g/dL  Hematocrit : 26.3 %  Platelet Count - Automated : 103 K/uL  Mean Cell Volume : 84.3 fl  Mean Cell Hemoglobin : 25.3 pg  Mean Cell Hemoglobin Concentration : 30.0 gm/dL  08-25 Na152 mmol/L<H> Glu 370 mg/dL<H> K+ 3.7 mmol/L Cr  1.80 mg/dL<H> BUN 52.0 mg/dL<H> Phos n/a   Alb n/a   PAB n/a       Skin: no skin breakdown noted    Nutrition focused physical exam conducted - found signs of malnutrition [ ]absent [x ]present    Subcutaneous fat loss: [x ] Orbital fat pads region, [ ]Buccal fat region, [ ]Triceps region,  [ ]Ribs region    Muscle wasting: [ x]Temples region, [x ]Clavicle region, [x ]Shoulder region, [ ]Scapula region, [ ]Interosseous region,  [ ]thigh region, [ ]Calf region    Current Nutrition Diagnosis: Pt remains at nutrition risk secondary to moderate protein calorie malnutrition (acute on chronic) related to inability to meet sufficient protein-energy in setting of CHF, septic shock 2/2 urosepsis, recent COVID infection, and dysphagia as evidenced by pt with moderate muscle wasting/fat loss, probable 10.9% wt loss x ~4 month and likely meeting <75% estimated energy intake prior to admission.  Aware pt failed swallow eval at this time and continues to receive enteral nutrition.    Recommendations:   1. Consider increasing Nepro to goal rate of 50 ml/hr (x20 hrs) daily to provide 1000 ml, 1800 kcal, 81g protein, 727 ml free water.  2. Consider repeat swallow eval as feasible  3. RX: Nephro-augusto daily  4. Monitor daily wts     Monitoring and Evaluation:   [ ] PO intake [x ] Tolerance to diet prescription [X] Weights  [X] Follow up per protocol [X] Labs:

## 2020-08-25 NOTE — CONSULT NOTE ADULT - SUBJECTIVE AND OBJECTIVE BOX
HPI: 70M with PMH as listed admitted 8/20 with     PERTINENT PMH REVIEWED: Yes     PAST MEDICAL & SURGICAL HISTORY:  Encounter for blood transfusion  Anemia  History of gross hematuria  History of orthostatic hypotension  Bacterial pneumonia  Muscle weakness (generalized)  2019 novel coronavirus disease (COVID-19)  Falls  Heart failure  Afib  CHF (congestive heart failure)  Gout  Hiatal hernia  BPH (benign prostatic hyperplasia)  Cognitive impairment  Cardiomyopathy  HTN (hypertension)  DM (diabetes mellitus)    SOCIAL HISTORY:  mental illness                                    Admitted from: care home     Surrogate/HCP/Guardian: Phone#:    FAMILY HISTORY: unable to obtain patient is not good historian     Baseline ADLs (prior to admission):  Independent/ Dependent      Allergies    No Known Allergies    Present Symptoms:     Dyspnea: 0 1 2 3   Nausea/Vomiting: Yes No  Anxiety:  Yes No  Depression: Yes No  Fatigue: Yes No  Loss of appetite: Yes No    Pain:             Character-            Duration-            Effect-            Factors-            Frequency-            Location-            Severity-    Review of Systems: Reviewed                     Negative:                     Positive:  Unable to obtain due to poor mentation   All others negative    MEDICATIONS  (STANDING):  chlorhexidine 2% Cloths 1 Application(s) Topical <User Schedule>  dextrose 5%. 1000 milliLiter(s) (50 mL/Hr) IV Continuous <Continuous>  dextrose 50% Injectable 12.5 Gram(s) IV Push once  dextrose 50% Injectable 25 Gram(s) IV Push once  dextrose 50% Injectable 25 Gram(s) IV Push once  heparin   Injectable 5000 Unit(s) SubCutaneous every 8 hours  hydrocortisone sodium succinate Injectable 50 milliGRAM(s) IV Push every 12 hours  insulin glargine Injectable (LANTUS) 10 Unit(s) SubCutaneous at bedtime  insulin lispro (HumaLOG) corrective regimen sliding scale   SubCutaneous three times a day before meals  meropenem  IVPB 1000 milliGRAM(s) IV Intermittent every 12 hours  meropenem  IVPB      midodrine. 10 milliGRAM(s) Oral three times a day  nystatin/triamcinolone Ointment 1 Application(s) Topical two times a day  tamsulosin 0.4 milliGRAM(s) Oral at bedtime    MEDICATIONS  (PRN):  ALPRAZolam 0.5 milliGRAM(s) Oral every 8 hours PRN anxiety/agitation  dextrose 40% Gel 15 Gram(s) Oral once PRN Blood Glucose LESS THAN 70 milliGRAM(s)/deciliter  glucagon  Injectable 1 milliGRAM(s) IntraMuscular once PRN Glucose LESS THAN 70 milligrams/deciliter    PHYSICAL EXAM:    Vital Signs Last 24 Hrs  T(C): 37.2 (25 Aug 2020 05:32), Max: 37.2 (25 Aug 2020 05:32)  T(F): 99 (25 Aug 2020 05:32), Max: 99 (25 Aug 2020 05:32)  HR: 80 (25 Aug 2020 08:00) (58 - 82)  BP: 97/47 (25 Aug 2020 08:00) (96/55 - 113/60)  BP(mean): 60 (25 Aug 2020 08:00) (60 - 64)  RR: 11 (25 Aug 2020 08:00) (11 - 16)  SpO2: 100% (25 Aug 2020 08:00) (97% - 100%)    General: alert  oriented x ____ lethargic agitated                  cachexia  nonverbal  coma    Karnofsky:  %    HEENT: normal  dry mouth  ET tube/trach    Lungs: comfortable tachypnea/labored breathing  excessive secretions    CV: normal  tachycardia    GI: normal  distended  tender  no BS               PEG/NG/OG tube  constipation  last BM:     : normal  incontinent  oliguria/anuria  rodriguez    MSK: normal  weakness  edema             ambulatory  bedbound/wheelchair bound    Skin: normal  pressure ulcers- Stage_____  no rash    LABS:                      7.9    8.09  )-----------( 103      ( 25 Aug 2020 07:11 )             26.3     08-25    152<H>  |  116<H>  |  52.0<H>  ----------------------------<  370<H>  3.7   |  22.0  |  1.80<H>    Ca    8.9      25 Aug 2020 07:11    I&O's Summary    24 Aug 2020 07:01  -  25 Aug 2020 07:00  --------------------------------------------------------  IN: 1090 mL / OUT: 3275 mL / NET: -2185 mL    25 Aug 2020 07:01  -  25 Aug 2020 13:41  --------------------------------------------------------  IN: 120 mL / OUT: 350 mL / NET: -230 mL    RADIOLOGY & ADDITIONAL STUDIES:    ADVANCE DIRECTIVES: DNR/I. no HD, MOLST checklist and MOLST on chart HPI: 70M with PMH as listed admitted 8/20 with weakness and left facial dropp. Found to have bradycardia and hypotension, acute on chronic kidney failure, briefly admitted to ICU then transferred to medicine service. Patient with improving kidney failure, but with dysphagia and NG tube in place, awaiting repeat swallow testing today.     Recently admitted from 4/15 - 4/24 at [facility] for pseudomonal bacteremia/pneumonia, kidney failure, at that time, MOLST checklist was done ( patient is protected under OPWDD) and DNR/I and no HD was put into effect. He was also recently at [place] in March for COVID 19 +.     PERTINENT PMH REVIEWED: Yes     PAST MEDICAL & SURGICAL HISTORY:  Encounter for blood transfusion  Anemia  History of gross hematuria  History of orthostatic hypotension  Bacterial pneumonia  Muscle weakness (generalized)  2019 novel coronavirus disease (COVID-19)  Falls  Heart failure  Afib  CHF (congestive heart failure)  Gout  Hiatal hernia  BPH (benign prostatic hyperplasia)  Cognitive impairment  Cardiomyopathy  HTN (hypertension)  DM (diabetes mellitus)    SOCIAL HISTORY:  mental illness                                    Admitted from: group home     Surrogate - sister Avani Perez - [phone]    FAMILY HISTORY: unable to obtain patient is not good historian     Baseline ADLs (prior to admission):   Dependent      Allergies    No Known Allergies    Present Symptoms:     Dyspnea: 0   Nausea/Vomiting: No  Anxiety:  No  Depression: No  Fatigue: No  Loss of appetite: No    Pain: none currently             Character-            Duration-            Effect-            Factors-            Frequency-            Location-            Severity-    Review of Systems: Reviewed                 Unable to obtain due to poor mentation   All others negative    MEDICATIONS  (STANDING):  chlorhexidine 2% Cloths 1 Application(s) Topical <User Schedule>  dextrose 5%. 1000 milliLiter(s) (50 mL/Hr) IV Continuous <Continuous>  dextrose 50% Injectable 12.5 Gram(s) IV Push once  dextrose 50% Injectable 25 Gram(s) IV Push once  dextrose 50% Injectable 25 Gram(s) IV Push once  heparin   Injectable 5000 Unit(s) SubCutaneous every 8 hours  hydrocortisone sodium succinate Injectable 50 milliGRAM(s) IV Push every 12 hours  insulin glargine Injectable (LANTUS) 10 Unit(s) SubCutaneous at bedtime  insulin lispro (HumaLOG) corrective regimen sliding scale   SubCutaneous three times a day before meals  meropenem  IVPB 1000 milliGRAM(s) IV Intermittent every 12 hours  meropenem  IVPB      midodrine. 10 milliGRAM(s) Oral three times a day  nystatin/triamcinolone Ointment 1 Application(s) Topical two times a day  tamsulosin 0.4 milliGRAM(s) Oral at bedtime    MEDICATIONS  (PRN):  ALPRAZolam 0.5 milliGRAM(s) Oral every 8 hours PRN anxiety/agitation  dextrose 40% Gel 15 Gram(s) Oral once PRN Blood Glucose LESS THAN 70 milliGRAM(s)/deciliter  glucagon  Injectable 1 milliGRAM(s) IntraMuscular once PRN Glucose LESS THAN 70 milligrams/deciliter    PHYSICAL EXAM:    Vital Signs Last 24 Hrs  T(C): 37.2 (25 Aug 2020 05:32), Max: 37.2 (25 Aug 2020 05:32)  T(F): 99 (25 Aug 2020 05:32), Max: 99 (25 Aug 2020 05:32)  HR: 80 (25 Aug 2020 08:00) (58 - 82)  BP: 97/47 (25 Aug 2020 08:00) (96/55 - 113/60)  BP(mean): 60 (25 Aug 2020 08:00) (60 - 64)  RR: 11 (25 Aug 2020 08:00) (11 - 16)  SpO2: 100% (25 Aug 2020 08:00) (97% - 100%)    General: alert  NOT oriented     Karnofsky:  30 %    HEENT: dry mouth     Lungs: mild secretions    CV: normal      GI: normal. NG tube in place     : michael    MSK: bedbound    Skin: no rash    LABS:                      7.9    8.09  )-----------( 103      ( 25 Aug 2020 07:11 )             26.3     08-25    152<H>  |  116<H>  |  52.0<H>  ----------------------------<  370<H>  3.7   |  22.0  |  1.80<H>    Ca    8.9      25 Aug 2020 07:11    I&O's Summary    24 Aug 2020 07:01  -  25 Aug 2020 07:00  --------------------------------------------------------  IN: 1090 mL / OUT: 3275 mL / NET: -2185 mL    25 Aug 2020 07:01  -  25 Aug 2020 13:41  --------------------------------------------------------  IN: 120 mL / OUT: 350 mL / NET: -230 mL    RADIOLOGY & ADDITIONAL STUDIES: reviewed    < from: NM Pulmonary Perfusion Scan (08.24.20 @ 12:13) >  IMPRESSION: Very low probability of pulmonary embolus    < end of copied text >    < from: Xray Chest 1 View AP/PA. (08.24.20 @ 12:10) >    Impression:  1. NG tube in the stomach  2. Cardiomegaly with vascular congestion. Left lower lung atelectatic change. Unchanged    < end of copied text >    < from: CT Angio Head w/ IV Cont (08.20.20 @ 18:26) >    IMPRESSION:  No core infarct or evidence of delayed mean transit time on CTperfusion.    No hemodynamically significant stenosis in the neck    Intracranial narrowing as described above    < end of copied text >    ADVANCE DIRECTIVES: DNR/I. no HD, MOLST checklist and MOLST on chart HPI: 70M with PMH as listed admitted 8/20 with weakness and left facial dropp. Found to have bradycardia and hypotension, acute on chronic kidney failure, briefly admitted to ICU then transferred to medicine service. Found to have septic shock, ESBL proteus UTI/pyelonephritis on antibiotics. Patient with improving kidney failure, but with dysphagia and NG tube in place, awaiting repeat swallow testing today.     Recently admitted from 4/15 - 4/24 at [facility] for pseudomonal bacteremia/pneumonia, kidney failure, at that time, MOLST checklist was done ( patient is protected under OPWDD) and DNR/I and no HD was put into effect. He was also recently at [place] in March for COVID 19 +.     PERTINENT PMH REVIEWED: Yes     PAST MEDICAL & SURGICAL HISTORY:  Encounter for blood transfusion  Anemia  History of gross hematuria  History of orthostatic hypotension  Bacterial pneumonia  Muscle weakness (generalized)  2019 novel coronavirus disease (COVID-19)  Falls  Heart failure  Afib  CHF (congestive heart failure)  Gout  Hiatal hernia  BPH (benign prostatic hyperplasia)  Cognitive impairment  Cardiomyopathy  HTN (hypertension)  DM (diabetes mellitus)    SOCIAL HISTORY:  mental illness                                    Admitted from: group home     Surrogate - sister Avani Perez - [phone]    FAMILY HISTORY: unable to obtain patient is not good historian     Baseline ADLs (prior to admission):   Dependent      Allergies    No Known Allergies    Present Symptoms:     Dyspnea: 0   Nausea/Vomiting: No  Anxiety:  No  Depression: No  Fatigue: No  Loss of appetite: No    Pain: none currently             Character-            Duration-            Effect-            Factors-            Frequency-            Location-            Severity-    Review of Systems: Reviewed                 Unable to obtain due to poor mentation   All others negative    MEDICATIONS  (STANDING):  chlorhexidine 2% Cloths 1 Application(s) Topical <User Schedule>  dextrose 5%. 1000 milliLiter(s) (50 mL/Hr) IV Continuous <Continuous>  dextrose 50% Injectable 12.5 Gram(s) IV Push once  dextrose 50% Injectable 25 Gram(s) IV Push once  dextrose 50% Injectable 25 Gram(s) IV Push once  heparin   Injectable 5000 Unit(s) SubCutaneous every 8 hours  hydrocortisone sodium succinate Injectable 50 milliGRAM(s) IV Push every 12 hours  insulin glargine Injectable (LANTUS) 10 Unit(s) SubCutaneous at bedtime  insulin lispro (HumaLOG) corrective regimen sliding scale   SubCutaneous three times a day before meals  meropenem  IVPB 1000 milliGRAM(s) IV Intermittent every 12 hours  meropenem  IVPB      midodrine. 10 milliGRAM(s) Oral three times a day  nystatin/triamcinolone Ointment 1 Application(s) Topical two times a day  tamsulosin 0.4 milliGRAM(s) Oral at bedtime    MEDICATIONS  (PRN):  ALPRAZolam 0.5 milliGRAM(s) Oral every 8 hours PRN anxiety/agitation  dextrose 40% Gel 15 Gram(s) Oral once PRN Blood Glucose LESS THAN 70 milliGRAM(s)/deciliter  glucagon  Injectable 1 milliGRAM(s) IntraMuscular once PRN Glucose LESS THAN 70 milligrams/deciliter    PHYSICAL EXAM:    Vital Signs Last 24 Hrs  T(C): 37.2 (25 Aug 2020 05:32), Max: 37.2 (25 Aug 2020 05:32)  T(F): 99 (25 Aug 2020 05:32), Max: 99 (25 Aug 2020 05:32)  HR: 80 (25 Aug 2020 08:00) (58 - 82)  BP: 97/47 (25 Aug 2020 08:00) (96/55 - 113/60)  BP(mean): 60 (25 Aug 2020 08:00) (60 - 64)  RR: 11 (25 Aug 2020 08:00) (11 - 16)  SpO2: 100% (25 Aug 2020 08:00) (97% - 100%)    General: alert  NOT oriented     Karnofsky:  30 %    HEENT: dry mouth     Lungs: mild secretions    CV: normal      GI: normal. NG tube in place     : michael    MSK: bedbound    Skin: no rash    LABS:                      7.9    8.09  )-----------( 103      ( 25 Aug 2020 07:11 )             26.3     08-25    152<H>  |  116<H>  |  52.0<H>  ----------------------------<  370<H>  3.7   |  22.0  |  1.80<H>    Ca    8.9      25 Aug 2020 07:11    I&O's Summary    24 Aug 2020 07:01  -  25 Aug 2020 07:00  --------------------------------------------------------  IN: 1090 mL / OUT: 3275 mL / NET: -2185 mL    25 Aug 2020 07:01  -  25 Aug 2020 13:41  --------------------------------------------------------  IN: 120 mL / OUT: 350 mL / NET: -230 mL    RADIOLOGY & ADDITIONAL STUDIES: reviewed    < from: NM Pulmonary Perfusion Scan (08.24.20 @ 12:13) >  IMPRESSION: Very low probability of pulmonary embolus    < end of copied text >    < from: Xray Chest 1 View AP/PA. (08.24.20 @ 12:10) >    Impression:  1. NG tube in the stomach  2. Cardiomegaly with vascular congestion. Left lower lung atelectatic change. Unchanged    < end of copied text >    < from: CT Angio Head w/ IV Cont (08.20.20 @ 18:26) >    IMPRESSION:  No core infarct or evidence of delayed mean transit time on CTperfusion.    No hemodynamically significant stenosis in the neck    Intracranial narrowing as described above    < end of copied text >    ADVANCE DIRECTIVES: DNR/I. no HD, MOLST checklist and MOLST on chart

## 2020-08-25 NOTE — PHYSICAL THERAPY INITIAL EVALUATION ADULT - ADDITIONAL COMMENTS
Pt was a poor historian at this time, able to retrieve social history information from EMR & per sister who showed up at end of evaluation. Pt was dx with COVID and was sent to rehab for 100 days. Pre-covid pt was independent prior with use of RW. Post rehab was d/c back to his group home however there was reports of him having difficulties getting up the steps (2-3 to enter, room on main floor, 1 step to kitchen and living room). Pt was then transferred to a new group home and has only been there for ~10 days. Sister is unsure about pt's current mobility status, but reports being upset her brother is unable to do more since he was at rehab for 100 days.

## 2020-08-25 NOTE — CONSULT NOTE ADULT - ATTENDING COMMENTS
Thank you for the opportunity to assist with the care of this patient.   [facility] [phone].

## 2020-08-26 NOTE — PROVIDER CONTACT NOTE (OTHER) - BACKGROUND
Pt came in from group home for increased lethargy. Code sepsis in ED and hypothermic. H/O afib, CHF, cognitive impairment, muscle wekaness.

## 2020-08-26 NOTE — PROGRESS NOTE ADULT - ASSESSMENT
69 y/o M with a h/o developmental disability, CHF, HTN, DM, AF (apixaban recently stopped due to anemia/hematuria), CKD (related to recent COVID-19 infection, refused hemodialysis), BPH, with septic shock, UTI, profound hypothermia, bradycardia, MARSHAL, metabolic acidosis.    #Septic shock 2/2 bilateral pyelonephritis based on UA and CT abdomen - may have component of adrenal insufficiency as well based on hypothermia/bradycardia - improved  - seems to be responding to stress dose steroids overnight  - s/p 1L warmed LR bolus.  - continue meropenem - will treat for 7 days  - urine culture shows ESBL proteus mirabilis  - Blood cultures negative    #Hypothermia - recurred overnight and started on stress dose steroids  - taper steroids to 50mg daily starting tomorrow then stop  - treatment as above    #hypernatremia  - .225% saline  - follow BMP daily    #acute metabolic encephalopathy - with dysphagia - improved  - mentation improved  - still with some coughing on introducing foods  - NGT in place - continue for now  - speech and swallow to follow    #MARSHAL on CKD stage III  - Recent COVID-19 related renal failure for which HD was refused, however kidneys recovered. Now with ischemic ATN.   - Trend BUN/Cr. Monitor lytes. I'S and O's. Renally dose medications.   - Family declines dialysis    #diastolic heart failure  - Has significant chronic bilateral LE edema. Does not appear overtly volume overloaded at this time.   - TTE shows severely enlarged atria, severe tricuspid regurgitation, mod reduced RV systolic function, evidence of pulmonary hypertension. no prior records available  - VQ scan to rule out PE - VQ scan negative for PE    #Afib.  - Recently taken off apixaban for anemia/hematuria. No indication for rate control agents at this time.    #DM II  - A1c 7.4%  - Start sliding scale insulin coverage.   - will add lantus 10  - add short acting 5 U TID  - change FS to q6    #DVT ppx: SCDs, avoiding AC    #Code status: DNR/DNI    DISPO: PT recommending [facility]; can go to [facility] when sodium is normal

## 2020-08-26 NOTE — PROGRESS NOTE ADULT - ASSESSMENT
70M with developmental disability from Group home, HTN, DM, Afib (off AC due to bleeding), CKD, CHF, admitted with sepsis secondary to ESBL Proteus secondary to UTI/pyelonpehritis, marshal on CKD, now with dysphagia.     #1 UTI/pyelonephritis - on IV anitbiotics per primary team. blood cultures negative. urine culture < 50k ESBL proteus   #2 Dysphagia - cleared for dysphagia diet.   #3 MARSHAL on CKD - improving. no HD needed nor desired per prior directives.  #4 Encounter for palliative care - back to group home when medically stable.

## 2020-08-26 NOTE — PROGRESS NOTE ADULT - SUBJECTIVE AND OBJECTIVE BOX
OVERNIGHT EVENTS: doing well. NG tube out, passed speech and swallow.     Present Symptoms:     Dyspnea: 0   Nausea/Vomiting: No  Anxiety:  No  Depression: No  Fatigue: unable   Loss of appetite: no     Pain: none             Character-            Duration-            Effect-            Factors-            Frequency-            Location-            Severity-    Review of Systems: Reviewed                     Negative: no chest pain   All others negative    MEDICATIONS  (STANDING):  chlorhexidine 2% Cloths 1 Application(s) Topical <User Schedule>  dextrose 5%. 1000 milliLiter(s) (50 mL/Hr) IV Continuous <Continuous>  dextrose 50% Injectable 12.5 Gram(s) IV Push once  dextrose 50% Injectable 25 Gram(s) IV Push once  dextrose 50% Injectable 25 Gram(s) IV Push once  heparin   Injectable 5000 Unit(s) SubCutaneous every 8 hours  hydrocortisone sodium succinate Injectable 50 milliGRAM(s) IV Push every 12 hours  insulin glargine Injectable (LANTUS) 15 Unit(s) SubCutaneous at bedtime  insulin lispro (HumaLOG) corrective regimen sliding scale   SubCutaneous three times a day before meals  meropenem  IVPB 1000 milliGRAM(s) IV Intermittent every 12 hours  meropenem  IVPB      midodrine. 10 milliGRAM(s) Oral three times a day  nystatin/triamcinolone Ointment 1 Application(s) Topical two times a day  sodium chloride 0.225%. 1000 milliLiter(s) (80 mL/Hr) IV Continuous <Continuous>  tamsulosin 0.4 milliGRAM(s) Oral at bedtime    MEDICATIONS  (PRN):  ALPRAZolam 0.5 milliGRAM(s) Oral every 8 hours PRN anxiety/agitation  dextrose 40% Gel 15 Gram(s) Oral once PRN Blood Glucose LESS THAN 70 milliGRAM(s)/deciliter  glucagon  Injectable 1 milliGRAM(s) IntraMuscular once PRN Glucose LESS THAN 70 milligrams/deciliter    PHYSICAL EXAM:    Vital Signs Last 24 Hrs  T(C): 36.4 (26 Aug 2020 08:00), Max: 37.1 (26 Aug 2020 00:00)  T(F): 97.6 (26 Aug 2020 08:00), Max: 98.7 (26 Aug 2020 00:00)  HR: 74 (26 Aug 2020 12:00) (62 - 86)  BP: 129/77 (26 Aug 2020 12:00) (90/47 - 129/77)  BP(mean): 90 (26 Aug 2020 12:00) (66 - 90)  RR: 18 (26 Aug 2020 12:00) (12 - 18)  SpO2: 100% (26 Aug 2020 12:00) (95% - 100%)    General: alert, not oriented     Karnofsky:  30 %    HEENT: normal      Lungs: comfortable     CV: normal      GI: normal     : normal     MSK: bedbound/wheelchair bound    Skin: no rash    LABS:                      8.1    6.97  )-----------( 94       ( 26 Aug 2020 05:49 )             27.0     08-26    157<H>  |  120<H>  |  52.0<H>  ----------------------------<  218<H>  3.3<L>   |  24.0  |  1.55<H>    Ca    9.3      26 Aug 2020 05:49    I&O's Summary    25 Aug 2020 07:01  -  26 Aug 2020 07:00  --------------------------------------------------------  IN: 610 mL / OUT: 1000 mL / NET: -390 mL    26 Aug 2020 07:01  -  26 Aug 2020 12:52  --------------------------------------------------------  IN: 100 mL / OUT: 0 mL / NET: 100 mL    RADIOLOGY & ADDITIONAL STUDIES:    ADVANCE DIRECTIVES: DNR/I

## 2020-08-26 NOTE — PROGRESS NOTE ADULT - SUBJECTIVE AND OBJECTIVE BOX
CC: septic shock, UTI, hypothermia (21 Aug 2020 08:26)    INTERVAL HPI/OVERNIGHT EVENTS:  patient fully alert  speech improved  tolerating pureed diet  feeding himself independently    PHYSICAL EXAM:  General: in no acute distress; improved mentation  Respiratory: No wheezes, rales or rhonchi  Cardiovascular: Regular rate and rhythm  Gastrointestinal: Soft; doesn't appear to be tender; non-distended; Normal bowel sounds  Neurological: responsive mostly oriented to person, place and time; follows commands when asked  Psychiatric: Cooperative and appropriate                                   8.1    6.97  )-----------( 94       ( 26 Aug 2020 05:49 )             27.0     08-26    157<H>  |  120<H>  |  52.0<H>  ----------------------------<  218<H>  3.3<L>   |  24.0  |  1.55<H>    Ca    9.3      26 Aug 2020 05:49    < from: [facility] Pulmonary Perfusion Scan (08.24.20 @ 12:13) >  IMPRESSION: Very low probability of pulmonary embolus    < end of copied text >    MEDICATIONS  (STANDING):  chlorhexidine 2% Cloths 1 Application(s) Topical <User Schedule>  dextrose 5%. 1000 milliLiter(s) (50 mL/Hr) IV Continuous <Continuous>  dextrose 50% Injectable 12.5 Gram(s) IV Push once  dextrose 50% Injectable 25 Gram(s) IV Push once  dextrose 50% Injectable 25 Gram(s) IV Push once  heparin   Injectable 5000 Unit(s) SubCutaneous every 8 hours  insulin lispro (HumaLOG) corrective regimen sliding scale   SubCutaneous three times a day before meals  meropenem  IVPB 1000 milliGRAM(s) IV Intermittent every 12 hours  meropenem  IVPB      midodrine. 10 milliGRAM(s) Oral three times a day  nystatin/triamcinolone Ointment 1 Application(s) Topical two times a day  tamsulosin 0.4 milliGRAM(s) Oral at bedtime    MEDICATIONS  (PRN):  dextrose 40% Gel 15 Gram(s) Oral once PRN Blood Glucose LESS THAN 70 milliGRAM(s)/deciliter  glucagon  Injectable 1 milliGRAM(s) IntraMuscular once PRN Glucose LESS THAN 70 milligrams/deciliter    RADIOLOGY & ADDITIONAL TESTS:

## 2020-08-26 NOTE — PROVIDER CONTACT NOTE (OTHER) - ASSESSMENT
Pt temp of 95.7 rectal. /74. HR 75. Afib on monitor. Pt denies feeling chills/discomfort/pain. A&Ox3, confused at times at baseline.

## 2020-08-27 NOTE — PROGRESS NOTE ADULT - SUBJECTIVE AND OBJECTIVE BOX
OVERNIGHT EVENTS: no acute changes. doing well.     Present Symptoms:     Dyspnea: 0   Nausea/Vomiting: No  Anxiety:  No  Depression: No  Fatigue: No  Loss of appetite: No    Pain: none             Character-            Duration-            Effect-            Factors-            Frequency-            Location-            Severity-    Review of Systems: Reviewed             Unable to obtain due to poor mentation   All others negative    MEDICATIONS  (STANDING):  chlorhexidine 2% Cloths 1 Application(s) Topical <User Schedule>  dextrose 5%. 1000 milliLiter(s) (50 mL/Hr) IV Continuous <Continuous>  dextrose 5%. 1000 milliLiter(s) (100 mL/Hr) IV Continuous <Continuous>  dextrose 50% Injectable 12.5 Gram(s) IV Push once  dextrose 50% Injectable 25 Gram(s) IV Push once  dextrose 50% Injectable 25 Gram(s) IV Push once  heparin   Injectable 5000 Unit(s) SubCutaneous every 8 hours  hydrocortisone sodium succinate Injectable 50 milliGRAM(s) IV Push every 12 hours  insulin glargine Injectable (LANTUS) 15 Unit(s) SubCutaneous at bedtime  insulin lispro (HumaLOG) corrective regimen sliding scale   SubCutaneous three times a day before meals  insulin lispro Injectable (HumaLOG) 7 Unit(s) SubCutaneous three times a day with meals  meropenem  IVPB 1000 milliGRAM(s) IV Intermittent every 12 hours  meropenem  IVPB      midodrine. 10 milliGRAM(s) Oral three times a day  nystatin/triamcinolone Ointment 1 Application(s) Topical two times a day  tamsulosin 0.4 milliGRAM(s) Oral at bedtime    MEDICATIONS  (PRN):  ALPRAZolam 0.5 milliGRAM(s) Oral every 8 hours PRN anxiety/agitation  dextrose 40% Gel 15 Gram(s) Oral once PRN Blood Glucose LESS THAN 70 milliGRAM(s)/deciliter  glucagon  Injectable 1 milliGRAM(s) IntraMuscular once PRN Glucose LESS THAN 70 milligrams/deciliter    PHYSICAL EXAM:    Vital Signs Last 24 Hrs  T(C): 36.8 (27 Aug 2020 12:23), Max: 36.8 (27 Aug 2020 12:23)  T(F): 98.2 (27 Aug 2020 12:23), Max: 98.2 (27 Aug 2020 12:23)  HR: 71 (27 Aug 2020 12:23) (71 - 81)  BP: 123/73 (27 Aug 2020 12:23) (117/74 - 127/76)  BP(mean): --  RR: 18 (27 Aug 2020 04:59) (17 - 18)  SpO2: 96% (27 Aug 2020 12:23) (95% - 97%)    General: alert     Karnofsky: 30 %    HEENT: normal      Lungs: comfortable    CV: normal      GI: normal      : normal     MSK: weakness     Skin:  no rash    LABS:                      8.1    6.04  )-----------( 70       ( 27 Aug 2020 07:43 )             27.1     08-27    156<H>  |  124<H>  |  46.0<H>  ----------------------------<  142<H>  3.8   |  24.0  |  1.42<H>    Ca    9.0      27 Aug 2020 07:43    I&O's Summary    26 Aug 2020 07:01  -  27 Aug 2020 07:00  --------------------------------------------------------  IN: 280 mL / OUT: 0 mL / NET: 280 mL    RADIOLOGY & ADDITIONAL STUDIES:    ADVANCE DIRECTIVES: DNR/I

## 2020-08-27 NOTE — PROGRESS NOTE ADULT - ASSESSMENT
70M with developmental disability from Group home, HTN, DM, Afib (off AC due to bleeding), CKD, CHF, admitted with sepsis secondary to ESBL Proteus secondary to UTI/pyelonpehritis, marshal on CKD, now on dysphagia diet.     #1 UTI/pyelonephritis - on IV anitbiotics per primary team. blood cultures negative. urine culture < 50k ESBL proteus   #2 Dysphagia - cleared for dysphagia diet.   #3 MARSHAL on CKD - improving. no HD needed nor desired per prior directives.  #4 Encounter for palliative care - [facility] when medically stable. Will sign off, please reconsult our service should his condition change.

## 2020-08-27 NOTE — PROGRESS NOTE ADULT - SUBJECTIVE AND OBJECTIVE BOX
CC: septic shock, UTI, hypothermia (21 Aug 2020 08:26)    INTERVAL HPI/OVERNIGHT EVENTS:  no acute events overnight  patient without complaints  tolerating diet    Vital Signs Last 24 Hrs  T(C): 36.2 (27 Aug 2020 04:59), Max: 36.7 (27 Aug 2020 02:00)  T(F): 97.1 (27 Aug 2020 04:59), Max: 98.1 (27 Aug 2020 02:00)  HR: 81 (27 Aug 2020 04:59) (74 - 81)  BP: 127/76 (27 Aug 2020 04:59) (117/74 - 129/77)  BP(mean): 90 (26 Aug 2020 12:00) (90 - 90)  RR: 18 (27 Aug 2020 04:59) (17 - 18)  SpO2: 97% (27 Aug 2020 04:59) (95% - 100%)    PHYSICAL EXAM:  General: in no acute distress; improved mentation  Respiratory: No wheezes, rales or rhonchi  Cardiovascular: Regular rate and rhythm  Gastrointestinal: Soft; doesn't appear to be tender; non-distended; Normal bowel sounds  Neurological: responsive mostly oriented to person, place and time; follows commands when asked  Psychiatric: Cooperative and appropriate                                   8.1    6.04  )-----------( 70       ( 27 Aug 2020 07:43 )             27.1     08-27    156<H>  |  124<H>  |  46.0<H>  ----------------------------<  142<H>  3.8   |  24.0  |  1.42<H>    Ca    9.0      27 Aug 2020 07:43      MEDICATIONS  (STANDING):  chlorhexidine 2% Cloths 1 Application(s) Topical <User Schedule>  dextrose 5%. 1000 milliLiter(s) (50 mL/Hr) IV Continuous <Continuous>  dextrose 50% Injectable 12.5 Gram(s) IV Push once  dextrose 50% Injectable 25 Gram(s) IV Push once  dextrose 50% Injectable 25 Gram(s) IV Push once  heparin   Injectable 5000 Unit(s) SubCutaneous every 8 hours  insulin lispro (HumaLOG) corrective regimen sliding scale   SubCutaneous three times a day before meals  meropenem  IVPB 1000 milliGRAM(s) IV Intermittent every 12 hours  meropenem  IVPB      midodrine. 10 milliGRAM(s) Oral three times a day  nystatin/triamcinolone Ointment 1 Application(s) Topical two times a day  tamsulosin 0.4 milliGRAM(s) Oral at bedtime    MEDICATIONS  (PRN):  dextrose 40% Gel 15 Gram(s) Oral once PRN Blood Glucose LESS THAN 70 milliGRAM(s)/deciliter  glucagon  Injectable 1 milliGRAM(s) IntraMuscular once PRN Glucose LESS THAN 70 milligrams/deciliter    RADIOLOGY & ADDITIONAL TESTS:

## 2020-08-27 NOTE — PROGRESS NOTE ADULT - ASSESSMENT
71 y/o M with a h/o developmental disability, CHF, HTN, DM, AF (apixaban recently stopped due to anemia/hematuria), CKD (related to recent COVID-19 infection, refused hemodialysis), BPH, with septic shock, UTI, profound hypothermia, bradycardia, MARSHAL, metabolic acidosis.    #Septic shock 2/2 bilateral pyelonephritis based on UA and CT abdomen - may have component of adrenal insufficiency as well based on hypothermia/bradycardia - improved  - seems to be responding to stress dose steroids overnight  - s/p 1L warmed LR bolus.  - continue meropenem - will treat for 7 days  - urine culture shows ESBL proteus mirabilis  - Blood cultures negative    #Hypothermia - recurred overnight and started on stress dose steroids  - taper steroids to 50mg daily starting tomorrow then stop  - treatment as above    #hypernatremia  - change to D5 with W at 100 cc/hour  - follow BMP daily    #acute metabolic encephalopathy - with dysphagia - improved  - mentation improved  - speech and swallow to follow  - tolerating current diet of mechanical soft nectar thick    #MARSHAL on CKD stage III - improved  - Family declines dialysis in the past  - fluids as above    #diastolic heart failure  - Has significant chronic bilateral LE edema. Does not appear overtly volume overloaded at this time.   - TTE shows severely enlarged atria, severe tricuspid regurgitation, mod reduced RV systolic function, evidence of pulmonary hypertension. no prior records available  - VQ scan to rule out PE - VQ scan negative for PE    #Afib.  - Recently taken off apixaban for anemia/hematuria. No indication for rate control agents at this time.    #DM II  - A1c 7.4%  - Start sliding scale insulin coverage.   - will add lantus 10  - add short acting - increased to 7 U TID  - FS TIDAC    #DVT ppx: SCDs, avoiding AC    #Code status: DNR/DNI    DISPO: PT recommending [facility]; can go to [facility] when sodium is normal; COVID pending

## 2020-08-28 NOTE — PROGRESS NOTE ADULT - ASSESSMENT
69 y/o M with a h/o developmental disability, CHF, HTN, DM, AF (apixaban recently stopped due to anemia/hematuria), CKD (related to recent COVID-19 infection, refused hemodialysis), BPH, with septic shock, UTI, profound hypothermia, bradycardia, MARSHAL, metabolic acidosis.    #hypernatremia  - continue with fluids at 100cc/hour (D5 W)  - check BMP this afternoon  - follow BMP daily while here    #Septic shock 2/2 bilateral pyelonephritis based on UA and CT abdomen - may have component of adrenal insufficiency as well based on hypothermia/bradycardia - improved  - seems to be responding to stress dose steroids overnight  - s/p 1L warmed LR bolus.  - urine culture shows ESBL proteus mirabilis  - completed meropenem for 7 days  - Blood cultures negative    #Hypothermia - recurred overnight and started on stress dose steroids  - discontinued stress dose steroids today  - continue to monitor for now  - completed abx as above    #acute metabolic encephalopathy - with dysphagia - resolved (currently at baseline)  - mentation improved  - speech and swallow to follow  - tolerating current diet of mechanical soft nectar thick    #MARSHAL on CKD stage III - improved  - Family declines dialysis in the past when offered  - fluids as above  - Cr is improving    #chronic diastolic heart failure  - Has significant chronic bilateral LE edema. Does not appear overtly volume overloaded at this time.   - TTE shows severely enlarged atria, severe tricuspid regurgitation, mod reduced RV systolic function, evidence of pulmonary hypertension. no prior records available  - VQ scan to rule out PE - VQ scan negative for PE    #Afib.  - Recently taken off apixaban for anemia/hematuria. No indication for rate control agents at this time.    #DM II  - A1c 7.4%  - Start sliding scale insulin coverage.   - c/w lantus 10  - c/w lispro 7 U TID  - FS TIDAC    #DVT ppx: SCDs, avoiding AC    #Code status: DNR/DNI    DISPO: PT recommending [facility]; can go to [facility] when sodium is normal; COVID pending for disposition. If BMP shows sodium is <145 this afternoon will be medically stable for discharge.

## 2020-08-29 NOTE — PROGRESS NOTE ADULT - ASSESSMENT
71 y/o M with a h/o developmental disability, CHF, HTN, DM, AF (apixaban recently stopped due to anemia/hematuria), CKD (related to recent COVID-19 infection, refused hemodialysis), BPH, with septic shock, UTI, profound hypothermia, bradycardia, MARSHAL, metabolic acidosis.    #hypernatremia - improved  - off IV fluids and sodium stable  - follow up BMP daily    #Septic shock 2/2 bilateral pyelonephritis based on UA and CT abdomen - may have component of adrenal insufficiency as well based on hypothermia/bradycardia - improved  - seems to be responding to stress dose steroids overnight  - s/p 1L warmed LR bolus.  - urine culture shows ESBL proteus mirabilis  - completed meropenem for 7 days  - Blood cultures negative    #Hypothermia - recurred overnight and started on stress dose steroids  - discontinued stress dose steroids today  - continue to monitor for now  - completed abx as above    #acute metabolic encephalopathy - with dysphagia - resolved (currently at baseline)  - mentation improved  - speech and swallow to follow  - tolerating current diet of mechanical soft nectar thick    #MARSHLA on CKD stage III - improved  - Family declines dialysis in the past when offered  - fluids as above  - Cr is improving    #chronic diastolic heart failure  - Has significant chronic bilateral LE edema. Does not appear overtly volume overloaded at this time.   - TTE shows severely enlarged atria, severe tricuspid regurgitation, mod reduced RV systolic function, evidence of pulmonary hypertension. no prior records available  - VQ scan to rule out PE - VQ scan negative for PE    #Afib.  - Recently taken off apixaban for anemia/hematuria. No indication for rate control agents at this time.    #DM II  - A1c 7.4%  - Start sliding scale insulin coverage.   - c/w lantus 10  - c/w lispro 7 U TID  - FS TIDAC    #DVT ppx: SCDs, avoiding AC    #Code status: DNR/DNI    DISPO: PT recommending [facility]; patient medically ready for discharge; likely to discharge to [facility] on Monday

## 2020-08-29 NOTE — PROGRESS NOTE ADULT - SUBJECTIVE AND OBJECTIVE BOX
CC: septic shock, UTI, hypothermia (21 Aug 2020 08:26)    INTERVAL HPI/OVERNIGHT EVENTS:  no acute events overnight  patient without complaints  tolerating diet  sodium improved    Vital Signs Last 24 Hrs  T(C): 36.4 (29 Aug 2020 07:37), Max: 36.4 (28 Aug 2020 20:39)  T(F): 97.5 (29 Aug 2020 07:37), Max: 97.6 (28 Aug 2020 20:39)  HR: 63 (29 Aug 2020 07:37) (52 - 63)  BP: 95/70 (29 Aug 2020 07:37) (95/70 - 113/66)  BP(mean): --  RR: 18 (29 Aug 2020 07:37) (18 - 18)  SpO2: 97% (28 Aug 2020 20:57) (97% - 98%)    PHYSICAL EXAM:  General: in no acute distress; improved mentation  Respiratory: No wheezes, rales or rhonchi  Cardiovascular: Regular rate and rhythm  Gastrointestinal: Soft; doesn't appear to be tender; non-distended; Normal bowel sounds  Neurological: responsive mostly oriented to person, place and time; follows commands when asked  Psychiatric: Cooperative and appropriate                                     7.8    7.07  )-----------( 78       ( 29 Aug 2020 07:27 )             25.9     08-29    146<H>  |  112<H>  |  36.0<H>  ----------------------------<  55<L>  3.1<L>   |  24.0  |  1.13    Ca    8.2<L>      29 Aug 2020 07:27    MEDICATIONS  (STANDING):  chlorhexidine 2% Cloths 1 Application(s) Topical <User Schedule>  dextrose 5%. 1000 milliLiter(s) (50 mL/Hr) IV Continuous <Continuous>  dextrose 50% Injectable 12.5 Gram(s) IV Push once  dextrose 50% Injectable 25 Gram(s) IV Push once  dextrose 50% Injectable 25 Gram(s) IV Push once  heparin   Injectable 5000 Unit(s) SubCutaneous every 8 hours  insulin glargine Injectable (LANTUS) 15 Unit(s) SubCutaneous at bedtime  insulin lispro (HumaLOG) corrective regimen sliding scale   SubCutaneous three times a day before meals  insulin lispro Injectable (HumaLOG) 7 Unit(s) SubCutaneous three times a day with meals  midodrine. 10 milliGRAM(s) Oral three times a day  nystatin/triamcinolone Ointment 1 Application(s) Topical two times a day  potassium chloride    Tablet ER 40 milliEquivalent(s) Oral every 4 hours  tamsulosin 0.4 milliGRAM(s) Oral at bedtime    MEDICATIONS  (PRN):  ALPRAZolam 0.5 milliGRAM(s) Oral every 8 hours PRN anxiety/agitation  dextrose 40% Gel 15 Gram(s) Oral once PRN Blood Glucose LESS THAN 70 milliGRAM(s)/deciliter  glucagon  Injectable 1 milliGRAM(s) IntraMuscular once PRN Glucose LESS THAN 70 milligrams/deciliter      RADIOLOGY & ADDITIONAL TESTS:

## 2020-08-30 NOTE — PROGRESS NOTE ADULT - SUBJECTIVE AND OBJECTIVE BOX
CC: septic shock, UTI, hypothermia (21 Aug 2020 08:26)    INTERVAL HPI/OVERNIGHT EVENTS:  no acute events overnight  patient without complaints  tolerating diet  sodium improved    Vital Signs Last 24 Hrs  T(C): 36.7 (30 Aug 2020 05:00), Max: 36.7 (30 Aug 2020 05:00)  T(F): 98.1 (30 Aug 2020 05:00), Max: 98.1 (30 Aug 2020 05:00)  HR: 60 (30 Aug 2020 05:00) (60 - 71)  BP: 94/62 (30 Aug 2020 05:00) (90/59 - 125/87)  BP(mean): --  RR: 18 (30 Aug 2020 05:00) (17 - 18)  SpO2: 98% (30 Aug 2020 05:00) (97% - 99%)    PHYSICAL EXAM:  General: in no acute distress; improved mentation  Respiratory: No wheezes, rales or rhonchi  Cardiovascular: Regular rate and rhythm  Gastrointestinal: Soft; doesn't appear to be tender; non-distended; Normal bowel sounds  Neurological: responsive mostly oriented to person, place and time; follows commands when asked  Psychiatric: Cooperative and appropriate                                     7.8    7.07  )-----------( 78       ( 29 Aug 2020 07:27 )             25.9     08-29    146<H>  |  112<H>  |  36.0<H>  ----------------------------<  55<L>  3.1<L>   |  24.0  |  1.13    Ca    8.2<L>      29 Aug 2020 07:27    MEDICATIONS  (STANDING):  chlorhexidine 2% Cloths 1 Application(s) Topical <User Schedule>  dextrose 5%. 1000 milliLiter(s) (50 mL/Hr) IV Continuous <Continuous>  dextrose 50% Injectable 12.5 Gram(s) IV Push once  dextrose 50% Injectable 25 Gram(s) IV Push once  dextrose 50% Injectable 25 Gram(s) IV Push once  heparin   Injectable 5000 Unit(s) SubCutaneous every 8 hours  insulin glargine Injectable (LANTUS) 15 Unit(s) SubCutaneous at bedtime  insulin lispro (HumaLOG) corrective regimen sliding scale   SubCutaneous three times a day before meals  insulin lispro Injectable (HumaLOG) 7 Unit(s) SubCutaneous three times a day with meals  midodrine. 10 milliGRAM(s) Oral three times a day  nystatin/triamcinolone Ointment 1 Application(s) Topical two times a day  potassium chloride    Tablet ER 40 milliEquivalent(s) Oral every 4 hours  tamsulosin 0.4 milliGRAM(s) Oral at bedtime    MEDICATIONS  (PRN):  ALPRAZolam 0.5 milliGRAM(s) Oral every 8 hours PRN anxiety/agitation  dextrose 40% Gel 15 Gram(s) Oral once PRN Blood Glucose LESS THAN 70 milliGRAM(s)/deciliter  glucagon  Injectable 1 milliGRAM(s) IntraMuscular once PRN Glucose LESS THAN 70 milligrams/deciliter      RADIOLOGY & ADDITIONAL TESTS:

## 2020-08-30 NOTE — PROGRESS NOTE ADULT - ASSESSMENT
69 y/o M with a h/o developmental disability, CHF, HTN, DM, AF (apixaban recently stopped due to anemia/hematuria), CKD (related to recent COVID-19 infection, refused hemodialysis), BPH, with septic shock, UTI, profound hypothermia, bradycardia, MARSHAL, metabolic acidosis.    #hypernatremia - improved  - off IV fluids and sodium stable  - follow up BMP daily    #Septic shock 2/2 bilateral pyelonephritis based on UA and CT abdomen - may have component of adrenal insufficiency as well based on hypothermia/bradycardia - improved  - seems to be responding to stress dose steroids overnight  - s/p 1L warmed LR bolus.  - urine culture shows ESBL proteus mirabilis  - completed meropenem for 7 days  - Blood cultures negative    #Hypothermia - recurred overnight and started on stress dose steroids  - discontinued stress dose steroids today  - continue to monitor for now  - completed abx as above    #acute metabolic encephalopathy - with dysphagia - resolved (currently at baseline)  - mentation improved  - speech and swallow to follow  - tolerating current diet of mechanical soft nectar thick    #MARSHAL on CKD stage III - improved  - Family declines dialysis in the past when offered  - fluids as above  - Cr is improving    #chronic diastolic heart failure  - Has significant chronic bilateral LE edema. Does not appear overtly volume overloaded at this time.   - TTE shows severely enlarged atria, severe tricuspid regurgitation, mod reduced RV systolic function, evidence of pulmonary hypertension. no prior records available  - VQ scan to rule out PE - VQ scan negative for PE    #Afib.  - Recently taken off apixaban for anemia/hematuria. No indication for rate control agents at this time.    #DM II  - A1c 7.4%  - Start sliding scale insulin coverage.   - c/w lantus 10  - c/w lispro 7 U TID  - FS TIDAC    #DVT ppx: SCDs, avoiding AC    #Code status: DNR/DNI    DISPO: PT recommending [facility]; patient medically ready for discharge; likely to discharge to [facility] on Monday

## 2020-08-31 NOTE — SWALLOW BEDSIDE ASSESSMENT ADULT - SLP GENERAL OBSERVATIONS
Pt received awake and alert in bed, grossly 0x1/2, reduced cognition, garbled speech, inconsistent following of simple commands, +b/l wrist restraints, +NGT, Sp02 100% on RA throughout
Pt recd awake & upright in bed, A&A Ox2, fair ability for simple command following, 0/10 pain pre and post, tolerating RA without overt distress

## 2020-08-31 NOTE — SWALLOW BEDSIDE ASSESSMENT ADULT - SWALLOW EVAL: ORAL MUSCULATURE
unable to assess due to poor participation/comprehension/Pt unable to follow commands to complete oral movements for evaluation
reduced lingual elevation/lateralization

## 2020-08-31 NOTE — PROGRESS NOTE ADULT - ASSESSMENT
71 y/o M with a h/o developmental disability, CHF, HTN, DM, AF (apixaban recently stopped due to anemia/hematuria), CKD (related to recent COVID-19 infection, refused hemodialysis), BPH, who was admitted with septic shock requiring ICU admission (did not require pressors). Patient found to have UTI in the setting of profound hypothermia, bradycardia, MARSHAL, metabolic acidosis. On downgrade from ICU still had issues with BP, was started on midodrine and on the following night started on stress dose steroids. Patient had an excellent response to stress dose steroids which suggests some adrenal insufficiency. This diagnosis was not confirmed as patient was on solumedrol which did not allow for testing. Patient tapered off steroids. Patients mentation is improved, his diet was advanced after having NGT initially placed. Patient course complicated with hypernatremia which he was not really symptomatic from in terms of his mentation. hypernatremia improved with free water replacement. He is currently awaiting level II clearance for [facility].    #hypernatremia - slow to improve- slightly increased this morning  - restart for 1 L of water over next 24 hours  - speech reconsulted to see if can advance diet to thin liquids  - follow up BMP daily    #Septic shock 2/2 bilateral pyelonephritis based on UA and CT abdomen - may have component of adrenal insufficiency as well based on hypothermia/bradycardia - improved  - seems to be responding to stress dose steroids overnight  - s/p 1L warmed LR bolus.  - urine culture shows ESBL proteus mirabilis  - completed meropenem for 7 days  - Blood cultures negative    #Hypothermia - recurred overnight and started on stress dose steroids  - discontinued stress dose steroids today  - continue to monitor for now  - completed abx as above  - still on midodrine PO TID  - will attempt to wean to 5mg BID for now    #acute metabolic encephalopathy - with dysphagia - resolved (currently at baseline)  - mentation improved  - speech and swallow to follow  - tolerating current diet of mechanical soft nectar thick    #MARSHAL on CKD stage III - improved  - Family declines dialysis in the past when offered  - fluids as above  - Cr is improving    #chronic diastolic heart failure  - Has significant chronic bilateral LE edema. Does not appear overtly volume overloaded at this time.   - TTE shows severely enlarged atria, severe tricuspid regurgitation, mod reduced RV systolic function, evidence of pulmonary hypertension. no prior records available  - VQ scan to rule out PE - VQ scan negative for PE    #Afib.  - Recently taken off apixaban for anemia/hematuria. No indication for rate control agents at this time.    #DM II  - A1c 7.4%  - Start sliding scale insulin coverage.   - c/w lantus 10  - c/w lispro 7 U TID  - FS TIDAC    #DVT ppx: SCDs, avoiding AC    #Code status: DNR/DNI    DISPO: PT recommending [facility]; patient medically ready for discharge; likely to discharge once level II clearance obtained 71 y/o M with a h/o developmental disability, CHF, HTN, DM, AF (apixaban recently stopped due to anemia/hematuria), CKD (related to recent COVID-19 infection, refused hemodialysis), BPH, who was admitted with septic shock requiring ICU admission (did not require pressors). Patient found to have UTI in the setting of profound hypothermia, bradycardia, MARSHAL, metabolic acidosis. On downgrade from ICU still had issues with BP, was started on midodrine and on the following night started on stress dose steroids. Patient had an excellent response to stress dose steroids which suggests some adrenal insufficiency. This diagnosis was not confirmed as patient was on solumedrol which did not allow for testing. Patient tapered off steroids. Patients mentation is improved, his diet was advanced after having NGT initially placed. Patient course complicated with hypernatremia which he was not really symptomatic from in terms of his mentation. hypernatremia improved with free water replacement. He is currently awaiting level II clearance for [facility].    #hypernatremia - slow to improve- slightly increased this morning  - restart for 1 L of water over next 24 hours  - speech reconsulted to see if can advance diet to thin liquids  - follow up BMP daily    #Septic shock 2/2 bilateral pyelonephritis based on UA and CT abdomen - may have component of adrenal insufficiency as well based on hypothermia/bradycardia - improved  - seems to be responding to stress dose steroids overnight  - s/p 1L warmed LR bolus.  - urine culture shows ESBL proteus mirabilis  - completed meropenem for 7 days  - Blood cultures negative    #Hypothermia - recurred overnight and started on stress dose steroids  - discontinued stress dose steroids today  - continue to monitor for now  - completed abx as above  - still on midodrine PO TID  - will attempt to wean to 5mg BID for now  - will attempt cosyntropin test to determine if deficiency present  - start prednisone 20mg daily  - if positive consider endocrinology consult    #acute metabolic encephalopathy - with dysphagia - resolved (currently at baseline)  - mentation improved  - speech and swallow to follow  - tolerating current diet of mechanical soft nectar thick    #MARSHAL on CKD stage III - improved  - Family declines dialysis in the past when offered  - fluids as above  - Cr is improving    #chronic diastolic heart failure  - Has significant chronic bilateral LE edema. Does not appear overtly volume overloaded at this time.   - TTE shows severely enlarged atria, severe tricuspid regurgitation, mod reduced RV systolic function, evidence of pulmonary hypertension. no prior records available  - VQ scan to rule out PE - VQ scan negative for PE    #Afib.  - Recently taken off apixaban for anemia/hematuria. No indication for rate control agents at this time.    #DM II  - A1c 7.4%  - Start sliding scale insulin coverage.   - DC insulin for now given euglycemia  - FS TIDAC    #DVT ppx: SCDs, avoiding AC    #Code status: DNR/DNI    DISPO: PT recommended for [facility] but sister is asking for discharge back to group home; SW inquiring to see if they will take him. In the meantime if without hypothermia episodes will be medically ready.

## 2020-08-31 NOTE — PROGRESS NOTE ADULT - SUBJECTIVE AND OBJECTIVE BOX
CC: septic shock, UTI, hypothermia (21 Aug 2020 08:26)    INTERVAL HPI/OVERNIGHT EVENTS:  no acute events overnight  tolerating diet  no complaints at this time    Vital Signs Last 24 Hrs  T(C): 37.1 (31 Aug 2020 08:29), Max: 37.3 (31 Aug 2020 04:59)  T(F): 98.8 (31 Aug 2020 08:29), Max: 99.2 (31 Aug 2020 04:59)  HR: 66 (31 Aug 2020 08:29) (66 - 82)  BP: 97/56 (31 Aug 2020 08:29) (97/56 - 118/82)  BP(mean): --  RR: 16 (31 Aug 2020 08:29) (16 - 18)  SpO2: 94% (31 Aug 2020 08:29) (94% - 95%)    PHYSICAL EXAM:  General: in no acute distress; improved mentation  Respiratory: No wheezes, rales or rhonchi  Cardiovascular: Regular rate and rhythm  Gastrointestinal: Soft; doesn't appear to be tender; non-distended; Normal bowel sounds  Neurological: responsive mostly oriented to person, place and time; follows commands when asked  Psychiatric: Cooperative and appropriate                          9.0    6.65  )-----------( 86       ( 30 Aug 2020 09:43 )             30.5     08-31    149<H>  |  113<H>  |  31.0<H>  ----------------------------<  61<L>  3.6   |  25.0  |  1.23    Ca    8.4<L>      31 Aug 2020 07:26      MEDICATIONS  (STANDING):  chlorhexidine 2% Cloths 1 Application(s) Topical <User Schedule>  dextrose 5%. 1000 milliLiter(s) (50 mL/Hr) IV Continuous <Continuous>  dextrose 50% Injectable 12.5 Gram(s) IV Push once  dextrose 50% Injectable 25 Gram(s) IV Push once  dextrose 50% Injectable 25 Gram(s) IV Push once  heparin   Injectable 5000 Unit(s) SubCutaneous every 8 hours  insulin glargine Injectable (LANTUS) 15 Unit(s) SubCutaneous at bedtime  insulin lispro (HumaLOG) corrective regimen sliding scale   SubCutaneous three times a day before meals  insulin lispro Injectable (HumaLOG) 7 Unit(s) SubCutaneous three times a day with meals  midodrine. 10 milliGRAM(s) Oral three times a day  nystatin/triamcinolone Ointment 1 Application(s) Topical two times a day  potassium chloride    Tablet ER 40 milliEquivalent(s) Oral every 4 hours  tamsulosin 0.4 milliGRAM(s) Oral at bedtime    MEDICATIONS  (PRN):  ALPRAZolam 0.5 milliGRAM(s) Oral every 8 hours PRN anxiety/agitation  dextrose 40% Gel 15 Gram(s) Oral once PRN Blood Glucose LESS THAN 70 milliGRAM(s)/deciliter  glucagon  Injectable 1 milliGRAM(s) IntraMuscular once PRN Glucose LESS THAN 70 milligrams/deciliter      RADIOLOGY & ADDITIONAL TESTS:

## 2020-08-31 NOTE — CHART NOTE - NSCHARTNOTEFT_GEN_A_CORE
RN called to report episode of gross hematuria in diaper. Patient without any complaints of chest pain, SOB, abdominal pain, dysuria, flank pain, n/v/d/c, headaches, fevers, chills, dizziness. Hemodynamically stable, afebrile, HR 75 and /76. H/H and UA ordered. Continue to monitor.

## 2020-08-31 NOTE — SWALLOW BEDSIDE ASSESSMENT ADULT - ASR SWALLOW ASPIRATION MONITOR
pneumonia/upper respiratory infection/change of breathing pattern/cough/fever/throat clearing/oral hygiene/position upright (90Y)/gurgly voice
cough/change of breathing pattern/position upright (90Y)/gurgly voice/fever/pneumonia/throat clearing/upper respiratory infection/oral hygiene

## 2020-08-31 NOTE — SWALLOW BEDSIDE ASSESSMENT ADULT - ORAL PHASE
Within functional limits suspected premature pharyngeal entry/Decreased anterior-posterior movement of the bolus

## 2020-08-31 NOTE — SWALLOW BEDSIDE ASSESSMENT ADULT - SWALLOW EVAL: DIAGNOSIS
Mild oral dysphagia confounded by reduced cognition. Suspect pharyngeal dysphagia with administered consistencies.
Pt continues to present w/moderate oropharyngeal dysphagia. Oral stage significant for decreased manipulation/containment of thin liquids, w/suspected premature pharyngeal entry, noted 2* decreased lingual strength/ROM/coordination; Oral stage grossly functional for soft solids & further trials. Pharyngeal stage marked by suspected delayed swallow trigger observed as per cervical auscultation, w/immediate cough noted following small administration of thin via cup; +wet vocal quality. Improvement & no s/s penetration or aspiration observed w/nectar thick liquids or any further trials.

## 2020-08-31 NOTE — SWALLOW BEDSIDE ASSESSMENT ADULT - COMMENTS
No additional trials administerd at this time.
As per MD note, "69 y/o M with a h/o developmental disability, CHF, HTN, DM, AF (apixaban recently stopped due to anemia/hematuria), CKD (related to recent COVID-19 infection, refused hemodialysis), BPH, who was admitted with septic shock requiring ICU admission (did not require pressors). Patient found to have UTI in the setting of profound hypothermia, bradycardia, MARSHAL, metabolic acidosis. On downgrade from ICU still had issues with BP, was started on midodrine and on the following night started on stress dose steroids. Patient had an excellent response to stress dose steroids which suggests some adrenal insufficiency. This diagnosis was not confirmed as patient was on solumedrol which did not allow for testing. Patient tapered off steroids. Patients mentation is improved, his diet was advanced after having NGT initially placed. Patient course complicated with hypernatremia which he was not really symptomatic from in terms of his mentation. hypernatremia improved with free water replacement. He is currently awaiting level II clearance for [facility]".

## 2020-08-31 NOTE — SWALLOW BEDSIDE ASSESSMENT ADULT - PHARYNGEAL PHASE
Within functional limits Delayed pharyngeal swallow/Wet vocal quality post oral intake/Cough post oral intake

## 2020-08-31 NOTE — SWALLOW BEDSIDE ASSESSMENT ADULT - SPECIFY REASON(S)
r/o dysphagia
To rule out dysphagia and determine candidacy for potential advancement to thin liquids

## 2020-08-31 NOTE — SWALLOW BEDSIDE ASSESSMENT ADULT - SLP PERTINENT HISTORY OF CURRENT PROBLEM
69 y/o M with a h/o developmental disability, CHF, HTN, DM, AF (apixaban recently stopped due to anemia/hematuria), CKD (related to recent COVID-19 infection, refused hemodialysis), BPH, with septic shock, UTI, profound hypothermia, bradycardia, MARSHAL, metabolic acidosis.
To note, Pt known to this department, most recently seen on 8/28/20, rx continue mech soft/nectar thick liquids, at that time, ST signed-off, as Pt without change in overall swallow profile. MD requesting re-consult for assessment to determine if able to upgrade to thin liquids.
29-Dec-2019 21:45

## 2020-08-31 NOTE — SWALLOW BEDSIDE ASSESSMENT ADULT - SWALLOW EVAL: RECOMMENDED FEEDING/EATING TECHNIQUES
alternate food with liquid/no straws/position upright (90 degrees)/small sips/bites/maintain upright posture during/after eating for 30 mins/oral hygiene/allow for swallow between intakes/check mouth frequently for oral residue/pocketing/crush medication (when feasible)/hard swallow w/ each bite or sip

## 2020-09-01 NOTE — PROGRESS NOTE ADULT - SUBJECTIVE AND OBJECTIVE BOX
[facility] Division of Hospital Medicine    Chief Complaint:  Patient is a 70y old  Male who presents with a chief complaint of septic shock, UTI, hypothermia (31 Aug 2020 10:11)      SUBJECTIVE / OVERNIGHT EVENTS:  Patient was seen and examined at bedside. Resting. Overnight one episode of hematuria. Will monitor for now. Denies any active complaints.       MEDICATIONS  (STANDING):  chlorhexidine 2% Cloths 1 Application(s) Topical <User Schedule>  dextrose 5%. 1000 milliLiter(s) (50 mL/Hr) IV Continuous <Continuous>  dextrose 50% Injectable 12.5 Gram(s) IV Push once  dextrose 50% Injectable 25 Gram(s) IV Push once  dextrose 50% Injectable 25 Gram(s) IV Push once  heparin   Injectable 5000 Unit(s) SubCutaneous every 8 hours  insulin lispro (HumaLOG) corrective regimen sliding scale   SubCutaneous three times a day before meals  midodrine. 10 milliGRAM(s) Oral <User Schedule>  nystatin/triamcinolone Ointment 1 Application(s) Topical two times a day  predniSONE   Tablet 20 milliGRAM(s) Oral at bedtime  tamsulosin 0.4 milliGRAM(s) Oral at bedtime    MEDICATIONS  (PRN):  dextrose 40% Gel 15 Gram(s) Oral once PRN Blood Glucose LESS THAN 70 milliGRAM(s)/deciliter  glucagon  Injectable 1 milliGRAM(s) IntraMuscular once PRN Glucose LESS THAN 70 milligrams/deciliter        I&O's Summary    31 Aug 2020 07:01  -  01 Sep 2020 07:00  --------------------------------------------------------  IN: 800 mL / OUT: 0 mL / NET: 800 mL        PHYSICAL EXAM:  Vital Signs Last 24 Hrs  T(C): 36.7 (01 Sep 2020 10:55), Max: 36.7 (01 Sep 2020 10:55)  T(F): 98 (01 Sep 2020 10:55), Max: 98 (01 Sep 2020 10:55)  HR: 80 (01 Sep 2020 10:55) (60 - 80)  BP: 96/57 (01 Sep 2020 10:55) (96/57 - 124/75)  BP(mean): --  RR: 18 (01 Sep 2020 10:55) (17 - 18)  SpO2: 92% (01 Sep 2020 10:55) (92% - 97%)      Constitutional: NAD, Male resting  ENT: MMM, no thrush, Supple, No JVD  Lungs: Clear to auscultation bilaterally, good air entry, non-labored breathing  Cardio: RRR, S1/S2, No murmur  Abdomen: Soft, Nontender, Nondistended; Bowel sounds present  Extremities: No calf tenderness, No pitting edema  Psych: Alert and awake; affect appropriate  Neuro: CN 2-12 are intact and symmetric; no gross sensory deficits;   Skin: No rashes; no palpable lesions    LABS:                        8.5    6.94  )-----------( 102      ( 01 Sep 2020 06:00 )             28.8     09-    142  |  110<H>  |  29.0<H>  ----------------------------<  199<H>  4.8   |  21.0<L>  |  1.30    Ca    8.2<L>      01 Sep 2020 06:00            Urinalysis Basic - ( 01 Sep 2020 04:38 )    Color: Pale Yellow / Appearance: Turbid / S.010 / pH: x  Gluc: x / Ketone: Negative  / Bili: Negative / Urobili: Negative   Blood: x / Protein: 100 / Nitrite: Negative   Leuk Esterase: Moderate / RBC: 25-50 /HPF / WBC TNTC /HPF   Sq Epi: x / Non Sq Epi: Occasional / Bacteria: Occasional        CAPILLARY BLOOD GLUCOSE      POCT Blood Glucose.: 194 mg/dL (01 Sep 2020 12:32)  POCT Blood Glucose.: 210 mg/dL (01 Sep 2020 07:48)  POCT Blood Glucose.: 258 mg/dL (31 Aug 2020 21:39)  POCT Blood Glucose.: 283 mg/dL (31 Aug 2020 17:09)        RADIOLOGY REVIEWED

## 2020-09-01 NOTE — PROGRESS NOTE ADULT - ASSESSMENT
69 y/o M with a h/o developmental disability, CHF, HTN, DM, AF (apixaban recently stopped due to anemia/hematuria), CKD (related to recent COVID-19 infection, refused hemodialysis), BPH, who was admitted with septic shock requiring ICU admission (did not require pressors). Patient found to have UTI in the setting of profound hypothermia, bradycardia, MARSHAL, metabolic acidosis. On downgrade from ICU still had issues with BP, was started on midodrine and on the following night started on stress dose steroids. Patient had an excellent response to stress dose steroids which suggests some adrenal insufficiency. This diagnosis was not confirmed as patient was on solumedrol which did not allow for testing. Patient tapered off steroids. Patients mentation is improved, his diet was advanced after having NGT initially placed. Patient course complicated with hypernatremia which he was not really symptomatic from in terms of his mentation. hypernatremia improved with free water replacement. He is currently awaiting level II clearance for LURDES.    #hypernatremia -Resolved  - speech reconsulted to see if can advance diet to thin liquids  - follow up BMP daily    #Septic shock 2/2 bilateral pyelonephritis based on UA and CT abdomen - may have component of adrenal insufficiency as well based on hypothermia/bradycardia - improved  - seems to be responding to stress dose steroids overnight  - s/p 1L warmed LR bolus.  - urine culture shows ESBL proteus mirabilis  - completed meropenem for 7 days  - Blood cultures negative    #Hematuria - 1 episode  UA reviewed  Hsq stopped and scd's started  According to patient - He has episodes in past and his urologist has said no intervention  No further episodes  Will monitor for now and if additional episodes, will then consult urology    #Hypothermia - recurred overnight and started on stress dose steroids  - Improved  - continue to monitor for now  - completed abx as above  - still on midodrine PO TID  - will attempt cosyntropin test to determine if deficiency present  - On prednisone 20mg daily  - Endocrine consulted - Will follow up recs      #acute metabolic encephalopathy - with dysphagia - resolved (currently at baseline)  - mentation improved  - speech and swallow to follow  - tolerating current diet of mechanical soft nectar thick    #MARSHAL on CKD stage III - improved  - Family declines dialysis in the past when offered  - fluids as above  - Cr is improving    #chronic diastolic heart failure  - Has significant chronic bilateral LE edema. Does not appear overtly volume overloaded at this time.   - TTE shows severely enlarged atria, severe tricuspid regurgitation, mod reduced RV systolic function, evidence of pulmonary hypertension. no prior records available  - VQ scan to rule out PE - VQ scan negative for PE    #Afib.  - Recently taken off apixaban for anemia/hematuria. No indication for rate control agents at this time.    #DM II  - A1c 7.4%  - Start sliding scale insulin coverage.   - DC insulin for now given euglycemia  - FS TIDAC    #DVT ppx: SCDs, avoiding AC    #Code status: DNR/DNI    DISPO: PT recommended for LURDES but sister is asking for discharge back to group home; SW inquiring to see if they will take him. Patient is medically stable for DC to Group Home.

## 2020-09-01 NOTE — CONSULT NOTE ADULT - CONSULT REASON
" DNR/DNI, refused HD recently, now with septic shock, profound hypothermia. He is MRDD from group home and sister is surrogate decision maker as per paperwork from prior hospital admission."
R/O adrenal insufficiency

## 2020-09-01 NOTE — CONSULT NOTE ADULT - SUBJECTIVE AND OBJECTIVE BOX
HPI:  71 y/o M with a h/o developmental disability, CHF, HTN, DM, AF (apixaban recently stopped due to anemia/hematuria), CKD (related to recent COVID-19 infection, refused hemodialysis), BPH, presents to the ED from group home with lethargy, weakness, and left sided facial droop (?). He was found to be profoundly hypothermic (TLow: 84'F), bradycardic, and hypotensive. UA (+). MARSHAL on CKD with significant metabolic acidosis.   Pt found to have Urosepsis  and was given s tress dose steroids for a few days  Pt also had Hypernatremi which was improving       Pt with epsidoe of hypothermia 2 nights ago    pt as started on prednisone 20 mg  but  Cotrosyn stim test was done befoer  this         PAST MEDICAL & SURGICAL HISTORY:  Encounter for blood transfusion  Anemia  History of gross hematuria  History of orthostatic hypotension  Bacterial pneumonia  Muscle weakness (generalized)  2019 novel coronavirus disease (COVID-19)  Falls  Heart failure  Afib  CHF (congestive heart failure)  Gout  Hiatal hernia  BPH (benign prostatic hyperplasia)  Cognitive impairment  Cardiomyopathy  HTN (hypertension)  DM (diabetes mellitus)      FAMILY HISTORY: unable ot obtain       SOCIAL HISTORY: live in group home     REVIEW OF SYSTEMS:    Constitutional: No fever, no chills, no change in weight.        Limited  by pt- feels overall ok   does not like steak dinner- hard to chew so wants softer food- like chicken     MEDICATIONS  (STANDING):  chlorhexidine 2% Cloths 1 Application(s) Topical <User Schedule>  dextrose 5%. 1000 milliLiter(s) (50 mL/Hr) IV Continuous <Continuous>  dextrose 50% Injectable 12.5 Gram(s) IV Push once  dextrose 50% Injectable 25 Gram(s) IV Push once  dextrose 50% Injectable 25 Gram(s) IV Push once  insulin lispro (HumaLOG) corrective regimen sliding scale   SubCutaneous three times a day before meals  midodrine. 10 milliGRAM(s) Oral <User Schedule>  nystatin/triamcinolone Ointment 1 Application(s) Topical two times a day  predniSONE   Tablet 20 milliGRAM(s) Oral at bedtime  tamsulosin 0.4 milliGRAM(s) Oral at bedtime    MEDICATIONS  (PRN):  dextrose 40% Gel 15 Gram(s) Oral once PRN Blood Glucose LESS THAN 70 milliGRAM(s)/deciliter  glucagon  Injectable 1 milliGRAM(s) IntraMuscular once PRN Glucose LESS THAN 70 milligrams/deciliter      Allergies    No Known Allergies    Intolerances          CAPILLARY BLOOD GLUCOSECAPILLARY BLOOD GLUCOSE      POCT Blood Glucose.: 170 mg/dL (01 Sep 2020 22:52)  POCT Blood Glucose.: 218 mg/dL (01 Sep 2020 17:12)  POCT Blood Glucose.: 194 mg/dL (01 Sep 2020 12:32)  POCT Blood Glucose.: 210 mg/dL (01 Sep 2020 07:48)        POCT Blood Glucose.: 170 mg/dL (01 Sep 2020 22:52)      PHYSICAL EXAM:    Vital Signs Last 24 Hrs  T(C): 36.6 (02 Sep 2020 04:27), Max: 37 (01 Sep 2020 16:24)  T(F): 97.9 (02 Sep 2020 04:27), Max: 98.6 (01 Sep 2020 16:24)  HR: 86 (02 Sep 2020 04:27) (70 - 86)  BP: 114/74 (02 Sep 2020 04:27) (96/57 - 114/74)  BP(mean): --  RR: 18 (02 Sep 2020 04:27) (18 - 18)  SpO2: 96% (02 Sep 2020 04:27) (92% - 96%)    General appearance: Well developed, well nourished.      Neck: Trachea midline. No thyroid enlargement.    Lungs: Normal respiratory excursion. Lungs clear.    CV: Regular cardiac rhythm. No murmur. Carotid and pedal pulses intact.    Abdomen: Soft, non tender, no organomegaly or mass.        Skin: Warm and moist.  + purple discolration bilateral lower extremities   Left leg wrapped in gauze     Neuro: Cranial nerves intact. Normal motor and sensory function. DTR's normal.    Psych: Normal affect, good judgement.            LABS:                        7.6    6.15  )-----------( 136      ( 02 Sep 2020 07:16 )             25.7     09-01    142  |  110<H>  |  29.0<H>  ----------------------------<  199<H>  4.8   |  21.0<L>  |  1.30    Ca    8.2<L>      01 Sep 2020 06:00    A1C with Estimated Average Glucose (08.20 @ 06:12)    A1C with Estimated Average Glucose Result: 7.4 %    Estimated Average Glucose: 166 mg/dL      Urinalysis Basic - ( 01 Sep 2020 04:38 )    Color: Pale Yellow / Appearance: Turbid / S.010 / pH: x  Gluc: x / Ketone: Negative  / Bili: Negative / Urobili: Negative   Blood: x / Protein: 100 / Nitrite: Negative   Leuk Esterase: Moderate / RBC: 25-50 /HPF / WBC TNTC /HPF   Sq Epi: x / Non Sq Epi: Occasional / Bacteria: Occasional        ACTH Stim Cortisol 60 min: 24.0: Morning (preferably around 8 AM) specimens are required. The ACTH  stimulation test requires expert administration and interpretation(1).  1. Ravi N, Joy H,Celsa CRUZ 2019 Pitfalls in the  interpretation of the cosyntropin stimulationtest for the diagnosis of  adrenal insufficiency, Current Opinion in Endocrinology & Diabetes and  Obesity; 26,3:139-145 doi: 10.1097/[id number] ug/dL (20 @ 23:38)    ACTH Stim:  Cortisol 30 min (20 @ 23:38)    ACTH Stim Cortisol 30 min: 25.1 ug/dL    ACTH Stim  Cortisol Baseline (20 @ 23:38)    ACTH Stim Cortisol baseline: 16.4 ug/dL

## 2020-09-01 NOTE — CHART NOTE - NSCHARTNOTESELECT_GEN_ALL_CORE
Event Note
Event Note/Respiratory Care
Nutrition Services

## 2020-09-01 NOTE — CHART NOTE - NSCHARTNOTEFT_GEN_A_CORE
Source: Patient [x ]  Family [ ]   other [ ]    Current Diet: Diet, Dysphagia 3 Soft-Nectar Consistency Fluid (08-31-20 @ 16:03)    PO intake:  Pt reports  good po intake at meals.  Pt consumes 100% at breakfast per tray observation and requesting additional food items.  Food preferences obtained.    Current Weight:   (8/26) 190 lbs  (8/23) 196 lbs    % Weight Change - possible wt loss since admission, will continue to monitor.  Aware pt with 1+ trace edema B/L legs.    Pertinent Medications: MEDICATIONS  (STANDING):  chlorhexidine 2% Cloths 1 Application(s) Topical <User Schedule>  dextrose 5%. 1000 milliLiter(s) (50 mL/Hr) IV Continuous <Continuous>  dextrose 50% Injectable 12.5 Gram(s) IV Push once  dextrose 50% Injectable 25 Gram(s) IV Push once  dextrose 50% Injectable 25 Gram(s) IV Push once  heparin   Injectable 5000 Unit(s) SubCutaneous every 8 hours  insulin lispro (HumaLOG) corrective regimen sliding scale   SubCutaneous three times a day before meals  midodrine. 10 milliGRAM(s) Oral <User Schedule>  nystatin/triamcinolone Ointment 1 Application(s) Topical two times a day  predniSONE   Tablet 20 milliGRAM(s) Oral at bedtime  tamsulosin 0.4 milliGRAM(s) Oral at bedtime    MEDICATIONS  (PRN):  dextrose 40% Gel 15 Gram(s) Oral once PRN Blood Glucose LESS THAN 70 milliGRAM(s)/deciliter  glucagon  Injectable 1 milliGRAM(s) IntraMuscular once PRN Glucose LESS THAN 70 milligrams/deciliter    Pertinent Labs: CBC Full  -  ( 01 Sep 2020 06:00 )  WBC Count : 6.94 K/uL  RBC Count : 3.23 M/uL  Hemoglobin : 8.5 g/dL  Hematocrit : 28.8 %  Platelet Count - Automated : 102 K/uL  Mean Cell Volume : 89.2 fl  Mean Cell Hemoglobin : 26.3 pg  Mean Cell Hemoglobin Concentration : 29.5 gm/dL  09-01 Na142 mmol/L Glu 199 mg/dL<H> K+ 4.8 mmol/L Cr  1.30 mg/dL BUN 29.0 mg/dL<H> Phos n/a   Alb n/a   PAB n/a       Skin: no skin breakdown noted    Nutrition focused physical exam conducted - found signs of malnutrition [ ]absent [x ]present    Subcutaneous fat loss: [x ] Orbital fat pads region, [ ]Buccal fat region, [ ]Triceps region,  [ ]Ribs region    Muscle wasting: [x ]Temples region, [x ]Clavicle region, [x ]Shoulder region, [ ]Scapula region, [ ]Interosseous region,  [ ]thigh region, [ ]Calf region    Current Nutrition Diagnosis: Pt remains at nutrition risk secondary to moderate protein calorie malnutrition (acute on chronic) related to inability to meet sufficient protein-energy in setting of CHF, septic shock 2/2 urosepsis, recent COVID infection, and dysphagia as evidenced by pt with moderate muscle wasting/fat loss, probable 10.9% wt loss x ~4 months and likely meeting <75% estimated energy intake prior to admission.  Pt passed swallow evaluation and is now tolerating soft with nectar diet.  Pt with good po intake at meals- consumed 100% at breakfast this morning.  Food preferences obtained.    Recommendations:   1. RX: Glucerna TID (nectar)  2. Add cons cho to diet rx (diet consistency per SLP)  2. RX: MVI daily   3. Monitor daily wts     Monitoring and Evaluation:   [x ] PO intake [x ] Tolerance to diet prescription [X] Weights  [X] Follow up per protocol [X] Labs:

## 2020-09-01 NOTE — CONSULT NOTE ADULT - ASSESSMENT
Pt with  admission for Urosepsis  required steroids during epsiode     recurrent hypothermia so pt restarted on prednsione  empirically while awaitng  Cortrosyn stim test   Pt seems to have passed    inital presentation to hospital c/w Urosepsis picture rather than adrenal insufficiency     can  stop Prednisone and see how he does         Legs- consdier vascualr evaluaiton -   discoloration and left leg wrapped ? underlying wound  which could also be a source for infeciton     T2DM- cont Sliding scale insulin
70M with developmental disability from Group home, HTN, DM, Afib (off AC due to bleeding), CKD, CHF, admitted with sepsis secondary to ESBL Proteus secondary to UTI/pyelonpehritis, marshal on CKD, now with dysphagia.     #1 UTI/pyelonephritis - on IV anitbiotics per primary team. blood cultures negative. urine culture < 50k ESBL proteus   #2 Dysphagia - pending repeat swallow testing. if fails, per Dr. Simmons, legal surrogate - sister Avani would unlikely pursue PEG tube  #3 MARSHAL on CKD - improving. no HD needed nor desired per prior directives.  #4 Encounter for palliative care - will wait and see how he does on swallow testing, if fails, and sister does not wish to proceed with PEG, would need to process legal paperwork through [employer] for approval.

## 2020-09-02 NOTE — DISCHARGE NOTE PROVIDER - HOSPITAL COURSE
71 y/o M with a h/o developmental disability, CHF, HTN, DM, AF (apixaban recently stopped due to anemia/hematuria), CKD (related to recent COVID-19 infection, refused hemodialysis), BPH, who was admitted with septic shock requiring ICU admission (did not require pressors). Patient found to have UTI in the setting of profound hypothermia, bradycardia, MARSHAL, metabolic acidosis. On downgrade from ICU still had issues with BP, was started on midodrine and on the following night started on stress dose steroids. Patient had an excellent response to stress dose steroids which suggests some adrenal insufficiency. This diagnosis was not confirmed as patient was on solumedrol which did not allow for testing. Patient tapered off steroids. Patients mentation is improved, his diet was advanced after having NGT initially placed. Patient course complicated with hypernatremia which he was not really symptomatic from in terms of his mentation. hypernatremia improved with free water replacement. Patient's hemoglobin had decreased and was given one unit of pRBC. Patient is ready for discharge to Group Home.         Time spent on patients discharge 32 minutes 69 y/o M with a h/o developmental disability, CHF, HTN, DM, AF (apixaban recently stopped due to anemia/hematuria), CKD (related to recent COVID-19 infection, refused hemodialysis), BPH, who was admitted with septic shock requiring ICU admission (did not require pressors). Patient found to have UTI in the setting of profound hypothermia, bradycardia, MARSHAL, metabolic acidosis. On downgrade from ICU still had issues with BP, was started on midodrine and on the following night started on stress dose steroids. Patient had an excellent response to stress dose steroids which suggests some adrenal insufficiency. This diagnosis was not confirmed as patient was on solumedrol which did not allow for testing. Patient tapered off steroids. Patients mentation is improved, his diet was advanced after having NGT initially placed. Patient course complicated with hypernatremia which he was not really symptomatic from in terms of his mentation. hypernatremia improved with free water replacement. Patient's hemoglobin had decreased and was given one unit of pRBC. Patient is ready for discharge to Group Home. Patient was prescribed Neurontin on discharge from 8/10/20 as per group home but never received it outpatient.  No need for Neurontin on discharge.        Time spent on patients discharge 32 minutes 71 y/o M with a h/o developmental disability, CHF, HTN, DM, AF (apixaban recently stopped due to anemia/hematuria), CKD (related to recent COVID-19 infection, refused hemodialysis), BPH, who was admitted with septic shock requiring ICU admission (did not require pressors). Patient found to have UTI in the setting of profound hypothermia, bradycardia, MARSHAL, metabolic acidosis. On downgrade from ICU still had issues with BP, was started on midodrine and on the following night started on stress dose steroids. Patient had an excellent response to stress dose steroids which suggests some adrenal insufficiency. This diagnosis was not confirmed as patient was on solumedrol which did not allow for testing. Patient tapered off steroids. Patients mentation is improved, his diet was advanced after having NGT initially placed. Patient course complicated with hypernatremia which he was not really symptomatic from in terms of his mentation. hypernatremia improved with free water replacement. Patient's hemoglobin had decreased and was given one unit of pRBC. Patient is ready for discharge to Group Home. Patient was prescribed Neurontin on discharge from 8/10/20 as per group home but never received it outpatient.  No need for Neurontin, Metoprolol, or Eliquis on discharge.  Patient's weight is 88kg.  Patient was swabbed for COVID.  Patient has no s/s of COVID.        Time spent on patients discharge 32 minutes

## 2020-09-02 NOTE — DISCHARGE NOTE PROVIDER - NSDCFUADDINST_GEN_ALL_CORE_FT
Follow up with PMD     take medications as prescribed     If you hav fever >100.3, chest pain, abdominal pain that is increasing, increasing nausea vomiting or diarrhea, headache with vision changes come to ED or call PMD immediately Follow up with PMD     take medications as prescribed     If you hav fever >100.3, chest pain, abdominal pain that is increasing, increasing nausea vomiting or diarrhea, headache with vision changes come to ED or call PMD immediately    Wound care to LLE: Adaptic to stay for 7days and daily compression

## 2020-09-02 NOTE — PROGRESS NOTE ADULT - SUBJECTIVE AND OBJECTIVE BOX
[facility] Division of Hospital Medicine    Chief Complaint:  Patient is a 70y old  Male who presents with a chief complaint of septic shock, UTI, hypothermia (01 Sep 2020 18:00)      SUBJECTIVE / OVERNIGHT EVENTS:  Patient was seen and examined at bedside. Patient was resting comfortably. Patient denies chest pain, SOB, abd pain, N/V, fever, chills, dysuria or any other complaints. All remainder ROS negative.     MEDICATIONS  (STANDING):  chlorhexidine 2% Cloths 1 Application(s) Topical <User Schedule>  dextrose 5%. 1000 milliLiter(s) (50 mL/Hr) IV Continuous <Continuous>  dextrose 50% Injectable 12.5 Gram(s) IV Push once  dextrose 50% Injectable 25 Gram(s) IV Push once  dextrose 50% Injectable 25 Gram(s) IV Push once  insulin lispro (HumaLOG) corrective regimen sliding scale   SubCutaneous three times a day before meals  midodrine. 10 milliGRAM(s) Oral <User Schedule>  nystatin/triamcinolone Ointment 1 Application(s) Topical two times a day  tamsulosin 0.4 milliGRAM(s) Oral at bedtime    MEDICATIONS  (PRN):  dextrose 40% Gel 15 Gram(s) Oral once PRN Blood Glucose LESS THAN 70 milliGRAM(s)/deciliter  glucagon  Injectable 1 milliGRAM(s) IntraMuscular once PRN Glucose LESS THAN 70 milligrams/deciliter        I&O's Summary      PHYSICAL EXAM:  Vital Signs Last 24 Hrs  T(C): 36.6 (02 Sep 2020 04:27), Max: 37 (01 Sep 2020 16:24)  T(F): 97.9 (02 Sep 2020 04:27), Max: 98.6 (01 Sep 2020 16:24)  HR: 82 (02 Sep 2020 08:00) (70 - 86)  BP: 110/58 (02 Sep 2020 08:00) (109/75 - 114/74)  BP(mean): --  RR: 18 (02 Sep 2020 04:27) (18 - 18)  SpO2: 96% (02 Sep 2020 04:27) (93% - 96%)    Constitutional: NAD, Male resting  ENT: MMM, no thrush, Supple, No JVD  Lungs: Clear to auscultation bilaterally, good air entry, non-labored breathing  Cardio: RRR, S1/S2, No murmur  Abdomen: Soft, Nontender, Nondistended; Bowel sounds present  Extremities: No calf tenderness, No pitting edema  Psych: Alert and awake; affect appropriate  Neuro: CN 2-12 are intact and symmetric; no gross sensory deficits;   Skin: No rashes; no palpable lesions    LABS:                        7.6    6.15  )-----------( 136      ( 02 Sep 2020 07:16 )             25.7     09-02    145  |  107  |  36.0<H>  ----------------------------<  165<H>  3.6   |  26.0  |  1.30    Ca    8.1<L>      02 Sep 2020 07:16  Phos  2.6       Mg     1.2     0902            Urinalysis Basic - ( 01 Sep 2020 04:38 )    Color: Pale Yellow / Appearance: Turbid / S.010 / pH: x  Gluc: x / Ketone: Negative  / Bili: Negative / Urobili: Negative   Blood: x / Protein: 100 / Nitrite: Negative   Leuk Esterase: Moderate / RBC: 25-50 /HPF / WBC TNTC /HPF   Sq Epi: x / Non Sq Epi: Occasional / Bacteria: Occasional        CAPILLARY BLOOD GLUCOSE      POCT Blood Glucose.: 165 mg/dL (02 Sep 2020 07:53)  POCT Blood Glucose.: 170 mg/dL (01 Sep 2020 22:52)  POCT Blood Glucose.: 218 mg/dL (01 Sep 2020 17:12)  POCT Blood Glucose.: 194 mg/dL (01 Sep 2020 12:32)        RADIOLOGY REVIEWED

## 2020-09-02 NOTE — DISCHARGE NOTE PROVIDER - NSDCCPCAREPLAN_GEN_ALL_CORE_FT
PRINCIPAL DISCHARGE DIAGNOSIS  Diagnosis: Septic shock  Assessment and Plan of Treatment: If symptoms return, please follow up with your PMD or return to the ED.      SECONDARY DISCHARGE DIAGNOSES  Diagnosis: Hypothermia  Assessment and Plan of Treatment: Currently its resolved but return to ED if symptoms reappear

## 2020-09-02 NOTE — DISCHARGE NOTE PROVIDER - NSDCFUSCHEDAPPT_GEN_ALL_CORE_FT
KIMBERLY MARKS ; 09/15/2020 ; [facility] [address] [facility] KIMBERLY MARKS ; 09/15/2020 ; [facility] [address] [facility] KIMBERLY MARKS ; 09/15/2020 ; [place] [place] [address] [facility] KIMBERLY MARKS ; 09/15/2020 ; [facility] [address] [facility] KIMBERLY MARKS ; 09/15/2020 ; [facility] [address] [facility]

## 2020-09-02 NOTE — DISCHARGE NOTE PROVIDER - NSDCMRMEDTOKEN_GEN_ALL_CORE_FT
albuterol 90 mcg/inh inhalation aerosol: 2 puff(s) inhaled 2 times a day  allopurinol 300 mg oral tablet: 1 tab(s) orally once a day  atorvastatin 20 mg oral tablet: 1 tab(s) orally once a day (at bedtime)  calcium-vitamin D 500 mg-200 intl units (5 mcg) oral tablet: 1 tab(s) orally 2 times a day  ferrous sulfate 325 mg (65 mg elemental iron) oral tablet: 1 tab(s) orally once a day  finasteride 5 mg oral tablet: 1 tab(s) orally once a day  fluticasone 50 mcg/inh nasal spray: 1 spray(s) nasal 2 times a day  midodrine 10 mg oral tablet: 1 tab(s) orally   Multiple Vitamins oral tablet: 1 tab(s) orally once a day  oxybutynin 5 mg oral tablet: 2 tab(s) orally once a day  tamsulosin 0.4 mg oral capsule: 1 cap(s) orally once a day (at bedtime)  zinc sulfate 220 mg oral capsule: 1 cap(s) orally once a day allopurinol 300 mg oral tablet: 1 tab(s) orally once a day  atorvastatin 20 mg oral tablet: 1 tab(s) orally once a day (at bedtime)  calcium-vitamin D 500 mg-200 intl units (5 mcg) oral tablet: 1 tab(s) orally 2 times a day  dutasteride 0.5 mg oral capsule: 1 cap(s) orally once a day  ferrous sulfate 325 mg (65 mg elemental iron) oral tablet: 1 tab(s) orally once a day  fluticasone 50 mcg/inh nasal spray: 1 spray(s) nasal 2 times a day  Januvia 100 mg oral tablet: 1 tab(s) orally once a day  metFORMIN 1000 mg oral tablet: 1 tab(s) orally 2 times a day  midodrine 10 mg oral tablet: 1 tab(s) orally 2 times a day at 0800 and 2000  Multiple Vitamins oral tablet: 1 tab(s) orally once a day  Myrbetriq 50 mg oral tablet, extended release: 1 tab(s) orally once a day  oxybutynin 5 mg oral tablet: 2 tab(s) orally once a day  Saline Mist 0.65% nasal spray: 2 spray(s) nasal 2 times a day  tamsulosin 0.4 mg oral capsule: 1 cap(s) orally once a day (at bedtime)  Vitamin C 250 mg oral tablet: 1 tab(s) orally once a day  zinc sulfate 220 mg oral capsule: 1 cap(s) orally once a day allopurinol 300 mg oral tablet: 1 tab(s) orally once a day  atorvastatin 20 mg oral tablet: 1 tab(s) orally once a day (at bedtime)  calcium-vitamin D 500 mg-200 intl units (5 mcg) oral tablet: 1 tab(s) orally 2 times a day  dutasteride 0.5 mg oral capsule: 1 cap(s) orally once a day  ferrous sulfate 325 mg (65 mg elemental iron) oral tablet: 1 tab(s) orally once a day  fluticasone 50 mcg/inh nasal spray: 1 spray(s) nasal 2 times a day  Januvia 100 mg oral tablet: 1 tab(s) orally once a day  metFORMIN 1000 mg oral tablet: 1 tab(s) orally 2 times a day  midodrine 10 mg oral tablet: 1 tab(s) orally 2 times a day at 0800 and 2000  Multiple Vitamins oral tablet: 1 tab(s) orally once a day  Myrbetriq 50 mg oral tablet, extended release: 1 tab(s) orally once a day  oxybutynin 5 mg oral tablet: 2 tab(s) orally once a day  Physical therapy: Dx: R53  Saline Mist 0.65% nasal spray: 2 spray(s) nasal 2 times a day  tamsulosin 0.4 mg oral capsule: 1 cap(s) orally once a day (at bedtime)  Vitamin C 250 mg oral tablet: 1 tab(s) orally once a day  zinc sulfate 220 mg oral capsule: 1 cap(s) orally once a day

## 2020-09-02 NOTE — PROGRESS NOTE ADULT - ASSESSMENT
71 y/o M with a h/o developmental disability, CHF, HTN, DM, AF (apixaban recently stopped due to anemia/hematuria), CKD (related to recent COVID-19 infection, refused hemodialysis), BPH, who was admitted with septic shock requiring ICU admission (did not require pressors). Patient found to have UTI in the setting of profound hypothermia, bradycardia, MARSHAL, metabolic acidosis. On downgrade from ICU still had issues with BP, was started on midodrine and on the following night started on stress dose steroids. Patient had an excellent response to stress dose steroids which suggests some adrenal insufficiency. This diagnosis was not confirmed as patient was on solumedrol which did not allow for testing. Patient tapered off steroids. Patients mentation is improved, his diet was advanced after having NGT initially placed. Patient course complicated with hypernatremia which he was not really symptomatic from in terms of his mentation. hypernatremia improved with free water replacement. Patient's hemoglobin had decreased and was given one unit of pRBC. Patient is ready for discharge to Group Home but Group Home requested to wait until 9/3 pending hemoglobin.     #hypernatremia -Resolved  - speech reconsulted to see if can advance diet to thin liquids  - follow up BMP daily    #Septic shock 2/2 bilateral pyelonephritis based on UA and CT abdomen - may have component of adrenal insufficiency as well based on hypothermia/bradycardia - improved  - seems to be responding to stress dose steroids overnight  - s/p 1L warmed LR bolus.  - urine culture shows ESBL proteus mirabilis  - completed meropenem for 7 days  - Blood cultures negative    #Hematuria - 1 episode  UA reviewed  Hsq stopped and scd's started  According to patient - He has episodes in past and his urologist has said no intervention  No further episodes  Will monitor for now and if additional episodes, will then consult urology    #Hypothermia - recurred overnight and started on stress dose steroids  - Improved  - continue to monitor for now  - completed abx as above  - still on midodrine PO TID  - will attempt cosyntropin test to determine if deficiency present  - On prednisone 20mg daily  - Endocrine consulted - Will follow up recs    #acute metabolic encephalopathy - with dysphagia - resolved (currently at baseline)  - mentation improved  - speech and swallow to follow  - tolerating current diet of mechanical soft nectar thick    #MARSHAL on CKD stage III - improved  - Family declines dialysis in the past when offered  - fluids as above  - Cr is improving    #chronic diastolic heart failure  - Has significant chronic bilateral LE edema. Does not appear overtly volume overloaded at this time.   - TTE shows severely enlarged atria, severe tricuspid regurgitation, mod reduced RV systolic function, evidence of pulmonary hypertension. no prior records available  - VQ scan to rule out PE - VQ scan negative for PE    #Afib.  - Recently taken off apixaban for anemia/hematuria. No indication for rate control agents at this time.    #DM II  - A1c 7.4%  - Start sliding scale insulin coverage.   - DC insulin for now given euglycemia  - FS TIDAC    #Anemia  Likely chronic in nature  Will transfuse one unit of pRBC    #DVT ppx: SCDs, avoiding AC    #Code status: DNR/DNI    DISPO: PT recommended for [facility] but sister is asking for discharge back to group home; SW inquiring to see if they will take him. Patient is medically stable for DC to Group Home. Will be discharge on 9/3 pending hemoglobin as request of Group Home. 71 y/o M with a h/o developmental disability, CHF, HTN, DM, AF (apixaban recently stopped due to anemia/hematuria), CKD (related to recent COVID-19 infection, refused hemodialysis), BPH, who was admitted with septic shock requiring ICU admission (did not require pressors). Patient found to have UTI in the setting of profound hypothermia, bradycardia, MARSHAL, metabolic acidosis. On downgrade from ICU still had issues with BP, was started on midodrine and on the following night started on stress dose steroids. Patient had an excellent response to stress dose steroids which suggests some adrenal insufficiency. This diagnosis was not confirmed as patient was on solumedrol which did not allow for testing. Patient tapered off steroids. Patients mentation is improved, his diet was advanced after having NGT initially placed. Patient course complicated with hypernatremia which he was not really symptomatic from in terms of his mentation. hypernatremia improved with free water replacement. Patient's hemoglobin had decreased and was given one unit of pRBC. Patient is ready for discharge to Group Home but Group Home requested to wait until 9/3 pending hemoglobin.     #hypernatremia -Resolved  - speech reconsulted to see if can advance diet to thin liquids  - follow up BMP daily    #Septic shock 2/2 bilateral pyelonephritis based on UA and CT abdomen - may have component of adrenal insufficiency as well based on hypothermia/bradycardia - improved  - seems to be responding to stress dose steroids overnight  - s/p 1L warmed LR bolus.  - urine culture shows ESBL proteus mirabilis  - completed meropenem for 7 days  - Blood cultures negative    #Hematuria - 1 episode  UA reviewed  Hsq stopped and scd's started  According to patient - He has episodes in past and his urologist has said no intervention  No further episodes  Will monitor for now and if additional episodes, will then consult urology    #Hypothermia - recurred overnight and started on stress dose steroids  - Improved  - continue to monitor for now  - completed abx as above  - still on midodrine PO TID  - Cosyntropin test results reviewed and discussed with Endo - Unlikely to be adrenal deficiency   - Discontinued prednisone 20mg daily  - Endocrine recs appreciated    #acute metabolic encephalopathy - with dysphagia - resolved (currently at baseline)  - mentation improved  - speech and swallow to follow  - tolerating current diet of mechanical soft nectar thick    #MARSHAL on CKD stage III - improved  - Family declines dialysis in the past when offered  - fluids as above  - Cr is improving    #chronic diastolic heart failure  - Has significant chronic bilateral LE edema. Does not appear overtly volume overloaded at this time.   - TTE shows severely enlarged atria, severe tricuspid regurgitation, mod reduced RV systolic function, evidence of pulmonary hypertension. no prior records available  - VQ scan to rule out PE - VQ scan negative for PE    #Afib.  - Recently taken off apixaban for anemia/hematuria. No indication for rate control agents at this time.    #DM II  - A1c 7.4%  - Start sliding scale insulin coverage.   - DC insulin for now given euglycemia  - FS TIDAC    #Anemia  Likely chronic in nature  Will transfuse one unit of pRBC    #DVT ppx: SCDs, avoiding AC    #Code status: DNR/DNI    DISPO: PT recommended for LURDES but sister is asking for discharge back to group home; SW inquiring to see if they will take him. Patient is medically stable for DC to Group Home. Will be discharge on 9/3 pending hemoglobin as request of Group Home.

## 2020-09-03 NOTE — PROGRESS NOTE ADULT - PROVIDER SPECIALTY LIST ADULT
Hospitalist
Palliative Care
Palliative Care
Critical Care

## 2020-09-03 NOTE — PROGRESS NOTE ADULT - ASSESSMENT
69 y/o M with a h/o developmental disability, CHF, HTN, DM, AF (apixaban recently stopped due to anemia/hematuria), CKD (related to recent COVID-19 infection, refused hemodialysis), BPH, who was admitted with septic shock requiring ICU admission (did not require pressors). Patient found to have UTI in the setting of profound hypothermia, bradycardia, MARSHAL, metabolic acidosis. On downgrade from ICU still had issues with BP, was started on midodrine and on the following night started on stress dose steroids. Patient had an excellent response to stress dose steroids which suggests some adrenal insufficiency. This diagnosis was not confirmed as patient was on solumedrol which did not allow for testing. Patient tapered off steroids. Patients mentation is improved, his diet was advanced after having NGT initially placed. Patient course complicated with hypernatremia which he was not really symptomatic from in terms of his mentation. hypernatremia improved with free water replacement. Patient's hemoglobin had decreased and was given one unit of pRBC. Patient is ready for discharge to Group Home but Group Home requested to wait until 9/3 pending hemoglobin. Stable for discharge today.     #hypernatremia -Resolved  - speech reconsulted to see if can advance diet to thin liquids  - follow up BMP daily    #Septic shock 2/2 bilateral pyelonephritis based on UA and CT abdomen - may have component of adrenal insufficiency as well based on hypothermia/bradycardia - improved  - seems to be responding to stress dose steroids overnight  - s/p 1L warmed LR bolus.  - urine culture shows ESBL proteus mirabilis  - completed meropenem for 7 days  - Blood cultures negative    #Hematuria - 1 episode  UA reviewed  Hsq stopped and scd's started  According to patient - He has episodes in past and his urologist has said no intervention  No further episodes  Will monitor for now and if additional episodes, will then consult urology    #Hypothermia - recurred overnight and started on stress dose steroids  - Improved  - continue to monitor for now  - completed abx as above  - still on midodrine PO TID  - Cosyntropin test results reviewed and discussed with Endo - Unlikely to be adrenal deficiency   - Discontinued prednisone 20mg daily  - Endocrine recs appreciated    #acute metabolic encephalopathy - with dysphagia - resolved (currently at baseline)  - mentation improved  - speech and swallow to follow  - tolerating current diet of mechanical soft nectar thick    #MARSHAL on CKD stage III - improved  - Family declines dialysis in the past when offered  - fluids as above  - Cr is improving    #chronic diastolic heart failure  - Has significant chronic bilateral LE edema. Does not appear overtly volume overloaded at this time.   - TTE shows severely enlarged atria, severe tricuspid regurgitation, mod reduced RV systolic function, evidence of pulmonary hypertension. no prior records available  - VQ scan to rule out PE - VQ scan negative for PE    #Afib.  - Recently taken off apixaban for anemia/hematuria. No indication for rate control agents at this time.    #DM II  - A1c 7.4%  - Start sliding scale insulin coverage.   - DC insulin for now given euglycemia  - FS TIDAC    #Anemia  Likely chronic in nature  s/p one unit transfused  H/H appropriately responded    #DVT ppx: SCDs, avoiding AC    #Code status: DNR/DNI    DISPO: PT recommended for LURDES but sister is asking for discharge back to group home; SW inquiring to see if they will take him. Patient is medically stable for DC to Group Home. DC to group home today.

## 2020-09-03 NOTE — PROGRESS NOTE ADULT - REASON FOR ADMISSION
septic shock, UTI, hypothermia

## 2020-09-03 NOTE — PROGRESS NOTE ADULT - SUBJECTIVE AND OBJECTIVE BOX
[facility] Division of Hospital Medicine    Chief Complaint:  Patient is a 70y old  Male who presents with a chief complaint of septic shock, UTI, hypothermia (02 Sep 2020 15:39)      SUBJECTIVE / OVERNIGHT EVENTS:  Patient was seen and examined at bedside. Resting comfortably.   Patient denies chest pain, SOB, abd pain, N/V, fever, chills, dysuria or any other complaints. All remainder ROS negative.     MEDICATIONS  (STANDING):  allopurinol 300 milliGRAM(s) Oral daily  atorvastatin 20 milliGRAM(s) Oral at bedtime  calcium carbonate 1250 mG  + Vitamin D (OsCal 500 + D) 1 Tablet(s) Oral two times a day  chlorhexidine 2% Cloths 1 Application(s) Topical <User Schedule>  dextrose 5%. 1000 milliLiter(s) (50 mL/Hr) IV Continuous <Continuous>  dextrose 50% Injectable 12.5 Gram(s) IV Push once  dextrose 50% Injectable 25 Gram(s) IV Push once  dextrose 50% Injectable 25 Gram(s) IV Push once  ferrous    sulfate 325 milliGRAM(s) Oral daily  finasteride 5 milliGRAM(s) Oral daily  fluticasone propionate 50 MICROgram(s)/spray Nasal Spray 1 Spray(s) Both Nostrils two times a day  insulin lispro (HumaLOG) corrective regimen sliding scale   SubCutaneous three times a day before meals  midodrine. 10 milliGRAM(s) Oral <User Schedule>  multivitamin 1 Tablet(s) Oral daily  nystatin/triamcinolone Ointment 1 Application(s) Topical two times a day  oxybutynin 10 milliGRAM(s) Oral daily  tamsulosin 0.4 milliGRAM(s) Oral at bedtime  zinc sulfate 220 milliGRAM(s) Oral daily    MEDICATIONS  (PRN):  dextrose 40% Gel 15 Gram(s) Oral once PRN Blood Glucose LESS THAN 70 milliGRAM(s)/deciliter  glucagon  Injectable 1 milliGRAM(s) IntraMuscular once PRN Glucose LESS THAN 70 milligrams/deciliter        I&O's Summary      PHYSICAL EXAM:  Vital Signs Last 24 Hrs  T(C): 36.4 (03 Sep 2020 08:29), Max: 36.6 (03 Sep 2020 05:22)  T(F): 97.6 (03 Sep 2020 08:29), Max: 97.8 (03 Sep 2020 05:22)  HR: 74 (03 Sep 2020 08:29) (64 - 92)  BP: 110/76 (03 Sep 2020 08:29) (93/50 - 128/89)  BP(mean): --  RR: 18 (03 Sep 2020 08:29) (17 - 20)  SpO2: 97% (03 Sep 2020 08:29) (95% - 97%)      Constitutional: NAD, Male resting  ENT: MMM, no thrush, Supple, No JVD  Lungs: Clear to auscultation bilaterally, good air entry, non-labored breathing  Cardio: RRR, S1/S2, No murmur  Abdomen: Soft, Nontender, Nondistended; Bowel sounds present  Extremities: No calf tenderness, No pitting edema  Psych: Alert and awake; affect appropriate  Neuro: CN 2-12 are intact and symmetric; no gross sensory deficits;   Skin: No rashes; no palpable lesions    LABS:                        9.0    5.76  )-----------( 168      ( 03 Sep 2020 07:45 )             30.1     09-02    145  |  107  |  36.0<H>  ----------------------------<  165<H>  3.6   |  26.0  |  1.30    Ca    8.1<L>      02 Sep 2020 07:16  Phos  2.6     09-02  Mg     1.2     09-02                CAPILLARY BLOOD GLUCOSE      POCT Blood Glucose.: 164 mg/dL (03 Sep 2020 08:04)  POCT Blood Glucose.: 213 mg/dL (02 Sep 2020 20:55)  POCT Blood Glucose.: 180 mg/dL (02 Sep 2020 16:50)  POCT Blood Glucose.: 343 mg/dL (02 Sep 2020 12:07)        RADIOLOGY REVIEWED

## 2020-09-03 NOTE — DISCHARGE NOTE NURSING/CASE MANAGEMENT/SOCIAL WORK - PATIENT PORTAL LINK FT
You can access the FollowMyHealth Patient Portal offered by [facility] by registering at the following website: [email or web address]. By joining [employer]’s FollowMyHealth portal, you will also be able to view your health information using other applications (apps) compatible with our system.

## 2020-09-07 NOTE — ED ADULT NURSE NOTE - CAS TRG GENERAL NORM CIRC DET
Capillary refill less/equal to 2 seconds
You can access the FollowMyHealth Patient Portal offered by [facility] by registering at the following website: [email or web address]. By joining [employer]’s FollowMyHealth portal, you will also be able to view your health information using other applications (apps) compatible with our system.

## 2020-09-15 PROBLEM — I95.9 HYPOTENSION: Status: ACTIVE | Noted: 2020-01-01

## 2020-09-15 NOTE — PHYSICAL EXAM
[Well Nourished] : well nourished [Regular Rhythm] : with a regular rhythm [Well Developed] : well developed [Normal S1, S2] : normal S1 and S2 [de-identified] : poor conditioning, cannot stand, ambulate or transfer independently, two dependent transfer [de-identified] : w/c dependent [de-identified] : down, fatigued, answers questions when spoken too

## 2020-09-15 NOTE — ASSESSMENT
[FreeTextEntry1] : s/p hospital admission\par  discharged 9/3/2020 for sepsis/uti\par extremely deconditioned/weak\par large change from previous\par was given hospital discharge packet that was reviewed\par medications were changed, reviewed and updated in the chart\par \par spent 30 minutes reviewing hospital records/meds/course\par \par current status\par patient showing large change from previous both physically and mentally\par patient now wheelchair dependent, jesús lift for transfers and needs two person assistance for transfers/toileting/bathing\par needs assistance for all adl except feeding\par also large change in responsiveness, much less verbal, will respond to questions directly asked\par \par discussed hcp\par reviewed molst/dnr/dni 18 min\par \par cholesterol.\par The diagnosis of high cholesterol is established and patient is currently taking medications. The patient was counseled on a low cholesterol diet and on medication compliance. Patient was advised to generally limit foods fried in oil, high in saturated fat, cheese, eggs and red meat. Patient was advised to continue on current medications and to followup in office for necessary blood work in three months.\par \par heart failure/afib\par sees cardiologist\par no longer on eliquis\par \par diabetes\par The diagnosis of diabetes is established with this patient.  Blood work was drawn in office and results will be reviewed and followed. The patient was counseled on using a low sugar and carbohydrate diet.  Patient was advised to eat small meals and exercise regularly. Patient as advised to take all medications as prescribed and to follow up with yearly podiatry and opthalmology visits. Patient was advised to call office  or go to the ER immediately if experiences any nausea, lightheadedness or for any other issues.\par was taken of glimepiride, will check bw\par \par gout\par was placed back on allopurinol, will reckeck

## 2020-09-15 NOTE — HISTORY OF PRESENT ILLNESS
[FreeTextEntry8] : 70 year old male  here for annual well visit. Patient's blood work was drawn and medications reviewed. Patient's past medical history was reviewed, allergies verified and problems were identified and assessed. Patients medications were reviewed.\par also here for hospital discharge after second admission in past two months\par

## 2020-09-15 NOTE — REVIEW OF SYSTEMS
[Fatigue] : fatigue [Dizziness] : dizziness [Unsteady Walk] : ataxia [Insomnia] : insomnia [Negative] : Gastrointestinal [FreeTextEntry9] : balance issues and strength issues - severe

## 2020-09-15 NOTE — HEALTH RISK ASSESSMENT
[Fair] : ~his/her~ current health as fair  [Good] : ~his/her~  mood as  good [No] : No [No falls in past year] : Patient reported no falls in the past year [0] : 2) Feeling down, depressed, or hopeless: Not at all (0) [No Retinopathy] : No retinopathy [Single] : single [On disability] : on disability [# Of Children ___] : has [unfilled] children [Independent] : feeding [Some assistance needed] : using telephone [Full assistance needed] : managing finances [With Patient/Caregiver] : With Patient/Caregiver [DNR] : DNR [DNI] : DNI [Last Verification Date: ___] : Last Verification Date: [unfilled] [[id number]] : 0 [] : No [EyeExamDate] : 2019 [de-identified] : in group home [AdvancecareDate] : 09/15/2020 [FreeTextEntry4] : sister - hcp - \par reviewed molst\par reviewed with aids and patient, went through all forms

## 2020-09-17 NOTE — H&P ADULT - NSHPLABSRESULTS_GEN_ALL_CORE
9.0    4.05  )-----------( 104      ( 17 Sep 2020 09:28 )             30.0       09-17    138  |  104  |  38.0<H>  ----------------------------<  134<H>  5.5<H>   |  22.0  |  3.10<H>    Ca    9.2      17 Sep 2020 11:17  Mg     2.0     09-17    TPro  7.1  /  Alb  3.0<L>  /  TBili  0.4  /  DBili  x   /  AST  42<H>  /  ALT  16  /  AlkPhos  115  09-17

## 2020-09-17 NOTE — ED PROVIDER NOTE - NS ED ATTENDING STATEMENT MOD
I have personally seen and examined this patient. I have fully participated in the care of this patient. I have reviewed all pertinent clinical information, including history, physical exam, plan and the Medical/PA/NP Student's note and agree except as noted. I have personally performed a face to face diagnostic evaluation on this patient. I have reviewed the ACP note and agree with the history, exam and plan of care, except as noted.

## 2020-09-17 NOTE — ED PROVIDER NOTE - SKIN, MLM
Skin normal color for race, warm, dry and intact. No evidence of rash. B/L LE rubor 2/2 venous stasis Skin normal color for race, warm, dry and intact. No evidence of rash. B/L LE skin thickening and discoloration 2/2 venous stasis

## 2020-09-17 NOTE — H&P ADULT - ASSESSMENT
69 y/o M with a h/o developmental disability, CHF, HTN, DM, AF taken off apixaban due to anemia/hematuria, CKD due to COVID-19 infection, refused hemodialysis, BPH who was recently discharged from [facility] 9/3/20 s/p treated for ESBL UTI and septic shock brought back to ED from group home due to no urinary output since yesterday. Staff at [facility] notes the patient had a change in diet yesterday and has refused to eat, drink, or take his medications since yesterday. In ED pt is found ot be in MARSHAL on CKD with hyperkalemia and U/A indicative of UTI. Pt denies any sob, no cough, no chest pain, no n/v or abd pain. Pt admitted for MARSHAL on CKD, hyperkalemia and UTI.    1) MARSHAL on CKD stage 4  - admit to medicine  - placed on bicarb drip  - renal following  - monitor renal functions  - avoid nephrotoxins    2) Hyperkalemia  - s/p calcium gluconate in ED  - repeat K level is 5.5  - give Kayexalate X1 dose   - repeat bmp 4pm today and monitor level    3) UTI  - pt had a complicated ESBL uti on prior admission  - UA results with description of pt bejavior from group [place] indicate UTI now  - placed in Invanz IV in ED will continue   - urine cx sensitivity results pending  - check blood cx X2     4) DM type 2  - hold oral DM meds  - CSI and fingerstick monitoring  - Diabetic Diet    5) BPH  - on Flomax and finasteride     6) HLD  - on statin    7) Afib.  - taken off apixaban for anemia/hematuria.    8) Prophylactic measure  - DVT ppx: heparin SQ

## 2020-09-17 NOTE — ED PROVIDER NOTE - CARE PLAN
Principal Discharge DX:	Renal failure  Secondary Diagnosis:	Hyperkalemia  Secondary Diagnosis:	UTI (urinary tract infection)

## 2020-09-17 NOTE — ED PROVIDER NOTE - OBJECTIVE STATEMENT
69 y/o male with sig PMhx HTN, BPH, CHF, Afib, NIDDM, Venous insufficiency, cognitive impairment, cardiomyopathy, BIBA from [facility] ([address] [place]) due to no urinary output since yesterday. Staff at [facility] notes the patient had a change in diet yesterday and has refused to eat, drink, or take his medications since yesterday. Patient denies any complaints at this time, no cp, sob, n/v/,d, fevers.   [facility] [phone]   EVANGELINA Abraham [phone]  [facility] manager [phone]  On call [phone]

## 2020-09-17 NOTE — ED PROCEDURE NOTE - PROCEDURE ADDITIONAL DETAILS
sono to eval for retention, ~50cc urine, some thickened wall vs underdistension, sediment vs mass?  Follow up PO intake and resono then possible straight cath/rodriguez

## 2020-09-17 NOTE — ED PROVIDER NOTE - ATTENDING CONTRIBUTION TO CARE
I, Cheryl Pereira, performed a face to face bedside interview with this patient regarding history of present illness, review of symptoms and relevant past medical, social and family history.  I completed an independent physical examination. Medical decision making, follow-up on ordered tests (ie labs, radiologic studies) and re-evaluation of the patient's status has been communicated to the ACP.  Disposition of the patient will be based on test outcome and response to ED interventions.    71yo M w/ afib not on A/C, CHF, BPH, DM, cognitive impairment from group home, recent admission for Urosepsis, today w/ decreased PO intake for 2 days, no fevers, per RN no Urine outpt. patient denies abd pain. denies change in urination. leg swelling @ baseline.     Gen: NAD, AOx1- s3elf only  Head: NCAT  HEENT: EOMI, oral mucosa moist, normal conjunctiva, neck supple  Lung: CTAB, no respiratory distress  CV: bradycardic, no murmur, Normal perfusion  Abd: soft, NT, distended  MSK: +3 b/l pitting edema, no visible deformities  Neuro: No focal neurologic deficits  Skin: chronic venous stasis change b/l LE  Psych: flat affect     patient with decreased po INtake, bedside sono 50cc urine only, will check labs, gentle hydration reasses

## 2020-09-17 NOTE — ED PROVIDER NOTE - PROGRESS NOTE DETAILS
patient in renal failure, likely intrinsic, US no obstruction, will give 500cc bolus, nephro paged. home aware patient to be admitted. K elevated, no EKG changes and hemolyzed will repeat BMP prior to aggressive tx -Slowey DO patient in renal failure, likely intrinsic, US no obstruction, will give 500cc bolus, nephro paged. home aware patient to be admitted. K elevated, EKG bradycardic and a fib but has been in past, will give Ca,  -Slowey DO

## 2020-09-17 NOTE — ED PROVIDER NOTE - CLINICAL SUMMARY MEDICAL DECISION MAKING FREE TEXT BOX
71 y/o male BIBA from group home for no urine output since yesterday and refusing PO intake and meds since yesterday. Patient denies any complaints, PE otherwise unremarkable. POCUS bladder shows 48 ml and diaper is dry. Will check labs and give 250 ml bolus slow and reassess for urine ouput and PO challenge.

## 2020-09-17 NOTE — CONSULT NOTE ADULT - SUBJECTIVE AND OBJECTIVE BOX
[facility] DIVISION OF KIDNEY DISEASES AND HYPERTENSION -- INITIAL CONSULT NOTE  --------------------------------------------------------------------------------  HPI:    · Chief Complaint: The patient is a 70y Male complaining of urinary symptoms.    · HPI Objective Statement: 71 y/o male with sig PMhx HTN, BPH, CHF, Afib, NIDDM, Venous insufficiency, cognitive impairment, cardiomyopathy, BIBA from [facility] due to no urinary output since yesterday. Staff at [facility] notes the patient had a change in diet yesterday and has refused to eat, drink, or take his medications since yesterday. Patient denies any complaints at this time, no cp, sob, n/v/,d, fevers.   	[facility] [phone]   	EVANGELINA Abraham [phone]  	[facility] manager [phone]      PAST HISTORY  --------------------------------------------------------------------------------  PAST MEDICAL & SURGICAL HISTORY:  Encounter for blood transfusion    Anemia    History of gross hematuria    History of orthostatic hypotension    Bacterial pneumonia    Muscle weakness (generalized)    2019 novel coronavirus disease (COVID-19)    Falls    Heart failure    Afib    CHF (congestive heart failure)    Gout    Hiatal hernia    BPH (benign prostatic hyperplasia)    Cognitive impairment    Cardiomyopathy    HTN (hypertension)    DM (diabetes mellitus)      FAMILY HISTORY:    PAST SOCIAL HISTORY:    ALLERGIES & MEDICATIONS  --------------------------------------------------------------------------------  Allergies    No Known Allergies    Intolerances      Standing Inpatient Medications  calcium gluconate IVPB 2 Gram(s) IV Intermittent Once  sodium bicarbonate  Infusion 0.076 mEq/kG/Hr IV Continuous <Continuous>  sodium chloride 0.9% Bolus 250 milliLiter(s) IV Bolus once  sodium zirconium cyclosilicate 10 Gram(s) Oral Once    PRN Inpatient Medications      REVIEW OF SYSTEMS  --------------------------------------------------------------------------------  Gen: No weight changes, fatigue, fevers/chills, weakness  Skin: No rashes  Head/Eyes/Ears/Mouth: No headache; Normal hearing; Normal vision w/o blurriness; No sinus pain/discomfort, sore throat  Respiratory: No dyspnea, cough, wheezing, hemoptysis  CV: No chest pain, PND, orthopnea  GI: No abdominal pain, diarrhea, constipation, nausea, vomiting, melena, hematochezia  : No increased frequency, dysuria, hematuria, nocturia  MSK: No joint pain/swelling; no back pain; no edema  Neuro: No dizziness/lightheadedness, weakness, seizures, numbness, tingling  Heme: No easy bruising or bleeding  Endo: No heat/cold intolerance  Psych: No significant nervousness, anxiety, stress, depression    All other systems were reviewed and are negative, except as noted.    VITALS/PHYSICAL EXAM  --------------------------------------------------------------------------------  T(C): 36.5 (09-17-20 @ 08:18), Max: 36.5 (09-17-20 @ 08:18)  HR: 78 (09-17-20 @ 08:18) (78 - 78)  BP: 133/67 (09-17-20 @ 08:18) (133/67 - 133/67)  RR: 20 (09-17-20 @ 08:18) (20 - 20)  SpO2: 96% (09-17-20 @ 08:18) (96% - 96%)  Wt(kg): --  Height (cm): 175.3 (09-17-20 @ 08:18)  Weight (kg): 104.3 (09-17-20 @ 08:18)  BMI (kg/m2): 33.9 (09-17-20 @ 08:18)  BSA (m2): 2.19 (09-17-20 @ 08:18)      Physical Exam:  	Gen: NAD, ill-appearing, Pale,   	HEENT: PERRL, supple neck, clear oropharynx  	Pulm: CTA B/L  	CV: RRR, S1S2; no rub  	Back: No spinal or CVA tenderness; no sacral edema  	Abd: +BS, soft, nontender/nondistended  	: No suprapubic tenderness  	UE: Warm, FROM, no clubbing, intact strength; no edema; no asterixis  	LE: Warm, FROM, no clubbing, intact strength; + edema  	Neuro: No focal deficits,   	Psych: Normal affect and mood  	Skin: Warm, without rashes  	Vascular access:    LABS/STUDIES  --------------------------------------------------------------------------------              9.0    4.05  >-----------<  104      [09-17-20 @ 09:28]              30.0     137  |  104  |  37.0  ----------------------------<  165      [09-17-20 @ 09:28]  6.3   |  21.0  |  3.03        Ca     9.4     [09-17-20 @ 09:28]      Mg     2.0     [09-17-20 @ 09:28]    TPro  7.1  /  Alb  3.0  /  TBili  0.4  /  DBili  x   /  AST  42  /  ALT  16  /  AlkPhos  115  [09-17-20 @ 09:28]    Creatinine Trend:  SCr 3.03 [09-17 @ 09:28]  SCr 1.30 [09-02 @ 07:16]  SCr 1.30 [09-01 @ 06:00]  SCr 1.23 [08-31 @ 07:26]  SCr 1.14 [08-30 @ 09:43]    Urinalysis - [09-17-20 @ 10:27]      Color Demi / Appearance very cloudy / SG 1.010 / pH 7.0      Gluc Negative / Ketone Trace  / Bili Negative / Urobili Negative       Blood Large / Protein 500 / Leuk Est Moderate / Nitrite Negative      RBC  / WBC  / Hyaline  / Gran  / Sq Epi  / Non Sq Epi  / Bacteria       Ferritin 343      [04-15-20 @ 23:46]  TSH 1.80      [08-20-20 @ 21:02]    HCV 0.39, Nonreact      [04-16-20 @ 08:40]

## 2020-09-17 NOTE — H&P ADULT - NSHPPHYSICALEXAM_GEN_ALL_CORE
PHYSICAL EXAM:  Vital Signs Last 24 Hrs  T(C): 36.5 (17 Sep 2020 08:18), Max: 36.5 (17 Sep 2020 08:18)  T(F): 97.7 (17 Sep 2020 08:18), Max: 97.7 (17 Sep 2020 08:18)  HR: 78 (17 Sep 2020 08:18) (78 - 78)  BP: 133/67 (17 Sep 2020 08:18) (133/67 - 133/67)  BP(mean): --  RR: 20 (17 Sep 2020 08:18) (20 - 20)  SpO2: 96% (17 Sep 2020 08:18) (96% - 96%)    GENERAL: NAD, well-groomed, well-developed  HEAD:  Atraumatic, Normocephalic  EYES: EOMI, PERRLA, conjunctiva and sclera clear  ENMT: No tonsillar erythema, exudates, or enlargement; Moist mucous membranes  NERVOUS SYSTEM: Motor Strength 5/5 B/L upper and lower extremities, sensory intact  CHEST/LUNG: Clear to auscultation bilaterally; No rales, rhonchi, wheezing  HEART: Regular rate and rhythm; No murmurs  ABDOMEN: Soft, Nontender, Nondistended; Bowel sounds present  EXTREMITIES:   No clubbing, cyanosis, B/L +LE  edema, chronic LE venous stasis

## 2020-09-17 NOTE — H&P ADULT - HISTORY OF PRESENT ILLNESS
71 y/o M with a h/o developmental disability, CHF, HTN, DM, AF taken off apixaban due to anemia/hematuria, CKD due to COVID-19 infection, refused hemodialysis, BPH who was recently discharged from [facility] 9/3/20 s/p treated for ESBL UTI and septic shock brought back to ED from group home due to no urinary output since yesterday. Staff at [facility] notes the patient had a change in diet yesterday and has refused to eat, drink, or take his medications since yesterday. In ED pt is found ot be in MARSHAL on CKD with hyperkalemia and U/A indicative of UTI. Pt denies any sob, no cough, no chest pain, no n/v or abd pain. Pt admitted for MARSHAL on CKD, hyperkalemia and UTI.

## 2020-09-17 NOTE — H&P ADULT - NSICDXFAMILYHX_GEN_ALL_CORE_FT
FAMILY HISTORY:  No pertinent family history in first degree relatives, pt hossein recal his family medical hx of either parents

## 2020-09-18 NOTE — PHYSICAL THERAPY INITIAL EVALUATION ADULT - PERTINENT HX OF CURRENT PROBLEM, REHAB EVAL
71 y/o M with a h/o developmental disability, CHF, HTN, DM, AF taken off apixaban due to anemia/hematuria, CKD due to COVID-19 infection, refused hemodialysis, BPH who was recently discharged from [facility] 9/3/20 s/p treated for ESBL UTI and septic shock brought back to ED from group home due to no urinary output x1 day prior to arrival

## 2020-09-18 NOTE — PROGRESS NOTE ADULT - SUBJECTIVE AND OBJECTIVE BOX
[facility] DIVISION OF KIDNEY DISEASES AND HYPERTENSION -- FOLLOW UP NOTE  --------------------------------------------------------------------------------  Chief Complaint:  CKD IV    24 hour events/subjective:  Pt seen/examined in CDU  Lying in bed in NAD  Rivera in place; draining urine;      PAST HISTORY  --------------------------------------------------------------------------------  No significant changes to PMH, PSH, FHx, SHx, unless otherwise noted    ALLERGIES & MEDICATIONS  --------------------------------------------------------------------------------  Allergies    No Known Allergies    Intolerances      Standing Inpatient Medications  allopurinol 300 milliGRAM(s) Oral daily  atorvastatin 20 milliGRAM(s) Oral at bedtime  calcium carbonate 1250 mG  + Vitamin D (OsCal 500 + D) 1 Tablet(s) Oral two times a day  dextrose 5%. 1000 milliLiter(s) IV Continuous <Continuous>  dextrose 50% Injectable 12.5 Gram(s) IV Push once  dextrose 50% Injectable 25 Gram(s) IV Push once  dextrose 50% Injectable 25 Gram(s) IV Push once  ferrous    sulfate 325 milliGRAM(s) Oral daily  finasteride 5 milliGRAM(s) Oral daily  heparin   Injectable 5000 Unit(s) SubCutaneous every 8 hours  insulin lispro (HumaLOG) corrective regimen sliding scale   SubCutaneous three times a day before meals  insulin lispro (HumaLOG) corrective regimen sliding scale   SubCutaneous at bedtime  meropenem  IVPB 500 milliGRAM(s) IV Intermittent every 12 hours  midodrine. 10 milliGRAM(s) Oral <User Schedule>  multivitamin 1 Tablet(s) Oral daily  oxybutynin 10 milliGRAM(s) Oral daily  sodium bicarbonate  Infusion 0.076 mEq/kG/Hr IV Continuous <Continuous>  tamsulosin 0.4 milliGRAM(s) Oral at bedtime    PRN Inpatient Medications  dextrose 40% Gel 15 Gram(s) Oral once PRN  glucagon  Injectable 1 milliGRAM(s) IntraMuscular once PRN      REVIEW OF SYSTEMS  --------------------------------------------------------------------------------  Gen: No weight changes, fatigue, fevers/chills, weakness  Skin: No rashes  Head/Eyes/Ears/Mouth: No headache; Normal hearing; Normal vision w/o blurriness; No sinus pain/discomfort, sore throat  Respiratory: No dyspnea, cough, wheezing, hemoptysis  CV: No chest pain, PND, orthopnea  GI: No abdominal pain, diarrhea, constipation, nausea, vomiting, melena, hematochezia  : No increased frequency, dysuria, hematuria, nocturia  MSK: No joint pain/swelling; no back pain; no edema  Neuro: No dizziness/lightheadedness, weakness, seizures, numbness, tingling  Heme: No easy bruising or bleeding  Endo: No heat/cold intolerance  Psych: No significant nervousness, anxiety, stress, depression    All other systems were reviewed and are negative, except as noted.    VITALS/PHYSICAL EXAM  --------------------------------------------------------------------------------  T(C): 36.8 (09-18-20 @ 03:45), Max: 36.8 (09-17-20 @ 13:19)  HR: 78 (09-18-20 @ 07:47) (64 - 78)  BP: 109/69 (09-18-20 @ 07:47) (94/60 - 122/66)  RR: 18 (09-18-20 @ 07:47) (18 - 20)  SpO2: 94% (09-18-20 @ 07:47) (94% - 98%)  Wt(kg): --  Height (cm): 175.3 (09-17-20 @ 08:18)  Weight (kg): 104.3 (09-17-20 @ 08:18)  BMI (kg/m2): 33.9 (09-17-20 @ 08:18)  BSA (m2): 2.19 (09-17-20 @ 08:18)      09-17-20 @ 07:01  -  09-18-20 @ 07:00  --------------------------------------------------------  IN: 0 mL / OUT: 400 mL / NET: -400 mL      Physical Exam:  	Gen: NAD   	HEENT: PERRL, supple neck, clear oropharynx  	Pulm: CTA B/L  	CV: RRR, S1S2; no rub  	Back: No spinal or CVA tenderness; no sacral edema  	Abd: +BS, soft, nontender/nondistended  	: No suprapubic tenderness  	UE: Warm, FROM, no clubbing, intact strength; no edema; no asterixis  	LE: Warm, FROM, no clubbing, intact strength; no edema  	Neuro: No focal deficits, intact gait  	Psych: Normal affect and mood  	Skin: Warm, without rashes  	Vascular access:    LABS/STUDIES  --------------------------------------------------------------------------------              8.3    3.85  >-----------<  84       [09-18-20 @ 07:17]              27.4     140  |  103  |  38.0  ----------------------------<  94      [09-18-20 @ 07:17]  5.0   |  24.0  |  3.05        Ca     9.1     [09-18-20 @ 07:17]      Mg     1.8     [09-18-20 @ 07:17]      Phos  4.0     [09-18-20 @ 07:17]    TPro  7.1  /  Alb  3.0  /  TBili  0.4  /  DBili  x   /  AST  42  /  ALT  16  /  AlkPhos  115  [09-17-20 @ 09:28]          Creatinine Trend:  SCr 3.05 [09-18 @ 07:17]  SCr 3.16 [09-17 @ 16:55]  SCr 3.10 [09-17 @ 11:17]  SCr 3.03 [09-17 @ 09:28]  SCr 1.30 [09-02 @ 07:16]    Urinalysis - [09-17-20 @ 10:27]      Color Demi / Appearance very cloudy / SG 1.010 / pH 7.0      Gluc Negative / Ketone Trace  / Bili Negative / Urobili Negative       Blood Large / Protein 500 / Leuk Est Moderate / Nitrite Negative      RBC >50 / WBC >100 / Hyaline  / Gran  / Sq Epi  / Non Sq Epi Negative / Bacteria Few    Urine Sodium 116      [09-17-20 @ 10:54]  Urine Urea Nitrogen 94      [09-17-20 @ 15:41]  Urine Potassium 13      [09-17-20 @ 10:54]  Urine Osmolality 316      [09-17-20 @ 10:54]    Ferritin 343      [04-15-20 @ 23:46]  TSH 1.80      [08-20-20 @ 21:02]

## 2020-09-18 NOTE — CHART NOTE - NSCHARTNOTEFT_GEN_A_CORE
Notified by RN pt with multiple BMs today and large soft BM x3 within a period of 30 minutes tonight. Pt received Kayexalate 30mg PO today for hyperkalemia. Ordered RN to insert [employer] for pt comfort. Monitor BMs. Serum potassium improved 6.3--> 5.0. RN to continue to monitor and escalate PRN.

## 2020-09-18 NOTE — CONSULT NOTE ADULT - SUBJECTIVE AND OBJECTIVE BOX
[facility]  INFECTIOUS DISEASES AND INTERNAL MEDICINE at [place]  =======================================================  Zach Chew MD  Diplomates American Board of Internal Medicine and Infectious Diseases  Tel: [phone]      Fax: [phone]  =======================================================      [id number]  KIMBERLY MARKS is a 70y  Male     CC: Patient is a 70y old  Male who presents with a chief complaint of MARSHAL and UTI (18 Sep 2020 12:00)    HPI:  71 y/o M with a h/o developmental disability, CHF, HTN, DM, AF taken off apixaban due to anemia/hematuria, CKD due to COVID-19 infection, refused hemodialysis, BPH who was recently discharged from [facility] 9/3/20 s/p treated for ESBL UTI and septic shock brought back to ED from group home due to no urinary output since yesterday. Staff at [facility] notes the patient had a change in diet yesterday and has refused to eat, drink, or take his medications since yesterday. In ED pt is found ot be in MARSHAL on CKD with hyperkalemia and U/A indicative of UTI. Pt denies any sob, no cough, no chest pain, no n/v or abd pain. Pt admitted for MARSHAL on CKD, hyperkalemia and UTI. (17 Sep 2020 12:46)      PAST MEDICAL & SURGICAL HISTORY:  Encounter for blood transfusion    Anemia    History of gross hematuria    History of orthostatic hypotension    Bacterial pneumonia    Muscle weakness (generalized)    2019 novel coronavirus disease (COVID-19)    Falls    Heart failure    Afib    CHF (congestive heart failure)    Gout    Hiatal hernia    BPH (benign prostatic hyperplasia)    Cognitive impairment    Cardiomyopathy    HTN (hypertension)    DM (diabetes mellitus)    S/P appendectomy        Social Hx:    FAMILY HISTORY:  No pertinent family history in first degree relatives  pt cant recal his family medical hx of either parents        Allergies    No Known Allergies    Intolerances        MEDICATIONS  (STANDING):  allopurinol 300 milliGRAM(s) Oral daily  atorvastatin 20 milliGRAM(s) Oral at bedtime  calcium carbonate 1250 mG  + Vitamin D (OsCal 500 + D) 1 Tablet(s) Oral two times a day  dextrose 5%. 1000 milliLiter(s) (50 mL/Hr) IV Continuous <Continuous>  dextrose 50% Injectable 12.5 Gram(s) IV Push once  dextrose 50% Injectable 25 Gram(s) IV Push once  dextrose 50% Injectable 25 Gram(s) IV Push once  ferrous    sulfate 325 milliGRAM(s) Oral daily  finasteride 5 milliGRAM(s) Oral daily  heparin   Injectable 5000 Unit(s) SubCutaneous every 8 hours  insulin lispro (HumaLOG) corrective regimen sliding scale   SubCutaneous three times a day before meals  insulin lispro (HumaLOG) corrective regimen sliding scale   SubCutaneous at bedtime  meropenem  IVPB 500 milliGRAM(s) IV Intermittent every 12 hours  midodrine. 10 milliGRAM(s) Oral <User Schedule>  multivitamin 1 Tablet(s) Oral daily  oxybutynin 10 milliGRAM(s) Oral daily  sodium bicarbonate  Infusion 0.076 mEq/kG/Hr (105 mL/Hr) IV Continuous <Continuous>  tamsulosin 0.4 milliGRAM(s) Oral at bedtime    MEDICATIONS  (PRN):  dextrose 40% Gel 15 Gram(s) Oral once PRN Blood Glucose LESS THAN 70 milliGRAM(s)/deciliter  glucagon  Injectable 1 milliGRAM(s) IntraMuscular once PRN Glucose LESS THAN 70 milligrams/deciliter      ANTIMICROBIALS:  meropenem  IVPB 500 every 12 hours      OTHER MEDS: MEDICATIONS  (STANDING):  allopurinol 300 daily  atorvastatin 20 at bedtime  dextrose 40% Gel 15 once PRN  dextrose 50% Injectable 12.5 once  dextrose 50% Injectable 25 once  dextrose 50% Injectable 25 once  finasteride 5 daily  glucagon  Injectable 1 once PRN  heparin   Injectable 5000 every 8 hours  insulin lispro (HumaLOG) corrective regimen sliding scale  three times a day before meals  insulin lispro (HumaLOG) corrective regimen sliding scale  at bedtime  midodrine. 10 <User Schedule>  oxybutynin 10 daily  tamsulosin 0.4 at bedtime             REVIEW OF SYSTEMS:  CONSTITUTIONAL:  No Fever or chills  HEENT:  No diplopia or blurred vision.  No earache, sore throat or runny nose.  CARDIOVASCULAR:  No pressure, squeezing, strangling, tightness, heaviness or aching about the chest, neck, axilla or epigastrium.  RESPIRATORY:  No cough, shortness of breath  GASTROINTESTINAL:  No nausea, vomiting or diarrhea.  GENITOURINARY:  No dysuria, frequency or urgency. No Blood in urine  MUSCULOSKELETAL:  no joint aches, no muscle pain  SKIN:  No change in skin, hair or nails.  NEUROLOGIC:  No Headaches, seizures or weakness.  PSYCHIATRIC:  No disorder of thought or mood.  ENDOCRINE:  No heat or cold intolerance  HEMATOLOGICAL:  No easy bruising or bleeding.           I&O's Detail    17 Sep 2020 07:01  -  18 Sep 2020 07:00  --------------------------------------------------------  IN:  Total IN: 0 mL    OUT:    Indwelling Catheter - Urethral (mL): 400 mL  Total OUT: 400 mL    Total NET: -400 mL            Physical Exam:  Vital Signs Last 24 Hrs  T(C): 36.8 (18 Sep 2020 03:45), Max: 36.8 (17 Sep 2020 13:19)  T(F): 98.2 (18 Sep 2020 03:45), Max: 98.2 (17 Sep 2020 13:19)  HR: 78 (18 Sep 2020 07:47) (64 - 78)  BP: 109/69 (18 Sep 2020 07:47) (94/60 - 122/66)  BP(mean): 72 (18 Sep 2020 03:45) (72 - 72)  RR: 18 (18 Sep 2020 07:47) (18 - 20)  SpO2: 94% (18 Sep 2020 07:47) (94% - 98%)    GEN: NAD, pleasant  HEENT: normocephalic and atraumatic. EOMI. PERRL.  Anicteric  NECK: Supple.   LUNGS: Clear to auscultation.  HEART: Regular rate and rhythm without murmur.  ABDOMEN: Soft, nontender, and nondistended.  Positive bowel sounds.    : No CVA tenderness  EXTREMITIES: Without any edema.  MSK: No joint swelling  NEUROLOGIC: Cranial nerves II through XII are grossly intact. No Focal Deficits  PSYCHIATRIC: Appropriate affect .  SKIN: No Rash        Labs:      Radiology:   [facility]  INFECTIOUS DISEASES AND INTERNAL MEDICINE at [place]  =======================================================  Zach Chew MD  Diplomates American Board of Internal Medicine and Infectious Diseases  Tel: [phone]      Fax: [phone]  =======================================================      MRN-[id number]  KIMBERLY MARKS is a 70y  Male     CC: Patient is a 70y old  Male who presents with a chief complaint of MARSHAL and UTI (18 Sep 2020 12:00)    HPI:  69 y/o M with a h/o developmental disability, CHF, HTN, DM, AF taken off apixaban due to anemia/hematuria, CKD due to COVID-19 infection, refused hemodialysis, BPH who was recently discharged from [facility] 9/3/20 s/p treated for ESBL UTI and septic shock brought back to ED from group home due to no urinary output since yesterday. Staff at [facility] notes the patient had a change in diet yesterday and has refused to eat, drink, or take his medications since yesterday. In ED pt is found ot be in MARSHAL on CKD with hyperkalemia and U/A indicative of UTI. Pt denies any sob, no cough, no chest pain, no n/v or abd pain. Pt admitted for MARSHAL on CKD, hyperkalemia and UTI. (17 Sep 2020 12:46)      PAST MEDICAL & SURGICAL HISTORY:  Encounter for blood transfusion    Anemia    History of gross hematuria    History of orthostatic hypotension    Bacterial pneumonia    Muscle weakness (generalized)    2019 novel coronavirus disease (COVID-19)    Falls    Heart failure    Afib    CHF (congestive heart failure)    Gout    Hiatal hernia    BPH (benign prostatic hyperplasia)    Cognitive impairment    Cardiomyopathy    HTN (hypertension)    DM (diabetes mellitus)    S/P appendectomy        Social Hx:    FAMILY HISTORY:  No pertinent family history in first degree relatives  pt cant recal his family medical hx of either parents        Allergies    No Known Allergies    Intolerances        MEDICATIONS  (STANDING):  allopurinol 300 milliGRAM(s) Oral daily  atorvastatin 20 milliGRAM(s) Oral at bedtime  calcium carbonate 1250 mG  + Vitamin D (OsCal 500 + D) 1 Tablet(s) Oral two times a day  dextrose 5%. 1000 milliLiter(s) (50 mL/Hr) IV Continuous <Continuous>  dextrose 50% Injectable 12.5 Gram(s) IV Push once  dextrose 50% Injectable 25 Gram(s) IV Push once  dextrose 50% Injectable 25 Gram(s) IV Push once  ferrous    sulfate 325 milliGRAM(s) Oral daily  finasteride 5 milliGRAM(s) Oral daily  heparin   Injectable 5000 Unit(s) SubCutaneous every 8 hours  insulin lispro (HumaLOG) corrective regimen sliding scale   SubCutaneous three times a day before meals  insulin lispro (HumaLOG) corrective regimen sliding scale   SubCutaneous at bedtime  meropenem  IVPB 500 milliGRAM(s) IV Intermittent every 12 hours  midodrine. 10 milliGRAM(s) Oral <User Schedule>  multivitamin 1 Tablet(s) Oral daily  oxybutynin 10 milliGRAM(s) Oral daily  sodium bicarbonate  Infusion 0.076 mEq/kG/Hr (105 mL/Hr) IV Continuous <Continuous>  tamsulosin 0.4 milliGRAM(s) Oral at bedtime    MEDICATIONS  (PRN):  dextrose 40% Gel 15 Gram(s) Oral once PRN Blood Glucose LESS THAN 70 milliGRAM(s)/deciliter  glucagon  Injectable 1 milliGRAM(s) IntraMuscular once PRN Glucose LESS THAN 70 milligrams/deciliter      ANTIMICROBIALS:  meropenem  IVPB 500 every 12 hours      OTHER MEDS: MEDICATIONS  (STANDING):  allopurinol 300 daily  atorvastatin 20 at bedtime  dextrose 40% Gel 15 once PRN  dextrose 50% Injectable 12.5 once  dextrose 50% Injectable 25 once  dextrose 50% Injectable 25 once  finasteride 5 daily  glucagon  Injectable 1 once PRN  heparin   Injectable 5000 every 8 hours  insulin lispro (HumaLOG) corrective regimen sliding scale  three times a day before meals  insulin lispro (HumaLOG) corrective regimen sliding scale  at bedtime  midodrine. 10 <User Schedule>  oxybutynin 10 daily  tamsulosin 0.4 at bedtime             REVIEW OF SYSTEMS:  CONSTITUTIONAL:  No Fever or chills  HEENT:  No diplopia or blurred vision.  No earache, sore throat or runny nose.  CARDIOVASCULAR:  No pressure, squeezing, strangling, tightness, heaviness or aching about the chest, neck, axilla or epigastrium.  RESPIRATORY:  No cough, shortness of breath  GASTROINTESTINAL:  No nausea, vomiting or diarrhea.  GENITOURINARY:  No dysuria, frequency or urgency. No Blood in urine  MUSCULOSKELETAL:  no joint aches, no muscle pain  SKIN:  No change in skin, hair or nails.  NEUROLOGIC:  No Headaches, seizures or weakness.  PSYCHIATRIC:  No disorder of thought or mood.  ENDOCRINE:  No heat or cold intolerance  HEMATOLOGICAL:  No easy bruising or bleeding.           I&O's Detail    17 Sep 2020 07:01  -  18 Sep 2020 07:00  --------------------------------------------------------  IN:  Total IN: 0 mL    OUT:    Indwelling Catheter - Urethral (mL): 400 mL  Total OUT: 400 mL    Total NET: -400 mL            Physical Exam:  Vital Signs Last 24 Hrs  T(C): 36.8 (18 Sep 2020 03:45), Max: 36.8 (17 Sep 2020 13:19)  T(F): 98.2 (18 Sep 2020 03:45), Max: 98.2 (17 Sep 2020 13:19)  HR: 78 (18 Sep 2020 07:47) (64 - 78)  BP: 109/69 (18 Sep 2020 07:47) (94/60 - 122/66)  BP(mean): 72 (18 Sep 2020 03:45) (72 - 72)  RR: 18 (18 Sep 2020 07:47) (18 - 20)  SpO2: 94% (18 Sep 2020 07:47) (94% - 98%)    GEN: NAD, pleasant  HEENT: normocephalic and atraumatic. EOMI. PERRL.  Anicteric  NECK: Supple.   LUNGS: Clear to auscultation.  HEART: Regular rate and rhythm without murmur.  ABDOMEN: Soft, nontender, and nondistended.  Positive bowel sounds.    : No CVA tenderness + rodriguez cloudy urine  EXTREMITIES: Without any edema. chronic venous stasis dermatitis  MSK: No joint swelling  NEUROLOGIC: Cranial nerves II through XII are grossly intact. No Focal Deficits  PSYCHIATRIC: Appropriate affect .  SKIN: No Rash        Labs:                        8.3    3.85  )-----------( 84       ( 18 Sep 2020 07:17 )             27.4   09-18    140  |  103  |  38.0<H>  ----------------------------<  94  5.0   |  24.0  |  3.05<H>    Ca    9.1      18 Sep 2020 07:17  Phos  4.0     09-18  Mg     1.8     09-18    TPro  7.1  /  Alb  3.0<L>  /  TBili  0.4  /  DBili  x   /  AST  42<H>  /  ALT  16  /  AlkPhos  115  09-17      Radiology:  < from: US Duplex Venous Lower Ext Complete, Bilateral (09.18.20 @ 11:52) >  IMPRESSION:  No evidence of deep venous thrombosis in either lower extremity.      < end of copied text >

## 2020-09-18 NOTE — PHYSICAL THERAPY INITIAL EVALUATION ADULT - RANGE OF MOTION EXAMINATION, REHAB EVAL
bilateral lower extremity ROM was WFL (within functional limits)/BUEs limited shoulder ROM throughout

## 2020-09-18 NOTE — PROGRESS NOTE ADULT - ASSESSMENT
69 y/o M with a h/o developmental disability, CHF, HTN, DM, AF taken off apixaban due to anemia/hematuria, CKD due to COVID-19 infection, refused hemodialysis, BPH who was recently discharged from [facility] 9/3/20 s/p treated for ESBL UTI and septic shock brought back to ED from group home due to no urinary output x1 day prior to arrival    #MARSHAL on CKD stage 4  - nephro conuslt appreciated  - bicarb drip  - avoid nephrotoxic meds   - monitor cr   - check u/s renal to r/o hydronephrosis    #UTI  - pt had a complicated ESBL uti on prior admission  - UA positive for probable uti- cultures pending  - ID consult pending   - Invanz     #DM type 2  - hold oral meds while admitted   - a1c 08/2020- 7.4  - iss   - monitor fingersticks    #BPH  - Flomax and finasteride     #HLD  - statin    #Afib.  - taken off apixaban for anemia/hematuria.    #DVT Prophylaxis  - heparin SC 71 y/o M with a h/o developmental disability, CHF, HTN, DM, AF taken off apixaban due to anemia/hematuria, CKD due to COVID-19 infection, refused hemodialysis, BPH who was recently discharged from [facility] 9/3/20 s/p treated for ESBL UTI and septic shock brought back to ED from group home due to no urinary output x1 day prior to arrival    #MARSHAL on CKD stage 4  - probably 2/2 dehydration and poor po intake   - nephro conuslt appreciated  - bicarb drip  - avoid nephrotoxic meds   - monitor cr   - check u/s renal to r/o hydronephrosis    #UTI  - pt had a complicated ESBL uti on prior admission  - UA positive for probable uti- cultures pending  - ID consult pending   - Invanz     #DM type 2  - hold oral meds while admitted   - a1c 08/2020- 7.4  - iss   - monitor fingersticks    #BPH  - Flomax and finasteride     #HLD  - statin    #Afib.  - taken off apixaban for anemia/hematuria.    #DVT Prophylaxis  - heparin SC

## 2020-09-18 NOTE — PROGRESS NOTE ADULT - ASSESSMENT
1) MARSHAL on CKD IV  2) HTN  3) DM  4) Hyperkalemia  5) UTI    Rivera in place;  Increase flomax to 0.8mg qhs  Continue IV hydration  Start epogen 10k units weekly    anitha Pryor

## 2020-09-18 NOTE — PHYSICAL THERAPY INITIAL EVALUATION ADULT - ADDITIONAL COMMENTS
Pt is a questionable historian. Pt lives in a group home with aides and other residents. No steps to navigate per pt. Pt was only able to transfer to w/c PTA with assist. Pt owns w/c only.

## 2020-09-18 NOTE — PHYSICAL THERAPY INITIAL EVALUATION ADULT - PLANNED THERAPY INTERVENTIONS, PT EVAL
bed mobility training/strengthening/gait training/postural re-education/balance training/transfer training

## 2020-09-18 NOTE — PHYSICAL THERAPY INITIAL EVALUATION ADULT - CRITERIA FOR SKILLED THERAPEUTIC INTERVENTIONS
impairments found/anticipated discharge recommendation/LURDES/anticipated equipment needs at discharge

## 2020-09-18 NOTE — CHART NOTE - NSCHARTNOTEFT_GEN_A_CORE
called by rn for patient low temp    - start warming blanket  - monitor vitals  - repeat blood cultures    d/w RN Staff

## 2020-09-18 NOTE — CONSULT NOTE ADULT - ASSESSMENT
Med refilled  
  · CONSTITUTIONAL: no fever and no chills.  · CARDIOVASCULAR: no chest pain and no edema.  · RESPIRATORY: no chest pain, no cough, and no shortness of breath.  · GASTROINTESTINAL: no abdominal pain, no bloating, no constipation, no diarrhea, no nausea and no vomiting.  · GENITOURINARY: - - -  · Genitourinary [+]: No urination as per house staff, patient denies  · Genitourinary [-]: no difficulty urinating, no dysmenorrhea, no dysuria, no hematuria, no pelvic pain, no STD exposure  · MUSCULOSKELETAL: no back pain, no gout, no musculoskeletal pain, no neck pain, and no weakness.  · SKIN: no abrasions, no jaundice, no lesions, no pruritis, and no rashes.  · NEURO: no loss of consciousness, no gait abnormality, no headache, no sensory deficits, and no weakness.      · CONSTITUTIONAL: - - -  · Appearance: well appearing, good hygiene  · Development: well developed  · Distress: no apparent  · Manner: appropriate for situation  · Mentation: awake, alert, Baseline a/o x 1 to self  · Mood: appropriate  · Nourishment: well  · ENMT: Airway patent, Nasal mucosa clear. Mouth with normal mucosa.  · EYES: Clear bilaterally, pupils equal, round and reactive to light.  · CARDIAC: - - -  · CARDIAC RHYTHM: IRREGULAR  · CARDIAC RATE: normal  · CARDIAC SOUNDS: S1-S2  · CARDIAC PEDAL EDEMA: mild chronic edema  · CARDIAC CAPI REFILL: less than or equal to 2 seconds  · CARDIAC PULSES: normal bilaterally  · RESPIRATORY: Breath sounds clear and equal bilaterally.  · GASTROINTESTINAL: Abdomen soft, non-tender, no guarding.  · GENITOURINARY: - - -  · Bladder: TENDER  · Genitourinary Testicle: normal bilaterally  · Penis: uncircumcised  · NEUROLOGICAL: - - -  · NEURO LOC: alert  follows commands  · NEURO NECK: normal  · SKIN: Skin normal color for race, warm, dry and intact. No evidence of rash. B/L LE skin thickening and discoloration 2/2 venous stasis      KDIGO-Based Care Bundle for patients with elevated urinary [TIMP-2]*[IGFBP7] led to fewer stage 2 and 3 AKIs and shorter ICU and hospital length stays    KDIGO Care Bundle:    Avoidance of nephrotoxins/nephrotoxin medication adjustment  Medication dosage adjustment for kidney function  Continuous IVF administration (guided by CVP and testing of fluid responsiveness for 6 hours in combination with nephorology consultation)  Discontinuation of ACE/ARBs for first 48 postoperative hours  Close monitoring of serum Creatinine and UO  Acid-base, electrolyte and albumin status management  Avoidance of hyperglycemia for first 72 postoperative hours  Consider alternatives to radiocontrast administration  Optimizing hemodynamics:      
71 y/o M with a h/o developmental disability, CHF, HTN, DM, AF taken off apixaban due to anemia/hematuria, CKD not on HD, refused hemodialysis, BPH who was recently discharged from [facility] 9/3/20 s/p treated for ESBL UTI and septic shock brought back to ED from group home due to no urinary output since yesterday. Staff at [facility] notes the patient had a change in diet yesterday and has refused to eat, drink, or take his medications since yesterday. In ED pt is found ot be in MARSHAL on CKD with hyperkalemia and U/A indicative of UTI. Pt denies any sob, no cough, no chest pain, no n/v or abd pain. Pt admitted for MARSHAL on CKD, hyperkalemia and UTI. (17 Sep 2020 12:46)    UTI  CKD      - c/o difficulty urinating PTA, resolved with rodriguez placement  - prior history of ESBL proteus in urine and Pseudomonas in blood  - Blood cultures   - UA +, check UCX  - agree with [facility] renal  - meropenem 500 mg iv q12h  - Trend Fever  - Trend Leukocytosis    d/w Dr Pryor  Will Follow

## 2020-09-18 NOTE — PROGRESS NOTE ADULT - SUBJECTIVE AND OBJECTIVE BOX
Patient is a 70y old  Male who presents with a chief complaint of MARSHAL and UTI (17 Sep 2020 12:46)    Patient seen and examined at bedside.     ALLERGIES:  No Known Allergies    MEDICATIONS  (STANDING):  allopurinol 300 milliGRAM(s) Oral daily  atorvastatin 20 milliGRAM(s) Oral at bedtime  calcium carbonate 1250 mG  + Vitamin D (OsCal 500 + D) 1 Tablet(s) Oral two times a day  dextrose 5%. 1000 milliLiter(s) (50 mL/Hr) IV Continuous <Continuous>  dextrose 50% Injectable 12.5 Gram(s) IV Push once  dextrose 50% Injectable 25 Gram(s) IV Push once  dextrose 50% Injectable 25 Gram(s) IV Push once  ertapenem  IVPB 500 milliGRAM(s) IV Intermittent every 24 hours  ferrous    sulfate 325 milliGRAM(s) Oral daily  finasteride 5 milliGRAM(s) Oral daily  heparin   Injectable 5000 Unit(s) SubCutaneous every 8 hours  insulin lispro (HumaLOG) corrective regimen sliding scale   SubCutaneous three times a day before meals  insulin lispro (HumaLOG) corrective regimen sliding scale   SubCutaneous at bedtime  midodrine. 10 milliGRAM(s) Oral <User Schedule>  multivitamin 1 Tablet(s) Oral daily  oxybutynin 10 milliGRAM(s) Oral daily  sodium bicarbonate  Infusion 0.076 mEq/kG/Hr (105 mL/Hr) IV Continuous <Continuous>  tamsulosin 0.4 milliGRAM(s) Oral at bedtime    MEDICATIONS  (PRN):  dextrose 40% Gel 15 Gram(s) Oral once PRN Blood Glucose LESS THAN 70 milliGRAM(s)/deciliter  glucagon  Injectable 1 milliGRAM(s) IntraMuscular once PRN Glucose LESS THAN 70 milligrams/deciliter    Vital Signs Last 24 Hrs  T(F): 98.2 (18 Sep 2020 03:45), Max: 98.2 (17 Sep 2020 13:19)  HR: 78 (18 Sep 2020 07:47) (64 - 78)  BP: 109/69 (18 Sep 2020 07:47) (94/60 - 122/66)  RR: 18 (18 Sep 2020 07:47) (18 - 20)  SpO2: 94% (18 Sep 2020 07:47) (94% - 98%)  I&O's Summary    17 Sep 2020 07:01  -  18 Sep 2020 07:00  --------------------------------------------------------  IN: 0 mL / OUT: 400 mL / NET: -400 mL      PHYSICAL EXAM:  General: NAD, alert  ENT: MMM, no thrush  Neck: Supple, No JVD  Lungs: Clear to auscultation bilaterally, good air entry, non-labored breathing  Cardio: +S1/S2; +edema b/l e  Abdomen: Soft, Nontender, Nondistended; Bowel sounds present  Extremities: No calf tenderness    LABS:                        8.3    3.85  )-----------( 84       ( 18 Sep 2020 07:17 )             27.4         140  |  103  |  38.0  ----------------------------<  94  5.0   |  24.0  |  3.05    Ca    9.1      18 Sep 2020 07:17  Phos  4.0       Mg     1.8         TPro  7.1  /  Alb  3.0  /  TBili  0.4  /  DBili  x   /  AST  42  /  ALT  16  /  AlkPhos  115      eGFR if Non African American: 19 mL/min/1.73M2 (20 @ 07:17)  eGFR if : 23 mL/min/1.73M2 (20 @ 07:17)    Glucose  POCT Blood Glucose.: 101 mg/dL (18 Sep 2020 07:35)  POCT Blood Glucose.: 110 mg/dL (17 Sep 2020 15:54)    Urinalysis Basic - ( 17 Sep 2020 10:27 )  Color: Demi / Appearance: very cloudy / S.010 / pH: x  Gluc: x / Ketone: Trace  / Bili: Negative / Urobili: Negative mg/dL   Blood: x / Protein: 500 mg/dL / Nitrite: Negative   Leuk Esterase: Moderate / RBC: >50 /HPF / WBC >100 /HPF   Sq Epi: x / Non Sq Epi: Negative / Bacteria: Few    RADIOLOGY & ADDITIONAL TESTS:  - no new tests

## 2020-09-18 NOTE — PHYSICAL THERAPY INITIAL EVALUATION ADULT - LEVEL OF INDEPENDENCE: BED TO CHAIR, REHAB EVAL
Pt resisting PT assist to Sit at EOB, + posterior lean and BLEs weak, unsafe to attempt to stand./unable to perform

## 2020-09-19 NOTE — SWALLOW BEDSIDE ASSESSMENT ADULT - ASR SWALLOW ASPIRATION MONITOR
change of breathing pattern/throat clearing/oral hygiene/fever/pneumonia/gurgly voice/upper respiratory infection/position upright (88Y)/cough

## 2020-09-19 NOTE — PROGRESS NOTE ADULT - SUBJECTIVE AND OBJECTIVE BOX
[facility]  INFECTIOUS DISEASES AND INTERNAL MEDICINE at [place]  =======================================================  Zach Chew MD  Diplomates American Board of Internal Medicine and Infectious Diseases  Tel: [phone]      Fax: [phone]  =======================================================    KIMBERLY MARKS [id number]    Follow up: uti  hypothermic overnight  agitated overnight  today denies complaints    Allergies:  No Known Allergies      Medications:  acetaminophen   Tablet .. 650 milliGRAM(s) Oral every 6 hours PRN  allopurinol 300 milliGRAM(s) Oral daily  atorvastatin 20 milliGRAM(s) Oral at bedtime  calcium carbonate 1250 mG  + Vitamin D (OsCal 500 + D) 1 Tablet(s) Oral two times a day  dextrose 40% Gel 15 Gram(s) Oral once PRN  dextrose 5%. 1000 milliLiter(s) IV Continuous <Continuous>  dextrose 50% Injectable 12.5 Gram(s) IV Push once  dextrose 50% Injectable 25 Gram(s) IV Push once  dextrose 50% Injectable 25 Gram(s) IV Push once  ferrous    sulfate 325 milliGRAM(s) Oral daily  finasteride 5 milliGRAM(s) Oral daily  glucagon  Injectable 1 milliGRAM(s) IntraMuscular once PRN  heparin   Injectable 5000 Unit(s) SubCutaneous every 8 hours  insulin lispro (HumaLOG) corrective regimen sliding scale   SubCutaneous three times a day before meals  insulin lispro (HumaLOG) corrective regimen sliding scale   SubCutaneous at bedtime  meropenem  IVPB 500 milliGRAM(s) IV Intermittent every 12 hours  midodrine. 10 milliGRAM(s) Oral <User Schedule>  multivitamin 1 Tablet(s) Oral daily  oxybutynin 10 milliGRAM(s) Oral daily  sodium bicarbonate  Infusion 0.076 mEq/kG/Hr IV Continuous <Continuous>  tamsulosin 0.4 milliGRAM(s) Oral at bedtime            REVIEW OF SYSTEMS:  CONSTITUTIONAL:  No Fever or chills  HEENT:   No diplopia or blurred vision.  No earache, sore throat or runny nose.  CARDIOVASCULAR:  No pressure, squeezing, strangling, tightness, heaviness or aching about the chest, neck, axilla or epigastrium.  RESPIRATORY:  No cough, shortness of breath  GASTROINTESTINAL:  No nausea, vomiting or diarrhea.  GENITOURINARY:  No dysuria, frequency or urgency. No Blood in urine  MUSCULOSKELETAL:  no joint aches, no muscle pain  SKIN:  No change in skin, hair or nails.  NEUROLOGIC:  No Headaches, seizures or weakness.  PSYCHIATRIC:  No disorder of thought or mood.  ENDOCRINE:  No heat or cold intolerance  HEMATOLOGICAL:  No easy bruising or bleeding.            Physical Exam:  ICU Vital Signs Last 24 Hrs  T(C): 36.8 (19 Sep 2020 07:47), Max: 36.8 (19 Sep 2020 07:47)  T(F): 98.2 (19 Sep 2020 07:47), Max: 98.2 (19 Sep 2020 07:47)  HR: 65 (19 Sep 2020 07:47) (65 - 91)  BP: 100/65 (19 Sep 2020 07:47) (100/54 - 101/67)  BP(mean): --  ABP: --  ABP(mean): --  RR: 19 (19 Sep 2020 07:47) (18 - 19)  SpO2: 94% (19 Sep 2020 07:47) (94% - 96%)      GEN: NAD, pleasant  HEENT: normocephalic and atraumatic. EOMI. PERRL.  Anicteric   NECK: Supple.   LUNGS: Clear to auscultation.  HEART: Regular rate and rhythm without murmur.  ABDOMEN: Soft, nontender, and nondistended.  Positive bowel sounds.    : No CVA tenderness rodriguez dark urine  EXTREMITIES: Without any edema., venous stasis dermatitis  MSK: no joint swelling  NEUROLOGIC: Cranial nerves II through XII are grossly intact. No focal deficits  PSYCHIATRIC: Appropriate affect .  SKIN: No Rash      Labs:                        7.9    4.24  )-----------( 91       ( 19 Sep 2020 09:51 )             25.9   09-19    141  |  103  |  34.0<H>  ----------------------------<  69<L>  4.6   |  23.0  |  3.42<H>    Ca    8.4<L>      19 Sep 2020 09:51  Phos  4.0     09-18  Mg     1.8     09-18    < from: US Duplex Venous Lower Ext Complete, Bilateral (09.18.20 @ 11:52) >  FINDINGS:    There is normal compressibility of the bilateral common femoral, femoral and popliteal veins.  Doppler examination shows normal spontaneous and phasic flow.    No calf vein thrombosis is detected.    IMPRESSION:  No evidence of deep venous thrombosis in either lower extremity.      < end of copied text >    < from: US Renal (09.18.20 @ 11:14) >  FINDINGS:    Right kidney: 10.5. No renal mass or calculi. Mild hydronephrosis.    Left kidney: 10.0. No renal mass, hydronephrosis or calculi.    Urinary bladder: Collapsed around a Rodriguez catheter balloon, precluding full evaluation.    IMPRESSION:    Mild right hydronephrosis      < end of copied text >

## 2020-09-19 NOTE — PROGRESS NOTE ADULT - SUBJECTIVE AND OBJECTIVE BOX
KIMBERLY MARKS [id number]    Follow up: UTI, HDN & AK I  hypothermic overnight  agitated overnight  today denies complaints    Allergies:  No Known Allergies    Medications:  acetaminophen   Tablet .. 650 milliGRAM(s) Oral every 6 hours PRN  allopurinol 300 milliGRAM(s) Oral daily  atorvastatin 20 milliGRAM(s) Oral at bedtime  calcium carbonate 1250 mG  + Vitamin D (OsCal 500 + D) 1 Tablet(s) Oral two times a day  dextrose 40% Gel 15 Gram(s) Oral once PRN  dextrose 5%. 1000 milliLiter(s) IV Continuous <Continuous>  dextrose 50% Injectable 12.5 Gram(s) IV Push once  dextrose 50% Injectable 25 Gram(s) IV Push once  dextrose 50% Injectable 25 Gram(s) IV Push once  ferrous    sulfate 325 milliGRAM(s) Oral daily  finasteride 5 milliGRAM(s) Oral daily  glucagon  Injectable 1 milliGRAM(s) IntraMuscular once PRN  heparin   Injectable 5000 Unit(s) SubCutaneous every 8 hours  insulin lispro (HumaLOG) corrective regimen sliding scale   SubCutaneous three times a day before meals  insulin lispro (HumaLOG) corrective regimen sliding scale   SubCutaneous at bedtime  meropenem  IVPB 500 milliGRAM(s) IV Intermittent every 12 hours  midodrine. 10 milliGRAM(s) Oral <User Schedule>  multivitamin 1 Tablet(s) Oral daily  oxybutynin 10 milliGRAM(s) Oral daily  sodium bicarbonate  Infusion 0.076 mEq/kG/Hr IV Continuous <Continuous>  tamsulosin 0.4 milliGRAM(s) Oral at bedtime    REVIEW OF SYSTEMS:  CONSTITUTIONAL:  No Fever or chills  HEENT:   No diplopia or blurred vision.  No earache, sore throat or runny nose.  CARDIOVASCULAR:  No pressure, squeezing, strangling, tightness, heaviness or aching about the chest, neck, axilla or epigastrium.  RESPIRATORY:  No cough, shortness of breath  GASTROINTESTINAL:  No nausea, vomiting or diarrhea.  GENITOURINARY:  No dysuria, frequency or urgency. No Blood in urine  MUSCULOSKELETAL:  no joint aches, no muscle pain  SKIN:  No change in skin, hair or nails.  NEUROLOGIC:  No Headaches, seizures or weakness.  PSYCHIATRIC:  No disorder of thought or mood.  ENDOCRINE:  No heat or cold intolerance  HEMATOLOGICAL:  No easy bruising or bleeding.     Physical Exam:  ICU Vital Signs Last 24 Hrs  T(C): 36.8 (19 Sep 2020 07:47), Max: 36.8 (19 Sep 2020 07:47)  T(F): 98.2 (19 Sep 2020 07:47), Max: 98.2 (19 Sep 2020 07:47)  HR: 65 (19 Sep 2020 07:47) (65 - 91)  BP: 100/65 (19 Sep 2020 07:47) (100/54 - 101/67)  RR: 19 (19 Sep 2020 07:47) (18 - 19)  SpO2: 94% (19 Sep 2020 07:47) (94% - 96%)    GEN: NAD, pleasant  HEENT: normocephalic and atraumatic. EOMI. PERRL.  Anicteric   NECK: Supple.   LUNGS: Clear to auscultation.  HEART: Regular rate and rhythm without murmur.  ABDOMEN: Soft, nontender, and nondistended.  Positive bowel sounds.    : No CVA tenderness rodriguez dark urine  EXTREMITIES: Without any edema., venous stasis dermatitis  MSK: no joint swelling  NEUROLOGIC: Cranial nerves II through XII are grossly intact. No focal deficits  PSYCHIATRIC: Appropriate affect .  SKIN: No Rash    Labs:                        7.9    4.24  )-----------( 91       ( 19 Sep 2020 09:51 )             25.9   09-19    141  |  103  |  34.0<H>  ----------------------------<  69<L>  4.6   |  23.0  |  3.42<H>    Ca    8.4<L>      19 Sep 2020 09:51  Phos  4.0     09-18  Mg     1.8     09-18    US Duplex Venous Lower Ext Complete, Bilateral (09.18.20 @ 11:52)     FINDINGS:    There is normal compressibility of the bilateral common femoral, femoral and popliteal veins.  Doppler examination shows normal spontaneous and phasic flow.    No calf vein thrombosis is detected.    IMPRESSION:  No evidence of deep venous thrombosis in either lower extremity.    US Renal (09.18.20 @ 11:14)   FINDINGS:    Right kidney: 10.5. No renal mass or calculi. Mild hydronephrosis.    Left kidney: 10.0. No renal mass, hydronephrosis or calculi.    Urinary bladder: Collapsed around a Rodriguez catheter balloon, precluding full evaluation.    IMPRESSION:    Mild right hydronephrosis      69 y/o M with a h/o developmental disability, CHF, HTN, DM, AF taken off apixaban due to anemia/hematuria, CKD not on HD, refused hemodialysis, BPH who was recently discharged from [facility] 9/3/20 s/p treated for ESBL UTI and septic shock brought back to ED from group home due to no urinary output since yesterday. Staff at [facility] notes the patient had a change in diet yesterday and has refused to eat, drink, or take his medications since yesterday. In ED pt is found ot be in MARSHAL on CKD with hyperkalemia and U/A indicative of UTI. Pt denies any sob, no cough, no chest pain, no n/v or abd pain. Pt admitted for MARSHAL on CKD, hyperkalemia and UTI. (17 Sep 2020 12:46)    UTI  CKD-AK I,    - c/o difficulty urinating PTA, resolved with Rodriguez placement  - prior history of ESBL proteus in urine and Pseudomonas in blood  - Blood cultures   - UA +, UCX + yeast, repeat UCx  - USG renal-mild right hydro with Rodriguez in place, consider Ct a/p  - meropenem 500 mg iv q12

## 2020-09-19 NOTE — SWALLOW BEDSIDE ASSESSMENT ADULT - SWALLOW EVAL: DIAGNOSIS
Moderate oral dysphagia marked by anterior loss of liquids, w/suspected posterior loss of thin liquids, noted 2* decreased lingual strength/ROM/coordination. Reduced mastication of solids with +oral stasis, grossly functional for purees. Oral stage negatively impacted by lethargy at this time. Pharyngeal stage appears functional for solids and nectar liquids; cannot r/o pharyngeal dysphagia with thins at this time. +inconsistent delayed cough after trials

## 2020-09-19 NOTE — SWALLOW BEDSIDE ASSESSMENT ADULT - SWALLOW EVAL: RECOMMENDED FEEDING/EATING TECHNIQUES
oral hygiene/maintain upright posture during/after eating for 30 mins/no straws/small sips/bites/position upright (90 degrees)/crush medication (when feasible)

## 2020-09-19 NOTE — SWALLOW BEDSIDE ASSESSMENT ADULT - SLP GENERAL OBSERVATIONS
Pt received in bed, eyes closed throughout, awake but lethargic, 0x1, reduced cognition, following some simple commands, poor historian, nonverbal pain 0/10

## 2020-09-19 NOTE — PROGRESS NOTE ADULT - ASSESSMENT
# complicated UTI   previous hx of ESBL and pseudomonas   on meropenem     # MARSHAL on CKD with hyperkalemia   due to obstructive uropathy   hyperkalemia resolved   potassium normalized   creatinine still elevated  nephro on board , will follow recs      # Urinary retention   rodriguez in place   on flomax and oxybutynin   will need urology follow up outpatient     # Developmental delay / cognitive impairment   will have sitter at bedside , patient has been pulling at lines   DC mittens     # DLP  on statin     # Anemia   chronic   hemoglobin around baseline   will monitor     # Thrombocytopenia   chronic   slightly lower than baseline   will monitor     # DVT prophylaxis  heparin     details discussed with sister    # complicated UTI   previous hx of ESBL and pseudomonas   on meropenem     # MARSHAL on CKD with hyperkalemia   due to obstructive uropathy , hx of covid induced renal failure   hyperkalemia resolved   potassium normalized   creatinine still elevated  nephro on board , will follow recs     # Acute encephalopathy   delirium and also complicated by ativan use  will start melatonin   seroquel if needed   sitter for safety   no mittens no other restrains      # Urinary retention   rodriguez in place   on flomax and oxybutynin   will need urology   US showed right sided hydro , [place] [facility] consulted     # Developmental delay / cognitive impairment / Hx of cerebral Palsy   will have sitter at bedside , patient has been pulling at lines   DC mittens     # DLP  on statin     # Dysphagia   diet order changed according to recs from speech     # Anemia   chronic   hemoglobin around baseline   will monitor     # Thrombocytopenia   chronic   slightly lower than baseline   will monitor     # DVT prophylaxis  heparin     details discussed with sister at length , all concerns answered   patient carries guarded prognosis   was DNR during previous hospital stay , will discuss with sister again

## 2020-09-19 NOTE — PROGRESS NOTE ADULT - ASSESSMENT
1) MARSHAL on CKD IV  2) HTN  3) DM  4) Hyperkalemia  5) UTI    Increase flomax to 0.8mg qhs  Continue IV hydration  Start epogen 10k units weekly    Bladder Scan,

## 2020-09-19 NOTE — SWALLOW BEDSIDE ASSESSMENT ADULT - ORAL PHASE
Within functional limits Decreased anterior-posterior movement of the bolus/Delayed oral transit time/Lingual stasis Lingual stasis/Decreased anterior-posterior movement of the bolus/Delayed oral transit time suspected posterior loss of bolus

## 2020-09-19 NOTE — SWALLOW BEDSIDE ASSESSMENT ADULT - SLP PERTINENT HISTORY OF CURRENT PROBLEM
Pt known to this service, last seen 8/31/20 with a rx for soft/nectar diet 2* moderate oral and suspected pharyngeal dysphagia for thin liquids (see full report for details). RN reports pt has been very lethargic, has been managing medication in [facility]

## 2020-09-19 NOTE — CHART NOTE - NSCHARTNOTEFT_GEN_A_CORE
Medicine PA- Cd fro pt that is pulling at his rodriguez and [facility] tubing despite redirection and ativan PRN as ordered by M.D. VSS, Order placed for unsecured junior bilsolo for safety, will D/C when pt more cooperative.

## 2020-09-19 NOTE — PROGRESS NOTE ADULT - SUBJECTIVE AND OBJECTIVE BOX
CC: UTI     INTERVAL HPI/OVERNIGHT EVENTS: seen and examined , feels well , ROS is limited due to cognitive impairment           Vital Signs Last 24 Hrs  T(C): 36.8 (19 Sep 2020 07:47), Max: 36.8 (19 Sep 2020 07:47)  T(F): 98.2 (19 Sep 2020 07:47), Max: 98.2 (19 Sep 2020 07:47)  HR: 65 (19 Sep 2020 07:47) (65 - 91)  BP: 100/65 (19 Sep 2020 07:47) (100/54 - 101/67)  BP(mean): --  RR: 19 (19 Sep 2020 07:47) (18 - 19)  SpO2: 94% (19 Sep 2020 07:47) (94% - 96%)    PHYSICAL EXAM:    GENERAL: NAD, resting comfortable in bed   CHEST/LUNG: CTAB , no wheezing , rales, rhonchi heard   HEART: S1S2+, Regular rate and rhythm; No murmurs, rubs, or gallops  ABDOMEN: Soft, Nontender, Nondistended; Bowel sounds present  EXTREMITIES:  no pitting edema , pulses palpated BL.        LABS:                        8.3    3.85  )-----------( 84       ( 18 Sep 2020 07:17 )             27.4     09-18    140  |  103  |  38.0<H>  ----------------------------<  94  5.0   |  24.0  |  3.05<H>    Ca    9.1      18 Sep 2020 07:17  Phos  4.0     -18  Mg     1.8     -18        Urinalysis Basic - ( 17 Sep 2020 10:27 )    Color: Demi / Appearance: very cloudy / S.010 / pH: x  Gluc: x / Ketone: Trace  / Bili: Negative / Urobili: Negative mg/dL   Blood: x / Protein: 500 mg/dL / Nitrite: Negative   Leuk Esterase: Moderate / RBC: >50 /HPF / WBC >100 /HPF   Sq Epi: x / Non Sq Epi: Negative / Bacteria: Few          MEDICATIONS  (STANDING):  allopurinol 300 milliGRAM(s) Oral daily  atorvastatin 20 milliGRAM(s) Oral at bedtime  calcium carbonate 1250 mG  + Vitamin D (OsCal 500 + D) 1 Tablet(s) Oral two times a day  dextrose 5%. 1000 milliLiter(s) (50 mL/Hr) IV Continuous <Continuous>  dextrose 50% Injectable 12.5 Gram(s) IV Push once  dextrose 50% Injectable 25 Gram(s) IV Push once  dextrose 50% Injectable 25 Gram(s) IV Push once  ferrous    sulfate 325 milliGRAM(s) Oral daily  finasteride 5 milliGRAM(s) Oral daily  heparin   Injectable 5000 Unit(s) SubCutaneous every 8 hours  insulin lispro (HumaLOG) corrective regimen sliding scale   SubCutaneous three times a day before meals  insulin lispro (HumaLOG) corrective regimen sliding scale   SubCutaneous at bedtime  meropenem  IVPB 500 milliGRAM(s) IV Intermittent every 12 hours  midodrine. 10 milliGRAM(s) Oral <User Schedule>  multivitamin 1 Tablet(s) Oral daily  oxybutynin 10 milliGRAM(s) Oral daily  sodium bicarbonate  Infusion 0.076 mEq/kG/Hr (105 mL/Hr) IV Continuous <Continuous>  tamsulosin 0.4 milliGRAM(s) Oral at bedtime    MEDICATIONS  (PRN):  acetaminophen   Tablet .. 650 milliGRAM(s) Oral every 6 hours PRN Temp greater or equal to 38C (100.4F), Mild Pain (1 - 3), Moderate Pain (4 - 6)  dextrose 40% Gel 15 Gram(s) Oral once PRN Blood Glucose LESS THAN 70 milliGRAM(s)/deciliter  glucagon  Injectable 1 milliGRAM(s) IntraMuscular once PRN Glucose LESS THAN 70 milligrams/deciliter  LORazepam   Injectable 0.5 milliGRAM(s) IV Push every 6 hours PRN Agitation      RADIOLOGY & ADDITIONAL TESTS:   CC: UTI     INTERVAL HPI/OVERNIGHT EVENTS: seen and examined , drowsy  , ROS is limited due to mentation   as per staff patient was agitated last night and was pulling at lines ,did get ativan           Vital Signs Last 24 Hrs  T(C): 36.8 (19 Sep 2020 07:47), Max: 36.8 (19 Sep 2020 07:47)  T(F): 98.2 (19 Sep 2020 07:47), Max: 98.2 (19 Sep 2020 07:47)  HR: 65 (19 Sep 2020 07:47) (65 - 91)  BP: 100/65 (19 Sep 2020 07:47) (100/54 - 101/67)  BP(mean): --  RR: 19 (19 Sep 2020 07:47) (18 - 19)  SpO2: 94% (19 Sep 2020 07:47) (94% - 96%)    PHYSICAL EXAM:    GENERAL: drowsy , sleepy   CHEST/LUNG: diminished air entry   HEART: S1S2+, Regular rate and rhythm  ABDOMEN: Soft, Nontender, Nondistended; Bowel sounds present  EXTREMITIES:  no pitting edema , pulses palpated BL.        LABS:                        8.3    3.85  )-----------( 84       ( 18 Sep 2020 07:17 )             27.4     09-18    140  |  103  |  38.0<H>  ----------------------------<  94  5.0   |  24.0  |  3.05<H>    Ca    9.1      18 Sep 2020 07:17  Phos  4.0     09-18  Mg     1.8     09-18        Urinalysis Basic - ( 17 Sep 2020 10:27 )    Color: Demi / Appearance: very cloudy / S.010 / pH: x  Gluc: x / Ketone: Trace  / Bili: Negative / Urobili: Negative mg/dL   Blood: x / Protein: 500 mg/dL / Nitrite: Negative   Leuk Esterase: Moderate / RBC: >50 /HPF / WBC >100 /HPF   Sq Epi: x / Non Sq Epi: Negative / Bacteria: Few          MEDICATIONS  (STANDING):  allopurinol 300 milliGRAM(s) Oral daily  atorvastatin 20 milliGRAM(s) Oral at bedtime  calcium carbonate 1250 mG  + Vitamin D (OsCal 500 + D) 1 Tablet(s) Oral two times a day  dextrose 5%. 1000 milliLiter(s) (50 mL/Hr) IV Continuous <Continuous>  dextrose 50% Injectable 12.5 Gram(s) IV Push once  dextrose 50% Injectable 25 Gram(s) IV Push once  dextrose 50% Injectable 25 Gram(s) IV Push once  ferrous    sulfate 325 milliGRAM(s) Oral daily  finasteride 5 milliGRAM(s) Oral daily  heparin   Injectable 5000 Unit(s) SubCutaneous every 8 hours  insulin lispro (HumaLOG) corrective regimen sliding scale   SubCutaneous three times a day before meals  insulin lispro (HumaLOG) corrective regimen sliding scale   SubCutaneous at bedtime  meropenem  IVPB 500 milliGRAM(s) IV Intermittent every 12 hours  midodrine. 10 milliGRAM(s) Oral <User Schedule>  multivitamin 1 Tablet(s) Oral daily  oxybutynin 10 milliGRAM(s) Oral daily  sodium bicarbonate  Infusion 0.076 mEq/kG/Hr (105 mL/Hr) IV Continuous <Continuous>  tamsulosin 0.4 milliGRAM(s) Oral at bedtime    MEDICATIONS  (PRN):  acetaminophen   Tablet .. 650 milliGRAM(s) Oral every 6 hours PRN Temp greater or equal to 38C (100.4F), Mild Pain (1 - 3), Moderate Pain (4 - 6)  dextrose 40% Gel 15 Gram(s) Oral once PRN Blood Glucose LESS THAN 70 milliGRAM(s)/deciliter  glucagon  Injectable 1 milliGRAM(s) IntraMuscular once PRN Glucose LESS THAN 70 milligrams/deciliter  LORazepam   Injectable 0.5 milliGRAM(s) IV Push every 6 hours PRN Agitation      RADIOLOGY & ADDITIONAL TESTS:

## 2020-09-19 NOTE — PROGRESS NOTE ADULT - ASSESSMENT
69 y/o M with a h/o developmental disability, CHF, HTN, DM, AF taken off apixaban due to anemia/hematuria, CKD not on HD, refused hemodialysis, BPH who was recently discharged from [facility] 9/3/20 s/p treated for ESBL UTI and septic shock brought back to ED from group home due to no urinary output since yesterday. Staff at [facility] notes the patient had a change in diet yesterday and has refused to eat, drink, or take his medications since yesterday. In ED pt is found ot be in MARSHAL on CKD with hyperkalemia and U/A indicative of UTI. Pt denies any sob, no cough, no chest pain, no n/v or abd pain. Pt admitted for MARSHAL on CKD, hyperkalemia and UTI. (17 Sep 2020 12:46)    UTI  CKD      - c/o difficulty urinating PTA, resolved with rodriguez placement  - prior history of ESBL proteus in urine and Pseudomonas in blood  - Blood cultures   - UA +, UCX + yeast, repeat UCx  - USG renal-mild right hydro, consider Ct a/p  - meropenem 500 mg iv q12h  - Trend Fever  - Trend Leukocytosis      Will Follow 69 y/o M with a h/o developmental disability, CHF, HTN, DM, AF taken off apixaban due to anemia/hematuria, CKD not on HD, refused hemodialysis, BPH who was recently discharged from [facility] 9/3/20 s/p treated for ESBL UTI and septic shock brought back to ED from group home due to no urinary output since yesterday. Staff at [facility] notes the patient had a change in diet yesterday and has refused to eat, drink, or take his medications since yesterday. In ED pt is found ot be in MARSHAL on CKD with hyperkalemia and U/A indicative of UTI. Pt denies any sob, no cough, no chest pain, no n/v or abd pain. Pt admitted for MARSHAL on CKD, hyperkalemia and UTI. (17 Sep 2020 12:46)    UTI  CKD      - c/o difficulty urinating PTA, resolved with rodriguez placement  - prior history of ESBL proteus in urine and Pseudomonas in blood  - Blood cultures   - UA +, UCX + yeast, repeat UCx  - USG renal-mild right hydro with rodriguez in place, consider Ct a/p  - meropenem 500 mg iv q12h  - Trend Fever  - Trend Leukocytosis      Will Follow

## 2020-09-19 NOTE — SWALLOW BEDSIDE ASSESSMENT ADULT - ORAL PREPARATORY PHASE
Within functional limits Decreased mastication ability/reduced attention to bolus, - impacted by lethargy - impacted by lethargy with reduced overall attention to bolus/Decreased mastication ability Anterior loss of bolus inconsistent anterior loss of bolus

## 2020-09-20 NOTE — PROGRESS NOTE ADULT - SUBJECTIVE AND OBJECTIVE BOX
[facility]  INFECTIOUS DISEASES AND INTERNAL MEDICINE at [place]  =======================================================  Zach Chew MD  Diplomates American Board of Internal Medicine and Infectious Diseases  Tel: [phone]      Fax: [phone]  =======================================================    SARAH MARKSNIS [id number]    Follow up: uti    Allergies:  No Known Allergies      Medications:  acetaminophen   Tablet .. 650 milliGRAM(s) Oral every 6 hours PRN  allopurinol 300 milliGRAM(s) Oral daily  atorvastatin 20 milliGRAM(s) Oral at bedtime  calcium carbonate 1250 mG  + Vitamin D (OsCal 500 + D) 1 Tablet(s) Oral two times a day  dextrose 40% Gel 15 Gram(s) Oral once PRN  dextrose 5%. 1000 milliLiter(s) IV Continuous <Continuous>  dextrose 50% Injectable 12.5 Gram(s) IV Push once  dextrose 50% Injectable 25 Gram(s) IV Push once  dextrose 50% Injectable 25 Gram(s) IV Push once  ferrous    sulfate 325 milliGRAM(s) Oral daily  finasteride 5 milliGRAM(s) Oral daily  glucagon  Injectable 1 milliGRAM(s) IntraMuscular once PRN  heparin   Injectable 5000 Unit(s) SubCutaneous every 8 hours  insulin lispro (HumaLOG) corrective regimen sliding scale   SubCutaneous three times a day before meals  insulin lispro (HumaLOG) corrective regimen sliding scale   SubCutaneous at bedtime  melatonin 5 milliGRAM(s) Oral at bedtime  meropenem  IVPB 500 milliGRAM(s) IV Intermittent every 12 hours  midodrine. 10 milliGRAM(s) Oral <User Schedule>  multivitamin 1 Tablet(s) Oral daily  oxybutynin 10 milliGRAM(s) Oral daily  sodium bicarbonate  Infusion 0.076 mEq/kG/Hr IV Continuous <Continuous>  tamsulosin 0.4 milliGRAM(s) Oral at bedtime            REVIEW OF SYSTEMS:  CONSTITUTIONAL:  No Fever or chills  HEENT:   No diplopia or blurred vision.  No earache, sore throat or runny nose.  CARDIOVASCULAR:  No pressure, squeezing, strangling, tightness, heaviness or aching about the chest, neck, axilla or epigastrium.  RESPIRATORY:  No cough, shortness of breath  GASTROINTESTINAL:  No nausea, vomiting or diarrhea.  GENITOURINARY:  No dysuria, frequency or urgency. No Blood in urine  MUSCULOSKELETAL:  no joint aches, no muscle pain  SKIN:  No change in skin, hair or nails.  NEUROLOGIC:  No Headaches, seizures or weakness.  PSYCHIATRIC:  No disorder of thought or mood.  ENDOCRINE:  No heat or cold intolerance  HEMATOLOGICAL:  No easy bruising or bleeding.            Physical Exam:  ICU Vital Signs Last 24 Hrs  T(C): 36.4 (20 Sep 2020 15:43), Max: 36.4 (20 Sep 2020 15:43)  T(F): 97.6 (20 Sep 2020 15:43), Max: 97.6 (20 Sep 2020 15:43)  HR: 79 (20 Sep 2020 15:43) (79 - 102)  BP: 97/56 (20 Sep 2020 15:43) (97/56 - 123/72)  BP(mean): --  ABP: --  ABP(mean): --  RR: 19 (20 Sep 2020 15:43) (15 - 19)  SpO2: 91% (20 Sep 2020 08:07) (91% - 91%)      GEN: NAD, pleasant  HEENT: normocephalic and atraumatic. EOMI. PERRL.  Anicteric   NECK: Supple.   LUNGS: Clear to auscultation.  HEART: Regular rate and rhythm without murmur.  ABDOMEN: Soft, nontender, and nondistended.  Positive bowel sounds.    : No CVA tenderness +rodriguez  EXTREMITIES: Without any edema.  MSK: no joint swelling  NEUROLOGIC: Cranial nerves II through XII are grossly intact. No focal deficits  PSYCHIATRIC: Appropriate affect .  SKIN: chronic venous stasis dermatitis LE      Labs:                        7.9    3.31  )-----------( 81       ( 20 Sep 2020 09:29 )             26.4   09-20    145  |  106  |  30.0<H>  ----------------------------<  85  3.9   |  27.0  |  2.93<H>    Ca    8.1<L>      20 Sep 2020 09:29

## 2020-09-20 NOTE — PROGRESS NOTE ADULT - ASSESSMENT
# complicated UTI   previous hx of ESBL and pseudomonas   on meropenem   unfortunately urine culture was never collected at time of admission   now will hold off , ID docs are aware     # MARSHAL on CKD with hyperkalemia   due to obstructive uropathy , hx of covid induced renal failure   hyperkalemia resolved   potassium normalized   creatinine still elevated  nephro on board , will follow recs   as per sister they will not be willing to go for dialysis     # Acute encephalopathy   delirium and also complicated by ativan use  improved   continue with melatonin   no mittens no other restrains      # Urinary retention   rodriguez in place   on flomax and oxybutynin   will need urology   US showed right sided hydro , [place] urology consulted   awaiting input     #Autonomic dysfunction   post covid   patient has had hypothermia and hypotension on and off  will monitor   these details were obtained from sister     # Developmental delay / cognitive impairment / Hx of cerebral Palsy   patient is around his baseline     # DLP  on statin     # Dysphagia   diet order changed according to recs from speech     # Anemia   chronic   hemoglobin around baseline   will monitor     # Thrombocytopenia   chronic   slightly lower than baseline   will monitor     # DVT prophylaxis  heparin     details discussed with sister at length , all concerns answered   patient carries guarded prognosis   was DNR during previous hospital stay , will discuss with sister again    # complicated UTI   previous hx of ESBL and pseudomonas   on meropenem   unfortunately urine culture was never collected at time of admission   now will hold off , ID docs are aware     # MARSHAL on CKD with hyperkalemia   due to obstructive uropathy , hx of covid induced renal failure   hyperkalemia resolved   potassium normalized   creatinine still elevated but improved   nephro on board , will follow recs   as per sister they will not be willing to go for dialysis     # Acute encephalopathy   delirium and also complicated by ativan use  improved   continue with melatonin   no mittens no other restrains      # Urinary retention   rodriguez in place   on flomax and oxybutynin   will need urology   US showed right sided hydro , [place] urology consulted   awaiting input     #Autonomic dysfunction   post covid   patient has had hypothermia and hypotension on and off  will monitor   these details were obtained from sister     # Developmental delay / cognitive impairment / Hx of cerebral Palsy   patient is around his baseline     # DLP  on statin     # Dysphagia   diet order changed according to recs from speech     # Anemia   chronic   hemoglobin around baseline   will monitor     # Thrombocytopenia   chronic   slightly lower than baseline   will monitor     # DVT prophylaxis  heparin     details discussed with sister at length , all concerns answered   patient carries guarded prognosis   was DNR during previous hospital stay , will discuss with sister again

## 2020-09-20 NOTE — PROGRESS NOTE ADULT - SUBJECTIVE AND OBJECTIVE BOX
KIMBERLY MARKS [id number]    Follow up: Urosepsis, AK I,    Allergies:  No Known Allergies    Medications:  acetaminophen   Tablet .. 650 milliGRAM(s) Oral every 6 hours PRN  allopurinol 300 milliGRAM(s) Oral daily  atorvastatin 20 milliGRAM(s) Oral at bedtime  calcium carbonate 1250 mG  + Vitamin D (OsCal 500 + D) 1 Tablet(s) Oral two times a day  dextrose 40% Gel 15 Gram(s) Oral once PRN  dextrose 5%. 1000 milliLiter(s) IV Continuous <Continuous>  dextrose 50% Injectable 12.5 Gram(s) IV Push once  dextrose 50% Injectable 25 Gram(s) IV Push once  dextrose 50% Injectable 25 Gram(s) IV Push once  ferrous    sulfate 325 milliGRAM(s) Oral daily  finasteride 5 milliGRAM(s) Oral daily  glucagon  Injectable 1 milliGRAM(s) IntraMuscular once PRN  heparin   Injectable 5000 Unit(s) SubCutaneous every 8 hours  insulin lispro (HumaLOG) corrective regimen sliding scale   SubCutaneous three times a day before meals  insulin lispro (HumaLOG) corrective regimen sliding scale   SubCutaneous at bedtime  melatonin 5 milliGRAM(s) Oral at bedtime  meropenem  IVPB 500 milliGRAM(s) IV Intermittent every 12 hours  midodrine. 10 milliGRAM(s) Oral <User Schedule>  multivitamin 1 Tablet(s) Oral daily  oxybutynin 10 milliGRAM(s) Oral daily  sodium bicarbonate  Infusion 0.076 mEq/kG/Hr IV Continuous <Continuous>  tamsulosin 0.4 milliGRAM(s) Oral at bedtime     REVIEW OF SYSTEMS:  CONSTITUTIONAL:  No Fever or chills  HEENT:   No diplopia or blurred vision.  No earache, sore throat or runny nose.  CARDIOVASCULAR:  No pressure, squeezing, strangling, tightness, heaviness or aching about the chest, neck, axilla or epigastrium.  RESPIRATORY:  No cough, shortness of breath  GASTROINTESTINAL:  No nausea, vomiting or diarrhea.  GENITOURINARY:  No dysuria, frequency or urgency. No Blood in urine  MUSCULOSKELETAL:  no joint aches, no muscle pain  SKIN:  No change in skin, hair or nails.  NEUROLOGIC:  No Headaches, seizures or weakness.  PSYCHIATRIC:  No disorder of thought or mood.  ENDOCRINE:  No heat or cold intolerance  HEMATOLOGICAL:  No easy bruising or bleeding.     Physical Exam:    ICU Vital Signs Last 24 Hrs  T(C): 36.4 (20 Sep 2020 15:43), Max: 36.4 (20 Sep 2020 15:43)  T(F): 97.6 (20 Sep 2020 15:43), Max: 97.6 (20 Sep 2020 15:43)  HR: 79 (20 Sep 2020 15:43) (79 - 102)  BP: 97/56 (20 Sep 2020 15:43) (97/56 - 123/72)  RR: 19 (20 Sep 2020 15:43) (15 - 19)  SpO2: 91% (20 Sep 2020 08:07) (91% - 91%)    GEN: NAD, pleasant  HEENT: normocephalic and atraumatic. EOMI. PERRL.  Anicteric   NECK: Supple.   LUNGS: Clear to auscultation.  HEART: Regular rate and rhythm without murmur.  ABDOMEN: Soft, nontender, and nondistended.  Positive bowel sounds.    : No CVA tenderness +rodriguez  EXTREMITIES: Without any edema.  MSK: no joint swelling  NEUROLOGIC: Cranial nerves II through XII are grossly intact. No focal deficits  PSYCHIATRIC: Appropriate affect .  SKIN: chronic venous stasis dermatitis LE    Labs:                        7.9    3.31  )-----------( 81       ( 20 Sep 2020 09:29 )             26.4   09-20    145  |  106  |  30.0<H>  ----------------------------<  85  3.9   |  27.0  |  2.93<H>    Ca    8.1<L>      20 Sep 2020 09:29              71 y/o M with a h/o developmental disability, CHF, HTN, DM, AF taken off apixaban due to anemia/hematuria, CKD not on HD, refused hemodialysis, BPH who was recently discharged from [facility]/3/20 s/p treated for ESBL UTI and septic shock brought back to ED from group home due to no urinary output since yesterday. Staff at [facility] notes the patient had a change in diet yesterday and has refused to eat, drink, or take his medications since yesterday. In ED pt is found ot be in MARSHAL on CKD with hyperkalemia and U/A indicative of UTI. Pt denies any sob, no cough, no chest pain, no n/v or abd pain. Pt admitted for MARSHAL on CKD, hyperkalemia and UTI. (17 Sep 2020 12:46)    UTI  CKD    - Hypothermic  - c/o difficulty urinating PTA, resolved with rodriguez placement  - prior history of ESBL proteus in urine and Pseudomonas in blood  - Blood cultures   - UA +, UCX + yeast, repeat UA +  - USG renal-mild right hydro with rodriguez in place, consider Ct a/p  - meropenem 500 mg IV q12h  - c/w flomax and proscar      Will Follow

## 2020-09-20 NOTE — PROGRESS NOTE ADULT - SUBJECTIVE AND OBJECTIVE BOX
CC: UTI     INTERVAL HPI/OVERNIGHT EVENTS: seen and examined , feels better , more awake today , no ativan given , no sitter , no mittens needed   denies any abdominal discomfort , no SOB , no chest pain           Vital Signs Last 24 Hrs  T(C): 36.3 (20 Sep 2020 08:07), Max: 36.4 (19 Sep 2020 16:01)  T(F): 97.4 (20 Sep 2020 08:07), Max: 97.5 (19 Sep 2020 16:01)  HR: 102 (20 Sep 2020 08:07) (86 - 102)  BP: 121/72 (20 Sep 2020 08:07) (115/76 - 123/72)  BP(mean): --  RR: 19 (20 Sep 2020 08:07) (15 - 19)  SpO2: 91% (20 Sep 2020 08:07) (91% - 91%)    PHYSICAL EXAM:    GENERAL: NAD, resting comfortable in bed   CHEST/LUNG: CTAB , no wheezing , rales, rhonchi heard   HEART: S1S2+, Regular rate and rhythm  ABDOMEN: Soft, Nontender, Nondistended; Bowel sounds present  EXTREMITIES:  no pitting edema , pulses palpated BL.        LABS:                        7.9    4.24  )-----------( 91       ( 19 Sep 2020 09:51 )             25.9     09-19    141  |  103  |  34.0<H>  ----------------------------<  69<L>  4.6   |  23.0  |  3.42<H>    Ca    8.4<L>      19 Sep 2020 09:51        Urinalysis Basic - ( 19 Sep 2020 15:16 )    Color: Yellow / Appearance: Cloudy / S.005 / pH: x  Gluc: x / Ketone: Small  / Bili: Negative / Urobili: Negative mg/dL   Blood: x / Protein: 30 mg/dL / Nitrite: Negative   Leuk Esterase: Moderate / RBC: 0-2 /HPF / WBC 26-50   Sq Epi: x / Non Sq Epi: Occasional / Bacteria: Few          MEDICATIONS  (STANDING):  allopurinol 300 milliGRAM(s) Oral daily  atorvastatin 20 milliGRAM(s) Oral at bedtime  calcium carbonate 1250 mG  + Vitamin D (OsCal 500 + D) 1 Tablet(s) Oral two times a day  dextrose 5%. 1000 milliLiter(s) (50 mL/Hr) IV Continuous <Continuous>  dextrose 50% Injectable 12.5 Gram(s) IV Push once  dextrose 50% Injectable 25 Gram(s) IV Push once  dextrose 50% Injectable 25 Gram(s) IV Push once  ferrous    sulfate 325 milliGRAM(s) Oral daily  finasteride 5 milliGRAM(s) Oral daily  heparin   Injectable 5000 Unit(s) SubCutaneous every 8 hours  insulin lispro (HumaLOG) corrective regimen sliding scale   SubCutaneous three times a day before meals  insulin lispro (HumaLOG) corrective regimen sliding scale   SubCutaneous at bedtime  melatonin 5 milliGRAM(s) Oral at bedtime  meropenem  IVPB 500 milliGRAM(s) IV Intermittent every 12 hours  midodrine. 10 milliGRAM(s) Oral <User Schedule>  multivitamin 1 Tablet(s) Oral daily  oxybutynin 10 milliGRAM(s) Oral daily  sodium bicarbonate  Infusion 0.076 mEq/kG/Hr (105 mL/Hr) IV Continuous <Continuous>  tamsulosin 0.4 milliGRAM(s) Oral at bedtime    MEDICATIONS  (PRN):  acetaminophen   Tablet .. 650 milliGRAM(s) Oral every 6 hours PRN Temp greater or equal to 38C (100.4F), Mild Pain (1 - 3), Moderate Pain (4 - 6)  dextrose 40% Gel 15 Gram(s) Oral once PRN Blood Glucose LESS THAN 70 milliGRAM(s)/deciliter  glucagon  Injectable 1 milliGRAM(s) IntraMuscular once PRN Glucose LESS THAN 70 milligrams/deciliter      RADIOLOGY & ADDITIONAL TESTS:   CC: UTI     INTERVAL HPI/OVERNIGHT EVENTS: seen and examined , feels ok , more awake today , no ativan given , no sitter , no mittens needed   unable to obtain detailed ROS due to mentation           Vital Signs Last 24 Hrs  T(C): 36.3 (20 Sep 2020 08:07), Max: 36.4 (19 Sep 2020 16:01)  T(F): 97.4 (20 Sep 2020 08:07), Max: 97.5 (19 Sep 2020 16:01)  HR: 102 (20 Sep 2020 08:07) (86 - 102)  BP: 121/72 (20 Sep 2020 08:07) (115/76 - 123/72)  BP(mean): --  RR: 19 (20 Sep 2020 08:07) (15 - 19)  SpO2: 91% (20 Sep 2020 08:07) (91% - 91%)    PHYSICAL EXAM:    GENERAL: awake , chronically ill   CHEST/LUNG: CTAB , no wheezing , rales, rhonchi heard   HEART: S1S2+, Regular rate and rhythm  ABDOMEN: Soft, Nontender, Nondistended; Bowel sounds present  EXTREMITIES:  no pitting edema , pulses palpated BL.  rodriguez in place         LABS:                        7.9    4.24  )-----------( 91       ( 19 Sep 2020 09:51 )             25.9     09-19    141  |  103  |  34.0<H>  ----------------------------<  69<L>  4.6   |  23.0  |  3.42<H>    Ca    8.4<L>      19 Sep 2020 09:51        Urinalysis Basic - ( 19 Sep 2020 15:16 )    Color: Yellow / Appearance: Cloudy / S.005 / pH: x  Gluc: x / Ketone: Small  / Bili: Negative / Urobili: Negative mg/dL   Blood: x / Protein: 30 mg/dL / Nitrite: Negative   Leuk Esterase: Moderate / RBC: 0-2 /HPF / WBC 26-50   Sq Epi: x / Non Sq Epi: Occasional / Bacteria: Few          MEDICATIONS  (STANDING):  allopurinol 300 milliGRAM(s) Oral daily  atorvastatin 20 milliGRAM(s) Oral at bedtime  calcium carbonate 1250 mG  + Vitamin D (OsCal 500 + D) 1 Tablet(s) Oral two times a day  dextrose 5%. 1000 milliLiter(s) (50 mL/Hr) IV Continuous <Continuous>  dextrose 50% Injectable 12.5 Gram(s) IV Push once  dextrose 50% Injectable 25 Gram(s) IV Push once  dextrose 50% Injectable 25 Gram(s) IV Push once  ferrous    sulfate 325 milliGRAM(s) Oral daily  finasteride 5 milliGRAM(s) Oral daily  heparin   Injectable 5000 Unit(s) SubCutaneous every 8 hours  insulin lispro (HumaLOG) corrective regimen sliding scale   SubCutaneous three times a day before meals  insulin lispro (HumaLOG) corrective regimen sliding scale   SubCutaneous at bedtime  melatonin 5 milliGRAM(s) Oral at bedtime  meropenem  IVPB 500 milliGRAM(s) IV Intermittent every 12 hours  midodrine. 10 milliGRAM(s) Oral <User Schedule>  multivitamin 1 Tablet(s) Oral daily  oxybutynin 10 milliGRAM(s) Oral daily  sodium bicarbonate  Infusion 0.076 mEq/kG/Hr (105 mL/Hr) IV Continuous <Continuous>  tamsulosin 0.4 milliGRAM(s) Oral at bedtime    MEDICATIONS  (PRN):  acetaminophen   Tablet .. 650 milliGRAM(s) Oral every 6 hours PRN Temp greater or equal to 38C (100.4F), Mild Pain (1 - 3), Moderate Pain (4 - 6)  dextrose 40% Gel 15 Gram(s) Oral once PRN Blood Glucose LESS THAN 70 milliGRAM(s)/deciliter  glucagon  Injectable 1 milliGRAM(s) IntraMuscular once PRN Glucose LESS THAN 70 milligrams/deciliter      RADIOLOGY & ADDITIONAL TESTS:

## 2020-09-20 NOTE — PROGRESS NOTE ADULT - ASSESSMENT
71 y/o M with a h/o developmental disability, CHF, HTN, DM, AF taken off apixaban due to anemia/hematuria, CKD not on HD, refused hemodialysis, BPH who was recently discharged from [facility] 9/3/20 s/p treated for ESBL UTI and septic shock brought back to ED from group home due to no urinary output since yesterday. Staff at [facility] notes the patient had a change in diet yesterday and has refused to eat, drink, or take his medications since yesterday. In ED pt is found ot be in MARSHAL on CKD with hyperkalemia and U/A indicative of UTI. Pt denies any sob, no cough, no chest pain, no n/v or abd pain. Pt admitted for MARSHAL on CKD, hyperkalemia and UTI. (17 Sep 2020 12:46)    UTI  CKD    -hypothermic  - c/o difficulty urinating PTA, resolved with rodriguez placement  - prior history of ESBL proteus in urine and Pseudomonas in blood  - Blood cultures   - UA +, UCX + yeast, repeat UA +  - [facility]G renal-mild right hydro with rodriguez in place, consider Ct a/p  - meropenem 500 mg IV q12h  - Trend Fever  - Trend Leukocytosis  - c/w flomax and proscar    d/w sister, Dr Tolentino  Will Follow

## 2020-09-20 NOTE — PROGRESS NOTE ADULT - ASSESSMENT
Urinary retention, uti, right hydronephrosis    1.  IV abx  2.  rodriguez to gravity  3.  Hydronephrosis will improve with a few days of rodriguez drainage  4.  daily cbc  5.  daily chem 10  6.  Rodriguez to gravity

## 2020-09-20 NOTE — PROGRESS NOTE ADULT - SUBJECTIVE AND OBJECTIVE BOX
Evaluated.  Full note to follow HPI:  71 y/o M with a h/o developmental disability, CHF, HTN, DM, AF taken off apixaban due to anemia/hematuria, CKD due to COVID-19 infection, refused hemodialysis, BPH who was recently discharged from [facility] 9/3/20 s/p treated for ESBL UTI and septic shock brought back to ED from group home due to no urinary output since yesterday. Staff at [facility] notes the patient had a change in diet yesterday and has refused to eat, drink, or take his medications since yesterday. In ED pt is found ot be in MARSHAL on CKD with hyperkalemia and U/A indicative of UTI. Pt denies any sob, no cough, no chest pain, no n/v or abd pain. Pt admitted for MARSHAL on CKD, hyperkalemia and UTI. (17 Sep 2020 12:46)    Patient found to be in urinary retention and have mild hydronephrosis      PAST MEDICAL & SURGICAL HISTORY:  Encounter for blood transfusion    Anemia    History of gross hematuria    History of orthostatic hypotension    Bacterial pneumonia    Muscle weakness (generalized)     novel coronavirus disease (COVID-19)    Falls    Heart failure    Afib    CHF (congestive heart failure)    Gout    Hiatal hernia    BPH (benign prostatic hyperplasia)    Cognitive impairment    Cardiomyopathy    HTN (hypertension)    DM (diabetes mellitus)    S/P appendectomy               Review of Systems:  Other Review of Systems: All other review of systems negative, except as noted in HPI       MEDICATIONS  (STANDING):  allopurinol 300 milliGRAM(s) Oral daily  atorvastatin 20 milliGRAM(s) Oral at bedtime  calcium carbonate 1250 mG  + Vitamin D (OsCal 500 + D) 1 Tablet(s) Oral two times a day  dextrose 5%. 1000 milliLiter(s) (50 mL/Hr) IV Continuous <Continuous>  dextrose 50% Injectable 12.5 Gram(s) IV Push once  dextrose 50% Injectable 25 Gram(s) IV Push once  dextrose 50% Injectable 25 Gram(s) IV Push once  ferrous    sulfate 325 milliGRAM(s) Oral daily  finasteride 5 milliGRAM(s) Oral daily  heparin   Injectable 5000 Unit(s) SubCutaneous every 8 hours  insulin lispro (HumaLOG) corrective regimen sliding scale   SubCutaneous three times a day before meals  insulin lispro (HumaLOG) corrective regimen sliding scale   SubCutaneous at bedtime  melatonin 5 milliGRAM(s) Oral at bedtime  meropenem  IVPB 500 milliGRAM(s) IV Intermittent every 12 hours  midodrine. 10 milliGRAM(s) Oral <User Schedule>  multivitamin 1 Tablet(s) Oral daily  oxybutynin 10 milliGRAM(s) Oral daily  tamsulosin 0.4 milliGRAM(s) Oral at bedtime    MEDICATIONS  (PRN):  acetaminophen   Tablet .. 650 milliGRAM(s) Oral every 6 hours PRN Temp greater or equal to 38C (100.4F), Mild Pain (1 - 3), Moderate Pain (4 - 6)  dextrose 40% Gel 15 Gram(s) Oral once PRN Blood Glucose LESS THAN 70 milliGRAM(s)/deciliter  glucagon  Injectable 1 milliGRAM(s) IntraMuscular once PRN Glucose LESS THAN 70 milligrams/deciliter      Allergies    No Known Allergies    Intolerances        SOCIAL HISTORY:    FAMILY HISTORY:  No pertinent family history in first degree relatives  pt hossein recal his family medical hx of either parents        Vital Signs Last 24 Hrs  Afebrile avss    PHYSICAL EXAM:    General: Well developed; well nourished; in no acute distress  Head: Normocephalic; atraumatic  Respiratory: No wheezes, rales or rhonchi  Cardiovascular: Regular rate and rhythm. S1 and S2 Normal; No murmurs, gallops or rubs  Gastrointestinal: Soft non-tender non-distended; Normal bowel sounds; No hepatosplenomegaly  Genitourinary: Rivera is draining clear urine.  No flank tenderness  Extremities: Normal range of motion, No clubbing, cyanosis or edema  Vascular: Peripheral pulses palpable 2+ bilaterally  Neurological: Alert and oriented x4  Skin: Warm and dry. No acute rash  Musculoskeletal: Normal gait, tone, without deformities  Psychiatric: Cooperative and appropriate      LABS:  cre= 3.42           Urinalysis Basic - ( 19 Sep 2020 15:16 )    Color: Yellow / Appearance: Cloudy / S.005 / pH: x  Gluc: x / Ketone: Small  / Bili: Negative / Urobili: Negative mg/dL   Blood: x / Protein: 30 mg/dL / Nitrite: Negative   Leuk Esterase: Moderate / RBC: 0-2 /HPF / WBC 26-50   Sq Epi: x / Non Sq Epi: Occasional / Bacteria: Few        RADIOLOGY & ADDITIONAL STUDIES:    Sono- mild right hydro

## 2020-09-21 NOTE — PROGRESS NOTE ADULT - ASSESSMENT
1) Yvette on CKD  2) Complicated UTI  3) hypernatremia  4) Rt sided hydronephrosis; s/p rodriguez  5) Developmental delay / cognitive impairment / Hx of cerebral Palsy     SCr trending down, pt non oliguric  Maintain euvolemia       1) Yvette on CKD  2) Complicated UTI  3) hypernatremia  4) Rt sided hydronephrosis; s/p rodriguez  5) Developmental delay / cognitive impairment / Hx of cerebral Palsy     SCr trending down, pt non oliguric  Sodium up trending; encourage po hydration

## 2020-09-21 NOTE — GOALS OF CARE CONVERSATION - ADVANCED CARE PLANNING - CONVERSATION DETAILS
CHADD contacted by Dr Tolentino hospitalist regarding patient OPWDD affiliation and advance directives in place. SW aware of patient case from prior hospitalization and at that time was able to obtain MOLST checklist and letter of no objection which had been initiated at [facility]. At that time family requested DNR/DNI and SW reviewed paperwork upon last admission since family wanted to maintain directives. SW previously contacted [facility] and Office of mental Hygiene Legal services.     CHADD today contacted [facility] Legal Services and contacted  Sergio Ramsey  whom handled checklist process when initially initiated. SW requested copy of letter of no objection to therefore place in chart again with MOLST attached to maintain directives during current hospitalization.    Previously the [place] protocols for directives for OPWDD patients was followed and directives implemented correctly as per guidelines. CHADD contacted by Dr Tolentino hospitalist regarding patient OPWDD affiliation and advance directives in place. SW aware of patient case from prior hospitalization and at that time was able to obtain MOLST checklist and letter of no objection which had been initiated at [facility]. At that time family requested DNR/DNI and SW reviewed paperwork upon last admission since family wanted to maintain directives. SW previously contacted [facility] and Office of mental Hygiene Legal services.     SW today contacted [facility] Legal Services and contacted  Sergio Ramsey  whom handled checklist process when initially initiated. SW requested copy of letter of no objection to therefore place in chart again with MOLST attached to maintain directives during current hospitalization. SW obtained initail Molst checklist and MOLST directives form [facility]. Placed on medical chart.     Previously the [place] protocols for directives for OPWDD patients was followed and directives implemented correctly as per guidelines.  CHADD advised Dr Tolentino. CHADD contacted by Dr Tolentino hospitalist regarding patient OPWDD affiliation and advance directives in place. SW aware of patient case from prior hospitalization and at that time was able to obtain MOLST checklist and letter of no objection which had been initiated at [facility]. At that time family requested DNR/DNI  and no dialysis. SW reviewed paperwork upon last admission since family wanted to maintain directives. SW previously contacted [facility] and Office of Mental Hygiene Legal services.     SW today contacted [facility] Legal Services and contacted  Sergio Ramsey  whom handled checklist process when initially initiated. SW requested copy of letter of no objection to therefore place in chart again with MOLST attached to maintain directives during current hospitalization. CHADD obtained initial MOLST checklist and MOLST directives from [facility]. CHADD also obtained letter of no objection from Office of [facility] Legal services,  Vernon Butcher from April 17, 2020.  Placed on medical chart.     Previously the [place] protocols for directives for OPWDD patients were followed and directives implemented correctly as per guidelines.  CHADD advised Dr Tolentino and directives implemented for this admission.

## 2020-09-21 NOTE — PROGRESS NOTE ADULT - ASSESSMENT
# complicated UTI   previous hx of ESBL and pseudomonas   on meropenem   unfortunately urine culture was never collected at time of admission   now will hold off , ID docs are aware     # MARSHAL on CKD with hyperkalemia   due to obstructive uropathy , hx of covid induced renal failure   hyperkalemia resolved   potassium normalized   creatinine still elevated but improved   nephro on board , will follow recs   as per sister they will not be willing to go for dialysis     # Acute encephalopathy   delirium and also complicated by ativan use  improved   continue with melatonin   no mittens no other restrains      # Urinary retention   rodriguez in place   on flomax and oxybutynin   US showed right sided hydro , seen by urology   continue with conservative management and repeat US     #Autonomic dysfunction   post covid   patient has had hypothermia and hypotension on and off  will monitor   these details were obtained from sister     # Developmental delay / cognitive impairment / Hx of cerebral Palsy   patient is around his baseline     # DLP  on statin     # Dysphagia   diet order changed according to recs from speech   speech to re evaluate as patient is more awake     # Anemia   chronic   hemoglobin around baseline   will monitor     # Thrombocytopenia   chronic   slightly lower than baseline   will monitor     # DVT prophylaxis  heparin     details discussed with sister at length , all concerns answered   patient carries guarded prognosis   DNR DNI as per VIVIENNE , paper work faxed from group [place] , discussed with Taras  from palliative care team to make sure papers look good   patient would have to stay on IV antibiotics

## 2020-09-21 NOTE — PROGRESS NOTE ADULT - SUBJECTIVE AND OBJECTIVE BOX
[facility] DIVISION OF KIDNEY DISEASES AND HYPERTENSION -- FOLLOW UP NOTE  --------------------------------------------------------------------------------  Chief Complaint:    24 hour events/subjective:        PAST HISTORY  --------------------------------------------------------------------------------  No significant changes to PMH, PSH, FHx, SHx, unless otherwise noted    ALLERGIES & MEDICATIONS  --------------------------------------------------------------------------------  Allergies    No Known Allergies    Intolerances      Standing Inpatient Medications  allopurinol 300 milliGRAM(s) Oral daily  atorvastatin 20 milliGRAM(s) Oral at bedtime  calcium carbonate 1250 mG  + Vitamin D (OsCal 500 + D) 1 Tablet(s) Oral two times a day  ferrous    sulfate 325 milliGRAM(s) Oral daily  finasteride 5 milliGRAM(s) Oral daily  heparin   Injectable 5000 Unit(s) SubCutaneous every 8 hours  melatonin 5 milliGRAM(s) Oral at bedtime  meropenem  IVPB 500 milliGRAM(s) IV Intermittent every 12 hours  midodrine. 10 milliGRAM(s) Oral <User Schedule>  multivitamin 1 Tablet(s) Oral daily  oxybutynin 10 milliGRAM(s) Oral daily  tamsulosin 0.4 milliGRAM(s) Oral at bedtime    PRN Inpatient Medications  acetaminophen   Tablet .. 650 milliGRAM(s) Oral every 6 hours PRN      REVIEW OF SYSTEMS  --------------------------------------------------------------------------------  Gen: No weight changes, fatigue, fevers/chills, weakness  Skin: No rashes  Head/Eyes/Ears/Mouth: No headache; Normal hearing; Normal vision w/o blurriness; No sinus pain/discomfort, sore throat  Respiratory: No dyspnea, cough, wheezing, hemoptysis  CV: No chest pain, PND, orthopnea  GI: No abdominal pain, diarrhea, constipation, nausea, vomiting, melena, hematochezia  : No increased frequency, dysuria, hematuria, nocturia  MSK: No joint pain/swelling; no back pain; no edema  Neuro: No dizziness/lightheadedness, weakness, seizures, numbness, tingling  Heme: No easy bruising or bleeding  Endo: No heat/cold intolerance  Psych: No significant nervousness, anxiety, stress, depression    All other systems were reviewed and are negative, except as noted.    VITALS/PHYSICAL EXAM  --------------------------------------------------------------------------------  T(C): 36.2 (09-21-20 @ 15:37), Max: 36.3 (09-21-20 @ 00:04)  HR: 54 (09-21-20 @ 15:37) (54 - 84)  BP: 104/65 (09-21-20 @ 15:37) (99/62 - 159/89)  RR: 19 (09-21-20 @ 15:37) (18 - 20)  SpO2: 95% (09-21-20 @ 15:37) (94% - 95%)  Wt(kg): --        09-20-20 @ 07:01  -  09-21-20 @ 07:00  --------------------------------------------------------  IN: 1050 mL / OUT: 975 mL / NET: 75 mL      Physical Exam:  	Gen: NAD, well-appearing  	HEENT: PERRL, supple neck, clear oropharynx  	Pulm: CTA B/L  	CV: RRR, S1S2; no rub  	Back: No spinal or CVA tenderness; no sacral edema  	Abd: +BS, soft, nontender/nondistended  	: No suprapubic tenderness  	UE: Warm, FROM, no clubbing, intact strength; no edema; no asterixis  	LE: Warm, FROM, no clubbing, intact strength; no edema  	Neuro: No focal deficits, intact gait  	Psych: Normal affect and mood  	Skin: Warm, without rashes  	Vascular access:    LABS/STUDIES  --------------------------------------------------------------------------------              7.9    3.31  >-----------<  81       [09-20-20 @ 09:29]              26.4     148  |  106  |  22.0  ----------------------------<  64      [09-21-20 @ 09:46]  3.6   |  29.0  |  2.36        Ca     7.7     [09-21-20 @ 09:46]            Creatinine Trend:  SCr 2.36 [09-21 @ 09:46]  SCr 2.93 [09-20 @ 09:29]  SCr 3.42 [09-19 @ 09:51]  SCr 3.05 [09-18 @ 07:17]  SCr 3.16 [09-17 @ 16:55]    Urinalysis - [09-19-20 @ 15:16]      Color Yellow / Appearance Cloudy / SG 1.005 / pH 7.0      Gluc Negative / Ketone Small  / Bili Negative / Urobili Negative       Blood Moderate / Protein 30 / Leuk Est Moderate / Nitrite Negative      RBC 0-2 / WBC 26-50 / Hyaline  / Gran  / Sq Epi  / Non Sq Epi Occasional / Bacteria Few    Urine Sodium 88      [09-19-20 @ 15:16]  Urine Urea Nitrogen 94      [09-17-20 @ 15:41]  Urine Potassium 13      [09-17-20 @ 10:54]  Urine Osmolality 270      [09-19-20 @ 15:16]    Ferritin 343      [04-15-20 @ 23:46]  TSH 1.80      [08-20-20 @ 21:02]       [facility] DIVISION OF KIDNEY DISEASES AND HYPERTENSION -- FOLLOW UP NOTE  --------------------------------------------------------------------------------  Chief Complaint: barbra on ckd    24 hour events/subjective:  no acute event  pt seen and examined; feels well      PAST HISTORY  --------------------------------------------------------------------------------  No significant changes to PMH, PSH, FHx, SHx, unless otherwise noted    ALLERGIES & MEDICATIONS  --------------------------------------------------------------------------------  Allergies    No Known Allergies    Intolerances      Standing Inpatient Medications  allopurinol 300 milliGRAM(s) Oral daily  atorvastatin 20 milliGRAM(s) Oral at bedtime  calcium carbonate 1250 mG  + Vitamin D (OsCal 500 + D) 1 Tablet(s) Oral two times a day  ferrous    sulfate 325 milliGRAM(s) Oral daily  finasteride 5 milliGRAM(s) Oral daily  heparin   Injectable 5000 Unit(s) SubCutaneous every 8 hours  melatonin 5 milliGRAM(s) Oral at bedtime  meropenem  IVPB 500 milliGRAM(s) IV Intermittent every 12 hours  midodrine. 10 milliGRAM(s) Oral <User Schedule>  multivitamin 1 Tablet(s) Oral daily  oxybutynin 10 milliGRAM(s) Oral daily  tamsulosin 0.4 milliGRAM(s) Oral at bedtime    PRN Inpatient Medications  acetaminophen   Tablet .. 650 milliGRAM(s) Oral every 6 hours PRN      REVIEW OF SYSTEMS  --------------------------------------------------------------------------------  Gen: No weight changes, fatigue, fevers/chills, weakness  Skin: No rashes  Head/Eyes/Ears/Mouth: No headache; Normal hearing; Normal vision w/o blurriness; No sinus pain/discomfort, sore throat  Respiratory: No dyspnea, cough, wheezing, hemoptysis  CV: No chest pain, PND, orthopnea  GI: No abdominal pain, diarrhea, constipation, nausea, vomiting, melena, hematochezia  : No increased frequency, dysuria, hematuria, nocturia  MSK: No joint pain/swelling; no back pain; no edema  Neuro: No dizziness/lightheadedness, weakness, seizures, numbness, tingling  Heme: No easy bruising or bleeding  Endo: No heat/cold intolerance  Psych: No significant nervousness, anxiety, stress, depression    All other systems were reviewed and are negative, except as noted.    VITALS/PHYSICAL EXAM  --------------------------------------------------------------------------------  T(C): 36.2 (09-21-20 @ 15:37), Max: 36.3 (09-21-20 @ 00:04)  HR: 54 (09-21-20 @ 15:37) (54 - 84)  BP: 104/65 (09-21-20 @ 15:37) (99/62 - 159/89)  RR: 19 (09-21-20 @ 15:37) (18 - 20)  SpO2: 95% (09-21-20 @ 15:37) (94% - 95%)  Wt(kg): --        09-20-20 @ 07:01  -  09-21-20 @ 07:00  --------------------------------------------------------  IN: 1050 mL / OUT: 975 mL / NET: 75 mL      Physical Exam:  	Gen: NAD  	HEENT: supple neck  	Pulm: CTA B/L  	CV: RRR, S1S2; no rub  	Back: ; no sacral edema  	Abd: +BS, soft, nontender  	: No suprapubic tenderness  	UE: Warm,  no edema  	LE: Warm,no edema  	Neuro: No focal deficits  	Psych: Normal affect  	Skin: Warm    LABS/STUDIES  --------------------------------------------------------------------------------              7.9    3.31  >-----------<  81       [09-20-20 @ 09:29]              26.4     148  |  106  |  22.0  ----------------------------<  64      [09-21-20 @ 09:46]  3.6   |  29.0  |  2.36        Ca     7.7     [09-21-20 @ 09:46]            Creatinine Trend:  SCr 2.36 [09-21 @ 09:46]  SCr 2.93 [09-20 @ 09:29]  SCr 3.42 [09-19 @ 09:51]  SCr 3.05 [09-18 @ 07:17]  SCr 3.16 [09-17 @ 16:55]    Urinalysis - [09-19-20 @ 15:16]      Color Yellow / Appearance Cloudy / SG 1.005 / pH 7.0      Gluc Negative / Ketone Small  / Bili Negative / Urobili Negative       Blood Moderate / Protein 30 / Leuk Est Moderate / Nitrite Negative      RBC 0-2 / WBC 26-50 / Hyaline  / Gran  / Sq Epi  / Non Sq Epi Occasional / Bacteria Few    Urine Sodium 88      [09-19-20 @ 15:16]  Urine Urea Nitrogen 94      [09-17-20 @ 15:41]  Urine Potassium 13      [09-17-20 @ 10:54]  Urine Osmolality 270      [09-19-20 @ 15:16]    Ferritin 343      [04-15-20 @ 23:46]  TSH 1.80      [08-20-20 @ 21:02]

## 2020-09-21 NOTE — PROGRESS NOTE ADULT - SUBJECTIVE AND OBJECTIVE BOX
INTERVAL HPI/OVERNIGHT EVENTS:  Rodriguez is draining clear yellow urine    MEDICATIONS  (STANDING):  allopurinol 300 milliGRAM(s) Oral daily  atorvastatin 20 milliGRAM(s) Oral at bedtime  calcium carbonate 1250 mG  + Vitamin D (OsCal 500 + D) 1 Tablet(s) Oral two times a day  dextrose 5%. 1000 milliLiter(s) (50 mL/Hr) IV Continuous <Continuous>  dextrose 50% Injectable 12.5 Gram(s) IV Push once  dextrose 50% Injectable 25 Gram(s) IV Push once  dextrose 50% Injectable 25 Gram(s) IV Push once  ferrous    sulfate 325 milliGRAM(s) Oral daily  finasteride 5 milliGRAM(s) Oral daily  heparin   Injectable 5000 Unit(s) SubCutaneous every 8 hours  insulin lispro (HumaLOG) corrective regimen sliding scale   SubCutaneous three times a day before meals  insulin lispro (HumaLOG) corrective regimen sliding scale   SubCutaneous at bedtime  melatonin 5 milliGRAM(s) Oral at bedtime  meropenem  IVPB 500 milliGRAM(s) IV Intermittent every 12 hours  midodrine. 10 milliGRAM(s) Oral <User Schedule>  multivitamin 1 Tablet(s) Oral daily  oxybutynin 10 milliGRAM(s) Oral daily  tamsulosin 0.4 milliGRAM(s) Oral at bedtime    MEDICATIONS  (PRN):  acetaminophen   Tablet .. 650 milliGRAM(s) Oral every 6 hours PRN Temp greater or equal to 38C (100.4F), Mild Pain (1 - 3), Moderate Pain (4 - 6)  dextrose 40% Gel 15 Gram(s) Oral once PRN Blood Glucose LESS THAN 70 milliGRAM(s)/deciliter  glucagon  Injectable 1 milliGRAM(s) IntraMuscular once PRN Glucose LESS THAN 70 milligrams/deciliter      Allergies    No Known Allergies    Intolerances        Vital Signs Last 24 Hrs  T(C): 36.3 (21 Sep 2020 08:28), Max: 36.4 (20 Sep 2020 15:43)  T(F): 97.3 (21 Sep 2020 08:28), Max: 97.6 (20 Sep 2020 15:43)  HR: 76 (21 Sep 2020 08:28) (76 - 84)  BP: 159/89 (21 Sep 2020 08:28) (97/56 - 159/89)  BP(mean): --  RR: 20 (21 Sep 2020 08:28) (18 - 20)  SpO2: 94% (21 Sep 2020 08:28) (94% - 94%)     ON PE:  General: Resting comfortably    heart- no tachycardia    lungs- no tachypnea      Abdomen:  soft, nt, nd, bs+  :  Clear yellow urine in the rodriguez, no flank tenderness    LABS:                        7.9    3.31  )-----------( 81       ( 20 Sep 2020 09:29 )             26.4     09-20    145  |  106  |  30.0<H>  ----------------------------<  85  3.9   |  27.0  |  2.93<H>    Ca    8.1<L>      20 Sep 2020 09:29        Urinalysis Basic - ( 19 Sep 2020 15:16 )    Color: Yellow / Appearance: Cloudy / S.005 / pH: x  Gluc: x / Ketone: Small  / Bili: Negative / Urobili: Negative mg/dL   Blood: x / Protein: 30 mg/dL / Nitrite: Negative   Leuk Esterase: Moderate / RBC: 0-2 /HPF / WBC 26-50   Sq Epi: x / Non Sq Epi: Occasional / Bacteria: Few        RADIOLOGY & ADDITIONAL TESTS:

## 2020-09-21 NOTE — PROGRESS NOTE ADULT - ASSESSMENT
69 y/o M with a h/o developmental disability, CHF, HTN, DM, AF taken off apixaban due to anemia/hematuria, CKD not on HD, refused hemodialysis, BPH who was recently discharged from [facility] 9/3/20 s/p treated for ESBL UTI and septic shock brought back to ED from group home due to no urinary output since yesterday. Staff at [facility] notes the patient had a change in diet yesterday and has refused to eat, drink, or take his medications since yesterday. In ED pt is found ot be in MARSHAL on CKD with hyperkalemia and U/A indicative of UTI. Pt denies any sob, no cough, no chest pain, no n/v or abd pain. Pt admitted for MARSHAL on CKD, hyperkalemia and UTI. (17 Sep 2020 12:46)    UTI  CKD    -tmin 96.7f  - c/o difficulty urinating PTA, resolved with rodriguez placement  - prior history of ESBL proteus in urine and Pseudomonas in blood  - Blood cultures ngtd  - UA +, UCX + yeast, repeat UA +  - USG renal-mild right hydro with rodriguez in place, consider Ct a/p  - meropenem 500 mg IV q12h adjusted for renal function-total 10 days through 9/26 Midline if plan for DC  - Trend Fever  - Trend Leukocytosis  - c/w flomax and proscar    will follow

## 2020-09-21 NOTE — PROGRESS NOTE ADULT - SUBJECTIVE AND OBJECTIVE BOX
CC: UTI     INTERVAL HPI/OVERNIGHT EVENTS: seen and examined , feels ok , no events overnight   patient denies any pain .   speech to re evaluate patient   paper work received from group home with Taras eHnao  from palliative care team is involved           Vital Signs Last 24 Hrs  T(C): 36.3 (21 Sep 2020 08:28), Max: 36.4 (20 Sep 2020 15:43)  T(F): 97.3 (21 Sep 2020 08:28), Max: 97.6 (20 Sep 2020 15:43)  HR: 76 (21 Sep 2020 08:28) (76 - 84)  BP: 159/89 (21 Sep 2020 08:28) (97/56 - 159/89)  BP(mean): --  RR: 20 (21 Sep 2020 08:28) (18 - 20)  SpO2: 94% (21 Sep 2020 08:28) (94% - 94%)    PHYSICAL EXAM:    GENERAL: NAD, resting comfortable in bed   CHEST/LUNG: CTAB , no wheezing , rales, rhonchi heard   HEART: S1S2+, Regular rate and rhythm; No murmurs, rubs, or gallops  ABDOMEN: Soft, Nontender, Nondistended; Bowel sounds present  EXTREMITIES:  no pitting edema , pulses palpated BL.        LABS:                        7.9    3.31  )-----------( 81       ( 20 Sep 2020 09:29 )             26.4     09-21    148<H>  |  106  |  22.0<H>  ----------------------------<  64<L>  3.6   |  29.0  |  2.36<H>    Ca    7.7<L>      21 Sep 2020 09:46        Urinalysis Basic - ( 19 Sep 2020 15:16 )    Color: Yellow / Appearance: Cloudy / S.005 / pH: x  Gluc: x / Ketone: Small  / Bili: Negative / Urobili: Negative mg/dL   Blood: x / Protein: 30 mg/dL / Nitrite: Negative   Leuk Esterase: Moderate / RBC: 0-2 /HPF / WBC 26-50   Sq Epi: x / Non Sq Epi: Occasional / Bacteria: Few          MEDICATIONS  (STANDING):  allopurinol 300 milliGRAM(s) Oral daily  atorvastatin 20 milliGRAM(s) Oral at bedtime  calcium carbonate 1250 mG  + Vitamin D (OsCal 500 + D) 1 Tablet(s) Oral two times a day  ferrous    sulfate 325 milliGRAM(s) Oral daily  finasteride 5 milliGRAM(s) Oral daily  heparin   Injectable 5000 Unit(s) SubCutaneous every 8 hours  melatonin 5 milliGRAM(s) Oral at bedtime  meropenem  IVPB 500 milliGRAM(s) IV Intermittent every 12 hours  midodrine. 10 milliGRAM(s) Oral <User Schedule>  multivitamin 1 Tablet(s) Oral daily  oxybutynin 10 milliGRAM(s) Oral daily  tamsulosin 0.4 milliGRAM(s) Oral at bedtime    MEDICATIONS  (PRN):  acetaminophen   Tablet .. 650 milliGRAM(s) Oral every 6 hours PRN Temp greater or equal to 38C (100.4F), Mild Pain (1 - 3), Moderate Pain (4 - 6)      RADIOLOGY & ADDITIONAL TESTS:

## 2020-09-21 NOTE — PROGRESS NOTE ADULT - ASSESSMENT
Improved renal function, uti, hydronephrosis    1,  iv abx  2.  rodriguez to gravity  3.  sono of kidneys in 3-5 days  4.  daily chem 10

## 2020-09-21 NOTE — PROGRESS NOTE ADULT - SUBJECTIVE AND OBJECTIVE BOX
[facility]  INFECTIOUS DISEASES AND INTERNAL MEDICINE at [place]  =======================================================  Zach Chew MD  Diplomates American Board of Internal Medicine and Infectious Diseases  Tel: [phone]      Fax: [phone]  =======================================================    KIMBERLY MARKS [id number]    Follow up: uti  feels well    Allergies:  No Known Allergies      Medications:  acetaminophen   Tablet .. 650 milliGRAM(s) Oral every 6 hours PRN  allopurinol 300 milliGRAM(s) Oral daily  atorvastatin 20 milliGRAM(s) Oral at bedtime  calcium carbonate 1250 mG  + Vitamin D (OsCal 500 + D) 1 Tablet(s) Oral two times a day  ferrous    sulfate 325 milliGRAM(s) Oral daily  finasteride 5 milliGRAM(s) Oral daily  heparin   Injectable 5000 Unit(s) SubCutaneous every 8 hours  melatonin 5 milliGRAM(s) Oral at bedtime  meropenem  IVPB 500 milliGRAM(s) IV Intermittent every 12 hours  midodrine. 10 milliGRAM(s) Oral <User Schedule>  multivitamin 1 Tablet(s) Oral daily  oxybutynin 10 milliGRAM(s) Oral daily  tamsulosin 0.4 milliGRAM(s) Oral at bedtime            REVIEW OF SYSTEMS:  CONSTITUTIONAL:  No Fever or chills  HEENT:   No diplopia or blurred vision.  No earache, sore throat or runny nose.  CARDIOVASCULAR:  No pressure, squeezing, strangling, tightness, heaviness or aching about the chest, neck, axilla or epigastrium.  RESPIRATORY:  No cough, shortness of breath  GASTROINTESTINAL:  No nausea, vomiting or diarrhea.  GENITOURINARY:  No dysuria, frequency or urgency. No Blood in urine  MUSCULOSKELETAL:  no joint aches, no muscle pain  SKIN:  No change in skin, hair or nails.  NEUROLOGIC:  No Headaches, seizures or weakness.  PSYCHIATRIC:  No disorder of thought or mood.  ENDOCRINE:  No heat or cold intolerance  HEMATOLOGICAL:  No easy bruising or bleeding.            Physical Exam:  ICU Vital Signs Last 24 Hrs  T(C): 36.2 (21 Sep 2020 15:37), Max: 36.3 (21 Sep 2020 00:04)  T(F): 97.2 (21 Sep 2020 15:37), Max: 97.4 (21 Sep 2020 00:04)  HR: 54 (21 Sep 2020 15:37) (54 - 84)  BP: 104/65 (21 Sep 2020 15:37) (99/62 - 159/89)  BP(mean): --  ABP: --  ABP(mean): --  RR: 19 (21 Sep 2020 15:37) (18 - 20)  SpO2: 95% (21 Sep 2020 15:37) (94% - 95%)      GEN: NAD, pleasant  HEENT: normocephalic and atraumatic. EOMI. PERRL.  Anicteric   NECK: Supple.   LUNGS: Clear to auscultation.  HEART: Regular rate and rhythm without murmur.  ABDOMEN: Soft, nontender, and nondistended.  Positive bowel sounds.    : No CVA tenderness + rodriguez  EXTREMITIES: Without any edema.  MSK: no joint swelling  NEUROLOGIC: Cranial nerves II through XII are grossly intact. No focal deficits  PSYCHIATRIC: Appropriate affect .  SKIN: b/l LE venous stasis dermatitis      Labs:                          7.9    3.31  )-----------( 81       ( 20 Sep 2020 09:29 )             26.4   09-21    148<H>  |  106  |  22.0<H>  ----------------------------<  64<L>  3.6   |  29.0  |  2.36<H>    Ca    7.7<L>      21 Sep 2020 09:46

## 2020-09-22 NOTE — DIETITIAN INITIAL EVALUATION ADULT. - OTHER INFO
71 y/o M with a h/o developmental disability, CHF, HTN, DM, AF taken off apixaban due to anemia/hematuria, CKD not on HD, refused hemodialysis, BPH who was recently discharged from [facility] 9/3/20 s/p treated for ESBL UTI and septic shock brought back to ED from group home due to no urinary output since yesterday. Staff at [facility] notes the patient had a change in diet yesterday and has refused to eat, drink, or take his medications since yesterday. In ED pt is found ot be in MARSHAL on CKD with hyperkalemia and U/A indicative of UTI. Pt denies any sob, no cough, no chest pain, no n/v or abd pain. Pt admitted for MARSHAL on CKD, hyperkalemia and UTI. (17 Sep 2020 12:46)  Pt will be in the hospital to receive antibiotics,   spoke to pts family member- reporting long complicated illness ( COVID) with multiple inpt stays at rehabs and the hospital , involving difficulty swallowing and pt being placed on a [facility] soft diet. All of this resulting in approx 45 lb weight loss.     physical signs of malnutrition [ ]absent [ ]present. [ ]Mild [ ]Moderate [ x]Severe Muscle wasting present at [ x]clavicle [ ]interosseos [ ]calf. [ ]Mild [ ]Moderate [x ]Severe subcutaneous fat loss presents at [ ]ribs [x]orbital region [ ]triceps [x ]buccal area.    CURRENT WEIGHT per bed scale 195 lbs -240

## 2020-09-22 NOTE — PROGRESS NOTE ADULT - ASSESSMENT
69 y/o M with a h/o developmental disability, CHF, HTN, DM, AF taken off apixaban due to anemia/hematuria, CKD not on HD, refused hemodialysis, BPH who was recently discharged from [facility] 9/3/20 s/p treated for ESBL UTI and septic shock brought back to ED from group home due to no urinary output since yesterday. Staff at [facility] notes the patient had a change in diet yesterday and has refused to eat, drink, or take his medications since yesterday. In ED pt is found ot be in MARSHAL on CKD with hyperkalemia and U/A indicative of UTI. Pt denies any sob, no cough, no chest pain, no n/v or abd pain. Pt admitted for MARSHAL on CKD, hyperkalemia and UTI. (17 Sep 2020 12:46)    UTI  CKD    -afebrile   - c/o difficulty urinating PTA, resolved with rodriguez placement  - prior history of ESBL proteus in urine and Pseudomonas in blood  - Blood cultures ngtd  - UA +, UCX + yeast, repeat UA +  - USG renal-mild right hydro with rodriguez in place, consider Ct a/p  - meropenem 500 mg IV q12h adjusted for renal function-total 10 days through 9/26, Midline if plan for DC  - Trend Fever  - Trend Leukocytosis  - c/w flomax and proscar    d/w NP

## 2020-09-22 NOTE — CHART NOTE - TREATMENT: THE FOLLOWING DIET HAS BEEN RECOMMENDED
Diet, Soft (09-21-20 @ 13:26) [Active]    1) change diet to [place] Soft  with glucerna TID  2) Daily weights  3) encourage intake

## 2020-09-22 NOTE — DIETITIAN INITIAL EVALUATION ADULT. - PERTINENT LABORATORY DATA
09-22 Na147 mmol/L<H> Glu 97 mg/dL K+ 3.5 mmol/L Cr  2.13 mg/dL<H> BUN 21.0 mg/dL<H> Phos n/a   Alb n/a   PAB n/a

## 2020-09-22 NOTE — DIETITIAN INITIAL EVALUATION ADULT. - PERTINENT MEDS FT
MEDICATIONS  (STANDING):  allopurinol 300 milliGRAM(s) Oral daily  atorvastatin 20 milliGRAM(s) Oral at bedtime  calcium carbonate 1250 mG  + Vitamin D (OsCal 500 + D) 1 Tablet(s) Oral two times a day  chlorhexidine 2% Cloths 1 Application(s) Topical daily  ferrous    sulfate 325 milliGRAM(s) Oral daily  finasteride 5 milliGRAM(s) Oral daily  heparin   Injectable 5000 Unit(s) SubCutaneous every 8 hours  melatonin 5 milliGRAM(s) Oral at bedtime  meropenem  IVPB 500 milliGRAM(s) IV Intermittent every 12 hours  midodrine. 10 milliGRAM(s) Oral <User Schedule>  multivitamin 1 Tablet(s) Oral daily  mupirocin 2% Nasal 1 Application(s) Nasal two times a day  oxybutynin 10 milliGRAM(s) Oral daily  tamsulosin 0.4 milliGRAM(s) Oral at bedtime    MEDICATIONS  (PRN):  acetaminophen   Tablet .. 650 milliGRAM(s) Oral every 6 hours PRN Temp greater or equal to 38C (100.4F), Mild Pain (1 - 3), Moderate Pain (4 - 6)

## 2020-09-22 NOTE — PROGRESS NOTE ADULT - ASSESSMENT
1) Yvette on CKD  2) Complicated UTI  3) hypernatremia  4) Rt sided hydronephrosis; s/p rodriguez  5) Developmental delay / cognitive impairment / Hx of cerebral Palsy     SCr trending down, pt non oliguric   encourage po hydration  Monitor Scr, lytes, UOP    discussed with Dr. Tolentino

## 2020-09-22 NOTE — PROGRESS NOTE ADULT - SUBJECTIVE AND OBJECTIVE BOX
CC: UTI     INTERVAL HPI/OVERNIGHT EVENTS: seen and examined , feels well, fully awake , had full conversation with me   no other events noticed           Vital Signs Last 24 Hrs  T(C): 36.6 (22 Sep 2020 08:12), Max: 37 (21 Sep 2020 23:31)  T(F): 97.8 (22 Sep 2020 08:12), Max: 98.6 (21 Sep 2020 23:31)  HR: 76 (22 Sep 2020 08:12) (54 - 88)  BP: 107/65 (22 Sep 2020 08:12) (104/65 - 110/68)  BP(mean): --  RR: 18 (22 Sep 2020 08:12) (16 - 19)  SpO2: 93% (22 Sep 2020 08:12) (93% - 95%)    PHYSICAL EXAM:    GENERAL: NAD, resting comfortable in bed   CHEST/LUNG: CTAB , no wheezing , rales, rhonchi heard   HEART: S1S2+, Regular rate and rhythm  ABDOMEN: Soft, Nontender, Nondistended; Bowel sounds present  EXTREMITIES:  no pitting edema , pulses palpated BL.        LABS:    09-22    147<H>  |  107  |  21.0<H>  ----------------------------<  97  3.5   |  30.0<H>  |  2.13<H>    Ca    7.9<L>      22 Sep 2020 07:43              MEDICATIONS  (STANDING):  allopurinol 300 milliGRAM(s) Oral daily  atorvastatin 20 milliGRAM(s) Oral at bedtime  calcium carbonate 1250 mG  + Vitamin D (OsCal 500 + D) 1 Tablet(s) Oral two times a day  ferrous    sulfate 325 milliGRAM(s) Oral daily  finasteride 5 milliGRAM(s) Oral daily  heparin   Injectable 5000 Unit(s) SubCutaneous every 8 hours  melatonin 5 milliGRAM(s) Oral at bedtime  meropenem  IVPB 500 milliGRAM(s) IV Intermittent every 12 hours  midodrine. 10 milliGRAM(s) Oral <User Schedule>  multivitamin 1 Tablet(s) Oral daily  oxybutynin 10 milliGRAM(s) Oral daily  tamsulosin 0.4 milliGRAM(s) Oral at bedtime    MEDICATIONS  (PRN):  acetaminophen   Tablet .. 650 milliGRAM(s) Oral every 6 hours PRN Temp greater or equal to 38C (100.4F), Mild Pain (1 - 3), Moderate Pain (4 - 6)      RADIOLOGY & ADDITIONAL TESTS:

## 2020-09-22 NOTE — DIETITIAN INITIAL EVALUATION ADULT. - ETIOLOGY
related to inadequate protein calorie intake in the setting of multiple hospitalizations and complications

## 2020-09-22 NOTE — PROGRESS NOTE ADULT - SUBJECTIVE AND OBJECTIVE BOX
[facility]  INFECTIOUS DISEASES AND INTERNAL MEDICINE at [place]  =======================================================  Zach Chew MD  Diplomates American Board of Internal Medicine and Infectious Diseases  Tel: [phone]      Fax: [phone]  =======================================================    KIMBERLY MARKS [id number]    Follow up: uti  feels well    Allergies:  No Known Allergies      Medications:  acetaminophen   Tablet .. 650 milliGRAM(s) Oral every 6 hours PRN  allopurinol 300 milliGRAM(s) Oral daily  atorvastatin 20 milliGRAM(s) Oral at bedtime  calcium carbonate 1250 mG  + Vitamin D (OsCal 500 + D) 1 Tablet(s) Oral two times a day  ferrous    sulfate 325 milliGRAM(s) Oral daily  finasteride 5 milliGRAM(s) Oral daily  heparin   Injectable 5000 Unit(s) SubCutaneous every 8 hours  melatonin 5 milliGRAM(s) Oral at bedtime  meropenem  IVPB 500 milliGRAM(s) IV Intermittent every 12 hours  midodrine. 10 milliGRAM(s) Oral <User Schedule>  multivitamin 1 Tablet(s) Oral daily  oxybutynin 10 milliGRAM(s) Oral daily  tamsulosin 0.4 milliGRAM(s) Oral at bedtime            REVIEW OF SYSTEMS:  CONSTITUTIONAL:  No Fever or chills  HEENT:   No diplopia or blurred vision.  No earache, sore throat or runny nose.  CARDIOVASCULAR:  No pressure, squeezing, strangling, tightness, heaviness or aching about the chest, neck, axilla or epigastrium.  RESPIRATORY:  No cough, shortness of breath  GASTROINTESTINAL:  No nausea, vomiting or diarrhea.  GENITOURINARY:  No dysuria, frequency or urgency. No Blood in urine  MUSCULOSKELETAL:  no joint aches, no muscle pain  SKIN:  No change in skin, hair or nails.  NEUROLOGIC:  No Headaches, seizures or weakness.  PSYCHIATRIC:  No disorder of thought or mood.  ENDOCRINE:  No heat or cold intolerance  HEMATOLOGICAL:  No easy bruising or bleeding.            Physical Exam:  ICU Vital Signs Last 24 Hrs  T(C): 36.6 (22 Sep 2020 08:12), Max: 37 (21 Sep 2020 23:31)  T(F): 97.8 (22 Sep 2020 08:12), Max: 98.6 (21 Sep 2020 23:31)  HR: 76 (22 Sep 2020 08:12) (54 - 88)  BP: 107/65 (22 Sep 2020 08:12) (104/65 - 110/68)  BP(mean): --  ABP: --  ABP(mean): --  RR: 18 (22 Sep 2020 08:12) (16 - 19)  SpO2: 93% (22 Sep 2020 08:12) (93% - 95%)      GEN: NAD, pleasant  HEENT: normocephalic and atraumatic. EOMI. PERRL.  Anicteric   NECK: Supple.   LUNGS: Clear to auscultation.  HEART: Regular rate and rhythm without murmur.  ABDOMEN: Soft, nontender, and nondistended.  Positive bowel sounds.    : No CVA tenderness rodriguez intact  EXTREMITIES: Without any edema.  MSK: no joint swelling  NEUROLOGIC: Cranial nerves II through XII are grossly intact. No focal deficits  PSYCHIATRIC: Appropriate affect .  SKIN: No Rash b/l Le venous stasis dermatitis      Labs:    09-22    147<H>  |  107  |  21.0<H>  ----------------------------<  97  3.5   |  30.0<H>  |  2.13<H>    Ca    7.9<L>      22 Sep 2020 07:43

## 2020-09-22 NOTE — DIETITIAN INITIAL EVALUATION ADULT. - FACTORS AFF FOOD INTAKE
other (specify)/difficulty chewing/difficulty swallowing/developmental delay/multiple hospitalizations

## 2020-09-22 NOTE — PROGRESS NOTE ADULT - SUBJECTIVE AND OBJECTIVE BOX
[facility] DIVISION OF KIDNEY DISEASES AND HYPERTENSION -- FOLLOW UP NOTE  --------------------------------------------------------------------------------  Chief Complaint: barbra on ckd    24 hour events/subjective:  pt seen and examined  feels well      PAST HISTORY  --------------------------------------------------------------------------------  No significant changes to PMH, PSH, FHx, SHx, unless otherwise noted    ALLERGIES & MEDICATIONS  --------------------------------------------------------------------------------  Allergies    No Known Allergies    Intolerances      Standing Inpatient Medications  allopurinol 300 milliGRAM(s) Oral daily  atorvastatin 20 milliGRAM(s) Oral at bedtime  calcium carbonate 1250 mG  + Vitamin D (OsCal 500 + D) 1 Tablet(s) Oral two times a day  ferrous    sulfate 325 milliGRAM(s) Oral daily  finasteride 5 milliGRAM(s) Oral daily  heparin   Injectable 5000 Unit(s) SubCutaneous every 8 hours  melatonin 5 milliGRAM(s) Oral at bedtime  meropenem  IVPB 500 milliGRAM(s) IV Intermittent every 12 hours  midodrine. 10 milliGRAM(s) Oral <User Schedule>  multivitamin 1 Tablet(s) Oral daily  oxybutynin 10 milliGRAM(s) Oral daily  tamsulosin 0.4 milliGRAM(s) Oral at bedtime    PRN Inpatient Medications  acetaminophen   Tablet .. 650 milliGRAM(s) Oral every 6 hours PRN      REVIEW OF SYSTEMS  --------------------------------------------------------------------------------  Gen: No weight changes, fatigue, fevers/chills, weakness  Skin: No rashes  Head/Eyes/Ears/Mouth: No headache; Normal hearing; Normal vision w/o blurriness; No sinus pain/discomfort, sore throat  Respiratory: No dyspnea, cough, wheezing, hemoptysis  CV: No chest pain, PND, orthopnea  GI: No abdominal pain, diarrhea, constipation, nausea, vomiting, melena, hematochezia  : No increased frequency, dysuria, hematuria, nocturia  MSK: No joint pain/swelling; no back pain; no edema  Neuro: No dizziness/lightheadedness, weakness, seizures, numbness, tingling  Heme: No easy bruising or bleeding  Endo: No heat/cold intolerance  Psych: No significant nervousness, anxiety, stress, depression    All other systems were reviewed and are negative, except as noted.    VITALS/PHYSICAL EXAM  --------------------------------------------------------------------------------  T(C): 36.6 (09-22-20 @ 08:12), Max: 37 (09-21-20 @ 23:31)  HR: 76 (09-22-20 @ 08:12) (54 - 88)  BP: 107/65 (09-22-20 @ 08:12) (104/65 - 110/68)  RR: 18 (09-22-20 @ 08:12) (16 - 19)  SpO2: 93% (09-22-20 @ 08:12) (93% - 95%)  Wt(kg): --        09-21-20 @ 07:01  -  09-22-20 @ 07:00  --------------------------------------------------------  IN: 0 mL / OUT: 1525 mL / NET: -1525 mL      Physical Exam:  	Gen: NAD  	HEENT: , supple neck,  	Pulm: CTA B/L  	CV: RRR, S1S2; no rub  	Back: No spinal or CVA tenderness; no sacral edema  	Abd: +BS, soft, nontender/nondistended  	: No suprapubic tenderness  	UE: Warm,  no edema; no asterixis  	LE: Warm, no edema  	Neuro: No focal deficit  	Psych: Normal affect and mood  	Skin: Warm, without rashes  	    LABS/STUDIES  --------------------------------------------------------------------------------    147  |  107  |  21.0  ----------------------------<  97      [09-22-20 @ 07:43]  3.5   |  30.0  |  2.13        Ca     7.9     [09-22-20 @ 07:43]            Creatinine Trend:  SCr 2.13 [09-22 @ 07:43]  SCr 2.36 [09-21 @ 09:46]  SCr 2.93 [09-20 @ 09:29]  SCr 3.42 [09-19 @ 09:51]  SCr 3.05 [09-18 @ 07:17]    Urinalysis - [09-19-20 @ 15:16]      Color Yellow / Appearance Cloudy / SG 1.005 / pH 7.0      Gluc Negative / Ketone Small  / Bili Negative / Urobili Negative       Blood Moderate / Protein 30 / Leuk Est Moderate / Nitrite Negative      RBC 0-2 / WBC 26-50 / Hyaline  / Gran  / Sq Epi  / Non Sq Epi Occasional / Bacteria Few    Urine Sodium 88      [09-19-20 @ 15:16]  Urine Urea Nitrogen 94      [09-17-20 @ 15:41]  Urine Potassium 13      [09-17-20 @ 10:54]  Urine Osmolality 270      [09-19-20 @ 15:16]    Ferritin 343      [04-15-20 @ 23:46]  TSH 1.80      [08-20-20 @ 21:02]

## 2020-09-22 NOTE — PROGRESS NOTE ADULT - ASSESSMENT
# complicated UTI   previous hx of ESBL and pseudomonas   on meropenem   unfortunately urine culture was never collected at time of admission   now will hold off , ID docs are aware   not able to do midline , antibiotics at group home , patient will stay in hospital     # MARSHAL on CKD with hyperkalemia   due to obstructive uropathy , hx of covid induced renal failure   hyperkalemia resolved   potassium normalized   creatinine still elevated but improved   nephro on board , will follow recs   as per sister they will not be willing to go for dialysis     # Acute encephalopathy   improved   continue with melatonin   no mittens no other restrains      # Urinary retention   rodriguez in place   on flomax and oxybutynin   US showed right sided hydro , seen by urology   continue with conservative management and repeat US to be done today     #Autonomic dysfunction   post covid   patient has had hypothermia and hypotension on and off  will monitor   these details were obtained from sister     # Developmental delay / cognitive impairment / Hx of cerebral Palsy   patient is around his baseline     # DLP  on statin     # Dysphagia   diet order changed according to recs from speech     # Anemia   chronic   hemoglobin around baseline   will monitor     # Thrombocytopenia   chronic   slightly lower than baseline   will monitor     # DVT prophylaxis  heparin     details discussed with sister at length , all concerns answered   DNR DNI as per VIVIENNE , paper work faxed from group [place] , discussed with Taras  from palliative care team to make sure papers look good   patient would have to stay on IV antibiotics

## 2020-09-23 NOTE — PROGRESS NOTE ADULT - ASSESSMENT
71 y/o M with a h/o developmental disability, CHF, HTN, DM, AF taken off apixaban due to anemia/hematuria, CKD not on HD, refused hemodialysis, BPH who was recently discharged from [facility] 9/3/20 s/p treated for ESBL UTI and septic shock brought back to ED from group home due to no urinary output since yesterday. [facility] had a change in diet yesterday and has refused to eat, drink, or take his medications since yesterday. In ED pt is found ot be in MARSHAL on CKD with hyperkalemia and U/A indicative of UTI. Pt denies any sob, no cough, no chest pain, no n/v or abd pain. Pt admitted for MARSHAL on CKD, hyperkalemia and UTI.     # complicated UTI   previous hx of ESBL and pseudomonas   on meropenem   unfortunately urine culture was never collected at time of admission   now will hold off , ID docs are aware   not able to do midline , antibiotics at group home , patient will stay in hospital until weekend     # MARSHAL on CKD with hyperkalemia   due to obstructive uropathy , hx of covid induced renal failure   hyperkalemia resolved   potassium normalized   creatinine still elevated but improved   nephro on board , will follow recs   as per sister they will not be willing to go for dialysis     # Hypernatremia   due to poor oral intake   now gradually improving     # Acute encephalopathy   improved   continue with melatonin   no mittens no other restrains    avoid ativan     # Urinary retention / hydronephrosis / obstructive uropathy   rodriguez in place   on flomax and oxybutynin   US showed right sided hydro , seen by urology   continue with conservative management and repeat US done that shows some improvement   will follow recs from urology     #Autonomic dysfunction   post covid   patient has had hypothermia and hypotension on and off  will monitor   these details were obtained from sister     # Developmental delay / cognitive impairment / Hx of cerebral Palsy   patient is around his baseline     # DLP  on statin     # Dysphagia   diet order changed according to recs from speech   gluceraleah added     # Anemia   chronic   hemoglobin around baseline   will monitor     # Thrombocytopenia   chronic   will monitor     # DVT prophylaxis  heparin     details discussed with sister at length , all concerns answered   DNR DNI as per VIVIENNE , paper work faxed from group [place] , discussed with Taras  from palliative care team to make sure papers look good   patient would have to stay on IV antibiotics and will stay in hospital until over weekend , discussed with case management

## 2020-09-23 NOTE — PROGRESS NOTE ADULT - SUBJECTIVE AND OBJECTIVE BOX
CC: UTI     INTERVAL HPI/OVERNIGHT EVENTS: seen and examined pleasant awake . feels well. offers no complains   repeat US done shows that right hydro has improved to some extent but still present           Vital Signs Last 24 Hrs  T(C): 36.4 (23 Sep 2020 07:49), Max: 37.1 (22 Sep 2020 23:46)  T(F): 97.5 (23 Sep 2020 07:49), Max: 98.7 (22 Sep 2020 23:46)  HR: 86 (23 Sep 2020 07:49) (70 - 86)  BP: 92/49 (23 Sep 2020 07:49) (92/49 - 122/76)  BP(mean): --  RR: 20 (23 Sep 2020 07:49) (18 - 20)  SpO2: 96% (23 Sep 2020 07:49) (96% - 97%)    PHYSICAL EXAM:    GENERAL: NAD, resting comfortable in bed   CHEST/LUNG: CTAB , no wheezing , rales, rhonchi heard   HEART: S1S2+, Regular rate and rhythm; No murmurs, rubs, or gallops  ABDOMEN: Soft, Nontender, Nondistended; Bowel sounds present  EXTREMITIES:  no pitting edema , pulses palpated BL.        LABS:                        8.8    3.45  )-----------( 82       ( 23 Sep 2020 07:33 )             30.2     09-23    146<H>  |  107  |  19.0  ----------------------------<  90  3.4<L>   |  28.0  |  1.75<H>    Ca    8.1<L>      23 Sep 2020 07:33              MEDICATIONS  (STANDING):  allopurinol 300 milliGRAM(s) Oral daily  atorvastatin 20 milliGRAM(s) Oral at bedtime  calcium carbonate 1250 mG  + Vitamin D (OsCal 500 + D) 1 Tablet(s) Oral two times a day  chlorhexidine 2% Cloths 1 Application(s) Topical daily  ferrous    sulfate 325 milliGRAM(s) Oral daily  finasteride 5 milliGRAM(s) Oral daily  heparin   Injectable 5000 Unit(s) SubCutaneous every 8 hours  melatonin 5 milliGRAM(s) Oral at bedtime  meropenem  IVPB 500 milliGRAM(s) IV Intermittent every 12 hours  midodrine. 10 milliGRAM(s) Oral <User Schedule>  multivitamin 1 Tablet(s) Oral daily  mupirocin 2% Nasal 1 Application(s) Nasal two times a day  oxybutynin 10 milliGRAM(s) Oral daily  tamsulosin 0.4 milliGRAM(s) Oral at bedtime    MEDICATIONS  (PRN):  acetaminophen   Tablet .. 650 milliGRAM(s) Oral every 6 hours PRN Temp greater or equal to 38C (100.4F), Mild Pain (1 - 3), Moderate Pain (4 - 6)      RADIOLOGY & ADDITIONAL TESTS:

## 2020-09-23 NOTE — PROGRESS NOTE ADULT - SUBJECTIVE AND OBJECTIVE BOX
[facility] DIVISION OF KIDNEY DISEASES AND HYPERTENSION -- FOLLOW UP NOTE  --------------------------------------------------------------------------------  Chief Complaint:  Hydronephrosis    24 hour events/subjective:  Seen/examined this AM;  SCr improving;      PAST HISTORY  --------------------------------------------------------------------------------  No significant changes to PMH, PSH, FHx, SHx, unless otherwise noted    ALLERGIES & MEDICATIONS  --------------------------------------------------------------------------------  Allergies    No Known Drug Allergies  provide glucerna TID , diet coke BID,  diet ice cream with dinner- RD ok (Unknown)    Intolerances      Standing Inpatient Medications  allopurinol 300 milliGRAM(s) Oral daily  atorvastatin 20 milliGRAM(s) Oral at bedtime  calcium carbonate 1250 mG  + Vitamin D (OsCal 500 + D) 1 Tablet(s) Oral two times a day  chlorhexidine 2% Cloths 1 Application(s) Topical daily  ferrous    sulfate 325 milliGRAM(s) Oral daily  finasteride 5 milliGRAM(s) Oral daily  heparin   Injectable 5000 Unit(s) SubCutaneous every 8 hours  melatonin 5 milliGRAM(s) Oral at bedtime  meropenem  IVPB 500 milliGRAM(s) IV Intermittent every 12 hours  midodrine. 10 milliGRAM(s) Oral <User Schedule>  multivitamin 1 Tablet(s) Oral daily  mupirocin 2% Nasal 1 Application(s) Nasal two times a day  oxybutynin 10 milliGRAM(s) Oral daily  tamsulosin 0.4 milliGRAM(s) Oral at bedtime    PRN Inpatient Medications  acetaminophen   Tablet .. 650 milliGRAM(s) Oral every 6 hours PRN      REVIEW OF SYSTEMS  --------------------------------------------------------------------------------  Gen: No weight changes, fatigue, fevers/chills, weakness  Skin: No rashes  Head/Eyes/Ears/Mouth: No headache; Normal hearing; Normal vision w/o blurriness; No sinus pain/discomfort, sore throat  Respiratory: No dyspnea, cough, wheezing, hemoptysis  CV: No chest pain, PND, orthopnea  GI: No abdominal pain, diarrhea, constipation, nausea, vomiting, melena, hematochezia  : No increased frequency, dysuria, hematuria, nocturia  MSK: No joint pain/swelling; no back pain; no edema  Neuro: No dizziness/lightheadedness, weakness, seizures, numbness, tingling  Heme: No easy bruising or bleeding  Endo: No heat/cold intolerance  Psych: No significant nervousness, anxiety, stress, depression    All other systems were reviewed and are negative, except as noted.    VITALS/PHYSICAL EXAM  --------------------------------------------------------------------------------  T(C): 36.4 (09-23-20 @ 07:49), Max: 37.1 (09-22-20 @ 23:46)  HR: 86 (09-23-20 @ 07:49) (70 - 86)  BP: 92/49 (09-23-20 @ 07:49) (92/49 - 122/76)  RR: 20 (09-23-20 @ 07:49) (18 - 20)  SpO2: 96% (09-23-20 @ 07:49) (96% - 97%)  Wt(kg): --        09-22-20 @ 07:01  -  09-23-20 @ 07:00  --------------------------------------------------------  IN: 50 mL / OUT: 1700 mL / NET: -1650 mL      Physical Exam:  	Gen: NAD  	HEENT: , supple neck,  	Pulm: CTA B/L  	CV: RRR, S1S2; no rub  	Back: No spinal or CVA tenderness; no sacral edema  	Abd: +BS, soft, nontender/nondistended  	: No suprapubic tenderness  	UE: Warm,  no edema; no asterixis  	LE: Warm, no edema  	Neuro: No focal deficit  	Psych: Normal affect and mood  	Skin: Warm, without rashes    LABS/STUDIES  --------------------------------------------------------------------------------              8.8    3.45  >-----------<  82       [09-23-20 @ 07:33]              30.2     146  |  107  |  19.0  ----------------------------<  90      [09-23-20 @ 07:33]  3.4   |  28.0  |  1.75        Ca     8.1     [09-23-20 @ 07:33]            Creatinine Trend:  SCr 1.75 [09-23 @ 07:33]  SCr 2.13 [09-22 @ 07:43]  SCr 2.36 [09-21 @ 09:46]  SCr 2.93 [09-20 @ 09:29]  SCr 3.42 [09-19 @ 09:51]    Urinalysis - [09-19-20 @ 15:16]      Color Yellow / Appearance Cloudy / SG 1.005 / pH 7.0      Gluc Negative / Ketone Small  / Bili Negative / Urobili Negative       Blood Moderate / Protein 30 / Leuk Est Moderate / Nitrite Negative      RBC 0-2 / WBC 26-50 / Hyaline  / Gran  / Sq Epi  / Non Sq Epi Occasional / Bacteria Few    Urine Sodium 88      [09-19-20 @ 15:16]  Urine Urea Nitrogen 94      [09-17-20 @ 15:41]  Urine Potassium 13      [09-17-20 @ 10:54]  Urine Osmolality 270      [09-19-20 @ 15:16]    Ferritin 343      [04-15-20 @ 23:46]  TSH 1.80      [08-20-20 @ 21:02]

## 2020-09-24 NOTE — PROGRESS NOTE ADULT - ASSESSMENT
71 y/o M with a h/o developmental disability, CHF, HTN, DM, AF taken off apixaban due to anemia/hematuria, CKD not on HD, refused hemodialysis, BPH who was recently discharged from [facility] 9/3/20 s/p treated for ESBL UTI and septic shock brought back to ED from group home due to no urinary output since yesterday.  In ED pt is found ot be in MARSHAL on CKD with hyperkalemia and U/A indicative of UTI. Pt admitted for MARSHAL on CKD, hyperkalemia and UTI.     # complicated UTI   previous hx of ESBL and pseudomonas   on meropenem until 9/26/20  unfortunately urine culture was never collected at time of admission   now will hold off , ID docs are aware   not able to do midline , antibiotics at group home , patient will stay in hospital until weekend     # MARSHAL on CKD with hyperkalemia   due to obstructive uropathy , hx of covid induced renal failure   hyperkalemia resolved   potassium normalized   creatinine still elevated but improved   nephro on board , will follow recs   as per sister they will not be willing to go for dialysis     # Hypernatremia   due to poor oral intake   now gradually improving     # Acute encephalopathy   improved   continue with melatonin   no mittens no other restraints    avoid ativan     # Urinary retention / hydronephrosis / obstructive uropathy   rodriguez in place   on flomax and oxybutynin   US showed right sided hydro , seen by urology   continue with conservative management and repeat US done that shows some improvement   will follow recs from urology     #Autonomic dysfunction   post covid   patient has had hypothermia and hypotension on and off  will monitor   these details were obtained from sister     # Developmental delay / cognitive impairment / Hx of cerebral Palsy   patient is around his baseline     # DLP  on statin     # Dysphagia   diet order changed according to recs from speech   gluceraleah added     # Anemia   chronic   hemoglobin around baseline   will monitor     # Thrombocytopenia   chronic   will monitor     # DVT prophylaxis  heparin     details discussed with sister at length , all concerns answered   DNR DNI as per VIVIENNE , paper work faxed from group home , discussed with Taras  from palliative care team to make sure papers look good   patient would have to stay on IV antibiotics and will stay in hospital until over weekend , discussed with case management

## 2020-09-24 NOTE — PROGRESS NOTE ADULT - ASSESSMENT
1) Yvette on CKD  2) Complicated UTI  3) hypernatremia- improved  4) Rt sided hydronephrosis; s/p rodriguez; repeat renal US with mild improvement  5) Developmental delay / cognitive impairment / Hx of cerebral Palsy     SCr trending down, pt non oliguric  Encourage po hydration  Monitor Scr, lytes, UOP

## 2020-09-24 NOTE — PROGRESS NOTE ADULT - SUBJECTIVE AND OBJECTIVE BOX
CC: MARSHAL and UTI (24 Sep 2020 11:49)    HPI:  69 y/o M with a h/o developmental disability, CHF, HTN, DM, AF taken off apixaban due to anemia/hematuria, CKD due to COVID-19 infection, refused hemodialysis, BPH who was recently discharged from [facility] 9/3/20 s/p treated for ESBL UTI and septic shock brought back to ED from group home due to no urinary output. In ED pt is found ot be in MARSHAL on CKD with hyperkalemia and U/A indicative of UTI. Pt admitted for MARSHAL on CKD, hyperkalemia and UTI. (17 Sep 2020 12:46)    INTERVAL HPI/OVERNIGHT EVENTS: Patient seen and examined lying in bed.  Patient denies any complaints.      Vital Signs Last 24 Hrs  T(C): 36.7 (24 Sep 2020 07:41), Max: 36.7 (24 Sep 2020 07:41)  T(F): 98 (24 Sep 2020 07:41), Max: 98 (24 Sep 2020 07:41)  HR: 76 (24 Sep 2020 07:41) (53 - 76)  BP: 101/68 (24 Sep 2020 07:41) (99/66 - 109/74)  BP(mean): --  RR: 20 (24 Sep 2020 07:41) (18 - 20)  SpO2: 97% (24 Sep 2020 07:41) (96% - 98%)  I&O's Detail    23 Sep 2020 07:01  -  24 Sep 2020 07:00  --------------------------------------------------------  IN:    IV PiggyBack: 50 mL    Oral Fluid: 380 mL  Total IN: 430 mL    OUT:    Indwelling Catheter - Urethral (mL): 1250 mL  Total OUT: 1250 mL    Total NET: -820 mL      PHYSICAL EXAM:  GENERAL: NAD  HEAD:  Atraumatic, Normocephalic  NECK: Supple, No JVD, Normal thyroid  NERVOUS SYSTEM:  Alert, Good concentration; generalized weakness  CHEST/LUNG: Clear to auscultation bilaterally  HEART: Regular rate and rhythm; No murmurs, rubs, or gallops  ABDOMEN: Soft, Nontender, Nondistended; Bowel sounds present  EXTREMITIES:  2+ Peripheral Pulses, No clubbing, cyanosis, or edema                                8.8    3.45  )-----------( 82       ( 23 Sep 2020 07:33 )             30.2     24 Sep 2020 07:27    144    |  106    |  17.0   ----------------------------<  111    4.2     |  30.0   |  1.43     Ca    8.4        24 Sep 2020 07:27        CAPILLARY BLOOD GLUCOSE  POCT Blood Glucose.: 157 mg/dL (24 Sep 2020 11:08)  POCT Blood Glucose.: 110 mg/dL (24 Sep 2020 08:17)  POCT Blood Glucose.: 141 mg/dL (23 Sep 2020 16:16)          MEDICATIONS  (STANDING):  allopurinol 300 milliGRAM(s) Oral daily  atorvastatin 20 milliGRAM(s) Oral at bedtime  calcium carbonate 1250 mG  + Vitamin D (OsCal 500 + D) 1 Tablet(s) Oral two times a day  chlorhexidine 2% Cloths 1 Application(s) Topical daily  ferrous    sulfate 325 milliGRAM(s) Oral daily  finasteride 5 milliGRAM(s) Oral daily  heparin   Injectable 5000 Unit(s) SubCutaneous every 8 hours  melatonin 5 milliGRAM(s) Oral at bedtime  meropenem  IVPB 500 milliGRAM(s) IV Intermittent every 12 hours  midodrine. 10 milliGRAM(s) Oral <User Schedule>  multivitamin 1 Tablet(s) Oral daily  mupirocin 2% Nasal 1 Application(s) Nasal two times a day  oxybutynin 10 milliGRAM(s) Oral daily  tamsulosin 0.4 milliGRAM(s) Oral at bedtime    MEDICATIONS  (PRN):  acetaminophen   Tablet .. 650 milliGRAM(s) Oral every 6 hours PRN Temp greater or equal to 38C (100.4F), Mild Pain (1 - 3), Moderate Pain (4 - 6)  ondansetron Injectable 4 milliGRAM(s) IV Push every 6 hours PRN Nausea and/or Vomiting

## 2020-09-24 NOTE — PROGRESS NOTE ADULT - SUBJECTIVE AND OBJECTIVE BOX
[facility] DIVISION OF KIDNEY DISEASES AND HYPERTENSION -- FOLLOW UP NOTE  --------------------------------------------------------------------------------  Chief Complaint: barbra on ckd    24 hour events/subjective:  no acute event  pt seen and examined  feels well        PAST HISTORY  --------------------------------------------------------------------------------  No significant changes to PMH, PSH, FHx, SHx, unless otherwise noted    ALLERGIES & MEDICATIONS  --------------------------------------------------------------------------------  Allergies    No Known Drug Allergies  provide glucerna TID , diet coke BID,  diet ice cream with dinner- RD ok (Unknown)    Intolerances      Standing Inpatient Medications  allopurinol 300 milliGRAM(s) Oral daily  atorvastatin 20 milliGRAM(s) Oral at bedtime  calcium carbonate 1250 mG  + Vitamin D (OsCal 500 + D) 1 Tablet(s) Oral two times a day  chlorhexidine 2% Cloths 1 Application(s) Topical daily  ferrous    sulfate 325 milliGRAM(s) Oral daily  finasteride 5 milliGRAM(s) Oral daily  heparin   Injectable 5000 Unit(s) SubCutaneous every 8 hours  melatonin 5 milliGRAM(s) Oral at bedtime  meropenem  IVPB 500 milliGRAM(s) IV Intermittent every 12 hours  midodrine. 10 milliGRAM(s) Oral <User Schedule>  multivitamin 1 Tablet(s) Oral daily  mupirocin 2% Nasal 1 Application(s) Nasal two times a day  oxybutynin 10 milliGRAM(s) Oral daily  tamsulosin 0.4 milliGRAM(s) Oral at bedtime    PRN Inpatient Medications  acetaminophen   Tablet .. 650 milliGRAM(s) Oral every 6 hours PRN  ondansetron Injectable 4 milliGRAM(s) IV Push every 6 hours PRN      REVIEW OF SYSTEMS  --------------------------------------------------------------------------------  Gen: No weight changes, fatigue, fevers/chills, weakness  Skin: No rashes  Head/Eyes/Ears/Mouth: No headache; Normal hearing;  Respiratory: No dyspnea, cough, wheezing, hemoptysis  CV: No chest pain, PND, orthopnea  GI: No abdominal pain, diarrhea, constipation, nausea, vomiting, melena, hematochezia  : No increased frequency, dysuria, hematuria, nocturia  MSK: No joint pain/swelling; no back pain;  Neuro: No dizziness/lightheadedness, weakness  Heme: No easy bruising or bleeding  Endo: No heat/cold intolerance  Psych: No, anxiety, stress, depression    All other systems were reviewed and are negative, except as noted.    VITALS/PHYSICAL EXAM  --------------------------------------------------------------------------------  T(C): 36.7 (09-24-20 @ 07:41), Max: 36.7 (09-24-20 @ 07:41)  HR: 76 (09-24-20 @ 07:41) (53 - 76)  BP: 101/68 (09-24-20 @ 07:41) (99/66 - 109/74)  RR: 20 (09-24-20 @ 07:41) (18 - 20)  SpO2: 97% (09-24-20 @ 07:41) (96% - 98%)  Wt(kg): --        09-23-20 @ 07:01  -  09-24-20 @ 07:00  --------------------------------------------------------  IN: 430 mL / OUT: 1250 mL / NET: -820 mL      Physical Exam:  	Gen: NAD  	HEENT: , supple neck,  	Pulm: CTA B/L  	CV: RRR, S1S2; no rub  	Back: No spinal or CVA tenderness; no sacral edema  	Abd: +BS, soft, nontender/nondistended  	: rodriguez catheter+  	UE: Warm,  no edema; no asterixis  	LE: Warm, no edema  	Neuro: No focal deficit  	Psych: Normal affect and mood  	Skin: Warm  	  LABS/STUDIES  --------------------------------------------------------------------------------              8.8    3.45  >-----------<  82       [09-23-20 @ 07:33]              30.2     144  |  106  |  17.0  ----------------------------<  111      [09-24-20 @ 07:27]  4.2   |  30.0  |  1.43        Ca     8.4     [09-24-20 @ 07:27]            Creatinine Trend:  SCr 1.43 [09-24 @ 07:27]  SCr 1.75 [09-23 @ 07:33]  SCr 2.13 [09-22 @ 07:43]  SCr 2.36 [09-21 @ 09:46]  SCr 2.93 [09-20 @ 09:29]    Urinalysis - [09-19-20 @ 15:16]      Color Yellow / Appearance Cloudy / SG 1.005 / pH 7.0      Gluc Negative / Ketone Small  / Bili Negative / Urobili Negative       Blood Moderate / Protein 30 / Leuk Est Moderate / Nitrite Negative      RBC 0-2 / WBC 26-50 / Hyaline  / Gran  / Sq Epi  / Non Sq Epi Occasional / Bacteria Few    Urine Sodium 88      [09-19-20 @ 15:16]  Urine Urea Nitrogen 94      [09-17-20 @ 15:41]  Urine Osmolality 270      [09-19-20 @ 15:16]    Ferritin 343      [04-15-20 @ 23:46]  TSH 1.80      [08-20-20 @ 21:02]

## 2020-09-25 NOTE — DISCHARGE NOTE PROVIDER - HOSPITAL COURSE
Patient is a 69 y/o M with a h/o developmental disability, CHF, HTN, DM, AF taken off apixaban due to anemia/hematuria, CKD due to COVID-19 infection, refused hemodialysis, BPH who was recently discharged from [facility] 9/3/20 s/p treated for ESBL UTI and septic shock brought back to ED from group home due to no urinary output. In ED pt is found to be in MARSHAL on CKD with hyperkalemia and U/A indicative of UTI. Pt admitted for MARSHAL on CKD, hyperkalemia and UTI. Patient admitted to medicine and seen by ID and renal in consult. Patient found to have complicated UTI but no urine culture was drawn in er thus he is being treated with iv abx until 9/26.     # complicated UTI   previous hx of ESBL and pseudomonas   on meropenem until 9/26/20 as per ID  unfortunately urine culture was never collected at time of admission   not able to do midline with antibiotics at group home ,     # MARSHAL on CKD with hyperkalemia   due to obstructive uropathy , hx of covid induced renal failure   hyperkalemia resolved   potassium normalized   creatinine stabilized   nephro following   as per sister they will not be willing to go for dialysis     # Hypernatremia   due to poor oral intake   resolved    # Acute encephalopathy   improved   continue with melatonin   no mittens no other restraints    avoid ativan     # Urinary retention / hydronephrosis / obstructive uropathy   rodriguez in place   on flomax, finasteride  US showed right sided hydro , seen by urology   continue with conservative management and repeat US done that shows some improvement     #Autonomic dysfunction   post covid   supportive care    # Developmental delay / cognitive impairment / Hx of cerebral Palsy   patient is around his baseline     # DLP  on statin     # Dysphagia - soft diet   glucerna     # Anemia   chronic   hemoglobin around baseline   will monitor     # Thrombocytopenia   chronic   will monitor     #hypomagnesemia - replete    DNR DNI 70 yr old male with h/o developmental disability, CHF, HTN, DM, AF taken off apixaban due to anemia/hematuria, CKD due to COVID-19 infection, refused hemodialysis, BPH who was recently discharged from [facility] 9/3/20 s/p treated for ESBL UTI and septic shock was sent from [facility] for concerns for UTI, decreased PO intake. Noted to have  MARSHAL on CKD with hyperkalemia and U/A indicative of UTI. Empiric Meropenem and bicarbonate gtt was initiated. ID and nephrology were consulted. Urology consulted for hydronephrosis, advised Rivera. He improved clinically. His renal function improved. Rivera was removed, no retention noted. He completed course of antibiotics and is stable for discharge to [facility].    Spent > 35 mins in discharge plan and documentation.

## 2020-09-25 NOTE — PROGRESS NOTE ADULT - SUBJECTIVE AND OBJECTIVE BOX
KIMBERLY MARKS    [id number]    70y      Male    INTERVAL HPI/OVERNIGHT EVENTS:    Patient is a 69 y/o M with a h/o developmental disability, CHF, HTN, DM, AF taken off apixaban due to anemia/hematuria, CKD due to COVID-19 infection, refused hemodialysis, BPH who was recently discharged from [facility] 9/3/20 s/p treated for ESBL UTI and septic shock brought back to ED from group home due to no urinary output. In ED pt is found ot be in MARSHAL on CKD with hyperkalemia and U/A indicative of UTI. Pt admitted for MARSHAL on CKD, hyperkalemia and UTI. Patient admitted to medicine and seen by ID and renal in consult. Patient found to have complicated UTI but no urine culture was drawn in er thus he is being treated with iv abx until 9/26.     patient seen at bedside and denies any complaints      REVIEW OF SYSTEMS:    CONSTITUTIONAL: No fever, weight loss, or fatigue  RESPIRATORY: No cough, wheezing, hemoptysis; No shortness of breath  CARDIOVASCULAR: No chest pain, palpitations  GASTROINTESTINAL: No abdominal or epigastric pain. No nausea, vomiting  NEUROLOGICAL: No headaches, memory loss, loss of strength.  MISCELLANEOUS:      Vital Signs Last 24 Hrs  T(C): 36.3 (25 Sep 2020 08:54), Max: 36.7 (24 Sep 2020 15:58)  T(F): 97.4 (25 Sep 2020 08:54), Max: 98 (24 Sep 2020 15:58)  HR: 51 (25 Sep 2020 08:54) (51 - 78)  BP: 107/64 (25 Sep 2020 08:54) (103/68 - 122/80)  BP(mean): --  RR: 18 (25 Sep 2020 08:54) (18 - 20)  SpO2: 96% (25 Sep 2020 08:54) (93% - 96%)    PHYSICAL EXAM:    GENERAL: NAD  HEAD:  Atraumatic, Normocephalic  NECK: Supple, No JVD, Normal thyroid  NERVOUS SYSTEM:  Alert, Good concentration; generalized weakness  CHEST/LUNG: Clear to auscultation bilaterally  HEART: Regular rate and rhythm; No murmurs, rubs, or gallops  ABDOMEN: Soft, Nontender, Nondistended; Bowel sounds present  EXTREMITIES:  2+ Peripheral Pulses, No clubbing, cyanosis, or edema        LABS:                        8.6    4.25  )-----------( 91       ( 25 Sep 2020 09:39 )             28.7     09-25    142  |  103  |  17.0  ----------------------------<  118<H>  3.9   |  29.0  |  1.52<H>    Ca    8.6      25 Sep 2020 09:39  Mg     1.2     09-25    TPro  5.6<L>  /  Alb  2.5<L>  /  TBili  0.4  /  DBili  x   /  AST  18  /  ALT  8   /  AlkPhos  97  09-25            MEDICATIONS  (STANDING):  allopurinol 300 milliGRAM(s) Oral daily  atorvastatin 20 milliGRAM(s) Oral at bedtime  calcium carbonate 1250 mG  + Vitamin D (OsCal 500 + D) 1 Tablet(s) Oral two times a day  chlorhexidine 2% Cloths 1 Application(s) Topical daily  ferrous    sulfate 325 milliGRAM(s) Oral daily  finasteride 5 milliGRAM(s) Oral daily  heparin   Injectable 5000 Unit(s) SubCutaneous every 12 hours  magnesium sulfate  IVPB 2 Gram(s) IV Intermittent once  meropenem  IVPB 500 milliGRAM(s) IV Intermittent every 12 hours  midodrine. 10 milliGRAM(s) Oral <User Schedule>  multivitamin 1 Tablet(s) Oral daily  mupirocin 2% Nasal 1 Application(s) Nasal two times a day  oxybutynin 10 milliGRAM(s) Oral daily  tamsulosin 0.4 milliGRAM(s) Oral at bedtime    MEDICATIONS  (PRN):  acetaminophen   Tablet .. 650 milliGRAM(s) Oral every 6 hours PRN Temp greater or equal to 38C (100.4F), Mild Pain (1 - 3), Moderate Pain (4 - 6)  ondansetron Injectable 4 milliGRAM(s) IV Push every 6 hours PRN Nausea and/or Vomiting      RADIOLOGY & ADDITIONAL TESTS:

## 2020-09-25 NOTE — PROGRESS NOTE ADULT - ASSESSMENT
1) Yvette on CKD  2) Complicated UTI  3) hypernatremia- improved  4) Rt sided hydronephrosis; s/p rodriguez; repeat renal US with mild improvement  5) Developmental delay / cognitive impairment / Hx of cerebral Palsy     SCr trending down, pt non oliguric  Encourage po hydration  Monitor Scr, lytes, UOP    dw Dr Gordon

## 2020-09-25 NOTE — DISCHARGE NOTE PROVIDER - CARE PROVIDER_API CALL
primary care,   [facility]  Phone: (   )    -  Fax: (   )    -  Follow Up Time:     Alexandra Van)  Medicine  Nephrology  [address] [place] [address]  Phone: [phone]  Fax: [phone]  Follow Up Time:

## 2020-09-25 NOTE — DISCHARGE NOTE PROVIDER - PROVIDER TOKENS
FREE:[LAST:[primary care],PHONE:[(   )    -],FAX:[(   )    -],ADDRESS:[[facility]]],PROVIDER:[TOKEN:[[id number]]]

## 2020-09-25 NOTE — PROGRESS NOTE ADULT - SUBJECTIVE AND OBJECTIVE BOX
[facility] DIVISION OF KIDNEY DISEASES AND HYPERTENSION -- FOLLOW UP NOTE  --------------------------------------------------------------------------------  Chief Complaint:  barbra on ckd    24 hour events/subjective:  no acute event  pt seen and examined  feels well      PAST HISTORY  --------------------------------------------------------------------------------  No significant changes to PMH, PSH, FHx, SHx, unless otherwise noted    ALLERGIES & MEDICATIONS  --------------------------------------------------------------------------------  Allergies    No Known Drug Allergies  provide glucerna TID , diet coke BID,  diet ice cream with dinner- RD ok (Unknown)    Intolerances      Standing Inpatient Medications  allopurinol 300 milliGRAM(s) Oral daily  atorvastatin 20 milliGRAM(s) Oral at bedtime  calcium carbonate 1250 mG  + Vitamin D (OsCal 500 + D) 1 Tablet(s) Oral two times a day  chlorhexidine 2% Cloths 1 Application(s) Topical daily  ferrous    sulfate 325 milliGRAM(s) Oral daily  finasteride 5 milliGRAM(s) Oral daily  heparin   Injectable 5000 Unit(s) SubCutaneous every 12 hours  magnesium sulfate  IVPB 2 Gram(s) IV Intermittent once  meropenem  IVPB 500 milliGRAM(s) IV Intermittent every 12 hours  midodrine. 10 milliGRAM(s) Oral <User Schedule>  multivitamin 1 Tablet(s) Oral daily  mupirocin 2% Nasal 1 Application(s) Nasal two times a day  oxybutynin 10 milliGRAM(s) Oral daily  tamsulosin 0.4 milliGRAM(s) Oral at bedtime    PRN Inpatient Medications  acetaminophen   Tablet .. 650 milliGRAM(s) Oral every 6 hours PRN  ondansetron Injectable 4 milliGRAM(s) IV Push every 6 hours PRN      REVIEW OF SYSTEMS  --------------------------------------------------------------------------------  Gen: No weight changes, fatigue, fevers/chills, weakness  Skin: No rashes  Head/Eyes/Ears/Mouth: No headache; Normal hearing; Normal vision w/o blurriness; No sinus pain/discomfort, sore throat  Respiratory: No dyspnea, cough, wheezing, hemoptysis  CV: No chest pain, PND, orthopnea  GI: No abdominal pain, diarrhea, constipation, nausea, vomiting, melena, hematochezia  : No increased frequency, dysuria, hematuria, nocturia  MSK: No joint pain/swelling; no back pain; no edema  Neuro: No dizziness/lightheadedness, weakness, seizures, numbness, tingling  Heme: No easy bruising or bleeding  Endo: No heat/cold intolerance  Psych: No significant nervousness, anxiety, stress, depression    All other systems were reviewed and are negative, except as noted.    VITALS/PHYSICAL EXAM  --------------------------------------------------------------------------------  T(C): 36.3 (09-25-20 @ 08:54), Max: 36.7 (09-24-20 @ 15:58)  HR: 51 (09-25-20 @ 08:54) (51 - 78)  BP: 107/64 (09-25-20 @ 08:54) (103/68 - 122/80)  RR: 18 (09-25-20 @ 08:54) (18 - 20)  SpO2: 96% (09-25-20 @ 08:54) (93% - 96%)  Wt(kg): --        Physical Exam:  	Gen: NAD  	HEENT: , supple neck,  	Pulm: CTA B/L  	CV: RRR, S1S2; no rub  	Back: No spinal or CVA tenderness; no sacral edema  	Abd: +BS, soft, nontender/nondistended  	: rodriguez catheter+  	UE: Warm,  no edema; no asterixis  	LE: Warm, no edema  	Neuro: No focal deficit  	Psych: Normal affect and mood  	Skin: Warm    LABS/STUDIES  --------------------------------------------------------------------------------              8.6    4.25  >-----------<  91       [09-25-20 @ 09:39]              28.7     142  |  103  |  17.0  ----------------------------<  118      [09-25-20 @ 09:39]  3.9   |  29.0  |  1.52        Ca     8.6     [09-25-20 @ 09:39]      Mg     1.2     [09-25-20 @ 09:39]    TPro  5.6  /  Alb  2.5  /  TBili  0.4  /  DBili  x   /  AST  18  /  ALT  8   /  AlkPhos  97  [09-25-20 @ 09:39]          Creatinine Trend:  SCr 1.52 [09-25 @ 09:39]  SCr 1.43 [09-24 @ 07:27]  SCr 1.75 [09-23 @ 07:33]  SCr 2.13 [09-22 @ 07:43]  SCr 2.36 [09-21 @ 09:46]    Urinalysis - [09-19-20 @ 15:16]      Color Yellow / Appearance Cloudy / SG 1.005 / pH 7.0      Gluc Negative / Ketone Small  / Bili Negative / Urobili Negative       Blood Moderate / Protein 30 / Leuk Est Moderate / Nitrite Negative      RBC 0-2 / WBC 26-50 / Hyaline  / Gran  / Sq Epi  / Non Sq Epi Occasional / Bacteria Few    Urine Sodium 88      [09-19-20 @ 15:16]  Urine Osmolality 270      [09-19-20 @ 15:16]    Ferritin 343      [04-15-20 @ 23:46]  TSH 1.80      [08-20-20 @ 21:02]

## 2020-09-25 NOTE — DISCHARGE NOTE PROVIDER - NSDCFUADDINST_GEN_ALL_CORE_FT
Continue medications as instructed, follow up with nephrology and PMD. Continue medications as instructed, follow up with nephrology and PMD. Resume all previous treatments. Continue medications as instructed, follow up with nephrology and PMD. Resume all previous treatments. Do not resume Metformin due to underlying renal disease.

## 2020-09-25 NOTE — CHART NOTE - NSCHARTNOTEFT_GEN_A_CORE
Source: Patient [ ]  Family [ ]   other [ x]    Current Diet: Diet, Soft:   Supplement Feeding Modality:  Oral  Glucerna Shake Cans or Servings Per Day:  3       Frequency:  Three Times a day (09-23-20 @ 09:00)    PO intake: Pt with varying po intake per documentation    Current Weight:   (9/22) 218.6 lbs  -Obtain daily wts, continue to monitor. 1+ dependent edema noted.     Pertinent Medications: MEDICATIONS  (STANDING):  allopurinol 300 milliGRAM(s) Oral daily  atorvastatin 20 milliGRAM(s) Oral at bedtime  calcium carbonate 1250 mG  + Vitamin D (OsCal 500 + D) 1 Tablet(s) Oral two times a day  chlorhexidine 2% Cloths 1 Application(s) Topical daily  ferrous    sulfate 325 milliGRAM(s) Oral daily  finasteride 5 milliGRAM(s) Oral daily  heparin   Injectable 5000 Unit(s) SubCutaneous every 12 hours  influenza   Vaccine 0.5 milliLiter(s) IntraMuscular once  meropenem  IVPB 500 milliGRAM(s) IV Intermittent every 12 hours  midodrine. 10 milliGRAM(s) Oral <User Schedule>  multivitamin 1 Tablet(s) Oral daily  mupirocin 2% Nasal 1 Application(s) Nasal two times a day  oxybutynin 10 milliGRAM(s) Oral daily  tamsulosin 0.4 milliGRAM(s) Oral at bedtime    MEDICATIONS  (PRN):  acetaminophen   Tablet .. 650 milliGRAM(s) Oral every 6 hours PRN Temp greater or equal to 38C (100.4F), Mild Pain (1 - 3), Moderate Pain (4 - 6)  ondansetron Injectable 4 milliGRAM(s) IV Push every 6 hours PRN Nausea and/or Vomiting    Pertinent Labs: CBC Full  -  ( 25 Sep 2020 09:39 )  WBC Count : 4.25 K/uL  RBC Count : 3.20 M/uL  Hemoglobin : 8.6 g/dL  Hematocrit : 28.7 %  Platelet Count - Automated : 91 K/uL  Mean Cell Volume : 89.7 fl  Mean Cell Hemoglobin : 26.9 pg  Mean Cell Hemoglobin Concentration : 30.0 gm/dL  Auto Neutrophil # : x  Auto Lymphocyte # : x  Auto Monocyte # : x  Auto Eosinophil # : x  Auto Basophil # : x  Auto Neutrophil % : x  Auto Lymphocyte % : x  Auto Monocyte % : x  Auto Eosinophil % : x  Auto Basophil % : x  09-25 Na142 mmol/L Glu 118 mg/dL<H> K+ 3.9 mmol/L Cr  1.52 mg/dL<H> BUN 17.0 mg/dL Phos n/a   Alb 2.5 g/dL<L> PAB n/a       Skin: IAD, b/l toe ulcers    Nutrition focused physical exam conducted - found signs of malnutrition [ ]absent [ ]present    Subcutaneous fat loss: [ x] Orbital fat pads region, [x ]Buccal fat region, [ ]Triceps region,  [ ]Ribs region    Muscle wasting: [ ]Temples region, [x ]Clavicle region, [ ]Shoulder region, [ ]Scapula region, [ ]Interosseous region,  [ ]thigh region, [ ]Calf region    Estimated Needs:   [ x] no change since previous assessment  [ ] recalculated:     Current Nutrition Diagnosis: Pt remains at high nutrition risk and meets criteria for severe chronic malnutrition related to inadequate protein calorie intake in the setting of multiple hospitalizations and complications as evidenced by 18.8% weight loss ,physical findings,  and impaired skin. Pt with varying po intake per documentation: 25-75%. Last documented BM 9/19. Plan to d/c to group home.     Recommendations:   1) Continue glucerna TID to optimize po intake and provide an additional 220kcal, 10g protein per serving  2) Continue MVI daily  3) Encourage po intake and HBV protein  4) Monitor wts and labs     Monitoring and Evaluation:   [x ] PO intake [x ] Tolerance to diet prescription [X] Weights  [X] Follow up per protocol [X] Labs:

## 2020-09-25 NOTE — DISCHARGE NOTE PROVIDER - NSDCMRMEDTOKEN_GEN_ALL_CORE_FT
allopurinol 300 mg oral tablet: 1 tab(s) orally once a day  atorvastatin 20 mg oral tablet: 1 tab(s) orally once a day (at bedtime)  calcium-vitamin D 500 mg-200 intl units (5 mcg) oral tablet: 1 tab(s) orally 2 times a day  dutasteride 0.5 mg oral capsule: 1 cap(s) orally once a day  ferrous sulfate 325 mg (65 mg elemental iron) oral tablet: 1 tab(s) orally once a day  fluticasone 50 mcg/inh nasal spray: 1 spray(s) nasal 2 times a day  Januvia 100 mg oral tablet: 1 tab(s) orally once a day  midodrine 10 mg oral tablet: 1 tab(s) orally 2 times a day at 0800 and 2000  Multiple Vitamins oral tablet: 1 tab(s) orally once a day  Myrbetriq 50 mg oral tablet, extended release: 1 tab(s) orally once a day  oxybutynin 10 mg/24 hr oral tablet, extended release: 1 tab(s) orally once a day  Saline Mist 0.65% nasal spray: 2 spray(s) nasal 2 times a day  tamsulosin 0.4 mg oral capsule: 1 cap(s) orally once a day (at bedtime)  Vitamin C 250 mg oral tablet: 1 tab(s) orally once a day  zinc sulfate 220 mg oral capsule: 1 cap(s) orally once a day   allopurinol 300 mg oral tablet: 1 tab(s) orally once a day  atorvastatin 20 mg oral tablet: 1 tab(s) orally once a day (at bedtime)  Calcium 600+D oral tablet: 1 tab(s) orally 2 times a day  dutasteride 0.5 mg oral capsule: 1 cap(s) orally once a day  ferrous sulfate 325 mg (65 mg elemental iron) oral tablet: 1 tab(s) orally once a day  Flonase 50 mcg/inh nasal spray: 1 spray(s) nasal once a day  Januvia 100 mg oral tablet: 1 tab(s) orally once a day  midodrine 10 mg oral tablet: 1 tab(s) orally 2 times a day at 0800 and 2000  Multiple Vitamins oral tablet: 1 tab(s) orally once a day  Myrbetriq 50 mg oral tablet, extended release: 1 tab(s) orally once a day  oxybutynin 10 mg/24 hr oral tablet, extended release: 1 tab(s) orally once a day  Saline Mist 0.65% nasal spray: 2 spray(s) nasal 2 times a day  tamsulosin 0.4 mg oral capsule: 1 cap(s) orally once a day (at bedtime)  Vitamin C 250 mg oral tablet: 1 tab(s) orally once a day  zinc sulfate 220 mg oral capsule: 1 cap(s) orally once a day

## 2020-09-25 NOTE — PROGRESS NOTE ADULT - ASSESSMENT
# complicated UTI   previous hx of ESBL and pseudomonas   on meropenem until 9/26/20 as per ID  unfortunately urine culture was never collected at time of admission   now will hold off , ID docs are aware   not able to do midline with antibiotics at group home , patient will stay in hospital until tomorrow     # MARSHAL on CKD with hyperkalemia   due to obstructive uropathy , hx of covid induced renal failure   hyperkalemia resolved   potassium normalized   creatinine stabilized   nephro following   as per sister they will not be willing to go for dialysis     # Hypernatremia   due to poor oral intake   resolved    # Acute encephalopathy   improved   continue with melatonin   no mittens no other restraints    avoid ativan     # Urinary retention / hydronephrosis / obstructive uropathy   rodriguez in place   on flomax and oxybutynin   US showed right sided hydro , seen by urology   continue with conservative management and repeat US done that shows some improvement     #Autonomic dysfunction   post covid   patient has had hypothermia and hypotension on and off    # Developmental delay / cognitive impairment / Hx of cerebral Palsy   patient is around his baseline     # DLP  on statin     # Dysphagia - soft diet   glucerna     # Anemia   chronic   hemoglobin around baseline   will monitor     # Thrombocytopenia   chronic   will monitor     # DVT prophylaxis  heparin changed to q12h    #hypomagnesemia - replete    DNR DNI  dc tomorrow to group home # complicated UTI   previous hx of ESBL and pseudomonas   on meropenem until 9/26/20 as per ID  unfortunately urine culture was never collected at time of admission   now will hold off , ID docs are aware   not able to do midline with antibiotics at group home , patient will stay in hospital until tomorrow     # MARSHAL on CKD with hyperkalemia   due to obstructive uropathy , hx of covid induced renal failure   hyperkalemia resolved   potassium normalized   creatinine stabilized   nephro following   as per sister they will not be willing to go for dialysis     # Hypernatremia   due to poor oral intake   resolved    # Acute encephalopathy   improved   continue with melatonin   no mittens no other restraints    avoid ativan     # Urinary retention / hydronephrosis / obstructive uropathy   rodriguez in place   on flomax, finasteride  US showed right sided hydro , seen by urology   continue with conservative management and repeat US done that shows some improvement     #Autonomic dysfunction   post covid   patient has had hypothermia and hypotension on and off    # Developmental delay / cognitive impairment / Hx of cerebral Palsy   patient is around his baseline     # DLP  on statin     # Dysphagia - soft diet   glucerna     # Anemia   chronic   hemoglobin around baseline   will monitor     # Thrombocytopenia   chronic   will monitor     # DVT prophylaxis  heparin changed to q12h    #hypomagnesemia - replete    DNR DNI  dc tomorrow to group home

## 2020-09-25 NOTE — DISCHARGE NOTE PROVIDER - NSDCCPCAREPLAN_GEN_ALL_CORE_FT
PRINCIPAL DISCHARGE DIAGNOSIS  Diagnosis: Renal failure  Assessment and Plan of Treatment:       SECONDARY DISCHARGE DIAGNOSES  Diagnosis: UTI (urinary tract infection)  Assessment and Plan of Treatment:     Diagnosis: Hyperkalemia  Assessment and Plan of Treatment:

## 2020-09-26 NOTE — PROGRESS NOTE ADULT - SUBJECTIVE AND OBJECTIVE BOX
KIMBERLY MARKS    [id number]    70y      Male    INTERVAL HPI/OVERNIGHT EVENTS:     Patient is a 69 y/o M with a h/o developmental disability, CHF, HTN, DM, AF taken off apixaban due to anemia/hematuria, CKD due to COVID-19 infection, refused hemodialysis, BPH who was recently discharged from [facility] 9/3/20 s/p treated for ESBL UTI and septic shock brought back to ED from group home due to no urinary output. In ED pt is found ot be in MARSHAL on CKD with hyperkalemia and U/A indicative of UTI. Pt admitted for MARSHAL on CKD, hyperkalemia and UTI. Patient admitted to medicine and seen by ID and renal in consult. Patient found to have complicated UTI but no urine culture was drawn in er thus he is being treated with iv abx until 9/28 .     patient seen at bedside and denies any complaints patient is on idris hugg      REVIEW OF SYSTEMS:    CONSTITUTIONAL: No fever, weight loss, or fatigue  RESPIRATORY: No cough, wheezing, hemoptysis; No shortness of breath  CARDIOVASCULAR: No chest pain, palpitations  GASTROINTESTINAL: No abdominal or epigastric pain. No nausea, vomiting  NEUROLOGICAL: No headaches, memory loss, loss of strength.  MISCELLANEOUS:      Vital Signs Last 24 Hrs  T(C): 36.4 (26 Sep 2020 15:23), Max: 36.5 (25 Sep 2020 16:26)  T(F): 97.5 (26 Sep 2020 15:23), Max: 97.7 (25 Sep 2020 16:26)  HR: 63 (26 Sep 2020 15:23) (51 - 71)  BP: 96/54 (26 Sep 2020 15:23) (90/58 - 110/71)  BP(mean): --  RR: 18 (26 Sep 2020 15:23) (18 - 20)  SpO2: 98% (26 Sep 2020 15:23) (90% - 98%)    PHYSICAL EXAM:  GENERAL: NAD  HEAD:  Atraumatic, Normocephalic  NECK: Supple, No JVD, Normal thyroid  NERVOUS SYSTEM:  Alert, Good concentration; generalized weakness  CHEST/LUNG: Clear to auscultation bilaterally  HEART: Regular rate and rhythm; No murmurs, rubs, or gallops  ABDOMEN: Soft, Nontender, Nondistended; Bowel sounds present  EXTREMITIES:  2+ Peripheral Pulses, rodriguez     LABS:                        8.7    5.35  )-----------( 101      ( 26 Sep 2020 08:23 )             29.0     09-26    140  |  103  |  21.0<H>  ----------------------------<  130<H>  4.3   |  26.0  |  1.47<H>    Ca    8.8      26 Sep 2020 08:23  Mg     1.5     09-26    TPro  6.0<L>  /  Alb  2.7<L>  /  TBili  0.4  /  DBili  x   /  AST  21  /  ALT  9   /  AlkPhos  102  09-26            MEDICATIONS  (STANDING):  allopurinol 300 milliGRAM(s) Oral daily  atorvastatin 20 milliGRAM(s) Oral at bedtime  calcium carbonate 1250 mG  + Vitamin D (OsCal 500 + D) 1 Tablet(s) Oral two times a day  chlorhexidine 2% Cloths 1 Application(s) Topical daily  ferrous    sulfate 325 milliGRAM(s) Oral daily  finasteride 5 milliGRAM(s) Oral daily  heparin   Injectable 5000 Unit(s) SubCutaneous every 12 hours  meropenem  IVPB 500 milliGRAM(s) IV Intermittent every 12 hours  midodrine. 10 milliGRAM(s) Oral <User Schedule>  multivitamin 1 Tablet(s) Oral daily  mupirocin 2% Nasal 1 Application(s) Nasal two times a day  oxybutynin 10 milliGRAM(s) Oral daily  tamsulosin 0.4 milliGRAM(s) Oral at bedtime    MEDICATIONS  (PRN):  acetaminophen   Tablet .. 650 milliGRAM(s) Oral every 6 hours PRN Temp greater or equal to 38C (100.4F), Mild Pain (1 - 3), Moderate Pain (4 - 6)  ondansetron Injectable 4 milliGRAM(s) IV Push every 6 hours PRN Nausea and/or Vomiting      RADIOLOGY & ADDITIONAL TESTS:

## 2020-09-26 NOTE — PROGRESS NOTE ADULT - ASSESSMENT
# complicated UTI   previous hx of ESBL and pseudomonas   on meropenem until 9/28/20 as per ID  unfortunately urine culture was never collected at time of admission   not able to do midline with antibiotics at group home , patient will stay in hospital until completion of abx     # MARSHAL on CKD with hyperkalemia   due to obstructive uropathy , hx of covid induced renal failure   hyperkalemia resolved   potassium normalized   creatinine stabilized   nephro following   as per sister they will not be willing to go for dialysis     # Hypernatremia   due to poor oral intake   resolved    # Acute encephalopathy   improved   continue with melatonin   no mittens no other restraints    avoid ativan     # Urinary retention / hydronephrosis / obstructive uropathy   rodriguez in place   on flomax, finasteride  US showed right sided hydro , seen by urology   continue with conservative management and repeat US done that shows some improvement   , sister wants a tov prior to being discharged, dc rodriguez today and tov     #Autonomic dysfunction   post covid   patient has had hypothermia and hypotension on and off    # Developmental delay / cognitive impairment / Hx of cerebral Palsy   patient is around his baseline     # DLP  on statin     # Dysphagia - soft diet   glucerna     # Anemia   chronic   hemoglobin around baseline   for some reason the nurse at group home states he has not history of chronic anemia, our records in [place] show that he has hemoglobin ranging form 8-9 as far back as april     # Thrombocytopenia   chronic   will monitor     # DVT prophylaxis  heparin changed to q12h    #hypomagnesemia - replete    DNR DNI  covid check  dc next week   tov

## 2020-09-26 NOTE — PROGRESS NOTE ADULT - ASSESSMENT
1) Yvette on CKD  2) Complicated UTI  3) hypernatremia- improved  4) Rt sided hydronephrosis; s/p rodriguez; repeat renal US with mild improvement  5) Developmental delay / cognitive impairment / Hx of cerebral Palsy     SCr trending down, pt non oliguric  Encourage po hydration  Monitor Scr, lytes, UOP  Signing off  Please recall if needed    dw Dr Gordon

## 2020-09-26 NOTE — PROGRESS NOTE ADULT - SUBJECTIVE AND OBJECTIVE BOX
[facility] DIVISION OF KIDNEY DISEASES AND HYPERTENSION -- FOLLOW UP NOTE  --------------------------------------------------------------------------------  Chief Complaint:  Elevated SCr    24 hour events/subjective:  Pt seen/examined this AM  SCr stable;      PAST HISTORY  --------------------------------------------------------------------------------  No significant changes to PMH, PSH, FHx, SHx, unless otherwise noted    ALLERGIES & MEDICATIONS  --------------------------------------------------------------------------------  Allergies    No Known Drug Allergies  provide glucerna TID , diet coke BID,  diet ice cream with dinner- RD ok (Unknown)    Intolerances      Standing Inpatient Medications  allopurinol 300 milliGRAM(s) Oral daily  atorvastatin 20 milliGRAM(s) Oral at bedtime  calcium carbonate 1250 mG  + Vitamin D (OsCal 500 + D) 1 Tablet(s) Oral two times a day  chlorhexidine 2% Cloths 1 Application(s) Topical daily  ferrous    sulfate 325 milliGRAM(s) Oral daily  finasteride 5 milliGRAM(s) Oral daily  heparin   Injectable 5000 Unit(s) SubCutaneous every 12 hours  meropenem  IVPB 500 milliGRAM(s) IV Intermittent every 12 hours  midodrine. 10 milliGRAM(s) Oral <User Schedule>  multivitamin 1 Tablet(s) Oral daily  mupirocin 2% Nasal 1 Application(s) Nasal two times a day  oxybutynin 10 milliGRAM(s) Oral daily  tamsulosin 0.4 milliGRAM(s) Oral at bedtime    PRN Inpatient Medications  acetaminophen   Tablet .. 650 milliGRAM(s) Oral every 6 hours PRN  ondansetron Injectable 4 milliGRAM(s) IV Push every 6 hours PRN      REVIEW OF SYSTEMS  --------------------------------------------------------------------------------  Gen: No weight changes, fatigue, fevers/chills, weakness  Skin: No rashes  Head/Eyes/Ears/Mouth: No headache; Normal hearing; Normal vision w/o blurriness; No sinus pain/discomfort, sore throat  Respiratory: No dyspnea, cough, wheezing, hemoptysis  CV: No chest pain, PND, orthopnea  GI: No abdominal pain, diarrhea, constipation, nausea, vomiting, melena, hematochezia  : No increased frequency, dysuria, hematuria, nocturia  MSK: No joint pain/swelling; no back pain; no edema  Neuro: No dizziness/lightheadedness, weakness, seizures, numbness, tingling  Heme: No easy bruising or bleeding  Endo: No heat/cold intolerance  Psych: No significant nervousness, anxiety, stress, depression    All other systems were reviewed and are negative, except as noted.    VITALS/PHYSICAL EXAM  --------------------------------------------------------------------------------  T(C): 36.4 (09-26-20 @ 08:03), Max: 36.5 (09-25-20 @ 16:26)  HR: 65 (09-26-20 @ 08:03) (51 - 71)  BP: 90/58 (09-26-20 @ 08:03) (90/58 - 110/71)  RR: 20 (09-26-20 @ 08:03) (18 - 20)  SpO2: 90% (09-26-20 @ 08:03) (90% - 98%)  Wt(kg): --        09-25-20 @ 07:01  -  09-26-20 @ 07:00  --------------------------------------------------------  IN: 0 mL / OUT: 900 mL / NET: -900 mL      Physical Exam:  	Gen: NAD  	HEENT: , supple neck,  	Pulm: CTA B/L  	CV: RRR, S1S2; no rub  	Back: No spinal or CVA tenderness; no sacral edema  	Abd: +BS, soft, nontender/nondistended  	: rodriguez catheter+  	UE: Warm,  no edema; no asterixis  	LE: Warm, no edema  	Neuro: No focal deficit  	Psych: Normal affect and mood  	Skin: Warm  LABS/STUDIES  --------------------------------------------------------------------------------              8.7    5.35  >-----------<  101      [09-26-20 @ 08:23]              29.0     140  |  103  |  21.0  ----------------------------<  130      [09-26-20 @ 08:23]  4.3   |  26.0  |  1.47        Ca     8.8     [09-26-20 @ 08:23]      Mg     1.5     [09-26-20 @ 08:23]    TPro  6.0  /  Alb  2.7  /  TBili  0.4  /  DBili  x   /  AST  21  /  ALT  9   /  AlkPhos  102  [09-26-20 @ 08:23]          Creatinine Trend:  SCr 1.47 [09-26 @ 08:23]  SCr 1.52 [09-25 @ 09:39]  SCr 1.43 [09-24 @ 07:27]  SCr 1.75 [09-23 @ 07:33]  SCr 2.13 [09-22 @ 07:43]    Urinalysis - [09-19-20 @ 15:16]      Color Yellow / Appearance Cloudy / SG 1.005 / pH 7.0      Gluc Negative / Ketone Small  / Bili Negative / Urobili Negative       Blood Moderate / Protein 30 / Leuk Est Moderate / Nitrite Negative      RBC 0-2 / WBC 26-50 / Hyaline  / Gran  / Sq Epi  / Non Sq Epi Occasional / Bacteria Few    Urine Sodium 88      [09-19-20 @ 15:16]  Urine Osmolality 270      [09-19-20 @ 15:16]    Ferritin 343      [04-15-20 @ 23:46]  TSH 1.80      [08-20-20 @ 21:02]

## 2020-09-27 NOTE — PROGRESS NOTE ADULT - SUBJECTIVE AND OBJECTIVE BOX
INTERVAL HPI/OVERNIGHT EVENTS:    CC:    Chart and course reviewed. Denies complaints.    Vital Signs Last 24 Hrs  T(C): 36.8 (27 Sep 2020 07:50), Max: 36.9 (27 Sep 2020 06:16)  T(F): 98.2 (27 Sep 2020 07:50), Max: 98.4 (27 Sep 2020 06:16)  HR: 69 (27 Sep 2020 07:50) (62 - 69)  BP: 96/64 (27 Sep 2020 09:35) (96/54 - 112/68)  BP(mean): --  RR: 18 (27 Sep 2020 07:50) (18 - 18)  SpO2: 96% (27 Sep 2020 07:50) (94% - 98%)    PHYSICAL EXAM:    GENERAL: alert, not in distress  CHEST/LUNG: b/l air entry  HEART: regular  ABDOMEN: soft, bs+  EXTREMITIES:  no edema, tenderness    MEDICATIONS  (STANDING):  allopurinol 300 milliGRAM(s) Oral daily  atorvastatin 20 milliGRAM(s) Oral at bedtime  calcium carbonate 1250 mG  + Vitamin D (OsCal 500 + D) 1 Tablet(s) Oral two times a day  chlorhexidine 2% Cloths 1 Application(s) Topical daily  ferrous    sulfate 325 milliGRAM(s) Oral daily  finasteride 5 milliGRAM(s) Oral daily  heparin   Injectable 5000 Unit(s) SubCutaneous every 12 hours  meropenem  IVPB 500 milliGRAM(s) IV Intermittent every 12 hours  midodrine. 10 milliGRAM(s) Oral <User Schedule>  multivitamin 1 Tablet(s) Oral daily  oxybutynin 10 milliGRAM(s) Oral daily  tamsulosin 0.4 milliGRAM(s) Oral at bedtime    MEDICATIONS  (PRN):  acetaminophen   Tablet .. 650 milliGRAM(s) Oral every 6 hours PRN Temp greater or equal to 38C (100.4F), Mild Pain (1 - 3), Moderate Pain (4 - 6)  ondansetron Injectable 4 milliGRAM(s) IV Push every 6 hours PRN Nausea and/or Vomiting      Allergies    No Known Drug Allergies  provide glucerna TID , diet coke BID,  diet ice cream with dinner- RD ok (Unknown)    Intolerances          LABS:                          8.7    5.35  )-----------( 101      ( 26 Sep 2020 08:23 )             29.0     09-26    140  |  103  |  21.0<H>  ----------------------------<  130<H>  4.3   |  26.0  |  1.47<H>    Ca    8.8      26 Sep 2020 08:23  Mg     1.5     09-26    TPro  6.0<L>  /  Alb  2.7<L>  /  TBili  0.4  /  DBili  x   /  AST  21  /  ALT  9   /  AlkPhos  102  09-26          RADIOLOGY & ADDITIONAL TESTS:

## 2020-09-27 NOTE — PROGRESS NOTE ADULT - ASSESSMENT
70 yr old male with h/o developmental disability, CHF, HTN, DM, AF taken off apixaban due to anemia/hematuria, CKD due to COVID-19 infection, refused hemodialysis, BPH who was recently discharged from [facility] 9/3/20 s/p treated for ESBL UTI and septic shock was sent from [facility] for concerns for UTI, decreased PO intake. Noted to have  MARSHAL on CKD with hyperkalemia and U/A indicative of UTI. Empiric Meropenem and bicarbonate gtt was initiated. ID and nephrology were consulted. Urology consulted for hydronephrosis, advised Rivera. He improved clinically. PT advised LURDES.

## 2020-09-28 NOTE — PROGRESS NOTE ADULT - NUTRITIONAL ASSESSMENT
This patient has been assessed with a concern for Malnutrition and has been determined to have a diagnosis/diagnoses of Severe protein-calorie malnutrition.    This patient is being managed with:   Diet Soft-  Supplement Feeding Modality:  Oral  Glucerna Shake Cans or Servings Per Day:  3       Frequency:  Three Times a day  Entered: Sep 23 2020  9:01AM    

## 2020-09-28 NOTE — PROGRESS NOTE ADULT - ASSESSMENT
70 yr old male with h/o developmental disability, CHF, HTN, DM, AF taken off apixaban due to anemia/hematuria, CKD due to COVID-19 infection, refused hemodialysis, BPH who was recently discharged from [facility] 9/3/20 s/p treated for ESBL UTI and septic shock was sent from [facility] for concerns for UTI, decreased PO intake. Noted to have  MARSHAL on CKD with hyperkalemia and U/A indicative of UTI. Empiric Meropenem and bicarbonate gtt was initiated. ID and nephrology were consulted. Urology consulted for hydronephrosis, advised Rivera. He improved clinically. Rivera was removed, no retention noted. He completed course of antibiotics and is stable for discharge to [facility].

## 2020-09-28 NOTE — PROGRESS NOTE ADULT - PROVIDER SPECIALTY LIST ADULT
Hospitalist
Infectious Disease
Internal Medicine
Internal Medicine
Nephrology
Urology
Urology
Nephrology
Hospitalist
Hospitalist

## 2020-09-28 NOTE — DISCHARGE NOTE NURSING/CASE MANAGEMENT/SOCIAL WORK - PATIENT PORTAL LINK FT
You can access the FollowMyHealth Patient Portal offered by [facility] by registering at the following website: [email or web address]. By joining [employer]’s FollowMyHealth portal, you will also be able to view your health information using other applications (apps) compatible with our system.

## 2020-09-28 NOTE — PROGRESS NOTE ADULT - REASON FOR ADMISSION
MARSHAL and UTI

## 2020-09-28 NOTE — PROGRESS NOTE ADULT - SUBJECTIVE AND OBJECTIVE BOX
INTERVAL HPI/OVERNIGHT EVENTS:    CC: MARSHAL resolved, s/p UTI, autonomic dysfunction, hyperlipidemia      No overnight events, denies complaints.    Vital Signs Last 24 Hrs  T(C): 36.4 (28 Sep 2020 07:14), Max: 36.7 (27 Sep 2020 15:53)  T(F): 97.5 (28 Sep 2020 07:14), Max: 98 (27 Sep 2020 15:53)  HR: 65 (28 Sep 2020 07:14) (65 - 71)  BP: 96/62 (28 Sep 2020 07:14) (96/62 - 115/74)  BP(mean): --  RR: 20 (28 Sep 2020 07:14) (18 - 20)  SpO2: 99% (28 Sep 2020 07:14) (96% - 99%)    PHYSICAL EXAM:    GENERAL: alert, not in distress  CHEST/LUNG: b/l air entry  HEART: regular  ABDOMEN: soft, bs+  EXTREMITIES:  no edema, tenderness    MEDICATIONS  (STANDING):  allopurinol 300 milliGRAM(s) Oral daily  atorvastatin 20 milliGRAM(s) Oral at bedtime  calcium carbonate 1250 mG  + Vitamin D (OsCal 500 + D) 1 Tablet(s) Oral two times a day  chlorhexidine 2% Cloths 1 Application(s) Topical daily  ferrous    sulfate 325 milliGRAM(s) Oral daily  finasteride 5 milliGRAM(s) Oral daily  heparin   Injectable 5000 Unit(s) SubCutaneous every 12 hours  midodrine. 10 milliGRAM(s) Oral <User Schedule>  multivitamin 1 Tablet(s) Oral daily  oxybutynin 10 milliGRAM(s) Oral daily  tamsulosin 0.4 milliGRAM(s) Oral at bedtime    MEDICATIONS  (PRN):  acetaminophen   Tablet .. 650 milliGRAM(s) Oral every 6 hours PRN Temp greater or equal to 38C (100.4F), Mild Pain (1 - 3), Moderate Pain (4 - 6)  ondansetron Injectable 4 milliGRAM(s) IV Push every 6 hours PRN Nausea and/or Vomiting      Allergies    No Known Drug Allergies  provide glucerna TID , diet coke BID,  diet ice cream with dinner- RD ok (Unknown)    Intolerances          LABS:                  RADIOLOGY & ADDITIONAL TESTS:

## 2020-09-28 NOTE — PROGRESS NOTE ADULT - ATTENDING COMMENTS
seen and examined   agree with above   patient to remain in hospital for IV antibiotics , as per case management team , group home is not able to provide IV therapy   called sister to update but not answered , voicemail left
Discussed with patient and RN.
Stable for discharge. Discussed with social work and RN.

## 2020-09-28 NOTE — PROGRESS NOTE ADULT - PROBLEM SELECTOR PLAN 1
Rivera removed, no retention noted.  Completed Meropenem
Monitor bladder scan to assess for retention.  Completes course of Meropenem 9/28

## 2020-09-29 PROBLEM — I87.2 CHRONIC VENOUS INSUFFICIENCY: Status: ACTIVE | Noted: 2017-01-06

## 2020-09-29 PROBLEM — R53.81 PHYSICAL DECONDITIONING: Status: ACTIVE | Noted: 2020-01-01

## 2020-09-29 PROBLEM — R79.89 ELEVATED SERUM CREATININE: Status: ACTIVE | Noted: 2020-01-01

## 2020-09-29 PROBLEM — Z09 HOSPITAL DISCHARGE FOLLOW-UP: Status: ACTIVE | Noted: 2020-01-01

## 2020-09-29 PROBLEM — S31.809A GLUTEAL CLEFT WOUND: Status: ACTIVE | Noted: 2020-01-01

## 2020-09-29 PROBLEM — S91.109A WOUND, OPEN, TOE: Status: ACTIVE | Noted: 2020-01-01

## 2020-09-29 NOTE — ASSESSMENT
[FreeTextEntry1] : kidney failure - must see nephrologist\par reviewed labs - creatinine 3.10\par \par right foot wound\par wound care\par \par pressure changes on right sacrum\par wound care, suggest positional changes\par \par deconditioning\par suggest home PT evaluation\par \par s/p hospital admission\par  discharged 9/28/2020 for sepsis/uti\par extremely deconditioned/weak\par large change from previous\par was given hospital discharge packet that was reviewed\par medications were changed, reviewed and updated in the chart\par \par spent 30 minutes reviewing hospital records/meds/course\par \par current status\par patient showing large change from previous both physically and mentally\par patient now wheelchair dependent, jesús lift for transfers and needs two person assistance for transfers/toileting/bathing\par needs assistance for all adl except feeding\par also large change in responsiveness, much less verbal, will respond to questions directly asked\par \par discussed hcp\par molst/dnr/dni \par \par cholesterol.\par The diagnosis of high cholesterol is established and patient is currently taking medications. The patient was counseled on a low cholesterol diet and on medication compliance. Patient was advised to generally limit foods fried in oil, high in saturated fat, cheese, eggs and red meat. Patient was advised to continue on current medications and to followup in office for necessary blood work in three months.\par \par heart failure/afib\par sees cardiologist\par no longer on eliquis\par \par diabetes\par The diagnosis of diabetes is established with this patient.  Blood work was drawn in office and results will be reviewed and followed. The patient was counseled on using a low sugar and carbohydrate diet.  Patient was advised to eat small meals and exercise regularly. Patient as advised to take all medications as prescribed and to follow up with yearly podiatry and opthalmology visits. Patient was advised to call office  or go to the ER immediately if experiences any nausea, lightheadedness or for any other issues.\par was taken of glimepiride, will check bw\par \par gout\par was placed back on allopurinol, will reckeck

## 2020-09-29 NOTE — REVIEW OF SYSTEMS
[Fatigue] : fatigue [Dizziness] : dizziness [Unsteady Walk] : ataxia [Insomnia] : insomnia [Negative] : Gastrointestinal [FreeTextEntry9] : balance issues and strength issues - severe, can stand with assistance

## 2020-09-29 NOTE — HEALTH RISK ASSESSMENT
[] : No [No] : No [No falls in past year] : Patient reported no falls in the past year [0] : 2) Feeling down, depressed, or hopeless: Not at all (0) [UPQ1Hulca] : 0 [Fair] : ~his/her~ current health as fair  [Good] : ~his/her~  mood as  good [No Retinopathy] : No retinopathy [EyeExamDate] : 2019 [On disability] : on disability [Single] : single [# Of Children ___] : has [unfilled] children [Independent] : feeding [Some assistance needed] : using telephone [Full assistance needed] : managing finances [de-identified] : in group home [With Patient/Caregiver] : With Patient/Caregiver [DNR] : DNR [DNI] : DNI [Last Verification Date: ___] : Last Verification Date: [unfilled] [AdvancecareDate] : 09/15/2020 [FreeTextEntry4] : sister - hcp - \par reviewed molst\par reviewed with aids and patient, went through all forms

## 2020-09-29 NOTE — PHYSICAL EXAM
[Well Nourished] : well nourished [Well Developed] : well developed [Regular Rhythm] : with a regular rhythm [de-identified] : poor conditioning, cannot stand, needs two hand assist to stand ambulate or transfer independently, two dependent transfer [de-identified] : right foot, first toe, wound [de-identified] : w/c dependent [de-identified] : down, fatigued, answers questions when spoken too

## 2020-09-29 NOTE — HISTORY OF PRESENT ILLNESS
[FreeTextEntry8] : 70 year old male is here for a followup visit. Patient is here for medication renewals and for blood work discussion. Medications and allergies were reviewed and assessed.  There has been no new medications since the last visit. Patient is feeling well with no active changes or issues since His last visit.\par

## 2020-10-12 NOTE — ED PROVIDER NOTE - CLINICAL SUMMARY MEDICAL DECISION MAKING FREE TEXT BOX
male presenting from group home for low h/h asymptomatic found to be tachycardic nontoxic appearing will check labs guaiac and re-eval

## 2020-10-12 NOTE — ED PROVIDER NOTE - CONSTITUTIONAL, MLM
normal... Well appearing, obese male  awake, alert, oriented to person, place, time/situation and in no apparent distress.

## 2020-10-12 NOTE — ED ADULT NURSE NOTE - NSIMPLEMENTINTERV_GEN_ALL_ED
Implemented All Fall with Harm Risk Interventions:  [place] to call system. Call bell, personal items and telephone within reach. Instruct patient to call for assistance. Room bathroom lighting operational. Non-slip footwear when patient is off stretcher. Physically safe environment: no spills, clutter or unnecessary equipment. Stretcher in lowest position, wheels locked, appropriate side rails in place. Provide visual cue, wrist band, yellow gown, etc. Monitor gait and stability. Monitor for mental status changes and reorient to person, place, and time. Review medications for side effects contributing to fall risk. Reinforce activity limits and safety measures with patient and family. Provide visual clues: red socks.

## 2020-10-12 NOTE — ED PROVIDER NOTE - PATIENT PORTAL LINK FT
You can access the FollowMyHealth Patient Portal offered by [facility] by registering at the following website: [email or web address]. By joining [employer]’s FollowMyHealth portal, you will also be able to view your health information using other applications (apps) compatible with our system.

## 2020-10-12 NOTE — ED PROVIDER NOTE - CARE PLAN
Principal Discharge DX:	Feared condition not demonstrated  Secondary Diagnosis:	Iron deficiency anemia, unspecified iron deficiency anemia type

## 2020-10-12 NOTE — ED PROVIDER NOTE - CARDIAC, MLM
tachycardic  regular rhythm.  Heart sounds S1, S2.  No murmurs, rubs or gallops. no chest wall tenderness PVD bilateral le with b/l pitting edema, sensation intact

## 2020-10-12 NOTE — ED ADULT NURSE NOTE - OBJECTIVE STATEMENT
Pt presented to ED c/o abnormal lab result. Pt sent to ED for "low hemoglobin." Pt states hat he has no other complaints at this time.

## 2020-10-12 NOTE — ED PROVIDER NOTE - ATTENDING CONTRIBUTION TO CARE
69 y/o M from group home with anemia, on iron, required blood transfusions in the past, was sent in when CBC from out patient labs showed a hgb of 7.8. Patient has no complaints.    Exam: NAD, well appearing, tachycardic, lungs CTA B/L, diminished in the bases, abd soft, NT/ND, soft, easily reducible umbilical hernia with diastasis rectus, 4+ b/l LE pitting edema with chronic venous stasis changes.    Guaiac negative, hgb 8.5, patient is asymptomatic, not actively bleeding, no indication for transfusion. Advised to continue taking his iron and follow up with PMD.

## 2020-10-12 NOTE — ED PROVIDER NOTE - PROGRESS NOTE DETAILS
h/h stable. advised on results. no need for transfusion today, advised on need for fu with pmd and specialties. pt verbalizes understanding   pending covid swab COVID neg.  Pt stable for d/c back to residence

## 2020-10-12 NOTE — ED PROVIDER NOTE - NSFOLLOWUPINSTRUCTIONS_ED_ALL_ED_FT
You are anemic, but your hemoglobin today is 8.5, which is not an indication for transfusion. There is no sign of bleeding in your stool. Continue to take your iron supplementation. Follow up with your primary doctor. You are anemic, but your hemoglobin today is 8.5, which is not an indication for transfusion. There is no sign of bleeding in your stool. Continue to take your iron supplementation. Follow up with your primary doctor.      Anemia    WHAT YOU NEED TO KNOW:    Anemia is a low number of red blood cells or a low amount of hemoglobin in your red blood cells. Hemoglobin is a protein that helps carry oxygen throughout your body. Red blood cells use iron to create hemoglobin. Anemia may develop if your body does not have enough iron. It may also develop if your body does not make enough red blood cells or they die faster than your body can make them.     DISCHARGE INSTRUCTIONS:    Call 911 or have someone call 911 for any of the following:   •You lose consciousness.      •You have severe chest pain.      Return to the emergency department if:   •You have dark or bloody bowel movements.          Contact your healthcare provider if:   •Your symptoms are worse, even after treatment.      •You have questions or concerns about your condition or care.      Medicines:   •Iron or folic acid supplements help increase your red blood cell and hemoglobin levels.       •Vitamin B12 injections may help boost your red blood cell level and decrease your symptoms. Ask your healthcare provider how to inject B12.      •Take your medicine as directed. Contact your healthcare provider if you think your medicine is not helping or if you have side effects. Tell him of her if you are allergic to any medicine. Keep a list of the medicines, vitamins, and herbs you take. Include the amounts, and when and why you take them. Bring the list or the pill bottles to follow-up visits. Carry your medicine list with you in case of an emergency.      Prevent anemia: Eat healthy foods rich in iron and vitamin C. Nuts, meat, dark leafy green vegetables, and beans are high in iron and protein. Vitamin C helps your body absorb iron. Foods rich in vitamin C include oranges and other citrus fruits. Ask your healthcare provider for a list of other foods that are high in iron or vitamin C. Ask if you need to be on a special diet.     Follow up with your healthcare provider as directed: Write down your questions so you remember to ask them during your visits.

## 2020-10-12 NOTE — ED ADULT TRIAGE NOTE - CHIEF COMPLAINT QUOTE
Pt presents with health aide from [facility] RN sent pt for abnormal Hgb of 7.8 and Hct of 25.

## 2020-10-16 PROBLEM — S00.81XA ABRASION, FACE W/O INFECTION: Status: RESOLVED | Noted: 2018-05-23 | Resolved: 2020-01-01

## 2020-10-16 PROBLEM — N40.0 BPH (BENIGN PROSTATIC HYPERPLASIA): Status: ACTIVE | Noted: 2017-08-29
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
## 2020-10-16 PROBLEM — I50.22 CHRONIC SYSTOLIC HEART FAILURE: Status: ACTIVE | Noted: 2019-07-26

## 2020-10-16 NOTE — ASSESSMENT
[FreeTextEntry1] : 1) Yvette on CKD- Scr now stable at 1.85. \par 2) Complicated UTI- s/p antibiotics; completed 09/28\par 3) HTN; BP stable; continue current regimen\par 4) Rt sided hydronephrosis; s/p rodriguez; repeat renal US with mild improvement\par Will repeat renal US on next visit\par \par RTC in 4 months\par \par \par

## 2020-10-16 NOTE — HISTORY OF PRESENT ILLNESS
[FreeTextEntry1] : Patient is a 71 y/o man with a h/o developmental disabilities, CHF, HTN, DM, AF  taken off apixaban due to anemia/hematuria, CKD after COVID-19 infection, BPH who was recently discharged from [facility] 9/3/20 s/p treated for ESBL UTI and septic shock recently admitted to [facility] for UTI. Pt was seen by our team at [facility] for MARSHAL on CKD.\par \par Pt now presents for post hospitalization follow up.\par Pt seen and examined; states he feels well.\par Denies any acute complaint.\par

## 2020-10-16 NOTE — PHYSICAL EXAM
[General Appearance - Alert] : alert [General Appearance - In No Acute Distress] : in no acute distress [Hearing Threshold Finger Rub Not [place]] : hearing was normal [Sclera] : the sclera and conjunctiva were normal [Auscultation Breath Sounds / Voice Sounds] : lungs were clear to auscultation bilaterally [Heart Sounds] : normal S1 and S2 [Bowel Sounds] : normal bowel sounds [FreeTextEntry1] : b/l LE edema [No CVA Tenderness] : no ~M costovertebral angle tenderness [Nail Clubbing] : no clubbing  or cyanosis of the fingernails [] : no rash [No Focal Deficits] : no focal deficits [Affect] : the affect was normal [Mood] : the mood was normal

## 2020-10-16 NOTE — REVIEW OF SYSTEMS
[Feeling Poorly] : not feeling poorly [Feeling Tired] : not feeling tired [Eyesight Problems] : no eyesight problems [Nosebleeds] : no nosebleeds [Chest Pain] : no chest pain [Palpitations] : no palpitations [Cough] : no cough [Abdominal Pain] : no abdominal pain [Diarrhea] : no diarrhea [Dysuria] : no dysuria [Constipation] : no constipation [Skin Lesions] : no skin lesions [Hesitancy] : no urinary hesitancy [Dizziness] : no dizziness [Fainting] : no fainting [Anxiety] : no anxiety [Depression] : no depression [Easy Bleeding] : no tendency for easy bleeding [Muscle Weakness] : no muscle weakness [Easy Bruising] : no tendency for easy bruising

## 2020-10-27 NOTE — H&P ADULT - NSICDXFAMHXPERTINENTNEGATIVE_GEN_A_CORE_FT
No pertinent family history in first degree relatives, pt hossein recal his family medical hx of either parents

## 2020-10-27 NOTE — ED PROVIDER NOTE - NS ED ROS FT
Constitutional: no fever, +chills  Eyes: no vision changes  ENT: no nasal congestion, no sore throat  CV: no chest pain  Resp: no cough, no shortness of breath  GI: no abdominal pain, no vomiting, no diarrhea  : no dysuria  MSK: no joint pain  Skin: no rash  Neuro: no headache, no weakness, no paresthesias

## 2020-10-27 NOTE — ED PROVIDER NOTE - CLINICAL SUMMARY MEDICAL DECISION MAKING FREE TEXT BOX
70y M w/ hx developmental delay, Afib, CHF, CKD, presenting for dizziness, found to be hypothermic to 32.3 rectal.  Will initiate sepsis workup and treatment.  CXR, EKG, labs, UA, cultures.  IV fluid bolus, empiric zosyn and vancomycin.  Pt placed on Son hugger and warm blankets.

## 2020-10-27 NOTE — H&P ADULT - NSHPPHYSICALEXAM_GEN_ALL_CORE
General: Well developed male lying in bed not in distress.  HEENT: AT, NC. PERRL. intact EOM. no throat erythema or exudate.   Neck: supple. no JVD.  Chest: CTA bilaterally  Heart: S1,S2. RRR. no heart murmur. trace  to 1 + edema of b/L LE.  Abdomen: soft. non-tender. non-distended. + BS.   rectal : deferred by pt.   Ext: no calf tenderness. distal pulses 2+.  Neuro: AAO x3. no focal weakness. speech clear, Cns ii to xii intact. sensory/ reflexes intact.  Skin: old venous stasis skin changes noted over b/l lower ext.  Psych : co-operative, mood ok, no si/hi. General: Well developed male lying in bed not in distress.  HEENT: AT, NC. PERRL. intact EOM. no throat erythema or exudate.   Neck: supple. no JVD.  Chest: CTA bilaterally  Heart: S1,S2. RRR. no heart murmur. trace  to 1 + edema of b/L LE.  Abdomen: soft. non-tender. non-distended. + BS.   rectal : deferred by pt.   Ext: no calf tenderness. distal pulses intact.  Neuro: AAO x3. no focal weakness. speech clear, Cns ii to xii intact. sensory/ reflexes intact.  Skin: old venous stasis skin changes noted over b/l lower ext. rt. big toe with old dry appearing ulcer over middle of the big toe on dorsal aspect, oval shaped about the size of a quarter, no discharge, no sig. surrounding erythema,  no sig. warmth,  tip of big toe with a small ulcer with black eschar formation.   Psych : co-operative, mood ok, no si/hi.

## 2020-10-27 NOTE — ED ADULT NURSE NOTE - INTERVENTIONS DEFINITIONS
Instruct patient to call for assistance/Non-slip footwear when patient is off stretcher/Monitor for mental status changes and reorient to person, place, and time/Reinforce activity limits and safety measures with patient and family/Stretcher in lowest position, wheels locked, appropriate side rails in place

## 2020-10-27 NOTE — ED ADULT NURSE NOTE - ED STAT RN HANDOFF DETAILS
Report given to AYDEN Rapp RN.  Pt transported to bed A8L  Placed on cardiac monitor with  and warming blanket.

## 2020-10-27 NOTE — H&P ADULT - PROBLEM SELECTOR PLAN 3
Likely and obvious source is urine, h/o ESBL uti, bacteremia is in differential, will be on meropenem, follow blood and urine cx. hemodynamically stable at this point.

## 2020-10-27 NOTE — H&P ADULT - ASSESSMENT
In summary 71 y/o M with h/o developmental disability, CHF, HTN, DM, AF taken off apixaban due to anemia/hematuria, CKD not on HD, refused hemodialysis, BPH who was discharged from [facility] 9/3/20 s/p treated for ESBL UT and then was readmitted on 09/17/20 for barbra and uti now comes back again from [facility],  was sent in for hypothermia and in the ER pt's urine is suggestive of UTI as source.             In summary 69 y/o M with h/o developmental disability, CHF, HTN, DM, AF taken off apixaban due to anemia/hematuria, CKD not on HD, refused hemodialysis, BPH who was discharged from [facility] 9/3/20 s/p treated for ESBL UT and then was readmitted on 09/17/20 for barbra and uti now comes back again from [facility],  was sent in for hypothermia and in the ER pt's urine is suggestive of UTI as source, will be admitted for urosepsis with ID follow up.

## 2020-10-27 NOTE — ED PROVIDER NOTE - OBJECTIVE STATEMENT
70y M w/ hx developmental disability, CHF, HTN, DM, AF taken off apixaban due to anemia/hematuria, CKD due to COVID-19 infection, BPH; presenting from group home for dizziness.  Per aide, pt was noted to have decreased appetite today and said he wanted to go to the hospital.  Pt otherwise at baseline mental status; is normally AAOx3.  Group home staff also noted malodorous urine.  No fever, cough, chest pain, SOB, n/v/d, abdominal pain, dysuria.  Pt is normally has temp of ~35; noted to be hypothermic to 32.3 rectal on arrival. 70y M w/ hx developmental disability, CHF, HTN, DM, AF taken off apixaban due to anemia/hematuria, CKD due to COVID-19 infection, BPH; presenting from group home for dizziness.  Per aide, pt was noted to have decreased appetite today and said he wanted to go to the hospital.  Pt otherwise at baseline mental status; is normally AAOx3.  Group home staff also noted malodorous urine.  No fever, cough, chest pain, SOB, n/v/d, abdominal pain, dysuria.  Pt has chronic foot ulcers and rash over diaper area; denies testicular pain.  Pt is normally has temp of ~35; noted to be hypothermic to 32.3 rectal on arrival.

## 2020-10-27 NOTE — ED ADULT NURSE REASSESSMENT NOTE - NS ED NURSE REASSESS COMMENT FT1
Report received from Joelle OCAMPO pt in no apparent distress. Report received from Joelle OCAMPO pt in no apparent distress, AOX3 denies any pain at the moment.

## 2020-10-27 NOTE — H&P ADULT - PROBLEM SELECTOR PLAN 1
- uti unspecified organism without hematuria, h/o ESBL uti, will keep on meropenem, follow urine and blood cx. ID consult.

## 2020-10-27 NOTE — ED ADULT NURSE NOTE - OBJECTIVE STATEMENT
Assumed pt care as critical care RN.  Pt from [facility] group home with complaint of feeling cold and dizzy all day today.  Alert and oriented X 3. Aide from facility states pt is usually able to stand on his own, but can't today due to dizziness and generalized weakness.  not acting himself.

## 2020-10-27 NOTE — H&P ADULT - HISTORY OF PRESENT ILLNESS
71 y/o male from group home [facility] was sent in for hypothermia and in the ER pt's urine is suggestive of UTI as source. pt. has been on warming blanket in the ER. pt. denies any dysuria or urgency. no abd. pain. no n/v/d. no stool per rectum. no cp, no sob, no cough, no fever. 69 y/o M with h/o developmental disability, CHF, HTN, DM, AF taken off apixaban due to anemia/hematuria, CKD not on HD, refused hemodialysis, BPH who was discharged from [facility] 9/3/20 s/p treated for ESBL UT and then was readmitted on 09/17/20 for barbra and uti now comes back again from [facility],  was sent in for hypothermia and in the ER pt's urine is suggestive of UTI as source. pt. has been on warming blanket in the ER. pt. denies any dysuria or urgency. no abd. pain. no n/v/d. no stool per rectum. no cp, no sob, no cough, no fever. 71 y/o M with h/o developmental disability, CHF, HTN, DM, AF taken off apixaban due to anemia/hematuria, CKD not on HD, refused hemodialysis, BPH who was discharged from [facility] 9/3/20 s/p treated for ESBL UT and then was readmitted on 09/17/20 for barbra and uti now [facility],  was sent in for hypothermia and in the ER pt's urine is suggestive of UTI as source. pt. has been on warming blanket in the ER. pt. denies any dysuria or urgency. no abd. pain. no n/v/d. no stool per rectum. no cp, no sob, no cough, no fever. As per pt and her sister pt. does not have any swallow difficulty. 69 y/o M with h/o developmental disability, CHF, HTN, DM, AF taken off apixaban due to anemia/hematuria, CKD not on HD, refused hemodialysis, BPH who was discharged from [facility] 9/3/20 s/p treated for ESBL UT and then was readmitted on 09/17/20 for barbra and uti now comes back again from nursing home [facility],  was sent in for hypothermia and in the ER pt's urine is suggestive of UTI as source. pt. has been on warming blanket in the ER. pt. denies any dysuria or urgency. no abd. pain. no n/v/d. no stool per rectum. no cp, no sob, no cough, no fever. Temp upon arrival in the ER 90.1. As per pt and her sister pt. does not have any swallow difficulty. 69 y/o M with h/o developmental disability, CHF, HTN, DM, AF taken off apixaban due to anemia/hematuria, CKD not on HD, refused hemodialysis, BPH who was discharged from [facility] 9/3/20 s/p treated for ESBL UT and then was readmitted on 09/17/20 for barbra and uti now comes back again from care home [facility],  was sent in for hypothermia and in the ER pt's urine is suggestive of UTI as source. pt. has been on warming blanket in the ER. pt. denies any dysuria or urgency. no abd. pain. no n/v/d. no stool per rectum. no cp, no sob, no cough, no fever. pt. has been making urine.  Temp upon arrival in the ER 90.1. As per pt and her sister pt. does not have any swallow difficulty. 71 y/o M with h/o developmental disability, CHF, HTN, DM, AF taken off apixaban due to anemia/hematuria, CKD not on HD, refused hemodialysis, BPH who was discharged from [facility] 9/3/20 s/p treated for ESBL UT and then was readmitted on 09/17/20 for barbra and uti now comes back again from skilled nursing [facility],  was sent in for hypothermia and in the ER pt's urine is suggestive of UTI as source. pt. has been on warming blanket in the ER. pt. denies any dysuria or urgency. no abd. pain. no n/v/d. no stool per rectum. no cp, no sob, no cough, no fever. no dizziness reported to me and giving good history. pt. has been making urine.  Temp upon arrival in the ER 90.1. As per pt and her sister pt. does not have any swallow difficulty.

## 2020-10-27 NOTE — ED PROVIDER NOTE - ATTENDING CONTRIBUTION TO CARE
70 YOM PMH developmental disability, CHF, HTN, DM, AF taken off apixaban due to anemia/hematuria, CKD, BPH here for hypothermia. usually around 95F but today was found to be 90.1. Not as interactive as he usually is. Staff felt his urine was malodorous. Patient has chronic foot ulcers on left heel and right base of the first that have received wound care. Also with chronic diaper area rash, denies worsening pain, testicular pain. Patient endorsing mild dizziness. Denies any pain.   AP - bear hugger placed. sepsis w/up. labs, cultures. empiric abx. anticipate adm

## 2020-10-27 NOTE — ED ADULT NURSE NOTE - CHIEF COMPLAINT QUOTE
Pt from group home c/o dizziness. the aide at [facility] states pt woke up with complaint, and refused to eat.  in triage. no further complaints

## 2020-10-27 NOTE — ED ADULT TRIAGE NOTE - CHIEF COMPLAINT QUOTE
Pt from group home c/o dizziness. the aide at [facility] states pt woke up with complaint, and refused to eat.  in triage. no further complaints

## 2020-10-27 NOTE — H&P ADULT - PROBLEM SELECTOR PLAN 4
h/o ckd, Cr 1.95 to be followed closely, will request [facility] nephrology to follow.

## 2020-10-27 NOTE — ED PROVIDER NOTE - PROGRESS NOTE DETAILS
Alessandro YOON: Diagnostics reviewed.  Per review of prior records, pt has hx of ESBL UTI.  Will add Meropenem.  Will admit. Alessandro YOON: Diagnostics reviewed.  Per review of prior records, pt has hx of ESBL UTI.  Will switch antibiotic regiment to Meropenem.  Will admit.

## 2020-10-27 NOTE — ED PROVIDER NOTE - PHYSICAL EXAMINATION
Constitutional: Awake, alert, in no acute distress  Eyes: no scleral icterus  HENT: normocephalic, atraumatic, moist oral mucosa  CV: RRR, no murmur  Pulm: non-labored respirations, CTAB  Abdomen: soft, non-tender, non-distended  Extremities: no edema, no deformity  Skin: erythematous rash noted over groin area, +sacral decubitus ulcer, no surrounding cellulitis  Neuro: AAOx3, moving all extremities equally Constitutional: Awake, alert, in no acute distress  Eyes: no scleral icterus  HENT: normocephalic, atraumatic, moist oral mucosa  CV: RRR, no murmur  Pulm: non-labored respirations, CTAB  Abdomen: soft, non-tender, non-distended  Extremities: +chronic lower extremity edema and venous stasis changes, no deformity  Skin: chronic erythematous rash noted over groin area, +sacral decubitus ulcer, no surrounding cellulitis  Neuro: AAOx3, moving all extremities equally Constitutional: Awake, alert, in no acute distress  Eyes: no scleral icterus  HENT: normocephalic, atraumatic, moist oral mucosa  CV: RRR, no murmur  Pulm: non-labored respirations, CTAB  Abdomen: soft, non-tender, non-distended  Extremities: +chronic lower extremity edema and venous stasis changes, no deformity  Skin: Diffuse, mildly tender erythematous rash noted over groin area, no pain out of proportion, no crepitus.  +Unstageable ulcers over base of R great toe and L heel; no surrounding cellulitis.  Neuro: AAOx3, moving all extremities equally

## 2020-10-27 NOTE — H&P ADULT - PROBLEM SELECTOR PLAN 7
likely chronic and multifactorial, Hb stable compared to old labs, close f/u of daily cbc, transfuse as needed. continue mvi and feso4.

## 2020-10-28 NOTE — PROGRESS NOTE ADULT - PROBLEM SELECTOR PLAN 7
likely chronic and multifactorial, Hb stable compared to old labs, close f/u of daily cbc, transfuse as needed. continue mvi and feso4.  -will be started on Epo by nephrologist likely chronic and multifactorial, Hb stable compared to old labs, close f/u of daily cbc, transfuse as needed. continue mvi and feso4.  -will be started on Epo by nephrologist    DVT ppx- heparin q8h  Code - DNI/DNR  - healthy proxy sister Avani

## 2020-10-28 NOTE — PROGRESS NOTE ADULT - PROBLEM SELECTOR PLAN 4
Bedside and Verbal shift change report received from Kaia Irving, [address] [place] [address]. Report included the following information SBAR, Kardex, Procedure Summary, Intake/Output, MAR, Recent Results and Cardiac Rhythm paced. h/o ckd, Cr 1.95 > 2.15  - started gentle IV hydration

## 2020-10-28 NOTE — CONSULT NOTE ADULT - SUBJECTIVE AND OBJECTIVE BOX
[facility] DIVISION OF KIDNEY DISEASES AND HYPERTENSION -- INITIAL CONSULT NOTE  --------------------------------------------------------------------------------  HPI:  69 y/o M with h/o developmental disability, CHF, HTN, DM, AF taken off apixaban due to anemia/hematuria, CKD not on HD, refused hemodialysis, BPH who was discharged from [facility] 9/3/20 s/p treated for ESBL UT and then was readmitted on 09/17/20 for barbra and uti now [facility],  was sent in for hypothermia and in the ER pt's urine is suggestive of UTI as source. pt. has been on warming blanket in the ER. pt. denies any dysuria or urgency. no abd. pain. no n/v/d. no stool per rectum. no cp, no sob, no cough, no fever. no dizziness reported to me and giving good history. pt. has been making urine.  Temp upon arrival in the ER 90.1. As per pt and her sister pt. does not have any swallow difficulty.   Pt seen/examined; sitting in chair in NAD; no complaint; SCr 2.18 today; nephrology consulted for elevated SCr.      PAST HISTORY  --------------------------------------------------------------------------------  PAST MEDICAL & SURGICAL HISTORY:  Encounter for blood transfusion    Anemia    History of gross hematuria    History of orthostatic hypotension    Bacterial pneumonia    Muscle weakness (generalized)    2019 novel coronavirus disease (COVID-19)    Falls    Heart failure    Afib    CHF (congestive heart failure)    Gout    Hiatal hernia    BPH (benign prostatic hyperplasia)    Cognitive impairment    Cardiomyopathy    HTN (hypertension)    DM (diabetes mellitus)    S/P appendectomy      FAMILY HISTORY:  No pertinent family history in first degree relatives  pt cant recal his family medical hx of either parents      PAST SOCIAL HISTORY:    ALLERGIES & MEDICATIONS  --------------------------------------------------------------------------------  Allergies    No Known Drug Allergies  provide glucerna TID , diet coke BID,  diet ice cream with dinner- RD ok (Unknown)    Intolerances      Standing Inpatient Medications  allopurinol 100 milliGRAM(s) Oral daily  atorvastatin 20 milliGRAM(s) Oral at bedtime  dextrose 5%. 1000 milliLiter(s) IV Continuous <Continuous>  dextrose 50% Injectable 12.5 Gram(s) IV Push once  dextrose 50% Injectable 25 Gram(s) IV Push once  dextrose 50% Injectable 25 Gram(s) IV Push once  ferrous    sulfate 325 milliGRAM(s) Oral daily  finasteride 5 milliGRAM(s) Oral daily  heparin   Injectable 5000 Unit(s) SubCutaneous every 12 hours  insulin lispro (ADMELOG) corrective regimen sliding scale   SubCutaneous three times a day before meals  insulin lispro (ADMELOG) corrective regimen sliding scale   SubCutaneous at bedtime  lactobacillus acidophilus 1 Tablet(s) Oral three times a day with meals  meropenem  IVPB 1000 milliGRAM(s) IV Intermittent every 12 hours  midodrine. 10 milliGRAM(s) Oral three times a day  multivitamin 1 Tablet(s) Oral daily  oxybutynin 10 milliGRAM(s) Oral daily  tamsulosin 0.4 milliGRAM(s) Oral at bedtime    PRN Inpatient Medications  acetaminophen   Tablet .. 650 milliGRAM(s) Oral every 6 hours PRN  dextrose 40% Gel 15 Gram(s) Oral once PRN  glucagon  Injectable 1 milliGRAM(s) IntraMuscular once PRN      REVIEW OF SYSTEMS  --------------------------------------------------------------------------------  Gen: No weight changes, fatigue, fevers/chills, weakness  Skin: No rashes  Head/Eyes/Ears/Mouth: No headache; Normal hearing; Normal vision w/o blurriness; No sinus pain/discomfort, sore throat  Respiratory: No dyspnea, cough, wheezing, hemoptysis  CV: No chest pain, PND, orthopnea  GI: No abdominal pain, diarrhea, constipation, nausea, vomiting, melena, hematochezia  : No increased frequency, dysuria, hematuria, nocturia  MSK: No joint pain/swelling; no back pain; no edema  Neuro: No dizziness/lightheadedness, weakness, seizures, numbness, tingling  Heme: No easy bruising or bleeding  Endo: No heat/cold intolerance  Psych: No significant nervousness, anxiety, stress, depression    All other systems were reviewed and are negative, except as noted.    VITALS/PHYSICAL EXAM  --------------------------------------------------------------------------------  T(C): 37 (10-28-20 @ 08:08), Max: 37 (10-28-20 @ 08:08)  HR: 89 (10-28-20 @ 08:08) (75 - 106)  BP: 106/66 (10-28-20 @ 08:08) (102/54 - 131/84)  RR: 18 (10-28-20 @ 08:08) (18 - 20)  SpO2: 92% (10-28-20 @ 08:08) (92% - 100%)  Wt(kg): --  Height (cm): 175.3 (10-27-20 @ 14:06)  Weight (kg): 83.9 (10-27-20 @ 14:06)  BMI (kg/m2): 27.3 (10-27-20 @ 14:06)  BSA (m2): 2 (10-27-20 @ 14:06)      Physical Exam:  	Gen: NAD  	HEENT: PERRL, supple neck, clear oropharynx  	Pulm: CTA B/L  	CV: RRR, S1S2; no rub  	Back: No spinal or CVA tenderness; no sacral edema  	Abd: +BS, soft, nontender/nondistended  	: No suprapubic tenderness  	UE: Warm, FROM, no clubbing, intact strength; no edema; no asterixis  	LE: venous stasis ; chronic venous insufficiency LE +1 edema  	Neuro: No focal deficits, intact gait  	Psych: Normal affect and mood  	Skin: Warm, without rashes    LABS/STUDIES  --------------------------------------------------------------------------------              7.5    5.97  >-----------<  125      [10-28-20 @ 07:05]              24.5     139  |  104  |  37.0  ----------------------------<  139      [10-28-20 @ 07:05]  4.6   |  23.0  |  2.18        Ca     9.0     [10-28-20 @ 07:05]    TPro  6.4  /  Alb  2.9  /  TBili  0.3  /  DBili  x   /  AST  21  /  ALT  17  /  AlkPhos  112  [10-27-20 @ 15:05]    PT/INR: PT 18.2 , INR 1.60       [10-27-20 @ 15:05]  PTT: 45.0       [10-27-20 @ 15:05]    Troponin 0.05      [10-27-20 @ 15:05]    Creatinine Trend:  SCr 2.18 [10-28 @ 07:05]  SCr 1.95 [10-27 @ 15:05]  SCr 1.85 [10-12 @ 21:19]    Urinalysis - [10-27-20 @ 15:41]      Color Yellow / Appearance Clear / SG 1.010 / pH 6.0      Gluc Negative / Ketone Negative  / Bili Negative / Urobili Negative       Blood Large / Protein 100 / Leuk Est Moderate / Nitrite Negative      RBC 11-25 / WBC >50 / Hyaline  / Gran  / Sq Epi  / Non Sq Epi Occasional / Bacteria       Ferritin 343      [04-15-20 @ 23:46]  TSH 3.38      [10-27-20 @ 15:05]    HCV 0.39, Nonreact      [04-16-20 @ 08:40]

## 2020-10-28 NOTE — CONSULT NOTE ADULT - SUBJECTIVE AND OBJECTIVE BOX
[facility]  INFECTIOUS DISEASES AND INTERNAL MEDICINE at [place]  =======================================================  Zach Chew MD  Diplomates American Board of Internal Medicine and Infectious Diseases  Tel: [phone]      Fax: [phone]  =======================================================      [id number]  KIMBERLY MARKS is a 70y  Male     CC: Patient is a 70y old  Male who presents with a chief complaint of urosepsis (28 Oct 2020 13:40)    HPI:  71 y/o M with h/o developmental disability, CHF, HTN, DM, AF taken off apixaban due to anemia/hematuria, CKD not on HD, refused hemodialysis, BPH who was discharged from [facility] 9/3/20 s/p treated for ESBL UT and then was readmitted on 09/17/20 for barbra and uti now comes back again from [facility],  was sent in for hypothermia and in the ER pt's urine is suggestive of UTI as source. pt. has been on warming blanket in the ER. pt. denies any dysuria or urgency. no abd. pain. no n/v/d. no stool per rectum. no cp, no sob, no cough, no fever. no dizziness reported to me and giving good history. pt. has been making urine.  Temp upon arrival in the ER 90.1. As per pt and her sister pt. does not have any swallow difficulty.  (27 Oct 2020 17:21)      PAST MEDICAL & SURGICAL HISTORY:  Encounter for blood transfusion    Anemia    History of gross hematuria    History of orthostatic hypotension    Bacterial pneumonia    Muscle weakness (generalized)    2019 novel coronavirus disease (COVID-19)    Falls    Heart failure    Afib    CHF (congestive heart failure)    Gout    Hiatal hernia    BPH (benign prostatic hyperplasia)    Cognitive impairment    Cardiomyopathy    HTN (hypertension)    DM (diabetes mellitus)    S/P appendectomy        Social Hx:    FAMILY HISTORY:  No pertinent family history in first degree relatives  pt hossein recal his family medical hx of either parents        Allergies    No Known Drug Allergies  provide glucerna TID , diet coke BID,  diet ice cream with dinner- RD ok (Unknown)    Intolerances        MEDICATIONS  (STANDING):  allopurinol 100 milliGRAM(s) Oral daily  atorvastatin 20 milliGRAM(s) Oral at bedtime  dextrose 5%. 1000 milliLiter(s) (50 mL/Hr) IV Continuous <Continuous>  dextrose 50% Injectable 12.5 Gram(s) IV Push once  dextrose 50% Injectable 25 Gram(s) IV Push once  dextrose 50% Injectable 25 Gram(s) IV Push once  epoetin liam-epbx (RETACRIT) Injectable 55568 Unit(s) SubCutaneous every 7 days  ferrous    sulfate 325 milliGRAM(s) Oral daily  finasteride 5 milliGRAM(s) Oral daily  heparin   Injectable 5000 Unit(s) SubCutaneous every 12 hours  insulin lispro (ADMELOG) corrective regimen sliding scale   SubCutaneous three times a day before meals  insulin lispro (ADMELOG) corrective regimen sliding scale   SubCutaneous at bedtime  lactobacillus acidophilus 1 Tablet(s) Oral three times a day with meals  meropenem  IVPB 1000 milliGRAM(s) IV Intermittent every 12 hours  midodrine. 10 milliGRAM(s) Oral three times a day  multivitamin 1 Tablet(s) Oral daily  oxybutynin 10 milliGRAM(s) Oral daily  povidone iodine 10% Ointment 1 Application(s) Topical every 12 hours  sodium chloride 0.9%. 1000 milliLiter(s) (50 mL/Hr) IV Continuous <Continuous>  tamsulosin 0.4 milliGRAM(s) Oral at bedtime    MEDICATIONS  (PRN):  acetaminophen   Tablet .. 650 milliGRAM(s) Oral every 6 hours PRN Temp greater or equal to 38C (100.4F)  dextrose 40% Gel 15 Gram(s) Oral once PRN Blood Glucose LESS THAN 70 milliGRAM(s)/deciliter  glucagon  Injectable 1 milliGRAM(s) IntraMuscular once PRN Glucose LESS THAN 70 milligrams/deciliter      ANTIMICROBIALS:  meropenem  IVPB 1000 every 12 hours      OTHER MEDS: MEDICATIONS  (STANDING):  acetaminophen   Tablet .. 650 every 6 hours PRN  allopurinol 100 daily  atorvastatin 20 at bedtime  dextrose 40% Gel 15 once PRN  dextrose 50% Injectable 12.5 once  dextrose 50% Injectable 25 once  dextrose 50% Injectable 25 once  epoetin liam-epbx (RETACRIT) Injectable 12729 every 7 days  finasteride 5 daily  glucagon  Injectable 1 once PRN  heparin   Injectable 5000 every 12 hours  insulin lispro (ADMELOG) corrective regimen sliding scale  three times a day before meals  insulin lispro (ADMELOG) corrective regimen sliding scale  at bedtime  midodrine. 10 three times a day  oxybutynin 10 daily  tamsulosin 0.4 at bedtime             REVIEW OF SYSTEMS:  CONSTITUTIONAL:  No Fever or chills  HEENT:  No diplopia or blurred vision.  No earache, sore throat or runny nose.  CARDIOVASCULAR:  No pressure, squeezing, strangling, tightness, heaviness or aching about the chest, neck, axilla or epigastrium.  RESPIRATORY:  No cough, shortness of breath  GASTROINTESTINAL:  No nausea, vomiting or diarrhea.  GENITOURINARY:  No dysuria, frequency or urgency. No Blood in urine  MUSCULOSKELETAL:  no joint aches, no muscle pain  SKIN:  No change in skin, hair or nails.  NEUROLOGIC:  No Headaches, seizures or weakness.  PSYCHIATRIC:  No disorder of thought or mood.  ENDOCRINE:  No heat or cold intolerance  HEMATOLOGICAL:  No easy bruising or bleeding.           I&O's Detail        Physical Exam:  Vital Signs Last 24 Hrs  T(C): 37 (28 Oct 2020 08:08), Max: 37 (28 Oct 2020 08:08)  T(F): 98.6 (28 Oct 2020 08:08), Max: 98.6 (28 Oct 2020 08:08)  HR: 89 (28 Oct 2020 08:08) (75 - 106)  BP: 106/66 (28 Oct 2020 08:08) (102/54 - 113/77)  BP(mean): --  RR: 18 (28 Oct 2020 08:08) (18 - 20)  SpO2: 92% (28 Oct 2020 08:08) (92% - 100%)    GEN: NAD, pleasant axox2  HEENT: normocephalic and atraumatic. EOMI. PERRL.  Anicteric  NECK: Supple.   LUNGS: Clear to auscultation.  HEART: Regular rate and rhythm without murmur.  ABDOMEN: Soft, nontender, and nondistended.  Positive bowel sounds.    : No CVA tenderness  EXTREMITIES: Without any edema. b/l venous stasis dermatitis LE  MSK: No joint swelling  NEUROLOGIC: Cranial nerves II through XII are grossly intact. No Focal Deficits  PSYCHIATRIC: Appropriate affect .  SKIN: No Rash        Labs:                        7.5    5.97  )-----------( 125      ( 28 Oct 2020 07:05 )             24.5   10-28    139  |  104  |  37.0<H>  ----------------------------<  139<H>  4.6   |  23.0  |  2.18<H>    Ca    9.0      28 Oct 2020 07:05    TPro  6.4<L>  /  Alb  2.9<L>  /  TBili  0.3<L>  /  DBili  x   /  AST  21  /  ALT  17  /  AlkPhos  112  10-27      Radiology:  < from: Xray Chest 1 View-PORTABLE IMMEDIATE (10.27.20 @ 15:15) >  INTERPRETATION:  AP chest on October 27, 2020 at 3:03 PM. Patient has sepsis.    Heart is enlarged.    Somewhat prominent interstitial markings and there right lung and left perihilar area again noted.    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    Chest is similar to August 24 of this year.    IMPRESSION: Stable chest findings as above. Somewhat prominent interstitial markings again noted. Chronic dislocation left humeral head. Heart is enlarged.        < end of copied text >

## 2020-10-28 NOTE — CONSULT NOTE ADULT - ASSESSMENT
1) MARSHAL on CKD III  2) Anemia  3) BPH  4) UTI    Continue flomax, finasteride; oxybutynin;  Would start epogen 10k units weekly for renal anemia  Cr now likely at baseline;  Would gently hydrate NS @ 50cc/hr for 24 hours  Will follow    anitha RN at bedside

## 2020-10-28 NOTE — CONSULT NOTE ADULT - ASSESSMENT
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 Blank

## 2020-10-28 NOTE — CONSULT NOTE ADULT - SUBJECTIVE AND OBJECTIVE BOX
Patient is a 70y old  Male who presents with a chief complaint of urosepsis (28 Oct 2020 11:31)      HPI:  71 y/o M with h/o developmental disability, CHF, HTN, DM, AF taken off apixaban due to anemia/hematuria, CKD not on HD, refused hemodialysis, BPH who was discharged from [facility] 9/3/20 s/p treated for ESBL UT and then was readmitted on 09/17/20 for barbra and uti now comes back again from [facility],  was sent in for hypothermia and in the ER pt's urine is suggestive of UTI as source. pt. has been on warming blanket in the ER. pt. denies any dysuria or urgency. no abd. pain. no n/v/d. no stool per rectum. no cp, no sob, no cough, no fever. no dizziness reported to me and giving good history. pt. has been making urine.  Temp upon arrival in the ER 90.1. As per pt and her sister pt. does not have any swallow difficulty.  (27 Oct 2020 17:21)    History as noted above. 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 Pt states has been present for quite some time but unaware of actual duration. Pt states he sees a podiatrist out-patient who trims his nails and manages his feet. Pt denies any current pedal complaints at this time. Pt denies any current F/V/N/C/SOB.      PMH:Encounter for blood transfusion    Anemia    History of gross hematuria    History of orthostatic hypotension    Bacterial pneumonia    Muscle weakness (generalized)    2019 novel coronavirus disease (COVID-19)    Falls    Heart failure    Afib    CHF (congestive heart failure)    Gout    Hiatal hernia    BPH (benign prostatic hyperplasia)    Cognitive impairment    Cardiomyopathy    HTN (hypertension)    DM (diabetes mellitus)      Allergies: No Known Drug Allergies  provide glucerna TID , diet coke BID,  diet ice cream with dinner- RD ok (Unknown)    Medications: acetaminophen   Tablet .. 650 milliGRAM(s) Oral every 6 hours PRN  allopurinol 100 milliGRAM(s) Oral daily  atorvastatin 20 milliGRAM(s) Oral at bedtime  dextrose 40% Gel 15 Gram(s) Oral once PRN  dextrose 5%. 1000 milliLiter(s) IV Continuous <Continuous>  dextrose 50% Injectable 12.5 Gram(s) IV Push once  dextrose 50% Injectable 25 Gram(s) IV Push once  dextrose 50% Injectable 25 Gram(s) IV Push once  ferrous    sulfate 325 milliGRAM(s) Oral daily  finasteride 5 milliGRAM(s) Oral daily  glucagon  Injectable 1 milliGRAM(s) IntraMuscular once PRN  heparin   Injectable 5000 Unit(s) SubCutaneous every 12 hours  insulin lispro (ADMELOG) corrective regimen sliding scale   SubCutaneous three times a day before meals  insulin lispro (ADMELOG) corrective regimen sliding scale   SubCutaneous at bedtime  lactobacillus acidophilus 1 Tablet(s) Oral three times a day with meals  meropenem  IVPB 1000 milliGRAM(s) IV Intermittent every 12 hours  midodrine. 10 milliGRAM(s) Oral three times a day  multivitamin 1 Tablet(s) Oral daily  oxybutynin 10 milliGRAM(s) Oral daily  tamsulosin 0.4 milliGRAM(s) Oral at bedtime    FH:No pertinent family history in first degree relatives      PSX: S/P appendectomy    No significant past surgical history      SH: acetaminophen   Tablet .. 650 milliGRAM(s) Oral every 6 hours PRN  allopurinol 100 milliGRAM(s) Oral daily  atorvastatin 20 milliGRAM(s) Oral at bedtime  dextrose 40% Gel 15 Gram(s) Oral once PRN  dextrose 5%. 1000 milliLiter(s) IV Continuous <Continuous>  dextrose 50% Injectable 12.5 Gram(s) IV Push once  dextrose 50% Injectable 25 Gram(s) IV Push once  dextrose 50% Injectable 25 Gram(s) IV Push once  ferrous    sulfate 325 milliGRAM(s) Oral daily  finasteride 5 milliGRAM(s) Oral daily  glucagon  Injectable 1 milliGRAM(s) IntraMuscular once PRN  heparin   Injectable 5000 Unit(s) SubCutaneous every 12 hours  insulin lispro (ADMELOG) corrective regimen sliding scale   SubCutaneous three times a day before meals  insulin lispro (ADMELOG) corrective regimen sliding scale   SubCutaneous at bedtime  lactobacillus acidophilus 1 Tablet(s) Oral three times a day with meals  meropenem  IVPB 1000 milliGRAM(s) IV Intermittent every 12 hours  midodrine. 10 milliGRAM(s) Oral three times a day  multivitamin 1 Tablet(s) Oral daily  oxybutynin 10 milliGRAM(s) Oral daily  tamsulosin 0.4 milliGRAM(s) Oral at bedtime      Vital Signs Last 24 Hrs  T(C): 37 (28 Oct 2020 08:08), Max: 37 (28 Oct 2020 08:08)  T(F): 98.6 (28 Oct 2020 08:08), Max: 98.6 (28 Oct 2020 08:08)  HR: 89 (28 Oct 2020 08:08) (75 - 106)  BP: 106/66 (28 Oct 2020 08:08) (102/54 - 131/84)  BP(mean): --  RR: 18 (28 Oct 2020 08:08) (18 - 20)  SpO2: 92% (28 Oct 2020 08:08) (92% - 100%)    LABS                        7.5    5.97  )-----------( 125      ( 28 Oct 2020 07:05 )             24.5               10-28    139  |  104  |  37.0<H>  ----------------------------<  139<H>  4.6   |  23.0  |  2.18<H>    Ca    9.0      28 Oct 2020 07:05    TPro  6.4<L>  /  Alb  2.9<L>  /  TBili  0.3<L>  /  DBili  x   /  AST  21  /  ALT  17  /  AlkPhos  112  10-27      ROS  REVIEW OF SYSTEMS:    CONSTITUTIONAL: No fever, weight loss, or fatigue  EYES: No eye pain, visual disturbances, or discharge  ENMT:  No difficulty hearing, tinnitus, vertigo; No sinus or throat pain  NECK: No pain or stiffness  BREASTS: No pain, masses, or nipple discharge  RESPIRATORY: No cough, wheezing, chills or hemoptysis; No shortness of breath  CARDIOVASCULAR: No chest pain, palpitations, dizziness, or leg swelling  GASTROINTESTINAL: No abdominal or epigastric pain. No nausea, vomiting, or hematemesis; No diarrhea or constipation. No melena or hematochezia.  GENITOURINARY: No dysuria, frequency, hematuria, or incontinence  NEUROLOGICAL: No headaches, memory loss, loss of strength, numbness, or tremors  SKIN: No itching, burning, rashes, or lesions   LYMPH NODES: No enlarged glands  ENDOCRINE: No heat or cold intolerance; No hair loss  MUSCULOSKELETAL: No joint pain or swelling; No muscle, back, or extremity pain  PSYCHIATRIC: No depression, anxiety, mood swings, or difficulty sleeping  HEME/LYMPH: No easy bruising, or bleeding gums  ALLERY AND IMMUNOLOGIC: No hives or eczema      PHYSICAL EXAM  GEN: KIMBERLY MARKS is a pleasant well-nourished, well developed 70y Male in no acute distress, alert awake, and oriented to person, place and time.   LE Focused:    Vasc: DP/PT palpable; CFT <3 sec x 10; TG WNL; no noted edema or erythema  Derm: Dry  gangrene noted to plantar medial aspect of right hallux and 1st MPJ; no fluctuance noted- no crepitance or fluctuance; no clinical signs of infection  Neuro: Unable to assess

## 2020-10-28 NOTE — CONSULT NOTE ADULT - ATTENDING COMMENTS
Patient was examined.  All documentation reviewed.  Discussed pathology and treatment plan with resident.  Reviewed written documentation and agreed with the above.  Patient will be follow while in house.

## 2020-10-28 NOTE — CONSULT NOTE ADULT - ASSESSMENT
69 y/o M with h/o developmental disability, CHF, HTN, DM, AF taken off apixaban due to anemia/hematuria, CKD not on HD, refused hemodialysis, BPH who was discharged from [facility] 9/3/20 s/p treated for ESBL UT and then was readmitted on 09/17/20 for marshal and uti now comes back again from [facility],  was sent in for hypothermia and in the ER pt's urine is suggestive of UTI as source. pt. has been on warming blanket in the ER. pt. denies any dysuria or urgency. no abd. pain. no n/v/d. no stool per rectum. no cp, no sob, no cough, no fever. no dizziness reported to me and giving good history. pt. has been making urine.  Temp upon arrival in the ER 90.1. As per pt and her sister pt. does not have any swallow difficulty.  (27 Oct 2020 17:21)    Hypothermia  UTI  MARSHAL    - Blood cultures  testing  - UA +  - f/u Ucx  - prior h/o ESBL proteus mirabalis, yeast in urine  - Continue meropenem adjusted for renal function  - consider renal imaging  - Trend Fever  - Trend Leukocytosis      Will Follow

## 2020-10-28 NOTE — PROGRESS NOTE ADULT - SUBJECTIVE AND OBJECTIVE BOX
[facility] Division of Hospital Medicine    Chief Complaint:  hypothermia/UTI    SUBJECTIVE: reports no complains to me this morning, denies any dysuria    OVERNIGHT EVENTS: none reported    Patient denies chest pain, SOB, abd pain, N/V, fever, chills, dysuria or any other complaints. All remainder ROS negative.     MEDICATIONS  (STANDING):  allopurinol 100 milliGRAM(s) Oral daily  atorvastatin 20 milliGRAM(s) Oral at bedtime  dextrose 5%. 1000 milliLiter(s) (50 mL/Hr) IV Continuous <Continuous>  dextrose 50% Injectable 12.5 Gram(s) IV Push once  dextrose 50% Injectable 25 Gram(s) IV Push once  dextrose 50% Injectable 25 Gram(s) IV Push once  epoetin liam-epbx (RETACRIT) Injectable 23188 Unit(s) SubCutaneous every 7 days  ferrous    sulfate 325 milliGRAM(s) Oral daily  finasteride 5 milliGRAM(s) Oral daily  heparin   Injectable 5000 Unit(s) SubCutaneous every 12 hours  insulin lispro (ADMELOG) corrective regimen sliding scale   SubCutaneous three times a day before meals  insulin lispro (ADMELOG) corrective regimen sliding scale   SubCutaneous at bedtime  lactobacillus acidophilus 1 Tablet(s) Oral three times a day with meals  meropenem  IVPB 1000 milliGRAM(s) IV Intermittent every 12 hours  midodrine. 10 milliGRAM(s) Oral three times a day  multivitamin 1 Tablet(s) Oral daily  oxybutynin 10 milliGRAM(s) Oral daily  povidone iodine 10% Ointment 1 Application(s) Topical every 12 hours  sodium chloride 0.9%. 1000 milliLiter(s) (50 mL/Hr) IV Continuous <Continuous>  tamsulosin 0.4 milliGRAM(s) Oral at bedtime    MEDICATIONS  (PRN):  acetaminophen   Tablet .. 650 milliGRAM(s) Oral every 6 hours PRN Temp greater or equal to 38C (100.4F)  dextrose 40% Gel 15 Gram(s) Oral once PRN Blood Glucose LESS THAN 70 milliGRAM(s)/deciliter  glucagon  Injectable 1 milliGRAM(s) IntraMuscular once PRN Glucose LESS THAN 70 milligrams/deciliter        I&O's Summary      PHYSICAL EXAM:  Vital Signs Last 24 Hrs  T(C): 37 (28 Oct 2020 08:08), Max: 37 (28 Oct 2020 08:08)  T(F): 98.6 (28 Oct 2020 08:08), Max: 98.6 (28 Oct 2020 08:08)  HR: 89 (28 Oct 2020 08:08) (75 - 106)  BP: 106/66 (28 Oct 2020 08:08) (102/54 - 131/84)  BP(mean): --  RR: 18 (28 Oct 2020 08:08) (18 - 20)  SpO2: 92% (28 Oct 2020 08:08) (92% - 100%)        CONSTITUTIONAL: NAD, well-developed, well-groomed  ENMT: Moist oral mucosa, no pharyngeal injection or exudates; normal dentition; No JVD  RESPIRATORY: Normal respiratory effort; lungs are clear to auscultation bilaterally  CARDIOVASCULAR: Regular rate and rhythm, normal S1 and S2, no murmur/rub/gallop; No lower extremity edema; Peripheral pulses are 2+ bilaterally  ABDOMEN: Nontender to palpation, normoactive bowel sounds, no rebound/guarding; No hepatosplenomegaly  MUSCLOSKELETAL:  Normal gait; no clubbing or cyanosis of digits; no joint swelling or tenderness to palpation  PSYCH: A+O to person, place, and time; affect appropriate  NEUROLOGY: CN 2-12 are intact and symmetric; no gross sensory deficits; was observed moving all 4 ext against gravity cooperating with exam.   SKIN: Chronic appears wound over 1st PT, with some tenderness over dorsal servafe and minimal erythema, no apparent bleeding or pus,   LABS:                        7.5    5.97  )-----------( 125      ( 28 Oct 2020 07:05 )             24.5     10-28    139  |  104  |  37.0<H>  ----------------------------<  139<H>  4.6   |  23.0  |  2.18<H>    Ca    9.0      28 Oct 2020 07:05    TPro  6.4<L>  /  Alb  2.9<L>  /  TBili  0.3<L>  /  DBili  x   /  AST  21  /  ALT  17  /  AlkPhos  112  10-27    PT/INR - ( 27 Oct 2020 15:05 )   PT: 18.2 sec;   INR: 1.60 ratio         PTT - ( 27 Oct 2020 15:05 )  PTT:45.0 sec  CARDIAC MARKERS ( 27 Oct 2020 15:05 )  x     / 0.05 ng/mL / x     / x     / x          Urinalysis Basic - ( 27 Oct 2020 15:41 )    Color: Yellow / Appearance: Clear / S.010 / pH: x  Gluc: x / Ketone: Negative  / Bili: Negative / Urobili: Negative mg/dL   Blood: x / Protein: 100 mg/dL / Nitrite: Negative   Leuk Esterase: Moderate / RBC: 11-25 /HPF / WBC >50   Sq Epi: x / Non Sq Epi: Occasional / Bacteria: x        CAPILLARY BLOOD GLUCOSE      POCT Blood Glucose.: 147 mg/dL (28 Oct 2020 10:30)  POCT Blood Glucose.: 151 mg/dL (28 Oct 2020 07:59)  POCT Blood Glucose.: 180 mg/dL (27 Oct 2020 22:47)  POCT Blood Glucose.: 203 mg/dL (27 Oct 2020 14:07)        RADIOLOGY & ADDITIONAL TESTS:  Results Reviewed:   Imaging Personally Reviewed:  Electrocardiogram Personally Reviewed:

## 2020-10-28 NOTE — PROGRESS NOTE ADULT - ASSESSMENT
Mr. Jacobs is 69 y/o M with h/o developmental disability, CHF, HTN, DM, AF taken off apixaban due to anemia/hematuria, CKD not on HD, refused hemodialysis, BPH who was discharged from [facility] 9/3/20 s/p treated for ESBL UT and then was readmitted on 09/17/20 for barbra and uti now comes back again from [facility],  was sent in for hypothermia and in the ER pt's urine is suggestive of UTI as source, will be admitted for urosepsis with ID follow up.

## 2020-10-29 NOTE — PROGRESS NOTE ADULT - SUBJECTIVE AND OBJECTIVE BOX
[facility]  INFECTIOUS DISEASES AND INTERNAL MEDICINE at [place]  =======================================================  Zach Chew MD  Diplomates American Board of Internal Medicine and Infectious Diseases  Tel: [phone]      Fax: [phone]  =======================================================      [id number]  KIMBERLY MARKS is a 70y  Male     Followup- uti, hypothermia  feels well    PAST MEDICAL & SURGICAL HISTORY:  Encounter for blood transfusion    Anemia    History of gross hematuria    History of orthostatic hypotension    Bacterial pneumonia    Muscle weakness (generalized)    2019 novel coronavirus disease (COVID-19)    Falls    Heart failure    Afib    CHF (congestive heart failure)    Gout    Hiatal hernia    BPH (benign prostatic hyperplasia)    Cognitive impairment    Cardiomyopathy    HTN (hypertension)    DM (diabetes mellitus)    S/P appendectomy        Social Hx:    FAMILY HISTORY:  No pertinent family history in first degree relatives  pt cant recal his family medical hx of either parents        Allergies    No Known Drug Allergies  provide glucerna TID , diet coke BID,  diet ice cream with dinner- RD ok (Unknown)    Intolerances        MEDICATIONS  (STANDING):  allopurinol 100 milliGRAM(s) Oral daily  atorvastatin 20 milliGRAM(s) Oral at bedtime  dextrose 5%. 1000 milliLiter(s) (50 mL/Hr) IV Continuous <Continuous>  dextrose 50% Injectable 12.5 Gram(s) IV Push once  dextrose 50% Injectable 25 Gram(s) IV Push once  dextrose 50% Injectable 25 Gram(s) IV Push once  epoetin liam-epbx (RETACRIT) Injectable 31292 Unit(s) SubCutaneous every 7 days  ferrous    sulfate 325 milliGRAM(s) Oral daily  finasteride 5 milliGRAM(s) Oral daily  heparin   Injectable 5000 Unit(s) SubCutaneous every 12 hours  insulin lispro (ADMELOG) corrective regimen sliding scale   SubCutaneous three times a day before meals  insulin lispro (ADMELOG) corrective regimen sliding scale   SubCutaneous at bedtime  lactobacillus acidophilus 1 Tablet(s) Oral three times a day with meals  meropenem  IVPB 1000 milliGRAM(s) IV Intermittent every 12 hours  midodrine. 10 milliGRAM(s) Oral three times a day  multivitamin 1 Tablet(s) Oral daily  oxybutynin 10 milliGRAM(s) Oral daily  povidone iodine 10% Ointment 1 Application(s) Topical every 12 hours  sodium chloride 0.9%. 1000 milliLiter(s) (50 mL/Hr) IV Continuous <Continuous>  tamsulosin 0.4 milliGRAM(s) Oral at bedtime    MEDICATIONS  (PRN):  acetaminophen   Tablet .. 650 milliGRAM(s) Oral every 6 hours PRN Temp greater or equal to 38C (100.4F)  dextrose 40% Gel 15 Gram(s) Oral once PRN Blood Glucose LESS THAN 70 milliGRAM(s)/deciliter  glucagon  Injectable 1 milliGRAM(s) IntraMuscular once PRN Glucose LESS THAN 70 milligrams/deciliter      ANTIMICROBIALS:  meropenem  IVPB 1000 every 12 hours      OTHER MEDS: MEDICATIONS  (STANDING):  acetaminophen   Tablet .. 650 every 6 hours PRN  allopurinol 100 daily  atorvastatin 20 at bedtime  dextrose 40% Gel 15 once PRN  dextrose 50% Injectable 12.5 once  dextrose 50% Injectable 25 once  dextrose 50% Injectable 25 once  epoetin liam-epbx (RETACRIT) Injectable 72712 every 7 days  finasteride 5 daily  glucagon  Injectable 1 once PRN  heparin   Injectable 5000 every 12 hours  insulin lispro (ADMELOG) corrective regimen sliding scale  three times a day before meals  insulin lispro (ADMELOG) corrective regimen sliding scale  at bedtime  midodrine. 10 three times a day  oxybutynin 10 daily  tamsulosin 0.4 at bedtime             REVIEW OF SYSTEMS:  CONSTITUTIONAL:  No Fever or chills  HEENT:  No diplopia or blurred vision.  No earache, sore throat or runny nose.  CARDIOVASCULAR:  No pressure, squeezing, strangling, tightness, heaviness or aching about the chest, neck, axilla or epigastrium.  RESPIRATORY:  No cough, shortness of breath  GASTROINTESTINAL:  No nausea, vomiting or diarrhea.  GENITOURINARY:  No dysuria, frequency or urgency. No Blood in urine  MUSCULOSKELETAL:  no joint aches, no muscle pain  SKIN:  No change in skin, hair or nails.  NEUROLOGIC:  No Headaches, seizures or weakness.  PSYCHIATRIC:  No disorder of thought or mood.  ENDOCRINE:  No heat or cold intolerance  HEMATOLOGICAL:  No easy bruising or bleeding.           I&O's Detail        Physical Exam:  Vital Signs Last 24 Hrs  T(C): 37 (28 Oct 2020 08:08), Max: 37 (28 Oct 2020 08:08)  T(F): 98.6 (28 Oct 2020 08:08), Max: 98.6 (28 Oct 2020 08:08)  HR: 89 (28 Oct 2020 08:08) (75 - 106)  BP: 106/66 (28 Oct 2020 08:08) (102/54 - 113/77)  BP(mean): --  RR: 18 (28 Oct 2020 08:08) (18 - 20)  SpO2: 92% (28 Oct 2020 08:08) (92% - 100%)    GEN: NAD, pleasant axox2  HEENT: normocephalic and atraumatic. EOMI. PERRL.  Anicteric  NECK: Supple.   LUNGS: Clear to auscultation.  HEART: Regular rate and rhythm without murmur.  ABDOMEN: Soft, nontender, and nondistended.  Positive bowel sounds.    : No CVA tenderness  EXTREMITIES: Without any edema. b/l venous stasis dermatitis LE  MSK: No joint swelling  NEUROLOGIC: Cranial nerves II through XII are grossly intact. No Focal Deficits  PSYCHIATRIC: Appropriate affect .  SKIN: No Rash        Labs:                        7.5    5.97  )-----------( 125      ( 28 Oct 2020 07:05 )             24.5   10-28    139  |  104  |  37.0<H>  ----------------------------<  139<H>  4.6   |  23.0  |  2.18<H>    Ca    9.0      28 Oct 2020 07:05    TPro  6.4<L>  /  Alb  2.9<L>  /  TBili  0.3<L>  /  DBili  x   /  AST  21  /  ALT  17  /  AlkPhos  112  10-27      Radiology:  < from: Xray Chest 1 View-PORTABLE IMMEDIATE (10.27.20 @ 15:15) >  INTERPRETATION:  AP chest on October 27, 2020 at 3:03 PM. Patient has sepsis.    Heart is enlarged.    Somewhat prominent interstitial markings and there right lung and left perihilar area again noted.    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    Chest is similar to August 24 of this year.    IMPRESSION: Stable chest findings as above. Somewhat prominent interstitial markings again noted. Chronic dislocation left humeral head. Heart is enlarged.        < end of copied text >

## 2020-10-29 NOTE — PROGRESS NOTE ADULT - ATTENDING COMMENTS
I personally seen and examined neeta, author reviewed available labs, imaging, consultants recommendations, discuss results and plan of care with patient and patient family - sister over phone    Focal PE:  Genaral : NAD  Lungs: good air entry b/l, no wheezing or crackles  CVS: RRR, s1s2, no M/R/G, chronic venous insufficiency with +1-2, pedal pulses palpable  Abd: soft   Skin: diper rash vs intertrigo in b/l groin, scrotum and inner thights, chronic appearing 2x3 cm wound with no apparent infection, pus or bleeding.    Agree with above assessment and plan  Updated family over phone

## 2020-10-29 NOTE — PROGRESS NOTE ADULT - SUBJECTIVE AND OBJECTIVE BOX
Chief Complaint:  hypothermia/UTI    HPI/ OVERNIGHT EVENTS:   Pt is seen and examined at bedside   He is alert and oriented  Pt states he is "feeling well."  Denie     Patient denies chest pain, SOB, abd pain, N/V, fever, chills, dysuria or any other complaints. All remainder ROS negative.     MEDICATIONS  (STANDING):  allopurinol 100 milliGRAM(s) Oral daily  atorvastatin 20 milliGRAM(s) Oral at bedtime  dextrose 5%. 1000 milliLiter(s) (50 mL/Hr) IV Continuous <Continuous>  dextrose 50% Injectable 12.5 Gram(s) IV Push once  dextrose 50% Injectable 25 Gram(s) IV Push once  dextrose 50% Injectable 25 Gram(s) IV Push once  epoetin liam-epbx (RETACRIT) Injectable 47257 Unit(s) SubCutaneous every 7 days  ferrous    sulfate 325 milliGRAM(s) Oral daily  finasteride 5 milliGRAM(s) Oral daily  heparin   Injectable 5000 Unit(s) SubCutaneous every 12 hours  insulin lispro (ADMELOG) corrective regimen sliding scale   SubCutaneous three times a day before meals  insulin lispro (ADMELOG) corrective regimen sliding scale   SubCutaneous at bedtime  lactobacillus acidophilus 1 Tablet(s) Oral three times a day with meals  meropenem  IVPB 1000 milliGRAM(s) IV Intermittent every 12 hours  midodrine. 10 milliGRAM(s) Oral three times a day  multivitamin 1 Tablet(s) Oral daily  oxybutynin 10 milliGRAM(s) Oral daily  povidone iodine 10% Ointment 1 Application(s) Topical every 12 hours  sodium chloride 0.9%. 1000 milliLiter(s) (50 mL/Hr) IV Continuous <Continuous>  tamsulosin 0.4 milliGRAM(s) Oral at bedtime    MEDICATIONS  (PRN):  acetaminophen   Tablet .. 650 milliGRAM(s) Oral every 6 hours PRN Temp greater or equal to 38C (100.4F)  dextrose 40% Gel 15 Gram(s) Oral once PRN Blood Glucose LESS THAN 70 milliGRAM(s)/deciliter  glucagon  Injectable 1 milliGRAM(s) IntraMuscular once PRN Glucose LESS THAN 70 milligrams/deciliter        I&O's Summary      PHYSICAL EXAM:  Vital Signs Last 24 Hrs  T(C): 37 (28 Oct 2020 08:08), Max: 37 (28 Oct 2020 08:08)  T(F): 98.6 (28 Oct 2020 08:08), Max: 98.6 (28 Oct 2020 08:08)  HR: 89 (28 Oct 2020 08:08) (75 - 106)  BP: 106/66 (28 Oct 2020 08:08) (102/54 - 131/84)  BP(mean): --  RR: 18 (28 Oct 2020 08:08) (18 - 20)  SpO2: 92% (28 Oct 2020 08:08) (92% - 100%)        CONSTITUTIONAL: NAD, well-developed, well-groomed  ENMT: Moist oral mucosa, no pharyngeal injection or exudates; normal dentition; No JVD  RESPIRATORY: Normal respiratory effort; lungs are clear to auscultation bilaterally  CARDIOVASCULAR: Regular rate and rhythm, normal S1 and S2, no murmur/rub/gallop; No lower extremity edema; Peripheral pulses are 2+ bilaterally  ABDOMEN: Nontender to palpation, normoactive bowel sounds, no rebound/guarding; No hepatosplenomegaly  MUSCLOSKELETAL:  Normal gait; no clubbing or cyanosis of digits; no joint swelling or tenderness to palpation  PSYCH: A+O to person, place, and time; affect appropriate  NEUROLOGY: CN 2-12 are intact and symmetric; no gross sensory deficits; was observed moving all 4 ext against gravity cooperating with exam.   SKIN: Chronic appears wound over 1st PT, with some tenderness over dorsal servafe and minimal erythema, no apparent bleeding or pus,     LABS:                                        7.5    5.14  )-----------( 115      ( 29 Oct 2020 07:57 )             25.0     10-29    138  |  103  |  45.0<H>  ----------------------------<  134<H>  4.4   |  22.0  |  2.34<H>    Ca    8.8      29 Oct 2020 07:57    LIVER FUNCTIONS - ( 27 Oct 2020 15:05 )  Alb: 2.9 g/dL / Pro: 6.4 g/dL / ALK PHOS: 112 U/L / ALT: 17 U/L / AST: 21 U/L / GGT: x           PT/INR - ( 27 Oct 2020 15:05 )   PT: 18.2 sec;   INR: 1.60 ratio      PTT - ( 27 Oct 2020 15:05 )  PTT:45.0 sec       PTT - ( 27 Oct 2020 15:05 )  PTT:45.0 sec  CARDIAC MARKERS ( 27 Oct 2020 15:05 )  x     / 0.05 ng/mL / x     / x     / x          Urinalysis Basic - ( 27 Oct 2020 15:41 )  Color: Yellow / Appearance: Clear / S.010 / pH: x  Gluc: x / Ketone: Negative  / Bili: Negative / Urobili: Negative mg/dL   Blood: x / Protein: 100 mg/dL / Nitrite: Negative   Leuk Esterase: Moderate / RBC: 11-25 /HPF / WBC >50   Sq Epi: x / Non Sq Epi: Occasional / Bacteria: x        CAPILLARY BLOOD GLUCOSE  POCT Blood Glucose.: 147 mg/dL (28 Oct 2020 10:30)  POCT Blood Glucose.: 151 mg/dL (28 Oct 2020 07:59)  POCT Blood Glucose.: 180 mg/dL (27 Oct 2020 22:47)  POCT Blood Glucose.: 203 mg/dL (27 Oct 2020 14:07)        RADIOLOGY & ADDITIONAL TESTS:  Results Reviewed:   Imaging Personally Reviewed:  Electrocardiogram Personally Reviewed: Chief Complaint:  hypothermia/UTI    HPI/ OVERNIGHT EVENTS:   Pt is seen and examined at bedside with presence of pt's sister.  He is alert & oriented and pleasant and cooperative on exam.  Pt states he is "feeling well." He c/o mild pruritis of his LE.  Denies fever, chills, n/v/d, abdominal pain, dysuria, SOB, CP, palpitations. All other ROS are negative.      MEDICATIONS  (STANDING):  allopurinol 100 milliGRAM(s) Oral daily  atorvastatin 20 milliGRAM(s) Oral at bedtime  dextrose 5%. 1000 milliLiter(s) (50 mL/Hr) IV Continuous <Continuous>  dextrose 50% Injectable 12.5 Gram(s) IV Push once  dextrose 50% Injectable 25 Gram(s) IV Push once  dextrose 50% Injectable 25 Gram(s) IV Push once  epoetin liam-epbx (RETACRIT) Injectable 19916 Unit(s) SubCutaneous every 7 days  ferrous    sulfate 325 milliGRAM(s) Oral daily  finasteride 5 milliGRAM(s) Oral daily  heparin   Injectable 5000 Unit(s) SubCutaneous every 12 hours  insulin lispro (ADMELOG) corrective regimen sliding scale   SubCutaneous three times a day before meals  insulin lispro (ADMELOG) corrective regimen sliding scale   SubCutaneous at bedtime  lactobacillus acidophilus 1 Tablet(s) Oral three times a day with meals  meropenem  IVPB 1000 milliGRAM(s) IV Intermittent every 12 hours  midodrine. 10 milliGRAM(s) Oral three times a day  multivitamin 1 Tablet(s) Oral daily  oxybutynin 10 milliGRAM(s) Oral daily  sodium chloride 0.9%. 1000 milliLiter(s) (50 mL/Hr) IV Continuous <Continuous>  tamsulosin 0.4 milliGRAM(s) Oral at bedtime    MEDICATIONS  (PRN):  acetaminophen   Tablet .. 650 milliGRAM(s) Oral every 6 hours PRN Temp greater or equal to 38C (100.4F)  dextrose 40% Gel 15 Gram(s) Oral once PRN Blood Glucose LESS THAN 70 milliGRAM(s)/deciliter  glucagon  Injectable 1 milliGRAM(s) IntraMuscular once PRN Glucose LESS THAN 70 milligrams/deciliter      PHYSICAL EXAM:  Vital Signs Last 24 Hrs  T(C): 36.4 (29 Oct 2020 08:21), Max: 36.4 (29 Oct 2020 00:08)  T(F): 97.5 (29 Oct 2020 08:21), Max: 97.6 (29 Oct 2020 00:08)  HR: 84 (29 Oct 2020 08:21) (67 - 87)  BP: 116/76 (29 Oct 2020 08:21) (104/66 - 150/90)  RR: 19 (29 Oct 2020 08:21) (18 - 19)  SpO2: 97% (29 Oct 2020 05:23) (97% - 100%)    CONSTITUTIONAL: NAD, well-developed, well-groomed  NECK: No JVD, neck supple, trachea midline  RESPIRATORY: Normal respiratory effort; lungs are clear to auscultation b/l  CARDIOVASCULAR: RRR,  normal S1 and S2, no murmur/rub/gallop;   PV: venous statis b/l, dressing over right foot, small ulcer on dorsal aspect of L foot, pulses palpable  ABDOMEN: Nontender to palpation, normoactive bowel sounds, no rebound/guarding; No hepatosplenomegaly  MUSCLOSKELETAL:  Normal gait; no clubbing or cyanosis of digits; no joint swelling or tenderness to palpation  PSYCH: A+O to person, place, and time; affect appropriate  NEUROLOGY: CN 2-12 are intact and symmetric; no gross sensory deficits  SKIN:       LABS:                                        7.5    5.14  )-----------( 115      ( 29 Oct 2020 07:57 )             25.0     10-29    138  |  103  |  45.0<H>  ----------------------------<  134<H>  4.4   |  22.0  |  2.34<H>    Ca    8.8      29 Oct 2020 07:57    LIVER FUNCTIONS - ( 27 Oct 2020 15:05 )  Alb: 2.9 g/dL / Pro: 6.4 g/dL / ALK PHOS: 112 U/L / ALT: 17 U/L / AST: 21 U/L / GGT: x           PT/INR - ( 27 Oct 2020 15:05 )   PT: 18.2 sec;   INR: 1.60 ratio      PTT - ( 27 Oct 2020 15:05 )  PTT:45.0 sec       PTT - ( 27 Oct 2020 15:05 )  PTT:45.0 sec  CARDIAC MARKERS ( 27 Oct 2020 15:05 )  x     / 0.05 ng/mL / x     / x     / x          Urinalysis Basic - ( 27 Oct 2020 15:41 )  Color: Yellow / Appearance: Clear / S.010 / pH: x  Gluc: x / Ketone: Negative  / Bili: Negative / Urobili: Negative mg/dL   Blood: x / Protein: 100 mg/dL / Nitrite: Negative   Leuk Esterase: Moderate / RBC: 11-25 /HPF / WBC >50   Sq Epi: x / Non Sq Epi: Occasional / Bacteria: x      CAPILLARY BLOOD GLUCOSE  POCT Blood Glucose.: 231 mg/dL (29 Oct 2020 10:40)  POCT Blood Glucose.: 140 mg/dL (29 Oct 2020 07:45)  POCT Blood Glucose.: 144 mg/dL (28 Oct 2020 21:28)  POCT Blood Glucose.: 166 mg/dL (28 Oct 2020 19:30)

## 2020-10-29 NOTE — PROGRESS NOTE ADULT - ASSESSMENT
1) MARSHAL on CKD III  2) Anemia  3) BPH  4) UTI    SCr at baseline  Maintain euvolemia  Continue flomax, finasteride; oxybutynin  Continue epogen 10k units weekly for renal anemia    Will follow

## 2020-10-29 NOTE — PROGRESS NOTE ADULT - SUBJECTIVE AND OBJECTIVE BOX
[facility] DIVISION OF KIDNEY DISEASES AND HYPERTENSION -- FOLLOW UP NOTE  --------------------------------------------------------------------------------  Chief Complaint: barbra on ckd    24 hour events/subjective:  no acute event        PAST HISTORY  --------------------------------------------------------------------------------  No significant changes to PMH, PSH, FHx, SHx, unless otherwise noted    ALLERGIES & MEDICATIONS  --------------------------------------------------------------------------------  Allergies    No Known Drug Allergies  provide glucerna TID , diet coke BID,  diet ice cream with dinner- RD ok (Unknown)    Intolerances      Standing Inpatient Medications  allopurinol 100 milliGRAM(s) Oral daily  atorvastatin 20 milliGRAM(s) Oral at bedtime  dextrose 5%. 1000 milliLiter(s) IV Continuous <Continuous>  dextrose 50% Injectable 12.5 Gram(s) IV Push once  dextrose 50% Injectable 25 Gram(s) IV Push once  dextrose 50% Injectable 25 Gram(s) IV Push once  epoetin liam-epbx (RETACRIT) Injectable 67120 Unit(s) SubCutaneous every 7 days  ferrous    sulfate 325 milliGRAM(s) Oral daily  finasteride 5 milliGRAM(s) Oral daily  heparin   Injectable 5000 Unit(s) SubCutaneous every 12 hours  insulin lispro (ADMELOG) corrective regimen sliding scale   SubCutaneous three times a day before meals  insulin lispro (ADMELOG) corrective regimen sliding scale   SubCutaneous at bedtime  lactobacillus acidophilus 1 Tablet(s) Oral three times a day with meals  meropenem  IVPB 1000 milliGRAM(s) IV Intermittent every 12 hours  midodrine. 10 milliGRAM(s) Oral three times a day  multivitamin 1 Tablet(s) Oral daily  oxybutynin 10 milliGRAM(s) Oral daily  sodium chloride 0.9%. 1000 milliLiter(s) IV Continuous <Continuous>  tamsulosin 0.4 milliGRAM(s) Oral at bedtime    PRN Inpatient Medications  acetaminophen   Tablet .. 650 milliGRAM(s) Oral every 6 hours PRN  dextrose 40% Gel 15 Gram(s) Oral once PRN  glucagon  Injectable 1 milliGRAM(s) IntraMuscular once PRN      REVIEW OF SYSTEMS  --------------------------------------------------------------------------------  Gen: No weight changes, fatigue, fevers/chills, weakness  Skin: No rashes  Head/Eyes/Ears/Mouth: No headache; Normal hearing; Normal vision w/o blurriness; No sinus pain/discomfort, sore throat  Respiratory: No dyspnea, cough, wheezing, hemoptysis  CV: No chest pain, PND, orthopnea  GI: No abdominal pain, diarrhea, constipation, nausea, vomiting, melena, hematochezia  : No increased frequency, dysuria, hematuria, nocturia  MSK: No joint pain/swelling; no back pain; no edema  Neuro: No dizziness/lightheadedness, weakness, seizures, numbness, tingling  Heme: No easy bruising or bleeding  Endo: No heat/cold intolerance  Psych: No significant nervousness, anxiety, stress, depression    All other systems were reviewed and are negative, except as noted.    VITALS/PHYSICAL EXAM  --------------------------------------------------------------------------------  T(C): 36.4 (10-29-20 @ 08:21), Max: 36.4 (10-29-20 @ 00:08)  HR: 84 (10-29-20 @ 08:21) (67 - 87)  BP: 116/76 (10-29-20 @ 08:21) (104/66 - 150/90)  RR: 19 (10-29-20 @ 08:21) (18 - 19)  SpO2: 97% (10-29-20 @ 05:23) (97% - 100%)  Wt(kg): --  Height (cm): 175.3 (10-27-20 @ 14:06)  Weight (kg): 83.9 (10-27-20 @ 14:06)  BMI (kg/m2): 27.3 (10-27-20 @ 14:06)  BSA (m2): 2 (10-27-20 @ 14:06)      Physical Exam:  	Gen: NAD  	HEENT: , supple neck,  	Pulm: CTA B/L  	CV: RRR, S1S2; no rub  	Back: No spinal or CVA tenderness; no sacral edema  	Abd: +BS, soft, nontender/nondistended  	: rodriguez catheter+  	UE: Warm,  no edema; no asterixis  	LE: Warm, no edema  	Neuro: No focal deficit  	Psych: Normal affect and mood  	Skin: Warm  	  LABS/STUDIES  --------------------------------------------------------------------------------              7.5    5.14  >-----------<  115      [10-29-20 @ 07:57]              25.0     138  |  103  |  45.0  ----------------------------<  134      [10-29-20 @ 07:57]  4.4   |  22.0  |  2.34        Ca     8.8     [10-29-20 @ 07:57]    TPro  6.4  /  Alb  2.9  /  TBili  0.3  /  DBili  x   /  AST  21  /  ALT  17  /  AlkPhos  112  [10-27-20 @ 15:05]    PT/INR: PT 18.2 , INR 1.60       [10-27-20 @ 15:05]  PTT: 45.0       [10-27-20 @ 15:05]    Troponin 0.05      [10-27-20 @ 15:05]    Creatinine Trend:  SCr 2.34 [10-29 @ 07:57]  SCr 2.18 [10-28 @ 07:05]  SCr 1.95 [10-27 @ 15:05]  SCr 1.85 [10-12 @ 21:19]    Urinalysis - [10-27-20 @ 15:41]      Color Yellow / Appearance Clear / SG 1.010 / pH 6.0      Gluc Negative / Ketone Negative  / Bili Negative / Urobili Negative       Blood Large / Protein 100 / Leuk Est Moderate / Nitrite Negative      RBC 11-25 / WBC >50 / Hyaline  / Gran  / Sq Epi  / Non Sq Epi Occasional / Bacteria       Ferritin 343      [04-15-20 @ 23:46]  TSH 3.38      [10-27-20 @ 15:05]

## 2020-10-29 NOTE — PROGRESS NOTE ADULT - ASSESSMENT
Mr. Jacobs is 71 y/o male with h/o developmental disability, CHF, HTN, DM, AF taken off apixaban due to anemia/hematuria, CKD not on HD, refused hemodialysis, BPH who was discharged from [facility] 9/3/20 s/p treated for ESBL UT and then was readmitted on 09/17/20 for MARSHAL and UTI now comes back again from [facility],  was sent in for hypothermia and in the ER pt's urine is suggestive of UTI as source, will be admitted for urosepsis with ID f/u.      1: Urinary tract infection without hematuria, site unspecified.  Sepsis 2/2 UTI and ?superimposed infection on b/l  intertrigo in setting of chronic wound on R foot  - Hx of ESBL before  - C/w Meropenem  - C/w Nystatin powder  - ID consult was called  - Appreciate Podiatrist recommendations - would care as per Podiatry note  - Gentle IV hydration    2: Hypothermia  - Improved, temp today is 36.4  - Pt denies any chills     3: Sepsis without acute organ dysfunction, due to unspecified organism.  - Pt receives Meropenem adjusted for CKD  - Pt is being followed by infectious disease     4: Stage 3b chronic kidney disease.    - Cr 1.95 > 2.15  - Continue gentle IV hydration.   - Seen by nephrology today  - Pt's meropenem was adjusted for renal function    5: IDDM with hyperglycemia, without long-term current use of insulin.    - Diabetic diet  - LDSS for now  - will adjust Insulin base on POC.     6: Foot ulcer.   - Wound care as per above.  - Pt regularly sees wound care as outpatient    Dry gangrene of R hallux.  - Pt is being followed by podiatry.  - Pt states he usually sees a podiatrist as out patient as well.     7: Anemia.    - Likely chronic and multifactorial  - Hb stable compared to old labs- Hgb 7.5 today.  Close f/u of daily cbc  - Transfuse as needed.   - Continue mvi and feso4.  - Will be started on Epo by nephrologist    DVT ppx- heparin q8h  Code - DNI/DNR  - healthy proxy sister Avani. Mr. Jacobs is 69 y/o male with h/o developmental disability, CHF, HTN, DM, AF taken off apixaban due to anemia/hematuria, CKD not on HD, refused hemodialysis, BPH who was discharged from [facility] 9/3/20 s/p treated for ESBL UT and then was readmitted on 09/17/20 for MARSHAL and UTI now [facility] again from [facility],  was sent in for hypothermia and in the ER pt's urine is suggestive of UTI as source, will be admitted for urosepsis with ID f/u.      1: Urinary tract infection without hematuria, site unspecified.  Sepsis 2/2 UTI and ?superimposed infection on b/l  intertrigo in setting of chronic wound on R foot  - Hx of ESBL before  - C/w Meropenem and Nystatin powder  - ID consult was called  - Appreciate Podiatrist recommendations - would care as per Podiatry note  - Gentle IV hydration    2: Hypothermia  - Improved, temp today is 36.4  - Pt denies any chills     3: Sepsis without acute organ dysfunction, due to unspecified organism.  - Pt receives Meropenem adjusted for CKD  - Pt is being followed by infectious disease     4: Stage 3b chronic kidney disease.    - Cr is 1.95 - 2.34. Today is 2.34  - Continue gentle IV hydration.   - Seen by nephrology today  - Pt's meropenem was adjusted for renal function  - Continue flomax, finasteride; oxybutynin as per nephrology    5: IDDM with hyperglycemia, without long-term current use of insulin.    - HbA1C was 6.2 on 10/28/2020  -  Pt is on a diabetic diet  - LDSS for now  - Will adjust insulin base on POC.     6: Dry gangrene of R hallux.  - Pt was seen by podiatry on 10/28/2020 who recommend continued local wound care with betadine application to dry gangrene  - Pt states he usually sees a podiatrist as out patient as well.   He is to continue to follow with podiatry once discharged.    7. Venous stasis  - pt has dry skin and gangrene as above  - Pt c/o mild pruritis   - Will order calamine lotion PRN  - Elevate extremities as tolerated     8: Anemia  - Likely chronic and multifactorial  - Hb stable compared to old labs- Hb 7.5 today.  Close f/u of daily cbc  - Transfuse as needed.   - Continue multi-vitamin and feso4.  - Started on Epo by nephrologist yesterday, 10/28/2020    DVT ppx- heparin q8h  Code - DNI/DNR  - healthy proxy sister Avani. Mr. Jacobs is 69 y/o male with h/o developmental disability, CHF, HTN, DM, AF taken off apixaban due to anemia/hematuria, CKD not on HD, refused hemodialysis, BPH who was discharged from [facility] 9/3/20 s/p treated for ESBL UT and then was readmitted on 09/17/20 for MARSHAL and UTI now comes back again from [facility],  was sent in for hypothermia and in the ER pt's urine is suggestive of UTI as source, will be admitted for urosepsis with ID f/u.      1: Urinary tract infection without hematuria, site unspecified.  Sepsis 2/2 UTI and ?superimposed infection on b/l  intertrigo in setting of chronic wound on R foot  - Hx of ESBL before  - C/w Meropenem and Nystatin powder  - ID recommendations appreciated - ordered pan CT scan w/o contrast  - Appreciate Podiatrist recommendations - would care as per Podiatry note  - c/w IV hydration    2: Hypothermia  - Improved, temp today is 36.4  - Pt denies any chills     3: Sepsis without acute organ dysfunction, due to unspecified organism.  - Pt receives Meropenem adjusted for CKD  - Pt is being followed by infectious disease     4: Stage 3b chronic kidney disease.    - Cr is 1.95 - 2.34. Today is 2.34  - Continue gentle IV hydration.   - Seen by nephrology today  - Pt's meropenem was adjusted for renal function  - Continue flomax, finasteride; oxybutynin as per nephrology,  - ct abd/pelvis as per above    5: IDDM with hyperglycemia, without long-term current use of insulin.    - HbA1C was 6.2 on 10/28/2020  -  Pt is on a diabetic diet  - LDSS for now  - Will adjust insulin base on POC.     6: Dry gangrene of R hallux.  - Pt was seen by podiatry on 10/28/2020 who recommend continued local wound care with betadine application to dry gangrene  - Pt states he usually sees a podiatrist as out patient as well.   He is to continue to follow with podiatry once discharged.    7. Venous stasis  - pt has dry skin and gangrene as above  - Pt c/o mild pruritis   - Will order calamine lotion PRN  - Elevate extremities as tolerated     8: Anemia  - Likely chronic and multifactorial  - Hb stable compared to old labs- Hb 7.5 today.  Close f/u of daily cbc  - Transfuse as needed.   - Continue multi-vitamin and feso4.  - Started on Epo by nephrologist yesterday, 10/28/2020    DVT ppx- heparin q8h  Code - DNI/DNR  - healthy proxy sister Avani- update over phone

## 2020-10-29 NOTE — PROGRESS NOTE ADULT - ASSESSMENT
69 y/o M with h/o developmental disability, CHF, HTN, DM, AF taken off apixaban due to anemia/hematuria, CKD not on HD, refused hemodialysis, BPH who was discharged from [facility] 9/3/20 s/p treated for ESBL UT and then was readmitted on 09/17/20 for marshal and uti now comes back again from [facility],  was sent in for hypothermia and in the ER pt's urine is suggestive of UTI as source. pt. has been on warming blanket in the ER. pt. denies any dysuria or urgency. no abd. pain. no n/v/d. no stool per rectum. no cp, no sob, no cough, no fever. no dizziness reported to me and giving good history. pt. has been making urine.  Temp upon arrival in the ER 90.1. As per pt and her sister pt. does not have any swallow difficulty.  (27 Oct 2020 17:21)    Hypothermia  UTI  MARSHAL    -normothermic  - UA +  - f/u Ucx, bcx  - prior h/o ESBL proteus mirabalis, yeast in urine  - Continue meropenem adjusted for renal function  - consider renal imaging  - Trend Fever  - Trend Leukocytosis      Will Follow

## 2020-10-30 NOTE — PROGRESS NOTE ADULT - ASSESSMENT
69 y/o M with h/o developmental disability, CHF, HTN, DM, AF taken off apixaban due to anemia/hematuria, CKD not on HD, refused hemodialysis, BPH who was discharged from [facility] 9/3/20 s/p treated for ESBL UT and then was readmitted on 09/17/20 for marshal and uti now comes back again from [facility],  was sent in for hypothermia and in the ER pt's urine is suggestive of UTI as source. pt. has been on warming blanket in the ER. pt. denies any dysuria or urgency. no abd. pain. no n/v/d. no stool per rectum. no cp, no sob, no cough, no fever. no dizziness reported to me and giving good history. pt. has been making urine.  Temp upon arrival in the ER 90.1. As per pt and her sister pt. does not have any swallow difficulty.  (27 Oct 2020 17:21)    Hypothermia  Foot ulcer  UTI  MARSHAL    - normothermic  - UA +  -ucx , bcx ngtd  - prior h/o ESBL proteus mirabalis, yeast in urine  - Continue meropenem adjusted for renal function  - ct c/ap as above-no clear alternate infectious source for hypothermic episode. no symptoms of pneumonia  - per sister he has hypothermia everyday, however has not had any further episodes since admission  - seen by podiatry for foot ulcer  - suggest complete meropenem x 5 days  - w/u for endocrine/autoimmune causes of hypothermia        please call with questions

## 2020-10-30 NOTE — CHART NOTE - NSCHARTNOTEFT_GEN_A_CORE
Called by RN due to Pt having a rash to Groin/Diaper area  Pt wears diapers 2/2 incontinence   + Dark red Plaque to Groin/Diaper area  + Lue/thigh Mole with dry blood  from base  No exudate    Hold Barrier cream to Groin/Diaper Area until Fungal Rash fully resolved   Nystatin cream BID x 7 Days to affected area  Bacitracin oint to L Upper medial thigh Mole-Observe for Active Bleeding   Continue to Monitor Pt  Call PA if any changes in Pt Status

## 2020-10-30 NOTE — PROGRESS NOTE ADULT - SUBJECTIVE AND OBJECTIVE BOX
[facility] DIVISION OF KIDNEY DISEASES AND HYPERTENSION -- FOLLOW UP NOTE  --------------------------------------------------------------------------------  Chief Complaint:  barbra on ckd    24 hour events/subjective:  no acute event    PAST HISTORY  --------------------------------------------------------------------------------  No significant changes to PMH, PSH, FHx, SHx, unless otherwise noted    ALLERGIES & MEDICATIONS  --------------------------------------------------------------------------------  Allergies    No Known Drug Allergies  provide glucerna TID , diet coke BID,  diet ice cream with dinner- RD ok (Unknown)    Intolerances      Standing Inpatient Medications  allopurinol 100 milliGRAM(s) Oral daily  atorvastatin 20 milliGRAM(s) Oral at bedtime  dextrose 5%. 1000 milliLiter(s) IV Continuous <Continuous>  dextrose 50% Injectable 12.5 Gram(s) IV Push once  dextrose 50% Injectable 25 Gram(s) IV Push once  dextrose 50% Injectable 25 Gram(s) IV Push once  epoetin liam-epbx (RETACRIT) Injectable 21611 Unit(s) SubCutaneous every 7 days  ferrous    sulfate 325 milliGRAM(s) Oral daily  finasteride 5 milliGRAM(s) Oral daily  heparin   Injectable 5000 Unit(s) SubCutaneous every 12 hours  insulin lispro (ADMELOG) corrective regimen sliding scale   SubCutaneous three times a day before meals  insulin lispro (ADMELOG) corrective regimen sliding scale   SubCutaneous at bedtime  lactobacillus acidophilus 1 Tablet(s) Oral three times a day with meals  meropenem  IVPB 1000 milliGRAM(s) IV Intermittent every 12 hours  midodrine. 10 milliGRAM(s) Oral three times a day  multivitamin 1 Tablet(s) Oral daily  oxybutynin 10 milliGRAM(s) Oral daily  tamsulosin 0.4 milliGRAM(s) Oral at bedtime    PRN Inpatient Medications  acetaminophen   Tablet .. 650 milliGRAM(s) Oral every 6 hours PRN  calamine/zinc oxide Lotion 1 Application(s) Topical two times a day PRN  dextrose 40% Gel 15 Gram(s) Oral once PRN  glucagon  Injectable 1 milliGRAM(s) IntraMuscular once PRN      REVIEW OF SYSTEMS  --------------------------------------------------------------------------------  Gen: No weight changes, fatigue, fevers/chills, weakness  Skin: No rashes  Head/Eyes/Ears/Mouth: No headache; Normal hearing; Normal vision w/o blurriness; No sinus pain/discomfort, sore throat  Respiratory: No dyspnea, cough, wheezing, hemoptysis  CV: No chest pain, PND, orthopnea  GI: No abdominal pain, diarrhea, constipation, nausea, vomiting, melena, hematochezia  : No increased frequency, dysuria, hematuria, nocturia  MSK: No joint pain/swelling; no back pain; no edema  Neuro: No dizziness/lightheadedness, weakness, seizures, numbness, tingling  Heme: No easy bruising or bleeding  Endo: No heat/cold intolerance  Psych: No significant nervousness, anxiety, stress, depression    All other systems were reviewed and are negative, except as noted.    VITALS/PHYSICAL EXAM  --------------------------------------------------------------------------------  T(C): 36.4 (10-30-20 @ 08:41), Max: 36.4 (10-30-20 @ 08:41)  HR: 76 (10-30-20 @ 08:41) (67 - 82)  BP: 101/65 (10-30-20 @ 08:41) (101/65 - 112/79)  RR: 19 (10-30-20 @ 08:41) (18 - 19)  SpO2: 96% (10-30-20 @ 08:41) (96% - 100%)  Wt(kg): --        Physical Exam:  	Gen: NAD  	HEENT: , supple neck,  	Pulm: CTA B/L  	CV: RRR, S1S2; no rub  	Back: No spinal or CVA tenderness; no sacral edema  	Abd: +BS, soft, nontender/nondistended  	: rodriguez catheter+  	UE: Warm,  no edema; no asterixis  	LE: Warm, no edema  	Neuro: No focal deficit  	Psych: Normal affect and mood  	Skin: Warm    LABS/STUDIES  --------------------------------------------------------------------------------              7.2    5.22  >-----------<  118      [10-30-20 @ 08:10]              23.8     137  |  103  |  48.0  ----------------------------<  118      [10-30-20 @ 07:34]  4.9   |  20.0  |  2.28        Ca     8.6     [10-30-20 @ 07:34]            Creatinine Trend:  SCr 2.28 [10-30 @ 07:34]  SCr 2.34 [10-29 @ 07:57]  SCr 2.18 [10-28 @ 07:05]  SCr 1.95 [10-27 @ 15:05]  SCr 1.85 [10-12 @ 21:19]    Urinalysis - [10-27-20 @ 15:41]      Color Yellow / Appearance Clear / SG 1.010 / pH 6.0      Gluc Negative / Ketone Negative  / Bili Negative / Urobili Negative       Blood Large / Protein 100 / Leuk Est Moderate / Nitrite Negative      RBC 11-25 / WBC >50 / Hyaline  / Gran  / Sq Epi  / Non Sq Epi Occasional / Bacteria       Ferritin 343      [04-15-20 @ 23:46]  TSH 3.38      [10-27-20 @ 15:05]

## 2020-10-30 NOTE — PROGRESS NOTE ADULT - ASSESSMENT
Mr. Jacobs is 71 y/o male with h/o developmental disability, CHF, HTN, DM, AF taken off apixaban due to anemia/hematuria, CKD not on HD, refused hemodialysis, BPH who was discharged from [facility] 9/3/20 s/p treated for ESBL UT and then was readmitted on 09/17/20 for MARSHAL and UTI now comes back again from nursing home ACLD,  was sent in for hypothermia and in the ER pt's urine is suggestive of UTI as source, will be admitted for urosepsis with ID f/u.      1: Urinary tract infection without hematuria, site unspecified.  Sepsis 2/2 UTI and ?superimposed infection on b/l  intertrigo in setting of chronic wound on R foot  - Hx of ESBL before  - C/w Meropenem and Nystatin powder  - ID recommendations appreciated - ordered pan CT scan w/o contrast  - Appreciate Podiatrist recommendations - would care as per Podiatry note  - c/w IV hydration    2: Hypothermia  - Improved, temp today is 36.4  - Pt denies any chills     3: Sepsis without acute organ dysfunction, due to unspecified organism.  - Pt receives Meropenem adjusted for CKD  - Pt is being followed by infectious disease     4: Stage 3b chronic kidney disease.    - Cr is 1.95 - 2.34. Today is 2.34  - Continue gentle IV hydration.   - Seen by nephrology today  - Pt's meropenem was adjusted for renal function  - Continue flomax, finasteride; oxybutynin as per nephrology,  - ct abd/pelvis as per above    5: IDDM with hyperglycemia, without long-term current use of insulin.    - HbA1C was 6.2 on 10/28/2020  -  Pt is on a diabetic diet  - LDSS for now  - Will adjust insulin base on POC.     6: Dry gangrene of R hallux.  - Pt was seen by podiatry on 10/28/2020 who recommend continued local wound care with betadine application to dry gangrene  - Pt states he usually sees a podiatrist as out patient as well.   He is to continue to follow with podiatry once discharged.    7. Venous stasis  - pt has dry skin and gangrene as above  - Pt c/o mild pruritis   - Will order calamine lotion PRN  - Elevate extremities as tolerated     8: Anemia  - Likely chronic and multifactorial  - Hb stable compared to old labs- Hb 7.5 today.  Close f/u of daily cbc  - Transfuse as needed.   - Continue multi-vitamin and feso4.  - Started on Epo by nephrologist yesterday, 10/28/2020    DVT ppx- heparin q8h  Code - DNI/DNR  - healthy proxy sister Avani- update over phone     Mr. Jacobs is 69 y/o male with h/o developmental disability, CHF, HTN, DM, AF taken off apixaban due to anemia/hematuria, CKD not on HD, refused hemodialysis, BPH who was discharged from [facility] 9/3/20 s/p treated for ESBL UT and then was readmitted on 09/17/20 for MARSHAL and UTI now comes back again from custodial ACLD,  was sent in for hypothermia and in the ER pt's urine is suggestive of UTI as source.  Admitted for urosepsis with ID f/u.      1: Urinary tract infection without hematuria, site unspecified.  Sepsis 2/2 UTI and ?superimposed infection on b/l  intertrigo in setting of chronic wound on R foot  - Hx of ESBL before  - C/w Meropenem and Nystatin powder. IV abx will be stopped tomorrow.  - Pt had CT scan w/o contrast on 10/29/2020 as per ID recommendation  - Appreciate Podiatrist recommendations - would care as per Podiatry note  - C/w IV hydration    2: Hypothermia  - Improved, temp today is 36.4 C  - Pt denies chills     3: Sepsis without acute organ dysfunction, due to unspecified organism.  - Pt receives Meropenem adjusted for CKD  - Pt is being followed by ID     4: Stage 3b chronic kidney disease.    - Cr is 1.95 - 2.34. Today is 2.28  - Continue gentle IV hydration.   - Pt has been followed by nephrology  - Pt's meropenem was adjusted for renal function  - Continue Flomax, finasteride; oxybutynin as per nephrology  - CT abd/pelvis as per above    5. Mild b/l hydronephrosis  - As per infectious disease recommendation US renal done on 10/29/2020 revealed 10.3 cm of L kidney and 10.5 cm of R kidney  - Continue bladder scan  - Continue Flomax, finasteride; oxybutynin as per nephrology    6: IDDM with hyperglycemia, without long-term current use of insulin.    - HbA1C was 6.2 on 10/28/2020  - Pt is on a diabetic diet  - LDSS for now  - Will adjust insulin base on POC.     7: Dry gangrene of R hallux  - Pt was seen by podiatry on 10/28/2020  - Continue local wound care with betadine application to dry gangrene  - Pt states he usually sees a podiatrist as out patient as well.   He is to continue to follow with podiatry once discharged.    8: Venous stasis  - Pt has dry skin and gangrene as above  - Pt uses calamine lotion PRN  - Dry dressing over dorsum of Left foot b/l secondary to shallow ulcer  - Elevate extremities as tolerated     9: Anemia  - Hb 7.2 this morning. Likely secondary to chronic kidney disease.  - Continue close f/u of daily cbc and will transfuse if Hb is <7.   - Continue multi-vitamin and feso4.  - Nephrologist started pt on Epo on 10/28/2020    DVT ppx- heparin q8h  Code: DNI/DNR- healthy proxy sister Avani.  Plan to discharge patient tomorrow  Sister is updated about pt's status and plan of care over phone.   Mr. Jacobs is 71 y/o male with h/o developmental disability, CHF, HTN, DM, AF taken off apixaban due to anemia/hematuria, CKD not on HD, refused hemodialysis, BPH who was discharged from [facility] 9/3/20 s/p treated for ESBL UT and then was readmitted on 09/17/20 for MARSHAL and UTI now [facility],  was sent in for hypothermia and in the ER pt's urine is suggestive of UTI as source.  Admitted for urosepsis with ID f/u.     # Urinary tract infection without hematuria, site unspecified.  Sepsis 2/2 UTI and ?superimposed infection on b/l  intertrigo in setting of chronic wound on R foot  - Hx of ESBL before  - C/w Meropenem and Nystatin powder. IV abx will be stopped tomorrow.  - Pt had CT scan w/o contrast on 10/29/2020 with urinary bladder thickening and b/l mod hydro, with b/l mild-mod pleural effusion with atelectasis  - will f/u with ID on length of tx Abx  - Appreciate Podiatrist recommendations - would care as per Podiatry note  - d/c IV hydration    #B/l mod hydronephrosis  - already on Flomax, finasteride; oxybutynin with base line Cr >> no futher work up and with OP Urology f/u     # Hypothermia  - Improved, temp today is 36.4 C  - Pt denies chills     #  Sepsis without acute organ dysfunction, due to unspecified organism.  - Pt receives Meropenem adjusted for CKD  - Pt is being followed by ID    # Stage 3b chronic kidney disease.    - Cr is 1.95 - 2.34. Today is 2.28  - Continue gentle IV hydration.   - Pt has been followed by nephrology  - Pt's meropenem was adjusted for renal function  - Continue Flomax, finasteride; oxybutynin as per nephrology  - CT abd/pelvis as per above    # Mild b/l hydronephrosis  - As per infectious disease recommendation US renal done on 10/29/2020 revealed 10.3 cm of L kidney and 10.5 cm of R kidney  - Continue bladder scan  - Continue Flomax, finasteride; oxybutynin as per nephrology    #  IDDM with hyperglycemia, without long-term current use of insulin.    - HbA1C was 6.2 on 10/28/2020  - Pt is on a diabetic diet  - LDSS for now  - Will adjust insulin base on POC.     #  Dry gangrene of R hallux  - Pt was seen by podiatry on 10/28/2020  - Continue local wound care with betadine application to dry gangrene  - Pt states he usually sees a podiatrist as out patient as well.   He is to continue to follow with podiatry once discharged.    8: Venous stasis  - Pt has dry skin and gangrene as above  - Pt uses calamine lotion PRN  - Dry dressing over dorsum of Left foot b/l secondary to shallow ulcer  - Elevate extremities as tolerated    #  Anemia  - Hb 7.2 this morning. Likely secondary to chronic kidney disease.  - Continue close f/u of daily cbc and will transfuse if Hb is <7.   - Continue multi-vitamin and feso4.  - Nephrologist started pt on Epo on 10/28/2020    # DVT ppx- heparin q8h  Code: DNI/DNR- healthy proxy sister vAani.  Plan to discharge patient tomorrow  Sister is updated about pt's status and plan of care over phone.

## 2020-10-30 NOTE — PROGRESS NOTE ADULT - SUBJECTIVE AND OBJECTIVE BOX
[facility]  INFECTIOUS DISEASES AND INTERNAL MEDICINE at [place]  =======================================================  Zach Chew MD  Diplomates American Board of Internal Medicine and Infectious Diseases  Tel: [phone]      Fax: [phone]  =======================================================      [id number]  KIMBERLY MARKS is a 70y  Male     Followup- uti, hypothermia  feels well    PAST MEDICAL & SURGICAL HISTORY:  Encounter for blood transfusion    Anemia    History of gross hematuria    History of orthostatic hypotension    Bacterial pneumonia    Muscle weakness (generalized)    2019 novel coronavirus disease (COVID-19)    Falls    Heart failure    Afib    CHF (congestive heart failure)    Gout    Hiatal hernia    BPH (benign prostatic hyperplasia)    Cognitive impairment    Cardiomyopathy    HTN (hypertension)    DM (diabetes mellitus)    S/P appendectomy        Social Hx:    FAMILY HISTORY:  No pertinent family history in first degree relatives  pt cant recal his family medical hx of either parents        Allergies    No Known Drug Allergies  provide glucerna TID , diet coke BID,  diet ice cream with dinner- RD ok (Unknown)    Intolerances        MEDICATIONS  (STANDING):  allopurinol 100 milliGRAM(s) Oral daily  atorvastatin 20 milliGRAM(s) Oral at bedtime  dextrose 5%. 1000 milliLiter(s) (50 mL/Hr) IV Continuous <Continuous>  dextrose 50% Injectable 12.5 Gram(s) IV Push once  dextrose 50% Injectable 25 Gram(s) IV Push once  dextrose 50% Injectable 25 Gram(s) IV Push once  epoetin liam-epbx (RETACRIT) Injectable 57564 Unit(s) SubCutaneous every 7 days  ferrous    sulfate 325 milliGRAM(s) Oral daily  finasteride 5 milliGRAM(s) Oral daily  heparin   Injectable 5000 Unit(s) SubCutaneous every 12 hours  insulin lispro (ADMELOG) corrective regimen sliding scale   SubCutaneous three times a day before meals  insulin lispro (ADMELOG) corrective regimen sliding scale   SubCutaneous at bedtime  lactobacillus acidophilus 1 Tablet(s) Oral three times a day with meals  meropenem  IVPB 1000 milliGRAM(s) IV Intermittent every 12 hours  midodrine. 10 milliGRAM(s) Oral three times a day  multivitamin 1 Tablet(s) Oral daily  oxybutynin 10 milliGRAM(s) Oral daily  povidone iodine 10% Ointment 1 Application(s) Topical every 12 hours  sodium chloride 0.9%. 1000 milliLiter(s) (50 mL/Hr) IV Continuous <Continuous>  tamsulosin 0.4 milliGRAM(s) Oral at bedtime    MEDICATIONS  (PRN):  acetaminophen   Tablet .. 650 milliGRAM(s) Oral every 6 hours PRN Temp greater or equal to 38C (100.4F)  dextrose 40% Gel 15 Gram(s) Oral once PRN Blood Glucose LESS THAN 70 milliGRAM(s)/deciliter  glucagon  Injectable 1 milliGRAM(s) IntraMuscular once PRN Glucose LESS THAN 70 milligrams/deciliter      ANTIMICROBIALS:  meropenem  IVPB 1000 every 12 hours      OTHER MEDS: MEDICATIONS  (STANDING):  acetaminophen   Tablet .. 650 every 6 hours PRN  allopurinol 100 daily  atorvastatin 20 at bedtime  dextrose 40% Gel 15 once PRN  dextrose 50% Injectable 12.5 once  dextrose 50% Injectable 25 once  dextrose 50% Injectable 25 once  epoetin liam-epbx (RETACRIT) Injectable 44778 every 7 days  finasteride 5 daily  glucagon  Injectable 1 once PRN  heparin   Injectable 5000 every 12 hours  insulin lispro (ADMELOG) corrective regimen sliding scale  three times a day before meals  insulin lispro (ADMELOG) corrective regimen sliding scale  at bedtime  midodrine. 10 three times a day  oxybutynin 10 daily  tamsulosin 0.4 at bedtime             REVIEW OF SYSTEMS:  CONSTITUTIONAL:  No Fever or chills  HEENT:  No diplopia or blurred vision.  No earache, sore throat or runny nose.  CARDIOVASCULAR:  No pressure, squeezing, strangling, tightness, heaviness or aching about the chest, neck, axilla or epigastrium.  RESPIRATORY:  No cough, shortness of breath  GASTROINTESTINAL:  No nausea, vomiting or diarrhea.  GENITOURINARY:  No dysuria, frequency or urgency. No Blood in urine  MUSCULOSKELETAL:  no joint aches, no muscle pain  SKIN:  No change in skin, hair or nails.  NEUROLOGIC:  No Headaches, seizures or weakness.  PSYCHIATRIC:  No disorder of thought or mood.  ENDOCRINE:  No heat or cold intolerance  HEMATOLOGICAL:  No easy bruising or bleeding.           I&O's Detail        Physical Exam:  Vital Signs Last 24 Hrs  T(C): 37 (28 Oct 2020 08:08), Max: 37 (28 Oct 2020 08:08)  T(F): 98.6 (28 Oct 2020 08:08), Max: 98.6 (28 Oct 2020 08:08)  HR: 89 (28 Oct 2020 08:08) (75 - 106)  BP: 106/66 (28 Oct 2020 08:08) (102/54 - 113/77)  BP(mean): --  RR: 18 (28 Oct 2020 08:08) (18 - 20)  SpO2: 92% (28 Oct 2020 08:08) (92% - 100%)    GEN: NAD, pleasant axox2  HEENT: normocephalic and atraumatic. EOMI. PERRL.  Anicteric  NECK: Supple.   LUNGS: Clear to auscultation.  HEART: Regular rate and rhythm without murmur.  ABDOMEN: Soft, nontender, and nondistended.  Positive bowel sounds.    : No CVA tenderness  EXTREMITIES: Without any edema. b/l venous stasis dermatitis LE right foot ulcer clean  MSK: No joint swelling  NEUROLOGIC: Cranial nerves II through XII are grossly intact. No Focal Deficits  PSYCHIATRIC: Appropriate affect .  SKIN: No Rash        Labs:                        7.5    5.97  )-----------( 125      ( 28 Oct 2020 07:05 )             24.5   10-28    139  |  104  |  37.0<H>  ----------------------------<  139<H>  4.6   |  23.0  |  2.18<H>    Ca    9.0      28 Oct 2020 07:05    TPro  6.4<L>  /  Alb  2.9<L>  /  TBili  0.3<L>  /  DBili  x   /  AST  21  /  ALT  17  /  AlkPhos  112  10-27      Radiology:  < from: Xray Chest 1 View-PORTABLE IMMEDIATE (10.27.20 @ 15:15) >  INTERPRETATION:  AP chest on October 27, 2020 at 3:03 PM. Patient has sepsis.    Heart is enlarged.    Somewhat prominent interstitial markings and there right lung and left perihilar area again noted.    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    Chest is similar to August 24 of this year.    IMPRESSION: Stable chest findings as above. Somewhat prominent interstitial markings again noted. Chronic dislocation left humeral head. Heart is enlarged.        < end of copied text >    < from: CT Abdomen and Pelvis No Cont (10.29.20 @ 17:44) >  IMPRESSION:    Mild to moderate bilateral pleural effusions with bilateral lower lobe compressive atelectasis, correlate clinically for superimposed pneumonia. Suspected mild pulmonary edema. Additional findings of third spacing.    Colonic diverticulosis without diverticulitis. No bowel obstruction or gross bowel wall thickening.    Bilateral hydroureteronephrosis to the level of the bladder without radiopaque obstructing mass or calculus with marked thickening of the anterior and right/left lateral bladder wall, correlate with urinalysis and direct visualizationas indicated.     Anterior left shoulder dislocation with a left glenohumeral bursal fluid collection likely chronic in nature      < end of copied text >    < from: US Renal (10.29.20 @ 19:20) >    IMPRESSION:    Bilateral mild hydronephrosis. Irregular bladder wall thickening. Further evaluation is recommended.            < end of copied text >

## 2020-10-30 NOTE — PROGRESS NOTE ADULT - ASSESSMENT
1) MARSHAL on CKD III  2) Anemia  3) BPH  4) UTI    SCr at baseline  Maintain euvolemia  Continue flomax, finasteride; oxybutynin  Continue epogen 10k units weekly for renal anemia    anitha Armstrong

## 2020-10-30 NOTE — PROGRESS NOTE ADULT - ATTENDING COMMENTS
I personally seen and examined neeta, author reviewed available labs, imaging, consultants recommendations, discuss results and plan of care with patient and patient family - sister over phone.      Focused exam  CONSTITUTIONAL: NAD, well-developed, well-groomed  NECK: No JVD, neck supple, trachea midline  RESPIRATORY: Normal respiratory effort; lungs are clear to auscultation b/l, no rhonchi, wheezes or rales  CARDIOVASCULAR: RRR, normal S1 and S2, no murmur/rub/gallop  PV: venous statis b/l, dressing over right and left foot, pulses palpable  ABDOMEN: Nontender to palpation, no rebound/guarding, normoactive bowel sounds, prominent abdomen  MUSCULOSKELETAL: Normal gait; no clubbing or cyanosis of digits; no joint swelling or tenderness to palpation  PSYCH: A&Ox2, mood and affect appropriate      Agree with above plan and updated sister over phone

## 2020-10-31 NOTE — DISCHARGE NOTE PROVIDER - NSDCFUADDAPPT_GEN_ALL_CORE_FT
Schedule an Appointment to follow up with your Podiatrist in 1 week. Schedule an Appointment to follow up with your Podiatrist in 1 week.      Dry gangrene noted to hallux with no clinical signs of infection  Recommend continued local wound care with betadine application to dry gangrene with 4x4 gauze and gloria - can be changed daily   Recommend continued out-patient follow up with podiatrist for continued pedal care   No podiatric intervention needed during this admission  follow up with Dr. Blank

## 2020-10-31 NOTE — DISCHARGE NOTE PROVIDER - NSDCFUADDINST_GEN_ALL_CORE_FT
Podiatry Wound care instructions: Continue local wound care with betadine application to dry gangrene area with 4x4 gauze, then use gloria to wrap foot. Dressing can be changed daily   Recommend continued out-patient follow up with podiatrist for continued pedal care

## 2020-10-31 NOTE — DISCHARGE NOTE PROVIDER - PROVIDER TOKENS
PROVIDER:[TOKEN:[[id number]],FOLLOWUP:[1 week]] PROVIDER:[TOKEN:[[id number]],FOLLOWUP:[1 week]],PROVIDER:[TOKEN:[[id number]]],PROVIDER:[TOKEN:[[id number]]]

## 2020-10-31 NOTE — DISCHARGE NOTE PROVIDER - NSDCCPCAREPLAN_GEN_ALL_CORE_FT
PRINCIPAL DISCHARGE DIAGNOSIS  Diagnosis: UTI (urinary tract infection)  Assessment and Plan of Treatment:        PRINCIPAL DISCHARGE DIAGNOSIS  Diagnosis: UTI (urinary tract infection)  Assessment and Plan of Treatment: Follow up with your primary doctor within 1 week of discharge

## 2020-10-31 NOTE — DISCHARGE NOTE PROVIDER - CARE PROVIDER_API CALL
Franklin Murrieta (DO)  Internal Medicine  [address] [place] [address], [address] [place] [address]  Phone: [phone]  Fax: [phone]  Follow Up Time: 1 week   Franklin Murrieta (DO)  Internal Medicine  [address] [facility], [address] [place] [address]  Phone: [phone]  Fax: [phone]  Follow Up Time: 1 week    Devang Blank (DPM)  Surgery  [address] [place] [address]  Phone: [phone]  Fax: [phone]  Follow Up Time:     Maite Chew  INTERNAL MEDICINE  [address] [place] [id number]  Phone: [phone]  Fax: [phone]  Follow Up Time:

## 2020-10-31 NOTE — DISCHARGE NOTE PROVIDER - HOSPITAL COURSE
Mr. Jacobs is 71 y/o male with h/o developmental disability, CHF, HTN, DM, AF taken off apixaban due to anemia/hematuria, CKD not on HD, refused hemodialysis, BPH who was discharged from [facility] 9/3/20 s/p treated for ESBL UT and then was readmitted on 09/17/20 for MARSHAL and UTI now comes back again from [facility],  was sent in for hypothermia and in the ER pt's urine is suggestive of UTI as source.  Admitted for urosepsis with ID f/u.     Hospital course as per problem list:     # Sepsis 2/2 UTI, given prior hx of ESBL he was started on Meropenem and improved rapidly, as work up for other source of infections given pt is poor historian, peck CT was perform which didn't show urinary bladder thickening and b/l mod hydro, with b/l mild-mod pleural effusion with atelectasis, and no other source of infection. Chronic appearing wound on R foot was also evaluated by podiatry team who recommended   no further intervention.  On presentation UA is suggestive UTI, bur no spesific growth in urine or blood up to date of discharge. He finished total 5 days of Abx as per ID recommendations.    #B/l mod hydronephrosis- already on Flomax, finasteride; oxybutynin with base line Cr >> no futher work up and with OP Urology f/u      # Hypothermia- resolved after initiation abx, no further hypothermia episodes during admission     # Stage 3b chronic kidney disease.- on d/c he Cr range 2.3, seen in consultation with Nephrologist, will need OP f/u  ( discussed with family and primary care provider at [facility] -[phone])     #  IDDM with hyperglycemia, without long-term current use of insulin. - HbA1C was 6.2 on 10/28/2020,well controlled with DM diet and LDSS      # Chronic appearing wound on base on R hallux -  seen by podiatry team, recommended no further intervention.     #Venous stasis - clinically stable w/o oozing or signs of concurrent infection. - was wrap up with leg elevation during admission    #Anemia - flactuating in mid 7, on Epo injection, no RBC transfused during this admission   Mr. Jacobs is 71 y/o male with h/o developmental disability, CHF, HTN, DM, AF taken off apixaban due to anemia/hematuria, CKD not on HD, refused hemodialysis, BPH who was discharged from [facility] 9/3/20 s/p treated for ESBL UT and then was readmitted on 09/17/20 for MARSHAL and UTI now comes back again from group home [facility], presented to ED on 10/2from group home for dizziness.  Per aide, pt was noted to have decreased appetite today and said he wanted to go to the hospital.  Pt otherwise at baseline mental status; is normally AAOx3.  Group home staff also noted malodorous urine.  While in ED pt was noted to have 90.1 rectal temp and placed under a warming blanket, and admitted for urosepsis and started on meropenem.  During hospitalization ID was consulted and recommending obtaining blood and urine cultures, and continue meropenem and renally dose.  Nephrology was also consulted due to elevated Cr.  Renal US was preformed on 10/29/20 and notable for Bilateral mild-moderate pleural effusion with atelectasis. and Ct abd/plv showed Bilateral hydroureteronephrosis to the level of the bladder .  Podiatry was also consulted for right hallux dry gangrene, and recommended to continued local wound care with betadine application to dry gangrene with 4x4 gauze and gloria - can be changed daily.  Pt continued to improve clinically and completed abx treatment. All electrolyte abnormalities were monitored carefully and repleted as necessary during this hospitalization. At the time of discharge patient was hemodynamically stable and amenable to all terms of discharge. The patient has received verbal instructions from myself regarding discharge plans.     Length of Discharge: 45MIN      Vital Signs Last 24 Hrs  T(C): 36.4 (01 Nov 2020 23:20), Max: 37 (01 Nov 2020 18:50)  T(F): 97.6 (01 Nov 2020 23:20), Max: 98.6 (01 Nov 2020 18:50)  HR: 83 (01 Nov 2020 23:20) (83 - 93)  BP: 131/84 (01 Nov 2020 23:20) (118/76 - 131/84)  BP(mean): --  RR: 20 (01 Nov 2020 23:20) (18 - 20)  SpO2: 95% (01 Nov 2020 23:20) (95% - 95%)    PHYSICAL EXAM:  GENERAL: NAD, sitting in chair comfortably  HEAD:  Atraumatic, Normocephalic  EYES: EOMI, PERRL, conjunctiva and sclera clear  ENT: Moist mucous membranes  NECK: Supple, No JVD  CHEST/LUNG: Clear to auscultation bilaterally; No rales, rhonchi, wheezing, or rubs. Unlabored respirations  HEART: Regular rate and rhythm; No murmurs, rubs, or gallops  ABDOMEN: Bowel sounds present; Soft, Nontender, Nondistended   EXTREMITIES:  2+ Peripheral Pulses, brisk capillary refill. No clubbing, cyanosis, or edema  NERVOUS SYSTEM:  Alert & Oriented X3, speech clear. No facial droop, tongue protrusion midline. Answers questions appropriately. Full and equal 5/5 strength B/L upper and lower extremities.  No acute deficits   MSK: FROM x 4 extremities   SKIN: Ace wrap noted bilaterally from feet to just below knees.            Recommend continued out-patient follow up with podiatrist for continued pedal care   No podiatric intervention needed during this admission  Hospital course as per problem list:     # Sepsis 2/2 UTI, given prior hx of ESBL he was started on Meropenem and improved rapidly, as work up for other source of infections given pt is poor historian, peck CT was perform which didn't show urinary bladder thickening and b/l mod hydro, with b/l mild-mod pleural effusion with atelectasis, and no other source of infection. Chronic appearing wound on R foot was also evaluated by podiatry team who recommended   no further intervention.  On presentation UA is suggestive UTI, bur no spesific growth in urine or blood up to date of discharge. He finished total 5 days of Abx as per ID recommendations.    #B/l mod hydronephrosis- already on Flomax, finasteride; oxybutynin with base line Cr >> no futher work up and with OP Urology f/u      # Hypothermia- resolved after initiation abx, no further hypothermia episodes during admission     # Stage 3b chronic kidney disease.- on d/c he Cr range 2.3, seen in consultation with Nephrologist, will need OP f/u  ( discussed with family and primary care provider at senior [facility] [phone])     #  IDDM with hyperglycemia, without long-term current use of insulin. - HbA1C was 6.2 on 10/28/2020,well controlled with DM diet and LDSS      # Chronic appearing wound on base on R hallux -  seen by podiatry team, recommended no further intervention.     #Venous stasis - clinically stable w/o oozing or signs of concurrent infection. - was wrap up with leg elevation during admission    #Anemia - flactuating in mid 7, on Epo injection, no RBC transfused during this admission   Mr. Jacobs is 69 y/o male with h/o developmental disability, CHF, HTN, DM, AF taken off apixaban due to anemia/hematuria, CKD not on HD, refused hemodialysis, BPH who was discharged from [facility] 9/3/20 s/p treated for ESBL UT and then was readmitted on 09/17/20 for MARSHAL and UTI now comes back again from group home [facility], presented to ED on 10/2from group home for dizziness.  Per aide, pt was noted to have decreased appetite today and said he wanted to go to the hospital.  Pt otherwise at baseline mental status; is normally AAOx3.  Group home staff also noted malodorous urine.  While in ED pt was noted to have 90.1 rectal temp and placed under a warming blanket, and admitted for urosepsis and started on meropenem.  During hospitalization ID was consulted and recommending obtaining blood and urine cultures, and continue meropenem and renally dose.  Nephrology was also consulted due to elevated Cr.  Renal US was preformed on 10/29/20 and notable for Bilateral mild-moderate pleural effusion with atelectasis. and Ct abd/plv showed Bilateral hydroureteronephrosis to the level of the bladder .  Podiatry was also consulted for right hallux dry gangrene, and recommended to continued local wound care with betadine application to dry gangrene with 4x4 gauze and gloria - can be changed daily.  Pt continued to improve clinically and completed abx treatment. All electrolyte abnormalities were monitored carefully and repleted as necessary during this hospitalization. At the time of discharge patient was hemodynamically stable and amenable to all terms of discharge. Pt can continue soft diet with thin liquids, as well as continue all home medications as instructed.  Nystatin powder which was newly prescribed is to be used in groin area. The patient has received verbal instructions from myself regarding discharge plans.     Length of Discharge: 45MIN      Vital Signs Last 24 Hrs  T(C): 36.4 (01 Nov 2020 23:20), Max: 37 (01 Nov 2020 18:50)  T(F): 97.6 (01 Nov 2020 23:20), Max: 98.6 (01 Nov 2020 18:50)  HR: 83 (01 Nov 2020 23:20) (83 - 93)  BP: 131/84 (01 Nov 2020 23:20) (118/76 - 131/84)  BP(mean): --  RR: 20 (01 Nov 2020 23:20) (18 - 20)  SpO2: 95% (01 Nov 2020 23:20) (95% - 95%)    PHYSICAL EXAM:  GENERAL: NAD, sitting in chair comfortably  HEAD:  Atraumatic, Normocephalic  EYES: EOMI, PERRL, conjunctiva and sclera clear  ENT: Moist mucous membranes  NECK: Supple, No JVD  CHEST/LUNG: Clear to auscultation bilaterally; No rales, rhonchi, wheezing, or rubs. Unlabored respirations  HEART: Regular rate and rhythm; No murmurs, rubs, or gallops  ABDOMEN: Bowel sounds present; Soft, Nontender, Nondistended   EXTREMITIES:  2+ Peripheral Pulses, brisk capillary refill. No clubbing, cyanosis, or edema  NERVOUS SYSTEM:  Alert & Oriented X3, speech clear. No facial droop, tongue protrusion midline. Answers questions appropriately. Full and equal 5/5 strength B/L upper and lower extremities.  No acute deficits   MSK: FROM x 4 extremities   SKIN: Ace wrap noted bilaterally from feet to just below knees.            Recommend continued out-patient follow up with podiatrist for continued pedal care   No podiatric intervention needed during this admission  Hospital course as per problem list:     # Sepsis 2/2 UTI, given prior hx of ESBL he was started on Meropenem and improved rapidly, as work up for other source of infections given pt is poor historian, peck CT was perform which didn't show urinary bladder thickening and b/l mod hydro, with b/l mild-mod pleural effusion with atelectasis, and no other source of infection. Chronic appearing wound on R foot was also evaluated by podiatry team who recommended   no further intervention.  On presentation UA is suggestive UTI, bur no spesific growth in urine or blood up to date of discharge. He finished total 5 days of Abx as per ID recommendations.    #B/l mod hydronephrosis- already on Flomax, finasteride; oxybutynin with base line Cr >> no futher work up and with OP Urology f/u      # Hypothermia- resolved after initiation abx, no further hypothermia episodes during admission     # Stage 3b chronic kidney disease.- on d/c he Cr range 2.3, seen in consultation with Nephrologist, will need OP f/u  ( discussed with family and primary care provider at [facility] -[phone])     #  IDDM with hyperglycemia, without long-term current use of insulin. - HbA1C was 6.2 on 10/28/2020,well controlled with DM diet and LDSS      # Chronic appearing wound on base on R hallux -  seen by podiatry team, recommended no further intervention.     #Venous stasis - clinically stable w/o oozing or signs of concurrent infection. - was wrap up with leg elevation during admission    #Anemia - flactuating in mid 7, on Epo injection, no RBC transfused during this admission

## 2020-10-31 NOTE — DISCHARGE NOTE PROVIDER - NSDCFUSCHEDAPPT_GEN_ALL_CORE_FT
KIMBERLY MARKS ; 11/05/2020 ; [facility] KIMBERLY MARKS ; 12/10/2020 ; [facility] [address] [facility]

## 2020-10-31 NOTE — DISCHARGE NOTE PROVIDER - NSDCMRMEDTOKEN_GEN_ALL_CORE_FT
allopurinol 300 mg oral tablet: 1 tab(s) orally once a day  atorvastatin 20 mg oral tablet: 1 tab(s) orally once a day (at bedtime)  Calcium 600+D oral tablet: 1 tab(s) orally 2 times a day  dutasteride 0.5 mg oral capsule: 1 cap(s) orally once a day  ferrous sulfate 325 mg (65 mg elemental iron) oral tablet: 1 tab(s) orally once a day  Flonase 50 mcg/inh nasal spray: 1 spray(s) nasal once a day  Januvia 100 mg oral tablet: 1 tab(s) orally once a day  midodrine 10 mg oral tablet: 1 tab(s) orally 2 times a day at 0800 and 2000  Multiple Vitamins oral tablet: 1 tab(s) orally once a day  Myrbetriq 50 mg oral tablet, extended release: 1 tab(s) orally once a day  oxybutynin 10 mg/24 hr oral tablet, extended release: 1 tab(s) orally once a day  Saline Mist 0.65% nasal spray: 2 spray(s) nasal 2 times a day  tamsulosin 0.4 mg oral capsule: 1 cap(s) orally once a day (at bedtime)  Vitamin C 250 mg oral tablet: 1 tab(s) orally once a day  zinc sulfate 220 mg oral capsule: 1 cap(s) orally once a day   allopurinol 300 mg oral tablet: 1 tab(s) orally once a day  atorvastatin 20 mg oral tablet: 1 tab(s) orally once a day (at bedtime)  Calcium 600+D oral tablet: 1 tab(s) orally 2 times a day  dutasteride 0.5 mg oral capsule: 1 cap(s) orally once a day  ferrous sulfate 325 mg (65 mg elemental iron) oral tablet: 1 tab(s) orally once a day  Januvia 100 mg oral tablet: 1 tab(s) orally once a day  midodrine 10 mg oral tablet: 1 tab(s) orally 2 times a day at 0800 and 2000  Multiple Vitamins oral tablet: 1 tab(s) orally once a day  Myrbetriq 50 mg oral tablet, extended release: 1 tab(s) orally once a day  Nyamyc 100,000 units/g topical powder: 1 application topically 3 times a day  oxybutynin 10 mg/24 hr oral tablet, extended release: 1 tab(s) orally once a day  Saline Mist 0.65% nasal spray: 2 spray(s) nasal 2 times a day  tamsulosin 0.4 mg oral capsule: 1 cap(s) orally once a day (at bedtime)  Vitamin C 250 mg oral tablet: 1 tab(s) orally once a day  zinc sulfate 220 mg oral capsule: 1 cap(s) orally once a day

## 2020-10-31 NOTE — DISCHARGE NOTE PROVIDER - CARE PROVIDERS DIRECT ADDRESSES
,[email or web address],DirectAddress_Unknown,DirectAddress_Unknown

## 2020-10-31 NOTE — PROGRESS NOTE ADULT - SUBJECTIVE AND OBJECTIVE BOX
[facility] Division of Hospital Medicine    Chief Complaint:  hypothermia/UTI    SUBJECTIVE: reports no complains to me this morning, denies any dysuria    OVERNIGHT EVENTS: none reported    Patient denies chest pain, SOB, abd pain, N/V, fever, chills, dysuria or any other complaints. All remainder ROS negative.     MEDICATIONS  (STANDING):  allopurinol 100 milliGRAM(s) Oral daily  atorvastatin 20 milliGRAM(s) Oral at bedtime  BACItracin   Ointment 1 Application(s) Topical two times a day  dextrose 5%. 1000 milliLiter(s) (50 mL/Hr) IV Continuous <Continuous>  dextrose 50% Injectable 12.5 Gram(s) IV Push once  dextrose 50% Injectable 25 Gram(s) IV Push once  dextrose 50% Injectable 25 Gram(s) IV Push once  epoetin liam-epbx (RETACRIT) Injectable 61200 Unit(s) SubCutaneous every 7 days  ferrous    sulfate 325 milliGRAM(s) Oral daily  finasteride 5 milliGRAM(s) Oral daily  heparin   Injectable 5000 Unit(s) SubCutaneous every 12 hours  insulin lispro (ADMELOG) corrective regimen sliding scale   SubCutaneous three times a day before meals  insulin lispro (ADMELOG) corrective regimen sliding scale   SubCutaneous at bedtime  lactobacillus acidophilus 1 Tablet(s) Oral three times a day with meals  meropenem  IVPB 1000 milliGRAM(s) IV Intermittent every 12 hours  midodrine. 10 milliGRAM(s) Oral three times a day  multivitamin 1 Tablet(s) Oral daily  nystatin Cream 1 Application(s) Topical two times a day  oxybutynin 10 milliGRAM(s) Oral daily  tamsulosin 0.4 milliGRAM(s) Oral at bedtime    MEDICATIONS  (PRN):  acetaminophen   Tablet .. 650 milliGRAM(s) Oral every 6 hours PRN Temp greater or equal to 38C (100.4F)  calamine/zinc oxide Lotion 1 Application(s) Topical two times a day PRN Itching  dextrose 40% Gel 15 Gram(s) Oral once PRN Blood Glucose LESS THAN 70 milliGRAM(s)/deciliter  glucagon  Injectable 1 milliGRAM(s) IntraMuscular once PRN Glucose LESS THAN 70 milligrams/deciliter          I&O's Summary      PHYSICAL EXAM:  Vital Signs Last 24 Hrs  T(C): 36.7 (31 Oct 2020 08:06), Max: 36.7 (31 Oct 2020 08:06)  T(F): 98 (31 Oct 2020 08:06), Max: 98 (31 Oct 2020 08:06)  HR: 65 (31 Oct 2020 08:06) (65 - 95)  BP: 131/73 (31 Oct 2020 08:06) (104/73 - 131/73)  BP(mean): --  RR: 19 (31 Oct 2020 08:06) (18 - 19)  SpO2: 98% (31 Oct 2020 08:06) (95% - 98%))        CONSTITUTIONAL: NAD, well-developed, well-groomed  ENMT: Moist oral mucosa, no pharyngeal injection or exudates; normal dentition; No JVD  RESPIRATORY: Normal respiratory effort; lungs are clear to auscultation bilaterally  CARDIOVASCULAR: Regular rate and rhythm, normal S1 and S2, no murmur/rub/gallop; chronic lymphadema up to mid shins; Peripheral pulses are 2+ bilaterally  ABDOMEN: Nontender to palpation, mid line hernia , not tender on palpations normoactive bowel sounds, no rebound/guarding; No hepatosplenomegaly  MUSCLOSKELETAL:  Normal gait; no clubbing or cyanosis of digits; no joint swelling or tenderness to palpation  PSYCH: A+O to person, place, and time; affect appropriate  NEUROLOGY: CN 2-12 are intact and symmetric; no gross sensory deficits; was observed moving all 4 ext against gravity cooperating with exam.   SKIN: Chronic appears wound over 1st PT, with some tenderness over dorsal servafe and minimal erythema, no apparent bleeding or pus, b/l intertrigo vs diapers rash in both groins and scrotum    LABS:        LABS:                        7.7    5.62  )-----------( 129      ( 31 Oct 2020 07:57 )             25.3     10-31    140  |  105  |  47.0<H>  ----------------------------<  129<H>  4.4   |  22.0  |  2.34<H>    Ca    8.6      31 Oct 2020 07:57                CAPILLARY BLOOD GLUCOSE      POCT Blood Glucose.: 156 mg/dL (31 Oct 2020 07:58)  POCT Blood Glucose.: 189 mg/dL (30 Oct 2020 22:03)  POCT Blood Glucose.: 142 mg/dL (30 Oct 2020 16:29)  POCT Blood Glucose.: 238 mg/dL (30 Oct 2020 11:44)        RADIOLOGY & ADDITIONAL TESTS:  Results Reviewed:   Imaging Personally Reviewed:  Electrocardiogram Personally Reviewed:

## 2020-10-31 NOTE — PROGRESS NOTE ADULT - ASSESSMENT
Mr. Jacobs is 69 y/o male with h/o developmental disability, CHF, HTN, DM, AF taken off apixaban due to anemia/hematuria, CKD not on HD, refused hemodialysis, BPH who was discharged from [facility] 9/3/20 s/p treated for ESBL UT and then was readmitted on 09/17/20 for MARSHAL and UTI now comes back again from senior [facility],  was sent in for hypothermia and in the ER pt's urine is suggestive of UTI as source.  Admitted for urosepsis with ID f/u.      # Urinary tract infection without hematuria, site unspecified.  Sepsis 2/2 UTI and ?superimposed infection on b/l  intertrigo in setting of chronic wound on R foot  - Hx of ESBL before  - C/w Meropenem d4/5 with plan to get last dose in am and d/c back to group home and c/w Nystatin powder  - Pt had CT scan w/o contrast on 10/29/2020 with urinary bladder thickening and b/l mod hydro, with b/l mild-mod pleural effusion with atelectasis  - will f/u with ID on length of tx Abx  - Appreciate Podiatrist recommendations - would care as per Podiatry note  - d/c IV hydration    #B/l mod hydronephrosis  - already on Flomax, finasteride; oxybutynin with base line Cr >> no futher work up and with OP Urology f/u      # Hypothermia  - Improved, temp today is 36.4 C  - Pt denies chills      #  Sepsis without acute organ dysfunction, due to unspecified organism.  - Pt receives Meropenem adjusted for CKD  - Pt is being followed by ID     # Stage 3b chronic kidney disease.    - Cr stable in 2.3 range  - Pt has been followed by nephrology  - Pt's meropenem was adjusted for renal function  - Continue Flomax, finasteride; oxybutynin as per nephrology  - CT abd/pelvis as per above     # Mild b/l hydronephrosis  - As per infectious disease recommendation US renal done on 10/29/2020 revealed 10.3 cm of L kidney and 10.5 cm of R kidney  - Continue bladder scan  - Continue Flomax, finasteride; oxybutynin as per nephrology     #  IDDM with hyperglycemia, without long-term current use of insulin.    - HbA1C was 6.2 on 10/28/2020  - Pt is on a diabetic diet  - LDSS for now  - Will adjust insulin base on POC.      #  Dry gangrene of R hallux  - Pt was seen by podiatry on 10/28/2020  - Continue local wound care with betadine application to dry gangrene  - Pt states he usually sees a podiatrist as out patient as well.   He is to continue to follow with podiatry once discharged.    8: Venous stasis  - Pt has dry skin and gangrene as above  - Pt uses calamine lotion PRN  - Dry dressing over dorsum of Left foot b/l secondary to shallow ulcer  - Elevate extremities as tolerated     #  Anemia  - Hb 7.7holding on transfusion  - Continue close f/u of daily cbc and will transfuse if Hb is <7.   - Continue multi-vitamin and feso4.  -  Epo as per Nephrology team      # DVT ppx- heparin q8h  Code: DNI/DNR- healthy proxy sister Avani.  Plan to discharge patient tomorrow after last dose of Abx   Sister is updated about pt's status and plan of care over phone.

## 2020-11-01 NOTE — PROGRESS NOTE ADULT - PROBLEM SELECTOR PROBLEM 3
Sepsis without acute organ dysfunction, due to unspecified organism

## 2020-11-01 NOTE — PROGRESS NOTE ADULT - ASSESSMENT
Mr. Jacobs is 69 y/o male with h/o developmental disability, CHF, HTN, DM, AF taken off apixaban due to anemia/hematuria, CKD not on HD, refused hemodialysis, BPH who was discharged from [facility] 9/3/20 s/p treated for ESBL UT and then was readmitted on 09/17/20 for MARSHAL and UTI now comes back again from longterm ACLD,  was sent in for hypothermia and in the ER pt's urine is suggestive of UTI as source.  Admitted for urosepsis with ID f/u.      # Urinary tract infection without hematuria, site unspecified.  Sepsis 2/2 UTI and ?superimposed infection on b/l  intertrigo in setting of chronic wound on R foot  - Hx of ESBL before  - C/w Meropenem d5/5    c/w Nystatin powder  - Pt had CT scan w/o contrast on 10/29/2020 with urinary bladder thickening and b/l mod hydro, with b/l mild-mod pleural effusion with atelectasis  - will f/u with ID on length of tx Abx  - Appreciate Podiatrist recommendations - would care as per Podiatry note  - d/c IV hydration    #B/l mod hydronephrosis  - already on Flomax, finasteride; oxybutynin with base line Cr >> no futher work up and with OP Urology f/u      # Hypothermia  - Improved, temp today is 36.4 C  - Pt denies chills      #  Sepsis without acute organ dysfunction, due to unspecified organism.  - Pt receives Meropenem adjusted for CKD  - Pt is being followed by ID     # Stage 3b chronic kidney disease.    - Cr stable in 2.3 range  - Pt has been followed by nephrology  - Pt's meropenem was adjusted for renal function  - Continue Flomax, finasteride; oxybutynin as per nephrology  - CT abd/pelvis as per above     # Mild b/l hydronephrosis  - As per infectious disease recommendation US renal done on 10/29/2020 revealed 10.3 cm of L kidney and 10.5 cm of R kidney  - Continue bladder scan  - Continue Flomax, finasteride; oxybutynin as per nephrology     #  IDDM with hyperglycemia, without long-term current use of insulin.    - HbA1C was 6.2 on 10/28/2020  - Pt is on a diabetic diet  - LDSS for now  - Will adjust insulin base on POC.      #  Dry gangrene of R hallux  - Pt was seen by podiatry on 10/28/2020  - Continue local wound care with betadine application to dry gangrene  - Pt states he usually sees a podiatrist as out patient as well.   He is to continue to follow with podiatry once discharged.    8: Venous stasis  - Pt has dry skin and gangrene as above  - Pt uses calamine lotion PRN  - Dry dressing over dorsum of Left foot b/l secondary to shallow ulcer  - Elevate extremities as tolerated     #  Anemia  - Hb 7.7holding on transfusion  - Continue close f/u of daily cbc and will transfuse if Hb is <7.   - Continue multi-vitamin and feso4.  -  Epo as per Nephrology team      # DVT ppx- heparin q8h  Code: DNI/DNR- healthy proxy sister Avani.  Sister is updated about pt's status and plan of care over phone.

## 2020-11-01 NOTE — PROGRESS NOTE ADULT - ASSESSMENT
1) MARSHAL on CKD III ( New Baseline Scr., 2.3 mg., )  2) Anemia  3) BPH  4) UTI    Maintain euvolemia  Continue Flomax finasteride; oxybutynin  Continue epogen 10k units weekly for renal anemia    OP F/U     TY

## 2020-11-01 NOTE — PROGRESS NOTE ADULT - PROBLEM SELECTOR PROBLEM 5
Type 2 diabetes mellitus with hyperglycemia, without long-term current use of insulin

## 2020-11-01 NOTE — CHART NOTE - NSCHARTNOTEFT_GEN_A_CORE
Medicine PA- Cd for pt w/ c/o itching groin, hx fungal rash groin, has nystatin powder ordered. Pt's VSS, no c/o otherwise except that he wants to get OOB, likes to stay in chair. On exam- Pt is A+O x3, + dry, flaky erythematous skin groin, unchanged. Benadryl 12.5mg IVP x1 ordered, pt encouraged to get some rest in bed, ask for assist if needed, he understands and wants to go to sleep now, call PA if status changes.

## 2020-11-01 NOTE — PROGRESS NOTE ADULT - SUBJECTIVE AND OBJECTIVE BOX
[facility] Division of Hospital Medicine    Chief Complaint:  hypothermia/UTI    SUBJECTIVE: Patient seen and examined at bedside. denies any dysuria. Voices no complaints     OVERNIGHT EVENTS: none reported    Patient denies chest pain, SOB, abd pain, N/V, fever, chills, dysuria or any other complaints. All remainder ROS negative.     MEDICATIONS  (STANDING):  allopurinol 100 milliGRAM(s) Oral daily  atorvastatin 20 milliGRAM(s) Oral at bedtime  BACItracin   Ointment 1 Application(s) Topical two times a day  dextrose 5%. 1000 milliLiter(s) (50 mL/Hr) IV Continuous <Continuous>  dextrose 50% Injectable 12.5 Gram(s) IV Push once  dextrose 50% Injectable 25 Gram(s) IV Push once  dextrose 50% Injectable 25 Gram(s) IV Push once  epoetin liam-epbx (RETACRIT) Injectable 02359 Unit(s) SubCutaneous every 7 days  ferrous    sulfate 325 milliGRAM(s) Oral daily  finasteride 5 milliGRAM(s) Oral daily  heparin   Injectable 5000 Unit(s) SubCutaneous every 12 hours  insulin lispro (ADMELOG) corrective regimen sliding scale   SubCutaneous three times a day before meals  insulin lispro (ADMELOG) corrective regimen sliding scale   SubCutaneous at bedtime  lactobacillus acidophilus 1 Tablet(s) Oral three times a day with meals  meropenem  IVPB 1000 milliGRAM(s) IV Intermittent every 12 hours  midodrine. 10 milliGRAM(s) Oral three times a day  multivitamin 1 Tablet(s) Oral daily  nystatin Cream 1 Application(s) Topical two times a day  oxybutynin 10 milliGRAM(s) Oral daily  tamsulosin 0.4 milliGRAM(s) Oral at bedtime    MEDICATIONS  (PRN):  acetaminophen   Tablet .. 650 milliGRAM(s) Oral every 6 hours PRN Temp greater or equal to 38C (100.4F)  calamine/zinc oxide Lotion 1 Application(s) Topical two times a day PRN Itching  dextrose 40% Gel 15 Gram(s) Oral once PRN Blood Glucose LESS THAN 70 milliGRAM(s)/deciliter  glucagon  Injectable 1 milliGRAM(s) IntraMuscular once PRN Glucose LESS THAN 70 milligrams/deciliter          I&O's Summary      PHYSICAL EXAM:  Vital Signs Last 24 Hrs  T(C): 36.7 (31 Oct 2020 08:06), Max: 36.7 (31 Oct 2020 08:06)  T(F): 98 (31 Oct 2020 08:06), Max: 98 (31 Oct 2020 08:06)  HR: 65 (31 Oct 2020 08:06) (65 - 95)  BP: 131/73 (31 Oct 2020 08:06) (104/73 - 131/73)  BP(mean): --  RR: 19 (31 Oct 2020 08:06) (18 - 19)  SpO2: 98% (31 Oct 2020 08:06) (95% - 98%))          PHYSICAL EXAM-  GENERAL: NAD, well-groomed, well-developed  HEAD:  Atraumatic, Normocephalic  EYES: EOMI, PERRLA, conjunctiva and sclera clear  NECK: Supple, No JVD  NERVOUS SYSTEM:  Alert & Oriented X3, Motor Strength 5/5 B/L upper and lower extremities; DTRs 2+ intact and symmetric  CHEST/LUNG: Clear to auscultation bilaterally; No rales, rhonchi, wheezing, or rubs  HEART: Regular rate and rhythm; No murmurs, rubs, or gallops  ABDOMEN: Soft, Nontender, Nondistended; Bowel sounds present  EXTREMITIES:  2+ Peripheral Pulses, chronic lymphadema up to mid shins  SKIN: fungal groin rash            LABS:                        7.7    5.62  )-----------( 129      ( 31 Oct 2020 07:57 )             25.3     10-31    140  |  105  |  47.0<H>  ----------------------------<  129<H>  4.4   |  22.0  |  2.34<H>    Ca    8.6      31 Oct 2020 07:57                CAPILLARY BLOOD GLUCOSE      POCT Blood Glucose.: 156 mg/dL (31 Oct 2020 07:58)  POCT Blood Glucose.: 189 mg/dL (30 Oct 2020 22:03)  POCT Blood Glucose.: 142 mg/dL (30 Oct 2020 16:29)  POCT Blood Glucose.: 238 mg/dL (30 Oct 2020 11:44)        RADIOLOGY & ADDITIONAL TESTS:  Results Reviewed:   Imaging Personally Reviewed:  Electrocardiogram Personally Reviewed:

## 2020-11-01 NOTE — PROGRESS NOTE ADULT - SUBJECTIVE AND OBJECTIVE BOX
Chief Complaint:  barbra on ckd    24 hour events/subjective:  no acute event    PAST HISTORY  --------------------------------------------------------------------------------  No significant changes to PMH, PSH, FHx, SHx, unless otherwise noted    ALLERGIES & MEDICATIONS  --------------------------------------------------------------------------------  Allergies    No Known Drug Allergies  provide glucerna TID , diet coke BID,  diet ice cream with dinner- RD ok (Unknown)    Standing Inpatient Medications  allopurinol 100 milliGRAM(s) Oral daily  atorvastatin 20 milliGRAM(s) Oral at bedtime  dextrose 5%. 1000 milliLiter(s) IV Continuous <Continuous>  dextrose 50% Injectable 12.5 Gram(s) IV Push once  dextrose 50% Injectable 25 Gram(s) IV Push once  dextrose 50% Injectable 25 Gram(s) IV Push once  epoetin liam-epbx (RETACRIT) Injectable 59878 Unit(s) SubCutaneous every 7 days  ferrous    sulfate 325 milliGRAM(s) Oral daily  finasteride 5 milliGRAM(s) Oral daily  heparin   Injectable 5000 Unit(s) SubCutaneous every 12 hours  insulin lispro (ADMELOG) corrective regimen sliding scale   SubCutaneous three times a day before meals  insulin lispro (ADMELOG) corrective regimen sliding scale   SubCutaneous at bedtime  lactobacillus acidophilus 1 Tablet(s) Oral three times a day with meals  meropenem  IVPB 1000 milliGRAM(s) IV Intermittent every 12 hours  midodrine. 10 milliGRAM(s) Oral three times a day  multivitamin 1 Tablet(s) Oral daily  oxybutynin 10 milliGRAM(s) Oral daily  tamsulosin 0.4 milliGRAM(s) Oral at bedtime    PRN Inpatient Medications  acetaminophen   Tablet .. 650 milliGRAM(s) Oral every 6 hours PRN  calamine/zinc oxide Lotion 1 Application(s) Topical two times a day PRN  dextrose 40% Gel 15 Gram(s) Oral once PRN  glucagon  Injectable 1 milliGRAM(s) IntraMuscular once PRN    REVIEW OF SYSTEMS  --------------------------------------------------------------------------------  Gen: No weight changes, fatigue, fevers/chills, weakness  Skin: No rashes  Head/Eyes/Ears/Mouth: No headache; Normal hearing; Normal vision w/o blurriness; No sinus pain/discomfort, sore throat  Respiratory: No dyspnea, cough, wheezing, hemoptysis  CV: No chest pain, PND, orthopnea  GI: No abdominal pain, diarrhea, constipation, nausea, vomiting, melena, hematochezia  : No increased frequency, dysuria, hematuria, nocturia  MSK: No joint pain/swelling; no back pain; no edema  Neuro: No dizziness/lightheadedness, weakness, seizures, numbness, tingling  Heme: No easy bruising or bleeding  Endo: No heat/cold intolerance  Psych: No significant nervousness, anxiety, stress, depression    All other systems were reviewed and are negative, except as noted.    VITALS/PHYSICAL EXAM:    ICU Vital Signs Last 24 Hrs,    T(C): 36.8 (01 Nov 2020 08:29), Max: 36.8 (01 Nov 2020 08:29)  T(F): 98.2 (01 Nov 2020 08:29), Max: 98.2 (01 Nov 2020 08:29)  HR: 69 (01 Nov 2020 08:29) (69 - 80)  BP: 114/79 (01 Nov 2020 08:29) (114/65 - 129/89)  RR: 19 (01 Nov 2020 08:29) (18 - 19)  SpO2: 99% (01 Nov 2020 01:40) (92% - 99%)  --------------------------------------------------------------------------------  T(C): 36.4 (10-30-20 @ 08:41), Max: 36.4 (10-30-20 @ 08:41)  HR: 76 (10-30-20 @ 08:41) (67 - 82)  BP: 101/65 (10-30-20 @ 08:41) (101/65 - 112/79)  RR: 19 (10-30-20 @ 08:41) (18 - 19)  SpO2: 96% (10-30-20 @ 08:41) (96% - 100%)    Physical Exam:  	Gen: NAD  	HEENT: , supple neck,  	Pulm: CTA B/L  	CV: RRR, S1S2; no rub  	Back: No spinal or CVA tenderness; no sacral edema  	Abd: +BS, soft, nontender/nondistended  	: rodriguez catheter+  	UE: Warm,  no edema; no asterixis  	LE: Warm, no edema  	Neuro: No focal deficit  	Psych: Normal affect and mood  	Skin: Warm    LABS/STUDIES:  --------------------------------------------------------------------------------              7.2    5.22  >-----------<  118      [10-30-20 @ 08:10]              23.8     137  |  103  |  48.0  ----------------------------<  118      [10-30-20 @ 07:34]  4.9   |  20.0  |  2.28        Ca     8.6     [10-30-20 @ 07:34]    Creatinine Trend:  SCr 2.28 [10-30 @ 07:34]  SCr 2.34 [10-29 @ 07:57]  SCr 2.18 [10-28 @ 07:05]  SCr 1.95 [10-27 @ 15:05]  SCr 1.85 [10-12 @ 21:19]    Urinalysis - [10-27-20 @ 15:41]      Color Yellow / Appearance Clear / SG 1.010 / pH 6.0      Gluc Negative / Ketone Negative  / Bili Negative / Urobili Negative       Blood Large / Protein 100 / Leuk Est Moderate / Nitrite Negative      RBC 11-25 / WBC >50 / Hyaline  / Gran  / Sq Epi  / Non Sq Epi Occasional / Bacteria     Ferritin 343      [04-15-20 @ 23:46]  TSH 3.38      [10-27-20 @ 15:05]

## 2020-11-02 NOTE — PROGRESS NOTE ADULT - SUBJECTIVE AND OBJECTIVE BOX
[facility] DIVISION OF KIDNEY DISEASES AND HYPERTENSION -- FOLLOW UP NOTE  --------------------------------------------------------------------------------  Chief Complaint:  barbra on ckd    24 hour events/subjective:  no acute event      PAST HISTORY  --------------------------------------------------------------------------------  No significant changes to PMH, PSH, FHx, SHx, unless otherwise noted    ALLERGIES & MEDICATIONS  --------------------------------------------------------------------------------  Allergies    No Known Drug Allergies  provide glucerna TID , diet coke BID,  diet ice cream with dinner- RD ok (Unknown)    Intolerances      Standing Inpatient Medications  allopurinol 100 milliGRAM(s) Oral daily  atorvastatin 20 milliGRAM(s) Oral at bedtime  BACItracin   Ointment 1 Application(s) Topical two times a day  dextrose 5%. 1000 milliLiter(s) IV Continuous <Continuous>  dextrose 50% Injectable 12.5 Gram(s) IV Push once  dextrose 50% Injectable 25 Gram(s) IV Push once  dextrose 50% Injectable 25 Gram(s) IV Push once  epoetin liam-epbx (RETACRIT) Injectable 49635 Unit(s) SubCutaneous every 7 days  ferrous    sulfate 325 milliGRAM(s) Oral daily  finasteride 5 milliGRAM(s) Oral daily  heparin   Injectable 5000 Unit(s) SubCutaneous every 12 hours  insulin lispro (ADMELOG) corrective regimen sliding scale   SubCutaneous three times a day before meals  insulin lispro (ADMELOG) corrective regimen sliding scale   SubCutaneous at bedtime  lactobacillus acidophilus 1 Tablet(s) Oral three times a day with meals  midodrine. 10 milliGRAM(s) Oral three times a day  multivitamin 1 Tablet(s) Oral daily  nystatin Powder 1 Application(s) Topical three times a day  oxybutynin 10 milliGRAM(s) Oral daily  tamsulosin 0.4 milliGRAM(s) Oral at bedtime    PRN Inpatient Medications  acetaminophen   Tablet .. 650 milliGRAM(s) Oral every 6 hours PRN  calamine/zinc oxide Lotion 1 Application(s) Topical two times a day PRN  dextrose 40% Gel 15 Gram(s) Oral once PRN  diphenhydrAMINE   Injectable 12.5 milliGRAM(s) IV Push every 4 hours PRN  glucagon  Injectable 1 milliGRAM(s) IntraMuscular once PRN      REVIEW OF SYSTEMS  --------------------------------------------------------------------------------  Gen: No weight changes, fatigue, fevers/chills, weakness  Skin: No rashes  Head/Eyes/Ears/Mouth: No headache; Normal hearing; Normal vision w/o blurriness; No sinus pain/discomfort, sore throat  Respiratory: No dyspnea, cough, wheezing, hemoptysis  CV: No chest pain, PND, orthopnea  GI: No abdominal pain, diarrhea, constipation, nausea, vomiting, melena, hematochezia  : No increased frequency, dysuria, hematuria, nocturia  MSK: No joint pain/swelling; no back pain; no edema  Neuro: No dizziness/lightheadedness, weakness, seizures, numbness, tingling  Heme: No easy bruising or bleeding  Endo: No heat/cold intolerance  Psych: No significant nervousness, anxiety, stress, depression    All other systems were reviewed and are negative, except as noted.    VITALS/PHYSICAL EXAM  --------------------------------------------------------------------------------  T(C): 36.4 (11-01-20 @ 23:20), Max: 37 (11-01-20 @ 18:50)  HR: 83 (11-01-20 @ 23:20) (83 - 93)  BP: 131/84 (11-01-20 @ 23:20) (118/76 - 131/84)  RR: 20 (11-01-20 @ 23:20) (18 - 20)  SpO2: 95% (11-01-20 @ 23:20) (95% - 95%)  Wt(kg): --        11-01-20 @ 07:01  -  11-02-20 @ 07:00  --------------------------------------------------------  IN: 50 mL / OUT: 0 mL / NET: 50 mL      Physical Exam:  	Gen: NAD  	HEENT: , supple neck,  	Pulm: CTA B/L  	CV: RRR, S1S2; no rub  	Back: No spinal or CVA tenderness; no sacral edema  	Abd: +BS, soft, nontender/nondistended  	: rodriguez catheter+  	UE: Warm,  no edema; no asterixis  	LE: Warm, no edema  	Neuro: No focal deficit  	Psych: Normal affect and mood  	Skin: Warm    LABS/STUDIES  --------------------------------------------------------------------------------              8.0    5.20  >-----------<  129      [11-02-20 @ 07:04]              26.4     140  |  105  |  54.0  ----------------------------<  115      [11-02-20 @ 07:04]  4.7   |  21.0  |  2.15        Ca     8.8     [11-02-20 @ 07:04]      Mg     1.7     [11-02-20 @ 07:04]      Phos  3.9     [11-02-20 @ 07:04]            Creatinine Trend:  SCr 2.15 [11-02 @ 07:04]  SCr 2.08 [11-01 @ 12:40]  SCr 2.34 [10-31 @ 07:57]  SCr 2.28 [10-30 @ 07:34]  SCr 2.34 [10-29 @ 07:57]    Urinalysis - [10-27-20 @ 15:41]      Color Yellow / Appearance Clear / SG 1.010 / pH 6.0      Gluc Negative / Ketone Negative  / Bili Negative / Urobili Negative       Blood Large / Protein 100 / Leuk Est Moderate / Nitrite Negative      RBC 11-25 / WBC >50 / Hyaline  / Gran  / Sq Epi  / Non Sq Epi Occasional / Bacteria       Ferritin 343      [04-15-20 @ 23:46]  TSH 3.38      [10-27-20 @ 15:05]

## 2020-11-02 NOTE — DIETITIAN INITIAL EVALUATION ADULT. - PERTINENT LABORATORY DATA
11-02 Na140 mmol/L Glu 115 mg/dL<H> K+ 4.7 mmol/L Cr  2.15 mg/dL<H> BUN 54.0 mg/dL<H> Phos 3.9 mg/dL Alb n/a   PAB n/a

## 2020-11-02 NOTE — DISCHARGE NOTE NURSING/CASE MANAGEMENT/SOCIAL WORK - PATIENT PORTAL LINK FT
You can access the FollowMyHealth Patient Portal offered by [facility] by registering at the following website: [email or web address]. By joining [facility]’s FollowMyHealth portal, you will also be able to view your health information using other applications (apps) compatible with our system.

## 2020-11-02 NOTE — DISCHARGE NOTE NURSING/CASE MANAGEMENT/SOCIAL WORK - NSDCFUADDAPPT_GEN_ALL_CORE_FT
Schedule an Appointment to follow up with your Podiatrist in 1 week.      Dry gangrene noted to hallux with no clinical signs of infection  Recommend continued local wound care with betadine application to dry gangrene with 4x4 gauze and gloria - can be changed daily   Recommend continued out-patient follow up with podiatrist for continued pedal care   No podiatric intervention needed during this admission  follow up with Dr. Blank

## 2020-11-02 NOTE — PROGRESS NOTE ADULT - ASSESSMENT
1) MARSHAL on CKD III ( New Baseline Scr., 2.3 mg., )  2) Anemia  3) BPH  4) UTI    Maintain euvolemia  Continue Flomax finasteride; oxybutynin  Continue epogen 10k units weekly for renal anemia  Follow up as outpatient  Will see PRN  SCr stable    dw Dr Gannon

## 2020-11-02 NOTE — DIETITIAN INITIAL EVALUATION ADULT. - PROBLEM SELECTOR PLAN 4
h/o ckd, Cr 1.95 to be followed closely, will request [facility] nephrology to follow.

## 2020-11-02 NOTE — PROGRESS NOTE ADULT - REASON FOR ADMISSION
urosepsis

## 2020-11-02 NOTE — DIETITIAN INITIAL EVALUATION ADULT. - OTHER INFO
69yo male admitted with sepsis 2/2 UTI, dry gangrene R. hallux, hx developmental disability, CHF, HTN, DM, AF, CKD not on HD. Pt sleeping, unarousable to voice. Per documentation Pt eating well completing 100% of meals with assistance. Last documented BM 11/1.   Per previous RD assessment, Pt with multiple hospitalizations and inpatient stays 2/2 complicated illness resulting in 45lb weight loss, UBW reported 235-240lbs. Weight hx per documentation: (9/2020) 195lbs, (8/2020) 204lbs. Pt OOB to chair, unable to obtain new weight at this time, however weight 260lbs documented appears inaccurate. Weight per transfer paperwork 232lbs, will continue to monitor.

## 2020-11-05 NOTE — ED PROVIDER NOTE - PHYSICAL EXAMINATION
General: alert, conversant, looks well, hypothermic, HR/BP wnl  Head: atraumatic, normocephalic  Eyes: PERRL, EOMI, no scleral icterus  ENT: no epistaxis, moist mucous membranes, normal phonation, airway patent  Neck: full ROM, no midline ttp  CV: RRR, no murmurs, HDS  Pulm: lungs CTA b/l, no wheezing, no respiratory distress  GI: obese, soft, no ttp, no masses   Extremities: BLE edema, chronic venous stasis changes, limited ROM due limited use, radial pulses present b/l   Neuro: alert, oriented x3, interactive, PERRL, normal speech/memory  Derm: cool to touch, no pallor, no diaphoresis, dry gangrenous wounds to R 1st MTP and L heel, no purulence or erythema, appeared well healing

## 2020-11-05 NOTE — ED PROVIDER NOTE - CLINICAL SUMMARY MEDICAL DECISION MAKING FREE TEXT BOX
Haveaime, PGY3- hypothermic with no other sx or vital changes. Similar presentations in the past with reassuring workups, normal lactates, no bacteremia, normal thyroid/cortisol studies, coming from wound care clinic. Followed by podiatry for chronic dry gangrene.  screen for signs of sepsis, cystitis, PNA, electrolytes changes, MARSHAL, apply BAIRhugger   dispo pending temp and diagnostics, will re-eval  at this time no consistent with sepsis will hold off on abx

## 2020-11-05 NOTE — ED PROVIDER NOTE - ATTENDING CONTRIBUTION TO CARE
I, Dejuan Crawford, personally saw the patient with the resident, and completed the key components of the history and physical exam. I then discussed the management plan with the resident.    71 yo M chronic hypothermia, CHF, HTN, DM, lives in a group home for developmental disability. patient found hypothermic. rectal temp 90.9. Patient found to have pneumonia. mild UTI. patient had ESBL. patient given vanc and meropenum. idris hugger placed. patient also mild hypotension that improved with IVF. Patient admitted for sepsis.

## 2020-11-05 NOTE — H&P ADULT - ASSESSMENT
71 y/o M with h/o developmental disability, CHF, HTN, DM, AF taken off apixaban due to anemia/hematuria, CKD not on HD, refused hemodialysis, BPH who was discharged from [facility] 9/3/20 s/p treated for ESBL UTI and then was readmitted on 09/17/20 for MARSHAL and UTI now comes back again from group home after being here for UTI (discharged 2 days). He had episode of hypothermia and was sent to the ED. Patient reports no urinary symptoms. He is complaining of rectal pain, denies constipation/diarrhea. Denies abdominal pain. Denies chills. Denies leg pain or swelling.    In the ED received merrem IV. ED recommending endocrinology consult for possible adrenal insufficiency. BP on admission was on the softer side being 90 systolic.     #hypothermia with possible UTI (asymptomatic) vs pneumonia (CXR with mild right infiltrate and patient has some cough)  - doubt adrenal insufficiency - however unclear why patient requires midodrine  - can get endocrinology consult to evaluate for stim challenge  - continue meropenem  - start azithromycin for atypical coverage  - follow up urine culture  - evaluate foot for possible infection  - infectious disease consult  - admit to any bed  - discussed with patient and sister at bedside  - sister Katy is HCP based on previous admissions he was DNR/DNI; no MOLST in the chart; will confirm with patient and sister    #DM  #htn  #A-FIB  #CHF  #CKD  #chronic anemia 69 y/o M with h/o developmental disability, CHF, HTN, DM, AF taken off apixaban due to anemia/hematuria, CKD not on HD, refused hemodialysis, BPH who was discharged from [facility] 9/3/20 s/p treated for ESBL UTI and then was readmitted on 09/17/20 for MARSHAL and UTI now comes back again from group home after being here for UTI (discharged 2 days). He had episode of hypothermia and was sent to the ED. Patient reports no urinary symptoms. He is complaining of rectal pain, denies constipation/diarrhea. Denies abdominal pain. Denies chills. Denies leg pain or swelling.    In the ED received merrem IV. ED recommending endocrinology consult for possible adrenal insufficiency. BP on admission was on the softer side being 90 systolic.     #hypothermia with possible UTI (asymptomatic) vs pneumonia (CXR with mild right infiltrate and patient has some cough)  - doubt adrenal insufficiency - however unclear why patient requires midodrine  - can get endocrinology consult to evaluate for stim challenge  - continue meropenem  - start azithromycin for atypical coverage  - follow up urine culture  - evaluate foot for possible infection  - infectious disease consult  - admit to any bed  - discussed with patient and sister at bedside  - sister Katy is HCP based on previous admissions he was DNR/DNI; no MOLST in the chart; will confirm with patient and sister    #DM  - ISS while inhouse  - diabetic diet    #A-FIB  - no longer on AC  - rate and rhythm controlled    #CHF - preserved EF  - not in exacerbation    #CKD  - stable diesase    #chronic anemia  - stable at this time

## 2020-11-05 NOTE — ED PROVIDER NOTE - CARE PLAN
Principal Discharge DX:	Cystitis  Secondary Diagnosis:	SIRS (systemic inflammatory response syndrome)  Secondary Diagnosis:	Hypothermia  Secondary Diagnosis:	CKD (chronic kidney disease)

## 2020-11-05 NOTE — ED PROVIDER NOTE - PROGRESS NOTE DETAILS
Haverty, PGY3- BP down to 90s systolic. HR wnl. Will give broad spectrum (mildred given ESBL in past) abx and IVF bolus. Currently on BAIRhugger. No lacate. Remainder of serologies reassuring. Sister and aide updated. Admitted to Dr. Brar.

## 2020-11-05 NOTE — ED ADULT NURSE NOTE - OBJECTIVE STATEMENT
pt A&Ox4, sent from wound care dr for hypothermia , pt has hx of hypothermia and recently admitted for it, resp even and unlabored no distress noted, abd soft ntnd, pt has bilat lower leg edema and multiple un-stageable ulcers of feet and IAD in salud area, pt denies chills, sob, chest pain, abd pain n/v/d

## 2020-11-05 NOTE — ED ADULT TRIAGE NOTE - CHIEF COMPLAINT QUOTE
pt [facility] with reports of hypothermia, 94.7 temp from thermal scanner. pt without any complaints.

## 2020-11-05 NOTE — H&P ADULT - NSHPLABSRESULTS_GEN_ALL_CORE
8.5    5.35  )-----------( 118      ( 05 Nov 2020 12:33 )             27.9     11-05    138  |  103  |  48.0<H>  ----------------------------<  195<H>  5.1   |  21.0<L>  |  1.98<H>    Ca    9.0      05 Nov 2020 12:33    TPro  6.7  /  Alb  2.9<L>  /  TBili  0.3<L>  /  DBili  x   /  AST  39  /  ALT  24  /  AlkPhos  151<H>  11-05    < from: Xray Chest 1 View- PORTABLE-Urgent (Xray Chest 1 View- PORTABLE-Urgent .) (11.05.20 @ 12:51) >    IMPRESSION: Heart enlargement and a rather mild right lower perihilar infiltrate.    < end of copied text >

## 2020-11-05 NOTE — ED ADULT NURSE NOTE - NSIMPLEMENTINTERV_GEN_ALL_ED
Implemented All Fall Risk Interventions:  [place] to call system. Call bell, personal items and telephone within reach. Instruct patient to call for assistance. Room bathroom lighting operational. Non-slip footwear when patient is off stretcher. Physically safe environment: no spills, clutter or unnecessary equipment. Stretcher in lowest position, wheels locked, appropriate side rails in place. Provide visual cue, wrist band, yellow gown, etc. Monitor gait and stability. Monitor for mental status changes and reorient to person, place, and time. Review medications for side effects contributing to fall risk. Reinforce activity limits and safety measures with patient and family.

## 2020-11-05 NOTE — H&P ADULT - NSHPPHYSICALEXAM_GEN_ALL_CORE
PHYSICAL EXAM:  General: Well developed; in no acute distress  Eyes: PERRLA, EOMI; conjunctiva and sclera clear  Head: Normocephalic; atraumatic  ENMT: No nasal discharge; airway clear  Neck: Supple; non tender; no masses  Respiratory: No wheezes, rales or rhonchi  Cardiovascular: Regular rate and rhythm. S1 and S2 Normal; No murmurs, gallops or rubs  Gastrointestinal: Soft non-tender non-distended; Normal bowel sounds  Genitourinary: No costovertebral angle tenderness  Extremities: Normal range of motion, No clubbing, cyanosis; bilateral lymphedema; right foot is under wrap at this time  Vascular: Peripheral pulses palpable 2+ bilaterally  Neurological: Alert and oriented x4  Skin: Warm and dry. No acute rash  Lymph Nodes: No acute cervical adenopathy  Musculoskeletal: Normal gait, tone, without deformities  Psychiatric: Cooperative and appropriate PHYSICAL EXAM:    General: Well developed; in no acute distress  Eyes: PERRLA, EOMI; conjunctiva and sclera clear  Head: Normocephalic; atraumatic  ENMT: No nasal discharge; airway clear  Neck: Supple; non tender; no masses  Respiratory: No wheezes, rales or rhonchi  Cardiovascular: Regular rate and rhythm. S1 and S2 Normal; No murmurs, gallops or rubs  Gastrointestinal: Soft non-tender non-distended; Normal bowel sounds  Genitourinary: No costovertebral angle tenderness  Extremities: Normal range of motion, No clubbing, cyanosis; bilateral lymphedema; right foot is under wrap at this time  Vascular: Peripheral pulses palpable 2+ bilaterally  Neurological: Alert and oriented x4  Skin: Warm and dry. No acute rash  Lymph Nodes: No acute cervical adenopathy  Musculoskeletal: Normal gait, tone, without deformities  Psychiatric: Cooperative and appropriate

## 2020-11-05 NOTE — H&P ADULT - HISTORY OF PRESENT ILLNESS
71 y/o M with h/o developmental disability, CHF, HTN, DM, AF taken off apixaban due to anemia/hematuria, CKD not on HD, refused hemodialysis, BPH who was discharged from [facility] 9/3/20 s/p treated for ESBL UTI and then was readmitted on 09/17/20 for MARSHAL and UTI now comes back again from group home after being here for UTI (discharged 2 days). He had episode of hypothermia and was sent to the ED. Patient reports no urinary symptoms. He is complaining of rectal pain, denies constipation/diarrhea. Denies abdominal pain. Denies chills. Denies leg pain or swelling.    In the ED received merrem IV. ED recommending endocrinology consult for possible adrenal insufficiency. BP on admission was on the softer side being 90 systolic.

## 2020-11-05 NOTE — ED PROVIDER NOTE - OBJECTIVE STATEMENT
71 y/o M with h/o developmental disability, CHF, HTN, DM, AF taken off apixaban due to anemia/hematuria, CKD not on HD, refused hemodialysis, BPH BIB EMS from outpatient wound clinic (chronic feet wounds, dry gangrene) for concern for hypothermia of 94.X. Here in ED found to be 91F. In ED pt denies any sx. States he is often hypothermic. Discharged from [facility] 3 days ago after similar presentation. Denies any fever, cough, NVD. No drainage from wounds. Still makes urine. Alert and oriented x3 in ED. Mild dev delay, appears to be at baseline.

## 2020-11-06 NOTE — CONSULT NOTE ADULT - ASSESSMENT
This 71 y/o M with developmental disability, CHF, HTN, DM, AF taken off apixaban due to anemia/hematuria, CKD not on HD, refused hemodialysis, BPH who was discharged from [facility] 9/3/20 s/p treated for ESBL UTI and then was readmitted on 09/17/20 for MARSHAL and UTI now comes back again from group home after being here for UTI (discharged 2 days). He had episode of hypothermia and was sent to the ED. Patient reports no urinary symptoms. He is complaining of rectal pain, denies constipation/diarrhea. Denies abdominal pain. Denies chills. Denies leg pain or swelling.     In the ED received merrem IV. ED recommending endocrinology consult for possible adrenal insufficiency. BP on admission was on the softer side being 90 systolic.  (05 Nov 2020 16:21)    patient can provide limited information.  Denies chest pain, denies abd pain, denies back pain, denies SOB, denies leg pain.     recently seen by Dr. Chew last week for possible UTI, given a course of MERREM.   cultures from the urine at that visit had been NEGATIVE.    Impression:  hypothermia - intermittent  CKD  developmental delay  right foot wound      Plan:  - no clear evidence of overall infection leading to hypothermia  - agree with merrem for now  - follow up all outstanding cultures  - trend temperature and WBC curve  - repeat cultures from blood and all sources if febrile.    - check procalcitonin to r/o bacterial infection  - agree with endocrine workup; if normal workup, may need to consider medication side effects       has CKD, will watch renal fx closely on antibiotics  Foot wounds being followed with  podiatry.

## 2020-11-06 NOTE — CONSULT NOTE ADULT - SUBJECTIVE AND OBJECTIVE BOX
HPI:  71 y/o M with h/o developmental disability, CHF, HTN, DM, AF taken off apixaban due to anemia/hematuria, CKD not on HD, refused hemodialysis, BPH who was discharged from [facility] 9/3/20 s/p treated for ESBL UTI and then was readmitted on 09/17/20 for MARSHAL and UTI now comes back again from group home after being here for UTI (discharged 2 days). He had episode of hypothermia and was sent to the ED. Patient reports no urinary symptoms. He is complaining of rectal pain, denies constipation/diarrhea. Denies abdominal pain. Denies chills. Denies leg pain or swelling.    In the ED received merrem IV. ED recommending endocrinology consult for possible adrenal insufficiency. BP on admission was on the softer side being 90 systolic.  (05 Nov 2020 16:21)      History as above. Pt seen bedside in ED with family member present to help relay hx. Pt is from a group home and is frequently in the ED. He has dressing changes at his group home for his foot wounds. Pt is in no NAD and AAOx3. Pt denied F/C/V/N/SOB.    PMH:Encounter for blood transfusion    Anemia    History of gross hematuria    History of orthostatic hypotension    Bacterial pneumonia    Muscle weakness (generalized)    2019 novel coronavirus disease (COVID-19)    Falls    Heart failure    Afib    CHF (congestive heart failure)    Gout    Hiatal hernia    BPH (benign prostatic hyperplasia)    Cognitive impairment    Cardiomyopathy    HTN (hypertension)    DM (diabetes mellitus)      Allergies: No Known Allergies    Medications: acetaminophen   Tablet .. 650 milliGRAM(s) Oral once  allopurinol 300 milliGRAM(s) Oral daily  atorvastatin 20 milliGRAM(s) Oral at bedtime  dextrose 40% Gel 15 Gram(s) Oral once PRN  dextrose 5%. 1000 milliLiter(s) IV Continuous <Continuous>  dextrose 50% Injectable 12.5 Gram(s) IV Push once  dextrose 50% Injectable 25 Gram(s) IV Push once  dextrose 50% Injectable 25 Gram(s) IV Push once  finasteride 5 milliGRAM(s) Oral daily  glucagon  Injectable 1 milliGRAM(s) IntraMuscular once PRN  insulin lispro (ADMELOG) corrective regimen sliding scale   SubCutaneous three times a day before meals  insulin lispro (ADMELOG) corrective regimen sliding scale   SubCutaneous at bedtime  meropenem  IVPB 1000 milliGRAM(s) IV Intermittent every 12 hours  midodrine. 10 milliGRAM(s) Oral <User Schedule>  nystatin Powder 1 Application(s) Topical three times a day  oxybutynin 10 milliGRAM(s) Oral daily  saccharomyces boulardii 250 milliGRAM(s) Oral two times a day  tamsulosin Oral Tab/Cap - Peds 0.4 milliGRAM(s) Oral at bedtime  zinc sulfate 220 milliGRAM(s) Oral daily    FH:No pertinent family history in first degree relatives      PSX: S/P appendectomy    No significant past surgical history      SH: acetaminophen   Tablet .. 650 milliGRAM(s) Oral once  allopurinol 300 milliGRAM(s) Oral daily  atorvastatin 20 milliGRAM(s) Oral at bedtime  dextrose 40% Gel 15 Gram(s) Oral once PRN  dextrose 5%. 1000 milliLiter(s) IV Continuous <Continuous>  dextrose 50% Injectable 12.5 Gram(s) IV Push once  dextrose 50% Injectable 25 Gram(s) IV Push once  dextrose 50% Injectable 25 Gram(s) IV Push once  finasteride 5 milliGRAM(s) Oral daily  glucagon  Injectable 1 milliGRAM(s) IntraMuscular once PRN  insulin lispro (ADMELOG) corrective regimen sliding scale   SubCutaneous three times a day before meals  insulin lispro (ADMELOG) corrective regimen sliding scale   SubCutaneous at bedtime  meropenem  IVPB 1000 milliGRAM(s) IV Intermittent every 12 hours  midodrine. 10 milliGRAM(s) Oral <User Schedule>  nystatin Powder 1 Application(s) Topical three times a day  oxybutynin 10 milliGRAM(s) Oral daily  saccharomyces boulardii 250 milliGRAM(s) Oral two times a day  tamsulosin Oral Tab/Cap - Peds 0.4 milliGRAM(s) Oral at bedtime  zinc sulfate 220 milliGRAM(s) Oral daily      Vital Signs Last 24 Hrs  T(C): 36.7 (06 Nov 2020 07:43), Max: 36.8 (06 Nov 2020 04:35)  T(F): 98 (06 Nov 2020 07:43), Max: 98.2 (06 Nov 2020 04:35)  HR: 69 (06 Nov 2020 11:19) (67 - 94)  BP: 98/60 (06 Nov 2020 11:19) (96/64 - 142/84)  BP(mean): 73 (06 Nov 2020 11:19) (73 - 82)  RR: 18 (06 Nov 2020 11:19) (18 - 20)  SpO2: 99% (06 Nov 2020 11:19) (96% - 99%)    LABS                        7.9    4.61  )-----------( 129      ( 06 Nov 2020 07:30 )             25.9               11-06    140  |  107  |  45.0<H>  ----------------------------<  147<H>  4.8   |  23.0  |  2.03<H>    Ca    9.1      06 Nov 2020 07:30    TPro  6.7  /  Alb  2.9<L>  /  TBili  0.3<L>  /  DBili  x   /  AST  39  /  ALT  24  /  AlkPhos  151<H>  11-05        PHYSICAL EXAM  GEN: KIMBERLY MARKS is a pleasant well-nourished, well developed 70y Male in no acute distress, alert awake, and oriented to person, place and time.   LE Focused:    Vascular: Dorsalis Pedis and Posterior Tibial pulses 1/4. Capillary refill time less than 3 seconds digits 1-5 bilateral. Temperature gradient is warm to cold b/l.   Neuro: Protective sensation absent to the level of the digits bilateral via [place] touch test  Derm: Skin warm, dry and supple bilateral. right dorsal 1st MPJ wound ~2.9x1.9x0.2cm fibrogranular base, no drainage, no malodor. left heel wound ~1x1.7x0.1 dry eschar/scab. no malodor, no drainage, no signs of infection   MSK: Muscle strength 5/5 all major muscle groups bilateral. No structural abnormality, bilaterally      < from: Xray Foot AP + Lateral + Oblique, Right (11.06.20 @ 10:17) >     EXAM:  FOOT-RIGHT                          PROCEDURE DATE:  11/06/2020          INTERPRETATION:  Right foot. Patient has local pain. 3 views.    Arterial calcifications are noted.    There is a pes planus. There is scattered moderate dorsal tarsaldegeneration.    There is fairly significant first MTP degeneration with a slight hallux valgus. Old fracture deformity of the distal fifth metatarsal is seen.    No bone destruction or acute fracture evident.    IMPRESSION: Pes planus and scattered degeneration. Arterial calcification. No acute fracture.              SANFORD MANJARREZ MD; Attending Radiologist  This document has been electronically signed. Nov 6 2020  2:19PM    < end of copied text >      < from: [facility] Physiol Extremity Lower 3+ Level, BI (11.06.20 @ 14:04) >     EXAM:  US PHYSIOL LWR EXT 3+ LEV BI                          PROCEDURE DATE:  11/06/2020          INTERPRETATION:  History:Bilateral wounds on the feet. Hypertension hyperlipidemia diabetes.    Technique: Bilateral ADELA/PVR    Comparison: None    Findings:      The ankle-brachial indices could not be performed as the patient could not cooperate with the examination      RIGHT  Flow study: Pulsatile waveforms, with diminished flow within the foot    LEFT  Flow study: Pulsatile waveforms with diminished flow within the foot      Impression:    Limited study as described as the patient could not cooperate with examination    There is diminished flow within both feet.    < end of copied text >       Assessment:   right dorsal foot wound  Left plantar heel wound   Charcot foot   PVD    PLAN:   Pt evaluated and chart reviewed   Discussed diagnosis and treatment with patient   Applied betadine DSD applied  X-rays reviewed  Weight bearing as tolerated  Recommend vascular consult  Recommend PT consult   Wound care orders placed   No surgical intervention  Wound care: apply betadine to right dorsal foot wound and plantar left heel wound. apply sterile gauze kerlix and ACE. keep clean dry intact and change every other day.   Discussed with attending

## 2020-11-06 NOTE — PROGRESS NOTE ADULT - SUBJECTIVE AND OBJECTIVE BOX
CC:     INTERVAL HPI/OVERNIGHT EVENTS:  patient without acute complaints  no acute events overnight  patient follows with outpatient wound care for bilateral wounds on his feet    Vital Signs Last 24 Hrs  T(C): 36.7 (2020 07:43), Max: 36.8 (2020 04:35)  T(F): 98 (2020 07:43), Max: 98.2 (2020 04:35)  HR: 67 (2020 07:43) (67 - 94)  BP: 96/64 (2020 07:43) (91/54 - 142/84)  BP(mean): 75 (2020 07:43) (67 - 82)  RR: 18 (2020 07:43) (18 - 22)  SpO2: 98% (2020 07:43) (96% - 100%)    PHYSICAL EXAM:  General: in no acute distress  Eyes: PERRLA, EOMI; conjunctiva and sclera clear  Head: Normocephalic; atraumatic  ENMT: No nasal discharge; airway clear  Neck: Supple; non tender; no masses  Respiratory: No wheezes, rales or rhonchi  Cardiovascular: Regular rate and rhythm. S1 and S2 Normal; No murmurs, gallops or rubs  Gastrointestinal: Soft non-tender non-distended; Normal bowel sounds  Extremities: bilateral lymphedema; bilateral foot deformity and decreased ROM; has a circular wound on over the MCP joint of the right toe; some surrounding erythema  Vascular: Peripheral pulses palpable 2+ bilaterally  Neurological: Alert and oriented x4  Skin: Warm and dry. No acute rash  Psychiatric: Cooperative and appropriate    I&O's Detail    2020 07:01  -  2020 07:00  --------------------------------------------------------  IN:    Oral Fluid: 360 mL  Total IN: 360 mL    OUT:  Total OUT: 0 mL    Total NET: 360 mL               7.9    4.61  )-----------( 129      ( 2020 07:30 )             25.9     2020 07:30    140    |  107    |  45.0   ----------------------------<  147    4.8     |  23.0   |  2.03     Ca    9.1        2020 07:30    TPro  6.7    /  Alb  2.9    /  TBili  0.3    /  DBili  x      /  AST  39     /  ALT  24     /  AlkPhos  151    2020 12:33    PT/INR - ( 2020 12:33 )   PT: 18.0 sec;   INR: 1.59 ratio      PTT - ( 2020 12:33 )  PTT:47.1 sec    CAPILLARY BLOOD GLUCOSE  POCT Blood Glucose.: 143 mg/dL (2020 07:54)  POCT Blood Glucose.: 137 mg/dL (2020 21:40)    LIVER FUNCTIONS - ( 2020 12:33 )  Alb: 2.9 g/dL / Pro: 6.7 g/dL / ALK PHOS: 151 U/L / ALT: 24 U/L / AST: 39 U/L / GGT: x           Urinalysis Basic - ( 2020 13:15 )    Color: Yellow / Appearance: Cloudy / S.015 / pH: x  Gluc: x / Ketone: Negative  / Bili: Negative / Urobili: Negative mg/dL   Blood: x / Protein: 100 mg/dL / Nitrite: Negative   Leuk Esterase: Moderate / RBC: 11-25 /HPF / WBC 26-50   Sq Epi: x / Non Sq Epi: Occasional / Bacteria: Occasional    MEDICATIONS  (STANDING):  acetaminophen   Tablet .. 650 milliGRAM(s) Oral once  allopurinol 300 milliGRAM(s) Oral daily  atorvastatin 20 milliGRAM(s) Oral at bedtime  dextrose 5%. 1000 milliLiter(s) (50 mL/Hr) IV Continuous <Continuous>  dextrose 50% Injectable 12.5 Gram(s) IV Push once  dextrose 50% Injectable 25 Gram(s) IV Push once  dextrose 50% Injectable 25 Gram(s) IV Push once  finasteride 5 milliGRAM(s) Oral daily  insulin lispro (ADMELOG) corrective regimen sliding scale   SubCutaneous three times a day before meals  insulin lispro (ADMELOG) corrective regimen sliding scale   SubCutaneous at bedtime  meropenem  IVPB 1000 milliGRAM(s) IV Intermittent every 12 hours  midodrine. 10 milliGRAM(s) Oral <User Schedule>  nystatin Powder 1 Application(s) Topical three times a day  oxybutynin 10 milliGRAM(s) Oral daily  saccharomyces boulardii 250 milliGRAM(s) Oral two times a day  tamsulosin Oral Tab/Cap - Peds 0.4 milliGRAM(s) Oral at bedtime  zinc sulfate 220 milliGRAM(s) Oral daily    MEDICATIONS  (PRN):  dextrose 40% Gel 15 Gram(s) Oral once PRN Blood Glucose LESS THAN 70 milliGRAM(s)/deciliter  glucagon  Injectable 1 milliGRAM(s) IntraMuscular once PRN Glucose LESS THAN 70 milligrams/deciliter    RADIOLOGY & ADDITIONAL TESTS:

## 2020-11-06 NOTE — PROGRESS NOTE ADULT - ASSESSMENT
69 y/o M with h/o developmental disability, CHF, HTN, DM, AF taken off apixaban due to anemia/hematuria, CKD not on HD, refused hemodialysis, BPH who was discharged from [facility] 9/3/20 s/p treated for ESBL UTI and then was readmitted on 09/17/20 for MARSHAL and UTI now comes back again from group home after being here for UTI (discharged 2 days). He had episode of hypothermia and was sent to the ED. Patient reports no urinary symptoms. He is complaining of rectal pain, denies constipation/diarrhea. Denies abdominal pain. Denies chills. Denies leg pain or swelling.    In the ED received merrem IV. ED recommending endocrinology consult for possible adrenal insufficiency. BP on admission was on the softer side being 90 systolic.     #hypothermia with possible UTI (asymptomatic) vs pneumonia (CXR with mild right infiltrate and patient has some cough)  - in the past when patient had severe septic shock (in August) did improve with stress dose steroids  - patient had ACTH stim test in August that appears to demonstrate apporpriate levels of ACTH and appropriate response - ruled out for adrenal insufficiency  - continue meropenem for now  - no further episodes of hypothermia  - started azithromycin for atypical coverage given CXR findings; patient does report a cough in the ED  - follow up urine culture  - will consult podiatry - last seen for dry gangrene of the right foot and was recommended outpatient follow up  - will obtain right foot xray to evaluate for possible osteo  - infectious disease consult    #DM  - ISS while inhouse  - diabetic diet    #A-FIB  - no longer on AC  - rate and rhythm controlled    #CHF - preserved EF  - not in exacerbation    #CKD stage III - in the past has refused dialysis when offered; currently no need for dialysis  - stable disease  - would avoid nephrotoxic agents    #chronic anemia  - stable at this time

## 2020-11-06 NOTE — CHART NOTE - NSCHARTNOTEFT_GEN_A_CORE
Patient received from ER with rectal temp 94.2F. Seen at bedside, offers no complaints. States "I feel great." Denies chest pain, SOB, n/v/d/c, dysuria, cough, abdominal pain, leg pain, chills, night sweats.     Vital Signs Last 24 Hrs  T(C): 34.7 (06 Nov 2020 22:27), Max: 36.9 (06 Nov 2020 19:36)  T(F): 94.5 (06 Nov 2020 22:27), Max: 98.5 (06 Nov 2020 19:36)  HR: 85 (06 Nov 2020 20:45) (67 - 99)  BP: 131/89 (06 Nov 2020 20:45) (96/64 - 142/84)  BP(mean): 73 (06 Nov 2020 11:19) (73 - 82)  RR: 18 (06 Nov 2020 20:45) (18 - 20)  SpO2: 99% (06 Nov 2020 20:45) (96% - 99%)    PHYSICAL EXAM:  GENERAL: NAD, lying in bed comfortably  HEAD:  Atraumatic, Normocephalic  EYES: EOMI, PERRL, conjunctiva and sclera clear  ENT: Moist mucous membranes  CHEST/LUNG: Clear to auscultation bilaterally; No rales, rhonchi, wheezing, or rubs. Unlabored respirations  HEART: Regular rate and rhythm; No murmurs, rubs, or gallops  ABDOMEN: Bowel sounds present; Soft, Nontender, Nondistended   NERVOUS SYSTEM:  Alert & Oriented X3, speech clear. Answers questions appropriately. No deficits     A/P:  69 y/o male admitted with hypothermia 2/2 UTI vs. PNA. Currently with another hypothermic episode.  -Warming blanket applied  -Blood cultures pending  -Continue on meropenem  -AM Labs ordered  -Continue to monitor

## 2020-11-06 NOTE — CONSULT NOTE ADULT - SUBJECTIVE AND OBJECTIVE BOX
[facility]  INFECTIOUS DISEASES AND INTERNAL MEDICINE at [facility]  =======================================================  Zach Chew MD  Diplomates American Board of Internal Medicine and Infectious Diseases  Tel  [phone]  Fax [phone]  =======================================================    [id number]  KIMBERLY MARKS   HPI:  This 71 y/o M with developmental disability, CHF, HTN, DM, AF taken off apixaban due to anemia/hematuria, CKD not on HD, refused hemodialysis, BPH who was discharged from [facility] 9/3/20 s/p treated for ESBL UTI and then was readmitted on 09/17/20 for MARSHAL and UTI now comes back again from group home after being here for UTI (discharged 2 days). He had episode of hypothermia and was sent to the ED. Patient reports no urinary symptoms. He is complaining of rectal pain, denies constipation/diarrhea. Denies abdominal pain. Denies chills. Denies leg pain or swelling.     In the ED received merrem IV. ED recommending endocrinology consult for possible adrenal insufficiency. BP on admission was on the softer side being 90 systolic.  (05 Nov 2020 16:21)    patient can provide limited information.  Denies chest pain, denies abd pain, denies back pain, denies SOB, denies leg pain.     recently seen by Dr. Chew last week for possible UTI, given a course of MERREM.   cultures from the urine at that visit had been NEGATIVE    I have personally reviewed the labs and data; pertinent labs and data are listed in this note; please see below.   =======================================================  Past Medical & Surgical Hx:  =====================  PAST MEDICAL & SURGICAL HISTORY:  Encounter for blood transfusion    Anemia    History of gross hematuria    History of orthostatic hypotension    Bacterial pneumonia    Muscle weakness (generalized)    2019 novel coronavirus disease (COVID-19)    Falls    Heart failure    Afib    CHF (congestive heart failure)    Gout    Hiatal hernia    BPH (benign prostatic hyperplasia)    Cognitive impairment    Cardiomyopathy    HTN (hypertension)    DM (diabetes mellitus)    S/P appendectomy      Problem List:  ==========  HEALTH ISSUES - PROBLEM Dx:        Social Hx:  =======  no toxic habits currently    FAMILY HISTORY:  No pertinent family history in first degree relatives  pt cant recal his family medical hx of either parents    no significant family history of immunosuppressive disorders in mother or father   =======================================================  REVIEW OF SYSTEMS:  CONSTITUTIONAL:  No Fever or chills  HEENT:  No diplopia or blurred vision.  No earache, sore throat or runny nose.  CARDIOVASCULAR:  No pressure, squeezing, strangling, tightness, heaviness or aching about the chest, neck, axilla or epigastrium.  RESPIRATORY:  No cough, shortness of breath  GASTROINTESTINAL:  No nausea, vomiting or diarrhea.  GENITOURINARY:  No dysuria, frequency or urgency. No Blood in urine  MUSCULOSKELETAL:  no joint aches, no muscle pain  SKIN:  No change in skin, hair or nails.  NEUROLOGIC:  No Headaches, seizures or weakness.  PSYCHIATRIC:  No disorder of thought or mood.  ENDOCRINE:  No heat or cold intolerance  HEMATOLOGICAL:  No easy bruising or bleeding.   =======================================================  Allergies    No Known Allergies    Intolerances    Antibiotics:  meropenem  IVPB 1000 milliGRAM(s) IV Intermittent every 12 hours    Other medications:  acetaminophen   Tablet .. 650 milliGRAM(s) Oral once  allopurinol 300 milliGRAM(s) Oral daily  atorvastatin 20 milliGRAM(s) Oral at bedtime  dextrose 5%. 1000 milliLiter(s) IV Continuous <Continuous>  dextrose 50% Injectable 12.5 Gram(s) IV Push once  dextrose 50% Injectable 25 Gram(s) IV Push once  dextrose 50% Injectable 25 Gram(s) IV Push once  finasteride 5 milliGRAM(s) Oral daily  insulin lispro (ADMELOG) corrective regimen sliding scale   SubCutaneous three times a day before meals  insulin lispro (ADMELOG) corrective regimen sliding scale   SubCutaneous at bedtime  midodrine. 10 milliGRAM(s) Oral <User Schedule>  nystatin Powder 1 Application(s) Topical three times a day  oxybutynin 10 milliGRAM(s) Oral daily  saccharomyces boulardii 250 milliGRAM(s) Oral two times a day  tamsulosin Oral Tab/Cap - Peds 0.4 milliGRAM(s) Oral at bedtime  zinc sulfate 220 milliGRAM(s) Oral daily     ertapenem  IVPB   100 mL/Hr IV Intermittent (09-18-20 @ 05:16)    ertapenem  IVPB   100 mL/Hr IV Intermittent (09-17-20 @ 13:03)    meropenem  IVPB   100 mL/Hr IV Intermittent (10-27-20 @ 17:01)    meropenem  IVPB   100 mL/Hr IV Intermittent (09-18-20 @ 16:51)   100 mL/Hr IV Intermittent (09-19-20 @ 05:29)   100 mL/Hr IV Intermittent (09-19-20 @ 18:22)   100 mL/Hr IV Intermittent (09-20-20 @ 06:10)   100 mL/Hr IV Intermittent (09-20-20 @ 17:31)   100 mL/Hr IV Intermittent (09-21-20 @ 06:33)   100 mL/Hr IV Intermittent (09-21-20 @ 16:26)   100 mL/Hr IV Intermittent (09-22-20 @ 05:42)   100 mL/Hr IV Intermittent (09-22-20 @ 16:40)   100 mL/Hr IV Intermittent (09-23-20 @ 06:14)   100 mL/Hr IV Intermittent (09-23-20 @ 18:00)   100 mL/Hr IV Intermittent (09-24-20 @ 06:14)   100 mL/Hr IV Intermittent (09-24-20 @ 16:53)   100 mL/Hr IV Intermittent (09-25-20 @ 06:19)   100 mL/Hr IV Intermittent (09-25-20 @ 17:17)   100 mL/Hr IV Intermittent (09-26-20 @ 05:32)   100 mL/Hr IV Intermittent (09-26-20 @ 17:15)   100 mL/Hr IV Intermittent (09-27-20 @ 06:18)   100 mL/Hr IV Intermittent (09-27-20 @ 17:11)   100 mL/Hr IV Intermittent (09-28-20 @ 06:36)    meropenem  IVPB   100 mL/Hr IV Intermittent (10-28-20 @ 05:52)   100 mL/Hr IV Intermittent (10-28-20 @ 18:59)   100 mL/Hr IV Intermittent (10-29-20 @ 05:25)   100 mL/Hr IV Intermittent (10-29-20 @ 17:26)   100 mL/Hr IV Intermittent (10-30-20 @ 05:11)   100 mL/Hr IV Intermittent (10-30-20 @ 17:44)   100 mL/Hr IV Intermittent (10-31-20 @ 05:03)   100 mL/Hr IV Intermittent (10-31-20 @ 17:07)   100 mL/Hr IV Intermittent (11-01-20 @ 06:18)   100 mL/Hr IV Intermittent (11-01-20 @ 17:31)   100 mL/Hr IV Intermittent (11-02-20 @ 05:38)    meropenem  IVPB   100 mL/Hr IV Intermittent (11-05-20 @ 13:58)    meropenem  IVPB   100 mL/Hr IV Intermittent (11-05-20 @ 18:29)   100 mL/Hr IV Intermittent (11-06-20 @ 05:39)    vancomycin  IVPB   250 mL/Hr IV Intermittent (10-27-20 @ 15:31)    vancomycin  IVPB.   250 mL/Hr IV Intermittent (11-05-20 @ 14:47)      ======================================================  Physical Exam:  ============  T(F): 98 (06 Nov 2020 07:43), Max: 98.2 (06 Nov 2020 04:35)  HR: 69 (06 Nov 2020 11:19)  BP: 98/60 (06 Nov 2020 11:19)  RR: 18 (06 Nov 2020 11:19)  SpO2: 99% (06 Nov 2020 11:19) (96% - 99%)  temp max in last 48H T(F): , Max: 98.2 (11-06-20 @ 04:35)    General:  No acute distress. Pleasant  Eye: Pupils are equal, round and reactive to light, Extraocular movements are intact, Normal conjunctiva.  HENT: Normocephalic, Oral mucosa dry  Neck: Supple, No lymphadenopathy.  Respiratory: Lungs with fair air entry  Cardiovascular: Normal rate, Regular rhythm,   Gastrointestinal: Soft, Non-tender, Non-distended, Normal bowel sounds.  Genitourinary: No costovertebral angle tenderness.  Lymphatics: No lymphadenopathy neck,   Musculoskeletal: Normal range of motion, Normal strength.  Integumentary: bilateral chronic stasis dermatitis,   RIGHT #1 hallux with scab, signs of recent trauma to nail  Neurologic: alert, moves all extremities, no focal neuro deficits.   Psychiatric: Appropriate mood & affect.    =======================================================  Labs:                        7.9    4.61  )-----------( 129      ( 06 Nov 2020 07:30 )             25.9      11-06    140  |  107  |  45.0<H>  ----------------------------<  147<H>  4.8   |  23.0  |  2.03<H>    Ca    9.1      06 Nov 2020 07:30    TPro  6.7  /  Alb  2.9<L>  /  TBili  0.3<L>  /  DBili  x   /  AST  39  /  ALT  24  /  AlkPhos  151<H>  11-05      Culture - Urine (collected 10-27-20 @ 22:15)  Source: .Urine Clean Catch (Midstream)  Final Report (10-28-20 @ 18:20):    <10,000 CFU/mL Normal Urogenital Nola    Culture - Blood (collected 10-27-20 @ 15:07)  Source: .Blood Blood-Peripheral  Final Report (11-01-20 @ 15:00):    No growth at 5 days.    Culture - Blood (collected 10-27-20 @ 15:06)  Source: .Blood Blood-Peripheral  Final Report (11-01-20 @ 15:00):    No growth at 5 days.      Creatinine, Serum: 2.03 mg/dL (11-06-20 @ 07:30)  Creatinine, Serum: 1.98 mg/dL (11-05-20 @ 12:33)  Creatinine, Serum: 2.15 mg/dL (11-02-20 @ 07:04)    Rapid RVP Result: NotDetec (10-27-20 @ 17:58)    COVID-19 PCR: NotDetec (11-05-20 @ 12:33)  COVID-19 PCR: NotDetec (11-02-20 @ 11:05)  COVID-19 IgG Antibody Interpretation: Positive (10-28-20 @ 05:29)  COVID-19 IgG Antibody Index: 86.00 Index (10-28-20 @ 05:29)  COVID-19 PCR: NotDetec (10-27-20 @ 15:04)  COVID-19 PCR: NotDetec (10-12-20 @ 22:00)      < from: Xray Chest 1 View- PORTABLE-Urgent (Xray Chest 1 View- PORTABLE-Urgent .) (11.05.20 @ 12:51) >     EXAM:  XR CHEST PORTABLE URGENT 1V                          PROCEDURE DATE:  11/05/2020          INTERPRETATION:  AP chest on November 5, 2020 at 12:31 PM. Patient has hypothermia.    Heart enlargement again noted fairly pronounced.    Infiltrate off the lower right hilum is again noted somewhat increased from August 24 of this year.    Heart obscures left base.    IMPRESSION: Heart enlargement and a rather mild right lower perihilar infiltrate.           SANFORD MANJARREZ MD; Attending Radiologist  This document has been electronically signed. Nov 5 2020  1:10PM    < end of copied text >      < from: Xray Foot AP + Lateral + Oblique, Right (11.06.20 @ 10:17) >     EXAM:  FOOT-RIGHT                          PROCEDURE DATE:  11/06/2020          INTERPRETATION:  Right foot. Patient has local pain. 3 views.    Arterial calcifications are noted.    There is a pes planus. There is scattered moderate dorsal tarsaldegeneration.    There is fairly significant first MTP degeneration with a slight hallux valgus. Old fracture deformity of the distal fifth metatarsal is seen.    No bone destruction or acute fracture evident.    IMPRESSION: Pes planus and scattered degeneration. Arterial calcification. No acute fracture.              SANFORD MANJARREZ MD; Attending Radiologist  This document has been electronically signed. Nov 6 2020  2:19PM    < end of copied text >

## 2020-11-07 NOTE — PROGRESS NOTE ADULT - ASSESSMENT
71 y/o M with h/o developmental disability, CHF, HTN, DM, AF taken off apixaban due to anemia/hematuria, CKD not on HD, refused hemodialysis, BPH who was discharged from [facility] 9/3/20 s/p treated for ESBL UTI and then was readmitted on 09/17/20 for MARSHAL and UTI now comes back again from group home after being here for UTI (discharged 2 days). He had episode of hypothermia and was sent to the ED. Patient reports no urinary symptoms. He is complaining of rectal pain, denies constipation/diarrhea. Denies abdominal pain. Denies chills. Denies leg pain or swelling.    In the ED received merrem IV. ED recommending endocrinology consult for possible adrenal insufficiency. Patient however had a stim test performed in August 31st. BP on admission was on the softer side being 90 systolic.    #hypothermia with possible UTI (asymptomatic) vs pneumonia (CXR with mild right infiltrate and patient has some cough)  - in the past when patient had severe septic shock (in August) did improve with stress dose steroids - while he was tapering off on prednisone he received stim test - that appears to demonstrate apporpriate levels of ACTH and appropriate response - ruled out for adrenal insufficiency  - TSH is elevated to 4.69 however T4 is normal - makes him subclinical hypothyroidism  - can discuss with endocrine if they agree with a trial of levothyroxine while here  - from infection standpoint he is on meropenem for now - ID is following  - awaiting finalization of all cultures - as of today urine culture is showing normal urogenital kelsey  - started azithromycin for atypical coverage given CXR findings; patient does report a cough in the ED - will complete after total of 5 days  - on previous admissions was seen by podiatry for dry gangrene of the right foot and was recommended outpatient follow up - reconsulted on this admission and ordered xray of feet to rule out osteo - no osteo on xray  - other differentials could include periodic hypothermia - those cases usually due to intracranial lesion based on case reports - treated with alpha-adrenergic stimulants (clonidine)  - for now will discontinue midodrine which is alpha-adrenergic agonist  - continue to monitor temperature    #DM  - ISS while inhouse  - diabetic diet    #A-FIB  - no longer on AC  - rate and rhythm controlled    #CHF - preserved EF  - not in exacerbation    #CKD stage III - in the past has refused dialysis when offered; currently no need for dialysis  - stable disease  - would avoid nephrotoxic agents    #chronic anemia  - stable at this time  - will check iron levels  - if low start iron infusion    DISPO: if ruled out for infection can ultimately go back to  likely early next week

## 2020-11-07 NOTE — PROGRESS NOTE ADULT - SUBJECTIVE AND OBJECTIVE BOX
CC:     INTERVAL HPI/OVERNIGHT EVENTS:  patient overnight with hypothermia  this morning alert - upset about not eating breakfast and trying to get out of bed  avoid medications at this time and will wait for food tray    Vital Signs Last 24 Hrs  T(C): 36.4 (07 Nov 2020 08:38), Max: 36.9 (06 Nov 2020 19:36)  T(F): 97.6 (07 Nov 2020 08:38), Max: 98.5 (06 Nov 2020 19:36)  HR: 99 (07 Nov 2020 08:38) (67 - 99)  BP: 127/61 (07 Nov 2020 08:38) (97/63 - 131/89)  BP(mean): 80 (07 Nov 2020 04:59) (73 - 80)  RR: 18 (07 Nov 2020 08:38) (17 - 20)  SpO2: 99% (07 Nov 2020 08:38) (97% - 99%)    PHYSICAL EXAM:  General: in no acute distress  Eyes: PERRLA, EOMI; conjunctiva and sclera clear  Head: Normocephalic; atraumatic  ENMT: No nasal discharge; airway clear  Neck: Supple; non tender; no masses  Respiratory: No wheezes, rales or rhonchi  Cardiovascular: Regular rate and rhythm. S1 and S2 Normal; No murmurs, gallops or rubs  Gastrointestinal: Soft non-tender non-distended; Normal bowel sounds  Extremities: bilateral lymphedema; bilateral foot deformity and decreased ROM; has a circular wound on over the MCP joint of the right toe; some surrounding erythema  Vascular: Peripheral pulses palpable 2+ bilaterally  Neurological: Alert and oriented x4  Skin: Warm and dry. No acute rash  Psychiatric: Cooperative and appropriate                          7.7    4.89  )-----------( 122      ( 07 Nov 2020 07:14 )             26.0     11-07    143  |  109<H>  |  49.0<H>  ----------------------------<  116<H>  4.8   |  23.0  |  2.10<H>    Ca    9.0      07 Nov 2020 07:14    TPro  6.7  /  Alb  2.9<L>  /  TBili  0.3<L>  /  DBili  x   /  AST  39  /  ALT  24  /  AlkPhos  151<H>  11-05    MEDICATIONS  (STANDING):  acetaminophen   Tablet .. 650 milliGRAM(s) Oral once  allopurinol 300 milliGRAM(s) Oral daily  atorvastatin 20 milliGRAM(s) Oral at bedtime  dextrose 5%. 1000 milliLiter(s) (50 mL/Hr) IV Continuous <Continuous>  dextrose 50% Injectable 12.5 Gram(s) IV Push once  dextrose 50% Injectable 25 Gram(s) IV Push once  dextrose 50% Injectable 25 Gram(s) IV Push once  finasteride 5 milliGRAM(s) Oral daily  insulin lispro (ADMELOG) corrective regimen sliding scale   SubCutaneous three times a day before meals  insulin lispro (ADMELOG) corrective regimen sliding scale   SubCutaneous at bedtime  meropenem  IVPB 1000 milliGRAM(s) IV Intermittent every 12 hours  midodrine. 10 milliGRAM(s) Oral <User Schedule>  nystatin Powder 1 Application(s) Topical three times a day  oxybutynin 10 milliGRAM(s) Oral daily  saccharomyces boulardii 250 milliGRAM(s) Oral two times a day  tamsulosin Oral Tab/Cap - Peds 0.4 milliGRAM(s) Oral at bedtime  zinc sulfate 220 milliGRAM(s) Oral daily    MEDICATIONS  (PRN):  dextrose 40% Gel 15 Gram(s) Oral once PRN Blood Glucose LESS THAN 70 milliGRAM(s)/deciliter  glucagon  Injectable 1 milliGRAM(s) IntraMuscular once PRN Glucose LESS THAN 70 milligrams/deciliter    RADIOLOGY & ADDITIONAL TESTS:

## 2020-11-07 NOTE — CHART NOTE - NSCHARTNOTEFT_GEN_A_CORE
Patient refusing warming blanket, states he is uncomfortable and needs a break. Rectal temp of 96.8 at this time. Attempts made by myself and RN to redirect patient and encourage him to wear blanket. Educated patient on risks of not wearing blanket. Discussed with patient taking a break from the blanket and trying again in 1hr, patient is agreeable. RN to reattempt putting patient on warming blanket in 1hr. If at this time patient not agreeable will consider 1:1. Continue to monitor.

## 2020-11-07 NOTE — PROVIDER CONTACT NOTE (OTHER) - SITUATION
Patient was recently brought up to unit. Upon doing vitals patient was found to have a rectal temp of 94.2. Nursing supervision Oscar contacted for help finding a warming blanket ASAP.

## 2020-11-08 NOTE — CONSULT NOTE ADULT - SUBJECTIVE AND OBJECTIVE BOX
Patient is a 70y old  Male who presents with a chief complaint of   HPI:  70M with h/o developmental disability, CHF, HTN, T2DM, AF taken off apixaban due to anemia/hematuria, CKD not on HD, refused hemodialysis, BPH who was discharged from [facility] 9/3/20 s/p treated for ESBL UTI and then was readmitted on 09/17/20 for MARSHAL and UTI now comes back again from group home after being here for UTI (discharged 2 days). He had episode of hypothermia and was sent to the ED. Patient reports no urinary symptoms. He is complaining of rectal pain, denies constipation/diarrhea. Denies abdominal pain. Denies chills. Denies leg pain or swelling. BP on admission was on the softer side being 90 systolic.   Consult for hypothermia and subclinical hypothyroidism  of note: patient was seen 9/2020 by our service for possible adrenal insufficiency in the setting of sepsis due to TUI. patient had appropriate ACTH stim test (16.4/25/24 baseline/30mins/1hr) and intially was placed on prednisone and tapered off.  TSH repeated 3x over the last 3 months 1.8/3.38/4.82 with normal T4 6.1/6.7/6.6 and low normal T3 61/81/--    chart reviewed extensively  complains of rectal pain        PAST MEDICAL & SURGICAL HISTORY:  Encounter for blood transfusion        Anemia    History of gross hematuria    History of orthostatic hypotension    Bacterial pneumonia    Muscle weakness (generalized)    2019 novel coronavirus disease (COVID-19)    Falls    Heart failure    Afib    CHF (congestive heart failure)    Gout    Hiatal hernia    BPH (benign prostatic hyperplasia)    Cognitive impairment    Cardiomyopathy    HTN (hypertension)    DM (diabetes mellitus)    S/P appendectomy        Social History:  lives in group home  no tobacco, alcohol, ilicit drug use (05 Nov 2020 16:21)      FAMILY HISTORY:  No pertinent family history in first degree relatives  pt hossein recal his family medical hx of either parents          Allergies    No Known Allergies    Intolerances        REVIEW OF SYSTEMS:    CONSTITUTIONAL: No fever, weight loss, or fatigue  EYES: No eye pain, visual disturbances, or discharge  ENMT:  No difficulty hearing, tinnitus, vertigo; No sinus or throat pain  NECK: No pain or stiffness  RESPIRATORY: No cough, wheezing, chills or hemoptysis; No shortness of breath  CARDIOVASCULAR: No chest pain, palpitations, dizziness, or leg swelling  GASTROINTESTINAL: No abdominal or epigastric pain. No nausea, vomiting, or hematemesis; No diarrhea or constipation. No melena or hematochezia.  NEUROLOGICAL: No headaches, memory loss, loss of strength, numbness, or tremors  SKIN: No itching, burning, rashes, or lesions   MUSCULOSKELETAL: No joint pain or swelling; No muscle, back, or extremity pain  PSYCHIATRIC: No depression, anxiety, mood swings, or difficulty sleeping        MEDICATIONS  (STANDING):  acetaminophen   Tablet .. 650 milliGRAM(s) Oral once  allopurinol 300 milliGRAM(s) Oral daily  atorvastatin 20 milliGRAM(s) Oral at bedtime  dextrose 5%. 1000 milliLiter(s) (50 mL/Hr) IV Continuous <Continuous>  dextrose 50% Injectable 12.5 Gram(s) IV Push once  dextrose 50% Injectable 25 Gram(s) IV Push once  dextrose 50% Injectable 25 Gram(s) IV Push once  finasteride 5 milliGRAM(s) Oral daily  insulin lispro (ADMELOG) corrective regimen sliding scale   SubCutaneous three times a day before meals  insulin lispro (ADMELOG) corrective regimen sliding scale   SubCutaneous at bedtime  meropenem  IVPB 1000 milliGRAM(s) IV Intermittent every 12 hours  multivitamin 1 Tablet(s) Oral daily  nystatin Powder 1 Application(s) Topical three times a day  oxybutynin 10 milliGRAM(s) Oral daily  saccharomyces boulardii 250 milliGRAM(s) Oral two times a day  tamsulosin Oral Tab/Cap - Peds 0.4 milliGRAM(s) Oral at bedtime  thiamine 100 milliGRAM(s) Oral daily  zinc sulfate 220 milliGRAM(s) Oral daily    MEDICATIONS  (PRN):  dextrose 40% Gel 15 Gram(s) Oral once PRN Blood Glucose LESS THAN 70 milliGRAM(s)/deciliter  glucagon  Injectable 1 milliGRAM(s) IntraMuscular once PRN Glucose LESS THAN 70 milligrams/deciliter      Vital Signs Last 24 Hrs  T(C): 36.4 (08 Nov 2020 11:30), Max: 36.4 (08 Nov 2020 11:30)  T(F): 97.5 (08 Nov 2020 11:30), Max: 97.5 (08 Nov 2020 11:30)  HR: 66 (08 Nov 2020 11:30) (63 - 92)  BP: 103/75 (08 Nov 2020 11:30) (103/75 - 133/84)  BP(mean): --  RR: 18 (08 Nov 2020 11:30) (18 - 18)  SpO2: 98% (08 Nov 2020 11:30) (98% - 99%)    Physical Exam:    Constitutional: NAD, well-developed  HEENT: EOMI, no exophalmos  Neck: trachea midline, no thyroid enlargement  Respiratory: CTAB, normal respirations  Cardiovascular: S1 and S2, RRR  Gastrointestinal: BS+, soft, ntnd  Extremities: No peripheral edema  Neurological: AOx3, no focal deficits  Psychiatric: Normal mood and normal affect  Skin: no rashes, no acanthosis    LABS  11-07    143  |  109<H>  |  49.0<H>  ----------------------------<  116<H>  4.8   |  23.0  |  2.10<H>    Ca    9.0      07 Nov 2020 07:14                            7.7    4.89  )-----------( 122      ( 07 Nov 2020 07:14 )             26.0       A1C with Estimated Average Glucose Result: 6.1 % (11-06-20 @ 07:30)  A1C with Estimated Average Glucose Result: 6.2 % (10-28-20 @ 07:05)            T4, Serum: 6.6 ug/dL (11-06-20 @ 07:30)  Thyroid Stimulating Hormone, Serum: 4.82 uIU/mL (11-05-20 @ 12:33)    CAPILLARY BLOOD GLUCOSE      POCT Blood Glucose.: 170 mg/dL (08 Nov 2020 12:58)  POCT Blood Glucose.: 133 mg/dL (08 Nov 2020 08:44)  POCT Blood Glucose.: 174 mg/dL (07 Nov 2020 21:04)  POCT Blood Glucose.: 136 mg/dL (07 Nov 2020 16:55)     Patient is a 70y old  Male who presents with a chief complaint of   HPI:  70M with h/o developmental disability, CHF, HTN, T2DM, AF taken off apixaban due to anemia/hematuria, CKD not on HD, refused hemodialysis, BPH who was discharged from [facility] 9/3/20 s/p treated for ESBL UTI and then was readmitted on 09/17/20 for MARSHAL and UTI now comes back again from group home after being here for UTI (discharged 2 days). He had episode of hypothermia and was sent to the ED. Patient reports no urinary symptoms. He is complaining of rectal pain, denies constipation/diarrhea. Denies abdominal pain. Denies chills. Denies leg pain or swelling. BP on admission was on the softer side being 90 systolic.   Consult for hypothermia and subclinical hypothyroidism  of note: patient was seen 9/2020 by our service for possible adrenal insufficiency in the setting of sepsis due to TUI. patient had appropriate ACTH stim test (16.4/25/24 baseline/30mins/1hr) and intially was placed on prednisone and tapered off.  TSH repeated 3x over the last 3 months 1.8/3.38/4.82 with normal T4 6.1/6.7/6.6 and low normal T3 61/81/--    chart reviewed extensively  sister at bedside  FH of thyroid issues in mom and sister  FH of diabetes in father  diabetes for many years  meds given by group [place], only remembers being on metformin    sister reported that patient had hypothermia ever since having covid 10 in march 2020      PAST MEDICAL & SURGICAL HISTORY:  Encounter for blood transfusion        Anemia    History of gross hematuria    History of orthostatic hypotension    Bacterial pneumonia    Muscle weakness (generalized)    2019 novel coronavirus disease (COVID-19)    Falls    Heart failure    Afib    CHF (congestive heart failure)    Gout    Hiatal hernia    BPH (benign prostatic hyperplasia)    Cognitive impairment    Cardiomyopathy    HTN (hypertension)    DM (diabetes mellitus)    S/P appendectomy        Social History:  lives in group home  no tobacco, alcohol, ilicit drug use (05 Nov 2020 16:21)      FAMILY HISTORY:  No pertinent family history in first degree relatives  pt cant recal his family medical hx of either parents          Allergies    No Known Allergies    Intolerances        REVIEW OF SYSTEMS:    CONSTITUTIONAL: No fever, weight loss, or fatigue  EYES: No eye pain, visual disturbances, or discharge  ENMT:  No difficulty hearing, tinnitus, vertigo; No sinus or throat pain  NECK: No pain or stiffness  RESPIRATORY: No cough, wheezing, chills or hemoptysis; No shortness of breath  CARDIOVASCULAR: No chest pain, palpitations, dizziness, or leg swelling  GASTROINTESTINAL: No abdominal or epigastric pain. No nausea, vomiting, or hematemesis; No diarrhea or constipation. No melena or hematochezia.  NEUROLOGICAL: No headaches, memory loss, loss of strength, numbness, or tremors  SKIN: No itching, burning, rashes, or lesions   MUSCULOSKELETAL: No joint pain or swelling; No muscle, back, or extremity pain  PSYCHIATRIC: No depression, anxiety, mood swings, or difficulty sleeping        MEDICATIONS  (STANDING):  acetaminophen   Tablet .. 650 milliGRAM(s) Oral once  allopurinol 300 milliGRAM(s) Oral daily  atorvastatin 20 milliGRAM(s) Oral at bedtime  dextrose 5%. 1000 milliLiter(s) (50 mL/Hr) IV Continuous <Continuous>  dextrose 50% Injectable 12.5 Gram(s) IV Push once  dextrose 50% Injectable 25 Gram(s) IV Push once  dextrose 50% Injectable 25 Gram(s) IV Push once  finasteride 5 milliGRAM(s) Oral daily  insulin lispro (ADMELOG) corrective regimen sliding scale   SubCutaneous three times a day before meals  insulin lispro (ADMELOG) corrective regimen sliding scale   SubCutaneous at bedtime  meropenem  IVPB 1000 milliGRAM(s) IV Intermittent every 12 hours  multivitamin 1 Tablet(s) Oral daily  nystatin Powder 1 Application(s) Topical three times a day  oxybutynin 10 milliGRAM(s) Oral daily  saccharomyces boulardii 250 milliGRAM(s) Oral two times a day  tamsulosin Oral Tab/Cap - Peds 0.4 milliGRAM(s) Oral at bedtime  thiamine 100 milliGRAM(s) Oral daily  zinc sulfate 220 milliGRAM(s) Oral daily    MEDICATIONS  (PRN):  dextrose 40% Gel 15 Gram(s) Oral once PRN Blood Glucose LESS THAN 70 milliGRAM(s)/deciliter  glucagon  Injectable 1 milliGRAM(s) IntraMuscular once PRN Glucose LESS THAN 70 milligrams/deciliter      Vital Signs Last 24 Hrs  T(C): 36.4 (08 Nov 2020 11:30), Max: 36.4 (08 Nov 2020 11:30)  T(F): 97.5 (08 Nov 2020 11:30), Max: 97.5 (08 Nov 2020 11:30)  HR: 66 (08 Nov 2020 11:30) (63 - 92)  BP: 103/75 (08 Nov 2020 11:30) (103/75 - 133/84)  BP(mean): --  RR: 18 (08 Nov 2020 11:30) (18 - 18)  SpO2: 98% (08 Nov 2020 11:30) (98% - 99%)    Physical Exam:    Constitutional: NAD, well-developed  HEENT: EOMI, no exophalmos  Neck: trachea midline, no thyroid enlargement  Respiratory: CTAB, normal respirations  Cardiovascular: S1 and S2, RRR  Gastrointestinal: BS+, soft, ntnd  Extremities: b/l peripheral edema  Neurological: AOx3, no focal deficits  Psychiatric: Normal mood and normal affect  Skin: no rashes, no acanthosis, discolored lower legs    LABS  11-07    143  |  109<H>  |  49.0<H>  ----------------------------<  116<H>  4.8   |  23.0  |  2.10<H>    Ca    9.0      07 Nov 2020 07:14                            7.7    4.89  )-----------( 122      ( 07 Nov 2020 07:14 )             26.0       A1C with Estimated Average Glucose Result: 6.1 % (11-06-20 @ 07:30)  A1C with Estimated Average Glucose Result: 6.2 % (10-28-20 @ 07:05)            T4, Serum: 6.6 ug/dL (11-06-20 @ 07:30)  Thyroid Stimulating Hormone, Serum: 4.82 uIU/mL (11-05-20 @ 12:33)    CAPILLARY BLOOD GLUCOSE      POCT Blood Glucose.: 170 mg/dL (08 Nov 2020 12:58)  POCT Blood Glucose.: 133 mg/dL (08 Nov 2020 08:44)  POCT Blood Glucose.: 174 mg/dL (07 Nov 2020 21:04)  POCT Blood Glucose.: 136 mg/dL (07 Nov 2020 16:55)

## 2020-11-08 NOTE — PROGRESS NOTE ADULT - SUBJECTIVE AND OBJECTIVE BOX
Patient is a 70y old  Male who presents with a chief complaint of     INTERVAL HPI/OVERNIGHT EVENTS:  Patient seen awake and sitting in bed. No blankets on, he denies feeling cold. Nurse reports rectal temp improved. Denies pain at this time, reports eating well.      MEDICATIONS  (STANDING):  acetaminophen   Tablet .. 650 milliGRAM(s) Oral once  allopurinol 300 milliGRAM(s) Oral daily  atorvastatin 20 milliGRAM(s) Oral at bedtime  dextrose 5%. 1000 milliLiter(s) (50 mL/Hr) IV Continuous <Continuous>  dextrose 50% Injectable 12.5 Gram(s) IV Push once  dextrose 50% Injectable 25 Gram(s) IV Push once  dextrose 50% Injectable 25 Gram(s) IV Push once  finasteride 5 milliGRAM(s) Oral daily  insulin lispro (ADMELOG) corrective regimen sliding scale   SubCutaneous three times a day before meals  insulin lispro (ADMELOG) corrective regimen sliding scale   SubCutaneous at bedtime  meropenem  IVPB 1000 milliGRAM(s) IV Intermittent every 12 hours  multivitamin 1 Tablet(s) Oral daily  nystatin Powder 1 Application(s) Topical three times a day  oxybutynin 10 milliGRAM(s) Oral daily  saccharomyces boulardii 250 milliGRAM(s) Oral two times a day  tamsulosin Oral Tab/Cap - Peds 0.4 milliGRAM(s) Oral at bedtime  thiamine 100 milliGRAM(s) Oral daily  zinc sulfate 220 milliGRAM(s) Oral daily    MEDICATIONS  (PRN):  dextrose 40% Gel 15 Gram(s) Oral once PRN Blood Glucose LESS THAN 70 milliGRAM(s)/deciliter  glucagon  Injectable 1 milliGRAM(s) IntraMuscular once PRN Glucose LESS THAN 70 milligrams/deciliter      Allergies    No Known Allergies    Intolerances        REVIEW OF SYSTEMS:  CONSTITUTIONAL: No fever, weight loss, or fatigue  EYES: No eye pain, visual disturbances, or discharge  ENMT:  No difficulty hearing, tinnitus, vertigo; No sinus or throat pain  NECK: No pain or stiffness  RESPIRATORY: No cough, wheezing, chills or hemoptysis; No shortness of breath  CARDIOVASCULAR: No chest pain, palpitations, or lightheadedness  GASTROINTESTINAL: No abdominal or epigastric pain. No nausea, vomiting, or hematemesis; No diarrhea or constipation. No melena or hematochezia.  GENITOURINARY: No dysuria, frequency, hematuria, or incontinence  NEUROLOGICAL: No headaches, vertigo, memory loss, loss of strength, numbness, or tremors  SKIN: No itching, burning, rashes, or lesions   LYMPH NODES: No enlarged glands  ENDOCRINE: No heat or cold intolerance; No hair loss; No polydipsia or polyuria  MUSCULOSKELETAL: No back pain  HEME/LYMPH: No easy bruising, or bleeding gums      PHYSICAL EXAM:  GENERAL: NAD, well-groomed, well-developed  HEAD:  Atraumatic, Normocephalic  EYES: EOMI, PERRLA, conjunctiva and sclera clear  ENMT: Moist mucous membranes, Good dentition  NECK: Supple, No JVD appreciated, Normal thyroid  NERVOUS SYSTEM:  Alert & Oriented X3, Good concentration; Bilateral LE mobile, sensation to light touch intact  CHEST/LUNG: Clear to auscultation bilaterally; No rales, rhonchi, wheezing, or rubs  HEART: Regular rate and rhythm; No murmurs, rubs, or gallops  ABDOMEN: Soft, Nontender, Nondistended; Bowel sounds present  EXTREMITIES: diffuse erythema of lower extremities b/l to ankles, wrapped feet  SKIN: No rashes or lesions      Vital Signs Last 24 Hrs  T(C): 35.2 (08 Nov 2020 20:21), Max: 36.4 (08 Nov 2020 11:30)  T(F): 95.3 (08 Nov 2020 20:21), Max: 97.5 (08 Nov 2020 11:30)  HR: 75 (08 Nov 2020 20:21) (64 - 75)  BP: 107/74 (08 Nov 2020 20:21) (103/75 - 115/75)  BP(mean): --  RR: 18 (08 Nov 2020 11:30) (18 - 18)  SpO2: 95% (08 Nov 2020 20:21) (93% - 98%)    LABS:      Ca    9.0        07 Nov 2020 07:14          CAPILLARY BLOOD GLUCOSE      POCT Blood Glucose.: 151 mg/dL (08 Nov 2020 16:56)  POCT Blood Glucose.: 170 mg/dL (08 Nov 2020 12:58)  POCT Blood Glucose.: 133 mg/dL (08 Nov 2020 08:44)  POCT Blood Glucose.: 174 mg/dL (07 Nov 2020 21:04)           [x] Consultant(s) Notes Reviewed

## 2020-11-08 NOTE — PROGRESS NOTE ADULT - ASSESSMENT
71 y/o M with h/o developmental disability, CHF, HTN, DM, AF taken off apixaban due to anemia/hematuria, CKD not on HD, refused hemodialysis, BPH who was discharged from [facility] 9/3/20 s/p treated for ESBL UTI and then was readmitted on 09/17/20 for MARSHAL and UTI now comes back again from group home after being here for UTI (discharged 2 days). He had episode of hypothermia and was sent to the ED. Patient reports no urinary symptoms. He is complaining of rectal pain, denies constipation/diarrhea. Denies abdominal pain. Denies chills. Denies leg pain or swelling.    In the ED received merrem IV. ED recommending endocrinology consult for possible adrenal insufficiency. Patient however had a stim test performed in August 31st. BP on admission was on the softer side being 90 systolic.    #hypothermia with possible UTI (asymptomatic) vs pneumonia (CXR with mild right infiltrate and patient has some cough)  - in the past when patient had severe septic shock (in August) did improve with stress dose steroids - while he was tapering off on prednisone he received stim test - that appears to demonstrate apporpriate levels of ACTH and appropriate response - ruled out for adrenal insufficiency  - TSH is elevated to 4.69 however T4 is normal. Consulted endo for possible subclinical hypothyroidism vs sick euthyroid  - from infection standpoint he is on meropenem for now - ID is following  - urine culture showing normal urogenital kelsey, blood cx no growth to date x2  - started azithromycin for atypical coverage given CXR findings; patient does report a cough in the ED - will complete after total of 5 days  - on previous admissions was seen by podiatry for dry gangrene of the right foot and was recommended outpatient follow up - reconsulted on this admission, no osteo on xray  - other differentials could include periodic hypothermia - those cases usually due to intracranial lesion based on case reports - treated with alpha-adrenergic stimulants (clonidine)  - for now will discontinue midodrine which is alpha-adrenergic agonist  - continue to monitor temperature    #DM  - ISS while inhouse  - diabetic diet    #A-FIB  - no longer on AC  - rate and rhythm controlled    #CHF   - preserved EF  - not in exacerbation, will monitor    #CKD stage III - in the past has refused dialysis when offered; currently no need for dialysis  - stable disease  - would avoid nephrotoxic agents    #chronic iron deficiency anemia  - stable at this time  - start PO iron, added miralax PRN    DISPO: if ruled out for infection can ultimately go back to [facility] likely early next week

## 2020-11-08 NOTE — CONSULT NOTE ADULT - ASSESSMENT
70M with h/o developmental disability, CHF, HTN, T2DM, AF taken off apixaban due to anemia/hematuria, CKD not on HD, refused hemodialysis, BPH who was discharged from [facility] 9/3/20 s/p treated for ESBL UTI and then was readmitted on 09/17/20 for MARSHAL and UTI now comes back again from group home after being here for UTI (discharged 2 days). He had episode of hypothermia and was sent to the ED. Patient reports no urinary symptoms. He is complaining of rectal pain, denies constipation/diarrhea. Denies abdominal pain. Denies chills. Denies leg pain or swelling. BP on admission was on the softer side being 90 systolic.   Consult for hypothermia and subclinical hypothyroidism  of note: patient was seen 9/2020 by our service for possible adrenal insufficiency in the setting of sepsis due to TUI. patient had appropriate ACTH stim test (16.4/25/24 baseline/30mins/1hr) and intially was placed on prednisone and tapered off.  TSH repeated 3x over the last 3 months 1.8/3.38/4.82 with normal T4 6.1/6.7/6.6 and low normal T3 61/81/--    Hypothermia  -had normal ACTH stim test 8/2020  -check ACTH/AM cortisol to evaluate cortisol axis  -ddx includes infection related vs mechanical inaccuracy (ie. thermometer in fecal matter)  -tsh only slightly abnormal    Elevated TSH- subclinical hypothyroidism vs euthyroid sick syndrome vs acceptable age variation  -check Ab  -will hold off adding LT4 given cardiac issues    T2DM- well controlled  -A1c 6.1  -continue sliding scale insulin

## 2020-11-09 NOTE — PROGRESS NOTE ADULT - ASSESSMENT
70 year old male with developmental disability, CKD, Type 2 DM, HTN, Afib admitted with hypothermia in setting of UTI and possible PNA.  He had a normal cosyntropin stim test earlier this year making adrenal insufficiency very unlikely.  He has a slightly elevated TSH in setting of normal T4, which is likely related to sick euthyroid syndrome.     1.  Hypothermia-  ? due to underlying infectious process.  Await AM cortisol, but AI is very unlikely.  Elevated TSH is minimal and unlikely to be contributing to hypothermia.   Continue meropenum as per ID and await culture data.  2.  Elevated TSH-  await thyroid antibody panel.  If positive (c/w Hashimoto's) could consider starting low dose of LT4, otherwise would repeat TFTs in 4 weeks as this is likely due to sick euthyroid syndrome.   3.  Type 2 DM-  well controlled.  Continue Lispro AC + HS by scale.

## 2020-11-09 NOTE — PROGRESS NOTE ADULT - SUBJECTIVE AND OBJECTIVE BOX
CC: Hypothermia    INTERVAL HPI/OVERNIGHT EVENTS:  Patient seen and examined at bedside.  Rectal temp improving this AM.   Patient without any acute complaints at this time.  Patient denies chills, dizziness, headache, CP, SOB, abdominal pain, N/V.    PHYSICAL EXAM:  Vital Signs Last 24 Hrs  T(C): 36.8 (09 Nov 2020 10:15), Max: 36.8 (09 Nov 2020 10:15)  T(F): 98.2 (09 Nov 2020 10:15), Max: 98.2 (09 Nov 2020 10:15)  HR: 79 (09 Nov 2020 10:15) (64 - 82)  BP: 107/69 (09 Nov 2020 10:15) (106/68 - 120/78)  BP(mean): --  RR: 18 (09 Nov 2020 10:15) (18 - 18)  SpO2: 95% (09 Nov 2020 10:15) (93% - 97%)    GENERAL: NAD, lying comfortably  HEAD: Atraumatic, Normocephalic  EYES: EOMI, PERRLA, conjunctiva and sclera clear  ENMT: Moist mucous membranes  NECK: Supple, No JVD  NERVOUS SYSTEM: A&OX3, Good concentration  CHEST/LUNG: Clear to auscultation b/l; Unlabored respirations  HEART: S1S2+, Regular rate and rhythm  ABDOMEN: Soft, Nontender, Nondistended; BS+  EXTREMITIES: B/l LE erythema, dressings noted on b/l ankles  SKIN: Warm and dry    LABS:                        8.0    10.44 )-----------( 136      ( 09 Nov 2020 06:51 )             26.9     11-09    141  |  108<H>  |  51.0<H>  ----------------------------<  149<H>  4.9   |  21.0<L>  |  2.07<H>    Ca    8.9      09 Nov 2020 06:51  Mg     1.9     11-09      CAPILLARY BLOOD GLUCOSE  POCT Blood Glucose.: 202 mg/dL (09 Nov 2020 12:48)  POCT Blood Glucose.: 134 mg/dL (09 Nov 2020 08:43)  POCT Blood Glucose.: 125 mg/dL (08 Nov 2020 22:09)  POCT Blood Glucose.: 151 mg/dL (08 Nov 2020 16:56)

## 2020-11-09 NOTE — PROCEDURE NOTE - NSTIMEOUT_GEN_A_CORE
RN called to see if patient needs refill.  Pt states he does not need a refill at this time.    Additionally wondering what the video visit which was set up is for tomorrow.  Per appt notes, this is for a med check.  Pt states he does not know why med check has to occur.  RN advised that pt was last seen in clinic over a year ago.  Pt states understanding.    Advised RN that he would like the following addressed at appt: EKG, labs, chest xray and medication check as needed for dialysis.    This is an FYI for provider about points pt would like to address.    Patient's first and last name, , procedure, and correct site confirmed prior to the start of procedure.

## 2020-11-09 NOTE — PROCEDURE NOTE - NSPROCDETAILS_GEN_ALL_CORE
location identified, draped/prepped, sterile technique used/sterile technique, catheter placed/blood seen on insertion/dressing applied/flushes easily/secured in place

## 2020-11-09 NOTE — PROGRESS NOTE ADULT - SUBJECTIVE AND OBJECTIVE BOX
[facility]  INFECTIOUS DISEASES AND INTERNAL MEDICINE at [place]  =======================================================  Zach Chew MD  Diplomates American Board of Internal Medicine and Infectious Diseases  Tel  [phone]  Fax [phone]  =======================================================    [id number]  KIMBERLY DOLLJUANITA   follow up: hypothermia    no new issues today      =======================================================  REVIEW OF SYSTEMS:  CONSTITUTIONAL:  No Fever or chills  HEENT:  No diplopia or blurred vision.  No earache, sore throat or runny nose.  CARDIOVASCULAR:  No pressure, squeezing, strangling, tightness, heaviness or aching about the chest, neck, axilla or epigastrium.  RESPIRATORY:  No cough, shortness of breath  GASTROINTESTINAL:  No nausea, vomiting or diarrhea.  GENITOURINARY:  No dysuria, frequency or urgency. No Blood in urine  MUSCULOSKELETAL:  no joint aches, no muscle pain  SKIN:  No change in skin, hair or nails.  NEUROLOGIC:  No Headaches, seizures or weakness.  PSYCHIATRIC:  No disorder of thought or mood.  ENDOCRINE:  No heat or cold intolerance  HEMATOLOGICAL:  No easy bruising or bleeding.   =======================================================  Allergies  No Known Allergies     ======================================================  Physical Exam:  ============     General:  No acute distress. Pleasant  Eye: Pupils are equal, round and reactive to light, Extraocular movements are intact, Normal conjunctiva.  HENT: Normocephalic, Oral mucosa dry  Neck: Supple, No lymphadenopathy.  Respiratory: Lungs with fair air entry  Cardiovascular: Normal rate, Regular rhythm,   Gastrointestinal: Soft, Non-tender, Non-distended, Normal bowel sounds.  Genitourinary: No costovertebral angle tenderness.  Lymphatics: No lymphadenopathy neck,   Musculoskeletal: Normal range of motion, Normal strength.  Integumentary: bilateral chronic stasis dermatitis,    Neurologic: alert, moves all extremities, no focal neuro deficits.   Psychiatric: Appropriate mood & affect.    =======================================================   Vitals:  ============  T(F): 98.2 (09 Nov 2020 10:15), Max: 98.2 (09 Nov 2020 10:15)  HR: 79 (09 Nov 2020 10:15)  BP: 107/69 (09 Nov 2020 10:15)  RR: 18 (09 Nov 2020 10:15)  SpO2: 95% (09 Nov 2020 10:15) (93% - 97%)  temp max in last 48H T(F): , Max: 98.2 (11-09-20 @ 10:15)    =======================================================  Current Antibiotics:  meropenem  IVPB 1000 milliGRAM(s) IV Intermittent every 12 hours    Other medications:  acetaminophen   Tablet .. 650 milliGRAM(s) Oral once  allopurinol 300 milliGRAM(s) Oral daily  atorvastatin 20 milliGRAM(s) Oral at bedtime  dextrose 5%. 1000 milliLiter(s) IV Continuous <Continuous>  dextrose 50% Injectable 12.5 Gram(s) IV Push once  dextrose 50% Injectable 25 Gram(s) IV Push once  dextrose 50% Injectable 25 Gram(s) IV Push once  ferrous    sulfate 325 milliGRAM(s) Oral three times a day  finasteride 5 milliGRAM(s) Oral daily  insulin lispro (ADMELOG) corrective regimen sliding scale   SubCutaneous three times a day before meals  insulin lispro (ADMELOG) corrective regimen sliding scale   SubCutaneous at bedtime  multivitamin 1 Tablet(s) Oral daily  nystatin Powder 1 Application(s) Topical three times a day  oxybutynin 10 milliGRAM(s) Oral daily  saccharomyces boulardii 250 milliGRAM(s) Oral two times a day  tamsulosin Oral Tab/Cap - Peds 0.4 milliGRAM(s) Oral at bedtime  thiamine 100 milliGRAM(s) Oral daily  zinc sulfate 220 milliGRAM(s) Oral daily      =======================================================  Labs:                        8.0    10.44 )-----------( 136      ( 09 Nov 2020 06:51 )             26.9      11-09    141  |  108<H>  |  51.0<H>  ----------------------------<  149<H>  4.9   |  21.0<L>  |  2.07<H>    Ca    8.9      09 Nov 2020 06:51  Mg     1.9     11-09        Culture - Urine (collected 11-05-20 @ 21:53)  Source: .Urine Clean Catch (Midstream)  Final Report (11-06-20 @ 17:50):    <10,000 CFU/mL Normal Urogenital Nola    Culture - Blood (collected 11-05-20 @ 12:29)  Source: .Blood Blood-Peripheral    Culture - Blood (collected 11-05-20 @ 12:28)  Source: .Blood Blood-Venous    Culture - Urine (collected 10-27-20 @ 22:15)  Source: .Urine Clean Catch (Midstream)  Final Report (10-28-20 @ 18:20):    <10,000 CFU/mL Normal Urogenital Nola    Culture - Blood (collected 10-27-20 @ 15:07)  Source: .Blood Blood-Peripheral  Final Report (11-01-20 @ 15:00):    No growth at 5 days.    Culture - Blood (collected 10-27-20 @ 15:06)  Source: .Blood Blood-Peripheral  Final Report (11-01-20 @ 15:00):    No growth at 5 days.      Creatinine, Serum: 2.07 mg/dL (11-09-20 @ 06:51)  Creatinine, Serum: 2.10 mg/dL (11-07-20 @ 07:14)  Creatinine, Serum: 2.03 mg/dL (11-06-20 @ 07:30)  Creatinine, Serum: 1.98 mg/dL (11-05-20 @ 12:33)    Procalcitonin, Serum: 0.26 ng/mL (11-06-20 @ 18:02)      Ferritin, Serum: 164 ng/mL (11-07-20 @ 10:25)      WBC Count: 10.44 K/uL (11-09-20 @ 06:51)  WBC Count: 4.89 K/uL (11-07-20 @ 07:14)  WBC Count: 4.61 K/uL (11-06-20 @ 07:30)  WBC Count: 5.35 K/uL (11-05-20 @ 12:33)    Rapid RVP Result: NotDetec (10-27-20 @ 17:58)    COVID-19 IgG Antibody Interpretation: Positive (11-06-20 @ 21:29)  COVID-19 IgG Antibody Index: 78.70 Index (11-06-20 @ 21:29)  COVID-19 PCR: NotDetec (11-05-20 @ 12:33)  COVID-19 PCR: NotDetec (11-02-20 @ 11:05)  COVID-19 IgG Antibody Interpretation: Positive (10-28-20 @ 05:29)  COVID-19 IgG Antibody Index: 86.00 Index (10-28-20 @ 05:29)  COVID-19 PCR: NotDetec (10-27-20 @ 15:04)  COVID-19 PCR: NotDetec (10-12-20 @ 22:00)            < from: Xray Chest 1 View- PORTABLE-Urgent (Xray Chest 1 View- PORTABLE-Urgent .) (11.05.20 @ 12:51) >     EXAM:  XR CHEST PORTABLE URGENT 1V                          PROCEDURE DATE:  11/05/2020          INTERPRETATION:  AP chest on November 5, 2020 at 12:31 PM. Patient has hypothermia.    Heart enlargement again noted fairly pronounced.    Infiltrate off the lower right hilum is again noted somewhat increased from August 24 of this year.    Heart obscures left base.    IMPRESSION: Heart enlargement and a rather mild right lower perihilar infiltrate.           SANFORD MANJARREZ MD; Attending Radiologist  This document has been electronically signed. Nov 5 2020  1:10PM    < end of copied text >      < from: Xray Foot AP + Lateral + Oblique, Right (11.06.20 @ 10:17) >     EXAM:  FOOT-RIGHT                          PROCEDURE DATE:  11/06/2020          INTERPRETATION:  Right foot. Patient has local pain. 3 views.    Arterial calcifications are noted.    There is a pes planus. There is scattered moderate dorsal tarsaldegeneration.    There is fairly significant first MTP degeneration with a slight hallux valgus. Old fracture deformity of the distal fifth metatarsal is seen.    No bone destruction or acute fracture evident.    IMPRESSION: Pes planus and scattered degeneration. Arterial calcification. No acute fracture.              SANFORD MANJARREZ MD; Attending Radiologist  This document has been electronically signed. Nov 6 2020  2:19PM    < end of copied text >

## 2020-11-09 NOTE — PROGRESS NOTE ADULT - ASSESSMENT
69 y/o M with h/o developmental disability, CHF, HTN, DM, AF taken off apixaban due to anemia/hematuria, CKD not on HD, refused hemodialysis, BPH who was discharged from [facility] 9/3/20 s/p treated for ESBL UTI and then was readmitted on 09/17/20 for MARSHAL and UTI now comes back again from group home after being here for UTI (discharged 2 days). He had episode of hypothermia and was sent to the ED. Patient reports no urinary symptoms. He was complaining of rectal pain, denied constipation/diarrhea.     In the ED received merrem IV. ED recommended endocrinology consult for possible adrenal insufficiency. Patient however had a stim test performed in August 31st. BP on admission was on the softer side being 90 systolic.    1) Hypothermia   - Possible UTI (asymptomatic) vs pneumonia (CXR with mild right infiltrate and +cough)  - Stim test 8/2020 - demonstrated appropriate levels of ACTH and appropriate response and ruled out adrenal insufficiency  - TSH is elevated to 4.69 however T4 is normal  - Seen by endo; f/u ACTH, AM cortisol, and antibodies  - C/w meropenem  - Bcx results pending. If negative will consider discontinuing abx tomorrow. Will discuss with ID.  - Urine culture showing normal urogenital kelsey, blood cx no growth to date x2  - Guaifenesin added for cough  - Previous admissions was seen by podiatry for dry gangrene of the right foot and was recommended outpatient follow up. Podiatry reconsulted on this admission, no osteo on xray  - other differentials could include periodic hypothermia - those cases usually due to intracranial lesion based on case reports - treated with alpha-adrenergic stimulants (clonidine)  - for now will discontinue midodrine which is alpha-adrenergic agonist  - continue to monitor temperature    2) DM  - Accuchecks and ISS  - Diabetic diet    3) A-FIB  - No longer on AC  - Rate and rhythm controlled    4) CHF   - Preserved EF  - Not in exacerbation  - Monitor    5) CKD stage III   - Has refused dialysis in past; currently no need for dialysis  - Stable disease  - Avoid nephrotoxic agents    6) Chronic iron deficiency anemia  - PO iron  - Monitor     7) Constipation  - Miralax and senna added    DISPO: If ruled out for infection can ultimately go back to  likely early next week 71 y/o M with h/o developmental disability, CHF, HTN, DM, AF taken off apixaban due to anemia/hematuria, CKD not on HD, refused hemodialysis, BPH who was discharged from [facility] 9/3/20 s/p treated for ESBL UTI and then was readmitted on 09/17/20 for MARSHAL and UTI now comes back again from group home after being here for UTI (discharged 2 days). He had episode of hypothermia and was sent to the ED. Patient reports no urinary symptoms. He was complaining of rectal pain, denied constipation/diarrhea.     In the ED received merrem IV. ED recommended endocrinology consult for possible adrenal insufficiency. Patient however had a stim test performed in August 31st. BP on admission was on the softer side being 90 systolic.    1) Hypothermia   - Possible UTI (asymptomatic) vs pneumonia (CXR with mild right infiltrate and +cough)  - C/w meropenem  - Bcx results pending. If negative will consider discontinuing abx tomorrow. Will discuss with ID.  - Urine culture showing normal urogenital kelsey, blood cx no growth to date x2  - Stim test 8/2020 demonstrated appropriate levels of ACTH and appropriate response; ruled out adrenal insufficiency  - TSH elevated, T4 normal  - Seen by endo; f/u ACTH, AM cortisol, and antibodies  - Guaifenesin added for cough  - Previous admissions was seen by podiatry for dry gangrene of the right foot and was recommended outpatient follow up. Podiatry reconsulted on this admission, no osteo on xray  - Continue to monitor temperature    2) DM  - Accuchecks and ISS  - Diabetic diet    3) A-FIB  - No longer on AC  - Rate and rhythm controlled    4) CHF   - Preserved EF  - Not in exacerbation  - Monitor    5) CKD stage III   - Has refused dialysis in past; currently no need for dialysis  - Stable disease  - Avoid nephrotoxic agents    6) Chronic iron deficiency anemia  - PO iron  - Monitor     7) Constipation  - Miralax and senna added    DISPO: If ruled out for infection can ultimately go back to  likely early next week 71 y/o M with h/o developmental disability, CHF, HTN, DM, AF taken off apixaban due to anemia/hematuria, CKD not on HD, refused hemodialysis, BPH who was discharged from [facility] 9/3/20 s/p treated for ESBL UTI and then was readmitted on 09/17/20 for MARSHAL and UTI now comes back again from group home after being here for UTI (discharged 2 days). He had episode of hypothermia and was sent to the ED. Patient reports no urinary symptoms. He was complaining of rectal pain, denied constipation/diarrhea.     In the ED received merrem IV. ED recommended endocrinology consult for possible adrenal insufficiency. Patient however had a stim test performed in August 31st. BP on admission was on the softer side being 90 systolic.    1) Hypothermia   - Possible UTI (asymptomatic) vs pneumonia (CXR with mild right infiltrate and +cough)  - C/w meropenem  - Guaifenesin added for cough  - Bcx results pending. If negative will consider discontinuing abx tomorrow. Will discuss with ID.  - Urine culture showing normal urogenital kelsey, blood cx NGTD x2  - Stim test 8/2020 demonstrated appropriate levels of ACTH and appropriate response; ruled out adrenal insufficiency  - TSH elevated, T4 normal  - Seen by endo; f/u ACTH, AM cortisol, and antibodies  - Previous admissions was seen by podiatry for dry gangrene of the right foot and was recommended outpatient follow up. Podiatry reconsulted on this admission, no osteo on xray  - Continue to monitor temperature    2) DM  - Accuchecks and ISS  - Diabetic diet    3) A-FIB  - No longer on AC  - Rate and rhythm controlled    4) CHF   - Preserved EF  - Not in exacerbation  - Monitor    5) CKD stage III   - Has refused dialysis in past; currently no need for dialysis  - Stable disease  - Avoid nephrotoxic agents    6) Chronic iron deficiency anemia  - PO iron  - Monitor     7) Constipation  - Miralax and senna added    DISPO: If ruled out for infection can ultimately go back to  likely 48-72 hrs.

## 2020-11-09 NOTE — PROGRESS NOTE ADULT - SUBJECTIVE AND OBJECTIVE BOX
CC:  follow up abnormal TFTs, DM    Interval HPI/ Overnight Events:  Patient denies any fatigue, chills, CP or SOB.  Thyroid antibody levels are pending and AM cortisol level is pending.      MEDICATIONS  (STANDING):  acetaminophen   Tablet .. 650 milliGRAM(s) Oral once  allopurinol 300 milliGRAM(s) Oral daily  atorvastatin 20 milliGRAM(s) Oral at bedtime  dextrose 5%. 1000 milliLiter(s) (50 mL/Hr) IV Continuous <Continuous>  dextrose 50% Injectable 12.5 Gram(s) IV Push once  dextrose 50% Injectable 25 Gram(s) IV Push once  dextrose 50% Injectable 25 Gram(s) IV Push once  ferrous    sulfate 325 milliGRAM(s) Oral three times a day  finasteride 5 milliGRAM(s) Oral daily  insulin lispro (ADMELOG) corrective regimen sliding scale   SubCutaneous three times a day before meals  insulin lispro (ADMELOG) corrective regimen sliding scale   SubCutaneous at bedtime  meropenem  IVPB 1000 milliGRAM(s) IV Intermittent every 12 hours  multivitamin 1 Tablet(s) Oral daily  nystatin Powder 1 Application(s) Topical three times a day  oxybutynin 10 milliGRAM(s) Oral daily  saccharomyces boulardii 250 milliGRAM(s) Oral two times a day  tamsulosin Oral Tab/Cap - Peds 0.4 milliGRAM(s) Oral at bedtime  thiamine 100 milliGRAM(s) Oral daily  zinc sulfate 220 milliGRAM(s) Oral daily    Vital Signs Last 24 Hrs  T(C): 36.1 (09 Nov 2020 15:49), Max: 36.8 (09 Nov 2020 10:15)  T(F): 97 (09 Nov 2020 15:49), Max: 98.2 (09 Nov 2020 10:15)  HR: 75 (09 Nov 2020 15:49) (75 - 82)  BP: 106/70 (09 Nov 2020 15:49) (106/70 - 120/78)  RR: 18 (09 Nov 2020 15:49) (18 - 18)  SpO2: 92% (09 Nov 2020 15:49) (92% - 97%)    PE:  Gen: AAO, NAD  HEENT:  sclera anicteric, MMM  Neck:  no thyromegaly, no LAD  CV:  nl S1 + S2, RRR, 3/6 systolic murmur  Resp:  nl respiratory effort, CTA b/l  GI/ Abd: soft, NT/ ND, BS +  MS:   decreased muscle tone, 1 + b/l LE edmea  Skin:  b/l LE venous stasis changes  Psych: affect appropriate    LABS:  Thyroid Stimulating Hormone, Serum: 4.82 uIU/mL (11.05.20 @ 12:33)  T4, Serum: 6.6 ug/dL (11.06.20 @ 07:30)    11-09    141  |  108<H>  |  51.0<H>  ----------------------------<  149<H>  4.9   |  21.0<L>  |  2.07<H>    Ca    8.9      09 Nov 2020 06:51  Mg     1.9     11-09                          8.0    10.44 )-----------( 136      ( 09 Nov 2020 06:51 )             26.9     CAPILLARY BLOOD GLUCOSE    POCT Blood Glucose.: 202 mg/dL (09 Nov 2020 12:48)  POCT Blood Glucose.: 134 mg/dL (09 Nov 2020 08:43)  POCT Blood Glucose.: 125 mg/dL (08 Nov 2020 22:09)  POCT Blood Glucose.: 151 mg/dL (08 Nov 2020 16:56)      A1C with Estimated Average Glucose Result: 6.1 % (11.06.20 @ 07:30)                        8.0    10.44 )-----------( 136      ( 09 Nov 2020 06:51 )             26.9

## 2020-11-09 NOTE — PROGRESS NOTE ADULT - ASSESSMENT
This 71 y/o M with developmental disability, CHF, HTN, DM, AF taken off apixaban due to anemia/hematuria, CKD not on HD, refused hemodialysis, BPH who was discharged from [facility] 9/3/20 s/p treated for ESBL UTI and then was readmitted on 09/17/20 for MARSHAL and UTI now comes back again from group home after being here for UTI (discharged 2 days). He had episode of hypothermia and was sent to the ED. Patient reports no urinary symptoms. He is complaining of rectal pain, denies constipation/diarrhea. Denies abdominal pain. Denies chills. Denies leg pain or swelling.     In the ED received merrem IV. ED recommending endocrinology consult for possible adrenal insufficiency. BP on admission was on the softer side being 90 systolic.  (05 Nov 2020 16:21)    patient can provide limited information.  Denies chest pain, denies abd pain, denies back pain, denies SOB, denies leg pain.     recently seen by Dr. Chew last week for possible UTI, given a course of MERREM.   cultures from the urine at that visit had been NEGATIVE.    Impression:  hypothermia - intermittent  CKD  developmental delay  right foot wound      Plan:  - no clear evidence of overall infection leading to hypothermia  - continue MERREM    - follow up all outstanding cultures  - trend temperature and WBC curve  - repeat cultures from blood and all sources if febrile.    - procalcitonin is mildly elevated,   will watch      - endocrine workup; if normal workup, may need to consider medication side effects       has CKD, will watch renal fx closely on antibiotics  Foot wounds being followed with  podiatry.

## 2020-11-09 NOTE — CHART NOTE - NSCHARTNOTEFT_GEN_A_CORE
Called by RN for persistent R foot pain Called by RN for persistent foot pain & persistent cough  Pt seen and examined at bedside   Pt c/o B foot pain R>L for which he takes ASA/Tylenol at home w benefit   Tylenol 650mg PO given   Benzonatate 100mg given for cough ?  Next Mucinex dose @ 05:00  Pt denies any CP, SOB, HA, dizziness, abdominal pain  RN advised to call if any changes   Continue to monitor Called by RN for persistent foot pain & persistent cough  Pt seen and examined at bedside   Pt c/o B foot pain R>L for which he takes ASA/Tylenol at home w benefit   Tylenol 650mg PO given   Benzonatate 100mg given for cough   Next Mucinex PRN dose due @ 05:00  Pt denies any CP, SOB, HA, dizziness, abdominal pain  RN advised to call if any changes   Continue to monitor

## 2020-11-09 NOTE — PROGRESS NOTE ADULT - ATTENDING COMMENTS
Lymphedema of lower extremitiies, podiatry following, will consult vascular med as per podiatry recommendation. PT ordered given bedbound this admission. Pt had previous DNR, will locate documentation and consult palliative for instruction on how to retrieve from state. Will discuss discharge planning with care coordinator.

## 2020-11-10 NOTE — PROGRESS NOTE ADULT - SUBJECTIVE AND OBJECTIVE BOX
[facility]  INFECTIOUS DISEASES AND INTERNAL MEDICINE at [place]  =======================================================  Zach Chew MD  Diplomates American Board of Internal Medicine and Infectious Diseases  Tel  [phone]  Fax [phone]  =======================================================    [id number]  KIMBERLY DOLLJUANITA   follow up: hypothermia    no new issues today      =======================================================  REVIEW OF SYSTEMS:  CONSTITUTIONAL:  No Fever or chills  HEENT:  No diplopia or blurred vision.  No earache, sore throat or runny nose.  CARDIOVASCULAR:  No pressure, squeezing, strangling, tightness, heaviness or aching about the chest, neck, axilla or epigastrium.  RESPIRATORY:  No cough, shortness of breath  GASTROINTESTINAL:  No nausea, vomiting or diarrhea.  GENITOURINARY:  No dysuria, frequency or urgency. No Blood in urine  MUSCULOSKELETAL:  no joint aches, no muscle pain  SKIN:  No change in skin, hair or nails.  NEUROLOGIC:  No Headaches, seizures or weakness.  PSYCHIATRIC:  No disorder of thought or mood.  ENDOCRINE:  No heat or cold intolerance  HEMATOLOGICAL:  No easy bruising or bleeding.   =======================================================  Allergies  No Known Allergies     ======================================================  Physical Exam:  ============     General:  No acute distress. Pleasant  Eye: Pupils are equal, round and reactive to light, Extraocular movements are intact, Normal conjunctiva.  HENT: Normocephalic, Oral mucosa dry  Neck: Supple, No lymphadenopathy.  Respiratory: Lungs with fair air entry  Cardiovascular: Normal rate, Regular rhythm,   Gastrointestinal: Soft, Non-tender, Non-distended, Normal bowel sounds.  Genitourinary: No costovertebral angle tenderness.  Lymphatics: No lymphadenopathy neck,   Musculoskeletal: Normal range of motion, Normal strength.  Integumentary: bilateral chronic stasis dermatitis,    Neurologic: alert, moves all extremities, no focal neuro deficits.   Psychiatric: Appropriate mood & affect.    =======================================================    Vitals:  ============  T(F): 97.8 (10 Nov 2020 04:58), Max: 97.9 (09 Nov 2020 21:10)  HR: 80 (10 Nov 2020 04:58)  BP: 98/62 (10 Nov 2020 04:58)  RR: 18 (10 Nov 2020 04:58)  SpO2: 97% (10 Nov 2020 04:58) (92% - 97%)  temp max in last 48H T(F): , Max: 98.2 (11-09-20 @ 10:15)    =======================================================  Current Antibiotics:  meropenem  IVPB 1000 milliGRAM(s) IV Intermittent every 12 hours    Other medications:  acetaminophen   Tablet .. 650 milliGRAM(s) Oral once  allopurinol 300 milliGRAM(s) Oral daily  atorvastatin 20 milliGRAM(s) Oral at bedtime  dextrose 5%. 1000 milliLiter(s) IV Continuous <Continuous>  dextrose 50% Injectable 12.5 Gram(s) IV Push once  dextrose 50% Injectable 25 Gram(s) IV Push once  dextrose 50% Injectable 25 Gram(s) IV Push once  ferrous    sulfate 325 milliGRAM(s) Oral three times a day  finasteride 5 milliGRAM(s) Oral daily  insulin lispro (ADMELOG) corrective regimen sliding scale   SubCutaneous three times a day before meals  insulin lispro (ADMELOG) corrective regimen sliding scale   SubCutaneous at bedtime  multivitamin 1 Tablet(s) Oral daily  nystatin Powder 1 Application(s) Topical three times a day  oxybutynin 10 milliGRAM(s) Oral daily  saccharomyces boulardii 250 milliGRAM(s) Oral two times a day  tamsulosin Oral Tab/Cap - Peds 0.4 milliGRAM(s) Oral at bedtime  thiamine 100 milliGRAM(s) Oral daily  zinc sulfate 220 milliGRAM(s) Oral daily      =======================================================  Labs:                        8.0    10.44 )-----------( 136      ( 09 Nov 2020 06:51 )             26.9      11-09    141  |  108<H>  |  51.0<H>  ----------------------------<  149<H>  4.9   |  21.0<L>  |  2.07<H>    Ca    8.9      09 Nov 2020 06:51  Mg     1.9     11-09        Culture - Urine (collected 11-05-20 @ 21:53)  Source: .Urine Clean Catch (Midstream)  Final Report (11-06-20 @ 17:50):    <10,000 CFU/mL Normal Urogenital Nola    Culture - Blood (collected 11-05-20 @ 12:29)  Source: .Blood Blood-Peripheral    Culture - Blood (collected 11-05-20 @ 12:28)  Source: .Blood Blood-Venous    Culture - Urine (collected 10-27-20 @ 22:15)  Source: .Urine Clean Catch (Midstream)  Final Report (10-28-20 @ 18:20):    <10,000 CFU/mL Normal Urogenital Nola    Culture - Blood (collected 10-27-20 @ 15:07)  Source: .Blood Blood-Peripheral  Final Report (11-01-20 @ 15:00):    No growth at 5 days.    Culture - Blood (collected 10-27-20 @ 15:06)  Source: .Blood Blood-Peripheral  Final Report (11-01-20 @ 15:00):    No growth at 5 days.      Creatinine, Serum: 2.07 mg/dL (11-09-20 @ 06:51)  Creatinine, Serum: 2.10 mg/dL (11-07-20 @ 07:14)  Creatinine, Serum: 2.03 mg/dL (11-06-20 @ 07:30)  Creatinine, Serum: 1.98 mg/dL (11-05-20 @ 12:33)    Procalcitonin, Serum: 0.26 ng/mL (11-06-20 @ 18:02)      Ferritin, Serum: 164 ng/mL (11-07-20 @ 10:25)      WBC Count: 10.44 K/uL (11-09-20 @ 06:51)  WBC Count: 4.89 K/uL (11-07-20 @ 07:14)  WBC Count: 4.61 K/uL (11-06-20 @ 07:30)  WBC Count: 5.35 K/uL (11-05-20 @ 12:33)    Rapid RVP Result: NotDetec (10-27-20 @ 17:58)    COVID-19 IgG Antibody Interpretation: Positive (11-06-20 @ 21:29)  COVID-19 IgG Antibody Index: 78.70 Index (11-06-20 @ 21:29)  COVID-19 PCR: NotDetec (11-05-20 @ 12:33)  COVID-19 PCR: NotDetec (11-02-20 @ 11:05)  COVID-19 IgG Antibody Interpretation: Positive (10-28-20 @ 05:29)  COVID-19 IgG Antibody Index: 86.00 Index (10-28-20 @ 05:29)  COVID-19 PCR: NotDetec (10-27-20 @ 15:04)  COVID-19 PCR: NotDetec (10-12-20 @ 22:00)

## 2020-11-10 NOTE — PHYSICAL THERAPY INITIAL EVALUATION ADULT - DISCHARGE DISPOSITION, PT EVAL
Pt not appropriate at this time. At baseline requires complete assistance with mobility and is non ambulatory. Return to group home with previous level of assist.

## 2020-11-10 NOTE — DIETITIAN INITIAL EVALUATION ADULT. - OTHER INFO
71 y/o M with h/o developmental disability, CHF, HTN, DM, AF taken off apixaban due to anemia/hematuria, CKD not on HD, refused hemodialysis, BPH who was discharged from [facility] 9/3/20 s/p treated for ESBL UTI and then was readmitted on 09/17/20 for MARSHAL and UTI now comes back again from group home after being here for UTI (discharged 2 days). He had episode of hypothermia and was sent to the ED. Pt report # with 5# weight loss. A1C 6.1 noted. Possible UTI (asymptomatic) vs pneumonia (CXR with mild right infiltrate and +cough) on abx.

## 2020-11-10 NOTE — PROGRESS NOTE ADULT - ASSESSMENT
This 69 y/o M with developmental disability, CHF, HTN, DM, AF taken off apixaban due to anemia/hematuria, CKD not on HD, refused hemodialysis, BPH who was discharged from [facility] 9/3/20 s/p treated for ESBL UTI and then was readmitted on 09/17/20 for MARSHAL and UTI now comes back again from group home after being here for UTI (discharged 2 days). He had episode of hypothermia and was sent to the ED. Patient reports no urinary symptoms. He is complaining of rectal pain, denies constipation/diarrhea. Denies abdominal pain. Denies chills. Denies leg pain or swelling.     In the ED received merrem IV. ED recommending endocrinology consult for possible adrenal insufficiency. BP on admission was on the softer side being 90 systolic.  (05 Nov 2020 16:21)    patient can provide limited information.  Denies chest pain, denies abd pain, denies back pain, denies SOB, denies leg pain.     recently seen by Dr. Chew last week for possible UTI, given a course of MERREM.   cultures from the urine at that visit had been NEGATIVE.    Impression:  hypothermia - intermittent  CKD  developmental delay  right foot wound      Plan:  - no clear evidence of overall infection leading to hypothermia  - continue MERREM, Thru 11/11/2020 - for a 7 day course  - cultures remain negative so far    will need to consider non-infectious causes of hypothermia     - endocrine workup; if normal workup, may need to consider medication side effects    has CKD, will watch renal fx closely on antibiotics      Foot wounds being followed with  podiatry.    - continue wound care

## 2020-11-10 NOTE — PROGRESS NOTE ADULT - SUBJECTIVE AND OBJECTIVE BOX
Patient is a 70y old  Male who presents with a chief complaint of     INTERVAL HPI/OVERNIGHT EVENTS:      MEDICATIONS  (STANDING):  acetaminophen   Tablet .. 650 milliGRAM(s) Oral once  allopurinol 300 milliGRAM(s) Oral daily  atorvastatin 20 milliGRAM(s) Oral at bedtime  dextrose 5%. 1000 milliLiter(s) (50 mL/Hr) IV Continuous <Continuous>  dextrose 50% Injectable 12.5 Gram(s) IV Push once  dextrose 50% Injectable 25 Gram(s) IV Push once  dextrose 50% Injectable 25 Gram(s) IV Push once  ferrous    sulfate 325 milliGRAM(s) Oral three times a day  finasteride 5 milliGRAM(s) Oral daily  insulin lispro (ADMELOG) corrective regimen sliding scale   SubCutaneous three times a day before meals  insulin lispro (ADMELOG) corrective regimen sliding scale   SubCutaneous at bedtime  levothyroxine 25 MICROGram(s) Oral daily  meropenem  IVPB 1000 milliGRAM(s) IV Intermittent every 12 hours  multivitamin 1 Tablet(s) Oral daily  nystatin Powder 1 Application(s) Topical three times a day  oxybutynin 10 milliGRAM(s) Oral daily  saccharomyces boulardii 250 milliGRAM(s) Oral two times a day  tamsulosin Oral Tab/Cap - Peds 0.4 milliGRAM(s) Oral at bedtime  thiamine 100 milliGRAM(s) Oral daily  zinc sulfate 220 milliGRAM(s) Oral daily    MEDICATIONS  (PRN):  acetaminophen   Tablet .. 650 milliGRAM(s) Oral every 6 hours PRN Moderate Pain (4 - 6), Severe Pain (7 - 10)  dextrose 40% Gel 15 Gram(s) Oral once PRN Blood Glucose LESS THAN 70 milliGRAM(s)/deciliter  glucagon  Injectable 1 milliGRAM(s) IntraMuscular once PRN Glucose LESS THAN 70 milligrams/deciliter  guaiFENesin  milliGRAM(s) Oral every 12 hours PRN Cough  polyethylene glycol 3350 17 Gram(s) Oral daily PRN Constipation      Allergies    No Known Allergies    Intolerances        REVIEW OF SYSTEMS:  CONSTITUTIONAL: No fever, weight loss, or fatigue  EYES: No eye pain, visual disturbances, or discharge  ENMT:  No difficulty hearing, tinnitus, vertigo; No sinus or throat pain  NECK: No pain or stiffness  RESPIRATORY: No cough, wheezing, chills or hemoptysis; No shortness of breath  CARDIOVASCULAR: No chest pain, palpitations, or lightheadedness  GASTROINTESTINAL: No abdominal or epigastric pain. No nausea, vomiting, or hematemesis; No diarrhea or constipation. No melena or hematochezia.  GENITOURINARY: No dysuria, frequency, hematuria, or incontinence  NEUROLOGICAL: No headaches, vertigo, memory loss, loss of strength, numbness, or tremors  SKIN: No itching, burning, rashes, or lesions   LYMPH NODES: No enlarged glands  ENDOCRINE: No heat or cold intolerance; No hair loss; No polydipsia or polyuria  MUSCULOSKELETAL: No back pain  PSYCHIATRIC: No depression, anxiety, or mood swings  HEME/LYMPH: No easy bruising, or bleeding gums  ALLERGY AND IMMUNOLOGIC: No hives or eczema    PHYSICAL EXAM:  GENERAL: NAD, well-groomed, well-developed  HEAD:  Atraumatic, Normocephalic  EYES: EOMI, PERRLA, conjunctiva and sclera clear  ENMT: Moist mucous membranes, Good dentition, No lesions; No tonsillar erythema, exudates, or enlargement   NECK: Supple, No JVD appreciated, Normal thyroid  NERVOUS SYSTEM:  Alert & Oriented X3, Good concentration; Bilateral LE mobile, sensation to light touch intact  CHEST/LUNG: Clear to auscultation bilaterally; No rales, rhonchi, wheezing, or rubs  HEART: Regular rate and rhythm; No murmurs, rubs, or gallops  ABDOMEN: Soft, Nontender, Nondistended; Bowel sounds present  EXTREMITIES:  2+ Peripheral Pulses, No clubbing or cyanosis  LYMPH: No lymphadenopathy noted  SKIN: No rashes or lesions  INCISION:  Dressing dry and intact    Vital Signs Last 24 Hrs  T(C): 35.9 (10 Nov 2020 18:17), Max: 36.6 (09 Nov 2020 21:10)  T(F): 96.7 (10 Nov 2020 18:17), Max: 97.9 (09 Nov 2020 21:10)  HR: 85 (10 Nov 2020 15:48) (76 - 90)  BP: 112/69 (10 Nov 2020 15:48) (98/62 - 112/69)  BP(mean): --  RR: 18 (10 Nov 2020 15:48) (18 - 19)  SpO2: 98% (10 Nov 2020 15:48) (94% - 98%)    LABS:                        7.9    12.39 )-----------( 133      ( 10 Nov 2020 11:19 )             26.6     10 Nov 2020 11:19    140    |  107    |  48.0   ----------------------------<  128    4.9     |  25.0   |  1.95     Ca    8.8        10 Nov 2020 11:19          CAPILLARY BLOOD GLUCOSE      POCT Blood Glucose.: 148 mg/dL (10 Nov 2020 17:35)  POCT Blood Glucose.: 184 mg/dL (10 Nov 2020 13:04)  POCT Blood Glucose.: 158 mg/dL (10 Nov 2020 08:42)  POCT Blood Glucose.: 130 mg/dL (09 Nov 2020 21:04)           [x] Consultant(s) Notes Reviewed Patient is a 70y old  Male who presents with a chief complaint of     INTERVAL HPI/OVERNIGHT EVENTS:  Patient seen awake in bed. He denies pain, denies SOB, reports eating well, denies leg pain. Sister reports patient has been coughing and his face has been turning red. Nurse reports pt became hypothemic and needed to be on warming blanket.    MEDICATIONS  (STANDING):  acetaminophen   Tablet .. 650 milliGRAM(s) Oral once  allopurinol 300 milliGRAM(s) Oral daily  atorvastatin 20 milliGRAM(s) Oral at bedtime  dextrose 5%. 1000 milliLiter(s) (50 mL/Hr) IV Continuous <Continuous>  dextrose 50% Injectable 12.5 Gram(s) IV Push once  dextrose 50% Injectable 25 Gram(s) IV Push once  dextrose 50% Injectable 25 Gram(s) IV Push once  ferrous    sulfate 325 milliGRAM(s) Oral three times a day  finasteride 5 milliGRAM(s) Oral daily  insulin lispro (ADMELOG) corrective regimen sliding scale   SubCutaneous three times a day before meals  insulin lispro (ADMELOG) corrective regimen sliding scale   SubCutaneous at bedtime  levothyroxine 25 MICROGram(s) Oral daily  meropenem  IVPB 1000 milliGRAM(s) IV Intermittent every 12 hours  multivitamin 1 Tablet(s) Oral daily  nystatin Powder 1 Application(s) Topical three times a day  oxybutynin 10 milliGRAM(s) Oral daily  saccharomyces boulardii 250 milliGRAM(s) Oral two times a day  tamsulosin Oral Tab/Cap - Peds 0.4 milliGRAM(s) Oral at bedtime  thiamine 100 milliGRAM(s) Oral daily  zinc sulfate 220 milliGRAM(s) Oral daily    MEDICATIONS  (PRN):  acetaminophen   Tablet .. 650 milliGRAM(s) Oral every 6 hours PRN Moderate Pain (4 - 6), Severe Pain (7 - 10)  dextrose 40% Gel 15 Gram(s) Oral once PRN Blood Glucose LESS THAN 70 milliGRAM(s)/deciliter  glucagon  Injectable 1 milliGRAM(s) IntraMuscular once PRN Glucose LESS THAN 70 milligrams/deciliter  guaiFENesin  milliGRAM(s) Oral every 12 hours PRN Cough  polyethylene glycol 3350 17 Gram(s) Oral daily PRN Constipation      Allergies    No Known Allergies    Intolerances        REVIEW OF SYSTEMS:  CONSTITUTIONAL: No fever, weight loss, or fatigue  EYES: No eye pain, visual disturbances, or discharge  ENMT:  No difficulty hearing, tinnitus, vertigo; No sinus or throat pain  NECK: No pain or stiffness  RESPIRATORY: No cough, wheezing, chills or hemoptysis; No shortness of breath  CARDIOVASCULAR: No chest pain, palpitations, or lightheadedness  GASTROINTESTINAL: No abdominal or epigastric pain. No nausea, vomiting, or hematemesis; No diarrhea or constipation. No melena or hematochezia.  GENITOURINARY: No dysuria, frequency, hematuria, or incontinence  NEUROLOGICAL: No headaches, vertigo, memory loss, loss of strength, numbness, or tremors  SKIN: No itching, burning, rashes, or lesions   LYMPH NODES: No enlarged glands  ENDOCRINE: No heat or cold intolerance; No hair loss; No polydipsia or polyuria  MUSCULOSKELETAL: No back pain  HEME/LYMPH: No easy bruising, or bleeding gums      PHYSICAL EXAM:  GENERAL: NAD, well-groomed, well-developed  HEAD:  Atraumatic, Normocephalic  EYES: EOMI, PERRLA, conjunctiva and sclera clear  ENMT: Moist mucous membranes, Good dentition  NECK: Supple, No JVD appreciated, Normal thyroid  NERVOUS SYSTEM:  Alert & Oriented X3, Good concentration; Bilateral LE mobile, sensation to light touch intact  CHEST/LUNG: Clear to auscultation bilaterally; No rales, rhonchi, wheezing, or rubs  HEART: Regular rate and rhythm; No murmurs, rubs, or gallops  ABDOMEN: Soft, Nontender, Nondistended; Bowel sounds present  EXTREMITIES: diffuse erythema of lower extremities b/l to ankles, wrapped feet  SKIN: Darkened skin from calves to feet b/l, non-blanching        Vital Signs Last 24 Hrs  T(C): 35.9 (10 Nov 2020 18:17), Max: 36.6 (09 Nov 2020 21:10)  T(F): 96.7 (10 Nov 2020 18:17), Max: 97.9 (09 Nov 2020 21:10)  HR: 85 (10 Nov 2020 15:48) (76 - 90)  BP: 112/69 (10 Nov 2020 15:48) (98/62 - 112/69)  BP(mean): --  RR: 18 (10 Nov 2020 15:48) (18 - 19)  SpO2: 98% (10 Nov 2020 15:48) (94% - 98%)    LABS:                        7.9    12.39 )-----------( 133      ( 10 Nov 2020 11:19 )             26.6     10 Nov 2020 11:19    140    |  107    |  48.0   ----------------------------<  128    4.9     |  25.0   |  1.95     Ca    8.8        10 Nov 2020 11:19          CAPILLARY BLOOD GLUCOSE      POCT Blood Glucose.: 148 mg/dL (10 Nov 2020 17:35)  POCT Blood Glucose.: 184 mg/dL (10 Nov 2020 13:04)  POCT Blood Glucose.: 158 mg/dL (10 Nov 2020 08:42)  POCT Blood Glucose.: 130 mg/dL (09 Nov 2020 21:04)           [x] Consultant(s) Notes Reviewed

## 2020-11-10 NOTE — PROGRESS NOTE ADULT - ASSESSMENT
71 y/o M with h/o developmental disability, CHF, HTN, DM, AF taken off apixaban due to anemia/hematuria, CKD not on HD, refused hemodialysis, BPH who was discharged from [facility] 9/3/20 s/p treated for ESBL UTI and then was readmitted on 09/17/20 for MARSHAL and UTI now comes back again from group home after being here for UTI (discharged 2 days). He had episode of hypothermia and was sent to the ED. Patient reports no urinary symptoms. He was complaining of rectal pain, denied constipation/diarrhea.     In the ED received merrem IV. ED recommended endocrinology consult for possible adrenal insufficiency. Patient however had a stim test performed in August 31st. BP on admission was on the softer side being 90 systolic.    1) Hypothermia   - Possible UTI (asymptomatic) vs pneumonia (CXR with mild right infiltrate and +cough)  - C/w meropenem  - Guaifenesin added for cough  - Bcx results pending. If negative will consider discontinuing abx tomorrow. Will discuss with ID.  - Urine culture showing normal urogenital kelsey, blood cx NGTD x2  - Stim test 8/2020 demonstrated appropriate levels of ACTH and appropriate response; ruled out adrenal insufficiency  - TSH elevated, T4 normal  - Seen by endo; f/u ACTH, AM cortisol, and antibodies  - Previous admissions was seen by podiatry for dry gangrene of the right foot and was recommended outpatient follow up. Podiatry reconsulted on this admission, no osteo on xray  - Continue to monitor temperature    2) DM  - Accuchecks and ISS  - Diabetic diet    3) A-FIB  - No longer on AC  - Rate and rhythm controlled    4) CHF   - Preserved EF  - Not in exacerbation  - Monitor    5) CKD stage III   - Has refused dialysis in past; currently no need for dialysis  - Stable disease  - Avoid nephrotoxic agents    6) Chronic iron deficiency anemia  - PO iron  - Monitor     7) Constipation  - Miralax and senna added    DISPO: If ruled out for infection can ultimately go back to  likely 48-72 hrs. 69 y/o M with h/o developmental disability, CHF, HTN, DM, AF taken off apixaban due to anemia/hematuria, CKD not on HD, refused hemodialysis, BPH who was discharged from [facility] 9/3/20 s/p treated for ESBL UTI and then was readmitted on 09/17/20 for MARSHAL and UTI now comes back again from group home after being here for UTI (discharged 2 days). He had episode of hypothermia and was sent to the ED. Patient reports no urinary symptoms. He was complaining of rectal pain, denied constipation/diarrhea.     In the ED received merrem IV. ED recommended endocrinology consult for possible adrenal insufficiency. Patient however had a stim test performed in August 31st. BP on admission was on the softer side being 90 systolic.    1) Hypothermia   - Possible UTI (asymptomatic) vs pneumonia (CXR with mild right infiltrate and +cough)  - C/w meropenem  - Guaifenesin added for cough  - Blood and urine cultures negative  - Stim test 8/2020 demonstrated appropriate levels of ACTH and appropriate response; ruled out adrenal insufficiency  - TSH elevated, T4 normal. Will trial levothyroxine as per Endo  - Seen by endo; f/u ACTH, AM cortisol, and antibodies  - Previous admissions was seen by podiatry for dry gangrene of the right foot and was recommended outpatient follow up. Podiatry reconsulted on this admission, no osteo on xray  - Continue to monitor temperature, warming blanket PRN    2) DM  - Accuchecks and ISS  - Diabetic diet    3) A-FIB  - No longer on AC  - Rate and rhythm controlled    4) CHF   - Preserved EF  - Not in exacerbation  - Monitor    5) CKD stage III   - Has refused dialysis in past; currently no need for dialysis  - Stable disease  - Avoid nephrotoxic agents    6) Chronic iron deficiency anemia  - PO iron  - Monitor     7) Constipation  - Miralax and senna added    DISPO: If ruled out for infection can ultimately go back to  likely 24-48 hrs.

## 2020-11-10 NOTE — PROGRESS NOTE ADULT - ASSESSMENT
70 year old male with developmental disability, CKD, Type 2 DM, HTN, Afib admitted with hypothermia in setting of UTI.  He had a normal cosyntropin stim test earlier this year and a morning cortisol of 22, thereby ruling out adrenal insufficiency.  He does have evidence of mild hypothyroidism in setting of elevated TPO antibody c/w Hashimoto's Thyroiditis.          1.  Hypothermia-  likely multifactorial.  No evidence of AI, but there is mild primary hypothyroidism (although one would not expect hypothermia from such a mild degree).   On empiric antibiotics as per ID.    2.  Hypothyroidism-  start LT4 25 mcg daily and repeat TFTs in 6 weeks.    3.  Type 2 DM-  well controlled.  Continue Lispro AC + HS by scale.

## 2020-11-10 NOTE — PROGRESS NOTE ADULT - SUBJECTIVE AND OBJECTIVE BOX
CC: follow up elevated TSH, hypothermia    Interval HPI/ Overnight Events:  Patient denies any associated cold intolerance.  Remains on warming blanket and empiric abx.    MEDICATIONS  (STANDING):  acetaminophen   Tablet .. 650 milliGRAM(s) Oral once  allopurinol 300 milliGRAM(s) Oral daily  atorvastatin 20 milliGRAM(s) Oral at bedtime  dextrose 5%. 1000 milliLiter(s) (50 mL/Hr) IV Continuous <Continuous>  dextrose 50% Injectable 12.5 Gram(s) IV Push once  dextrose 50% Injectable 25 Gram(s) IV Push once  dextrose 50% Injectable 25 Gram(s) IV Push once  ferrous    sulfate 325 milliGRAM(s) Oral three times a day  finasteride 5 milliGRAM(s) Oral daily  insulin lispro (ADMELOG) corrective regimen sliding scale   SubCutaneous three times a day before meals  insulin lispro (ADMELOG) corrective regimen sliding scale   SubCutaneous at bedtime  meropenem  IVPB 1000 milliGRAM(s) IV Intermittent every 12 hours  multivitamin 1 Tablet(s) Oral daily  nystatin Powder 1 Application(s) Topical three times a day  oxybutynin 10 milliGRAM(s) Oral daily  saccharomyces boulardii 250 milliGRAM(s) Oral two times a day  tamsulosin Oral Tab/Cap - Peds 0.4 milliGRAM(s) Oral at bedtime  thiamine 100 milliGRAM(s) Oral daily  zinc sulfate 220 milliGRAM(s) Oral daily    Vital Signs Last 24 Hrs  T(C): 35.9 (10 Nov 2020 18:17), Max: 36.6 (09 Nov 2020 21:10)  T(F): 96.7 (10 Nov 2020 18:17), Max: 97.9 (09 Nov 2020 21:10)  HR: 85 (10 Nov 2020 15:48) (76 - 90)  BP: 112/69 (10 Nov 2020 15:48) (98/62 - 112/69)  RR: 18 (10 Nov 2020 15:48) (18 - 19)  SpO2: 98% (10 Nov 2020 15:48) (94% - 98%)    PE  Gen: awake and alert, NAD  HEENT:  sclera anicteric, MMM  Neck:  no thyromegaly, no LAD  CV:  nl S1 + S2, RRR, 2/6 systolic murmur  Resp:  nl respiratory effort, CTA b/l  GI/ Abd: soft, NT/ ND, BS +  Neuro:  No tremor  Skin:  stasis changes, b/l LEs  Psych:  affect appropriate    LABS:  Cortisol AM, Serum: 22.9 ug/dL (11.09.20 @ 19:39)  Thyroperoxidase Antibody: 57.7 IU/mL (11.09.20 @ 19:37)    Thyroid Stimulating Hormone, Serum: 4.82 uIU/mL (11.05.20 @ 12:33)  T4, Serum: 6.6 ug/dL (11.06.20 @ 07:30)    11-10    140  |  107  |  48.0<H>  ----------------------------<  128<H>  4.9   |  25.0  |  1.95<H>    Ca    8.8      10 Nov 2020 11:19  Mg     1.9     11-09                          7.9    12.39 )-----------( 133      ( 10 Nov 2020 11:19 )             26.6     CAPILLARY BLOOD GLUCOSE  POCT Blood Glucose.: 148 mg/dL (10 Nov 2020 17:35)  POCT Blood Glucose.: 184 mg/dL (10 Nov 2020 13:04)  POCT Blood Glucose.: 158 mg/dL (10 Nov 2020 08:42)  POCT Blood Glucose.: 130 mg/dL (09 Nov 2020 21:04)

## 2020-11-10 NOTE — PROGRESS NOTE ADULT - SUBJECTIVE AND OBJECTIVE BOX
HPI:  69 y/o M with h/o developmental disability, CHF, HTN, DM, AF taken off apixaban due to anemia/hematuria, CKD not on HD, refused hemodialysis, BPH who was discharged from [facility] 9/3/20 s/p treated for ESBL UTI and then was readmitted on 09/17/20 for MARSHAL and UTI now comes back again from group home after being here for UTI (discharged 2 days). He had episode of hypothermia and was sent to the ED. Patient reports no urinary symptoms. He is complaining of rectal pain, denies constipation/diarrhea. Denies abdominal pain. Denies chills. Denies leg pain or swelling.    In the ED received merrem IV. ED recommending endocrinology consult for possible adrenal insufficiency. BP on admission was on the softer side being 90 systolic.  (05 Nov 2020 16:21)      History as above. Pt seen bedside, Pt is from a group home and is frequently in the ED. He has dressing changes at his group home for his foot wounds. Pt is in no NAD and AAOx3. Pt denied F/C/V/N/SOB.    PMH:Encounter for blood transfusion    Anemia    History of gross hematuria    History of orthostatic hypotension    Bacterial pneumonia    Muscle weakness (generalized)    2019 novel coronavirus disease (COVID-19)    Falls    Heart failure    Afib    CHF (congestive heart failure)    Gout    Hiatal hernia    BPH (benign prostatic hyperplasia)    Cognitive impairment    Cardiomyopathy    HTN (hypertension)    DM (diabetes mellitus)      Allergies: No Known Allergies    Medications: acetaminophen   Tablet .. 650 milliGRAM(s) Oral once  allopurinol 300 milliGRAM(s) Oral daily  atorvastatin 20 milliGRAM(s) Oral at bedtime  dextrose 40% Gel 15 Gram(s) Oral once PRN  dextrose 5%. 1000 milliLiter(s) IV Continuous <Continuous>  dextrose 50% Injectable 12.5 Gram(s) IV Push once  dextrose 50% Injectable 25 Gram(s) IV Push once  dextrose 50% Injectable 25 Gram(s) IV Push once  finasteride 5 milliGRAM(s) Oral daily  glucagon  Injectable 1 milliGRAM(s) IntraMuscular once PRN  insulin lispro (ADMELOG) corrective regimen sliding scale   SubCutaneous three times a day before meals  insulin lispro (ADMELOG) corrective regimen sliding scale   SubCutaneous at bedtime  meropenem  IVPB 1000 milliGRAM(s) IV Intermittent every 12 hours  midodrine. 10 milliGRAM(s) Oral <User Schedule>  nystatin Powder 1 Application(s) Topical three times a day  oxybutynin 10 milliGRAM(s) Oral daily  saccharomyces boulardii 250 milliGRAM(s) Oral two times a day  tamsulosin Oral Tab/Cap - Peds 0.4 milliGRAM(s) Oral at bedtime  zinc sulfate 220 milliGRAM(s) Oral daily    FH:No pertinent family history in first degree relatives      PSX: S/P appendectomy    No significant past surgical history      SH: acetaminophen   Tablet .. 650 milliGRAM(s) Oral once  allopurinol 300 milliGRAM(s) Oral daily  atorvastatin 20 milliGRAM(s) Oral at bedtime  dextrose 40% Gel 15 Gram(s) Oral once PRN  dextrose 5%. 1000 milliLiter(s) IV Continuous <Continuous>  dextrose 50% Injectable 12.5 Gram(s) IV Push once  dextrose 50% Injectable 25 Gram(s) IV Push once  dextrose 50% Injectable 25 Gram(s) IV Push once  finasteride 5 milliGRAM(s) Oral daily  glucagon  Injectable 1 milliGRAM(s) IntraMuscular once PRN  insulin lispro (ADMELOG) corrective regimen sliding scale   SubCutaneous three times a day before meals  insulin lispro (ADMELOG) corrective regimen sliding scale   SubCutaneous at bedtime  meropenem  IVPB 1000 milliGRAM(s) IV Intermittent every 12 hours  midodrine. 10 milliGRAM(s) Oral <User Schedule>  nystatin Powder 1 Application(s) Topical three times a day  oxybutynin 10 milliGRAM(s) Oral daily  saccharomyces boulardii 250 milliGRAM(s) Oral two times a day  tamsulosin Oral Tab/Cap - Peds 0.4 milliGRAM(s) Oral at bedtime  zinc sulfate 220 milliGRAM(s) Oral daily      ICU Vital Signs Last 24 Hrs  T(C): 36.6 (10 Nov 2020 04:58), Max: 36.8 (09 Nov 2020 10:15)  T(F): 97.8 (10 Nov 2020 04:58), Max: 98.2 (09 Nov 2020 10:15)  HR: 80 (10 Nov 2020 04:58) (75 - 90)  BP: 98/62 (10 Nov 2020 04:58) (98/62 - 111/76)  BP(mean): --  ABP: --  ABP(mean): --  RR: 18 (10 Nov 2020 04:58) (18 - 18)  SpO2: 97% (10 Nov 2020 04:58) (92% - 97%)      LABS                        8.0    10.44 )-----------( 136      ( 09 Nov 2020 06:51 )             26.9   11-09    141  |  108<H>  |  51.0<H>  ----------------------------<  149<H>  4.9   |  21.0<L>  |  2.07<H>    Ca    8.9      09 Nov 2020 06:51  Mg     1.9     11-09          PHYSICAL EXAM  GEN: KIMBERLY MARKS is a pleasant well-nourished, well developed 70y Male in no acute distress, alert awake, and oriented to person, place and time.   LE Focused:    Vascular: Dorsalis Pedis and Posterior Tibial pulses 1/4. Capillary refill time less than 3 seconds digits 1-5 bilateral. Temperature gradient is warm to cold b/l.   Neuro: Protective sensation absent to the level of the digits bilateral via [place] touch test  Derm: Skin warm, dry and supple bilateral. right dorsal 1st MPJ wound ~2.9x1.6x0.2cm fibronecrotic base, no drainage, no malodor. left heel wound ~1x1.7x0.1 dry eschar/scab. no malodor, no drainage, no signs of infection   MSK: Muscle strength 5/5 all major muscle groups bilateral. No structural abnormality, bilaterally      < from: Xray Foot AP + Lateral + Oblique, Right (11.06.20 @ 10:17) >     EXAM:  FOOT-RIGHT                          PROCEDURE DATE:  11/06/2020          INTERPRETATION:  Right foot. Patient has local pain. 3 views.    Arterial calcifications are noted.    There is a pes planus. There is scattered moderate dorsal tarsaldegeneration.    There is fairly significant first MTP degeneration with a slight hallux valgus. Old fracture deformity of the distal fifth metatarsal is seen.    No bone destruction or acute fracture evident.    IMPRESSION: Pes planus and scattered degeneration. Arterial calcification. No acute fracture.              SANFORD MANJARREZ MD; Attending Radiologist  This document has been electronically signed. Nov 6 2020  2:19PM    < end of copied text >      < from: [facility] Physiol Extremity Lower 3+ Level, BI (11.06.20 @ 14:04) >     EXAM:  US PHYSIOL LWR EXT 3+ LEV BI                          PROCEDURE DATE:  11/06/2020          INTERPRETATION:  History:Bilateral wounds on the feet. Hypertension hyperlipidemia diabetes.    Technique: Bilateral ADELA/PVR    Comparison: None    Findings:      The ankle-brachial indices could not be performed as the patient could not cooperate with the examination      RIGHT  Flow study: Pulsatile waveforms, with diminished flow within the foot    LEFT  Flow study: Pulsatile waveforms with diminished flow within the foot      Impression:    Limited study as described as the patient could not cooperate with examination    There is diminished flow within both feet.    < end of copied text >       Assessment:   right dorsal foot wound  Left plantar heel wound   Charcot foot   PVD    PLAN:   Pt evaluated and chart reviewed   Discussed diagnosis and treatment with patient   Applied betadine DSD applied  X-rays reviewed  Weight bearing as tolerated  Recommend PT consult   Wound care orders placed   No surgical intervention  Wound care: apply betadine to right dorsal foot wound and plantar left heel wound. apply sterile gauze kerlix and ACE. keep clean dry intact and change every other day.   Discussed and seen with attending

## 2020-11-10 NOTE — DIETITIAN INITIAL EVALUATION ADULT. - PERTINENT LABORATORY DATA
11-09 Na141 mmol/L Glu 149 mg/dL<H> K+ 4.9 mmol/L Cr  2.07 mg/dL<H> BUN 51.0 mg/dL<H> Phos n/a   Alb n/a   PAB n/a

## 2020-11-11 NOTE — PROGRESS NOTE ADULT - ATTENDING COMMENTS
71 y/o M with h/o developmental disability, CHF, HTN, DM, AFib, CKD, BPH who presents with hypothermia. Discussed with endo, etiology most likely due to hypothyroid, started levothyroxine yesterday and pt tolerating. Will monitor. Plan for d/c to group home, care coordinator setting up transportation for tomorrow.

## 2020-11-11 NOTE — PROGRESS NOTE ADULT - SUBJECTIVE AND OBJECTIVE BOX
CC:  follow up hypothyroidism    Interval HPI/ Overnight Events:  Now off warming blanket.  Complains of chronic cough.  Denies any CP, fevers/ Chills    MEDICATIONS  (STANDING):  acetaminophen   Tablet .. 650 milliGRAM(s) Oral once  allopurinol 300 milliGRAM(s) Oral daily  atorvastatin 20 milliGRAM(s) Oral at bedtime  dextrose 5%. 1000 milliLiter(s) (50 mL/Hr) IV Continuous <Continuous>  dextrose 50% Injectable 12.5 Gram(s) IV Push once  dextrose 50% Injectable 25 Gram(s) IV Push once  dextrose 50% Injectable 25 Gram(s) IV Push once  ferrous    sulfate 325 milliGRAM(s) Oral three times a day  finasteride 5 milliGRAM(s) Oral daily  insulin lispro (ADMELOG) corrective regimen sliding scale   SubCutaneous three times a day before meals  insulin lispro (ADMELOG) corrective regimen sliding scale   SubCutaneous at bedtime  levothyroxine 25 MICROGram(s) Oral daily  multivitamin 1 Tablet(s) Oral daily  nystatin Powder 1 Application(s) Topical three times a day  oxybutynin 10 milliGRAM(s) Oral daily  saccharomyces boulardii 250 milliGRAM(s) Oral two times a day  tamsulosin Oral Tab/Cap - Peds 0.4 milliGRAM(s) Oral at bedtime  thiamine 100 milliGRAM(s) Oral daily  zinc sulfate 220 milliGRAM(s) Oral daily    Vital Signs Last 24 Hrs  T(C): 35.8 (11 Nov 2020 16:21), Max: 37.1 (10 Nov 2020 21:28)  T(F): 96.5 (11 Nov 2020 16:21), Max: 98.7 (10 Nov 2020 21:28)  HR: 81 (11 Nov 2020 16:21) (67 - 88)  BP: 103/74 (11 Nov 2020 16:21) (91/54 - 107/69)  RR: 18 (11 Nov 2020 16:21) (18 - 18)  SpO2: 98% (11 Nov 2020 16:21) (92% - 98%)    PE:  Gen: awake and alert, NAD  HEENT:  sclera anicteric, MMM  Neck:  no thyromegaly, no LAD  CV:  nl S1 + S2, RRR, no m/r/g  Resp:  nl respiratory effort, CTA b/l  GI/ Abd: soft, NT/ ND, BS +  Neuro:  No tremor   MS:  decrease muscle tone in legs  Skin:  venous stasis changes in b/l LEs  Psych:  affect appropriate    LABS:  CAPILLARY BLOOD GLUCOSE  POCT Blood Glucose.: 205 mg/dL (11 Nov 2020 12:09)  POCT Blood Glucose.: 127 mg/dL (11 Nov 2020 08:21)  POCT Blood Glucose.: 160 mg/dL (10 Nov 2020 21:27)  POCT Blood Glucose.: 148 mg/dL (10 Nov 2020 17:35)

## 2020-11-11 NOTE — PROGRESS NOTE ADULT - SUBJECTIVE AND OBJECTIVE BOX
CC:   INTERVAL HPI/OVERNIGHT EVENTS: Pt seen and examined at bedside this AM by Hospitalist and PA. Pt in a good mood, sitting in bed, asking for help to open his milk for his cereal.  No complaints at this time.  No acute overnight events.    REVIEW OF SYSTEMS:  CONSTITUTIONAL: No fever, weight loss, or fatigue  EYES: No eye pain, visual disturbances, or discharge  ENT:  No difficulty hearing, tinnitus, vertigo; No sinus or throat pain  NECK: No pain or stiffness  RESPIRATORY: No cough, wheezing, chills or hemoptysis; No shortness of breath  CARDIOVASCULAR: No chest pain, palpitations, dizziness, or leg swelling  GASTROINTESTINAL: No abdominal or epigastric pain. No nausea, vomiting, or hematemesis; No diarrhea or constipation.  GENITOURINARY: No dysuria, frequency, hematuria, or incontinence  NEUROLOGICAL: No headaches, memory loss, loss of strength, numbness, or tremors  SKIN: No itching, burning, rashes, or lesions   MUSCULOSKELETAL: No joint pain or swelling; No muscle, back, or extremity pain    Allergies    No Known Allergies    Intolerances      HEALTH ISSUES - PROBLEM Dx:      PAST MEDICAL & SURGICAL HISTORY:  Encounter for blood transfusion    Anemia    History of gross hematuria    History of orthostatic hypotension    Bacterial pneumonia    Muscle weakness (generalized)    2019 novel coronavirus disease (COVID-19)    Falls    Heart failure    Afib    CHF (congestive heart failure)    Gout    Hiatal hernia    BPH (benign prostatic hyperplasia)    Cognitive impairment    Cardiomyopathy    HTN (hypertension)    DM (diabetes mellitus)    S/P appendectomy    MEDICATIONS  (STANDING):  acetaminophen   Tablet .. 650 milliGRAM(s) Oral once  allopurinol 300 milliGRAM(s) Oral daily  atorvastatin 20 milliGRAM(s) Oral at bedtime  dextrose 5%. 1000 milliLiter(s) (50 mL/Hr) IV Continuous <Continuous>  dextrose 50% Injectable 12.5 Gram(s) IV Push once  dextrose 50% Injectable 25 Gram(s) IV Push once  dextrose 50% Injectable 25 Gram(s) IV Push once  ferrous    sulfate 325 milliGRAM(s) Oral three times a day  finasteride 5 milliGRAM(s) Oral daily  insulin lispro (ADMELOG) corrective regimen sliding scale   SubCutaneous three times a day before meals  insulin lispro (ADMELOG) corrective regimen sliding scale   SubCutaneous at bedtime  levothyroxine 25 MICROGram(s) Oral daily  multivitamin 1 Tablet(s) Oral daily  nystatin Powder 1 Application(s) Topical three times a day  oxybutynin 10 milliGRAM(s) Oral daily  saccharomyces boulardii 250 milliGRAM(s) Oral two times a day  tamsulosin Oral Tab/Cap - Peds 0.4 milliGRAM(s) Oral at bedtime  thiamine 100 milliGRAM(s) Oral daily  zinc sulfate 220 milliGRAM(s) Oral daily    MEDICATIONS  (PRN):  acetaminophen   Tablet .. 650 milliGRAM(s) Oral every 6 hours PRN Moderate Pain (4 - 6), Severe Pain (7 - 10)  dextrose 40% Gel 15 Gram(s) Oral once PRN Blood Glucose LESS THAN 70 milliGRAM(s)/deciliter  glucagon  Injectable 1 milliGRAM(s) IntraMuscular once PRN Glucose LESS THAN 70 milligrams/deciliter  guaiFENesin  milliGRAM(s) Oral every 12 hours PRN Cough  polyethylene glycol 3350 17 Gram(s) Oral daily PRN Constipation      VITAL SIGNS:  T(C): 36.7 (11-11-20 @ 10:30), Max: 37.1 (11-10-20 @ 21:28)  HR: 81 (11-11-20 @ 16:21) (67 - 88)  BP: 103/74 (11-11-20 @ 16:21) (91/54 - 107/69)  RR: 18 (11-11-20 @ 16:21) (18 - 18)  SpO2: 98% (11-11-20 @ 16:21) (92% - 98%)  Wt(kg): --Vital Signs Last 24 Hrs  T(C): 36.7 (11 Nov 2020 10:30), Max: 37.1 (10 Nov 2020 21:28)  T(F): 98 (11 Nov 2020 10:30), Max: 98.7 (10 Nov 2020 21:28)  HR: 81 (11 Nov 2020 16:21) (67 - 88)  BP: 103/74 (11 Nov 2020 16:21) (91/54 - 107/69)  BP(mean): --  RR: 18 (11 Nov 2020 16:21) (18 - 18)  SpO2: 98% (11 Nov 2020 16:21) (92% - 98%)    PHYSICAL EXAM:  GENERAL: Pt lying in bed comfortably in NAD  HEAD:  Atraumatic  EYES: EOMI, PERRL, conjunctiva and sclera clear  ENT: Moist mucous membranes  NECK: Supple, No JVD  CHEST/LUNG: Clear to auscultation bilaterally; No rales, rhonchi, wheezing, or rubs. Unlabored respirations  HEART: Regular rate and rhythm; No murmurs, rubs, or gallops  ABDOMEN: Bowel sounds present; Soft, Nontender, Nondistended. No guarding or rigidity   EXTREMITIES:  2+ Peripheral Pulses, brisk capillary refill. No clubbing, cyanosis, or edema  NERVOUS SYSTEM:  Alert & Oriented X3, speech clear, FROM x 4 extremities. No deficits   SKIN: No rashes or lesions    LABS:                        7.9    12.39 )-----------( 133      ( 10 Nov 2020 11:19 )             26.6     11-10    140  |  107  |  48.0<H>  ----------------------------<  128<H>  4.9   |  25.0  |  1.95<H>    Ca    8.8      10 Nov 2020 11:19        CAPILLARY BLOOD GLUCOSE      POCT Blood Glucose.: 205 mg/dL (11 Nov 2020 12:09)  POCT Blood Glucose.: 127 mg/dL (11 Nov 2020 08:21)  POCT Blood Glucose.: 160 mg/dL (10 Nov 2020 21:27)  POCT Blood Glucose.: 148 mg/dL (10 Nov 2020 17:35)

## 2020-11-11 NOTE — PROGRESS NOTE ADULT - ASSESSMENT
71 y/o M with h/o developmental disability, CHF, HTN, DM, AF taken off apixaban due to anemia/hematuria, CKD not on HD, refused hemodialysis, BPH who was discharged from [facility] 9/3/20 s/p treated for ESBL UTI and then was readmitted on 09/17/20 for MARSHAL and UTI now comes back again from group home after being here for UTI (discharged 2 days). He had episode of hypothermia and was sent to the ED. Patient reports no urinary symptoms. He was complaining of rectal pain, denied constipation/diarrhea.     In the ED received merrem IV. ED recommended endocrinology consult for possible adrenal insufficiency. Patient however had a stim test performed in August 31st. BP on admission was on the softer side being 90 systolic.    1) Hypothermia   - Possible UTI (asymptomatic) vs pneumonia (CXR with mild right infiltrate and +cough)  - Completed  meropenem today 11/11/20  - Guaifenesin added for cough  - Blood and urine cultures negative  - Stim test 8/2020 demonstrated appropriate levels of ACTH and appropriate response; ruled out adrenal insufficiency  - TSH elevated, T4 normal. Will trial levothyroxine as per Endo  - Seen by endo; f/u ACTH, AM cortisol, and antibodies  - Previous admissions was seen by podiatry for dry gangrene of the right foot and was recommended outpatient follow up. Podiatry reconsulted on this admission, no osteo on xray  - Continue to monitor temperature, warming blanket PRN    2) DM  - Accuchecks and ISS  - Diabetic diet    3) A-FIB  - No longer on AC  - Rate and rhythm controlled    4) CHF   - Preserved EF  - Not in exacerbation  - Monitor    5) CKD stage III   - Has refused dialysis in past; currently no need for dialysis  - Stable disease  - Avoid nephrotoxic agents    6) Chronic iron deficiency anemia  - PO iron  - Monitor     7) Constipation  - Miralax and senna added    DISPO: If ruled out for infection can ultimately go back to  likely 24-48 hrs. Medically stable for discharge.

## 2020-11-11 NOTE — PROGRESS NOTE ADULT - ASSESSMENT
This 71 y/o M with developmental disability, CHF, HTN, DM, AF taken off apixaban due to anemia/hematuria, CKD not on HD, refused hemodialysis, BPH who was discharged from [facility] 9/3/20 s/p treated for ESBL UTI and then was readmitted on 09/17/20 for MARSHAL and UTI now comes back again from group home after being here for UTI (discharged 2 days). He had episode of hypothermia and was sent to the ED. Patient reports no urinary symptoms. He is complaining of rectal pain, denies constipation/diarrhea. Denies abdominal pain. Denies chills. Denies leg pain or swelling.     In the ED received merrem IV. ED recommending endocrinology consult for possible adrenal insufficiency. BP on admission was on the softer side being 90 systolic.  (05 Nov 2020 16:21)    patient can provide limited information.  Denies chest pain, denies abd pain, denies back pain, denies SOB, denies leg pain.     recently seen by Dr. Chew last week for possible UTI, given a course of MERREM.   cultures from the urine at that visit had been NEGATIVE.    Impression:  hypothermia - intermittent  CKD  developmental delay  right foot wound      Plan:  - no clear evidence of overall infection leading to hypothermia  - continue MERREM, Thru 11/11/2020 - for a 7 day course  - cultures remain negative so far    will need to consider non-infectious causes of hypothermia     - endocrine workup; if normal workup, may need to consider medication side effects    has CKD, will watch renal fx closely on antibiotics      Foot wounds being followed with  podiatry.    - continue wound care with Betadine  - no clear need for antibiotics      No further specific infectious disease recommendations. Will be available as needed.

## 2020-11-11 NOTE — PROGRESS NOTE ADULT - ASSESSMENT
70 year old male with developmental disability, CKD, Type 2 DM, HTN, Afib admitted with hypothermia in setting of UTI.  He had a normal cosyntropin stim test earlier this year and a morning cortisol of 22, thereby ruling out adrenal insufficiency.  He does have evidence of mild hypothyroidism in setting of elevated TPO antibody c/w Hashimoto's Thyroiditis.          1.  Hypothermia-  likely multifactorial.  No evidence of AI, but there is mild primary hypothyroidism (although one would not expect hypothermia from such a mild degree).    No active infections.  Temp appears to have improved.  Safe for discharge from endocrine perspective at this time.    2.  Hypothyroidism-  continue LT4 25 mcg daily.  Can follow up with us as an outpatient in 4-6 weeks.    3.  Type 2 DM-   Continue Lispro AC + HS by scale.

## 2020-11-11 NOTE — PROGRESS NOTE ADULT - SUBJECTIVE AND OBJECTIVE BOX
[facility]  INFECTIOUS DISEASES AND INTERNAL MEDICINE at [place]  =======================================================  Zach Chew MD  Diplomates American Board of Internal Medicine and Infectious Diseases  Tel  [phone]  Fax [phone]  =======================================================    [id number]  KIMBERLY MARKS   follow up: hypothermia    no new issues today  infectious workup negative so far     =======================================================  REVIEW OF SYSTEMS:  CONSTITUTIONAL:  No Fever or chills  HEENT:  No diplopia or blurred vision.  No earache, sore throat or runny nose.  CARDIOVASCULAR:  No pressure, squeezing, strangling, tightness, heaviness or aching about the chest, neck, axilla or epigastrium.  RESPIRATORY:  No cough, shortness of breath  GASTROINTESTINAL:  No nausea, vomiting or diarrhea.  GENITOURINARY:  No dysuria, frequency or urgency. No Blood in urine  MUSCULOSKELETAL:  no joint aches, no muscle pain  SKIN:  No change in skin, hair or nails.  NEUROLOGIC:  No Headaches, seizures or weakness.  PSYCHIATRIC:  No disorder of thought or mood.  ENDOCRINE:  No heat or cold intolerance  HEMATOLOGICAL:  No easy bruising or bleeding.   =======================================================  Allergies  No Known Allergies     ======================================================  Physical Exam:  ============     General:  No acute distress. Pleasant  Eye: Pupils are equal, round and reactive to light, Extraocular movements are intact, Normal conjunctiva.  HENT: Normocephalic, Oral mucosa dry  Neck: Supple, No lymphadenopathy.  Respiratory: Lungs with fair air entry  Cardiovascular: Normal rate, Regular rhythm,   Gastrointestinal: Soft, Non-tender, Non-distended, Normal bowel sounds.  Genitourinary: No costovertebral angle tenderness.  Lymphatics: No lymphadenopathy neck,   Musculoskeletal: Normal range of motion, Normal strength.  Integumentary: bilateral chronic stasis dermatitis,    Neurologic: alert, moves all extremities, no focal neuro deficits.   Psychiatric: Appropriate mood & affect.    =======================================================   Vitals:  ============  T(F): 98 (11 Nov 2020 10:30), Max: 98.7 (10 Nov 2020 21:28)  HR: 67 (11 Nov 2020 10:30)  BP: 91/54 (11 Nov 2020 10:30)  RR: 18 (11 Nov 2020 10:30)  SpO2: 98% (11 Nov 2020 10:30) (92% - 98%)  temp max in last 48H T(F): , Max: 98.7 (11-10-20 @ 21:28)    =======================================================  Current Antibiotics:    Other medications:  acetaminophen   Tablet .. 650 milliGRAM(s) Oral once  allopurinol 300 milliGRAM(s) Oral daily  atorvastatin 20 milliGRAM(s) Oral at bedtime  dextrose 5%. 1000 milliLiter(s) IV Continuous <Continuous>  dextrose 50% Injectable 12.5 Gram(s) IV Push once  dextrose 50% Injectable 25 Gram(s) IV Push once  dextrose 50% Injectable 25 Gram(s) IV Push once  ferrous    sulfate 325 milliGRAM(s) Oral three times a day  finasteride 5 milliGRAM(s) Oral daily  insulin lispro (ADMELOG) corrective regimen sliding scale   SubCutaneous three times a day before meals  insulin lispro (ADMELOG) corrective regimen sliding scale   SubCutaneous at bedtime  levothyroxine 25 MICROGram(s) Oral daily  multivitamin 1 Tablet(s) Oral daily  nystatin Powder 1 Application(s) Topical three times a day  oxybutynin 10 milliGRAM(s) Oral daily  saccharomyces boulardii 250 milliGRAM(s) Oral two times a day  tamsulosin Oral Tab/Cap - Peds 0.4 milliGRAM(s) Oral at bedtime  thiamine 100 milliGRAM(s) Oral daily  zinc sulfate 220 milliGRAM(s) Oral daily      =======================================================  Labs:                        7.9    12.39 )-----------( 133      ( 10 Nov 2020 11:19 )             26.6      11-10    140  |  107  |  48.0<H>  ----------------------------<  128<H>  4.9   |  25.0  |  1.95<H>    Ca    8.8      10 Nov 2020 11:19        Culture - Urine (collected 11-05-20 @ 21:53)  Source: .Urine Clean Catch (Midstream)  Final Report (11-06-20 @ 17:50):    <10,000 CFU/mL Normal Urogenital Nola    Culture - Blood (collected 11-05-20 @ 12:29)  Source: .Blood Blood-Peripheral  Final Report (11-10-20 @ 13:01):    No growth at 5 days.    Culture - Blood (collected 11-05-20 @ 12:28)  Source: .Blood Blood-Venous  Final Report (11-10-20 @ 13:01):    No growth at 5 days.      Creatinine, Serum: 1.95 mg/dL (11-10-20 @ 11:19)  Creatinine, Serum: 2.07 mg/dL (11-09-20 @ 06:51)  Creatinine, Serum: 2.10 mg/dL (11-07-20 @ 07:14)    Procalcitonin, Serum: 0.26 ng/mL (11-06-20 @ 18:02)      Ferritin, Serum: 164 ng/mL (11-07-20 @ 10:25)    WBC Count: 12.39 K/uL (11-10-20 @ 11:19)  WBC Count: 10.44 K/uL (11-09-20 @ 06:51)  WBC Count: 4.89 K/uL (11-07-20 @ 07:14)    Rapid RVP Result: NotDetec (10-27-20 @ 17:58)    COVID-19 PCR: NotDetec (11-10-20 @ 11:13)  COVID-19 IgG Antibody Interpretation: Positive (11-06-20 @ 21:29)  COVID-19 IgG Antibody Index: 78.70 Index (11-06-20 @ 21:29)  COVID-19 PCR: NotDetec (11-05-20 @ 12:33)  COVID-19 PCR: NotDetec (11-02-20 @ 11:05)  COVID-19 IgG Antibody Interpretation: Positive (10-28-20 @ 05:29)  COVID-19 IgG Antibody Index: 86.00 Index (10-28-20 @ 05:29)  COVID-19 PCR: NotDetec (10-27-20 @ 15:04)  COVID-19 PCR: NotDetec (10-12-20 @ 22:00)

## 2020-11-12 NOTE — DISCHARGE NOTE PROVIDER - HOSPITAL COURSE
71 y/o M with h/o developmental disability, CHF, HTN, DM, AF taken off apixaban due to anemia/hematuria, CKD not on HD, refused hemodialysis, BPH who was discharged from [facility] 9/3/20 s/p treated for ESBL UTI and then was readmitted on 09/17/20 for MARSHAL and UTI now comes back again from group home after being here for UTI (discharged 2 days). He had episode of hypothermia and was sent to the ED. Patient reports no urinary symptoms. He is complaining of rectal pain, denies constipation/diarrhea. Denies abdominal pain. Denies chills. Denies leg pain or swelling.    In the ED received merrem IV. ED recommending endocrinology consult for possible adrenal insufficiency. BP on admission was on the softer side being 90 systolic.       1) Hypothermia   - Possible UTI (asymptomatic) vs pneumonia (CXR with mild right infiltrate and +cough)  - Completed  meropenem today 11/11/20  - Guaifenesin added for cough  - Blood and urine cultures negative  - Stim test 8/2020 demonstrated appropriate levels of ACTH and appropriate response; ruled out adrenal insufficiency  - TSH elevated, T4 normal. Will trial levothyroxine as per Endo  - Seen by endo; f/u ACTH, AM cortisol, and antibodies  - Previous admissions was seen by podiatry for dry gangrene of the right foot and was recommended outpatient follow up. Podiatry reconsulted on this admission, no osteo on xray  - Continue to monitor temperature, warming blanket PRN    2) DM  - Accuchecks and ISS  - Diabetic diet    3) A-FIB  - No longer on AC  - Rate and rhythm controlled    4) CHF   - Preserved EF  - Not in exacerbation  - Monitor    5) CKD stage III   - Has refused dialysis in past; currently no need for dialysis  - Stable disease  - Avoid nephrotoxic agents    6) Chronic iron deficiency anemia  - PO iron  - Monitor     7) Constipation  - Miralax and senna added      PHYSICAL EXAM:  GENERAL: NAD, well-groomed, well-developed  HEAD:  Atraumatic, Normocephalic  EYES: EOMI, PERRLA, conjunctiva and sclera clear  ENMT: Moist mucous membranes, Good dentition  NECK: Supple, No JVD appreciated, Normal thyroid  NERVOUS SYSTEM:  Alert & Oriented X3, Good concentration; Bilateral LE mobile, sensation to light touch intact  CHEST/LUNG: Clear to auscultation bilaterally; No rales, rhonchi, wheezing, or rubs  HEART: Regular rate and rhythm; No murmurs, rubs, or gallops  ABDOMEN: Soft, Nontender, Nondistended; Bowel sounds present  EXTREMITIES: diffuse erythema of lower extremities b/l to ankles, wrapped feet  SKIN: Darkened skin from calves to feet b/l, non-blanching                     69 y/o M with h/o developmental disability, CHF, HTN, DM, AF taken off apixaban due to anemia/hematuria, CKD not on HD, refused hemodialysis, BPH who was discharged from [facility] 9/3/20 s/p treated for ESBL UTI and then was readmitted on 09/17/20 for MARSHAL and UTI now comes back again from group home after being here for UTI (discharged 2 days). He had episode of hypothermia and was sent to the ED. Patient reports no urinary symptoms. He is complaining of rectal pain, denies constipation/diarrhea. Denies abdominal pain. Denies chills. Denies leg pain or swelling.    In the ED received merrem IV. ED recommending endocrinology consult for possible adrenal insufficiency. BP on admission was on the softer side being 90 systolic.       1) Hypothermia   - Possible UTI (asymptomatic) vs pneumonia (CXR with mild right infiltrate and +cough)  - Completed  meropenem today 11/11/20  - Guaifenesin added for cough, controlled  - Blood and urine cultures negative  - Seen by endo; ACTH, AM cortisol, and antibodies checked  - Stim test 8/2020 demonstrated appropriate levels of ACTH and appropriate response; ruled out adrenal insufficiency  - TSH mildly elevated, T4 normal. Will trial levothyroxine as per Endo  - Previous admissions was seen by podiatry for dry gangrene of the right foot and was recommended outpatient follow up. Podiatry reconsulted on this admission, no osteo on xray  - Continue to monitor temperature BID at home, warming blanket PRN. Sister reports patient has episodes every few days. Monitored on levothyroxine, appears to have improved. If temp drops below 95F, then contact provider or have patient visit ER.    2) DM  - Accuchecks and ISS  - Diabetic diet    3) A-FIB  - No longer on AC  - Rate and rhythm controlled    4) CHF   - Preserved EF  - Not in exacerbation  - Monitor    5) CKD stage III   - Has refused dialysis in past; currently no need for dialysis  - Stable disease  - Avoid nephrotoxic agents    6) Chronic iron deficiency anemia  - PO iron  - Monitor     7) Constipation  - Miralax and senna added      PHYSICAL EXAM:  GENERAL: NAD, well-groomed, well-developed  HEAD:  Atraumatic, Normocephalic  EYES: EOMI, PERRLA, conjunctiva and sclera clear  ENMT: Moist mucous membranes, Good dentition  NECK: Supple, No JVD appreciated, Normal thyroid  NERVOUS SYSTEM:  Alert & Oriented X3, Good concentration; Bilateral LE mobile, sensation to light touch intact  CHEST/LUNG: Clear to auscultation bilaterally; No rales, rhonchi, wheezing, or rubs  HEART: Regular rate and rhythm; No murmurs, rubs, or gallops  ABDOMEN: Soft, Nontender, Nondistended; Bowel sounds present  EXTREMITIES: diffuse erythema of lower extremities b/l to ankles, wrapped feet  SKIN: Darkened skin from calves to feet b/l, non-blanching                     69 y/o M with h/o developmental disability, CHF, HTN, DM, AF taken off apixaban due to anemia/hematuria, CKD not on HD, refused hemodialysis, BPH who was discharged from [facility] 9/3/20 s/p treated for ESBL UTI and then was readmitted on 09/17/20 for MARSHAL and UTI now comes back again from group home after being here for UTI (discharged 2 days). He had episode of hypothermia and was sent to the ED. Patient reports no urinary symptoms. He is complaining of rectal pain, denies constipation/diarrhea. Denies abdominal pain. Denies chills. Denies leg pain or swelling.    In the ED received merrem IV. ED recommending endocrinology consult for possible adrenal insufficiency. BP on admission was on the softer side being 90 systolic.       1) Hypothermia   - Possible UTI (asymptomatic) vs pneumonia (CXR with mild right infiltrate and +cough)  - Completed  meropenem today 11/11/20  - Guaifenesin added for cough, controlled  - Blood and urine cultures negative  - Seen by endo; ACTH, AM cortisol, and antibodies checked  - Stim test 8/2020 demonstrated appropriate levels of ACTH and appropriate response; ruled out adrenal insufficiency  - TSH mildly elevated, T4 normal. Will trial levothyroxine as per Endo  - Previous admissions was seen by podiatry for dry gangrene of the right foot and was recommended outpatient follow up. Podiatry reconsulted on this admission, no osteo on xray  - Continue to monitor temperature BID at home, apply blankets as needed. Sister reports patient has episodes every few days. Monitored on levothyroxine, appears to have improved. If temp drops below 95F, then contact provider or have patient visit ER.    2) DM  - Accuchecks and ISS  - Diabetic diet    3) A-FIB  - No longer on AC  - Rate and rhythm controlled    4) CHF   - Preserved EF  - Not in exacerbation  - Monitor    5) CKD stage III   - Has refused dialysis in past; currently no need for dialysis  - Stable disease  - Avoid nephrotoxic agents    6) Chronic iron deficiency anemia  - PO iron  - Monitor     7) Constipation  - Miralax and senna added      PHYSICAL EXAM:  GENERAL: NAD, well-groomed, well-developed  HEAD:  Atraumatic, Normocephalic  EYES: EOMI, PERRLA, conjunctiva and sclera clear  ENMT: Moist mucous membranes, Good dentition  NECK: Supple, No JVD appreciated, Normal thyroid  NERVOUS SYSTEM:  Alert & Oriented X3, Good concentration; Bilateral LE mobile, sensation to light touch intact  CHEST/LUNG: Clear to auscultation bilaterally; No rales, rhonchi, wheezing, or rubs  HEART: Regular rate and rhythm; No murmurs, rubs, or gallops  ABDOMEN: Soft, Nontender, Nondistended; Bowel sounds present  EXTREMITIES: diffuse erythema of lower extremities b/l to ankles, wrapped feet  SKIN: Darkened skin from calves to feet b/l, non-blanching                     69 y/o M with h/o developmental disability, CHF, HTN, DM, AF taken off apixaban due to anemia/hematuria, CKD not on HD, refused hemodialysis, BPH who was discharged from [facility] 9/3/20 s/p treated for ESBL UTI and then was readmitted on 09/17/20 for MARSHAL and UTI now comes back again from group home after being here for UTI (discharged 2 days). He had episode of hypothermia and was sent to the ED. Patient reports no urinary symptoms. He is complaining of rectal pain, denies constipation/diarrhea. Denies abdominal pain. Denies chills. Denies leg pain or swelling.    In the ED received merrem IV. ED recommending endocrinology consult for possible adrenal insufficiency. BP on admission was on the softer side being 90 systolic.       1) Hypothermia   - Possible UTI (asymptomatic) vs pneumonia (CXR with mild right infiltrate and +cough)  - Completed  meropenem today 11/11/20  - Guaifenesin added for cough, controlled  - Blood and urine cultures negative  - Seen by endo; ACTH, AM cortisol, and antibodies checked  - Stim test 8/2020 demonstrated appropriate levels of ACTH and appropriate response; ruled out adrenal insufficiency  - TSH mildly elevated, T4 normal. Will trial levothyroxine as per Endo  - Previous admissions was seen by podiatry for dry gangrene of the right foot and was recommended outpatient follow up. Podiatry reconsulted on this admission, no osteo on xray  - Continue to monitor temperature BID at home, apply blankets as needed. Sister reports patient has episodes every few days. Monitored on levothyroxine, appears to have improved. If temp drops below 95F, then contact provider or have patient visit ER.    2) DM  - Accuchecks and ISS  - Diabetic diet    3) A-FIB  - No longer on anticoagulation  - Rate and rhythm controlled    4) CHF   - Preserved EF  - Not in exacerbation  - Monitor    5) CKD stage III   - Has refused dialysis in past; currently no need for dialysis  - Stable disease  - Avoid nephrotoxic agents    6) Chronic iron deficiency anemia  - PO iron    7) Constipation  - Miralax and senna as needed    8) wound care  - patient has lymphedema of legs bilaterally  - continue betadine on foot wounds, patient will need ongoing wound care  - follow up with podiatry  - nystatin powder to buttocks 2x a day as needed until rash clears        PHYSICAL EXAM:  GENERAL: NAD, well-groomed, well-developed  HEAD:  Atraumatic, Normocephalic  EYES: EOMI, PERRLA, conjunctiva and sclera clear  ENMT: Moist mucous membranes, Good dentition  NECK: Supple, No JVD appreciated, Normal thyroid  NERVOUS SYSTEM:  Alert & Oriented X3, Good concentration; Bilateral LE mobile, sensation to light touch intact  CHEST/LUNG: Clear to auscultation bilaterally; No rales, rhonchi, wheezing, or rubs  HEART: Regular rate and rhythm; No murmurs, rubs, or gallops  ABDOMEN: Soft, Nontender, Nondistended; Bowel sounds present  EXTREMITIES: diffuse erythema of lower extremities b/l to ankles, wrapped feet  SKIN: Darkened skin from calves to feet b/l, non-blanching                     69 y/o M with h/o developmental disability, CHF, HTN, DM, AF taken off apixaban due to anemia/hematuria, CKD not on HD, refused hemodialysis, BPH who was discharged from [facility] 9/3/20 s/p treated for ESBL UTI and then was readmitted on 09/17/20 for MARSHAL and UTI now comes back again from group home after being here for UTI (discharged 2 days). He had episode of hypothermia and was sent to the ED. Patient reports no urinary symptoms. He is complaining of rectal pain, denies constipation/diarrhea. Denies abdominal pain. Denies chills. Denies leg pain or swelling.    In the ED received merrem IV. ED recommending endocrinology consult for possible adrenal insufficiency. BP on admission was on the softer side being 90 systolic. Endocrinology consulted, mild hypothyroid and started levothyroxine.       1) Hypothermia   - Possible UTI (asymptomatic) vs pneumonia (CXR with mild right infiltrate and +cough)  - Completed  meropenem today 11/11/20  - Guaifenesin added for cough, controlled  - Blood and urine cultures negative  - Seen by endo; ACTH, AM cortisol, and antibodies checked  - Stim test 8/2020 demonstrated appropriate levels of ACTH and appropriate response; ruled out adrenal insufficiency  - TSH mildly elevated, T4 normal. Will trial levothyroxine as per Endo. Patient will need followup in 6 weeks with endocrinology clinic.  - Previous admissions was seen by podiatry for dry gangrene of the right foot and was recommended outpatient follow up. Podiatry reconsulted on this admission, no osteo on xray  - Sister reports patient has episodes in evenings. Monitored on levothyroxine, appears to have improved.   - Continue to monitor temperature BID at home, apply blankets as needed. If temp drops below 95F and remains <95F, then contact provider or have patient visit ER.    2) DM  - Accuchecks before breakfast. Call RN for sugar <60 and for >300  - Diabetic diet    3) A-FIB  - No longer on anticoagulation  - Rate and rhythm controlled    4) CHF   - Preserved EF  - Not in exacerbation  - Monitor    5) CKD stage III   - Has refused dialysis in past; currently no need for dialysis  - Stable disease  - Avoid nephrotoxic agents    6) Chronic iron deficiency anemia  - PO iron    7) Constipation  - Miralax daily as needed for no BM after 1 day, and discontinue for loose stool    8) wound care  - patient has lymphedema of legs bilaterally. Hold lymphedema pump to legs  - continue betadine on foot wounds, patient will need ongoing wound care  - follow up with podiatry  - nystatin powder to groin and abdominal fold 2x a day as needed until redness clears    9) Cough  - guaifenesin as needed  - continue home nasal saline and flonase         PHYSICAL EXAM:  GENERAL: NAD, well-groomed, well-developed  HEAD:  Atraumatic, Normocephalic  EYES: EOMI, PERRLA, conjunctiva and sclera clear  ENMT: Moist mucous membranes, Good dentition  NECK: Supple, No JVD appreciated, Normal thyroid  NERVOUS SYSTEM:  Alert & Oriented X3, Good concentration; Bilateral LE mobile, sensation to light touch intact  CHEST/LUNG: Clear to auscultation bilaterally; No rales, rhonchi, wheezing, or rubs  HEART: Regular rate and rhythm; No murmurs, rubs, or gallops  ABDOMEN: Soft, Nontender, Nondistended; Bowel sounds present  EXTREMITIES: diffuse erythema of lower extremities b/l to ankles, wrapped feet  SKIN: Darkened skin from calves to feet b/l, non-blanching                     69 y/o M with h/o developmental disability, CHF, HTN, DM, AF taken off apixaban due to anemia/hematuria, CKD not on HD, refused hemodialysis, BPH who was discharged from [facility] 9/3/20 s/p treated for ESBL UTI and then was readmitted on 09/17/20 for MARSHAL and UTI now comes back again from group home after being here for UTI (discharged 2 days). He had episode of hypothermia and was sent to the ED. Patient reports no urinary symptoms. He is complaining of rectal pain, denies constipation/diarrhea. Denies abdominal pain. Denies chills. Denies leg pain or swelling.    In the ED received merrem IV. ED recommending endocrinology consult for possible adrenal insufficiency. BP on admission was on the softer side being 90 systolic. Endocrinology consulted, mild hypothyroid and started levothyroxine.       1) Hypothermia   - Possible UTI (asymptomatic) vs pneumonia (CXR with mild right infiltrate and +cough)  - Completed  meropenem today 11/11/20  - Guaifenesin added for cough, controlled  - Blood and urine cultures negative  - Seen by endo; ACTH, AM cortisol, and antibodies checked  - Stim test 8/2020 demonstrated appropriate levels of ACTH and appropriate response; ruled out adrenal insufficiency  - TSH mildly elevated, T4 normal. Will trial levothyroxine as per Endo. Patient will need followup in 6 weeks with endocrinology clinic.  - Previous admissions was seen by podiatry for dry gangrene of the right foot and was recommended outpatient follow up. Podiatry reconsulted on this admission, no osteo on xray  - Sister reports patient has episodes in evenings. Monitored on levothyroxine, appears to have improved.   - Continue to monitor temperature BID at home, apply blankets as needed. If temp drops below 95F and remains <95F, then contact provider or have patient visit ER.    2) DM  - Accuchecks before breakfast. Call RN for sugar <60 and for >300  - Diabetic diet    3) A-FIB  - No longer on anticoagulation  - Rate and rhythm controlled    4) CHF   - Preserved EF  - Not in exacerbation  - Monitor    5) CKD stage III   - Has refused dialysis in past; currently no need for dialysis  - Stable disease  - Avoid nephrotoxic agents    6) Chronic iron deficiency anemia  - PO iron    7) Constipation  - Miralax daily as needed for no BM after 1 day, and discontinue for loose stool    8) wound care  - patient has lymphedema of legs bilaterally. Hold lymphedema pump to legs  - Apply betadine to right dorsal foot wound and plantar left heel wound. apply sterile gauze kerlix and ACE. keep clean dry intact and change every other day.   - follow up with podiatry  - nystatin powder to groin and abdominal fold 2x a day as needed until redness clears    9) Cough  - guaifenesin as needed  - continue home nasal saline and flonase     Vitamins: continue multivitamin daily, continue Caltrate 600 chewable daily    PHYSICAL EXAM:  GENERAL: NAD, well-groomed, well-developed  HEAD:  Atraumatic, Normocephalic  EYES: EOMI, PERRLA, conjunctiva and sclera clear  ENMT: Moist mucous membranes, Good dentition  NECK: Supple, No JVD appreciated, Normal thyroid  NERVOUS SYSTEM:  Alert & Oriented X3, Good concentration; Bilateral LE mobile, sensation to light touch intact  CHEST/LUNG: Clear to auscultation bilaterally; No rales, rhonchi, wheezing, or rubs  HEART: Regular rate and rhythm; No murmurs, rubs, or gallops  ABDOMEN: Soft, Nontender, Nondistended; Bowel sounds present  EXTREMITIES: diffuse erythema of lower extremities b/l to ankles, wrapped feet  SKIN: Darkened skin from calves to feet b/l, non-blanching                     71 y/o M with h/o developmental disability, CHF, HTN, DM, AF taken off apixaban due to anemia/hematuria, CKD not on HD, refused hemodialysis, BPH who was discharged from [facility] 9/3/20 s/p treated for ESBL UTI and then was readmitted on 09/17/20 for MARSHAL and UTI now comes back again from group home after being here for UTI (discharged 2 days). He had episode of hypothermia and was sent to the ED. Patient reports no urinary symptoms. He is complaining of rectal pain, denies constipation/diarrhea. Denies abdominal pain. Denies chills. Denies leg pain or swelling.    In the ED received merrem IV. ED recommending endocrinology consult for possible adrenal insufficiency. BP on admission was on the softer side being 90 systolic. Endocrinology consulted, mild hypothyroid and started levothyroxine.       1) Hypothermia   - Possible UTI (asymptomatic) vs pneumonia (CXR with mild right infiltrate and +cough)  - Completed  meropenem today 11/11/20  - Guaifenesin added for cough, controlled  - Blood and urine cultures negative  - Seen by endo; ACTH, AM cortisol, and antibodies checked  - Stim test 8/2020 demonstrated appropriate levels of ACTH and appropriate response; ruled out adrenal insufficiency  - TSH mildly elevated, T4 normal. Will trial levothyroxine as per Endo. Patient will need followup in 6 weeks with endocrinology clinic.  - Previous admissions was seen by podiatry for dry gangrene of the right foot and was recommended outpatient follow up. Podiatry reconsulted on this admission, no osteo on xray  - Sister reports patient has episodes in evenings. Monitored on levothyroxine, appears to have improved.   - Continue to monitor temperature BID at home, apply blankets as needed. If temp drops below 95F and remains <95F, then contact provider or have patient visit ER.    2) DM  - Accuchecks before breakfast. Call RN for sugar <60 and for >300  - Diabetic diet, limit carbohydrate intake    3) A-FIB  - No longer on anticoagulation  - Rate and rhythm controlled    4) CHF   - Preserved EF  - Not in exacerbation  - Monitor    5) CKD stage III   - Has refused dialysis in past; currently no need for dialysis  - Stable disease  - Avoid nephrotoxic agents    6) Chronic iron deficiency anemia  - PO iron    7) Constipation  - Miralax daily as needed for no BM after 1 day, and discontinue for loose stool    8) wound care  - patient has lymphedema of legs bilaterally. Hold lymphedema pump to legs  - Apply betadine to right dorsal foot wound and plantar left heel wound. apply sterile gauze kerlix and ACE. keep clean dry intact and change every other day.   - follow up with podiatry  - nystatin powder to groin and abdominal fold 2x a day as needed until redness clears    9) Cough  - guaifenesin as needed  - continue home nasal saline and flonase     Vitamins: continue multivitamin daily, continue Caltrate 600 chewable daily    PHYSICAL EXAM:  GENERAL: NAD, well-groomed, well-developed  HEAD:  Atraumatic, Normocephalic  EYES: EOMI, PERRLA, conjunctiva and sclera clear  ENMT: Moist mucous membranes, Good dentition  NECK: Supple, No JVD appreciated, Normal thyroid  NERVOUS SYSTEM:  Alert & Oriented X3, Good concentration; Bilateral LE mobile, sensation to light touch intact  CHEST/LUNG: Clear to auscultation bilaterally; No rales, rhonchi, wheezing, or rubs  HEART: Regular rate and rhythm; No murmurs, rubs, or gallops  ABDOMEN: Soft, Nontender, Nondistended; Bowel sounds present  EXTREMITIES: diffuse erythema of lower extremities b/l to ankles, wrapped feet  SKIN: Darkened skin from calves to feet b/l, non-blanching

## 2020-11-12 NOTE — DISCHARGE NOTE PROVIDER - CARE PROVIDER_API CALL
Segundo Clark  ENDOCRINOLOGY/METAB/DIABETES  [address] [place] [address]  Phone: [phone]  Fax: [phone]  Follow Up Time: 1 month

## 2020-11-12 NOTE — DISCHARGE NOTE PROVIDER - NSDCFUSCHEDAPPT_GEN_ALL_CORE_FT
KIMBERLY MARKS ; 12/10/2020 ; [facility] [address] KIMBERLY MARKS ; 12/10/2020 ; [facility] [address]  KIMBERLY MARKS ; 02/10/2021 ; [facility] Nephro [address] [facility]

## 2020-11-12 NOTE — DISCHARGE NOTE PROVIDER - NSDCMRMEDTOKEN_GEN_ALL_CORE_FT
allopurinol 300 mg oral tablet: 1 tab(s) orally once a day  atorvastatin 20 mg oral tablet: 1 tab(s) orally once a day (at bedtime)  Calcium 600+D oral tablet: 1 tab(s) orally once a day  dutasteride 0.5 mg oral capsule: 1 cap(s) orally once a day  Januvia 100 mg oral tablet: 1 tab(s) orally once a day  midodrine 10 mg oral tablet: 1 tab(s) orally 2 times a day at 0800 and 2000  Multiple Vitamins oral tablet: 1 tab(s) orally once a day  Myrbetriq 50 mg oral tablet, extended release: 1 tab(s) orally once a day  oxybutynin 10 mg/24 hr oral tablet, extended release: 1 tab(s) orally once a day  tamsulosin 0.4 mg oral capsule: 1 cap(s) orally once a day (at bedtime)  Vitamin C 250 mg oral tablet: 1 tab(s) orally once a day  zinc sulfate 220 mg oral capsule: 1 cap(s) orally once a day   allopurinol 300 mg oral tablet: 1 tab(s) orally once a day  atorvastatin 20 mg oral tablet: 1 tab(s) orally once a day (at bedtime)  Calcium 600+D oral tablet: 1 tab(s) orally once a day  dutasteride 0.5 mg oral capsule: 1 cap(s) orally once a day  ferrous sulfate 325 mg (65 mg elemental iron) oral tablet: 1 tab(s) orally 3 times a day  guaiFENesin 600 mg oral tablet, extended release: 1 tab(s) orally every 12 hours, As needed, Cough  Januvia 100 mg oral tablet: 1 tab(s) orally once a day  levothyroxine 25 mcg (0.025 mg) oral tablet: 1 tab(s) orally once a day  midodrine 10 mg oral tablet: 1 tab(s) orally 2 times a day at 0800 and 2000  Multiple Vitamins oral tablet: 1 tab(s) orally once a day  Myrbetriq 50 mg oral tablet, extended release: 1 tab(s) orally once a day  nystatin 100,000 units/g topical powder: Apply topically to buttocks 2 times a day as needed for rash  oxybutynin 10 mg/24 hr oral tablet, extended release: 1 tab(s) orally once a day  polyethylene glycol 3350 oral powder for reconstitution: 17 gram(s) orally once a day, As needed, Constipation  tamsulosin 0.4 mg oral capsule: 1 cap(s) orally once a day (at bedtime)  Vitamin C 250 mg oral tablet: 1 tab(s) orally once a day  zinc sulfate 220 mg oral capsule: 1 cap(s) orally once a day   allopurinol 300 mg oral tablet: 1 tab(s) orally once a day  atorvastatin 20 mg oral tablet: 1 tab(s) orally once a day (at bedtime)  Calcium 600+D oral tablet: 1 tab(s) orally once a day  dutasteride 0.5 mg oral capsule: 1 cap(s) orally once a day  ferrous sulfate 325 mg (65 mg elemental iron) oral tablet: 1 tab(s) orally 3 times a day  guaiFENesin 600 mg oral tablet, extended release: 1 tab(s) orally every 12 hours, As needed, Cough  Januvia 100 mg oral tablet: 1 tab(s) orally once a day  levothyroxine 25 mcg (0.025 mg) oral tablet: 1 tab(s) orally once a day  Multiple Vitamins oral tablet: 1 tab(s) orally once a day  Myrbetriq 50 mg oral tablet, extended release: 1 tab(s) orally once a day  nystatin 100,000 units/g topical powder: Apply topically to buttocks 2 times a day as needed for rash  oxybutynin 10 mg/24 hr oral tablet, extended release: 1 tab(s) orally once a day  polyethylene glycol 3350 oral powder for reconstitution: 17 gram(s) orally once a day, As needed, Constipation  tamsulosin 0.4 mg oral capsule: 1 cap(s) orally once a day (at bedtime)  Vitamin C 250 mg oral tablet: 1 tab(s) orally once a day  zinc sulfate 220 mg oral capsule: 1 cap(s) orally once a day   allopurinol 300 mg oral tablet: 1 tab(s) orally once a day  atorvastatin 20 mg oral tablet: 1 tab(s) orally once a day (at bedtime)  Caltrate 600+D oral tablet, chewable: 1 tab(s) chewed once a day   dutasteride 0.5 mg oral capsule: 1 cap(s) orally once a day  ferrous sulfate 325 mg (65 mg elemental iron) oral tablet: 1 tab(s) orally 3 times a day  Januvia 100 mg oral tablet: 1 tab(s) orally once a day  levothyroxine 25 mcg (0.025 mg) oral tablet: 1 tab(s) orally once a day  Multiple Vitamins oral tablet: 1 tab(s) orally once a day  nystatin 100,000 units/g topical powder: Apply topically to groin and abdominal fold 2 times a day as needed for redness  oxybutynin 10 mg/24 hr oral tablet, extended release: 1 tab(s) orally once a day  polyethylene glycol 3350 oral powder for reconstitution: 17 gram(s) orally once a day, As needed for  Constipation if no BM in one day, discontinue for loose stool  tamsulosin 0.4 mg oral capsule: 1 cap(s) orally once a day (at bedtime)  Vitamin C 250 mg oral tablet: 1 tab(s) orally once a day  zinc sulfate 220 mg oral capsule: 1 cap(s) orally once a day

## 2020-11-12 NOTE — DISCHARGE NOTE NURSING/CASE MANAGEMENT/SOCIAL WORK - NSFLUVACAGEDISCH_IMM_ALL_CORE
Patient Education     Viral Pharyngitis (Sore Throat)    You or your child have pharyngitis (sore throat). This infection is caused by a virus. It can cause throat pain that is worse when swallowing, aching all over, headache, and fever. The infection may be spread by coughing, kissing, or touching others after touching your mouth or nose. Antibiotic medicines do not work against viruses. They are not used for treating this illness.  Home care  · If symptoms are severe, you or your child should rest at home. Return to work or school when you or your child feel well enough.   · You or your child should drink plenty of fluids to prevent dehydration.  · Use throat lozenges or numbing throat sprays to help reduce pain. Gargling with warm salt water will also help reduce throat pain. Dissolve 1/2 teaspoon of salt in 1 glass of warm water. Children can sip on juice or a popsicle. Children 5 years and older can also suck on a lollipop or hard candy.  · Don’t eat salty or spicy foods or give them to your child. These can be irritating to the throat.  Medicines for a child: You can give your child acetaminophen for fever, fussiness, or discomfort. In babies over 6 months of age, you may use ibuprofen instead of acetaminophen. If your child has chronic liver or kidney disease or ever had a stomach ulcer or GI bleeding, talk with your child’s healthcare provider before giving these medicines. Aspirin should never be used by any child under 18 years of age who has a fever. It may cause severe liver damage.  Medicines for an adult: You may use acetaminophen or ibuprofen to control pain or fever, unless another medicine was prescribed for this. If you have chronic liver or kidney disease or ever had a stomach ulcer or GI bleeding, talk with your healthcare provider before using these medicines.  Follow-up care  Follow up with a healthcare provider or our staff if you or your child are not getting better over the next  week.  When to seek medical advice  Call your healthcare provider right away if any of these occur:  · Fever as directed by your healthcare provider.  For children, seek care if:  ? Your child is of any age and has repeated fevers above 104°F (40°C).  ? Your child is younger than 2 years of age and has a fever of 100.4°F (38°C) for more than 1 day.  ? Your child is 2 years old or older and has a fever of 100.4°F (38°C) for more than 3 days.  · New or worsening ear pain, sinus pain, or headache  · Painful lumps in the back of neck  · Stiff neck  · Lymph nodes are getting larger  · Can’t swallow liquids, a lot of drooling, or can’t open mouth wide due to throat pain  · Signs of dehydration, such as very dark urine or no urine, sunken eyes, dizziness  · Trouble breathing or noisy breathing  · Muffled voice  · New rash  · Other symptoms are getting worse  Date Last Reviewed: 10/1/2017  © 6792-2356 [employer]. [address] [place] [address]. All rights reserved. This information is not intended as a substitute for professional medical care. Always follow your healthcare professional's instructions.           Patient Education     Psoriasis  Psoriasis is an inflammatory condition that affects the skin and nails. You may have patches of thick, red skin (plaques) covered with silvery scales. These often appear on the elbows, knees, legs, lower back, and scalp.  The plaques itch and can be painful. People with this condition are more likely to have emotional stress and depression.  Psoriasis is not contagious. It can’t spread to someone else who touches it. But it can be inherited. It is an autoimmune skin disease. This means that the immune system has an abnormal reaction. It treats healthy skin like it is a foreign substance. This causes skin cells to grow faster than normal and to stack up in raised red patches. Psoriasis is a long-term (chronic) disease. You will have flare-ups that come and go  over time.  Smoking, sun exposure, and alcohol use may affect how often the psoriasis occurs and how long the flare-ups last.  There is no cure, but treatments can offer relief. Treatment can include topical creams, light therapy (phototherapy), and oral or injectable medicines.  Home care  · No specific diet is needed. Eat a healthy, well-balanced diet that includes fresh fruits and vegetables, whole grains, and lean meats. Psoriasis can increase your risk for diabetes and heart disease.  · Increasing omega-3 fatty acids in your diet can help improve dry skin. The best dietary sources are fatty fish (salmon, mackerel, lake trout, albacore tuna) or fish oil (such as cod liver oil). A great way to take fish oil is to add it to a juice, shake, or smoothie. Flaxseeds and flaxseed oil, canola oil, walnuts, soybean, and tofu are converted to omega-3 fatty acid in the body.  · Stay at a healthy weight. Overlapping skin folds can be a site for psoriasis plaques. If you are overweight, talk to your healthcare provider about a weight-loss program.  · Bathing daily can help remove scales and calm inflamed skin. Use lukewarm water and mild soaps that have added oils, fats, and moisturizers. Avoid deodorants, antiperspirants, and antibacterial soaps. These have a drying effect. Many people find it helpful to soak in a tub with added bath oils, oatmeal, apple cider vinegar, or Epsom salts.  · After bathing, put on skin cream (or a skin oil for a stronger effect).  · Some exposure to UV rays from the sun can improve psoriasis. But too much sun can trigger an outbreak. It also raises your risk for skin cancer. Limit sun exposure and use sunscreen on healthy skin (at least 15 SPF).  · If you are prescribed medicine, take it as directed.  · Unless another steroid cream was prescribed, you may use over-the-counter hydrocortisone cream for a few weeks during symptom flare-ups.  · Stop smoking. If you are a long-time smoker, this can  be hard. Think about joining a stop-smoking program.  · Tell your provider if your joints start to ache or get stiff.  · Tell your provider if you notice changes in your fingernails.  · Depression is more common among psoriasis patients. Get help if you notice changes in your mood.  Follow-up care  Follow up with your healthcare provider, or as advised.  When to seek medical advice  Call your healthcare provider right away if any of these occur:  · Skin pain gets worse  · Bleeding from the skin plaques that is hard to control  · Signs of skin infection (redness, increasing pain, swelling, pus)  · Fever of 1 degree, or higher, above your normal temperature, or as directed by your provider  Date Last Reviewed: 8/1/2016  © 8850-6809 [employer], [employer]. [address] [place] [address], [place] [address]. All rights reserved. This information is not intended as a substitute for professional medical care. Always follow your healthcare professional's instructions.            Adult

## 2020-11-12 NOTE — PROGRESS NOTE ADULT - ASSESSMENT
70 year old male with developmental disability, CKD, Type 2 DM, HTN, Afib admitted with hypothermia in setting of UTI.  He had a normal cosyntropin stim test earlier this year and a morning cortisol of 22, thereby ruling out adrenal insufficiency.  He does have evidence of mild hypothyroidism in setting of elevated TPO antibody c/w Hashimoto's Thyroiditis.          1.  Hypothermia-  likely multifactorial.  No evidence of AI, but there is mild primary hypothyroidism (although one would not expect hypothermia from such a mild degree).    No active infections.  Temp appears to have improved.  Safe for discharge from endocrine perspective at this time.    2.  Hypothyroidism-  continue LT4 25 mcg daily.  Follow up with us in 4-6 weeks and will need repeat TFTs at that time.    3.  Type 2 DM-   Well controlled on sliding scale Lispro.  Can return to [facility] as outpatient.

## 2020-11-12 NOTE — PROGRESS NOTE ADULT - SUBJECTIVE AND OBJECTIVE BOX
CC:  follow up hypothyroidism    Interval HPI:  Patient denies fatigue or cold intolerance.   Temp has been maintaining today without warming blanket,  Getting discharged to Group home today.    MEDICATIONS  (STANDING):  acetaminophen   Tablet .. 650 milliGRAM(s) Oral once  allopurinol 300 milliGRAM(s) Oral daily  atorvastatin 20 milliGRAM(s) Oral at bedtime  dextrose 5%. 1000 milliLiter(s) (50 mL/Hr) IV Continuous <Continuous>  dextrose 50% Injectable 12.5 Gram(s) IV Push once  dextrose 50% Injectable 25 Gram(s) IV Push once  dextrose 50% Injectable 25 Gram(s) IV Push once  ferrous    sulfate 325 milliGRAM(s) Oral three times a day  finasteride 5 milliGRAM(s) Oral daily  insulin lispro (ADMELOG) corrective regimen sliding scale   SubCutaneous three times a day before meals  insulin lispro (ADMELOG) corrective regimen sliding scale   SubCutaneous at bedtime  levothyroxine 25 MICROGram(s) Oral daily  multivitamin 1 Tablet(s) Oral daily  nystatin Powder 1 Application(s) Topical three times a day  oxybutynin 10 milliGRAM(s) Oral daily  saccharomyces boulardii 250 milliGRAM(s) Oral two times a day  tamsulosin Oral Tab/Cap - Peds 0.4 milliGRAM(s) Oral at bedtime  thiamine 100 milliGRAM(s) Oral daily  zinc sulfate 220 milliGRAM(s) Oral daily    Vital Signs Last 24 Hrs  T(C): 36.4 (12 Nov 2020 16:09), Max: 36.9 (11 Nov 2020 23:25)  T(F): 97.5 (12 Nov 2020 16:09), Max: 98.5 (11 Nov 2020 23:25)  HR: 85 (12 Nov 2020 16:09) (70 - 85)  BP: 118/70 (12 Nov 2020 16:09) (102/72 - 121/75)  RR: 18 (12 Nov 2020 16:09) (16 - 18)  SpO2: 97% (12 Nov 2020 16:09) (95% - 97%)    PE:  Gen: AAO, NAD  HEENT:  sclera anicteric, MMM  Neck:  no thyromegaly, no LAD  CV:  nl S1 + S2, RRR, no m/r/g  Resp:  nl respiratory effort, CTA b/l  GI/ Abd: soft, NT/ ND, BS +  Neuro:  No tremor  MS: decrease muscle tone in b/l LEs  Skin:  stasis changes in b/l LEs  Psych: affect appropriate    LABS:  11-12    141  |  108<H>  |  44.0<H>  ----------------------------<  118<H>  4.7   |  22.0  |  1.85<H>    Ca    8.6      12 Nov 2020 07:39  Mg     1.9     11-12                          8.1    6.33  )-----------( 145      ( 12 Nov 2020 07:39 )             26.7     CAPILLARY BLOOD GLUCOSE  POCT Blood Glucose.: 195 mg/dL (12 Nov 2020 12:33)  POCT Blood Glucose.: 133 mg/dL (12 Nov 2020 08:40)  POCT Blood Glucose.: 112 mg/dL (11 Nov 2020 23:20)  POCT Blood Glucose.: 141 mg/dL (11 Nov 2020 17:35)

## 2020-11-12 NOTE — DISCHARGE NOTE PROVIDER - NSDCCPCAREPLAN_GEN_ALL_CORE_FT
PRINCIPAL DISCHARGE DIAGNOSIS  Diagnosis: Hypothermia  Assessment and Plan of Treatment: likely due to hypothyroidism. Patient should check temperature twice a day until seen by primary care provider. If temp drops below 95F, then contact provider or have patient visit ER.      SECONDARY DISCHARGE DIAGNOSES  Diagnosis: CKD (chronic kidney disease)  Assessment and Plan of Treatment: Follow up with primary care provider     PRINCIPAL DISCHARGE DIAGNOSIS  Diagnosis: Hypothermia  Assessment and Plan of Treatment: likely due to hypothyroidism. Patient should check temperature twice a day until seen by primary care provider. Use blankets if temperature low. If temp drops below 95F and does not improve with blankets, then contact provider or have patient visit ER.      SECONDARY DISCHARGE DIAGNOSES  Diagnosis: CKD (chronic kidney disease)  Assessment and Plan of Treatment: Follow up with primary care provider

## 2020-11-14 NOTE — H&P ADULT - HISTORY OF PRESENT ILLNESS
Patient is a 71 y/o M with h/o developmental disability, hypothyroidism, CHF, HTN, DM, AF taken off apixaban due to anemia/hematuria, CKD not on HD, refused hemodialysis, BPH, stim test in 8/2020 and recurrent ESBL UTI's presents from group home with hypothermia. Patient was treated with a full course of meropenem and seen by endocrine and started on levothyroixine. Patient now returns with temperature of 93 and worked up in the er and found to have a uti Patient seen at bedside with family member and also complains of cough.  Patient denies any other complaints. Patients family member is upset about constantly being in the hospital

## 2020-11-14 NOTE — ED PROVIDER NOTE - NS ED ROS FT
CONSTITUTIONAL: No fevers, no chills  Eyes: No vision changes  Cardiovascular: No Chest pain  Respiratory: No SOB  Gastrointestinal: No n/v/c/d, no abd pain  Genitourinary: no dysuria, no hematuria  SKIN: no rashes.  MSK: no weakness, no myalgias, no arthralgias  NEURO: no headache, no weakness, no numbness  PSYCHIATRIC: no SI/HI

## 2020-11-14 NOTE — ED PROVIDER NOTE - ATTENDING CONTRIBUTION TO CARE
71 y/o M with h/o developmental disability, CHF, HTN, DM, Atrial Fibrillation taken off apixaban due to anemia/hematuria, CKD not on HD, refused hemodialysis, BPH who was discharged from [facility] 3 days ago now p/w hypothermia.  patient's inpatient w/up for hypothermia determined to likely be 2/2 infection (UTI +/- PNA).  Seen by endo; ACTH, AM cortisol, and antibodies checked.  Stim test 8/2020 demonstrated appropriate levels of ACTH and appropriate response; ruled out adrenal insufficiency.  Found to have mildly hypothyroid and started on Levothyroxine.  patient discharged back to his facility. found to be hypothermic today.  denies any complaints. denies cp/sob/palp.  c/o chills. denies dysuria. denies cough.  General:     NAD, well-nourished, well-appearing  Head:     NC/AT, EOMI, oral mucosa moist  Neck:     trachea midline  Lungs:    L base crackles  CVS:     S1S2, RRR, no m/g/r  Abd:     +BS, s/nt/nd, no organomegaly  Ext:    2+ radial and pedal pulses, 2+ bilateral pedal edema, chronic venous status ulcers  Neuro: grossly intact  A/P:  70yoM p/w hypothermia  -labs, xray, idris hugger, warm fluids, re-eval, contact endo

## 2020-11-14 NOTE — H&P ADULT - NSHPREVIEWOFSYSTEMS_GEN_ALL_CORE
REVIEW OF SYSTEMS:    CONSTITUTIONAL: hypothermia  EYES: No eye pain, visual disturbances, or discharge  ENMT:  No difficulty hearing, tinnitus, vertigo; No sinus or throat pain  NECK: No pain or stiffness  BREASTS: No pain, masses, or nipple discharge  RESPIRATORY: No cough, wheezing, chills or hemoptysis; No shortness of breath  CARDIOVASCULAR: No chest pain, palpitations, dizziness, or leg swelling  GASTROINTESTINAL: No abdominal or epigastric pain. No nausea, vomiting, or hematemesis; No diarrhea or constipation. No melena or hematochezia.  GENITOURINARY: No dysuria, frequency, hematuria, or incontinence  NEUROLOGICAL: No headaches, memory loss, loss of strength, numbness, or tremors  SKIN: No itching, burning, rashes, or lesions   LYMPH NODES: No enlarged glands  ENDOCRINE: No heat or cold intolerance; No hair loss  MUSCULOSKELETAL: No joint pain or swelling; No muscle, back, or extremity pain  PSYCHIATRIC: No depression, anxiety, mood swings, or difficulty sleeping  HEME/LYMPH: No easy bruising, or bleeding gums  ALLERY AND IMMUNOLOGIC: No hives or eczema

## 2020-11-14 NOTE — ED ADULT TRIAGE NOTE - CHIEF COMPLAINT QUOTE
Pt brought in from [address] [facility] with c/o hypothermia. Pt has hx of hypothermia and was here recently for it. Pt offers no complaints. UTO temp in triage moved to Critical care for rectal temp.

## 2020-11-14 NOTE — ED PROVIDER NOTE - OBJECTIVE STATEMENT
Pt is a 71 y/o M w/PMHx developmental disability, CHF, HTN, DM, AF taken off apixaban due to anemia/hematuria, CKD not on HD, refused hemodialysis, BPH presents c/o of hypothermia.  Pt has no acute concerns, states he otherwise feels fine.  Pt was recently admitted for similar presentation with findings of mild hypothyroidism, started on Levothyroxine.  Adrenal insufficiency was ruled out by endo, pt previously had ESBL treated with Meropenem.   Pt denies fever, headache, chest pain, shortness of breath, abdominal pain, endorses chronic foot wounds.

## 2020-11-14 NOTE — ED PROVIDER NOTE - PHYSICAL EXAMINATION
General: well appearing, interactive, well nourished, NAD  HEENT: pupils equal and reactive, normal external ears bilaterally   Cardiac: RRR, no MRG appreciated  Resp: lungs clear to auscultation bilaterally, symmetric chest wall rise  Abd: soft, nontender, nondistended,   : no CVA tenderness  Neuro: Moving all extremities  Skin: General: well appearing, interactive, well nourished, NAD  HEENT: pupils equal and reactive, normal external ears bilaterally   Cardiac: RRR, no MRG appreciated  Resp: lungs clear to auscultation bilaterally, symmetric chest wall rise  Abd: soft, nontender, nondistended,   : no CVA tenderness  Neuro: Moving all extremities  Skin: rash across lower abdomen, healing wound at base of right great toe.

## 2020-11-14 NOTE — H&P ADULT - NSHPLABSRESULTS_GEN_ALL_CORE
8.8    5.65   )----------(  168       ( 2020 14:44 )               29.5      141    |  106    |  45.0   ----------------------------<  154        ( 2020 14:44 )  4.4     |  25.0   |  1.83     Ca    8.7        ( 2020 14:44 )    TPro  6.3    /  Alb  2.7    /  TBili  0.4    /  DBili  x      /  AST  42     /  ALT  29     /  AlkPhos  135    ( 2020 14:44 )    LIVER FUNCTIONS - ( 2020 14:44 )  Alb: 2.7 g/dL / Pro: 6.3 g/dL / ALK PHOS: 135 U/L / ALT: 29 U/L / AST: 42 U/L / GGT: x               CAPILLARY BLOOD GLUCOSE        Urinalysis Basic - ( 2020 16:26 )    Color: Yellow / Appearance: Turbid / S.015 / pH: x  Gluc: x / Ketone: Negative  / Bili: Negative / Urobili: Negative   Blood: x / Protein: 100 / Nitrite: Negative   Leuk Esterase: Moderate / RBC: 6-10 /HPF / WBC >50   Sq Epi: x / Non Sq Epi: Occasional / Bacteria: Few

## 2020-11-14 NOTE — ED ADULT NURSE NOTE - OBJECTIVE STATEMENT
pt alert and oriented x4 came in c/o low temperatures. family at bedside states this has been happenign 4 or5 times since pt has covid in august. pt denies pain states he feels fine otherwise. pt reports he usually ambulates at group home but due to increased hospitalizations he hasn't been able to stand up. pt educated on plan of care, pt able to successfully teach back plan of care to RN, RN will continue to reeducate pt during hospital stay.

## 2020-11-14 NOTE — H&P ADULT - NSHPPHYSICALEXAM_GEN_ALL_CORE
GENERAL: NAD, well-groomed, pleasant   HEAD:  Atraumatic, Normocephalic  EYES: EOMI, PERRLA, conjunctiva and sclera clear  ENMT: Moist mucous membranes, Good dentition  NECK: Supple, No JVD appreciated, Normal thyroid  NERVOUS SYSTEM:  Alert & Oriented X3, Good concentration; Bilateral LE mobile, sensation to light touch intact  CHEST/LUNG: Clear to auscultation bilaterally; No rales, rhonchi, wheezing, or rubs  HEART: Regular rate and rhythm; No murmurs, rubs, or gallops  ABDOMEN: Soft, Nontender, Nondistended; Bowel sounds present  EXTREMITIES: diffuse erythema of lower extremities b/l to ankles, wrapped feet,   SKIN: Darkened skin from calves to feet b/l, non-blanching

## 2020-11-14 NOTE — H&P ADULT - ASSESSMENT
#hypothermia - admit to any bed  --> doesnt appear toxic  --> cultures sent  --> start iv meropenem   --> follow up cultures  --> c.w hypothyroid  --> consider endocrine consult in am     #uti - follow up cultures    #dm2 - ssi   lantus     #ckd - at baseline  avoid nephrotxocia gents    #chronic anemia - iron pills    #dvt prop - lovenox sub q    #diet- consistent carbs

## 2020-11-15 NOTE — CONSULT NOTE ADULT - SUBJECTIVE AND OBJECTIVE BOX
[place] [facility]                                                               Office:  [address] [address]                                                              Telephone: [phone]. Fax:[phone]                                                                        CARDIOLOGY CONSULTATION NOTE                                                                                             Consult requested by:    Reason for Consultation:   History obtained by: Patient and medical record   obtained: No    Chief complaint:    Patient is a 70y old  Male who presents with a chief complaint of recurrent hypothermia (15 Nov 2020 15:32)        HPI:  Patient is a 71 y/o M with h/o developmental disability, hypothyroidism, CHF, HTN, DM, AF taken off apixaban due to anemia/hematuria, CKD not on HD, refused hemodialysis, BPH, stim test in 2020 and recurrent ESBL UTI's presents from group home with hypothermia. Patient was treated with a full course of meropenem and seen by endocrine and started on levothyroixine. Patient now returns with temperature of 93 and worked up in the er and found to have a uti Patient seen at bedside with family member and also complains of cough.  Patient denies any other complaints. Patients family member is upset about constantly being in the hospital (2020 18:15)        REVIEW OF SYMPTOMS:     CONSTITUTIONAL: No fever, weight loss, or fatigue  ENMT:  No difficulty hearing, tinnitus, vertigo; No sinus or throat pain  NECK: No pain or stiffness  CARDIOVASCULAR: No chest pain, dyspnea, syncope, palpitations, dizziness, Orthopnea, Paroxsymal nocturnal dyspnea  RESPIRATORY: No Dyspnea on exertion, Shortness of breath, cough, wheezing  : No dysuria, no hematuria   GI: No dark color stool, no melena, no diarrhea, no constipation, no abdominal pain   NEURO: No headache, no dizziness, no slurred speech   MUSCULOSKELETAL: No joint pain or swelling; No muscle, back, or extremity pain  PSYCH: No agitation, no anxiety.    ALL OTHER REVIEW OF SYSTEMS ARE NEGATIVE.      PREVIOUS DIAGNOSTIC TESTING  ECHO FINDINGS:      STRESS FINDINGS:      CATHETERIZATION FINDINGS:         ALLERGIES: Allergies    No Known Allergies    Intolerances          PAST MEDICAL HISTORY  Encounter for blood transfusion    Anemia    History of gross hematuria    History of orthostatic hypotension    Bacterial pneumonia    Muscle weakness (generalized)    2019 novel coronavirus disease (COVID-19)    Falls    Heart failure    Afib    CHF (congestive heart failure)    Gout    Hiatal hernia    BPH (benign prostatic hyperplasia)    Cognitive impairment    Cardiomyopathy    HTN (hypertension)    DM (diabetes mellitus)        PAST SURGICAL HISTORY  S/P appendectomy    No significant past surgical history        FAMILY HISTORY:      SOCIAL HISTORY:  Denies smoking/alcohol/drugs  CIGARETTES:     ALCOHOL:  DRUGS:      CURRENT MEDICATIONS:  furosemide   Injectable 40 milliGRAM(s) IV Push two times a day  tamsulosin 0.4 milliGRAM(s) Oral at bedtime     allopurinol  atorvastatin  calcium carbonate 1250 mG  + Vitamin D (OsCal 500 + D)  dextrose 40% Gel  dextrose 5%.  dextrose 5%.  dextrose 50% Injectable  dextrose 50% Injectable  dextrose 50% Injectable  ferrous    sulfate  finasteride  glucagon  Injectable  heparin   Injectable  insulin glargine Injectable (LANTUS)  insulin lispro (ADMELOG) corrective regimen sliding scale  levothyroxine  meropenem  IVPB  meropenem  IVPB  multivitamin  oxybutynin  povidone iodine 10% Solution        HOME MEDICATIONS:      Vital Signs Last 24 Hrs  T(C): 35.7 (15 Nov 2020 15:28), Max: 36.8 (2020 23:37)  T(F): 96.3 (15 Nov 2020 15:28), Max: 98.2 (2020 23:37)  HR: 63 (15 Nov 2020 15:28) (63 - 87)  BP: 106/59 (15 Nov 2020 15:28) (106/59 - 115/69)  BP(mean): --  RR: 12 (15 Nov 2020 15:28) (12 - 18)  SpO2: 98% (15 Nov 2020 10:40) (97% - 98%)      PHYSICAL EXAM:  Constitutional: Comfortable . No acute distress.   HEENT: Atraumatic and normocephalic , neck is supple . no JVD. No carotid bruit. PEERL   CNS: A&Ox3. No focal deficits. EOMI. Cranial nerves II-IX are intact.   Lymph Nodes: Cervical : Not palpable.  Respiratory: CTAB  Cardiovascular: S1S2 RRR. No murmur/rubs or gallop.  Gastrointestinal: Soft non-tender and non distended . +Bowel sounds. negative Hernandez's sign.  Extremities: No edema.   Psychiatric: Calm . no agitation.  Skin: No skin rash/ulcers visualized to face, hands or feet.    Intake and output:    @ 07:01  -  11-15 @ 07:00  --------------------------------------------------------  IN: 0 mL / OUT: 1000 mL / NET: -1000 mL        LABS:                        7.5    4.81  )-----------( 162      ( 15 Nov 2020 06:34 )             25.1     11-15    142  |  110<H>  |  42.0<H>  ----------------------------<  120<H>  4.4   |  22.0  |  1.75<H>    Ca    8.4<L>      15 Nov 2020 06:34  Mg     1.7     11-15    TPro  5.8<L>  /  Alb  2.5<L>  /  TBili  0.4  /  DBili  x   /  AST  32  /  ALT  24  /  AlkPhos  108  11-15      ;p-BNP=    Urinalysis Basic - ( 2020 16:26 )    Color: Yellow / Appearance: Turbid / S.015 / pH: x  Gluc: x / Ketone: Negative  / Bili: Negative / Urobili: Negative   Blood: x / Protein: 100 / Nitrite: Negative   Leuk Esterase: Moderate / RBC: 6-10 /HPF / WBC >50   Sq Epi: x / Non Sq Epi: Occasional / Bacteria: Few        INTERPRETATION OF TELEMETRY: Reviewed by me.   ECG: Reviewed by me.     RADIOLOGY & ADDITIONAL STUDIES:    X-ray:  reviewed by me.   CT scan:   MRI:      [place] [facility]                                                               Office:  [address] [address]                                                              Telephone: [phone]. Fax:[phone]                                                                        CARDIOLOGY CONSULTATION NOTE                                                                                             Consult requested by:    Reason for Consultation:   History obtained by: Patient and medical record   obtained: No    Chief complaint:    Patient is a 70y old  Male who presents with a chief complaint of recurrent hypothermia (15 Nov 2020 15:32)        HPI:  Patient is a 69 y/o M with h/o developmental disability, hypothyroidism, CHF, HTN, DM, AF taken off apixaban due to anemia/hematuria, CKD not on HD, refused hemodialysis, BPH, stim test in 2020 and recurrent ESBL UTI's presents from group home with hypothermia. Patient was treated with a full course of meropenem and seen by endocrine and started on levothyroixine. Patient now returns with temperature of 93 and worked up in the er and found to have a uti Patient seen at bedside with family member and also complains of cough.  Patient denies any other complaints. Patients family member is upset about constantly being in the hospital (2020 18:15)  Patient is unsure of why is here but does not endorse any chest pain or shortness of breath. Upon examination of his lower extremities notes there is evidence of ulcer on the tip of the great toe of the RLE and on the base on the great first toe there is exposed bone with dry gangrene, with pressure ulcer on the heel of the LLE. Pateint states that he has seen a Vascular surgeon Dr. Arriaza and had work up but notes no stents in his legs         REVIEW OF SYMPTOMS:     CONSTITUTIONAL: No fever, weight loss, or fatigue  ENMT:  No difficulty hearing, tinnitus, vertigo; No sinus or throat pain  NECK: No pain or stiffness  CARDIOVASCULAR: No chest pain, dyspnea, syncope, palpitations, dizziness, Orthopnea, Paroxsymal nocturnal dyspnea  RESPIRATORY: No Dyspnea on exertion, Shortness of breath, cough, wheezing  : No dysuria, no hematuria   GI: No dark color stool, no melena, no diarrhea, no constipation, no abdominal pain   NEURO: No headache, no dizziness, no slurred speech   MUSCULOSKELETAL: No joint pain or swelling; No muscle, back, or extremity pain  PSYCH: No agitation, no anxiety.    ALL OTHER REVIEW OF SYSTEMS ARE NEGATIVE.      PREVIOUS DIAGNOSTIC TESTING  ECHO FINDINGS:  < from: TTE Echo Complete w/ Contrast w/ Doppler (20 @ 09:38) >    Summary:   1. Severely enlarged left atrium.   2. Color Doppler demonstrates the presence of a shunt at the Atrial level.   3. Severely enlarged right atrium.   4. Severely enlarged right ventricle. Moderately reduced RV systolic function.   5. Right ventricular volume and pressure overload.   6. Mild aortic regurgitation.   7. Severe tricuspid regurgitation.   8. Estimated pulmonary artery systolic pressure is 40 mmHg assuming -mild pulmonary hypertension.   9. The degree of pulmonary hypertension does not explain the degree of RV dysfunction.  10. There is no evidence of pericardial effusion.  11. Team informed.    MD Awilda, RPVI Electronically signed on 2020 at 12:53:14 PM    < end of copied text >      STRESS FINDINGS:    CATHETERIZATION FINDINGS:         ALLERGIES: Allergies    No Known Allergies    Intolerances          PAST MEDICAL HISTORY  Encounter for blood transfusion    Anemia    History of gross hematuria    History of orthostatic hypotension    Bacterial pneumonia    Muscle weakness (generalized)    2019 novel coronavirus disease (COVID-19)    Falls    Heart failure    Afib    CHF (congestive heart failure)    Gout    Hiatal hernia    BPH (benign prostatic hyperplasia)    Cognitive impairment    Cardiomyopathy    HTN (hypertension)    DM (diabetes mellitus)        PAST SURGICAL HISTORY  S/P appendectomy    No significant past surgical history        FAMILY HISTORY:      SOCIAL HISTORY:  Denies smoking/alcohol/drugs  CIGARETTES:     ALCOHOL:  DRUGS:      CURRENT MEDICATIONS:  furosemide   Injectable 40 milliGRAM(s) IV Push two times a day  tamsulosin 0.4 milliGRAM(s) Oral at bedtime     allopurinol  atorvastatin  calcium carbonate 1250 mG  + Vitamin D (OsCal 500 + D)  dextrose 40% Gel  dextrose 5%.  dextrose 5%.  dextrose 50% Injectable  dextrose 50% Injectable  dextrose 50% Injectable  ferrous    sulfate  finasteride  glucagon  Injectable  heparin   Injectable  insulin glargine Injectable (LANTUS)  insulin lispro (ADMELOG) corrective regimen sliding scale  levothyroxine  meropenem  IVPB  meropenem  IVPB  multivitamin  oxybutynin  povidone iodine 10% Solution        HOME MEDICATIONS:      Vital Signs Last 24 Hrs  T(C): 35.7 (15 Nov 2020 15:28), Max: 36.8 (2020 23:37)  T(F): 96.3 (15 Nov 2020 15:28), Max: 98.2 (2020 23:37)  HR: 63 (15 Nov 2020 15:28) (63 - 87)  BP: 106/59 (15 Nov 2020 15:28) (106/59 - 115/69)  BP(mean): --  RR: 12 (15 Nov 2020 15:28) (12 - 18)  SpO2: 98% (15 Nov 2020 10:40) (97% - 98%)      PHYSICAL EXAM:  Constitutional: Comfortable . No acute distress.   HEENT: Atraumatic and normocephalic , neck is supple . no JVD. No carotid bruit. PEERL   CNS: A&Ox3. No focal deficits. EOMI. Cranial nerves II-IX are intact.   Lymph Nodes: Cervical : Not palpable.  Respiratory: CTAB  Cardiovascular: S1S2 RRR. No murmur/rubs or gallop.  Gastrointestinal: Soft non-tender and non distended . +Bowel sounds. negative Hernandez's sign.  Extremities: RLE: on the tip of the great toe + ulcer and on the base on the great first toe there is exposed bone with dry gangrene, LLE: pressure ulcer on the heel   Non palpable DP/PT bilaterally   Psychiatric: Calm . no agitation.  Skin: No skin rash/ulcers visualized to face, hands or feet.    Intake and output:    @ 07:01  -  11-15 @ 07:00  --------------------------------------------------------  IN: 0 mL / OUT: 1000 mL / NET: -1000 mL        LABS:                        7.5    4.81  )-----------( 162      ( 15 Nov 2020 06:34 )             25.1     15    142  |  110<H>  |  42.0<H>  ----------------------------<  120<H>  4.4   |  22.0  |  1.75<H>    Ca    8.4<L>      15 Nov 2020 06:34  Mg     1.7     11-15    TPro  5.8<L>  /  Alb  2.5<L>  /  TBili  0.4  /  DBili  x   /  AST  32  /  ALT  24  /  AlkPhos  108  11-15      ;p-BNP=    Urinalysis Basic - ( 2020 16:26 )    Color: Yellow / Appearance: Turbid / S.015 / pH: x  Gluc: x / Ketone: Negative  / Bili: Negative / Urobili: Negative   Blood: x / Protein: 100 / Nitrite: Negative   Leuk Esterase: Moderate / RBC: 6-10 /HPF / WBC >50   Sq Epi: x / Non Sq Epi: Occasional / Bacteria: Few        INTERPRETATION OF TELEMETRY: Reviewed by me.   ECG: Reviewed by me.     RADIOLOGY & ADDITIONAL STUDIES:    X-ray:  reviewed by me.   CT scan:   MRI:

## 2020-11-15 NOTE — PROGRESS NOTE ADULT - SUBJECTIVE AND OBJECTIVE BOX
seen for hypothermia    no acute complaints/events  ros negative     MEDICATIONS  (STANDING):  allopurinol 300 milliGRAM(s) Oral daily  atorvastatin 20 milliGRAM(s) Oral at bedtime  calcium carbonate 1250 mG  + Vitamin D (OsCal 500 + D) 1 Tablet(s) Oral daily  dextrose 40% Gel 15 Gram(s) Oral once  dextrose 5%. 1000 milliLiter(s) (50 mL/Hr) IV Continuous <Continuous>  dextrose 5%. 1000 milliLiter(s) (100 mL/Hr) IV Continuous <Continuous>  dextrose 50% Injectable 25 Gram(s) IV Push once  dextrose 50% Injectable 12.5 Gram(s) IV Push once  dextrose 50% Injectable 25 Gram(s) IV Push once  ferrous    sulfate 325 milliGRAM(s) Oral daily  finasteride 5 milliGRAM(s) Oral daily  furosemide   Injectable 40 milliGRAM(s) IV Push two times a day  glucagon  Injectable 1 milliGRAM(s) IntraMuscular once  heparin   Injectable 5000 Unit(s) SubCutaneous every 12 hours  insulin glargine Injectable (LANTUS) 5 Unit(s) SubCutaneous at bedtime  insulin lispro (ADMELOG) corrective regimen sliding scale   SubCutaneous three times a day before meals  levothyroxine 25 MICROGram(s) Oral daily  meropenem  IVPB      meropenem  IVPB 1000 milliGRAM(s) IV Intermittent every 12 hours  multivitamin 1 Tablet(s) Oral daily  oxybutynin 5 milliGRAM(s) Oral two times a day  povidone iodine 10% Solution 1 Application(s) Topical <User Schedule>  tamsulosin 0.4 milliGRAM(s) Oral at bedtime    MEDICATIONS  (PRN):  polyethylene glycol 3350 17 Gram(s) Oral daily PRN Constipation      Allergies    No Known Allergies      Vital Signs Last 24 Hrs  T(C): 35.7 (15 Nov 2020 15:28), Max: 36.8 (2020 23:37)  T(F): 96.3 (15 Nov 2020 15:28), Max: 98.2 (2020 23:37)  HR: 63 (15 Nov 2020 15:28) (63 - 87)  BP: 106/59 (15 Nov 2020 15:28) (106/59 - 133/67)  BP(mean): --  RR: 12 (15 Nov 2020 15:28) (12 - 22)  SpO2: 98% (15 Nov 2020 10:40) (97% - 99%)    PHYSICAL EXAM:    GENERAL: NAD  CHEST/LUNG: dec bs at bases   HEART: Regular rate and rhythm; S1 S2  ABDOMEN: Soft, Nontender,  Bowel sounds present  EXTREMITIES:  edema noted, chronic statis dermatitis skin changes b/l lower leg.  feet bandaged   NERVOUS SYSTEM:  Alert, responsive . nonfocal gen weakness    LABS:                        7.5    4.81  )-----------( 162      ( 15 Nov 2020 06:34 )             25.1     11-15    142  |  110<H>  |  42.0<H>  ----------------------------<  120<H>  4.4   |  22.0  |  1.75<H>    Ca    8.4<L>      15 Nov 2020 06:34  Mg     1.7     11-15    TPro  5.8<L>  /  Alb  2.5<L>  /  TBili  0.4  /  DBili  x   /  AST  32  /  ALT  24  /  AlkPhos  108  -15      Urinalysis Basic - ( 2020 16:26 )    Color: Yellow / Appearance: Turbid / S.015 / pH: x  Gluc: x / Ketone: Negative  / Bili: Negative / Urobili: Negative   Blood: x / Protein: 100 / Nitrite: Negative   Leuk Esterase: Moderate / RBC: 6-10 /HPF / WBC >50   Sq Epi: x / Non Sq Epi: Occasional / Bacteria: Few        CAPILLARY BLOOD GLUCOSE      POCT Blood Glucose.: 159 mg/dL (15 Nov 2020 11:58)  POCT Blood Glucose.: 138 mg/dL (15 Nov 2020 08:13)  POCT Blood Glucose.: 140 mg/dL (2020 21:25)        RADIOLOGY & ADDITIONAL TESTS:

## 2020-11-15 NOTE — CONSULT NOTE ADULT - SUBJECTIVE AND OBJECTIVE BOX
HPI:  70 year old male known to our service from his recent admission last week with a history of mild hypothyroidism, developmental disability, Type 2 DM, HTN, Afib, CHF, BPH, CKD and recent admission of hypothermia who returns to the ER from his group home with recurrent hypothermia.  ON arrival to the ED his Temp was as low as 93 rectally, otherwise he was hemodynamically stable.  His temperature has now improved with passive warming and after the initiation of meropenum for suspected UTI.    Patient was diagnosed with mild primary hypothyroidism last admission with TSH of 4.8 and low normal T4 levels due to Hashimoto's Thyroiditis.  Patient was started on low dose LT4 25 mcg daily with improvement.  Of note, no clear cause of the patient's hypothermia was found during his last visit.  He was treated with empiric antibiotics, although no clear source of infection was found.  He had a normal cosyntropin test in August of 2020 in work up of hypothermia then and last week AM cortisol was 22.9, thereby ruling out adrenal insufficiency.  He currently denies fevers, chills, fatigue, cold intolerance.    He also has a history of type 2 DM that is well controlled with the use of Januvia.  He does have associated CKD.      PAST MEDICAL & SURGICAL HISTORY:  Encounter for blood transfusion  Anemia  History of gross hematuria  History of orthostatic hypotension  Bacterial pneumonia  Muscle weakness (generalized)  2019 novel coronavirus disease (COVID-19)  Falls  Afib  CHF (congestive heart failure)  Gout  Hiatal hernia  BPH (benign prostatic hyperplasia)  Cognitive impairment  Cardiomyopathy  HTN (hypertension)  DM (diabetes mellitus)  S/P appendectomy    Allergies  No Known Allergies    MEDICATIONS  (STANDING):  allopurinol 300 milliGRAM(s) Oral daily  atorvastatin 20 milliGRAM(s) Oral at bedtime  calcium carbonate 1250 mG  + Vitamin D (OsCal 500 + D) 1 Tablet(s) Oral daily  dextrose 40% Gel 15 Gram(s) Oral once  dextrose 5%. 1000 milliLiter(s) (50 mL/Hr) IV Continuous <Continuous>  dextrose 5%. 1000 milliLiter(s) (100 mL/Hr) IV Continuous <Continuous>  dextrose 50% Injectable 25 Gram(s) IV Push once  dextrose 50% Injectable 12.5 Gram(s) IV Push once  dextrose 50% Injectable 25 Gram(s) IV Push once  ferrous    sulfate 325 milliGRAM(s) Oral daily  finasteride 5 milliGRAM(s) Oral daily  glucagon  Injectable 1 milliGRAM(s) IntraMuscular once  heparin   Injectable 5000 Unit(s) SubCutaneous every 12 hours  insulin glargine Injectable (LANTUS) 5 Unit(s) SubCutaneous at bedtime  insulin lispro (ADMELOG) corrective regimen sliding scale   SubCutaneous three times a day before meals  levothyroxine 25 MICROGram(s) Oral daily  meropenem  IVPB      meropenem  IVPB 1000 milliGRAM(s) IV Intermittent every 12 hours  multivitamin 1 Tablet(s) Oral daily  oxybutynin 5 milliGRAM(s) Oral two times a day  tamsulosin 0.4 milliGRAM(s) Oral at bedtime    SH:  lives at group home, no tobacco use    FH:  mother and sister have thyroid disorder    ROS:  as per HPI, otherwise 10 point ROS negative    Vital Signs Last 24 Hrs  T(C): 36.4 (15 Nov 2020 10:40), Max: 36.8 (14 Nov 2020 23:37)  T(F): 97.5 (15 Nov 2020 10:40), Max: 98.2 (14 Nov 2020 23:37)  HR: 87 (15 Nov 2020 10:40) (79 - 88)  BP: 109/73 (15 Nov 2020 10:40) (109/62 - 133/67)  RR: 18 (15 Nov 2020 10:40) (16 - 22)  SpO2: 98% (15 Nov 2020 10:40) (97% - 99%)      PE:  Gen: AAO, NAD  HEENT:  sclera anicteric, MMM  Neck:  no thyromegaly, no LAD  CV:  nl S1 + S2, RRR, no m/r/g  Resp:  nl respiratory effort, CTA b/l  GI/ Abd: soft, NT/ ND, BS +  Neuro:  No tremor, sensation intact to light touch on b/l feet  MS:  no c/c/e, contractures of b/l LEs.    Skin:  no acanthosis, chronic stasis changes of b/l LEs  Psych:  affect appropriate    LABS:  Thyroid Stimulating Hormone, Serum: 3.24 uIU/mL (11.14.20 @ 14:44)  Thyroid Stimulating Hormone, Serum: 4.82 uIU/mL (11.05.20 @ 12:33)    Thyroperoxidase Antibody: 57.7 IU/mL (11.09.20 @ 19:37)    Cortisol AM, Serum: 22.9 ug/dL (11.09.20 @ 19:39)    11-15    142  |  110<H>  |  42.0<H>  ----------------------------<  120<H>  4.4   |  22.0  |  1.75<H>    Ca    8.4<L>      15 Nov 2020 06:34  Mg     1.7     11-15    TPro  5.8<L>  /  Alb  2.5<L>  /  TBili  0.4  /  DBili  x   /  AST  32  /  ALT  24  /  AlkPhos  108  11-15                          7.5    4.81  )-----------( 162      ( 15 Nov 2020 06:34 )             25.1     CAPILLARY BLOOD GLUCOSE  POCT Blood Glucose.: 159 mg/dL (15 Nov 2020 11:58)  POCT Blood Glucose.: 138 mg/dL (15 Nov 2020 08:13)  POCT Blood Glucose.: 140 mg/dL (14 Nov 2020 21:25)  POCT Blood Glucose.: 157 mg/dL (14 Nov 2020 14:16)

## 2020-11-15 NOTE — PROGRESS NOTE ADULT - ASSESSMENT
69 y/o M with h/o developmental disability, hypothyroidism, CHF, HTN, DM2, AF taken off apixaban due to anemia/hematuria, CKD not on HD, refused hemodialysis, BPH, stim test in 8/2020 and recurrent ESBL UTI's presents from group home with hypothermia. Patient was treated with a full course of meropenem and seen by endocrine and started on levothyroxine.  Patient now returns with temperature of 93 and worked up in the er and found to have a uti     hypothermia: suspect due to recurrent infections. endocrine workup negative  suspected recurrent aspiration pneumonia  r/o UTI      ID consulted, f/u cultures      c/w meropenem    acute on chronic diastolic Heart failure, RV dysfunction, mild pulmonary HTN     IV lasix, [facility] cardiology consulted     last echo in aug 2020 hold off on repeat unless directed by cardio     unclear etiology of RV failure; prior v/q scan negative      dysphagia: speech re eval   prior w/ pureed nectar thick    DM2: [facility]    BPH: flomax, finasteride.  check bladder scan    CKD3: [facility]    palliative care consulted    anemia of renal disease and mild iron deficiency    recheck iron panel, if low start IV iron

## 2020-11-15 NOTE — CONSULT NOTE ADULT - SUBJECTIVE AND OBJECTIVE BOX
[facility]  INFECTIOUS DISEASES AND INTERNAL MEDICINE at [place]  =======================================================  Zach Chew MD  Diplomates American Board of Internal Medicine and Infectious Diseases  Tel: [phone]      Fax: [phone]  =======================================================      [id number]  KIMBERLY MARKS    History obtained from the chart. Patient not a great historian     CC: Patient is a 70y old  Male who presents with a chief complaint of recurrent hypothermia (15 Nov 2020 12:38)      70y  Male  with h/o developmental disability, hypothyroidism, CHF, HTN, DM, AF taken off apixaban due to anemia/hematuria, CKD not on HD, refused hemodialysis, BPH, stim test in 2020 and recurrent ESBL UTI's. Patient presents from group home with hypothermia. Patient was treated with a full course of meropenem and seen by endocrine and started on levothyroixine. Patient now returns with temperature of 93F and worked up in the er and found to have a uti.  Patient denies any other complaints. ID input requested.       Past Medical & Surgical Hx:  Encounter for blood transfusion  Anemia  History of gross hematuria  History of orthostatic hypotension  Bacterial pneumonia  Muscle weakness (generalized)  2019 novel coronavirus disease (COVID-19)  Falls  Heart failure  Afib  CHF (congestive heart failure)  Gout  Hiatal hernia  BPH (benign prostatic hyperplasia)  Cognitive impairment  Cardiomyopathy  HTN (hypertension)  DM (diabetes mellitus)  S/P appendectomy      Social Hx:  Denies smoking. Lives in group home       FAMILY HISTORY:  Patient unable to provide family history. he is unaware of medical problems of his family      Allergies  No Known Allergies       REVIEW OF SYSTEMS:  CONSTITUTIONAL:  No Fever. + chills  HEENT:  No diplopia or blurred vision.  No earache, sore throat or runny nose.  CARDIOVASCULAR:  No pressure, squeezing, strangling, tightness, heaviness or aching about the chest, neck, axilla or epigastrium.  RESPIRATORY:  No cough, shortness of breath  GASTROINTESTINAL:  No nausea, vomiting or diarrhea.  GENITOURINARY:  No dysuria, frequency or urgency.   MUSCULOSKELETAL:  no joint aches, no muscle pain  SKIN:  No change in skin, hair or nails.  NEUROLOGIC:  No Headaches, seizures   PSYCHIATRIC:  No disorder of thought or mood.  ENDOCRINE:  No heat or cold intolerance  HEMATOLOGICAL:  No easy bruising or bleeding.       Physical Exam:  GEN: NAD, pleasant  HEENT: normocephalic and atraumatic. EOMI. PERRL.  Anicteric  NECK: Supple.   LUNGS: Clear to auscultation.  HEART: Regular rate and rhythm  ABDOMEN: Soft, nontender, and nondistended.  Positive bowel sounds.    : No CVA tenderness  EXTREMITIES: B/L Stasis dermatitis, Rt foot with wound   MSK: No joint swelling  NEUROLOGIC: Awake alert, answers simple questions   PSYCHIATRIC: unable to assess   SKIN: No Rash    Height (cm): 175.3 ( @ 13:58)  Weight (kg): 100 ( @ 14:20)  BMI (kg/m2): 32.5 ( @ 14:20)  BSA (m2): 2.15 ( @ 14:20)      Vitals:  T(F): 97.5 (15 Nov 2020 10:40), Max: 98.2 (2020 23:37)  HR: 87 (15 Nov 2020 10:40)  BP: 109/73 (15 Nov 2020 10:40)  RR: 18 (15 Nov 2020 10:40)  SpO2: 98% (15 Nov 2020 10:40) (97% - 99%)  temp max in last 48H T(F): , Max: 98.2 (20 @ 23:37)      Current Antibiotics:  meropenem  IVPB      meropenem  IVPB 1000 milliGRAM(s) IV Intermittent every 12 hours      Other medications:  allopurinol 300 milliGRAM(s) Oral daily  atorvastatin 20 milliGRAM(s) Oral at bedtime  calcium carbonate 1250 mG  + Vitamin D (OsCal 500 + D) 1 Tablet(s) Oral daily  dextrose 40% Gel 15 Gram(s) Oral once  dextrose 5%. 1000 milliLiter(s) IV Continuous <Continuous>  dextrose 5%. 1000 milliLiter(s) IV Continuous <Continuous>  dextrose 50% Injectable 25 Gram(s) IV Push once  dextrose 50% Injectable 12.5 Gram(s) IV Push once  dextrose 50% Injectable 25 Gram(s) IV Push once  ferrous    sulfate 325 milliGRAM(s) Oral daily  finasteride 5 milliGRAM(s) Oral daily  glucagon  Injectable 1 milliGRAM(s) IntraMuscular once  heparin   Injectable 5000 Unit(s) SubCutaneous every 12 hours  insulin glargine Injectable (LANTUS) 5 Unit(s) SubCutaneous at bedtime  insulin lispro (ADMELOG) corrective regimen sliding scale   SubCutaneous three times a day before meals  levothyroxine 25 MICROGram(s) Oral daily  multivitamin 1 Tablet(s) Oral daily  oxybutynin 5 milliGRAM(s) Oral two times a day  tamsulosin 0.4 milliGRAM(s) Oral at bedtime      Labs:             7.5    4.81  )-----------( 162      ( 15 Nov 2020 06:34 )             25.1      11-15    142  |  110<H>  |  42.0<H>  ----------------------------<  120<H>  4.4   |  22.0  |  1.75<H>    Ca    8.4<L>      15 Nov 2020 06:34  Mg     1.7     11-15    TPro  5.8<L>  /  Alb  2.5<L>  /  TBili  0.4  /  DBili  x   /  AST  32  /  ALT  24  /  AlkPhos  108  11-15      Culture - Urine (collected 20 @ 21:53)  Source: .Urine Clean Catch (Midstream)  Final Report (20 @ 17:50):    <10,000 CFU/mL Normal Urogenital Nola    Culture - Blood (collected 20 @ 12:29)  Source: .Blood Blood-Peripheral  Final Report (11-10-20 @ 13:01):    No growth at 5 days.    Culture - Blood (collected 20 @ 12:28)  Source: .Blood Blood-Venous  Final Report (11-10-20 @ 13:01):    No growth at 5 days.      WBC Count: 4.81 K/uL (11-15-20 @ 06:34)  WBC Count: 5.65 K/uL (20 @ 14:44)  WBC Count: 6.33 K/uL (20 @ 07:39)    Creatinine, Serum: 1.75 mg/dL (11-15-20 @ 06:34)  Creatinine, Serum: 1.83 mg/dL (20 @ 14:44)  Creatinine, Serum: 1.85 mg/dL (20 @ 07:39)    Ferritin, Serum: 164 ng/mL (20 @ 10:25)    Procalcitonin, Serum: 0.26 ng/mL (20 @ 18:02)     Rapid RVP Result: NotDetec (10-27-20 @ 17:58)    COVID-19 PCR: NotDetec (20 @ 19:24)  COVID-19 PCR: NotDetec (20 @ 11:17)  COVID-19 PCR: NotDetec (11-10-20 @ 11:13)  COVID-19 IgG Antibody Interpretation: Positive (20 @ 21:29)  COVID-19 IgG Antibody Index: 78.70 Index (20 @ 21:29)  COVID-19 PCR: NotDetec (20 @ 12:33)  COVID-19 PCR: NotDetec (20 @ 11:05)  COVID-19 IgG Antibody Interpretation: Positive (10-28-20 @ 05:29)  COVID-19 IgG Antibody Index: 86.00 Index (10-28-20 @ 05:29)  COVID-19 PCR: NotDetec (10-27-20 @ 15:04)    Urinalysis Basic - ( 2020 16:26 )    Color: Yellow / Appearance: Turbid / S.015 / pH: x  Gluc: x / Ketone: Negative  / Bili: Negative / Urobili: Negative   Blood: x / Protein: 100 / Nitrite: Negative   Leuk Esterase: Moderate / RBC: 6-10 /HPF / WBC >50   Sq Epi: x / Non Sq Epi: Occasional / Bacteria: Few      < from: CT Chest No Cont (11.15.20 @ 09:57) >  EXAM:  CT CHEST                          PROCEDURE DATE:  11/15/2020          INTERPRETATION:  Clinical indications: Evaluate for pneumonia.    Axial CT images of the chest are obtained without intravenous administration of contrast.    Comparisonis made with a prior chest CT of 2020.    No enlarged axillary, mediastinal or hilar lymph nodes. Subcutaneous edema, most notable involving the upper abdomen.    Cardiomegaly. No pericardial effusion. Main pulmonary artery is enlarged measuring about 4 cm in transverse dimension as on the prior study can be seen in the setting of pulmonary arterial hypertension. Diffuse atherosclerotic disease with involvement of the coronary arteries. Bilateral gynecomastia.    Evaluation of the upper abdomen demonstrate small hepatic calcifications as on the prior study. 2 cm splenic hypodensity with associated small calcifications is unchanged. Trace ascites.    Small bilateral pleural effusions with overall interval progression since 2020.    Evaluation of the lungs demonstrate nodular consolidations throughout a large portion of the left upper lobe in somewhat of a dependent effusion new since 2020. Left lower lobe areas of compressive atelectasis with interval progression possibly containing superimposed pneumonia given internal patchy components.    Right lower lobe partial compressive atelectasis. No central endobronchial lesions. Anterior deviation of the posterior wall of the trachea can be seen in the setting of tracheomalacia.    Evaluation of the bones demonstrate degenerative changes of the spine and the shoulders. Anterior dislocation of the left shoulder with an associated left shoulder joints effusion as on the prior study.    IMPRESSION: Left upper lobe nodular consolidations new since 2020 representing pneumonia. Left lower lobe areas of compressive atelectasis also possibly with superimposed pneumonia. Clinical correlation for aspiration is recommended given the distribution.    Small bilateral pleural effusions with overall interval progression since 2020.    Cardiomegaly with coronary artery atherosclerotic disease.    Subcutaneous edema.    Anterior left shoulder dislocation as on 2020.    < end of copied text >

## 2020-11-15 NOTE — CONSULT NOTE ADULT - ASSESSMENT
70 year old male with recently diagnosed mild primary hypothyroidism due to Hashimoto's Thyroiditis, Type 2 DM with associated CKD, developmental disability, Afib, HLD, CHF, BPH and recurrent admissions for hypothermia admitted again with hypothermia in setting of UTI (CT of chest also shows JOEL infiltrate suspicious for aspiration).      1.  Hypothermia-  there does not appear to be any underlying endocrine contribution at this time.  His mild hypothyroidism is improving on LT4 therapy and should not be contributing and there is no evidence of adrenal insufficiency.  Underlying infection may play a role.  If no improvement with antibiotic therapy, consider neurologic work up.    2.  Hypothyroidism-  TSH is improving on low dose LT4.  Continue current dose.  Will recheck T4 level.   3.  Type 2 DM-  continue low dose Lantus and sliding scale for correction of hyperglycemia.  Can resume Januvia on discharge, but this must be renally dosed.   4.  HLD-  continue atorvastatin.
70y  Male  with h/o developmental disability, hypothyroidism, CHF, HTN, DM, AF taken off apixaban due to anemia/hematuria, CKD not on HD, refused hemodialysis, BPH, stim test in 8/2020 and recurrent ESBL UTI's. Patient presents from group home with hypothermia. Patient was treated with a full course of meropenem and seen by endocrine and started on levothyroixine. Patient now returns with temperature of 93F and worked up in the er and found to have a uti.      Hypothermia  Pneumonia on CT Chest  UTI  CKD      - Blood cultures pending  - Uine cultures pending  - RVP Negative   - CT Chest reporting pneumonia  - UA with pyuria   - Continue Meropenem  - Follow up cultures   - Trend Fever  - Trend Leukocytosis      Will Follow    
71 y/o M with h/o developmental disability, hypothyroidism, CHF, HTN, DM, AF taken off apixaban due to anemia/hematuria, CKD not on HD, refused hemodialysis, BPH, stim test in 8/2020 and recurrent ESBL UTI's presents from group home with hypothermia.  CT non-contrast Small bilateral pleural effusions with overall interval progression since October 29, 2020. Cardiomegaly with coronary artery atherosclerotic disease. Subcutaneous edema     ProBNP: not obtained     1) HFpEF  - patient not overtly volume overloaded   - CT chest shows evidence of small bilateral pleural effusion   - start Lasix 40mg IVP q 12hrs   - monitor I/Os; daily weights   - keep K ~4 and Mag ~ 2     2) Hypothermia: suspect due to recurrent infections likely due to possible osteomyelitis of the RLE vs suspected recurrent aspiration pneumonia  r/o UTI  - ID consulted, f/u cultures  - c/w meropenem  - consider Podiatry eval     3) PAD with CLI of the RLE   - dry gangrene of the R foot   - ADELA/PVR not a complete study but shows diminished PVR waveforms of the bilateral infrapopliteal arteries suggestive of moderate disease of the infrapopliteal arteries   - ordered US arterial duplex to asses velocities and any evidence of obstructive disease   - consider XRAY of the foot and possible MRI to rule out osteomyelitis   - Podiatry consult   - based on the results of the US arterail duplex may consider angiogram of the RLE     4) HTN   - blood pressure controlled   - HOLD all antihypertesives     5) Afib (was on Eliquis)   - in atrial fibrillation but rate controlled   - not on AC due to anemia and hematuria     Thank You   Kathy Krueger D.O.

## 2020-11-16 NOTE — PROGRESS NOTE ADULT - SUBJECTIVE AND OBJECTIVE BOX
seen for aspiration pna, dysphagia.    no acute complaints.   coughing fits during speech eval  ros limited due to mental status    MEDICATIONS  (STANDING):  dextrose 40% Gel 15 Gram(s) Oral once  dextrose 5%. 1000 milliLiter(s) (50 mL/Hr) IV Continuous <Continuous>  dextrose 5%. 1000 milliLiter(s) (100 mL/Hr) IV Continuous <Continuous>  dextrose 50% Injectable 25 Gram(s) IV Push once  dextrose 50% Injectable 12.5 Gram(s) IV Push once  dextrose 50% Injectable 25 Gram(s) IV Push once  furosemide   Injectable 40 milliGRAM(s) IV Push two times a day  glucagon  Injectable 1 milliGRAM(s) IntraMuscular once  heparin   Injectable 5000 Unit(s) SubCutaneous every 12 hours  insulin lispro (ADMELOG) corrective regimen sliding scale   SubCutaneous three times a day before meals  levothyroxine Injectable 12.5 MICROGram(s) IV Push at bedtime  meropenem  IVPB      meropenem  IVPB 1000 milliGRAM(s) IV Intermittent every 12 hours  nystatin Powder 1 Application(s) Topical two times a day  povidone iodine 10% Solution 1 Application(s) Topical <User Schedule>    MEDICATIONS  (PRN):      Allergies    No Known Allergies    Vital Signs Last 24 Hrs  T(C): 36.4 (2020 11:19), Max: 36.4 (2020 05:00)  T(F): 97.6 (2020 11:19), Max: 97.6 (2020 11:19)  HR: 72 (2020 11:19) (50 - 72)  BP: 107/71 (2020 11:19) (101/66 - 107/71)  BP(mean): 78 (2020 05:00) (78 - 79)  RR: 18 (2020 11:19) (12 - 18)  SpO2: 95% (2020 11:19) (95% - 99%)    PHYSICAL EXAM:    GENERAL: NAD  CHEST/LUNG: coarse bs left base  HEART: Regular rate and rhythm; S1 S2  ABDOMEN: Soft, Nontender, Nondistended; Bowel sounds present  EXTREMITIES:  edema chronic statis dermatitis skin changes b/l lower leg.  NERVOUS SYSTEM:  Alert, responsive . nonfocal gen weakness  not oriented     LABS:                        8.2    3.79  )-----------( 173      ( 2020 07:05 )             27.2     11-16    143  |  109<H>  |  34.0<H>  ----------------------------<  100<H>  4.0   |  24.0  |  1.67<H>    Ca    8.6      2020 07:05  Phos  3.3     11-16  Mg     1.6     11-16    TPro  5.8<L>  /  Alb  2.3<L>  /  TBili  0.4  /  DBili  x   /  AST  27  /  ALT  21  /  AlkPhos  95  11-16      Urinalysis Basic - ( 2020 16:26 )    Color: Yellow / Appearance: Turbid / S.015 / pH: x  Gluc: x / Ketone: Negative  / Bili: Negative / Urobili: Negative   Blood: x / Protein: 100 / Nitrite: Negative   Leuk Esterase: Moderate / RBC: 6-10 /HPF / WBC >50   Sq Epi: x / Non Sq Epi: Occasional / Bacteria: Few        CAPILLARY BLOOD GLUCOSE      POCT Blood Glucose.: 166 mg/dL (2020 12:54)  POCT Blood Glucose.: 148 mg/dL (2020 08:22)  POCT Blood Glucose.: 120 mg/dL (15 Nov 2020 21:41)  POCT Blood Glucose.: 143 mg/dL (15 Nov 2020 16:45)        RADIOLOGY & ADDITIONAL TESTS:

## 2020-11-16 NOTE — SWALLOW BEDSIDE ASSESSMENT ADULT - SLP PERTINENT HISTORY OF CURRENT PROBLEM
As per MD note, "Patient is a 71 y/o M with h/o developmental disability, hypothyroidism, CHF, HTN, DM, AF taken off apixaban due to anemia/hematuria, CKD not on HD, refused hemodialysis, BPH, stim test in 8/2020 and recurrent ESBL UTI's presents from group home with hypothermia. Patient was treated with a full course of meropenem and seen by endocrine and started on levothyroixine. Patient now returns with temperature of 93 and worked up in the er and found to have a uti Patient seen at bedside with family member and also complains of cough".

## 2020-11-16 NOTE — PROGRESS NOTE ADULT - SUBJECTIVE AND OBJECTIVE BOX
[place] [facility]                                                               Office: [address], [place] [address]                                                              Telephone: [phone]. Fax:[phone]                                                                             PROGRESS NOTE  Reason for follow up: CHF and PAD/ CLI of the bilateral lower extremities   Overnight: No new events.   Update:   Discussed with patient findings of the US arterial duplex and possible angiogram and states that he wants to think about it   otherwise no chest pain or shortness of breath     Subjective: "  ______________________"      	  Vitals:  T(C): 36.1 (11-16-20 @ 16:13), Max: 36.4 (11-16-20 @ 05:00)  HR: 75 (11-16-20 @ 16:13) (50 - 75)  BP: 108/75 (11-16-20 @ 16:13) (101/66 - 108/75)  RR: 17 (11-16-20 @ 16:13) (14 - 18)  SpO2: 95% (11-16-20 @ 16:13) (95% - 99%)  Wt(kg): --  I&O's Summary    15 Nov 2020 07:01  -  16 Nov 2020 07:00  --------------------------------------------------------  IN: 240 mL / OUT: 3025 mL / NET: -2785 mL      Weight (kg): 100 (11-14 @ 14:20)      PHYSICAL EXAM:  Constitutional: Comfortable . No acute distress.   HEENT: Atraumatic and normocephalic , neck is supple . no JVD. No carotid bruit. PEERL   CNS: A&Ox3. No focal deficits. EOMI. Cranial nerves II-IX are intact.   Lymph Nodes: Cervical : Not palpable.  Respiratory: CTAB  Cardiovascular: S1S2 RRR. No murmur/rubs or gallop.  Gastrointestinal: Soft non-tender and non distended . +Bowel sounds. negative Hernandez's sign.  Extremities: RLE: on the tip of the great toe + ulcer and on the base on the great first toe there is exposed bone with dry gangrene, LLE: pressure ulcer on the heel   Non palpable DP/PT bilaterally   Psychiatric: Calm . no agitation.  Skin: No skin rash/ulcers visualized to face, hands or feet.      CURRENT MEDICATIONS:  furosemide   Injectable 40 milliGRAM(s) IV Push two times a day    meropenem  IVPB  meropenem  IVPB  dextrose 40% Gel  dextrose 50% Injectable  dextrose 50% Injectable  dextrose 50% Injectable  glucagon  Injectable  insulin lispro (ADMELOG) corrective regimen sliding scale  levothyroxine Injectable  dextrose 5%.  dextrose 5%.  heparin   Injectable  nystatin Powder  povidone iodine 10% Solution      DIAGNOSTIC TESTING:  [ ] Echocardiogram:   < from: TTE Echo Complete w/ Contrast w/ Doppler (08.21.20 @ 09:38) >    Summary:   1. Severely enlarged left atrium.   2. Color Doppler demonstrates the presence of a shunt at the Atrial level.   3. Severely enlarged right atrium.   4. Severely enlarged right ventricle. Moderately reduced RV systolic function.   5. Right ventricular volume and pressure overload.   6. Mild aortic regurgitation.   7. Severe tricuspid regurgitation.   8. Estimated pulmonary artery systolic pressure is 40 mmHg assuming -mild pulmonary hypertension.   9. The degree of pulmonary hypertension does not explain the degree of RV dysfunction.  10. There is no evidence of pericardial effusion.  11. Team informed.    MD Awilda, RPVI Electronically signed on 8/21/2020 at 12:53:14 PM    < end of copied text >    [ ]  Catheterization:  [ ] Stress Test:    OTHER: < from: US Duplex Arterial Lower Ext Compl, Bilateral (11.16.20 @ 13:53) >    IMPRESSION:    Elevated peak systolic velocity in the proximal left superficial femoral artery suggestive of a hemodynamically significant stenosis.    Bilateral calf and dorsalis pedis arteries could not be evaluated secondary to an overlying bandage.    Arrhythmia.    PORSCHE HANSEN MD; Attending Radiologist  This document has been electronically signed. Nov 16 2020  2:51PM    < end of copied text >  	      LABS:	 	  CARDIAC MARKERS ( 16 Nov 2020 07:05 )  x     / x     / x     / x     / x      p-BNP 16 Nov 2020 07:05: 6725 pg/mL                          8.2    3.79  )-----------( 173      ( 16 Nov 2020 07:05 )             27.2     11-16    143  |  109<H>  |  34.0<H>  ----------------------------<  100<H>  4.0   |  24.0  |  1.67<H>    Ca    8.6      16 Nov 2020 07:05  Phos  3.3     11-16  Mg     1.6     11-16    TPro  5.8<L>  /  Alb  2.3<L>  /  TBili  0.4  /  DBili  x   /  AST  27  /  ALT  21  /  AlkPhos  95  11-16    proBNP: Serum Pro-Brain Natriuretic Peptide: 6725 pg/mL (11-16 @ 07:05)    Lipid Profile:   HgA1c:   TSH: Thyroid Stimulating Hormone, Serum: 3.24 uIU/mL        TELEMETRY: Reviewed  on bedside in atrial fibrillation  ECG:  Reviewed by me.

## 2020-11-16 NOTE — SWALLOW BEDSIDE ASSESSMENT ADULT - SWALLOW EVAL: DIAGNOSIS
Suspected pharyngeal dysphagia across all consistencies puree & honey thick liquids. Pt w/+multiple swallows & delayed cough w/wet vocal quality noted consistently following all trials x 2/2 puree & x 2/2 honey thick liquids

## 2020-11-16 NOTE — SWALLOW BEDSIDE ASSESSMENT ADULT - PHARYNGEAL PHASE
Multiple swallows/Wet vocal quality post oral intake/Delayed cough post oral intake Delayed cough post oral intake/Wet vocal quality post oral intake/Multiple swallows

## 2020-11-16 NOTE — SWALLOW BEDSIDE ASSESSMENT ADULT - COMMENTS
Pt known to this service from previous hospitalizations. Most recent re-assess of swallow function completed on 9/22/2020, w/rx made for soft solids w/thin liquids. Pt now re-admit w/UTI & PNA as per recent CXR. No further trials provided given current presentation

## 2020-11-16 NOTE — PROGRESS NOTE ADULT - ASSESSMENT
71 y/o M with h/o developmental disability, hypothyroidism, CHF, HTN, DM2, AF taken off apixaban due to anemia/hematuria, CKD not on HD, refused hemodialysis, BPH, stim test in 8/2020 and recurrent ESBL UTI's presents from group home with hypothermia. Patient was treated with a full course of meropenem and seen by endocrine and started on levothyroxine.  Patient now returns with temperature of 93 and worked up in the er and found to have a uti     hypothermia: suspect due to recurrent infections. endocrine workup negative  suspected recurrent aspiration pneumonia (gram negative/anaerobes)       UTI ruled out as ucx negative       ID consulted, f/u cultures      c/w meropenem    acute on chronic diastolic Heart failure, RV dysfunction, mild pulmonary HTN     IV lasix, [facility] cardiology following     last echo in aug 2020 hold off on repeat unless directed by cardio     unclear etiology of RV failure; prior v/q scan negative    dysphagia: speech following. NPO , MBS in am    DM2: slidings leda    BPH: flomax, finasteride.      chronic rodriguez     CKD3: baseline    PVD: arterial u/s with suspected stenosis. hold off on vascular eval given pna/dysphagia and goals of care  foot ulcerations: wound care per podiatry    anemia of renal disease: trend      d/w sister:  updated progressive functional decline and inability to safety eat/drink. advised MBS   if fails then consider PEG (does not prevent aspiration) or comfort feeds as patient loves to eat and dislikes dietary restrictions like pureed food (per sister).   she understands medical condition and will likely opt for comfort feeds and palliative care.    DNR/DNI per prior documentation.

## 2020-11-16 NOTE — PROGRESS NOTE ADULT - SUBJECTIVE AND OBJECTIVE BOX
[facility]  INFECTIOUS DISEASES AND INTERNAL MEDICINE at [place]  =======================================================  Zach Chew MD  Diplomates American Board of Internal Medicine and Infectious Diseases  Tel: [phone]      Fax: [phone]  =======================================================      [id number]  KIMBERLY DOLLJUANITA  follow up: hypothermia, UTI  rodriguez has been changed since admission,      =======================================================    Allergies  No Known Allergies    =======================================================     REVIEW OF SYSTEMS:  CONSTITUTIONAL:  No Fever. + chills  HEENT:  No diplopia or blurred vision.  No earache, sore throat or runny nose.  CARDIOVASCULAR:  No pressure, squeezing, strangling, tightness, heaviness or aching about the chest, neck, axilla or epigastrium.  RESPIRATORY:  No cough, shortness of breath  GASTROINTESTINAL:  No nausea, vomiting or diarrhea.  GENITOURINARY:  No dysuria, frequency or urgency.   MUSCULOSKELETAL:  no joint aches, no muscle pain  SKIN:  No change in skin, hair or nails.  NEUROLOGIC:  No Headaches, seizures   PSYCHIATRIC:  No disorder of thought or mood.  ENDOCRINE:  No heat or cold intolerance  HEMATOLOGICAL:  No easy bruising or bleeding.       =======================================================    Physical Exam:    GEN: NAD, pleasant  HEENT: normocephalic and atraumatic. EOMI. PERRL.  Anicteric  NECK: Supple.   LUNGS: Clear to auscultation.  HEART: Regular rate and rhythm  ABDOMEN: Soft, nontender, and nondistended.  Positive bowel sounds.    : No CVA tenderness  + RODRIGUEZ with TURBID urine.   EXTREMITIES: B/L Stasis dermatitis, Rt foot with wound   MSK: No joint swelling  NEUROLOGIC: Awake alert, answers simple questions   PSYCHIATRIC: unable to assess   SKIN: No Rash      =======================================================  Vitals:  ============  T(F): 97.6 (16 Nov 2020 11:19), Max: 97.6 (16 Nov 2020 11:19)  HR: 72 (16 Nov 2020 11:19)  BP: 107/71 (16 Nov 2020 11:19)  RR: 18 (16 Nov 2020 11:19)  SpO2: 95% (16 Nov 2020 11:19) (95% - 99%)  temp max in last 48H T(F): , Max: 98.2 (11-14-20 @ 23:37)    =======================================================  Current Antibiotics:  meropenem  IVPB      meropenem  IVPB 1000 milliGRAM(s) IV Intermittent every 12 hours    Other medications:  allopurinol 300 milliGRAM(s) Oral daily  atorvastatin 20 milliGRAM(s) Oral at bedtime  calcium carbonate 1250 mG  + Vitamin D (OsCal 500 + D) 1 Tablet(s) Oral daily  dextrose 40% Gel 15 Gram(s) Oral once  dextrose 5%. 1000 milliLiter(s) IV Continuous <Continuous>  dextrose 5%. 1000 milliLiter(s) IV Continuous <Continuous>  dextrose 50% Injectable 25 Gram(s) IV Push once  dextrose 50% Injectable 12.5 Gram(s) IV Push once  dextrose 50% Injectable 25 Gram(s) IV Push once  ferrous    sulfate 325 milliGRAM(s) Oral daily  finasteride 5 milliGRAM(s) Oral daily  furosemide   Injectable 40 milliGRAM(s) IV Push two times a day  glucagon  Injectable 1 milliGRAM(s) IntraMuscular once  heparin   Injectable 5000 Unit(s) SubCutaneous every 12 hours  insulin glargine Injectable (LANTUS) 5 Unit(s) SubCutaneous at bedtime  insulin lispro (ADMELOG) corrective regimen sliding scale   SubCutaneous three times a day before meals  levothyroxine 25 MICROGram(s) Oral daily  multivitamin 1 Tablet(s) Oral daily  nystatin Powder 1 Application(s) Topical two times a day  oxybutynin 5 milliGRAM(s) Oral two times a day  povidone iodine 10% Solution 1 Application(s) Topical <User Schedule>  tamsulosin 0.4 milliGRAM(s) Oral at bedtime      =======================================================  Labs:                        8.2    3.79  )-----------( 173      ( 16 Nov 2020 07:05 )             27.2      11-16    143  |  109<H>  |  34.0<H>  ----------------------------<  100<H>  4.0   |  24.0  |  1.67<H>    Ca    8.6      16 Nov 2020 07:05  Phos  3.3     11-16  Mg     1.6     11-16    TPro  5.8<L>  /  Alb  2.3<L>  /  TBili  0.4  /  DBili  x   /  AST  27  /  ALT  21  /  AlkPhos  95  11-16      Culture - Urine (collected 11-05-20 @ 21:53)  Source: .Urine Clean Catch (Midstream)  Final Report (11-06-20 @ 17:50):    <10,000 CFU/mL Normal Urogenital Nola    Culture - Blood (collected 11-05-20 @ 12:29)  Source: .Blood Blood-Peripheral  Final Report (11-10-20 @ 13:01):    No growth at 5 days.    Culture - Blood (collected 11-05-20 @ 12:28)  Source: .Blood Blood-Venous  Final Report (11-10-20 @ 13:01):    No growth at 5 days.      Creatinine, Serum: 1.67 mg/dL (11-16-20 @ 07:05)  Creatinine, Serum: 1.75 mg/dL (11-15-20 @ 06:34)  Creatinine, Serum: 1.83 mg/dL (11-14-20 @ 14:44)  Creatinine, Serum: 1.85 mg/dL (11-12-20 @ 07:39)    Procalcitonin, Serum: 0.26 ng/mL (11-06-20 @ 18:02)      Ferritin, Serum: 217 ng/mL (11-16-20 @ 07:05)  Ferritin, Serum: 164 ng/mL (11-07-20 @ 10:25)      WBC Count: 3.79 K/uL (11-16-20 @ 07:05)  WBC Count: 4.81 K/uL (11-15-20 @ 06:34)  WBC Count: 5.65 K/uL (11-14-20 @ 14:44)  WBC Count: 6.33 K/uL (11-12-20 @ 07:39)    Rapid RVP Result: NotDetec (10-27-20 @ 17:58)    COVID-19 PCR: NotDetec (11-14-20 @ 19:24)  COVID-19 PCR: NotDetec (11-12-20 @ 11:17)  COVID-19 PCR: NotDetec (11-10-20 @ 11:13)  COVID-19 IgG Antibody Interpretation: Positive (11-06-20 @ 21:29)  COVID-19 IgG Antibody Index: 78.70 Index (11-06-20 @ 21:29)  COVID-19 PCR: NotDetec (11-05-20 @ 12:33)  COVID-19 PCR: NotDetec (11-02-20 @ 11:05)  COVID-19 IgG Antibody Interpretation: Positive (10-28-20 @ 05:29)  COVID-19 IgG Antibody Index: 86.00 Index (10-28-20 @ 05:29)  COVID-19 PCR: NotDetec (10-27-20 @ 15:04)      Alkaline Phosphatase, Serum: 95 U/L (11-16-20 @ 07:05)  Alkaline Phosphatase, Serum: 108 U/L (11-15-20 @ 06:34)  Alkaline Phosphatase, Serum: 135 U/L (11-14-20 @ 14:44)  Alanine Aminotransferase (ALT/SGPT): 21 U/L (11-16-20 @ 07:05)  Alanine Aminotransferase (ALT/SGPT): 24 U/L (11-15-20 @ 06:34)  Alanine Aminotransferase (ALT/SGPT): 29 U/L (11-14-20 @ 14:44)  Aspartate Aminotransferase (AST/SGOT): 27 U/L (11-16-20 @ 07:05)  Aspartate Aminotransferase (AST/SGOT): 32 U/L (11-15-20 @ 06:34)  Aspartate Aminotransferase (AST/SGOT): 42 U/L (11-14-20 @ 14:44)  Bilirubin Total, Serum: 0.4 mg/dL (11-16-20 @ 07:05)  Bilirubin Total, Serum: 0.4 mg/dL (11-15-20 @ 06:34)  Bilirubin Total, Serum: 0.4 mg/dL (11-14-20 @ 14:44)        < from: CT Chest No Cont (11.15.20 @ 09:57) >   EXAM:  CT CHEST                          PROCEDURE DATE:  11/15/2020          INTERPRETATION:  Clinical indications: Evaluate for pneumonia.    Axial CT images of the chest are obtained without intravenous administration of contrast.    Comparisonis made with a prior chest CT of October 29, 2020.    No enlarged axillary, mediastinal or hilar lymph nodes. Subcutaneous edema, most notable involving the upper abdomen.    Cardiomegaly. No pericardial effusion. Main pulmonary artery is enlarged measuring about 4 cm in transverse dimension as on the prior study can be seen in the setting of pulmonary arterial hypertension. Diffuse atherosclerotic disease with involvement of the coronary arteries. Bilateral gynecomastia.    Evaluation of the upper abdomen demonstrate small hepatic calcifications as on the prior study. 2 cm splenic hypodensity with associated small calcifications is unchanged. Trace ascites.    Small bilateral pleural effusions with overall interval progression since October 29, 2020.    Evaluation of the lungs demonstrate nodular consolidations throughout a large portion of the left upper lobe in somewhat of a dependent effusion new since October 29, 2020. Left lower lobe areas of compressive atelectasis with interval progression possibly containing superimposed pneumonia given internal patchy components.    Right lower lobe partial compressive atelectasis. No central endobronchial lesions. Anterior deviation of the posterior wall of the trachea can be seen in the setting of tracheomalacia.    Evaluation of the bones demonstrate degenerative changes of the spine and the shoulders. Anterior dislocation of the left shoulder with an associated left shoulder joints effusion as on the prior study.    IMPRESSION: Left upper lobe nodular consolidations new since October 29, 2020 representing pneumonia. Left lower lobe areas of compressive atelectasis also possibly with superimposed pneumonia. Clinical correlation for aspiration is recommended given the distribution.    Small bilateral pleural effusions with overall interval progression since October 29, 2020.    Cardiomegaly with coronary artery atherosclerotic disease.    Subcutaneous edema.    Anterior left shoulder dislocation as on October 29, 2020.    SHERLYN DEAN MD; Attending Radiologist  This document has been electronically signed. Nov 15 2020 10:30AM    < end of copied text >

## 2020-11-16 NOTE — PROGRESS NOTE ADULT - ASSESSMENT
69 y/o M with h/o developmental disability, hypothyroidism, CHF, HTN, DM, AF taken off apixaban due to anemia/hematuria, CKD not on HD, refused hemodialysis, BPH, stim test in 8/2020 and recurrent ESBL UTI's presents from group home with hypothermia.  CT non-contrast Small bilateral pleural effusions with overall interval progression since October 29, 2020. Cardiomegaly with coronary artery atherosclerotic disease. Subcutaneous edema     ProBNP: not obtained     1) HFpEF  - patient not overtly volume overloaded   - CT chest shows evidence of small bilateral pleural effusion   - c/w Lasix 40mg IVP q 12hrs   - monitor I/Os; daily weights   - keep K ~4 and Mag ~ 2     2) Hypothermia: suspect due to recurrent infections likely due to possible osteomyelitis of the RLE vs suspected recurrent aspiration pneumonia  r/o UTI  - ID consulted, f/u cultures  - c/w meropenem    3) PAD with CLI of the w/ RLE [place] 5   - dry gangrene of the R foot   - ADELA/PVR not a complete study but shows diminished PVR waveforms of the bilateral infrapopliteal arteries suggestive of moderate disease of the infrapopliteal arteries   -US arterial duplex shows Elevated peak systolic velocity in the proximal left superficial femoral artery suggestive of a hemodynamically significant stenosis.  - Podiatry consult appreciated   - based on the results of the US arterial duplex may consider angiogram of the RLE   - start ASA and Lipitor 40mg po q hs     4) HTN   - blood pressure controlled   - HOLD all antihypertensives     5) Afib (was on Eliquis)   - in atrial fibrillation but rate controlled   - not on AC due to anemia and hematuria     Thank You   Kathy Krueger D.O.   Cardiology / Vascular Medicine

## 2020-11-16 NOTE — CONSULT NOTE ADULT - SUBJECTIVE AND OBJECTIVE BOX
HPI:  Patient is a 69 y/o M with h/o developmental disability, hypothyroidism, CHF, HTN, DM, AF taken off apixaban due to anemia/hematuria, CKD not on HD, refused hemodialysis, BPH, stim test in 8/2020 and recurrent ESBL UTI's presents from group home with hypothermia. Patient was treated with a full course of meropenem and seen by endocrine and started on levothyroixine. Patient now returns with temperature of 93 and worked up in the er and found to have a uti Patient seen at bedside with family member and also complains of cough.  Patient denies any other complaints. Patients family member is upset about constantly being in the hospital (14 Nov 2020 18:15)      History as above. Pt seen bedside in ED. Pt is in no NAD and AAOx3. Pt denied F/C/V/N/SOB. Pt was last seen by podiatry 6 days ago. Pt is from a group home and sees an outside podiatrist for wound care.     PMH:Encounter for blood transfusion    Anemia  History of gross hematuria  History of orthostatic hypotension  Bacterial pneumonia  Muscle weakness (generalized)  2019 novel coronavirus disease (COVID-19)  Falls  Heart failure  Afib  CHF (congestive heart failure)  Gout  Hiatal hernia  BPH (benign prostatic hyperplasia)  Cognitive impairment  Cardiomyopathy  HTN (hypertension)  DM (diabetes mellitus)      Allergies: No Known Allergies    Medications: dextrose 40% Gel 15 Gram(s) Oral once  dextrose 5%. 1000 milliLiter(s) IV Continuous <Continuous>  dextrose 5%. 1000 milliLiter(s) IV Continuous <Continuous>  dextrose 50% Injectable 25 Gram(s) IV Push once  dextrose 50% Injectable 12.5 Gram(s) IV Push once  dextrose 50% Injectable 25 Gram(s) IV Push once  furosemide   Injectable 40 milliGRAM(s) IV Push two times a day  glucagon  Injectable 1 milliGRAM(s) IntraMuscular once  heparin   Injectable 5000 Unit(s) SubCutaneous every 12 hours  insulin lispro (ADMELOG) corrective regimen sliding scale   SubCutaneous three times a day before meals  levothyroxine Injectable 12.5 MICROGram(s) IV Push at bedtime  meropenem  IVPB      meropenem  IVPB 1000 milliGRAM(s) IV Intermittent every 12 hours  nystatin Powder 1 Application(s) Topical two times a day  povidone iodine 10% Solution 1 Application(s) Topical <User Schedule>    FH:No pertinent family history in first degree relatives      PSX: S/P appendectomy    No significant past surgical history      SH: dextrose 40% Gel 15 Gram(s) Oral once  dextrose 5%. 1000 milliLiter(s) IV Continuous <Continuous>  dextrose 5%. 1000 milliLiter(s) IV Continuous <Continuous>  dextrose 50% Injectable 25 Gram(s) IV Push once  dextrose 50% Injectable 12.5 Gram(s) IV Push once  dextrose 50% Injectable 25 Gram(s) IV Push once  furosemide   Injectable 40 milliGRAM(s) IV Push two times a day  glucagon  Injectable 1 milliGRAM(s) IntraMuscular once  heparin   Injectable 5000 Unit(s) SubCutaneous every 12 hours  insulin lispro (ADMELOG) corrective regimen sliding scale   SubCutaneous three times a day before meals  levothyroxine Injectable 12.5 MICROGram(s) IV Push at bedtime  meropenem  IVPB      meropenem  IVPB 1000 milliGRAM(s) IV Intermittent every 12 hours  nystatin Powder 1 Application(s) Topical two times a day  povidone iodine 10% Solution 1 Application(s) Topical <User Schedule>      Vital Signs Last 24 Hrs  T(C): 36.4 (16 Nov 2020 11:19), Max: 36.4 (16 Nov 2020 05:00)  T(F): 97.6 (16 Nov 2020 11:19), Max: 97.6 (16 Nov 2020 11:19)  HR: 72 (16 Nov 2020 11:19) (50 - 72)  BP: 107/71 (16 Nov 2020 11:19) (101/66 - 107/71)  BP(mean): 78 (16 Nov 2020 05:00) (78 - 79)  RR: 18 (16 Nov 2020 11:19) (12 - 18)  SpO2: 95% (16 Nov 2020 11:19) (95% - 99%)    LABS                        8.2    3.79  )-----------( 173      ( 16 Nov 2020 07:05 )             27.2               11-16    143  |  109<H>  |  34.0<H>  ----------------------------<  100<H>  4.0   |  24.0  |  1.67<H>    Ca    8.6      16 Nov 2020 07:05  Phos  3.3     11-16  Mg     1.6     11-16    TPro  5.8<L>  /  Alb  2.3<L>  /  TBili  0.4  /  DBili  x   /  AST  27  /  ALT  21  /  AlkPhos  95  11-16        PHYSICAL EXAM  GEN: KIMBERLY MARKS is a pleasant well-nourished, well developed 70y Male in no acute distress, alert awake, and oriented to person, place and time.   LE Focused:    Vascular: Dorsalis Pedis and Posterior Tibial pulses 1/4. Capillary refill time less than 3 seconds digits 1-5 bilateral. Temperature gradient is warm to cold b/l.   Neuro: Protective sensation absent to the level of the digits bilateral via Ava touch test  Derm: Skin warm, dry and supple bilateral. right dorsal 1st MPJ wound ~2.9x1.6x0.2cm fibronecrotic base, no drainage, no malodor. left heel wound ~1x1.7x0.1 dry eschar/scab. no malodor, no drainage, no signs of infection. severe venous stasis dermatitis b/l LE   MSK: Muscle strength 5/5 all major muscle groups bilateral. No structural abnormality, bilaterally      Imaging:   Cultures: Culture Results:   <10,000 CFU/mL Normal Urogenital Nola (11-14 @ 20:55)      Assessment:   b/l foot wounds   venous stasis     PLAN:   Pt evaluated and chart reviewed   Discussed diagnosis and treatment with patient   Applied betadine DSD to b.l wounds    X-rays reviewed from last visit   Wounds have remained relatively unchanged   Weight bearing as tolerated  Podiatry to sign off  Wound care orders placed   Wound care: clean with sterile saline. Apply betadine to b/l wounds. Dress with sterile 4x4 gauze gloria or kerlix and ACE bandage. keep clean dry and intact. change every other day.   Pt may f/u outpt with his podiatrist on dc   Discussed and evaluated with attending

## 2020-11-16 NOTE — GOALS OF CARE CONVERSATION - ADVANCED CARE PLANNING - CONVERSATION DETAILS
SW aware of patient from prior admissions. patient pleasant and reports no complaints. patient did  verbalize not liking coming back and forth. patient resides at new group residence since he is now less ambulatory and requires increased assistance. Patient having recurrent admissions and case discussed with hospitalist Dr Davsi regarding possible chronic aspirations. Plan for swallow eval. SW advised physician that patient is under [employer] and therefore any further end of life decision making would require MOLST checklist. SW aware from previous admissions that checklist had initially been completed in April 2020 where [facility]- [facility] [employer] did not object to patients sisters request for DNR/DNI and no dialysis. SW obtained copy of letter of no objection and contacted patients sister Avani Perez  to review and confirm wishes . Sister acknowledges that she wants DNR/DNI and no dialysis orders to continue. SW completed new MOLST and attached letter of non objection since group Home did not send back original MOLST which had previously been completed.

## 2020-11-16 NOTE — SWALLOW BEDSIDE ASSESSMENT ADULT - ASR SWALLOW ASPIRATION MONITOR
upper respiratory infection/pneumonia/oral hygiene/fever/throat clearing/change of breathing pattern/position upright (90Y)/cough/gurgly voice

## 2020-11-16 NOTE — PROGRESS NOTE ADULT - ASSESSMENT
70y  Male  with h/o developmental disability, hypothyroidism, CHF, HTN, DM, AF taken off apixaban due to anemia/hematuria, CKD not on HD, refused hemodialysis, BPH, stim test in 8/2020 and recurrent ESBL UTI's. Patient presents from group home with hypothermia. Patient was treated with a full course of meropenem and seen by endocrine and started on levothyroixine. Patient now returns with temperature of 93F and worked up in the er and found to have a uti.      Hypothermia  Pneumonia on CT Chest  UTI  CKD    - UA with pyuria   - Blood cultures pending  - Urine cultures pending  - RVP Negative     - Continue Meropenem    - follow up all outstanding cultures  - trend temperature and WBC curve  - repeat cultures from blood and all sources if febrile.

## 2020-11-17 NOTE — SWALLOW VFSS/MBS ASSESSMENT ADULT - SLP GENERAL OBSERVATIONS
Pt recd awake & upright in stretcher in radiology, A&A Ox3, 0/10 pain pre and post, tolerating RA without overt distress.

## 2020-11-17 NOTE — SWALLOW VFSS/MBS ASSESSMENT ADULT - RECOMMENDED FEEDING/EATING TECHNIQUES
small sips/bites/alternate food with liquid/maintain upright posture during/after eating for 30 mins/no straws/position upright (90 degrees)/requires verbal cue for swallow x 2-3/allow for swallow between intakes/crush medication (when feasible)/hard swallow w/ each bite or sip/oral hygiene/provide rest periods between swallows

## 2020-11-17 NOTE — PROGRESS NOTE ADULT - ASSESSMENT
70y  Male  with h/o developmental disability, hypothyroidism, CHF, HTN, DM, AF taken off apixaban due to anemia/hematuria, CKD not on HD, refused hemodialysis, BPH, stim test in 8/2020 and recurrent ESBL UTI's. Patient presents from group home with hypothermia. Patient was treated with a full course of meropenem and seen by endocrine and started on levothyroixine. Patient now returns with temperature of 93F and worked up in the er and found to have a uti.      Hypothermia  Pneumonia on CT Chest  UTI  CKD    - UA with pyuria ; Urine culture with urogenital kelsey  blood culture remains negative for now    - RVP Negative     - antibiotics changed to Ceftriaxone  - thru 11/24/2020  - WHEN ready for discharge to home, can change to VANTIN 200mg PO BID

## 2020-11-17 NOTE — PROGRESS NOTE ADULT - ASSESSMENT
A/P: 71 y/o M with h/o developmental disability, hypothyroidism, CHF, HTN, DM, AF taken off apixaban due to anemia/hematuria, CKD not on HD, refused hemodialysis, BPH, stim test in 8/2020 and recurrent ESBL UTI's presents from group home with hypothermia.  CT non-contrast Small bilateral pleural effusions with overall interval progression since October 29, 2020. Cardiomegaly with coronary artery atherosclerotic disease. Subcutaneous edema     ProBNP: not obtained     1) HFpEF  - patient not overtly volume overloaded   - CT chest shows evidence of small bilateral pleural effusion   - Patient negative 5 L yesterday.   - Lasix 40mg IV daily, will transition to Lasix 40mg PO qday if cleared for oral medications.   - monitor I/Os; daily weights   - keep K ~4 and Mag ~ 2     2) Hypothermia: suspect due to recurrent infections likely due to possible osteomyelitis of the RLE vs suspected recurrent aspiration pneumonia  r/o UTI  - ID consulted, f/u cultures  - c/w meropenem    3) PAD with CLI of the w/ RLE [place] 5   - dry gangrene of the R foot   - ADELA/PVR not a complete study but shows diminished PVR waveforms of the bilateral infrapopliteal arteries suggestive of moderate disease of the infrapopliteal arteries   - US arterial duplex shows Elevated peak systolic velocity in the proximal left superficial femoral artery suggestive of a hemodynamically significant stenosis.  - Podiatry consult appreciated   - based on the results of the US arterial duplex may consider angiogram of the RLE, but patient defers at this time.  - start ASA and Lipitor 40mg po q hs     4) HTN   - blood pressure controlled   - HOLD all antihypertensives     5) Afib (was on Eliquis)   - in atrial fibrillation but rate controlled   - not on AC due to anemia and hematuria     Preliminary evaluation, please await official recommendations by Dr. Krueger

## 2020-11-17 NOTE — SWALLOW VFSS/MBS ASSESSMENT ADULT - ORAL PHASE
within functional limits/Uncontrolled bolus / spillover in keyshawn-pharynx Uncontrolled bolus / spillover in keyshawn-pharynx/Reduced anterior - posterior transport/within functional limits Within functional limits/Uncontrolled bolus / spillover in keyshawn-pharynx Uncontrolled bolus / spillover in keyshawn-pharynx/within functional limits within functional limits/Uncontrolled bolus / spillover in hypopharynx/Uncontrolled bolus / spillover in keyshawn-pharynx

## 2020-11-17 NOTE — SWALLOW VFSS/MBS ASSESSMENT ADULT - RECOMMENDED CONSISTENCY
Dysphagia III soft solids w/NECTAR thick liquids Dysphagia III soft solids w/NECTAR thick liquids  STRICT ASPIRATION PRECAUTIONS  1:1 assist with all meals - PROVIDE CUE FOR SWALLOW x 2-3 FOLLOWING EACH BITE/SIP  Meds crushed in puree  Upright to 90 degrees for all meals & >30 minutes following  Small bites/sips, slow rate, alternate solids & liquids  PT MUST SWALLOW 2-3 x FOLLOWING EACH INTAKE  SLP to follow for trial of dysphagia tx

## 2020-11-17 NOTE — SWALLOW VFSS/MBS ASSESSMENT ADULT - COMMENTS
Pt known to this service from previous hospitalizations. Most recent re-assess of swallow function completed on 9/22/2020, w/rx made for soft solids w/thin liquids.     CT chest on 11/15, reading, "Evaluation of the lungs demonstrate nodular consolidations throughout a large portion of the left upper lobe in somewhat of a dependent effusion new since October 29, 2020. Left lower lobe areas of compressive atelectasis with interval progression possibly containing superimposed pneumonia given internal patchy components".

## 2020-11-17 NOTE — PROGRESS NOTE ADULT - SUBJECTIVE AND OBJECTIVE BOX
CC: recurrent hypothermia/dysphagia    INTERVAL HPI/OVERNIGHT EVENTS: Patient seen and examined. Wants to eat. B/L foot wounds examined with ID, no apparent source of infection. Appears comfortable.     Vital Signs Last 24 Hrs  T(C): 36.4 (17 Nov 2020 07:43), Max: 36.7 (17 Nov 2020 05:46)  T(F): 97.6 (17 Nov 2020 07:43), Max: 98 (17 Nov 2020 05:46)  HR: 75 (17 Nov 2020 07:43) (69 - 79)  BP: 134/82 (17 Nov 2020 07:43) (86/50 - 134/82)  BP(mean): 77 (17 Nov 2020 05:46) (62 - 77)  RR: 18 (17 Nov 2020 07:43) (17 - 20)  SpO2: 95% (17 Nov 2020 07:43) (95% - 99%)      PHYSICAL EXAM:    GENERAL: NAD  CHEST/LUNG: coarse bs B/L, otherwise clear  HEART: Regular rate and rhythm; S1 S2  ABDOMEN: Soft, Nontender, Nondistended; Bowel sounds present  EXTREMITIES:  edema chronic statis dermatitis skin changes b/l lower leg. Left  heel shallow ulcer.   RIGHT dorsum of foot ulcer at MTP joint. DSD placed   NERVOUS SYSTEM:  Alert, responsive . Oriented to person  I&O's Detail    16 Nov 2020 07:01  -  17 Nov 2020 07:00  --------------------------------------------------------  IN:  Total IN: 0 mL    OUT:    Indwelling Catheter - Urethral (mL): 5250 mL  Total OUT: 5250 mL    Total NET: -5250 mL                                    8.1    4.44  )-----------( 192      ( 17 Nov 2020 07:25 )             27.0     17 Nov 2020 07:25    143    |  105    |  32.0   ----------------------------<  96     3.8     |  27.0   |  1.64     Ca    8.3        17 Nov 2020 07:25  Phos  3.2       17 Nov 2020 07:25  Mg     1.7       17 Nov 2020 07:25    TPro  5.8    /  Alb  2.3    /  TBili  0.4    /  DBili  x      /  AST  27     /  ALT  21     /  AlkPhos  95     16 Nov 2020 07:05      CAPILLARY BLOOD GLUCOSE      POCT Blood Glucose.: 152 mg/dL (17 Nov 2020 12:35)  POCT Blood Glucose.: 107 mg/dL (17 Nov 2020 07:46)  POCT Blood Glucose.: 137 mg/dL (16 Nov 2020 21:09)  POCT Blood Glucose.: 125 mg/dL (16 Nov 2020 16:43)    LIVER FUNCTIONS - ( 16 Nov 2020 07:05 )  Alb: 2.3 g/dL / Pro: 5.8 g/dL / ALK PHOS: 95 U/L / ALT: 21 U/L / AST: 27 U/L / GGT: x               MEDICATIONS  (STANDING):  cefTRIAXone   IVPB 1000 milliGRAM(s) IV Intermittent every 24 hours  dextrose 40% Gel 15 Gram(s) Oral once  dextrose 5%. 1000 milliLiter(s) (50 mL/Hr) IV Continuous <Continuous>  dextrose 5%. 1000 milliLiter(s) (100 mL/Hr) IV Continuous <Continuous>  dextrose 50% Injectable 25 Gram(s) IV Push once  dextrose 50% Injectable 12.5 Gram(s) IV Push once  dextrose 50% Injectable 25 Gram(s) IV Push once  glucagon  Injectable 1 milliGRAM(s) IntraMuscular once  heparin   Injectable 5000 Unit(s) SubCutaneous every 12 hours  insulin lispro (ADMELOG) corrective regimen sliding scale   SubCutaneous three times a day before meals  levothyroxine Injectable 12.5 MICROGram(s) IV Push at bedtime  nystatin Powder 1 Application(s) Topical two times a day  povidone iodine 10% Solution 1 Application(s) Topical <User Schedule>    MEDICATIONS  (PRN):      RADIOLOGY & ADDITIONAL TESTS:

## 2020-11-17 NOTE — SWALLOW VFSS/MBS ASSESSMENT ADULT - DIAGNOSTIC IMPRESSIONS
Pt presents w/moderate pharyngeal dysphagia & overall functional oral abilities. Oral stage noted for adequate acceptance & A-P transit of all PO, despite decreased containment w/noted premature pharyngeal entry observed variably between the keyshawn & hypopharynx following all PO, increase in depth of entry w/consistencies of decreased viscosity, 2* reduced lingual strength/ROM. No anterior loss or oral stasis visualized throughout.     Pharyngeal phase of swallow marked by reduced pharyngeal squeeze & UES opening, resulting in trace-min BOT/PPW & mod-severe vallecular/pyriform sinus stasis, following all consistencies (worsening w/increasingly viscous material). Noted w/slight improvement over time over course of trials, however Pt requiring consistent verbal cues for effortful subsequent swallow (as Pt without sensation of pharyngeal stasis), w/eventual benefit once successfully executed x 2-3. Additional decreased hyo-laryngeal elevation/excursion, & epiglottic deflection w/overall reduced airway protection, resulting in +penetration above the level of the vocal cords following thin liquids in 50% of trials, WITHOUT clearance despite cue for expectoration. Additional flash penetration of nectar thick liquids x 1 & mechanical soft x 1, however with SUCCESSFUL clearance. Compensatory postural strategies not attempted given fluctuating ability for command following. No aspiration visualized across trials throughout course of study.

## 2020-11-17 NOTE — PROGRESS NOTE ADULT - SUBJECTIVE AND OBJECTIVE BOX
[facility]  INFECTIOUS DISEASES AND INTERNAL MEDICINE at [place]  =======================================================  Zach Chew MD  Diplomates American Board of Internal Medicine and Infectious Diseases  Tel: [phone]      Fax: [phone]  =======================================================      [id number]  KIMBERLY DOLLJUANITA  follow up: hypothermia, UTI    still with interval hypothermia  no new complaints    =======================================================    Allergies  No Known Allergies    =======================================================     REVIEW OF SYSTEMS:  CONSTITUTIONAL:  No Fever.    HEENT:  No diplopia or blurred vision.  No earache, sore throat or runny nose.  CARDIOVASCULAR:  No pressure, squeezing, strangling, tightness, heaviness or aching about the chest, neck, axilla or epigastrium.  RESPIRATORY:  No cough, shortness of breath  GASTROINTESTINAL:  No nausea, vomiting or diarrhea.  GENITOURINARY:  No dysuria, frequency or urgency.   MUSCULOSKELETAL:  no joint aches, no muscle pain  SKIN:  No change in skin, hair or nails.  NEUROLOGIC:  No Headaches, seizures   PSYCHIATRIC:  No disorder of thought or mood.  ENDOCRINE:  No heat or cold intolerance  HEMATOLOGICAL:  No easy bruising or bleeding.       =======================================================    Physical Exam:    GEN: NAD, pleasant  HEENT: normocephalic and atraumatic. EOMI. PERRL.  Anicteric  NECK: Supple.   LUNGS: Clear to auscultation.  HEART: Regular rate and rhythm  ABDOMEN: Soft, nontender, and nondistended.  Positive bowel sounds.    : No CVA tenderness  + RIVER with TURBID urine.   EXTREMITIES: B/L Stasis dermatitis, Rt foot with wound   MSK: No joint swelling  NEUROLOGIC: Awake alert, answers simple questions   PSYCHIATRIC: unable to assess   SKIN: left posteromedial heel shallow ulcer.   RIGHT dorsum of foot ulcer at MTP joint      =======================================================  Vitals:  ============  T(F): 97.6 (17 Nov 2020 07:43), Max: 98 (17 Nov 2020 05:46)  HR: 75 (17 Nov 2020 07:43)  BP: 134/82 (17 Nov 2020 07:43)  RR: 18 (17 Nov 2020 07:43)  SpO2: 95% (17 Nov 2020 07:43) (95% - 99%)  temp max in last 48H T(F): , Max: 98 (11-17-20 @ 05:46)    =======================================================  Current Antibiotics:  cefTRIAXone   IVPB 1000 milliGRAM(s) IV Intermittent every 24 hours    Other medications:  dextrose 40% Gel 15 Gram(s) Oral once  dextrose 5%. 1000 milliLiter(s) IV Continuous <Continuous>  dextrose 5%. 1000 milliLiter(s) IV Continuous <Continuous>  dextrose 50% Injectable 25 Gram(s) IV Push once  dextrose 50% Injectable 12.5 Gram(s) IV Push once  dextrose 50% Injectable 25 Gram(s) IV Push once  glucagon  Injectable 1 milliGRAM(s) IntraMuscular once  heparin   Injectable 5000 Unit(s) SubCutaneous every 12 hours  insulin lispro (ADMELOG) corrective regimen sliding scale   SubCutaneous three times a day before meals  levothyroxine Injectable 12.5 MICROGram(s) IV Push at bedtime  nystatin Powder 1 Application(s) Topical two times a day  povidone iodine 10% Solution 1 Application(s) Topical <User Schedule>      =======================================================  Labs:                        8.1    4.44  )-----------( 192      ( 17 Nov 2020 07:25 )             27.0      11-17    143  |  105  |  32.0<H>  ----------------------------<  96  3.8   |  27.0  |  1.64<H>    Ca    8.3<L>      17 Nov 2020 07:25  Phos  3.2     11-17  Mg     1.7     11-17    TPro  5.8<L>  /  Alb  2.3<L>  /  TBili  0.4  /  DBili  x   /  AST  27  /  ALT  21  /  AlkPhos  95  11-16      Culture - Urine (collected 11-14-20 @ 20:55)  Source: .Urine Clean Catch (Midstream)  Final Report (11-16-20 @ 13:30):    <10,000 CFU/mL Normal Urogenital Nola    Culture - Blood (collected 11-14-20 @ 14:47)  Source: .Blood Blood-Venous    Culture - Blood (collected 11-14-20 @ 14:47)  Source: .Blood Blood-Venous    Culture - Urine (collected 11-05-20 @ 21:53)  Source: .Urine Clean Catch (Midstream)  Final Report (11-06-20 @ 17:50):    <10,000 CFU/mL Normal Urogenital Nola    Culture - Blood (collected 11-05-20 @ 12:29)  Source: .Blood Blood-Peripheral  Final Report (11-10-20 @ 13:01):    No growth at 5 days.    Culture - Blood (collected 11-05-20 @ 12:28)  Source: .Blood Blood-Venous  Final Report (11-10-20 @ 13:01):    No growth at 5 days.      Creatinine, Serum: 1.64 mg/dL (11-17-20 @ 07:25)  Creatinine, Serum: 1.67 mg/dL (11-16-20 @ 07:05)  Creatinine, Serum: 1.75 mg/dL (11-15-20 @ 06:34)  Creatinine, Serum: 1.83 mg/dL (11-14-20 @ 14:44)    Procalcitonin, Serum: 0.26 ng/mL (11-06-20 @ 18:02)      Ferritin, Serum: 217 ng/mL (11-16-20 @ 07:05)  Ferritin, Serum: 164 ng/mL (11-07-20 @ 10:25)      WBC Count: 4.44 K/uL (11-17-20 @ 07:25)  WBC Count: 3.79 K/uL (11-16-20 @ 07:05)  WBC Count: 4.81 K/uL (11-15-20 @ 06:34)  WBC Count: 5.65 K/uL (11-14-20 @ 14:44)    Rapid RVP Result: NotDetec (10-27-20 @ 17:58)    COVID-19 IgG Antibody Interpretation: Positive (11-15-20 @ 08:23)  COVID-19 IgG Antibody Index: 1.48 Index (11-15-20 @ 08:23)  COVID-19 PCR: NotDetec (11-14-20 @ 19:24)  COVID-19 PCR: NotDetec (11-12-20 @ 11:17)  COVID-19 PCR: NotDetec (11-10-20 @ 11:13)  COVID-19 IgG Antibody Interpretation: Positive (11-06-20 @ 21:29)  COVID-19 IgG Antibody Index: 78.70 Index (11-06-20 @ 21:29)  COVID-19 PCR: NotDetec (11-05-20 @ 12:33)  COVID-19 PCR: NotDetec (11-02-20 @ 11:05)  COVID-19 IgG Antibody Interpretation: Positive (10-28-20 @ 05:29)  COVID-19 IgG Antibody Index: 86.00 Index (10-28-20 @ 05:29)  COVID-19 PCR: NotDetec (10-27-20 @ 15:04)      Alkaline Phosphatase, Serum: 95 U/L (11-16-20 @ 07:05)  Alkaline Phosphatase, Serum: 108 U/L (11-15-20 @ 06:34)  Alkaline Phosphatase, Serum: 135 U/L (11-14-20 @ 14:44)  Alanine Aminotransferase (ALT/SGPT): 21 U/L (11-16-20 @ 07:05)  Alanine Aminotransferase (ALT/SGPT): 24 U/L (11-15-20 @ 06:34)  Alanine Aminotransferase (ALT/SGPT): 29 U/L (11-14-20 @ 14:44)  Aspartate Aminotransferase (AST/SGOT): 27 U/L (11-16-20 @ 07:05)  Aspartate Aminotransferase (AST/SGOT): 32 U/L (11-15-20 @ 06:34)  Aspartate Aminotransferase (AST/SGOT): 42 U/L (11-14-20 @ 14:44)  Bilirubin Total, Serum: 0.4 mg/dL (11-16-20 @ 07:05)  Bilirubin Total, Serum: 0.4 mg/dL (11-15-20 @ 06:34)  Bilirubin Total, Serum: 0.4 mg/dL (11-14-20 @ 14:44)          < from: CT Chest No Cont (11.15.20 @ 09:57) >   EXAM:  CT CHEST                          PROCEDURE DATE:  11/15/2020          INTERPRETATION:  Clinical indications: Evaluate for pneumonia.    Axial CT images of the chest are obtained without intravenous administration of contrast.    Comparisonis made with a prior chest CT of October 29, 2020.    No enlarged axillary, mediastinal or hilar lymph nodes. Subcutaneous edema, most notable involving the upper abdomen.    Cardiomegaly. No pericardial effusion. Main pulmonary artery is enlarged measuring about 4 cm in transverse dimension as on the prior study can be seen in the setting of pulmonary arterial hypertension. Diffuse atherosclerotic disease with involvement of the coronary arteries. Bilateral gynecomastia.    Evaluation of the upper abdomen demonstrate small hepatic calcifications as on the prior study. 2 cm splenic hypodensity with associated small calcifications is unchanged. Trace ascites.    Small bilateral pleural effusions with overall interval progression since October 29, 2020.    Evaluation of the lungs demonstrate nodular consolidations throughout a large portion of the left upper lobe in somewhat of a dependent effusion new since October 29, 2020. Left lower lobe areas of compressive atelectasis with interval progression possibly containing superimposed pneumonia given internal patchy components.    Right lower lobe partial compressive atelectasis. No central endobronchial lesions. Anterior deviation of the posterior wall of the trachea can be seen in the setting of tracheomalacia.    Evaluation of the bones demonstrate degenerative changes of the spine and the shoulders. Anterior dislocation of the left shoulder with an associated left shoulder joints effusion as on the prior study.    IMPRESSION: Left upper lobe nodular consolidations new since October 29, 2020 representing pneumonia. Left lower lobe areas of compressive atelectasis also possibly with superimposed pneumonia. Clinical correlation for aspiration is recommended given the distribution.    Small bilateral pleural effusions with overall interval progression since October 29, 2020.    Cardiomegaly with coronary artery atherosclerotic disease.    Subcutaneous edema.    Anterior left shoulder dislocation as on October 29, 2020.    SHERLYN DEAN MD; Attending Radiologist  This document has been electronically signed. Nov 15 2020 10:30AM    < end of copied text >

## 2020-11-17 NOTE — SWALLOW VFSS/MBS ASSESSMENT ADULT - ROSENBEK'S PENETRATION ASPIRATION SCALE
(1) no aspiration, contrast does not enter airway observed x 1/(2) contrast enters airway, remains above the vocal cords, no residue remains (penetration) x1/(2) contrast enters airway, remains above the vocal cords, no residue remains (penetration) (3) contrast remains above the vocal cords, visible residue remains (penetration)

## 2020-11-17 NOTE — PROGRESS NOTE ADULT - SUBJECTIVE AND OBJECTIVE BOX
[facility]                                                       [facility]                                                               Office: [address] [address]                                                              Telephone: [phone]. Fax:[phone]                                                                             PROGRESS NOTE  Reason for follow up: CHF, PAD/CLI of the bilateral LE  Overnight: No new events.   Update: Patient negative 5L since yesterday, currently resting comfortably off oxygen. Patient with L SFA disease, but patient deferred peripheral angiogram at this time. Patient awaiting swallowing study.       Review of symptoms:   Cardiac:  No chest pain. No dyspnea. No palpitations.  Respiratory: No cough. No dyspnea  Gastrointestinal: No diarrhea. No abdominal pain. No bleeding.     Past medical history: No updates.   	  Vitals:  T(C): 36.4 (11-17-20 @ 07:43), Max: 36.7 (11-17-20 @ 05:46)  HR: 75 (11-17-20 @ 07:43) (69 - 79)  BP: 134/82 (11-17-20 @ 07:43) (86/50 - 134/82)  RR: 18 (11-17-20 @ 07:43) (17 - 20)  SpO2: 95% (11-17-20 @ 07:43) (95% - 99%)  Wt(kg): --  I&O's Summary    16 Nov 2020 07:01  -  17 Nov 2020 07:00  --------------------------------------------------------  IN: 0 mL / OUT: 5250 mL / NET: -5250 mL      Weight (kg): 100 (11-14 @ 14:20)      PHYSICAL EXAM:  Constitutional: Comfortable . No acute distress.   HEENT: Atraumatic and normocephalic , neck is supple . no JVD. No carotid bruit. PEERL   CNS: A&Ox3. No focal deficits. EOMI. Cranial nerves II-IX are intact.   Lymph Nodes: Cervical : Not palpable.  Respiratory: CTAB  Cardiovascular: S1S2 RRR. No murmur/rubs or gallop.  Gastrointestinal: Soft non-tender and non distended . +Bowel sounds. negative Hernandez's sign.  Extremities: RLE: on the tip of the great toe + ulcer and on the base on the great first toe there is exposed bone with dry gangrene, LLE: pressure ulcer on the heel   Non palpable DP/PT bilaterally   Psychiatric: Calm . no agitation.  Skin: No skin rash/ulcers visualized to face, hands or feet.      CURRENT MEDICATIONS:  furosemide   Injectable 40 milliGRAM(s) IV Push two times a day    cefTRIAXone   IVPB  dextrose 40% Gel  dextrose 50% Injectable  dextrose 50% Injectable  dextrose 50% Injectable  glucagon  Injectable  insulin lispro (ADMELOG) corrective regimen sliding scale  levothyroxine Injectable  dextrose 5%.  dextrose 5%.  heparin   Injectable  nystatin Powder  povidone iodine 10% Solution      DIAGNOSTIC TESTING:  [ ] Echocardiogram:   < from: TTE Echo Complete w/ Contrast w/ Doppler (08.21.20 @ 09:38) >  PHYSICIAN INTERPRETATION:  Left Ventricle: The left ventricular internal cavity size is normal.  The interventricular septum is flattened in systole and diastole, consistent with right ventricular pressure and volume overload.  Right Ventricle: The right ventricular size is severely enlarged. RV systolic function is moderately reduced. RVOT VTi= 10.3 cm. AT= 81 msec.  Left Atrium: Severely enlarged left atrium. Color Doppler demonstrates the presence of a shunt at the Atrial level.  Right Atrium: Severely enlarged right atrium. Prominent Eustachean valve and prominent Chiari network.  Pericardium: There is no evidence of pericardial effusion.  Mitral Valve: There is mild mitral annular calcification. Trace mitral valve regurgitation is seen.  Tricuspid Valve: Severe tricuspid regurgitation is visualized. Estimated pulmonary artery systolic pressure is 40.4 mmHg assuming a right atrial pressure of 15 mmHg, which is consistent with mild pulmonary hypertension.  Aortic Valve: The aortic valve is trileaflet. Mild aortic valve regurgitation is seen.  Pulmonic Valve: Trace pulmonic valve regurgitation.  Aorta: The aortic root is normal in size and structure.  Pulmonary Artery: The main pulmonary artery is normal in size.  Venous: The inferior vena cava was dilated, with respiratory size variation less than 50%.      Summary:   1. Severely enlarged left atrium.   2. Color Doppler demonstrates the presence of a shunt at the Atrial level.   3. Severely enlarged right atrium.   4. Severely enlarged right ventricle. Moderately reduced RV systolic function.   5. Right ventricular volume and pressure overload.   6. Mild aortic regurgitation.   7. Severe tricuspid regurgitation.   8. Estimated pulmonary artery systolic pressure is 40 mmHg assuming -mild pulmonary hypertension.   9. The degree of pulmonary hypertension does not explain the degree of RV dysfunction.  10. There is no evidence of pericardial effusion.  11. Team informed.    MD Awilda, RPVI Electronically signed on 8/21/2020 at 12:53:14 PM    < end of copied text >      OTHER: 	  OTHER: < from: US Duplex Arterial Lower Ext Compl, Bilateral (11.16.20 @ 13:53) >    IMPRESSION:    Elevated peak systolic velocity in the proximal left superficial femoral artery suggestive of a hemodynamically significant stenosis.    Bilateral calf and dorsalis pedis arteries could not be evaluated secondary to an overlying bandage.    Arrhythmia.    PORSCHE HANSEN MD; Attending Radiologist  This document has been electronically signed. Nov 16 2020  2:51PM  LABS:	 	  CARDIAC MARKERS ( 16 Nov 2020 07:05 )  x     / x     / x     / x     / x      p-BNP 16 Nov 2020 07:05: 6725 pg/mL                          8.1    4.44  )-----------( 192      ( 17 Nov 2020 07:25 )             27.0     11-17    143  |  105  |  32.0<H>  ----------------------------<  96  3.8   |  27.0  |  1.64<H>    Ca    8.3<L>      17 Nov 2020 07:25  Phos  3.2     11-17  Mg     1.7     11-17    TPro  5.8<L>  /  Alb  2.3<L>  /  TBili  0.4  /  DBili  x   /  AST  27  /  ALT  21  /  AlkPhos  95  11-16    proBNP: Serum Pro-Brain Natriuretic Peptide: 6725 pg/mL (11-16 @ 07:05)    Lipid Profile:   HgA1c:   TSH: Thyroid Stimulating Hormone, Serum: 3.24 uIU/mL

## 2020-11-17 NOTE — SWALLOW VFSS/MBS ASSESSMENT ADULT - SLP PERTINENT HISTORY OF CURRENT PROBLEM
As per MD note, "As per MD note, "Patient is a 71 y/o M with h/o developmental disability, hypothyroidism, CHF, HTN, DM, AF taken off apixaban due to anemia/hematuria, CKD not on HD, refused hemodialysis, BPH, stim test in 8/2020 and recurrent ESBL UTI's presents from group home with hypothermia. Patient was treated with a full course of meropenem and seen by endocrine and started on levothyroixine. Patient now returns with temperature of 93 and worked up in the er and found to have a uti Patient seen at bedside with family member and also complains of cough".

## 2020-11-17 NOTE — PROGRESS NOTE ADULT - ATTENDING COMMENTS
dysphagia, aspiration pneumonia, PVD, leg ulcers. diastolic hf, recurrent hypothermia (environment vs recurrent infections)    c/w IV antibiotics, diet per speech eval, monitor for aspiration    c/w oral lasix.     d/w sister over the phone:  she is requesting a warming blanket at group home (will ask CM to look into that)    she will bring in his physician list so we can discuss with his primary vascular surgeon re his nicole/duplex studies.

## 2020-11-17 NOTE — SWALLOW VFSS/MBS ASSESSMENT ADULT - NS SWALLOW VFSS REC ASPIR MON
fever/change of breathing pattern/oral hygiene/cough/position upright (90Y)/pneumonia/throat clearing/upper respiratory infection/gurgly voice

## 2020-11-17 NOTE — PROGRESS NOTE ADULT - ASSESSMENT
71 y/o M with h/o developmental disability, hypothyroidism, CHF, HTN, DM2, AF taken off apixaban due to anemia/hematuria, CKD not on HD, refused hemodialysis, BPH, stim test in 8/2020 and recurrent ESBL UTI's presents from group home with hypothermia. Patient was treated with a full course of meropenem and seen by endocrine and started on levothyroxine.  Patient now returns with temperature of 93 and worked up in the er and found to have a uti     hypothermia: suspect due to recurrent infections. endocrine workup negative  suspected recurrent aspiration pneumonia (gram negative/anaerobes)       UTI ruled out as ucx negative       ID following      blood culture negative      c/w meropenem, warming blanket    acute on chronic diastolic Heart failure, RV dysfunction, mild pulmonary HTN     IV lasix, [facility] cardiology following     last echo in aug 2020 hold off on repeat unless directed by cardio     unclear etiology of RV failure; prior v/q scan negative    dysphagia: speech following. MBS completed, Soft diet with nectar thick liquids- needs supervision with meals    DM2: slidings leda    BPH: flomax, finasteride.      chronic rodriguez     CKD3: baseline    PVD: arterial u/s with suspected stenosis. hold off on vascular eval given pna/dysphagia and goals of care  foot ulcerations: wound care per podiatry. Add ASA/statin    anemia of renal disease: trend    dispo: Palliative Social Work involved for GOC.

## 2020-11-18 NOTE — DISCHARGE NOTE PROVIDER - CARE PROVIDERS DIRECT ADDRESSES
,[email or web address],DirectAddress_Unknown,DirectAddress_Unknown,DirectAddress_Unknown

## 2020-11-18 NOTE — CDI QUERY NOTE - NSCDIOTHERTXTBX_GEN_ALL_CORE_HH
Documentation noted sepsis workup in the ED.    Supporting Documentation:  Ed documentation:  Present on Admission - Sepsis: Yes.  Ed provider note:  69 yo M chronic hypothermia, CHF, HTN, DM, lives in a group home for developmental disability. patient found hypothermic. rectal temp 90.9. Patient found to have pneumonia. mild UTI. patient had ESBL. patient given vanc and meropenum. idris hugger placed. patient also mild hypotension that improved with IVF. Patient admitted for sepsis.  Temp= 90.9  RR=22    Please clarify the status of sepsis  A) Sepsis ruled out  B) Sepsis ruled in, POA  C) Other, please specify  D) Not clinically significant

## 2020-11-18 NOTE — DISCHARGE NOTE PROVIDER - NSDCFUSCHEDAPPT_GEN_ALL_CORE_FT
KIMBERLY MARKS ; 12/10/2020 ; [facility]PP Cardio [address]  KIMBERLY MARKS ; 02/10/2021 ; [facility]PP Nephro [address] [facility]

## 2020-11-18 NOTE — DISCHARGE NOTE PROVIDER - INSTRUCTIONS
Consistent Carbohydrate  DASH/TLC  Soft diet with nectar thick liquids  Needs supervision during meals.  Patient to swallow 3 times between bites in order to clear throat prior to taking in more food.

## 2020-11-18 NOTE — DISCHARGE NOTE NURSING/CASE MANAGEMENT/SOCIAL WORK - PATIENT PORTAL LINK FT
You can access the FollowMyHealth Patient Portal offered by [facility] by registering at the following website: [email or web address]. By joining [facility]’s FollowMyHealth portal, you will also be able to view your health information using other applications (apps) compatible with our system.

## 2020-11-18 NOTE — CDI QUERY NOTE - NSCDIOTHERTXTBX2_GEN_ALL_CORE_FT
Documentation noted PNA    Supporting Documentation:    Ed provider note:  Patient found to have pneumonia. mild UTI. patient had ESBL. patient given vanc and meropenum.  11/6 PA note:   71 y/o male admitted with hypothermia 2/2 UTI vs. PNA. Currently with another hypothermic episode.  -Warming blanket applied  -Blood cultures pending  -Continue on meropenem  -AM Labs ordered  -Continue to monitor.  Discharge note:  1) Hypothermia   - Possible UTI (asymptomatic) vs pneumonia (CXR with mild right infiltrate and +cough)  - Completed  meropenem today 11/11/20  - Guaifenesin added for cough, controlled  - Blood and urine cultures negative  - Seen by endo; ACTH, AM cortisol, and antibo    Please clarify the status of PNA  A) PNA ruled in, POA (Pls specify type)  B) PNA ruled out  C) Unable to rule PNA out, it was monitored and treated during this admission  D) Other, please specify  E) Not clinically significant

## 2020-11-18 NOTE — DISCHARGE NOTE PROVIDER - HOSPITAL COURSE
69 y/o M with h/o developmental disability, hypothyroidism, CHF, HTN, DM2, AF taken off apixaban due to anemia/hematuria, CKD not on HD, refused hemodialysis, BPH with chronic rodriguez, stim test in 8/2020 and recurrent ESBL UTI's presents from [facility] with recurrent hypothermia. Patient had recently been hospitalized for this issue, was treated with a full course of meropenem and seen by endocrine and started on levothyroxine for a potential subclinical hypothyroidism component. He was discharged on 11/12 but returned to the ER on 11/14 with temperature of 93. Cultures of blood and urine remained negative but he was found to have a ?aspiration PNA on chest CT from 11/15. During his hospital stay he was continued on meropenem but will continue with PO cefpodoxime upon discharge for empiric treatment of the PNA. Endocrinology saw the patient in consult again on this admission and still do not believe there is an endocrine component to his recurrent hypothermia, but recommended that he follow up with neurology if no improvement with abx. To address his peripheral vascular disease of the RLE, he will follow up outpatient with his established vascular team. Pt is medically stable for d/c at this time.     CONSTITUTIONAL: (-) fever, (-)chills  RESPIRATORY: (-) SOB, (-) cough  CARDIOVASCULAR: (-) chest pain, (-) palpitations  GASTROINTESTINAL: (-) abdominal pain, (-) nausea, (-) vomiting , (-) diarrhea     PHYSICAL EXAM:    Vital Signs Last 24 Hrs  T(C): 37.2 (18 Nov 2020 10:29), Max: 37.2 (18 Nov 2020 10:29)  T(F): 98.9 (18 Nov 2020 10:29), Max: 98.9 (18 Nov 2020 10:29)  HR: 73 (18 Nov 2020 09:00) (64 - 86)  BP: 95/57 (18 Nov 2020 09:00) (94/56 - 103/63)  BP(mean): --  RR: 18 (18 Nov 2020 09:00) (18 - 18)  SpO2: 97% (18 Nov 2020 09:00) (97% - 98%)    GENERAL: Pt lying comfortably, NAD, AOx3  CHEST/LUNG: Breathing comfortably on room air. Clear to auscultation bilaterally; No rhonchi, rales or wheezing.  HEART: Regular rate and rhythm; No murmurs, rubs or gallops.   ABDOMEN: Soft, Nontender, Nondistended; Bowel sounds present.     69 y/o M with h/o developmental disability, hypothyroidism, CHF, HTN, DM2, AF taken off apixaban due to anemia/hematuria, CKD not on HD, refused hemodialysis, BPH with chronic rodriguez, stim test in 8/2020 and recurrent ESBL UTI's presents from [facility] with recurrent hypothermia. Patient had recently been hospitalized for this issue, was treated with a full course of meropenem and seen by endocrine and started on levothyroxine for a potential subclinical hypothyroidism component. He was discharged on 11/12 but returned to the ER on 11/14 with temperature of 93. Cultures of blood and urine remained negative but he was found to have a ?aspiration PNA on chest CT from 11/15. During his hospital stay he was continued on meropenem but will continue with PO cefpodoxime upon discharge for empiric treatment of the PNA. Endocrinology saw the patient in consult again on this admission and still do not believe there is an endocrine component to his recurrent hypothermia, but recommended that he follow up with neurology if no improvement with abx. To address his peripheral vascular disease of the RLE, he will follow up outpatient with his established vascular team. underwent modified barium study with recommendation fo soft diet with nectar thick liquids and 2-3 swallows after each bite/sip. patient needs help with feeds.  Diuresed for fluid overload from acute  diastolic heart failure and cardiology recommending oral lasix on discharge.    updated RN at [facility]. suspect recurrent hypothermia may be environmental or consequence of recurrent aspiration (silent or symptomatic).     Pt is medically stable for d/c at this time.     CONSTITUTIONAL: (-) fever, (-)chills  RESPIRATORY: (-) SOB, (-) cough  CARDIOVASCULAR: (-) chest pain, (-) palpitations  GASTROINTESTINAL: (-) abdominal pain, (-) nausea, (-) vomiting , (-) diarrhea     PHYSICAL EXAM:    Vital Signs Last 24 Hrs  T(C): 37.2 (18 Nov 2020 10:29), Max: 37.2 (18 Nov 2020 10:29)  T(F): 98.9 (18 Nov 2020 10:29), Max: 98.9 (18 Nov 2020 10:29)  HR: 73 (18 Nov 2020 09:00) (64 - 86)  BP: 95/57 (18 Nov 2020 09:00) (94/56 - 103/63)  BP(mean): --  RR: 18 (18 Nov 2020 09:00) (18 - 18)  SpO2: 97% (18 Nov 2020 09:00) (97% - 98%)    GENERAL: Pt lying comfortably, NAD, AOx3  CHEST/LUNG: Breathing comfortably on room air. Clear to auscultation bilaterally; No rhonchi, rales or wheezing.  HEART: Regular rate and rhythm; No murmurs, rubs or gallops.   ABDOMEN: Soft, Nontender, Nondistended; Bowel sounds present.     71 y/o M with h/o developmental disability, hypothyroidism, CHF, HTN, DM2, AF taken off apixaban due to anemia/hematuria, CKD not on HD, refused hemodialysis, BPH with chronic rodriguez, stim test in 8/2020 and recurrent ESBL UTI's presents from [facility] with recurrent hypothermia. Patient had recently been hospitalized for this issue, was treated with a full course of meropenem and seen by endocrine and started on levothyroxine for a potential subclinical hypothyroidism component. He was discharged on 11/12 but returned to the ER on 11/14 with temperature of 93. Cultures of blood and urine remained negative but he was found to have a ?aspiration PNA on chest CT from 11/15. During his hospital stay he was continued on meropenem but will continue with PO cefpodoxime upon discharge for empiric treatment of the PNA. Endocrinology saw the patient in consult again on this admission and still do not believe there is an endocrine component to his recurrent hypothermia, but recommended that he follow up with neurology if no improvement with abx. To address his peripheral vascular disease of the RLE, he will follow up outpatient with his established vascular team. underwent modified barium study with recommendation fo soft diet with nectar thick liquids and 2-3 swallows after each bite/sip. patient needs help with feeds.  Diuresed for fluid overload from acute  diastolic heart failure and cardiology recommending oral lasix on discharge.    updated RN at [facility]. suspect recurrent hypothermia may be environmental or consequence of recurrent aspiration (silent or symptomatic).     Pt is medically stable for d/c at this time.     CONSTITUTIONAL: (-) fever, (-)chills  RESPIRATORY: (-) SOB, (-) cough  CARDIOVASCULAR: (-) chest pain, (-) palpitations  GASTROINTESTINAL: (-) abdominal pain, (-) nausea, (-) vomiting , (-) diarrhea     PHYSICAL EXAM:    Vital Signs Last 24 Hrs  T(C): 37.2 (18 Nov 2020 10:29), Max: 37.2 (18 Nov 2020 10:29)  T(F): 98.9 (18 Nov 2020 10:29), Max: 98.9 (18 Nov 2020 10:29)  HR: 73 (18 Nov 2020 09:00) (64 - 86)  BP: 95/57 (18 Nov 2020 09:00) (94/56 - 103/63)  BP(mean): --  RR: 18 (18 Nov 2020 09:00) (18 - 18)  SpO2: 97% (18 Nov 2020 09:00) (97% - 98%)    GENERAL: Pt lying comfortably, NAD, AOx3  CHEST/LUNG: Breathing comfortably on room air. Fine base crackles  HEART: Regular rate and rhythm; No murmurs, rubs or gallops.   ABDOMEN: Soft, Nontender, Nondistended; Bowel sounds present.  EXTREMITIES:  edema chronic statis dermatitis skin changes b/l lower leg. DSD placed C/D/I  NERVOUS SYSTEM:  Alert, responsive . Oriented to person

## 2020-11-18 NOTE — DISCHARGE NOTE PROVIDER - NSDCMRMEDTOKEN_GEN_ALL_CORE_FT
allopurinol 300 mg oral tablet: 1 tab(s) orally once a day  aspirin 81 mg oral delayed release tablet: 1 tab(s) orally once a day  atorvastatin 20 mg oral tablet: 1 tab(s) orally once a day (at bedtime)  Caltrate 600+D oral tablet, chewable: 1 tab(s) chewed once a day   cefpodoxime 200 mg oral tablet: 1 tab(s) orally every 12 hours  dutasteride 0.5 mg oral capsule: 1 cap(s) orally once a day  ferrous sulfate 325 mg (65 mg elemental iron) oral tablet: 1 tab(s) orally 3 times a day  furosemide 40 mg oral tablet: 1 tab(s) orally once a day  Januvia 100 mg oral tablet: 1 tab(s) orally once a day  levothyroxine 25 mcg (0.025 mg) oral tablet: 1 tab(s) orally once a day  Multiple Vitamins oral tablet: 1 tab(s) orally once a day  nystatin 100,000 units/g topical powder: Apply topically to groin and abdominal fold 2 times a day as needed for redness  oxybutynin 10 mg/24 hr oral tablet, extended release: 1 tab(s) orally once a day  polyethylene glycol 3350 oral powder for reconstitution: 17 gram(s) orally once a day, As needed for  Constipation if no BM in one day, discontinue for loose stool  tamsulosin 0.4 mg oral capsule: 1 cap(s) orally once a day (at bedtime)  Vitamin C 250 mg oral tablet: 1 tab(s) orally once a day  zinc sulfate 220 mg oral capsule: 1 cap(s) orally once a day

## 2020-11-18 NOTE — DISCHARGE NOTE PROVIDER - NSDCFUADDINST_GEN_ALL_CORE_FT
Wound care: clean with sterile saline. Apply betadine to b/l wounds. Dress with sterile 4x4 gauze gloria or kerlix and ACE bandage. keep clean dry and intact. change every other day.   Pt may f/u outpt with his podiatrist on dc

## 2020-11-18 NOTE — DISCHARGE NOTE PROVIDER - NSDCCPCAREPLAN_GEN_ALL_CORE_FT
PRINCIPAL DISCHARGE DIAGNOSIS  Diagnosis: Aspiration pneumonia  Assessment and Plan of Treatment: Please continue on cefpodoxime and follow up with your primary care provider within one week.      SECONDARY DISCHARGE DIAGNOSES  Diagnosis: Hypothermia not due to cold exposure  Assessment and Plan of Treatment: Recurrent, potentially secondary to recurrent infections. Urine and blood cultures negative. Please follow up outpatient with neurology if no improvement with antibiotics.    Diagnosis: Dry gangrene  Assessment and Plan of Treatment: Right lower extremity. Started on atorvastatin and ASA 81mg. Please follow up outpatient with your vascular team for further management.    Diagnosis: Dysphagia  Assessment and Plan of Treatment: Soft diet with nectar thick liquids, needs supervision with meals.    Diagnosis: Chronic diastolic heart failure  Assessment and Plan of Treatment: Started on Lasix 40mg. PLease follow up with your cardiologist for further managament.     PRINCIPAL DISCHARGE DIAGNOSIS  Diagnosis: Aspiration pneumonia  Assessment and Plan of Treatment: Please continue on cefpodoxime and follow up with your primary care provider within one week.      SECONDARY DISCHARGE DIAGNOSES  Diagnosis: Dry gangrene  Assessment and Plan of Treatment: Right lower extremity. Started on atorvastatin and ASA 81mg. Please follow up outpatient with your vascular team for further management.    Diagnosis: Dysphagia  Assessment and Plan of Treatment: Soft diet with nectar thick liquids, needs supervision with meals.    Diagnosis: Chronic diastolic heart failure  Assessment and Plan of Treatment: Started on Lasix 40mg. PLease follow up with your cardiologist or Dr Krueger for further managament.    Diagnosis: Hypothermia not due to cold exposure  Assessment and Plan of Treatment: Recurrent, potentially secondary to recurrent infections. Urine and blood cultures negative. Please follow up outpatient with neurology if no improvement with antibiotics.

## 2020-11-18 NOTE — DISCHARGE NOTE PROVIDER - CARE PROVIDER_API CALL
Cindy Arriaza  SURGERY  [address] [place] [address]  Phone: [phone]  Fax: [phone]  Established Patient  Follow Up Time: Routine    Segundo Clark  ENDOCRINOLOGY/METAB/DIABETES  [address] [place] [address]  Phone: [phone]  Fax: [phone]  Established Patient  Follow Up Time: Routine    Primary Care Provider,   Phone: (   )    -  Fax: (   )    -  Follow Up Time: 1 week    Home cardiologist,   Phone: (   )    -  Fax: (   )    -  Established Patient  Follow Up Time: Routine   Cindy Arriaza  SURGERY  [address] [place] [address]  Phone: [phone]  Fax: [phone]  Established Patient  Follow Up Time: Routine    Segundo Clark  ENDOCRINOLOGY/METAB/DIABETES  [address] [place] [address]  Phone: [phone]  Fax: [phone]  Established Patient  Follow Up Time: Routine    Primary Care Provider,   Phone: (   )    -  Fax: (   )    -  Follow Up Time: 1 week    Kathy Krueger (DO)  Internal Medicine  [address] [place] [address]  Phone: [phone]  Fax: [phone]  Follow Up Time:

## 2020-11-18 NOTE — DISCHARGE NOTE PROVIDER - PROVIDER TOKENS
PROVIDER:[[id number]],FOLLOWUP:[Routine],ESTABLISHEDPATIENT:[T]],PROVIDER:[[id number]],FOLLOWUP:[Routine],ESTABLISHEDPATIENT:[T]],FREE:[LAST:[Primary Care Provider],PHONE:[(   )    -],FAX:[(   )    -],FOLLOWUP:[1 week]],FREE:[LAST:[Home cardiologist],PHONE:[(   )    -],FAX:[(   )    -],FOLLOWUP:[Routine],ESTABLISHEDPATIENT:[T]] PROVIDER:[TOKEN:[[id number]],FOLLOWUP:[Routine],ESTABLISHEDPATIENT:[T]],PROVIDER:[TOKEN:[[id number]],FOLLOWUP:[Routine],ESTABLISHEDPATIENT:[T]],FREE:[LAST:[Primary Care Provider],PHONE:[(   )    -],FAX:[(   )    -],FOLLOWUP:[1 week]],PROVIDER:[TOKEN:[[id number]]]

## 2020-11-19 NOTE — CDI QUERY NOTE - NSCDIOTHERTXTBX_GEN_ALL_CORE_HH
There seems to be a conflicting CHF acuity documentations. Progress notes with acute on chronic diastolic Heart failure while dc note with Chronic diastolic heart failure and also with acute diastolic heart failure. Can you please clarify?    A.	Acute on chronic diastolic heart failure  B.	Acute diastolic heart failure  C.	Chronic diastolic heart failure  D.	Other, please specify  E.	Not clinically significant      Supporting Documentations:      Serum Pro-BNP:  11/16/20: 6725H      11/15/20, 11/16/20, 11/17/20  Progress Note Hospitalist:  acute on chronic diastolic Heart failure, RV dysfunction, mild pulmonary HTN     IV lasix, [facility] cardiology consulted     last echo in aug 2020 hold off on repeat unless directed by cardio     unclear etiology of RV failure; prior v/q scan negative      11/15/20 Consult Note Cardiology:  1) HFpEF  - patient not overtly volume overloaded   - CT chest shows evidence of small bilateral pleural effusion   - start Lasix 40mg IVP q 12hrs   - monitor I/Os; daily weights   - keep K ~4 and Mag ~ 2       DC Note:  Diuresed for fluid overload from acute diastolic heart failure and cardiology recommending oral lasix on discharge.  Diagnosis: Chronic diastolic heart failure  Assessment and Plan of Treatment: Started on Lasix 40mg. PLease follow up with your cardiologist or Dr Krueger for further managament.

## 2020-12-08 NOTE — CHART NOTE - NSCHARTNOTEFT_GEN_A_CORE
Called by RN for agitated patient, trying to pull off cardiac monitor, IV, and warming blanket. Called by RN for agitated patient, trying to pull off cardiac monitor, IV, and warming blanket.  Patient with developmental disability, admitted for lethargy/AMS.   Patient seen and examined, in NAD. Unable to assess patient due to mental status/lethargy.   0.5mg Ativan IVP and 1:1 ordered for safety and medical management.     Later called by RN for BP of 84/59. Rectal temp increasing from 90.1 to 91.6F  500cc fluid bolus ordered.   Previous lactate elevated at 2.5. Patient s/p bolus earlier in evening. Repeat Lactate ordered.   CT scan negative and blood cultures pending. Patient with positive UA, on IV Zosyn.   Continue to monitor patient, notify PA of any changes in patient status.

## 2020-12-08 NOTE — H&P ADULT - NSHPLABSRESULTS_GEN_ALL_CORE
LABS:                        7.7    8.84  )-----------( 73       ( 08 Dec 2020 15:41 )             25.1     12-08    139  |  103  |  71.0<H>  ----------------------------<  98  4.0   |  21.0<L>  |  3.62<H>    Ca    9.1      08 Dec 2020 15:41    TPro  6.6  /  Alb  2.5<L>  /  TBili  0.3<L>  /  DBili  x   /  AST  42<H>  /  ALT  22  /  AlkPhos  109  12-08    PT/INR - ( 08 Dec 2020 15:41 )   PT: 23.0 sec;   INR: 2.05 ratio      PTT - ( 08 Dec 2020 15:41 )  PTT:54.5 sec  CARDIAC MARKERS ( 08 Dec 2020 15:41 )  x     / 0.08 ng/mL / x     / x     / x          CT Brain Stroke Protocol (12.08.20 @ 16:00)  1)  no CT evidence of an acute infarct or hemorrhage.  2)  underlying involutional changes with volume loss.    CT Angio Neck w/ IV Cont (12.08.20 @ 16:01)   1. Calcified plaque in both carotid bulbs without significant narrowing. Both internal carotid arteries are widely patent without dissection.  2.Left vertebral artery is well visualized and is widely patent. There is only intermittent faint visualization of the right vertebral artery which is not continuous.  3. Intracranially, the anterior cerebral circulation is markedly redundant but is widely patent.  4. The posterior cerebral circulation is fed by the left vertebral artery, and the basilar artery is widely patent. Both posterior cerebral arteries are widely patent.    Xray Chest 1 View- PORTABLE-Urgent (12.08.20 @ 16:56)   Small left sided pleural effusion. Unchanged cardiomegaly.  Chronic anterior dislocation of the left shoulder.

## 2020-12-08 NOTE — ED ADULT NURSE REASSESSMENT NOTE - NS ED NURSE REASSESS COMMENT FT1
Pt. rectal temp 90.1; MD Bailey made aware and pt. to be placed on [facility].  [facility] 2 at this time for answering one question incorrectly and dysarthria.

## 2020-12-08 NOTE — ED PROVIDER NOTE - PHYSICAL EXAMINATION
Gen: lethargic appearing, oriented to self and place  Head: NCAT  HEENT: Dry oral mucosa  Lung: CTAB, no respiratory distress, no wheezing, rales, rhonchi  CV: normal s1/s2, rrr, no murmurs, Normal perfusion  Abd: soft, NTND  MSK: No edema, no visible deformities, full range of motion in all 4 extremities  Neuro: No focal neurologic deficits  Skin: No rash   Psych: normal affect Gen: lethargic appearing, oriented to self and place  Head: NCAT  HEENT: Dry oral mucosa  Lung: CTAB, no respiratory distress, no wheezing, rales, rhonchi  CV: normal s1/s2, rrr, no murmurs, Normal perfusion  Abd: soft, NTND  MSK: extremities with zane boot present b/l,  full range of motion in all 4 extremities  Neuro: No focal neurologic deficits  Skin: fungal-appearing rash present to b/l upper thighs with excoriations  Psych: normal affect

## 2020-12-08 NOTE — H&P ADULT - NSHPPHYSICALEXAM_GEN_ALL_CORE
Vital Signs   T(C): 32.3 (08 Dec 2020 17:09), Max: 36.7 (08 Dec 2020 15:30)  T(F): 90.1 (08 Dec 2020 17:09), Max: 98.1 (08 Dec 2020 15:30)  HR: 79 (08 Dec 2020 17:09) (77 - 79)  BP: 108/57 (08 Dec 2020 17:09) (108/57 - 123/82)  RR: 18 (08 Dec 2020 17:09) (18 - 18)  SpO2: 100% (08 Dec 2020 17:09) (97% - 100%)  General: Elderly male lying in bed comfortably. No acute distress but very lethargic  HEENT: PERRLA. EOMI. Clear conjunctivae. Moist mucus membrane  Neck: Supple.   Chest: Good air entry - no wheezing, rales or rhonchi. No chest wall tenderness.  Heart: S1 & S2 with irregular rhythm.   Abdomen: Ventral hernia. Soft. Non-tender. Non-distended. + BS  : Fungal rash in genital area and upper thighs.   Ext: Unna boots on both lower extremities. Ulcer on right first metatarsal with purulent discharge   Neuro: Sleepy and lethargic.   Skin: Fungal rash in urogenital area and upper thighs. Pressure ulcers on both buttocks as per RN. Patient is not cooperating at this time to examine again.   Psychiatry: Unable to assess

## 2020-12-08 NOTE — H&P ADULT - HISTORY OF PRESENT ILLNESS
INCOMPLETE History was taken from charts as Aide from residential gone by the time patient was admitted.    70 years old male with recurrent hospitalizations, Developmental Disability, Chronic A. Fib not on AC due to Hematuria/Anemia, Diastolic Heart Failure, CKD 3, HTN, HLD, DM 2, Hypothermia, Chronic Venous Stasis, LE Ulcers, Chronic Left Shoulder Dislocation, Dysphagia and Aspiration Pneumonia sent from correction as he was not doing well. He was lethargic, weak and his speech was slurred.   Patient is currently very sleepy and lethargic however wakes up on verbal stimuli. No active complaints. Speech is clear at this time.   He was code stroke in ER. No acute finding on CT. Found to be hypothermic. CXR with chronic small left pleural effusion. Urine was just sent and as per RN it was very cloudy.

## 2020-12-08 NOTE — ED PROVIDER NOTE - OBJECTIVE STATEMENT
69yo male with PMH atrial fibrillation (no AC), DM, congestive heart failure, cognitive impairment, congestive heart failure, presenting from group home with generalized weakness and slurred speech that started last night at 9pm. Per group home patient normally conversive and can ambulate however since last night has been lethargic with slurred speech. No focal weakness, no vomiting or diarrhea.

## 2020-12-08 NOTE — ED ADULT NURSE REASSESSMENT NOTE - NS ED NURSE REASSESS COMMENT FT1
pt coming from cc room. family at bedside, awaiting update from . Dr. Bailey aware family awaiting update. pt comfortable at this time. will continue to monitor

## 2020-12-08 NOTE — ED PROVIDER NOTE - CLINICAL SUMMARY MEDICAL DECISION MAKING FREE TEXT BOX
71yo male with multiple medical comorbidities p/w slurred speech and lethargy, code stroke activated, not candidate for tPA, no LVO on CTA. Suspect infectious/metabolic cause- check labs, ekg, cxr, give fluids and reassess. Mariel Bailey DO

## 2020-12-08 NOTE — ED PROVIDER NOTE - CARE PLAN
Principal Discharge DX:	Hypothermia  Secondary Diagnosis:	Acute kidney injury  Secondary Diagnosis:	Sepsis

## 2020-12-08 NOTE — ED PROVIDER NOTE - PROGRESS NOTE DETAILS
30cc/kilo of fluid not given 2/2 congestive heart failure. Mariel Bailey DO Pt noted to be hypothermic upon obtaining rectal temp, 90.1F- bcx/ua/ucx sent, antibiotics given, 30cc/kilo of fluid not given 2/2 h/o congestive heart failure. Mariel Bailey DO Patient more alert on bairhugger however now agitated and pulling at lines, pt's sister at bedside updated on clinical status and need for admission. D/w hospitalist who will admit patient., Mariel Bailey DO

## 2020-12-08 NOTE — ED CLERICAL - NS ED CLERK NOTE PRE-ARRIVAL INFORMATION; ADDITIONAL PRE-ARRIVAL INFORMATION
This patient is eligible for outpatient care navigation through the STAR readmission reduction initiative. This patient will be engaged by the transitional care management team to enroll into this program. Please call the number above to facilitate this enrollment or for close follow up.

## 2020-12-08 NOTE — H&P ADULT - ASSESSMENT
INCOMPLETE 70 years old male with recurrent hospitalizations, Developmental Disability, Chronic A. Fib not on AC due to Hematuria/Anemia, Diastolic Heart Failure, CKD 3, HTN, HLD, DM 2, Hypothermia, Chronic Venous Stasis, LE Ulcers, Chronic Left Shoulder Dislocation, Dysphagia and Aspiration Pneumonia sent from long term as he was not doing well. He was lethargic, weak and his speech was slurred.   Patient is currently very sleepy and lethargic however wakes up on verbal stimuli. No active complaints. Speech is clear at this time.   He was code stroke in ER. No acute finding on CT. Found to be hypothermic. CXR with chronic small left pleural effusion. Urine was just sent and as per RN it was very cloudy.    1) Rule out Sepsis  - Follow UA and Cultures  - Continue empiric Zosyn. Vanco x 1 given, check trough in am and further dosing accordingly  - Will consider ID Consult    2) Dysphagia  - NPO  - Swallow Eval    3) LE Ulcers  - Wound care  - Podiatry Consult    4) Rash / ? Cellulitis both thighs  - Antibiotics as above  - Nystatin Powder    5) Hypothermia  - Chronic, had extensive work up in past  - Hyperthermia blanker    6) DM 2  - Accu checks and ISS    7) Chronic Diastolic Heart Failure  - No signs of volume overload     8) Chronic A. Fib  - Rate controlled  - Not on AC due to hematuria / anemia    9) Acute on Chronic Kidney Injury   - Avoid nephrotoxic medications  - Gentle hydration    DVT Prophylaxis -- Heparin SQ    GOC: Will consult Palliative Care. Prognosis guarded.     Dispo: Unclear   70 years old male with recurrent hospitalizations, Developmental Disability, Chronic A. Fib not on AC due to Hematuria/Anemia, Diastolic Heart Failure, CKD 3, HTN, HLD, DM 2, Hypothermia, Chronic Venous Stasis, LE Ulcers, Chronic Left Shoulder Dislocation, Dysphagia and Aspiration Pneumonia sent from care home as he was not doing well. He was lethargic, weak and his speech was slurred.   Patient is currently very sleepy and lethargic however wakes up on verbal stimuli. No active complaints. Speech is clear at this time.   He was code stroke in ER. No acute finding on CT. Found to be hypothermic. CXR with chronic small left pleural effusion. Urine was just sent and as per RN it was very cloudy.    1) Metabolic Encephalopathy  - CT Head with no acute finding  - Rule out Sepsis  - Follow UA and Cultures  - Continue empiric Zosyn. Vanco x 1 given, check trough in am and further dosing accordingly  - Will consider ID Consult    2) Dysphagia  - NPO  - Swallow Eval    3) LE Ulcers  - Wound care  - Podiatry Consult    4) Rash / ? Cellulitis both thighs  - Antibiotics as above  - Nystatin Powder    5) Decubitus Ulcers  - Wound care  - Change position every 2 hours  - Clinitron bed    6) Hypothermia  - Chronic, had extensive work up in past  - Hyperthermia blanker    7) DM 2  - Accu checks and ISS    8) Chronic Diastolic Heart Failure  - No signs of volume overload     9) Chronic A. Fib  - Rate controlled  - Not on AC due to hematuria / anemia    10) Acute on Chronic Kidney Injury   - Avoid nephrotoxic medications  - Gentle hydration    DVT Prophylaxis -- Heparin SQ    GOC: As per previous notes. MOLST was filled and patient was DNR/I. Could not find documents from Group Home. Will consult Palliative Care as patient is under OPWDD. Prognosis guarded.     Dispo: Unclear

## 2020-12-08 NOTE — ED ADULT NURSE REASSESSMENT NOTE - NS ED NURSE REASSESS COMMENT FT1
report received from day shift RN.  Pt agitated and trying to pull out IV and will not allow RN to obtain vitals.  Sister at bedside and states that he is usually not this altered.  Medicine A called and made aware of pts agitation.  MD Palmer at bedside for family update.  NAD noted, respirations even and unlabored.  Safety precautions in place.  Plan of care explained, pt verbalized understanding.

## 2020-12-08 NOTE — ED ADULT TRIAGE NOTE - CHIEF COMPLAINT QUOTE
Patient HORTENCIA from group home for disabled adults c/o slurred speech and weakness noticed by aide. LKW 8pm yesterday. Arrives awake and alert, slurred speech noted. Per aide bedside patient is not acting baseline ms, is usually more awake and has not been eating/drinking. MD Bailey bedside 1528m, code stroke activated. fs 118

## 2020-12-08 NOTE — ED ADULT NURSE NOTE - INTERVENTIONS DEFINITIONS
Call bell, personal items and telephone within reach/Instruct patient to call for assistance/Non-slip footwear when patient is off stretcher/Room bathroom lighting operational/Adin to call system

## 2020-12-09 NOTE — PROCEDURE NOTE - NSURITECHNIQUE_GU_A_CORE
Sterile gloves were worn for the duration of the procedure/A sterile drape was used to cover all adjacent areas/The catheter was secured with a securement device (e.g. StatLock)/The collection bag is below the level of the patient and urinary bladder/All applicable medical record documentation is completed/The site was cleaned with soap/water and sterile solution (betadine)/The urinary drainage system is closed at the end of the procedure/Proper hand hygiene was performed/The catheter was appropriately lubricated

## 2020-12-09 NOTE — PROGRESS NOTE ADULT - ASSESSMENT
70 years old male with recurrent hospitalizations, Developmental Disability, Chronic A. Fib not on AC due to Hematuria/Anemia, Diastolic Heart Failure, CKD 3, HTN, HLD, DM 2, Hypothermia, Chronic Venous Stasis, LE Ulcers, Chronic Left Shoulder Dislocation, Dysphagia and Aspiration [facility]. He was lethargic, weak and his speech was slurred.   Patient is currently very sleepy and lethargic however wakes up on verbal stimuli. No active complaints. Speech is clear at this time.   He was code stroke in ER. No acute finding on CT. Found to be hypothermic. CXR with chronic small left pleural effusion. UA with UTI.     1) Metabolic Encephalopathy  - CT Head with no acute finding  - Rule out Sepsis  - Follow UA and Cultures  - Zosyn was changed to Meropenem due to history of ESBL Proteus in urine.  - Will consider ID Consult    2) Dysphagia  - NPO  - Swallow Eval    3) LE Ulcers  - Wound care  - Podiatry Consult    4) Rash / ? Cellulitis both thighs  - Antibiotics as above  - Nystatin Powder  - Rivera's catheter placed for healing of rash and ulcers     5) Decubitus Ulcers  - Wound care  - Change position every 2 hours  - Clinitron bed    6) Hypothermia  - Chronic, had extensive work up in past  - Hyperthermia blanket    7) DM 2  - Accu checks and ISS    8) Chronic Diastolic Heart Failure  - No signs of volume overload     9) Chronic A. Fib  - Rate controlled  - Not on AC due to hematuria / anemia    10) Acute on Chronic Kidney Injury   - Avoid nephrotoxic medications  - Gentle hydration    11) Anemia  - Low iron stores as per previous studies, started Venofer 200 mg x 5  - FOBT  - Transfuse 1 unit of PRBC (consent obtained from sister - Avani Perez)    DVT Prophylaxis -- Heparin SQ    GOC: As per previous notes. MOLST was filled and patient was DNR/I. Could not find documents from Group Home. Consulted Palliative Care as patient is under OPWDD. Prognosis guarded.     Dispo: Unclear

## 2020-12-09 NOTE — CHART NOTE - NSCHARTNOTEFT_GEN_A_CORE
CTSP by ER nurse    asking for assistance in placing rodriguez catheter    pt with retracted penis    **able to express head of penis/meatus    16 fr rodriguez inserted without difficulty - see procedure note    leave rodriguez indwelling for urinary diversion ( pt macerated and excoriated skin changes of penis/scrotum/groins/upper thighs/pubis)     plan per Hospitalist medicine CTSP by ER nurse    asking for assistance in placing rodriguez catheter    pt with retracted penis    **able to express head of penis/meatus    16 fr rodriguez inserted without difficulty - see procedure note    leave rodriguez indwelling for urinary diversion ( pt with macerated and excoriated skin changes of penis/scrotum/groins/upper thighs/pubis)     plan per Hospitalist medicine

## 2020-12-09 NOTE — PROCEDURE NOTE - NSINDICATIONS_GEN_A_CORE
history of difficult urethral catheterization/obtain UA/strict intake/output during critical illness

## 2020-12-09 NOTE — PROCEDURE NOTE - ADDITIONAL PROCEDURE DETAILS
ER nurse notified of successful placement of rodriguez after foreskin retracted deeply and head of penis and meatus exposed

## 2020-12-09 NOTE — CONSULT NOTE ADULT - SUBJECTIVE AND OBJECTIVE BOX
[facility]  INFECTIOUS DISEASES AND INTERNAL MEDICINE at [place]  =======================================================  Zach Chew MD  Diplomates American Board of Internal Medicine and Infectious Diseases  Tel: [phone]      Fax: [phone]  =======================================================      [id number]  KIMBERLY MARKS is a 70y  Male     CC: Patient is a 70y old  Male who presents with a chief complaint of Not doing well (08 Dec 2020 18:11)    HPI:  History was taken from charts as Aide from long term gone by the time patient was admitted.    70 years old male with recurrent hospitalizations, Developmental Disability, Chronic A. Fib not on AC due to Hematuria/Anemia, Diastolic Heart Failure, CKD 3, HTN, HLD, DM 2, Hypothermia, Chronic Venous Stasis, LE Ulcers, Chronic Left Shoulder Dislocation, Dysphagia and Aspiration Pneumonia sent from [facility] as he was not doing well. He was lethargic, weak and his speech was slurred.   Patient is currently very sleepy and lethargic however wakes up on verbal stimuli. No active complaints. Speech is clear at this time.   He was code stroke in ER. No acute finding on CT. Found to be hypothermic. CXR with chronic small left pleural effusion. Urine was just sent and as per RN it was very cloudy.            (08 Dec 2020 18:11)      PAST MEDICAL & SURGICAL HISTORY:  Encounter for blood transfusion    Anemia    History of gross hematuria    History of orthostatic hypotension    Bacterial pneumonia    Muscle weakness (generalized)    2019 novel coronavirus disease (COVID-19)    Falls    Heart failure    Afib    CHF (congestive heart failure)    Gout    Hiatal hernia    BPH (benign prostatic hyperplasia)    Cognitive impairment    Cardiomyopathy    HTN (hypertension)    DM (diabetes mellitus)    S/P appendectomy        Social Hx:    FAMILY HISTORY:  No pertinent family history in first degree relatives        Allergies    No Known Allergies    Intolerances        MEDICATIONS  (STANDING):  dextrose 40% Gel 15 Gram(s) Oral once  dextrose 5% + sodium chloride 0.45%. 1000 milliLiter(s) (75 mL/Hr) IV Continuous <Continuous>  dextrose 5%. 1000 milliLiter(s) (100 mL/Hr) IV Continuous <Continuous>  dextrose 5%. 1000 milliLiter(s) (50 mL/Hr) IV Continuous <Continuous>  dextrose 50% Injectable 25 Gram(s) IV Push once  dextrose 50% Injectable 12.5 Gram(s) IV Push once  dextrose 50% Injectable 25 Gram(s) IV Push once  glucagon  Injectable 1 milliGRAM(s) IntraMuscular once  heparin   Injectable 5000 Unit(s) SubCutaneous every 8 hours  insulin lispro (ADMELOG) corrective regimen sliding scale   SubCutaneous every 6 hours  iron sucrose IVPB 200 milliGRAM(s) IV Intermittent every 24 hours  levothyroxine Injectable 12.5 MICROGram(s) IV Push at bedtime  nystatin Powder 1 Application(s) Topical three times a day  piperacillin/tazobactam IVPB.. 3.375 Gram(s) IV Intermittent every 12 hours    MEDICATIONS  (PRN):  acetaminophen  Suppository .. 650 milliGRAM(s) Rectal every 6 hours PRN Temp greater or equal to 38C (100.4F), Mild Pain (1 - 3), Moderate Pain (4 - 6)  ondansetron Injectable 4 milliGRAM(s) IV Push every 6 hours PRN Nausea and/or Vomiting      ANTIMICROBIALS:  piperacillin/tazobactam IVPB.. 3.375 every 12 hours      OTHER MEDS: MEDICATIONS  (STANDING):  acetaminophen  Suppository .. 650 every 6 hours PRN  dextrose 40% Gel 15 once  dextrose 50% Injectable 25 once  dextrose 50% Injectable 12.5 once  dextrose 50% Injectable 25 once  glucagon  Injectable 1 once  heparin   Injectable 5000 every 8 hours  insulin lispro (ADMELOG) corrective regimen sliding scale  every 6 hours  levothyroxine Injectable 12.5 at bedtime  ondansetron Injectable 4 every 6 hours PRN             REVIEW OF SYSTEMS:  CONSTITUTIONAL:  No Fever or chills  HEENT:  No diplopia or blurred vision.  No earache, sore throat or runny nose.  CARDIOVASCULAR:  No pressure, squeezing, strangling, tightness, heaviness or aching about the chest, neck, axilla or epigastrium.  RESPIRATORY:  No cough, shortness of breath  GASTROINTESTINAL:  No nausea, vomiting or diarrhea.  GENITOURINARY:  No dysuria, frequency or urgency. No Blood in urine  MUSCULOSKELETAL:  no joint aches, no muscle pain  SKIN:  No change in skin, hair or nails.  NEUROLOGIC:  No Headaches, seizures or weakness.  PSYCHIATRIC:  No disorder of thought or mood.  ENDOCRINE:  No heat or cold intolerance  HEMATOLOGICAL:  No easy bruising or bleeding.           I&O's Detail        Physical Exam:  Vital Signs Last 24 Hrs  T(C): 36.4 (09 Dec 2020 10:28), Max: 91.2 (08 Dec 2020 22:33)  T(F): 97.5 (09 Dec 2020 10:28), Max: 196.1 (08 Dec 2020 22:33)  HR: 82 (09 Dec 2020 10:28) (70 - 82)  BP: 103/64 (09 Dec 2020 10:28) (84/59 - 123/82)  BP(mean): --  RR: 18 (09 Dec 2020 10:28) (18 - 18)  SpO2: 99% (09 Dec 2020 10:28) (93% - 100%)  Height (cm): 175.3 (12-08 @ 15:30)  GEN: NAD, pleasant  HEENT: normocephalic and atraumatic. EOMI. PERRL.  Anicteric  NECK: Supple.   LUNGS: Clear to auscultation.  HEART: Regular rate and rhythm without murmur.  ABDOMEN: Soft, nontender, and nondistended.  Positive bowel sounds.    : No CVA tenderness  EXTREMITIES: Without any edema.  MSK: No joint swelling  NEUROLOGIC: Cranial nerves II through XII are grossly intact. No Focal Deficits  PSYCHIATRIC: Appropriate affect .  SKIN: No Rash        Labs:      Radiology:   [facility]  INFECTIOUS DISEASES AND INTERNAL MEDICINE at [place]  =======================================================  Zach Chew MD  Diplomates American Board of Internal Medicine and Infectious Diseases  Tel: [phone]      Fax: [phone]  =======================================================      [id number]  KIMBERLY MARKS is a 70y  Male     CC: Patient is a 70y old  Male who presents with a chief complaint of Not doing well (08 Dec 2020 18:11)    HPI:  History was taken from charts as Aide from [facility] gone by the time patient was admitted.    70 years old male with recurrent hospitalizations, Developmental Disability, Chronic A. Fib not on AC due to Hematuria/Anemia, Diastolic Heart Failure, CKD 3, HTN, HLD, DM 2, Hypothermia, Chronic Venous Stasis, LE Ulcers, Chronic Left Shoulder Dislocation, Dysphagia and Aspiration Pneumonia sent from [facility] as he was not doing well. He was lethargic, weak and his speech was slurred.   Patient is currently very sleepy and lethargic however wakes up on verbal stimuli. No active complaints. Speech is clear at this time.   He was code stroke in ER. No acute finding on CT. Found to be hypothermic. CXR with chronic small left pleural effusion. Urine was just sent and as per RN it was very cloudy.            (08 Dec 2020 18:11)      PAST MEDICAL & SURGICAL HISTORY:  Encounter for blood transfusion    Anemia    History of gross hematuria    History of orthostatic hypotension    Bacterial pneumonia    Muscle weakness (generalized)    2019 novel coronavirus disease (COVID-19)    Falls    Heart failure    Afib    CHF (congestive heart failure)    Gout    Hiatal hernia    BPH (benign prostatic hyperplasia)    Cognitive impairment    Cardiomyopathy    HTN (hypertension)    DM (diabetes mellitus)    S/P appendectomy        Social Hx:    FAMILY HISTORY:  No pertinent family history in first degree relatives        Allergies    No Known Allergies    Intolerances        MEDICATIONS  (STANDING):  dextrose 40% Gel 15 Gram(s) Oral once  dextrose 5% + sodium chloride 0.45%. 1000 milliLiter(s) (75 mL/Hr) IV Continuous <Continuous>  dextrose 5%. 1000 milliLiter(s) (100 mL/Hr) IV Continuous <Continuous>  dextrose 5%. 1000 milliLiter(s) (50 mL/Hr) IV Continuous <Continuous>  dextrose 50% Injectable 25 Gram(s) IV Push once  dextrose 50% Injectable 12.5 Gram(s) IV Push once  dextrose 50% Injectable 25 Gram(s) IV Push once  glucagon  Injectable 1 milliGRAM(s) IntraMuscular once  heparin   Injectable 5000 Unit(s) SubCutaneous every 8 hours  insulin lispro (ADMELOG) corrective regimen sliding scale   SubCutaneous every 6 hours  iron sucrose IVPB 200 milliGRAM(s) IV Intermittent every 24 hours  levothyroxine Injectable 12.5 MICROGram(s) IV Push at bedtime  nystatin Powder 1 Application(s) Topical three times a day  piperacillin/tazobactam IVPB.. 3.375 Gram(s) IV Intermittent every 12 hours    MEDICATIONS  (PRN):  acetaminophen  Suppository .. 650 milliGRAM(s) Rectal every 6 hours PRN Temp greater or equal to 38C (100.4F), Mild Pain (1 - 3), Moderate Pain (4 - 6)  ondansetron Injectable 4 milliGRAM(s) IV Push every 6 hours PRN Nausea and/or Vomiting      ANTIMICROBIALS:  piperacillin/tazobactam IVPB.. 3.375 every 12 hours      OTHER MEDS: MEDICATIONS  (STANDING):  acetaminophen  Suppository .. 650 every 6 hours PRN  dextrose 40% Gel 15 once  dextrose 50% Injectable 25 once  dextrose 50% Injectable 12.5 once  dextrose 50% Injectable 25 once  glucagon  Injectable 1 once  heparin   Injectable 5000 every 8 hours  insulin lispro (ADMELOG) corrective regimen sliding scale  every 6 hours  levothyroxine Injectable 12.5 at bedtime  ondansetron Injectable 4 every 6 hours PRN             REVIEW OF SYSTEMS:  cannot obtain reliably        I&O's Detail        Physical Exam:  Vital Signs Last 24 Hrs  T(C): 36.4 (09 Dec 2020 10:28), Max: 91.2 (08 Dec 2020 22:33)  T(F): 97.5 (09 Dec 2020 10:28), Max: 196.1 (08 Dec 2020 22:33)  HR: 82 (09 Dec 2020 10:28) (70 - 82)  BP: 103/64 (09 Dec 2020 10:28) (84/59 - 123/82)  BP(mean): --  RR: 18 (09 Dec 2020 10:28) (18 - 18)  SpO2: 99% (09 Dec 2020 10:28) (93% - 100%)  Height (cm): 175.3 (12-08 @ 15:30)  GEN: NAD, pleasant  HEENT: normocephalic and atraumatic. EOMI. PERRL.  Anicteric  NECK: Supple.   LUNGS: Clear to auscultation.  HEART: Regular rate and rhythm without murmur.  ABDOMEN: Soft, nontender, and nondistended.  Positive bowel sounds.  + rodriguez purulent urine  : No CVA tenderness  EXTREMITIES: Without any edema. unna boots in place  MSK: No joint swelling  NEUROLOGIC: Cranial nerves II through XII are grossly intact. No Focal Deficits  PSYCHIATRIC: Appropriate affect .  SKIN: No Rash        Labs:                        6.8    8.49  )-----------( 69       ( 09 Dec 2020 11:49 )             21.8   12-09    140  |  106  |  66.0<H>  ----------------------------<  103<H>  4.0   |  20.0<L>  |  3.90<H>    Ca    8.2<L>      09 Dec 2020 11:49  Mg     2.0     12-09    TPro  6.6  /  Alb  2.5<L>  /  TBili  0.3<L>  /  DBili  x   /  AST  42<H>  /  ALT  22  /  AlkPhos  109  12-08      Radiology:  < from: Xray Chest 1 View- PORTABLE-Urgent (12.08.20 @ 16:56) >  FINDINGS: A small left-sided pleural effusion is seen which appears unchanged. The right lung is clear. The heart size is enlarged. Multilevel degenerative changes are noted within the imaged potions of the spine. There is unchanged chronic anterior dislocation of the left shoulder.    IMPRESSION: Small left sided pleural effusion. Unchanged cardiomegaly.    Chronic anterior dislocation of the left shoulder.        < end of copied text >

## 2020-12-10 NOTE — SWALLOW BEDSIDE ASSESSMENT ADULT - COMMENTS
Pt well known to this department, from previous hospitalizations. Most recent objective view of oropharyngeal swallow completed on 11/17/2020, w/rx made for soft solids w/nectar thick liquids. Pt now w/change in status & re-admission. This department re-consulted for initial bedside dysphagia evaluation w/new admission.

## 2020-12-10 NOTE — SWALLOW BEDSIDE ASSESSMENT ADULT - SLP PERTINENT HISTORY OF CURRENT PROBLEM
As per MD note, "70 years old male with recurrent hospitalizations, Developmental Disability, Chronic A. Fib not on AC due to Hematuria/Anemia, Diastolic Heart Failure, CKD 3, HTN, HLD, DM 2, Hypothermia, Chronic Venous Stasis, LE Ulcers, Chronic Left Shoulder Dislocation, Dysphagia and Aspiration Pneumonia [facility] as he was not doing well. He was lethargic, weak and his speech was slurred. He was code stroke in ER. No acute finding on CT. Found to be hypothermic. CXR with chronic small left pleural effusion. He was code stroke in ER. No acute finding on CT. Found to be hypothermic. CXR with chronic small left pleural effusion. UA with UTI. R/o sepsis & w/cellulitis of LE".

## 2020-12-10 NOTE — CONSULT NOTE ADULT - SUBJECTIVE AND OBJECTIVE BOX
Patient is a 70y old  Male who presents with a chief complaint of Not doing well (10 Dec 2020 18:14)      HPI:  History was taken from charts as Aide from longterm gone by the time patient was admitted.    70 years old male with recurrent hospitalizations, Developmental Disability, Chronic A. Fib not on AC due to Hematuria/Anemia, Diastolic Heart Failure, CKD 3, HTN, HLD, DM 2, Hypothermia, Chronic Venous Stasis, LE Ulcers, Chronic Left Shoulder Dislocation, Dysphagia and Aspiration Pneumonia sent from retirement as he was not doing well. He was lethargic, weak and his speech was slurred.   Patient is currently very sleepy and lethargic however wakes up on verbal stimuli. No active complaints. Speech is clear at this time.   He was code stroke in ER. No acute finding on CT. Found to be hypothermic. CXR with chronic small left pleural effusion. Urine was just sent and as per RN it was very cloudy.     Pt seen bedside. Pt is lethargic, unable to provide any history. Podiatry consulted for BLE ulcers. Pt has unna boots on for unknown amount of time.       PAST MEDICAL & SURGICAL HISTORY:  Encounter for blood transfusion    Anemia    History of gross hematuria    History of orthostatic hypotension    Bacterial pneumonia    Muscle weakness (generalized)    2019 novel coronavirus disease (COVID-19)    Falls    Heart failure    Afib    CHF (congestive heart failure)    Gout    Hiatal hernia    BPH (benign prostatic hyperplasia)    Cognitive impairment    Cardiomyopathy    HTN (hypertension)    DM (diabetes mellitus)    S/P appendectomy      Allergies: No Known Allergies    Medications: acetaminophen  Suppository .. 650 milliGRAM(s) Rectal every 6 hours PRN  dextrose 40% Gel 15 Gram(s) Oral once  dextrose 5% + sodium chloride 0.45%. 1000 milliLiter(s) IV Continuous <Continuous>  dextrose 5%. 1000 milliLiter(s) IV Continuous <Continuous>  dextrose 5%. 1000 milliLiter(s) IV Continuous <Continuous>  dextrose 50% Injectable 25 Gram(s) IV Push once  dextrose 50% Injectable 12.5 Gram(s) IV Push once  dextrose 50% Injectable 25 Gram(s) IV Push once  glucagon  Injectable 1 milliGRAM(s) IntraMuscular once  haloperidol    Injectable 2 milliGRAM(s) IntraMuscular every 6 hours PRN  insulin lispro (ADMELOG) corrective regimen sliding scale   SubCutaneous every 6 hours  iron sucrose IVPB 200 milliGRAM(s) IV Intermittent every 24 hours  levothyroxine Injectable 12.5 MICROGram(s) IV Push at bedtime  meropenem  IVPB 500 milliGRAM(s) IV Intermittent every 12 hours  nystatin Powder 1 Application(s) Topical three times a day  ondansetron Injectable 4 milliGRAM(s) IV Push every 6 hours PRN    FH:FAMILY HISTORY:  No pertinent family history in first degree relatives      SH:     Vital Signs Last 24 Hrs  T(C): 34.9 (10 Dec 2020 14:00), Max: 36.4 (10 Dec 2020 05:52)  T(F): 94.8 (10 Dec 2020 14:00), Max: 97.5 (10 Dec 2020 05:52)  HR: 68 (10 Dec 2020 14:00) (68 - 80)  BP: 95/53 (10 Dec 2020 14:00) (91/57 - 98/58)  BP(mean): --  RR: 18 (10 Dec 2020 14:00) (18 - 19)  SpO2: 96% (10 Dec 2020 14:00) (94% - 96%)    LABS                        7.2    6.43  )-----------( 70       ( 10 Dec 2020 07:23 )             23.5               12-10    144  |  109<H>  |  65.0<H>  ----------------------------<  108<H>  4.1   |  23.0  |  3.87<H>    Ca    8.3<L>      10 Dec 2020 07:23  Mg     2.2     12-10    ROS  Unable to obtain      PHYSICAL EXAM  LE Focused:    Vasc: DP/PT pulses non palpable, cap refill delayed, edema noted to Right first MPJ    Derm: Right medial 1st MPJ ulcer measuring ~2.5 x 2.0 x 0.4 cm, fibrogranular base with exposed capsule, + periwound erythema, no malodor, - probe to bone. Bilateral heel with unstagable ulcers  Neuro: Unable to assess  MSK: Unable to assess    Imaging:  Xray ordered    Cultures: taken    A: Right 1st MPJ ulcer  Bilateral unstageable heel ulcers    P:  Patient evaluated, chart reviewed  Xrays ordered  IV antibiotics per ID  Pt not a surgical candidate. Will likely need long term IV antibiotics  Continue local wound care  Applied betadine/DSD  Offloading to BLE while in bed  Seen with Dr. Blank    Wound care instructions:  Clean ulcer sites with sterile saline  Apply betadine, 4x4 gauze and to R 1st MPJ ulcer, Allevyn pads to bilateral heels  Change dressing daily

## 2020-12-10 NOTE — PROGRESS NOTE ADULT - SUBJECTIVE AND OBJECTIVE BOX
[facility]  INFECTIOUS DISEASES AND INTERNAL MEDICINE at [place]  =======================================================  Zach Chew MD  Diplomates American Board of Internal Medicine and Infectious Diseases  Tel: [phone]      Fax: [phone]  =======================================================    KIMBERLY MARKS [id number]    Follow up: hypothermia  remains hypothermic      Allergies:  No Known Allergies      Medications:  acetaminophen  Suppository .. 650 milliGRAM(s) Rectal every 6 hours PRN  dextrose 40% Gel 15 Gram(s) Oral once  dextrose 5% + sodium chloride 0.45%. 1000 milliLiter(s) IV Continuous <Continuous>  dextrose 5%. 1000 milliLiter(s) IV Continuous <Continuous>  dextrose 5%. 1000 milliLiter(s) IV Continuous <Continuous>  dextrose 50% Injectable 25 Gram(s) IV Push once  dextrose 50% Injectable 12.5 Gram(s) IV Push once  dextrose 50% Injectable 25 Gram(s) IV Push once  glucagon  Injectable 1 milliGRAM(s) IntraMuscular once  haloperidol    Injectable 2 milliGRAM(s) IntraMuscular every 6 hours PRN  insulin lispro (ADMELOG) corrective regimen sliding scale   SubCutaneous every 6 hours  iron sucrose IVPB 200 milliGRAM(s) IV Intermittent every 24 hours  levothyroxine Injectable 12.5 MICROGram(s) IV Push at bedtime  meropenem  IVPB 500 milliGRAM(s) IV Intermittent every 12 hours  nystatin Powder 1 Application(s) Topical three times a day  ondansetron Injectable 4 milliGRAM(s) IV Push every 6 hours PRN            REVIEW OF SYSTEMS:  cannot obtain           Physical Exam:  ICU Vital Signs Last 24 Hrs  T(C): 34.9 (10 Dec 2020 14:00), Max: 36.4 (10 Dec 2020 05:52)  T(F): 94.8 (10 Dec 2020 14:00), Max: 97.5 (10 Dec 2020 05:52)  HR: 68 (10 Dec 2020 14:00) (68 - 88)  BP: 95/53 (10 Dec 2020 14:00) (91/57 - 111/71)  BP(mean): --  ABP: --  ABP(mean): --  RR: 18 (10 Dec 2020 14:00) (18 - 19)  SpO2: 96% (10 Dec 2020 14:00) (94% - 99%)      GEN: NAD, pleasant  HEENT: normocephalic and atraumatic. EOMI. PERRL.  Anicteric   NECK: Supple.   LUNGS: Clear to auscultation.  HEART: Regular rate and rhythm without murmur.  ABDOMEN: Soft, nontender, and nondistended.  Positive bowel sounds.    : No CVA tenderness  EXTREMITIES: right foot big toe dorsal ulcer + discharge  MSK: no joint swelling  NEUROLOGIC: Cranial nerves II through XII are grossly intact. No focal deficits  PSYCHIATRIC: Appropriate affect .  SKIN: groin erythema, superficial sacral ulcers without cellulitis      Labs:                        7.2    6.43  )-----------( 70       ( 10 Dec 2020 07:23 )             23.5   12-10    144  |  109<H>  |  65.0<H>  ----------------------------<  108<H>  4.1   |  23.0  |  3.87<H>    Ca    8.3<L>      10 Dec 2020 07:23  Mg     2.2     12-10

## 2020-12-10 NOTE — SWALLOW BEDSIDE ASSESSMENT ADULT - ASR SWALLOW ASPIRATION MONITOR
oral hygiene/upper respiratory infection/change of breathing pattern/position upright (90Y)/cough/fever/throat clearing/gurgly voice/pneumonia

## 2020-12-10 NOTE — PROGRESS NOTE ADULT - ASSESSMENT
70 years old male with recurrent hospitalizations, Developmental Disability, Chronic A. Fib not on AC due to Hematuria/Anemia, Diastolic Heart Failure, CKD 3, HTN, HLD, DM 2, Hypothermia, Chronic Venous Stasis, LE Ulcers, Chronic Left Shoulder Dislocation, Dysphagia and Aspiration Pneumonia sent from long term as he was not doing well. He was lethargic, weak and his speech was slurred. Code stroke in Ed but Ct head wnl. Found to be hypothermic (which he is at baseline) cloudy urine sent for urinalysis with concern for UTI    - f/u BCX  - UA +, ucx NF  - covid pcr (-)  - c/w meropenem adjusted for renal function due to prior h/o ESBL proteus in urine  - c/w vanco by level- 21 today will not re-dose  - right foot needs to be imaged  - Trend Fever  - Trend Leukocytosis  - podiatry f/u      Will Follow d/w Dr Loza

## 2020-12-10 NOTE — PROGRESS NOTE ADULT - SUBJECTIVE AND OBJECTIVE BOX
Hypothermia, initial encounter    HPI:  History was taken from charts as Aide from custodial gone by the time patient was admitted.    70 years old male with recurrent hospitalizations, Developmental Disability, Chronic A. Fib not on AC due to Hematuria/Anemia, Diastolic Heart Failure, CKD 3, HTN, HLD, DM 2, Hypothermia, Chronic Venous Stasis, LE Ulcers, Chronic Left Shoulder Dislocation, Dysphagia and Aspiration Pneumonia sent from senior care as he was not doing well. He was lethargic, weak and his speech was slurred.   Patient is currently very sleepy and lethargic however wakes up on verbal stimuli. No active complaints. Speech is clear at this time.   He was code stroke in ER. No acute finding on CT. Found to be hypothermic. CXR with chronic small left pleural effusion. Urine was just sent and as per RN it was very cloudy.    Interval History:  Patient was seen and examined at bedside. Very sleepy and lethargic today.   As per RN, has been like this since morning.      ROS:  Unable to obtain.     PHYSICAL EXAM:  Vital Signs   T(C): 34.9 (10 Dec 2020 14:00), Max: 36.4 (10 Dec 2020 05:52)  T(F): 94.8 (10 Dec 2020 14:00), Max: 97.5 (10 Dec 2020 05:52)  HR: 68 (10 Dec 2020 14:00) (68 - 88)  BP: 95/53 (10 Dec 2020 14:00) (91/57 - 111/71)  RR: 18 (10 Dec 2020 14:00) (18 - 19)  SpO2: 96% (10 Dec 2020 14:00) (94% - 99%)  General: Elderly male lying in bed comfortably.  HEENT: Clear conjunctivae. Dry mucus membrane  Neck: Supple.   Chest: Good air entry - no wheezing, rales or rhonchi. No chest wall tenderness.  Heart: S1 & S2 with irregular rhythm.   Abdomen: Ventral hernia. Soft. Non-tender. Non-distended. + BS  : Fungal rash in genital area and upper thighs. Rivera's catheter in place   Ext: Unna boots on both lower extremities. Ulcer on right first metatarsal with purulent discharge   Neuro: Sleepy and lethargic   Skin: Fungal rash in urogenital area and upper thighs. Pressure ulcers on both buttocks.   Psychiatry: Unable to assess     MEDICATIONS  (STANDING):  dextrose 40% Gel 15 Gram(s) Oral once  dextrose 5% + sodium chloride 0.45%. 1000 milliLiter(s) (75 mL/Hr) IV Continuous <Continuous>  dextrose 5%. 1000 milliLiter(s) (100 mL/Hr) IV Continuous <Continuous>  dextrose 5%. 1000 milliLiter(s) (50 mL/Hr) IV Continuous <Continuous>  dextrose 50% Injectable 25 Gram(s) IV Push once  dextrose 50% Injectable 12.5 Gram(s) IV Push once  dextrose 50% Injectable 25 Gram(s) IV Push once  glucagon  Injectable 1 milliGRAM(s) IntraMuscular once  insulin lispro (ADMELOG) corrective regimen sliding scale   SubCutaneous every 6 hours  iron sucrose IVPB 200 milliGRAM(s) IV Intermittent every 24 hours  levothyroxine Injectable 12.5 MICROGram(s) IV Push at bedtime  meropenem  IVPB 500 milliGRAM(s) IV Intermittent every 12 hours  nystatin Powder 1 Application(s) Topical three times a day    MEDICATIONS  (PRN):  acetaminophen  Suppository .. 650 milliGRAM(s) Rectal every 6 hours PRN Temp greater or equal to 38C (100.4F), Mild Pain (1 - 3), Moderate Pain (4 - 6)  haloperidol    Injectable 2 milliGRAM(s) IntraMuscular every 6 hours PRN Agitation  ondansetron Injectable 4 milliGRAM(s) IV Push every 6 hours PRN Nausea and/or Vomiting    LABS:  CAPILLARY BLOOD GLUCOSE  POCT Blood Glucose.: 148 mg/dL (10 Dec 2020 16:33)  POCT Blood Glucose.: 144 mg/dL (10 Dec 2020 11:43)  POCT Blood Glucose.: 124 mg/dL (10 Dec 2020 06:34)  POCT Blood Glucose.: 158 mg/dL (09 Dec 2020 23:16)  POCT Blood Glucose.: 128 mg/dL (09 Dec 2020 17:36)                        7.2    6.43  )-----------( 70       ( 10 Dec 2020 07:23 )             23.5     12-10    144  |  109<H>  |  65.0<H>  ----------------------------<  108<H>  4.1   |  23.0  |  3.87<H>    Ca    8.3<L>      10 Dec 2020 07:23  Mg     2.2     12-10    Blood Culture: 12-08 @ 22:18  Organism --  Gram Stain Blood -- Gram Stain --  Specimen Source .Urine Catheterized  Culture-Blood --    RADIOLOGY & ADDITIONAL STUDIES:  CT Brain Stroke Protocol (12.08.20 @ 16:00)  1)  no CT evidence of an acute infarct or hemorrhage.  2)  underlying involutional changes with volume loss.    CT Angio Neck w/ IV Cont (12.08.20 @ 16:01)   1. Calcified plaque in both carotid bulbs without significant narrowing. Both internal carotid arteries are widely patent without dissection.  2.Left vertebral artery is well visualized and is widely patent. There is only intermittent faint visualization of the right vertebral artery which is not continuous.  3. Intracranially, the anterior cerebral circulation is markedly redundant but is widely patent.  4. The posterior cerebral circulation is fed by the left vertebral artery, and the basilar artery is widely patent. Both posterior cerebral arteries are widely patent.    Xray Chest 1 View- PORTABLE-Urgent (12.08.20 @ 16:56)   Small left sided pleural effusion. Unchanged cardiomegaly.  Chronic anterior dislocation of the left shoulder.

## 2020-12-10 NOTE — SWALLOW BEDSIDE ASSESSMENT ADULT - SLP GENERAL OBSERVATIONS
Recd in bed, eyes closed, +nonverbal, no command following, A&A Ox0 despite cues, +hypothermia blanket in place, 0/10 nonverbal pain scale, tolerating RA without distress (change in presentation from previous encounters from prior hospitalization - Pt typically conversive & w/command following)

## 2020-12-10 NOTE — PROGRESS NOTE ADULT - ASSESSMENT
70 years old male with recurrent hospitalizations, Developmental Disability, Chronic A. Fib not on AC due to Hematuria/Anemia, Diastolic Heart Failure, CKD 3, HTN, HLD, DM 2, Hypothermia, Chronic Venous Stasis, LE Ulcers, Chronic Left Shoulder Dislocation, Dysphagia and Aspiration Pneumonia sent from [facility] as he was not doing well. He was lethargic, weak and his speech was slurred.   Patient is currently very sleepy and lethargic however wakes up on verbal stimuli. No active complaints. Speech is clear at this time.   He was code stroke in ER. No acute finding on CT. Found to be hypothermic. CXR with chronic small left pleural effusion. UA with UTI.     1) Metabolic Encephalopathy  - CT Head with no acute finding  - Rule out Sepsis  - Urine culture negative   - Follow blood cultures Cultures  - Zosyn was changed to Meropenem due to history of ESBL Proteus in urine.  - Continue Vancomycin (gave 2nd dose yesterday). Vanco trough in am  - ID Consult appreciated     2) Dysphagia  - NPO  - Unable to perform swallow eval due to lethargy     3) LE Ulcers  - Wound care  - Podiatry Consult    4) Rash / ? Cellulitis both thighs  - Antibiotics as above  - Nystatin Powder  - Rivera's catheter placed for healing of rash and ulcers     5) Decubitus Ulcers  - Wound care  - Change position every 2 hours  - [place] bed    6) Hypothermia  - Chronic, had extensive work up in past  - Hyperthermia blanket    7) DM 2  - HbA1c 6.1 on 11/6/20  - Accu checks and ISS    8) Chronic Diastolic Heart Failure  - No signs of volume overload     9) Chronic A. Fib  - Rate controlled  - Not on AC due to hematuria / anemia    10) Acute on Chronic Kidney Injury   - Avoid nephrotoxic medications  - Gentle hydration  - Renal US  - Monitor renal function     11) Anemia  - Low iron stores as per previous studies, started Venofer 200 mg x 5  - FOBT pending   - Transfused 1 more unit of PRBC today (consent obtained from sister - Avani Perez)    DVT Prophylaxis -- Patient is auto anticoagulated     GOC: As per previous notes. MOLST was filled and patient was DNR/I. Could not find documents from Group Home. Consulted Palliative Care as patient is under OPWDD. Prognosis extremely guarded.     Dispo: Unclear

## 2020-12-10 NOTE — SWALLOW BEDSIDE ASSESSMENT ADULT - SWALLOW EVAL: DIAGNOSIS
Pt presents w/severe oral dysphagia negatively impacted upon by current reduced cognitive state & overall clinical presentation. Pt w/aversion to dry spoon placed on anterior labial surface, turning head away without benefit from max cues & encouragement. No PO provided given current Pt presentation.

## 2020-12-11 NOTE — CONSULT NOTE ADULT - PROBLEM SELECTOR RECOMMENDATION 4
Patient was awake and alert during encounter  Patient stated "Can I have some soda"  Discussed case with Dr. Loza - informed him that patient is now awake and alert and would recommend having speech/swallow evaluation repeated today since his mental status seems to be improving   Code Status: DNR DNI No Dialysis - All approved by [facility] April 17 2020  Will need to see how patient's course progresses - if he remains unable to swallow and his sister wants to place him on comfort feeds and hospice support this process would have to be approved through [facility] and [facility]    Total Time Spent__30__ minutes  This includes chart review, patient assessment, discussion and collaboration with interdisciplinary team members, ACP planning    Thank you for the opportunity to assist with the care of this patient.   [facility] [phone].

## 2020-12-11 NOTE — PROGRESS NOTE ADULT - ASSESSMENT
70 years old male with recurrent hospitalizations, Developmental Disability, Chronic A. Fib not on AC due to Hematuria/Anemia, Diastolic Heart Failure, CKD 3, HTN, HLD, DM 2, Hypothermia, Chronic Venous Stasis, LE Ulcers, Chronic Left Shoulder Dislocation, Dysphagia and Aspiration Pneumonia sent from long-term as he was not doing well. He was lethargic, weak and his speech was slurred. Code stroke in Ed but Ct head wnl. Found to be hypothermic (which he is at baseline) cloudy urine sent for urinalysis with concern for UTI and infection of ulcers of feet    - f/u BCX  - UA +, ucx NF  - covid pcr (-)  - c/w meropenem adjusted for renal function due to prior h/o ESBL proteus in urine  - vanco level 16, vanco 1 g x 1 today  - f/u wound cx  - per podiatry not a surgical candidate  - x ray right foot as above, would check MR  - Trend Fever  - Trend Leukocytosis        Will Follow

## 2020-12-11 NOTE — PROGRESS NOTE ADULT - SUBJECTIVE AND OBJECTIVE BOX
Hypothermia, initial encounter    HPI:  History was taken from charts as Aide from senior living gone by the time patient was admitted.    70 years old male with recurrent hospitalizations, Developmental Disability, Chronic A. Fib not on AC due to Hematuria/Anemia, Diastolic Heart Failure, CKD 3, HTN, HLD, DM 2, Hypothermia, Chronic Venous Stasis, LE Ulcers, Chronic Left Shoulder Dislocation, Dysphagia and Aspiration Pneumonia sent from long-term as he was not doing well. He was lethargic, weak and his speech was slurred.   Patient is currently very sleepy and lethargic however wakes up on verbal stimuli. No active complaints. Speech is clear at this time.   He was code stroke in ER. No acute finding on CT. Found to be hypothermic. CXR with chronic small left pleural effusion. Urine was just sent and as per RN it was very cloudy.    Interval History:  Patient was seen and examined at bedside around 10 am. More alert and awake today.  Wants to eat.  No active complaints.     ROS:  As per interval history otherwise unremarkable    PHYSICAL EXAM:  Vital Signs  T(C): 37 (11 Dec 2020 14:46), Max: 37 (11 Dec 2020 14:46)  T(F): 98.6 (11 Dec 2020 14:46), Max: 98.6 (11 Dec 2020 14:46)  HR: 79 (11 Dec 2020 14:46) (59 - 85)  BP: 97/62 (11 Dec 2020 14:46) (92/56 - 99/62)  RR: 18 (11 Dec 2020 14:46) (18 - 18)  SpO2: 95% (11 Dec 2020 14:46) (95% - 95%)  General: Elderly male lying in bed comfortably.  HEENT: Clear conjunctivae. Dry mucus membrane  Neck: Supple.   Chest: Good air entry - no wheezing or rhonchi. Fine rales on right side. No chest wall tenderness.  Heart: S1 & S2 with irregular rhythm.   Abdomen: Ventral hernia. Soft. Non-tender. Non-distended. + BS  : Fungal rash in genital area and upper thighs (improving). Rivera's catheter in place   Ext: Chronic skin changes on both lower legs. Ulcer on Right 1st MPJ and B/L Heels   Neuro: Awake. Following commands.   Skin: Fungal rash in urogenital area and upper thighs. Pressure ulcers on both buttocks.   Psychiatry: Calm     MEDICATIONS  (STANDING):  cadexomer iodine 0.9% Gel 1 Application(s) Topical every 24 hours  dextrose 40% Gel 15 Gram(s) Oral once  dextrose 5% + sodium chloride 0.45%. 1000 milliLiter(s) (100 mL/Hr) IV Continuous <Continuous>  dextrose 5%. 1000 milliLiter(s) (100 mL/Hr) IV Continuous <Continuous>  dextrose 5%. 1000 milliLiter(s) (50 mL/Hr) IV Continuous <Continuous>  dextrose 50% Injectable 25 Gram(s) IV Push once  dextrose 50% Injectable 12.5 Gram(s) IV Push once  dextrose 50% Injectable 25 Gram(s) IV Push once  glucagon  Injectable 1 milliGRAM(s) IntraMuscular once  insulin lispro (ADMELOG) corrective regimen sliding scale   SubCutaneous Before meals and at bedtime  iron sucrose IVPB 200 milliGRAM(s) IV Intermittent every 24 hours  levothyroxine Injectable 12.5 MICROGram(s) IV Push at bedtime  meropenem  IVPB 500 milliGRAM(s) IV Intermittent every 12 hours  nystatin Powder 1 Application(s) Topical three times a day  vancomycin  IVPB 1000 milliGRAM(s) IV Intermittent once    MEDICATIONS  (PRN):  acetaminophen  Suppository .. 650 milliGRAM(s) Rectal every 6 hours PRN Temp greater or equal to 38C (100.4F), Mild Pain (1 - 3), Moderate Pain (4 - 6)  haloperidol    Injectable 2 milliGRAM(s) IntraMuscular every 6 hours PRN Agitation  ondansetron Injectable 4 milliGRAM(s) IV Push every 6 hours PRN Nausea and/or Vomiting    LABS:  CAPILLARY BLOOD GLUCOSE  POCT Blood Glucose.: 111 mg/dL (11 Dec 2020 12:29)  POCT Blood Glucose.: 120 mg/dL (11 Dec 2020 05:06)  POCT Blood Glucose.: 145 mg/dL (10 Dec 2020 23:51)  POCT Blood Glucose.: 148 mg/dL (10 Dec 2020 16:33)                        8.0    5.57  )-----------( 81       ( 11 Dec 2020 06:43 )             25.8     12-11    144  |  112<H>  |  62.0<H>  ----------------------------<  98  3.5   |  21.0<L>  |  3.23<H>    Ca    8.1<L>      11 Dec 2020 06:43  Mg     2.0     12-11    Blood Culture: 12-08 @ 22:18  Organism --  Gram Stain Blood -- Gram Stain --  Specimen Source .Urine Catheterized  Culture-Blood --    12-08 @ 18:40  Organism --  Gram Stain Blood -- Gram Stain --  Specimen Source .Blood Blood-Peripheral  Culture-Blood --    RADIOLOGY & ADDITIONAL STUDIES:  CT Brain Stroke Protocol (12.08.20 @ 16:00)  1)  no CT evidence of an acute infarct or hemorrhage.  2)  underlying involutional changes with volume loss.    CT Angio Neck w/ IV Cont (12.08.20 @ 16:01)   1. Calcified plaque in both carotid bulbs without significant narrowing. Both internal carotid arteries are widely patent without dissection.  2.Left vertebral artery is well visualized and is widely patent. There is only intermittent faint visualization of the right vertebral artery which is not continuous.  3. Intracranially, the anterior cerebral circulation is markedly redundant but is widely patent.  4. The posterior cerebral circulation is fed by the left vertebral artery, and the basilar artery is widely patent. Both posterior cerebral arteries are widely patent.    Xray Chest 1 View- PORTABLE-Urgent (12.08.20 @ 16:56)   Small left sided pleural effusion. Unchanged cardiomegaly.  Chronic anterior dislocation of the left shoulder.

## 2020-12-11 NOTE — PROGRESS NOTE ADULT - SUBJECTIVE AND OBJECTIVE BOX
Podiatry Interval HPI: Patient seen and examined at bedside. Patient is nonverbal, no acute event overnight. Afebrile with normal wbc. Dressing intact to b/l feet with offloading boots.       HPI:  History was taken from charts as Aide from care home gone by the time patient was admitted.    70 years old male with recurrent hospitalizations, Developmental Disability, Chronic A. Fib not on AC due to Hematuria/Anemia, Diastolic Heart Failure, CKD 3, HTN, HLD, DM 2, Hypothermia, Chronic Venous Stasis, LE Ulcers, Chronic Left Shoulder Dislocation, Dysphagia and Aspiration Pneumonia sent from shelter as he was not doing well. He was lethargic, weak and his speech was slurred.   Patient is currently very sleepy and lethargic however wakes up on verbal stimuli. No active complaints. Speech is clear at this time.   He was code stroke in ER. No acute finding on CT. Found to be hypothermic. CXR with chronic small left pleural effusion. Urine was just sent and as per RN it was very cloudy.     Pt seen bedside. Pt is lethargic, unable to provide any history. Podiatry consulted for BLE ulcers. Pt has unna boots on for unknown amount of time.       PAST MEDICAL & SURGICAL HISTORY:  Encounter for blood transfusion    Anemia    History of gross hematuria    History of orthostatic hypotension    Bacterial pneumonia    Muscle weakness (generalized)    2019 novel coronavirus disease (COVID-19)    Falls    Heart failure    Afib    CHF (congestive heart failure)    Gout    Hiatal hernia    BPH (benign prostatic hyperplasia)    Cognitive impairment    Cardiomyopathy    HTN (hypertension)    DM (diabetes mellitus)    S/P appendectomy      Allergies: No Known Allergies    Medications acetaminophen  Suppository .. 650 milliGRAM(s) Rectal every 6 hours PRN  dextrose 40% Gel 15 Gram(s) Oral once  dextrose 5% + sodium chloride 0.45%. 1000 milliLiter(s) IV Continuous <Continuous>  dextrose 5%. 1000 milliLiter(s) IV Continuous <Continuous>  dextrose 5%. 1000 milliLiter(s) IV Continuous <Continuous>  dextrose 50% Injectable 25 Gram(s) IV Push once  dextrose 50% Injectable 12.5 Gram(s) IV Push once  dextrose 50% Injectable 25 Gram(s) IV Push once  glucagon  Injectable 1 milliGRAM(s) IntraMuscular once  haloperidol    Injectable 2 milliGRAM(s) IntraMuscular every 6 hours PRN  insulin lispro (ADMELOG) corrective regimen sliding scale   SubCutaneous every 6 hours  iron sucrose IVPB 200 milliGRAM(s) IV Intermittent every 24 hours  levothyroxine Injectable 12.5 MICROGram(s) IV Push at bedtime  meropenem  IVPB 500 milliGRAM(s) IV Intermittent every 12 hours  nystatin Powder 1 Application(s) Topical three times a day  ondansetron Injectable 4 milliGRAM(s) IV Push every 6 hours PRN  vancomycin  IVPB 1000 milliGRAM(s) IV Intermittent once    FHNo pertinent family history in first degree relatives    No pertinent family history in first degree relatives    ,   PMHEncounter for blood transfusion    Anemia    History of gross hematuria    History of orthostatic hypotension    Bacterial pneumonia    Muscle weakness (generalized)    2019 novel coronavirus disease (COVID-19)    Falls    Heart failure    Afib    CHF (congestive heart failure)    Gout    Hiatal hernia    BPH (benign prostatic hyperplasia)    Cognitive impairment    Cardiomyopathy    HTN (hypertension)    DM (diabetes mellitus)       PSHS/P appendectomy    No significant past surgical history        Labs                          8.0    5.57  )-----------( 81       ( 11 Dec 2020 06:43 )             25.8      12-11    144  |  112<H>  |  62.0<H>  ----------------------------<  98  3.5   |  21.0<L>  |  3.23<H>    Ca    8.1<L>      11 Dec 2020 06:43  Mg     2.0     12-11       Vital Signs Last 24 Hrs  T(C): 35.7 (11 Dec 2020 04:15), Max: 35.7 (11 Dec 2020 04:15)  T(F): 96.3 (11 Dec 2020 04:15), Max: 96.3 (11 Dec 2020 04:15)  HR: 85 (11 Dec 2020 04:15) (59 - 85)  BP: 99/62 (11 Dec 2020 04:15) (92/56 - 99/62)  BP(mean): --  RR: 18 (11 Dec 2020 04:15) (18 - 18)  SpO2: 95% (11 Dec 2020 04:15) (95% - 96%)          WBC Count: 5.57 K/uL (12-11-20 @ 06:43)    ROS  Unable to obtain      PHYSICAL EXAM  LE Focused:    Vasc: DP/PT pulses non palpable, cap refill delayed, edema noted to Right first MPJ    Derm: Right medial 1st MPJ ulcer measuring ~2.5 x 2.0 x 0.4 cm, fibrogranular base with exposed capsule, + periwound erythema, no malodor, - probe to bone. Bilateral heel with unstagable ulcers  Neuro: Unable to assess  MSK: Unable to assess    Imaging:  Xray no erosive changes, calcific vessels, pending official read    Cultures: taken    A: Right 1st MPJ ulcer  Bilateral unstageable heel ulcers    P:  Patient evaluated, chart reviewed  Xrays reviewed  IV antibiotics per ID  Pt not a surgical candidate. Will likely need long term IV antibiotics  Continue local wound care  Applied betadine/DSD with allevyn pad for offloading  Rx. iodosorb to be used with dressing change   Offloading to BLE while in bed  Discussed with Dr. Blank    Wound care instructions:  Clean ulcer sites with sterile saline  Apply iodosorb to b/l heel and Right 1st MPJ wound, cover with Allevyn pads  Change dressing every other day   Podiatry Interval HPI: Patient seen and examined at bedside. Patient is nonverbal, no acute event overnight. Afebrile with normal wbc. Dressing intact to b/l feet with offloading boots.       HPI:  History was taken from charts as Aide from long term gone by the time patient was admitted.    70 years old male with recurrent hospitalizations, Developmental Disability, Chronic A. Fib not on AC due to Hematuria/Anemia, Diastolic Heart Failure, CKD 3, HTN, HLD, DM 2, Hypothermia, Chronic Venous Stasis, LE Ulcers, Chronic Left Shoulder Dislocation, Dysphagia and Aspiration Pneumonia sent from [facility] as he was not doing well. He was lethargic, weak and his speech was slurred.   Patient is currently very sleepy and lethargic however wakes up on verbal stimuli. No active complaints. Speech is clear at this time.   He was code stroke in ER. No acute finding on CT. Found to be hypothermic. CXR with chronic small left pleural effusion. Urine was just sent and as per RN it was very cloudy.     Pt seen bedside. Pt is lethargic, unable to provide any history. Podiatry consulted for BLE ulcers. Pt has unna boots on for unknown amount of time.       PAST MEDICAL & SURGICAL HISTORY:  Encounter for blood transfusion    Anemia    History of gross hematuria    History of orthostatic hypotension    Bacterial pneumonia    Muscle weakness (generalized)    2019 novel coronavirus disease (COVID-19)    Falls    Heart failure    Afib    CHF (congestive heart failure)    Gout    Hiatal hernia    BPH (benign prostatic hyperplasia)    Cognitive impairment    Cardiomyopathy    HTN (hypertension)    DM (diabetes mellitus)    S/P appendectomy      Allergies: No Known Allergies    Medications acetaminophen  Suppository .. 650 milliGRAM(s) Rectal every 6 hours PRN  dextrose 40% Gel 15 Gram(s) Oral once  dextrose 5% + sodium chloride 0.45%. 1000 milliLiter(s) IV Continuous <Continuous>  dextrose 5%. 1000 milliLiter(s) IV Continuous <Continuous>  dextrose 5%. 1000 milliLiter(s) IV Continuous <Continuous>  dextrose 50% Injectable 25 Gram(s) IV Push once  dextrose 50% Injectable 12.5 Gram(s) IV Push once  dextrose 50% Injectable 25 Gram(s) IV Push once  glucagon  Injectable 1 milliGRAM(s) IntraMuscular once  haloperidol    Injectable 2 milliGRAM(s) IntraMuscular every 6 hours PRN  insulin lispro (ADMELOG) corrective regimen sliding scale   SubCutaneous every 6 hours  iron sucrose IVPB 200 milliGRAM(s) IV Intermittent every 24 hours  levothyroxine Injectable 12.5 MICROGram(s) IV Push at bedtime  meropenem  IVPB 500 milliGRAM(s) IV Intermittent every 12 hours  nystatin Powder 1 Application(s) Topical three times a day  ondansetron Injectable 4 milliGRAM(s) IV Push every 6 hours PRN  vancomycin  IVPB 1000 milliGRAM(s) IV Intermittent once    FHNo pertinent family history in first degree relatives    No pertinent family history in first degree relatives    ,   PMHEncounter for blood transfusion    Anemia    History of gross hematuria    History of orthostatic hypotension    Bacterial pneumonia    Muscle weakness (generalized)    2019 novel coronavirus disease (COVID-19)    Falls    Heart failure    Afib    CHF (congestive heart failure)    Gout    Hiatal hernia    BPH (benign prostatic hyperplasia)    Cognitive impairment    Cardiomyopathy    HTN (hypertension)    DM (diabetes mellitus)       PSHS/P appendectomy    No significant past surgical history        Labs                          8.0    5.57  )-----------( 81       ( 11 Dec 2020 06:43 )             25.8      12-11    144  |  112<H>  |  62.0<H>  ----------------------------<  98  3.5   |  21.0<L>  |  3.23<H>    Ca    8.1<L>      11 Dec 2020 06:43  Mg     2.0     12-11       Vital Signs Last 24 Hrs  T(C): 35.7 (11 Dec 2020 04:15), Max: 35.7 (11 Dec 2020 04:15)  T(F): 96.3 (11 Dec 2020 04:15), Max: 96.3 (11 Dec 2020 04:15)  HR: 85 (11 Dec 2020 04:15) (59 - 85)  BP: 99/62 (11 Dec 2020 04:15) (92/56 - 99/62)  BP(mean): --  RR: 18 (11 Dec 2020 04:15) (18 - 18)  SpO2: 95% (11 Dec 2020 04:15) (95% - 96%)          WBC Count: 5.57 K/uL (12-11-20 @ 06:43)    ROS  Unable to obtain      PHYSICAL EXAM  LE Focused:    Vasc: DP/PT pulses non palpable, cap refill delayed, edema noted to Right first MPJ    Derm: Right medial 1st MPJ ulcer measuring ~2.5 x 2.0 x 0.4 cm, fibrogranular base with exposed capsule, + periwound erythema, no malodor, - probe to bone. Bilateral heel with unstagable ulcers  Neuro: Unable to assess  MSK: Unable to assess    Imaging:  Xray no erosive changes, calcific vessels    EXAM:  FOOT-BILATERAL                        PROCEDURE DATE:  12/11/2020      INTERPRETATION:  RIGHT foot    CLINICAL INFORMATION: Heel ulceration. Assess for osteomyelitis..    TECHNIQUE .  AP,lateral and oblique views.    FINDINGS:  Posterior heel ulceration NOTED without radiographic evidence of osteomyelitis/osteolysis.  There is a pes planus deformity with degenerative a narrowing of the first metacarpal tarsal pharyngeal joint and tarsal bones. No acute fracture.  There are diffusevascular arterial calcifications.    IMPRESSION:    Pes planus deformity. Osteoarthritis. Heel ulcer without radiographic evidence of osteomyelitis.    LEFT foot    CLINICAL INFORMATION: Heel ulceration. Assess for osteoarthritis.    TECHNIQUE: AP,lateral and oblique views.    FINDINGS:    Posterior heel ulceration NOTED without radiographic evidence of osteomyelitis/osteolysis.  There is a pes planus deformity with degenerative a narrowing of the first metacarpal tarsal pharyngeal joint andtarsal bones. No acute fracture.  There are diffuse vascular arterial calcifications.    .  IMPRESSION:    Pes planus deformity. Osteoarthritis. Heel ulcer without radiographic evidence of osteomyelitis    If osteomyelitis is considered, despite conservative therapy, and soft tissue / bone infection requires further assessment, follow-up MRI recommended.      SANFORD VIDES MD; Attending Radiologist  This document has been electronically signed. Dec 11 2020  2:11PM        Cultures: taken    A: Right 1st MPJ ulcer  Bilateral unstageable heel ulcers    P:  Patient evaluated, chart reviewed  Xrays reviewed  IV antibiotics per ID  Pt not a surgical candidate. Will likely need long term IV antibiotics  Continue local wound care  Applied betadine/DSD with allevyn pad for offloading  Rx. iodosorb to be used with dressing change   Offloading to BLE while in bed  Discussed with Dr. Blank    Wound care instructions:  Clean ulcer sites with sterile saline  Apply iodosorb to b/l heel and Right 1st MPJ wound, cover with Allevyn pads  Change dressing every other day

## 2020-12-11 NOTE — CONSULT NOTE ADULT - SUBJECTIVE AND OBJECTIVE BOX
Palliative Consult  HPI This is a 70 year old male from [facility], PMHx of recurrent hospitalizations, Developmental Disability, Chronic A. Fib not on AC due to Hematuria/Anemia, Diastolic Heart Failure, CKD 3, HTN, HLD, DM 2, Hypothermia, Chronic Venous Stasis, LE Ulcers, Chronic Left Shoulder Dislocation, Dysphagia and Aspiration Pneumonia admitted with lethargy weakness and slurred speech. In ED patient was lethargic but would awaken to verbal stimuli. Code stroke called in ED. CT head negative at that time. Hypothermic in ED (per hospitalist team this is a chronic hypothermia issue) CXR signficiant for chronic small left pleural effusion. Patient admitted with sepsis. Called for palliative care consult due to patients lethargy and inability to pass swallow evaluation. Today patient is awake and alert, requesting PO intake. Code status:DNR DNI.     <HPI:  History was taken from charts as Aide from [facility] gone by the time patient was admitted.  70 years old male with recurrent hospitalizations, Developmental Disability, Chronic A. Fib not on AC due to Hematuria/Anemia, Diastolic Heart Failure, CKD 3, HTN, HLD, DM 2, Hypothermia, Chronic Venous Stasis, LE Ulcers, Chronic Left Shoulder Dislocation, Dysphagia and Aspiration Pneumonia sent from [facility] as he was not doing well. He was lethargic, weak and his speech was slurred.   Patient is currently very sleepy and lethargic however wakes up on verbal stimuli. No active complaints. Speech is clear at this time.   He was code stroke in ER. No acute finding on CT. Found to be hypothermic. CXR with chronic small left pleural effusion. Urine was just sent and as per RN it was very cloudy.(08 Dec 2020 18:11)> end of copied text    PERTINENT PMH REVIEWED: Yes     PAST MEDICAL & SURGICAL HISTORY:  Encounter for blood transfusion    Anemia    History of gross hematuria    History of orthostatic hypotension    Bacterial pneumonia    Muscle weakness (generalized)    2019 novel coronavirus disease (COVID-19)    Falls    Heart failure    Afib    CHF (congestive heart failure)    Gout    Hiatal hernia    BPH (benign prostatic hyperplasia)    Cognitive impairment    Cardiomyopathy    HTN (hypertension)    DM (diabetes mellitus)    S/P appendectomy        SOCIAL HISTORY:                                     Admitted from:  [facility]    Surrogate/HCP/Guardian: Phone#: Avani Perez Sister [phone]    FAMILY HISTORY:  No family history related to admission diagonsis    Baseline ADLs (prior to admission):  Dependent      Allergies  No Known Allergies    Present Symptoms:   Dyspnea: 0   Nausea/Vomiting: Unable to obtain due to poor mentation   Anxiety:  Unable to obtain due to poor mentation   Depression: Unable to obtain due to poor mentation   Fatigue: No  Loss of appetite: No    Pain: Non verbal cues of pain absent            Character-            Duration-            Effect-            Factors-            Frequency-            Location-            Severity-    Review of Systems: Reviewed  Per HPI, all other ROS negative  Unable to obtain due to poor mentation     MEDICATIONS  (STANDING):  cadexomer iodine 0.9% Gel 1 Application(s) Topical every 24 hours  dextrose 40% Gel 15 Gram(s) Oral once  dextrose 5% + sodium chloride 0.45%. 1000 milliLiter(s) (100 mL/Hr) IV Continuous <Continuous>  dextrose 5%. 1000 milliLiter(s) (100 mL/Hr) IV Continuous <Continuous>  dextrose 5%. 1000 milliLiter(s) (50 mL/Hr) IV Continuous <Continuous>  dextrose 50% Injectable 25 Gram(s) IV Push once  dextrose 50% Injectable 12.5 Gram(s) IV Push once  dextrose 50% Injectable 25 Gram(s) IV Push once  glucagon  Injectable 1 milliGRAM(s) IntraMuscular once  insulin lispro (ADMELOG) corrective regimen sliding scale   SubCutaneous Before meals and at bedtime  iron sucrose IVPB 200 milliGRAM(s) IV Intermittent every 24 hours  levothyroxine Injectable 12.5 MICROGram(s) IV Push at bedtime  meropenem  IVPB 500 milliGRAM(s) IV Intermittent every 12 hours  nystatin Powder 1 Application(s) Topical three times a day  vancomycin  IVPB 1000 milliGRAM(s) IV Intermittent once    MEDICATIONS  (PRN):  acetaminophen  Suppository .. 650 milliGRAM(s) Rectal every 6 hours PRN Temp greater or equal to 38C (100.4F), Mild Pain (1 - 3), Moderate Pain (4 - 6)  haloperidol    Injectable 2 milliGRAM(s) IntraMuscular every 6 hours PRN Agitation  ondansetron Injectable 4 milliGRAM(s) IV Push every 6 hours PRN Nausea and/or Vomiting    Vital Signs Last 24 Hrs  T(C): 35.7 (11 Dec 2020 04:15), Max: 35.7 (11 Dec 2020 04:15)  T(F): 96.3 (11 Dec 2020 04:15), Max: 96.3 (11 Dec 2020 04:15)  HR: 85 (11 Dec 2020 04:15) (59 - 85)  BP: 99/62 (11 Dec 2020 04:15) (92/56 - 99/62)  BP(mean): --  RR: 18 (11 Dec 2020 04:15) (18 - 18)  SpO2: 95% (11 Dec 2020 04:15) (95% - 95%)    General: alert  , awake    Karnofsky:  %__30-40    HEENT: dry mouth      Lungs: comfortable     CV: normal      GI: dysphagia - will need to be re-evaluated    : incontinent       MSK: weakness    Skin: thin frail skin    LABS:                        8.0    5.57  )-----------( 81       ( 11 Dec 2020 06:43 )             25.8     12-11    144  |  112<H>  |  62.0<H>  ----------------------------<  98  3.5   |  21.0<L>  |  3.23<H>    Ca    8.1<L>      11 Dec 2020 06:43  Mg     2.0     12-11    I&O's Summary    10 Dec 2020 07:01  -  11 Dec 2020 07:00  --------------------------------------------------------  IN: 1200 mL / OUT: 100 mL / NET: 1100 mL    RADIOLOGY & ADDITIONAL STUDIES:  Xray foot ANNE 12.11.20  IMPRESSION:  Pes planus deformity. Osteoarthritis. Heel ulcer without radiographic evidence of osteomyelitis  If osteomyelitis is considered, despite conservative therapy, and soft tissue / bone infection requires further assessment, follow-up MRI recommended.    CXR 12.08.20  IMPRESSION: Small left sided pleural effusion. Unchanged cardiomegaly.  Chronic anterior dislocation of the left shoulder.    CT head 12.08.20  IMPRESSION  1. Calcified plaque in both carotid bulbs without significant narrowing. Both internal carotid arteries are widely patent without dissection.  2.Left vertebral artery is well visualized and is widely patent. There is only intermittent faint visualization of the right vertebral artery which is not continuous.  3. Intracranially, the anterior cerebral circulation is markedly redundant but is widely patent.  4. The posterior cerebral circulation is fed by the left vertebral artery, and the basilar artery is widely patent. Both posterior cerebral arteries are widely patent.    ADVANCE DIRECTIVES:   DNR DNI

## 2020-12-11 NOTE — PROGRESS NOTE ADULT - ASSESSMENT
70 years old male with recurrent hospitalizations, Developmental Disability, Chronic A. Fib not on AC due to Hematuria/Anemia, Diastolic Heart Failure, CKD 3, HTN, HLD, DM 2, Hypothermia, Chronic Venous Stasis, LE Ulcers, Chronic Left Shoulder Dislocation, Dysphagia and Aspiration [facility]. He was lethargic, weak and his speech was slurred.   Patient is currently very sleepy and lethargic however wakes up on verbal stimuli. No active complaints. Speech is clear at this time.   He was code stroke in ER. No acute finding on CT. Found to be hypothermic. CXR with chronic small left pleural effusion. UA with UTI.     1) Metabolic Encephalopathy  - CT Head with no acute finding  - Likely due to Sepsis due to wound infection (Right 1st MPJ)  - Urine and blood culture negative   - Follow wound culture   - Zosyn was changed to Meropenem due to history of ESBL Proteus in urine.  - Continue Vancomycin   - ID following     2) Dysphagia  - Able to tolerate Puree Diet today     3) LE Ulcers (Right 1st MPJ and B/L Heels)  - Wound care  - Podiatry Consult appreciated     4) Rash / ? Cellulitis both thighs  - Antibiotics as above  - Nystatin Powder  - Rivera's catheter placed for healing of rash and ulcers     5) Decubitus Ulcers  - Wound care  - Change position every 2 hours  - Cl[place]ron bed    6) Hypothermia  - Chronic, had extensive work up in past  - Hyperthermia blanket    7) DM 2  - HbA1c 6.1 on 11/6/20  - Accu checks and ISS    8) Chronic Diastolic Heart Failure  - No signs of volume overload     9) Chronic A. Fib  - Rate controlled  - Not on AC due to hematuria / anemia    10) Acute on Chronic Kidney Injury   - Renal US with mild right hydronephrosis (chronic)  - Avoid nephrotoxic medications  - Gentle hydration  - Monitor renal function     11) Anemia  - Low iron stores as per previous studies, started Venofer 200 mg x 5  - FOBT pending   - s/p 2 units of PRBCs     DVT Prophylaxis -- Patient is auto anticoagulated     GOC: DNR/I. Patient is under OPWDD. Palliative Consult appreciated. Prognosis extremely guarded.     Dispo: Unclear

## 2020-12-11 NOTE — CONSULT NOTE ADULT - PROBLEM SELECTOR RECOMMENDATION 9
NPO   Patient now awake and alert - would recommend trying another swallow evaluation today now that his mental status to alertness has improved

## 2020-12-11 NOTE — PROGRESS NOTE ADULT - SUBJECTIVE AND OBJECTIVE BOX
[facility]  INFECTIOUS DISEASES AND INTERNAL MEDICINE at [place]  =======================================================  Zach Chew MD  Diplomates American Board of Internal Medicine and Infectious Diseases  Tel: [phone]      Fax: [phone]  =======================================================    [place] [id number]    Follow up: hypothermia  more alert today and responsive    Allergies:  No Known Allergies      Medications:  acetaminophen  Suppository .. 650 milliGRAM(s) Rectal every 6 hours PRN  dextrose 40% Gel 15 Gram(s) Oral once  dextrose 5% + sodium chloride 0.45%. 1000 milliLiter(s) IV Continuous <Continuous>  dextrose 5%. 1000 milliLiter(s) IV Continuous <Continuous>  dextrose 5%. 1000 milliLiter(s) IV Continuous <Continuous>  dextrose 50% Injectable 25 Gram(s) IV Push once  dextrose 50% Injectable 12.5 Gram(s) IV Push once  dextrose 50% Injectable 25 Gram(s) IV Push once  glucagon  Injectable 1 milliGRAM(s) IntraMuscular once  haloperidol    Injectable 2 milliGRAM(s) IntraMuscular every 6 hours PRN  insulin lispro (ADMELOG) corrective regimen sliding scale   SubCutaneous every 6 hours  iron sucrose IVPB 200 milliGRAM(s) IV Intermittent every 24 hours  levothyroxine Injectable 12.5 MICROGram(s) IV Push at bedtime  meropenem  IVPB 500 milliGRAM(s) IV Intermittent every 12 hours  nystatin Powder 1 Application(s) Topical three times a day  ondansetron Injectable 4 milliGRAM(s) IV Push every 6 hours PRN            REVIEW OF SYSTEMS:  cannot obtain           Physical Exam:  ICU Vital Signs Last 24 Hrs  T(C): 34.9 (10 Dec 2020 14:00), Max: 36.4 (10 Dec 2020 05:52)  T(F): 94.8 (10 Dec 2020 14:00), Max: 97.5 (10 Dec 2020 05:52)  HR: 68 (10 Dec 2020 14:00) (68 - 88)  BP: 95/53 (10 Dec 2020 14:00) (91/57 - 111/71)  BP(mean): --  ABP: --  ABP(mean): --  RR: 18 (10 Dec 2020 14:00) (18 - 19)  SpO2: 96% (10 Dec 2020 14:00) (94% - 99%)      GEN: NAD, pleasant  HEENT: normocephalic and atraumatic. EOMI. PERRL.  Anicteric   NECK: Supple.   LUNGS: Clear to auscultation.  HEART: Regular rate and rhythm without murmur.  ABDOMEN: Soft, nontender, and nondistended.  Positive bowel sounds.    : No CVA tenderness  EXTREMITIES: right foot big toe dorsal ulcer + discharge, b/l heel ulcers  MSK: no joint swelling  NEUROLOGIC: Cranial nerves II through XII are grossly intact. No focal deficits  PSYCHIATRIC: Appropriate affect .  SKIN: groin erythema, superficial sacral ulcers without cellulitis      Labs:                                      8.0    5.57  )-----------( 81       ( 11 Dec 2020 06:43 )             25.8       12-11    144  |  112<H>  |  62.0<H>  ----------------------------<  98  3.5   |  21.0<L>  |  3.23<H>    Ca    8.1<L>      11 Dec 2020 06:43  Mg     2.0     12-11                              CAPILLARY BLOOD GLUCOSE      POCT Blood Glucose.: 142 mg/dL (11 Dec 2020 17:12)          < from: Xray Foot AP + Lateral + Oblique, Bilat (12.11.20 @ 11:35) >    IMPRESSION:    Pes planus deformity. Osteoarthritis. Heel ulcer without radiographic evidence of osteomyelitis    If osteomyelitis is considered, despite conservative therapy, and soft tissue / bone infection requires further assessment, follow-up MRI recommended.        < end of copied text >

## 2020-12-12 NOTE — PROGRESS NOTE ADULT - SUBJECTIVE AND OBJECTIVE BOX
Hypothermia, initial encounter    HPI:  History was taken from charts as Aide from care home gone by the time patient was admitted.    70 years old male with recurrent hospitalizations, Developmental Disability, Chronic A. Fib not on AC due to Hematuria/Anemia, Diastolic Heart Failure, CKD 3, HTN, HLD, DM 2, Hypothermia, Chronic Venous Stasis, LE Ulcers, Chronic Left Shoulder Dislocation, Dysphagia and Aspiration Pneumonia sent from nursing home as he was not doing well. He was lethargic, weak and his speech was slurred.   Patient is currently very sleepy and lethargic however wakes up on verbal stimuli. No active complaints. Speech is clear at this time.   He was code stroke in ER. No acute finding on CT. Found to be hypothermic. CXR with chronic small left pleural effusion. Urine was just sent and as per RN it was very cloudy.    Interval History:  Patient was seen and examined at bedside around 10:15 am.   Awake. Very pleasant.   Tolerating PO.   No active complaints.     ROS:  As per interval history otherwise unremarkable    PHYSICAL EXAM:  Vital Signs   T(C): 36.4 (12 Dec 2020 10:14), Max: 37 (11 Dec 2020 14:46)  T(F): 97.5 (12 Dec 2020 10:14), Max: 98.6 (11 Dec 2020 14:46)  HR: 65 (12 Dec 2020 10:14) (65 - 89)  BP: 99/65 (12 Dec 2020 10:14) (94/62 - 99/65)  RR: 16 (12 Dec 2020 10:14) (16 - 18)  SpO2: 98% (12 Dec 2020 10:14) (93% - 98%)  General: Elderly male sitting in bed comfortably.  HEENT: Clear conjunctivae. Moist mucus membrane  Neck: Supple.   Chest: Good air entry - no wheezing or rhonchi. Fine rales on right side. No chest wall tenderness.  Heart: S1 & S2 with irregular rhythm.   Abdomen: Ventral hernia. Soft. Non-tender. Non-distended. + BS  : Fungal rash in genital area and upper thighs (improving). Rivera's catheter in place   Ext: Chronic skin changes on both lower legs. Varicose veins in left leg with ? lipoma or soft tissue on shin. Ulcer on Right 1st MPJ and B/L Heels.   Neuro: Awake. Following commands. Moves all four extremities.   Skin: Fungal rash in urogenital area and upper thighs. Pressure ulcers on both buttocks.   Psychiatry: Normal mood and affect     MEDICATIONS  (STANDING):  cadexomer iodine 0.9% Gel 1 Application(s) Topical every 24 hours  dextrose 40% Gel 15 Gram(s) Oral once  dextrose 5%. 1000 milliLiter(s) (100 mL/Hr) IV Continuous <Continuous>  dextrose 5%. 1000 milliLiter(s) (50 mL/Hr) IV Continuous <Continuous>  dextrose 50% Injectable 25 Gram(s) IV Push once  dextrose 50% Injectable 12.5 Gram(s) IV Push once  dextrose 50% Injectable 25 Gram(s) IV Push once  glucagon  Injectable 1 milliGRAM(s) IntraMuscular once  insulin lispro (ADMELOG) corrective regimen sliding scale   SubCutaneous Before meals and at bedtime  iron sucrose IVPB 200 milliGRAM(s) IV Intermittent every 24 hours  levothyroxine Injectable 12.5 MICROGram(s) IV Push at bedtime  meropenem  IVPB 500 milliGRAM(s) IV Intermittent every 12 hours  nystatin Powder 1 Application(s) Topical three times a day  sodium chloride 0.9%. 1000 milliLiter(s) (75 mL/Hr) IV Continuous <Continuous>    MEDICATIONS  (PRN):  acetaminophen  Suppository .. 650 milliGRAM(s) Rectal every 6 hours PRN Temp greater or equal to 38C (100.4F), Mild Pain (1 - 3), Moderate Pain (4 - 6)  haloperidol    Injectable 2 milliGRAM(s) IntraMuscular every 6 hours PRN Agitation  ondansetron Injectable 4 milliGRAM(s) IV Push every 6 hours PRN Nausea and/or Vomiting    LABS:                        8.0    5.57  )-----------( 81       ( 11 Dec 2020 06:43 )             25.8     12-12    145  |  114<H>  |  55.0<H>  ----------------------------<  127<H>  3.2<L>   |  20.0<L>  |  3.22<H>    Ca    8.0<L>      12 Dec 2020 08:10  Mg     2.0     12-11    Blood Culture: 12-10 @ 22:00  Organism --  Gram Stain Blood -- Gram Stain --  Specimen Source .Surgical Swab R foot ulcer  Culture-Blood --    12-08 @ 22:18  Organism --  Gram Stain Blood -- Gram Stain --  Specimen Source .Urine Catheterized  Culture-Blood --    12-08 @ 18:40  Organism --  Gram Stain Blood -- Gram Stain --  Specimen Source .Blood Blood-Peripheral  Culture-Blood --    RADIOLOGY & ADDITIONAL STUDIES:  CT Brain Stroke Protocol (12.08.20 @ 16:00)  1)  no CT evidence of an acute infarct or hemorrhage.  2)  underlying involutional changes with volume loss.    CT Angio Neck w/ IV Cont (12.08.20 @ 16:01)   1. Calcified plaque in both carotid bulbs without significant narrowing. Both internal carotid arteries are widely patent without dissection.  2.Left vertebral artery is well visualized and is widely patent. There is only intermittent faint visualization of the right vertebral artery which is not continuous.  3. Intracranially, the anterior cerebral circulation is markedly redundant but is widely patent.  4. The posterior cerebral circulation is fed by the left vertebral artery, and the basilar artery is widely patent. Both posterior cerebral arteries are widely patent.    Xray Chest 1 View- PORTABLE-Urgent (12.08.20 @ 16:56)   Small left sided pleural effusion. Unchanged cardiomegaly.  Chronic anterior dislocation of the left shoulder.

## 2020-12-12 NOTE — PROGRESS NOTE ADULT - ASSESSMENT
70 years old male with recurrent hospitalizations, Developmental Disability, Chronic A. Fib not on AC due to Hematuria/Anemia, Diastolic Heart Failure, CKD 3, HTN, HLD, DM 2, Hypothermia, Chronic Venous Stasis, LE Ulcers, Chronic Left Shoulder Dislocation, Dysphagia and Aspiration Pneumonia sent from senior living as he was not doing well. He was lethargic, weak and his speech was slurred.   Patient is currently very sleepy and lethargic however wakes up on verbal stimuli. No active complaints. Speech is clear at this time.   He was code stroke in ER. No acute finding on CT. Found to be hypothermic. CXR with chronic small left pleural effusion. UA with UTI.     1) Metabolic Encephalopathy  - CT Head with no acute finding  - Likely due to Sepsis due to wound infection (Right 1st MPJ)  - Urine and blood cultures negative   - Wound culture with Providencia, Pseudomonas, Enterococcus and Proteus    - Continue Meropenem   - Continue Vancomycin as per Vanco trough (today 21.9)  - ID following     2) LE Ulcers (Right 1st MPJ and B/L Heels)  - Wound care  - Podiatry following, not a surgical candidate    3) Dysphagia  - Able to tolerate Puree Diet   - Speech therapy following     4) Rash / ? Cellulitis both thighs  - Antibiotics as above  - Nystatin Powder  - Rivera's catheter placed for healing of rash and ulcers     5) Decubitus Ulcers  - Wound care  - Change position every 2 hours  - Clinitron bed    6) Hypothermia  - Chronic, had extensive work up in past  - Likely due to recurrent infections and environmental   - Hyperthermia blanket as needed    7) DM 2  - HbA1c 6.1 on 11/6/20  - Accu checks and ISS    8) Chronic Diastolic Heart Failure  - No signs of volume overload     9) Chronic A. Fib  - Rate controlled  - Not on AC due to hematuria / anemia    10) Acute on Chronic Kidney Injury   - Renal US with mild right hydronephrosis (chronic)  - Avoid nephrotoxic medications  - Gentle hydration  - Monitor renal function     11) Anemia  - Low iron stores as per previous studies, started Venofer 200 mg x 5  - FOBT pending   - s/p 2 units of PRBCs     12) Hypokalemia  - Replete     DVT Prophylaxis -- Patient is auto anticoagulated     GOC: DNR/I. Patient is under OPWDD. Palliative Consult appreciated. Prognosis extremely guarded.     Dispo: Group Home vs [facility] in 3-4 days

## 2020-12-13 NOTE — DIETITIAN INITIAL EVALUATION ADULT. - OTHER INFO
70 years old male with recurrent hospitalizations, Developmental Disability, Chronic A. Fib not on AC due to Hematuria/Anemia, Diastolic Heart Failure, CKD 3, HTN, HLD, DM 2, Hypothermia, Chronic Venous Stasis, LE Ulcers, Chronic Left Shoulder Dislocation, Dysphagia and Aspiration Pneumonia sent from senior care as he was not doing well. He was lethargic, weak and his speech was slurred.   Patient is currently very sleepy and lethargic however wakes up on verbal stimuli. No active complaints. Speech is clear at this time.   He was code stroke in ER. No acute finding on CT. Found to be hypothermic. CXR with chronic small left pleural effusion. UA with UTI.   Metabolic Encephalopathy    no weight for this admission   previous adm weight last month 195lbs - pt appears less- bed scale not zeroed  UBW per RD note 240 lbs    Pt known to this RD from previous admissions.   Pt appears thinner

## 2020-12-13 NOTE — DIETITIAN INITIAL EVALUATION ADULT. - PERTINENT MEDS FT
MEDICATIONS  (STANDING):  allopurinol 300 milliGRAM(s) Oral daily  ascorbic acid 500 milliGRAM(s) Oral daily  aspirin  chewable 81 milliGRAM(s) Oral daily  atorvastatin 20 milliGRAM(s) Oral at bedtime  cadexomer iodine 0.9% Gel 1 Application(s) Topical every 24 hours  calcium carbonate 1250 mG  + Vitamin D (OsCal 500 + D) 1 Tablet(s) Oral daily  dextrose 40% Gel 15 Gram(s) Oral once  dextrose 5%. 1000 milliLiter(s) (100 mL/Hr) IV Continuous <Continuous>  dextrose 5%. 1000 milliLiter(s) (50 mL/Hr) IV Continuous <Continuous>  dextrose 50% Injectable 25 Gram(s) IV Push once  dextrose 50% Injectable 12.5 Gram(s) IV Push once  dextrose 50% Injectable 25 Gram(s) IV Push once  ferrous    sulfate 325 milliGRAM(s) Oral three times a day  finasteride 5 milliGRAM(s) Oral daily  glucagon  Injectable 1 milliGRAM(s) IntraMuscular once  heparin   Injectable 5000 Unit(s) SubCutaneous every 12 hours  insulin lispro (ADMELOG) corrective regimen sliding scale   SubCutaneous Before meals and at bedtime  iron sucrose IVPB 200 milliGRAM(s) IV Intermittent every 24 hours  levothyroxine Injectable 12.5 MICROGram(s) IV Push at bedtime  magnesium oxide 400 milliGRAM(s) Oral three times a day with meals  meropenem  IVPB 500 milliGRAM(s) IV Intermittent every 12 hours  midodrine. 5 milliGRAM(s) Oral three times a day  multivitamin 1 Tablet(s) Oral daily  nystatin Powder 1 Application(s) Topical three times a day  saccharomyces boulardii 250 milliGRAM(s) Oral two times a day  sodium chloride 0.45%. 1000 milliLiter(s) (60 mL/Hr) IV Continuous <Continuous>  tamsulosin 0.4 milliGRAM(s) Oral at bedtime  trimethoprim  160 mG/sulfamethoxazole 800 mG 2 Tablet(s) Oral every 12 hours    MEDICATIONS  (PRN):  acetaminophen  Suppository .. 650 milliGRAM(s) Rectal every 6 hours PRN Temp greater or equal to 38C (100.4F), Mild Pain (1 - 3), Moderate Pain (4 - 6)  haloperidol    Injectable 2 milliGRAM(s) IntraMuscular every 6 hours PRN Agitation  ondansetron Injectable 4 milliGRAM(s) IV Push every 6 hours PRN Nausea and/or Vomiting

## 2020-12-13 NOTE — DIETITIAN INITIAL EVALUATION ADULT. - PERTINENT LABORATORY DATA
12-13 Na147 mmol/L<H> Glu 87 mg/dL K+ 3.3 mmol/L<L> Cr  2.82 mg/dL<H> BUN 49.0 mg/dL<H> Phos n/a   Alb n/a   PAB n/a

## 2020-12-13 NOTE — CHART NOTE - TREATMENT: THE FOLLOWING DIET HAS BEEN RECOMMENDED
Diet, Dysphagia 1 Pureed-No Liquids:   Consistent Carbohydrate {No Snacks} (CSTCHO)  DASH/TLC {Sodium & Cholesterol Restricted} (DASH) (12-11-20 @ 14:29) [Active]      1) add ensure pudding TID  2) Continue MVI, Vit C  3) encourage PO  4) assist with meals  5) daily weights

## 2020-12-13 NOTE — PROGRESS NOTE ADULT - SUBJECTIVE AND OBJECTIVE BOX
Hypothermia, initial encounter    HPI:  History was taken from charts as Aide from halfway gone by the time patient was admitted.    70 years old male with recurrent hospitalizations, Developmental Disability, Chronic A. Fib not on AC due to Hematuria/Anemia, Diastolic Heart Failure, CKD 3, HTN, HLD, DM 2, Hypothermia, Chronic Venous Stasis, LE Ulcers, Chronic Left Shoulder Dislocation, Dysphagia and Aspiration Pneumonia sent from care home as he was not doing well. He was lethargic, weak and his speech was slurred.   Patient is currently very sleepy and lethargic however wakes up on verbal stimuli. No active complaints. Speech is clear at this time.   He was code stroke in ER. No acute finding on CT. Found to be hypothermic. CXR with chronic small left pleural effusion. Urine was just sent and as per RN it was very cloudy.    Interval History:  Patient was seen and examined at bedside around 9:15 am.   Feeling better.  Has some cough.   Denies chest pain, shortness of breath, nausea, vomiting or abdominal pain.   Tolerating PO.   Denies paresthesia or pain in legs/feet.     ROS:  As per interval history otherwise unremarkable    PHYSICAL EXAM:  Vital Signs  T(C): 36.3 (13 Dec 2020 10:45), Max: 36.4 (13 Dec 2020 04:20)  T(F): 97.3 (13 Dec 2020 10:45), Max: 97.5 (13 Dec 2020 04:20)  HR: 72 (13 Dec 2020 10:45) (64 - 72)  BP: 111/66 (13 Dec 2020 10:45) (97/62 - 111/66)  RR: 18 (13 Dec 2020 10:45) (16 - 18)  SpO2: 98% (13 Dec 2020 10:45) (92% - 99%)  General: Elderly male sitting in bed comfortably.  HEENT: Clear conjunctivae. Moist mucus membrane  Neck: Supple.   Chest: Good air entry - no wheezing or rhonchi. Fine crackles on right side. No chest wall tenderness.  Heart: S1 & S2 with irregular rhythm.   Abdomen: Ventral hernia. Soft. Non-tender. Non-distended. + BS  : Fungal rash in genital area and upper thighs (improving). Rivera's catheter in place   Ext: Chronic skin changes on both lower legs. Varicose veins in left leg with ? lipoma or soft tissue on shin. Ulcer on Right 1st MPJ and B/L Heels.   Neuro: Awake. Following commands. Moves all four extremities.   Skin: Fungal rash in urogenital area and upper thighs. Pressure ulcers on both buttocks.   Psychiatry: Normal mood and affect     MEDICATIONS  (STANDING):  allopurinol 300 milliGRAM(s) Oral daily  ascorbic acid 500 milliGRAM(s) Oral daily  aspirin  chewable 81 milliGRAM(s) Oral daily  atorvastatin 20 milliGRAM(s) Oral at bedtime  cadexomer iodine 0.9% Gel 1 Application(s) Topical every 24 hours  calcium carbonate 1250 mG  + Vitamin D (OsCal 500 + D) 1 Tablet(s) Oral daily  dextrose 40% Gel 15 Gram(s) Oral once  dextrose 5%. 1000 milliLiter(s) (100 mL/Hr) IV Continuous <Continuous>  dextrose 5%. 1000 milliLiter(s) (50 mL/Hr) IV Continuous <Continuous>  dextrose 50% Injectable 25 Gram(s) IV Push once  dextrose 50% Injectable 12.5 Gram(s) IV Push once  dextrose 50% Injectable 25 Gram(s) IV Push once  ferrous    sulfate 325 milliGRAM(s) Oral three times a day  finasteride 5 milliGRAM(s) Oral daily  glucagon  Injectable 1 milliGRAM(s) IntraMuscular once  heparin   Injectable 5000 Unit(s) SubCutaneous every 12 hours  insulin lispro (ADMELOG) corrective regimen sliding scale   SubCutaneous Before meals and at bedtime  iron sucrose IVPB 200 milliGRAM(s) IV Intermittent every 24 hours  levothyroxine Injectable 12.5 MICROGram(s) IV Push at bedtime  magnesium oxide 400 milliGRAM(s) Oral three times a day with meals  meropenem  IVPB 500 milliGRAM(s) IV Intermittent every 12 hours  midodrine. 5 milliGRAM(s) Oral three times a day  multivitamin 1 Tablet(s) Oral daily  nystatin Powder 1 Application(s) Topical three times a day  saccharomyces boulardii 250 milliGRAM(s) Oral two times a day  sodium chloride 0.45%. 1000 milliLiter(s) (60 mL/Hr) IV Continuous <Continuous>  tamsulosin 0.4 milliGRAM(s) Oral at bedtime  trimethoprim  160 mG/sulfamethoxazole 800 mG 2 Tablet(s) Oral every 12 hours    MEDICATIONS  (PRN):  acetaminophen  Suppository .. 650 milliGRAM(s) Rectal every 6 hours PRN Temp greater or equal to 38C (100.4F), Mild Pain (1 - 3), Moderate Pain (4 - 6)  haloperidol    Injectable 2 milliGRAM(s) IntraMuscular every 6 hours PRN Agitation  ondansetron Injectable 4 milliGRAM(s) IV Push every 6 hours PRN Nausea and/or Vomiting    LABS:                        8.1    4.62  )-----------( 86       ( 13 Dec 2020 07:15 )             26.8     12-13    147<H>  |  118<H>  |  49.0<H>  ----------------------------<  87  3.3<L>   |  20.0<L>  |  2.82<H>    Ca    8.0<L>      13 Dec 2020 07:15  Mg     1.8     12-13    Blood Culture: 12-10 @ 22:00  Organism Providencia stuartii  Gram Stain Blood -- Gram Stain --  Specimen Source .Surgical Swab R foot ulcer  Culture-Blood --    12-08 @ 22:18  Organism --  Gram Stain Blood -- Gram Stain --  Specimen Source .Urine Catheterized  Culture-Blood --    12-08 @ 18:40  Organism --  Gram Stain Blood -- Gram Stain --  Specimen Source .Blood Blood-Peripheral  Culture-Blood --    RADIOLOGY & ADDITIONAL STUDIES:  CT Brain Stroke Protocol (12.08.20 @ 16:00)  1)  no CT evidence of an acute infarct or hemorrhage.  2)  underlying involutional changes with volume loss.    CT Angio Neck w/ IV Cont (12.08.20 @ 16:01)   1. Calcified plaque in both carotid bulbs without significant narrowing. Both internal carotid arteries are widely patent without dissection.  2.Left vertebral artery is well visualized and is widely patent. There is only intermittent faint visualization of the right vertebral artery which is not continuous.  3. Intracranially, the anterior cerebral circulation is markedly redundant but is widely patent.  4. The posterior cerebral circulation is fed by the left vertebral artery, and the basilar artery is widely patent. Both posterior cerebral arteries are widely patent.    Xray Chest 1 View- PORTABLE-Urgent (12.08.20 @ 16:56)   Small left sided pleural effusion. Unchanged cardiomegaly.  Chronic anterior dislocation of the left shoulder.

## 2020-12-13 NOTE — PROGRESS NOTE ADULT - ASSESSMENT
70 years old male with recurrent hospitalizations, Developmental Disability, Chronic A. Fib not on AC due to Hematuria/Anemia, Diastolic Heart Failure, CKD 3, HTN, HLD, DM 2, Hypothermia, Chronic Venous Stasis, LE Ulcers, Chronic Left Shoulder Dislocation, Dysphagia and Aspiration Pneumonia sent from nursing home as he was not doing well. He was lethargic, weak and his speech was slurred.   Patient is currently very sleepy and lethargic however wakes up on verbal stimuli. No active complaints. Speech is clear at this time.   He was code stroke in ER. No acute finding on CT. Found to be hypothermic. CXR with chronic small left pleural effusion. UA with UTI.     1) Metabolic Encephalopathy  - CT Head with no acute finding  - Likely due to Sepsis due to wound infection (Right 1st MPJ)  - Urine and blood cultures negative   - Wound culture with Providencia, Pseudomonas, Enterococcus, ESBL Proteus and Stenotrophomonas     - Continue Meropenem   - Bactrim DS was added today   - ID following     2) LE Ulcers (Right 1st MPJ and B/L Heels)  - Wound care  - Podiatry follow up noted: not a surgical candidate    3) Dysphagia  - Able to tolerate Puree Diet   - Speech therapy following     4) Rash / ? Cellulitis both thighs  - Antibiotics as above  - Nystatin Powder  - Rivera's catheter placed for healing of rash and ulcers     5) Decubitus Ulcers  - Wound care  - Change position every 2 hours  - Clinitron bed pending     6) Hypothermia  - Chronic, had extensive work up in past  - Likely due to recurrent infections and environmental   - Hyperthermia blanket as needed    7) DM 2  - HbA1c 6.1 on 11/6/20  - Accu checks and ISS    8) Chronic Diastolic Heart Failure  - No signs of volume overload     9) Chronic A. Fib  - Rate controlled  - Not on AC due to hematuria / anemia    10) Acute on Chronic Kidney Injury   - Renal US with mild right hydronephrosis (chronic)  - Avoid nephrotoxic medications  - Gentle hydration  - Monitor renal function   - Improving     11) Anemia  - Low iron stores as per previous studies, started Venofer 200 mg x 5  - FOBT pending   - s/p 2 units of PRBCs     12) Hypokalemia  - Replete    13) Hypernatremia  - Change IVF for 1/2 NS     14) Thrombocytopenia  - Chronic  - Monitor     DVT Prophylaxis -- Heparin SQ    GOC: DNR/I. Patient is under OPWDD. Palliative Consult appreciated. Prognosis extremely guarded.     Dispo: Group Home vs [facility] in 3-4 days

## 2020-12-14 NOTE — PROGRESS NOTE ADULT - SUBJECTIVE AND OBJECTIVE BOX
follow up for Hypothermia, UTI , ARF on CRF   seen in am , he is awake alert no distress   resting in the bed   no overnight events reported       ROS:  as above , poor historian ,no leg pain         Vital Signs Last 24 Hrs  T(C): 36.6 (14 Dec 2020 11:05), Max: 36.6 (13 Dec 2020 22:04)  T(F): 97.8 (14 Dec 2020 11:05), Max: 97.8 (13 Dec 2020 22:04)  HR: 71 (14 Dec 2020 11:05) (61 - 71)  BP: 103/68 (14 Dec 2020 11:05) (103/68 - 118/74)  BP(mean): --  RR: 18 (14 Dec 2020 11:05) (18 - 18)  SpO2: 98% (14 Dec 2020 11:05) (95% - 98%)      General: Elderly male sitting in bed comfortably.  Chest: CTA anteriorly ,  no wheezing or rhonchi  Heart: S1 & S2 with irregular rhythm.   Abdomen: Ventral hernia. Soft. Non-tender. Non-distended. + BS  Ext: Chronic skin changes on both lower legs. Varicose veins in left leg   Neuro: Awake, alert  Following commands. Moves all four extremities.   Skin: Fungal rash in urogenital area and upper thighs.    : has rodriguez, +  red color urine                         8.1    5.12  )-----------( 79       ( 14 Dec 2020 07:04 )             27.5   12-14    149<H>  |  121<H>  |  44.0<H>  ----------------------------<  97  3.8   |  22.0  |  2.61<H>    Ca    8.3<L>      14 Dec 2020 07:04  Mg     2.0     12-14    Organism Providencia stuartii  Gram Stain Blood -- Gram Stain --  Specimen Source .Surgical Swab R foot ulcer  Culture-Blood --    12-08 @ 22:18  Organism --  Gram Stain Blood -- Gram Stain --  Specimen Source .Urine Catheterized  Culture-Blood --    12-08 @ 18:40  Organism --  Gram Stain Blood -- Gram Stain --  Specimen Source .Blood Blood-Peripheral  Culture-Blood --    RADIOLOGY & ADDITIONAL STUDIES:  CT Brain Stroke Protocol (12.08.20 @ 16:00)  1)  no CT evidence of an acute infarct or hemorrhage.  2)  underlying involutional changes with volume loss.    CT Angio Neck w/ IV Cont (12.08.20 @ 16:01)   1. Calcified plaque in both carotid bulbs without significant narrowing. Both internal carotid arteries are widely patent without dissection.  2.Left vertebral artery is well visualized and is widely patent. There is only intermittent faint visualization of the right vertebral artery which is not continuous.  3. Intracranially, the anterior cerebral circulation is markedly redundant but is widely patent.  4. The posterior cerebral circulation is fed by the left vertebral artery, and the basilar artery is widely patent. Both posterior cerebral arteries are widely patent.    CAPILLARY BLOOD GLUCOSE      POCT Blood Glucose.: 117 mg/dL (14 Dec 2020 13:39)  POCT Blood Glucose.: 136 mg/dL (14 Dec 2020 08:47)  POCT Blood Glucose.: 144 mg/dL (13 Dec 2020 22:36)  POCT Blood Glucose.: 143 mg/dL (13 Dec 2020 17:10)

## 2020-12-14 NOTE — PHYSICAL THERAPY INITIAL EVALUATION ADULT - SITTING BALANCE: STATIC
Bilateral foot edema  Onset: few weeks  Swelling comes and goes.   He stated the tops of his feet are puffy.    No pain, redness or warmth to touch of his skin  Pt denies any lightheadedness, sob or chest pain.   He does not eat a lot of salt in his diet.  We was recently started on amlodipine on 2/1/19.   Writer informed pt Dr. Gordillo is not in the office today.  Will send him the message and call tomorrow with his recommendations.  Writer advised he seek care in ER if he develops sob or chest pain.  Pt verbalized understanding.  Reason for Disposition  • [1] MILD swelling of both ankles (i.e., pedal edema) AND [2] new onset or worsening    Protocols used: LEG SWELLING AND EDEMA-A-AH     fair balance

## 2020-12-14 NOTE — PROGRESS NOTE ADULT - ASSESSMENT
70 years old male with recurrent hospitalizations, Developmental Disability, Chronic A. Fib not on AC due to Hematuria/Anemia, Diastolic Heart Failure, CKD 3, HTN, HLD, DM 2, Hypothermia, Chronic Venous Stasis, LE Ulcers, Chronic Left Shoulder Dislocation, Dysphagia and Aspiration Pneumonia sent from [facility] as he was not doing well. He was lethargic, weak and his speech was slurred.   Patient is currently very sleepy and lethargic however wakes up on verbal stimuli. No active complaints. Speech is clear at this time.   He was code stroke in ER. No acute finding on CT. Found to be hypothermic. CXR with chronic small left pleural effusion. UA with UTI.     1) Sepsis with metabolic encephalopathy  due to wound infection (Right 1st MPJ) likely   - Urine and blood cultures negative   - Wound culture with Providencia, Pseudomonas, Enterococcus, ESBL Proteus and Stenotrophomonas     - Continue Meropenem   - Bactrim DS was added   ID input appreciated     2) LE Ulcers (Right 1st MPJ and B/L Heels)  - Wound care  - Podiatry follow up noted: not a surgical candidate    3- ARF with CRF stage 3   cr 1.57 in nov 2020 lrecords reviewed   improving   renal consult called   avoid nephrotoxic  agents     4)Cellulitis both thighs  on Antibiotics as above  - Nystatin Powder for fungal skin infection     5) Decubitus Ulcers  - Wound care  - Change position every 2 hours  - Clinitron bed pending     6) Hypothermia, recurrent cronic   - had extensive work up in past  - Likely due to recurrent infections and environmental   - Hyperthermia blanket as needed    7) DM 2  - HbA1c 6.1   - Accu checks and ISS    8) Chronic Diastolic Heart Failure  - No signs of volume overload     9) Chronic A. Fib  - Rate controlled  - Not on AC due to hematuria / anemia    10) Anemia, cronic illness kidney disease   - Low iron stores as per previous studies, started Venofer 200 mg x 5  repeat vit b 12, iron studies    - s/p 2 units of PRBCs   add epogen weekly for renal dx       11) Hypernatremia  on IVF for 1/2 NS     14) Thrombocytopenia  - Chronic  - Monitor     DVT Prophylaxis -- Heparin SQ    GOC: DNR/I. Patient is under OPWDD.   Palliative Consult appreciated   Prognosis extremely guarded.     Dispo: Group Home vs [facility] in 3-4 days

## 2020-12-14 NOTE — CONSULT NOTE ADULT - PROBLEM SELECTOR RECOMMENDATION 9
suggest:  repeat UA,  repeat urine cx if UA pos for nitrite/LE  irrigate rodriguez manually prn to maintain patency,   Noncontrast CT Abd/Pelvis  hold SQ Heparin if gross hematuria worsens,  Nephrology consult for elevated creatinine  leave rodriguez indwelling   investigate cause of increased INR    will follow

## 2020-12-14 NOTE — CONSULT NOTE ADULT - GENITOURINARY COMMENTS
rodriguez to BSD - light gross hematuria with clots noted in tubing, bladder not palpable/distended, scrotum/penis with mild edema

## 2020-12-14 NOTE — PHYSICAL THERAPY INITIAL EVALUATION ADULT - RANGE OF MOTION EXAMINATION, REHAB EVAL
except amanda shoulders lack 50% AROM, amanda hips lack 50% AROM, amanda knees lack 50% AROM/no ROM deficits were identified

## 2020-12-14 NOTE — PROGRESS NOTE ADULT - ASSESSMENT
70 years old male with recurrent hospitalizations, Developmental Disability, Chronic A. Fib not on AC due to Hematuria/Anemia, Diastolic Heart Failure, CKD 3, HTN, HLD, DM 2, Hypothermia, Chronic Venous Stasis, LE Ulcers, Chronic Left Shoulder Dislocation, Dysphagia and Aspiration Pneumonia sent from [facility] as he was not doing well. He was lethargic, weak and his speech was slurred. Code stroke in Ed but Ct head wnl. Found to be hypothermic (which he is at baseline) cloudy urine sent for urinalysis with concern for UTI and infection of ulcers of feet    - f/u BCX  - UA +, ucx NF  - covid pcr (-)  - c/w meropenem adjusted for renal function   - vanco stopped  - wound cx is polymicrobial, added bactrim for stenotrophomonas  - per podiatry not a surgical candidate  - x ray right foot as above, would check CT right foot to help guide duration of abx as patient will not agree to MR  - Trend Fever  - Trend Leukocytosis        Will Follow

## 2020-12-14 NOTE — CONSULT NOTE ADULT - SUBJECTIVE AND OBJECTIVE BOX
SURGICAL PA NOTE: Urology PA consult note    CTSP by Medicine NP - pt with new onset gross hematuria this am, pt was difficult rodriguez placement in ER secondary to altered anatomy, rodriguez placed with ease once meatus exposed and identified    STATUS POST:      POST OPERATIVE DAY #:     Vital Signs Last 24 Hrs  T(C): 36.6 (14 Dec 2020 11:05), Max: 36.6 (13 Dec 2020 22:04)  T(F): 97.8 (14 Dec 2020 11:05), Max: 97.8 (13 Dec 2020 22:04)  HR: 71 (14 Dec 2020 11:05) (61 - 71)  BP: 103/68 (14 Dec 2020 11:05) (103/68 - 118/74)  BP(mean): --  RR: 18 (14 Dec 2020 11:05) (18 - 18)  SpO2: 98% (14 Dec 2020 11:05) (95% - 98%)    HPI:  History was taken from charts as Aide from [facility] gone by the time patient was admitted.    70 years old male with recurrent hospitalizations, Developmental Disability, Chronic A. Fib not on AC due to Hematuria/Anemia, Diastolic Heart Failure, CKD 3, HTN, HLD, DM 2, Hypothermia, Chronic Venous Stasis, LE Ulcers, Chronic Left Shoulder Dislocation, Dysphagia and Aspiration Pneumonia sent from [facility] as he was not doing well. He was lethargic, weak and his speech was slurred.   Patient is currently very sleepy and lethargic however wakes up on verbal stimuli. No active complaints. Speech is clear at this time.   He was code stroke in ER. No acute finding on CT. Found to be hypothermic. CXR with chronic small left pleural effusion. Urine was just sent and as per RN it was very cloudy.            (08 Dec 2020 18:11)      Hypothermia, initial encounter    Yes    No pertinent family history in first degree relatives    No pertinent family history in first degree relatives    Handoff    MEWS Score    Encounter for blood transfusion    Anemia    History of gross hematuria    History of orthostatic hypotension    Bacterial pneumonia    Muscle weakness (generalized)    2019 novel coronavirus disease (COVID-19)    Falls    Heart failure    Afib    CHF (congestive heart failure)    Gout    Hiatal hernia    BPH (benign prostatic hyperplasia)    Cognitive impairment    Cardiomyopathy    HTN (hypertension)    DM (diabetes mellitus)    Hypothermia    Insertion of Rodriguez catheter    S/P appendectomy    No significant past surgical history    WEAKNESS    20    Sepsis    Acute kidney injury    [email or web address]        SUBJECTIVE: Pt seen lying supine with HOB up, alert, awake, rodriguez to BSD    Diet:    Activity:     Fevers: [ ]Yes [ ]NO  Chills: [ ] Yes [ ] NO  SOB:  [ ] YES [ ] NO  Dyspnea: [ ]YES [ ]NO  Chest Discomfort: [ ] YES [ ] NO    Nausea: [ ] YES [ ] NO           Vomiting: [ ] YES [ ] NO  Flatus: [ ] YES [ ] NO             Bowel Movement: [ ] YES [ ] NO  Diarrhea: [ ] YES [ ] NO         Void: [ ]YES [ ]No  Constipation: [ ] YES [ ] NO       Pain (0-10):              Pain Control Adequate: [ ] YES [ ] NO    Rodriguez:    FELIPET:      I&O's Detail    13 Dec 2020 07:01  -  14 Dec 2020 07:00  --------------------------------------------------------  IN:    sodium chloride 0.45%: 600 mL  Total IN: 600 mL    OUT:    Indwelling Catheter - Urethral (mL): 1350 mL  Total OUT: 1350 mL    Total NET: -750 mL        I&O's Summary    13 Dec 2020 07:01  -  14 Dec 2020 07:00  --------------------------------------------------------  IN: 600 mL / OUT: 1350 mL / NET: -750 mL          MEDICATIONS  (STANDING):  allopurinol 300 milliGRAM(s) Oral daily  ascorbic acid 500 milliGRAM(s) Oral daily  aspirin  chewable 81 milliGRAM(s) Oral daily  atorvastatin 20 milliGRAM(s) Oral at bedtime  cadexomer iodine 0.9% Gel 1 Application(s) Topical every 24 hours  calcium carbonate 1250 mG  + Vitamin D (OsCal 500 + D) 1 Tablet(s) Oral daily  dextrose 40% Gel 15 Gram(s) Oral once  dextrose 5%. 1000 milliLiter(s) (50 mL/Hr) IV Continuous <Continuous>  dextrose 5%. 1000 milliLiter(s) (100 mL/Hr) IV Continuous <Continuous>  dextrose 50% Injectable 25 Gram(s) IV Push once  dextrose 50% Injectable 12.5 Gram(s) IV Push once  dextrose 50% Injectable 25 Gram(s) IV Push once  ferrous    sulfate 325 milliGRAM(s) Oral three times a day  finasteride 5 milliGRAM(s) Oral daily  glucagon  Injectable 1 milliGRAM(s) IntraMuscular once  heparin   Injectable 5000 Unit(s) SubCutaneous every 12 hours  insulin lispro (ADMELOG) corrective regimen sliding scale   SubCutaneous Before meals and at bedtime  levothyroxine 25 MICROGram(s) Oral daily  magnesium oxide 400 milliGRAM(s) Oral three times a day with meals  meropenem  IVPB 500 milliGRAM(s) IV Intermittent every 12 hours  midodrine. 5 milliGRAM(s) Oral three times a day  multivitamin 1 Tablet(s) Oral daily  nystatin Powder 1 Application(s) Topical three times a day  saccharomyces boulardii 250 milliGRAM(s) Oral two times a day  sodium chloride 0.45%. 1000 milliLiter(s) (60 mL/Hr) IV Continuous <Continuous>  tamsulosin 0.4 milliGRAM(s) Oral at bedtime  trimethoprim  160 mG/sulfamethoxazole 800 mG 2 Tablet(s) Oral every 12 hours    MEDICATIONS  (PRN):  acetaminophen  Suppository .. 650 milliGRAM(s) Rectal every 6 hours PRN Temp greater or equal to 38C (100.4F), Mild Pain (1 - 3), Moderate Pain (4 - 6)  haloperidol    Injectable 2 milliGRAM(s) IntraMuscular every 6 hours PRN Agitation  ondansetron Injectable 4 milliGRAM(s) IV Push every 6 hours PRN Nausea and/or Vomiting      LABS:                        8.1    5.12  )-----------( 79       ( 14 Dec 2020 07:04 )             27.5     12-14    149<H>  |  121<H>  |  44.0<H>  ----------------------------<  97  3.8   |  22.0  |  2.61<H>    Ca    8.3<L>      14 Dec 2020 07:04  Mg     2.0     12-14      PT/INR - ( 14 Dec 2020 07:04 )   PT: 26.2 sec;   INR: 2.35 ratio                 RADIOLOGY & ADDITIONAL STUDIES:       EXAM:  US KIDNEY(S)                          PROCEDURE DATE:  12/10/2020          INTERPRETATION:  CLINICAL INFORMATION: Acute kidney injury    COMPARISON: 10/29/2020.    TECHNIQUE: Sonography of the kidneys    FINDINGS:    Right kidney: 10.6 cm. No renal mass or calculi. Mild hydronephrosis    Left kidney: 11.2 cm. evaluation is significantly limited.    IMPRESSION:    Mild right hydronephrosis also present on the prior study. Left kidney is poorly seen.              ZOHAIB RICHTER MD; Attending Radiologist  This document has been electronically signed. Dec 10 2020  8:18PM

## 2020-12-14 NOTE — PROGRESS NOTE ADULT - SUBJECTIVE AND OBJECTIVE BOX
[facility]  INFECTIOUS DISEASES AND INTERNAL MEDICINE at [place]  =======================================================  Zach Chew MD  Diplomates American Board of Internal Medicine and Infectious Diseases  Tel: [phone]      Fax: [phone]  =======================================================    KIMBERLY MARKS [id number]    Follow up: right foot ulcer infection  patient awake and alert no complaints    Allergies:  No Known Allergies           REVIEW OF SYSTEMS:  CONSTITUTIONAL:  No Fever or chills  HEENT:   No diplopia or blurred vision.  No earache, sore throat or runny nose.  CARDIOVASCULAR:  No pressure, squeezing, strangling, tightness, heaviness or aching about the chest, neck, axilla or epigastrium.  RESPIRATORY:  No cough, shortness of breath  GASTROINTESTINAL:  No nausea, vomiting or diarrhea.  GENITOURINARY:  No dysuria, frequency or urgency. No Blood in urine  MUSCULOSKELETAL:  no joint aches, no muscle pain  SKIN:  No change in skin, hair or nails.  NEUROLOGIC:  No Headaches, seizures or weakness.  PSYCHIATRIC:  No disorder of thought or mood.  ENDOCRINE:  No heat or cold intolerance  HEMATOLOGICAL:  No easy bruising or bleeding.       Physical Exam:  GEN: NAD, pleasant  HEENT: normocephalic and atraumatic. EOMI. PERRL.  Anicteric   NECK: Supple.   LUNGS: Clear to auscultation.  HEART: Regular rate and rhythm without murmur.  ABDOMEN: Soft, nontender, and nondistended.  Positive bowel sounds.    : No CVA tenderness  EXTREMITIES: chronic venous stasis dermatitis, right dorsum ulcer improved drainage, b/l heel unstageables, anasarca  MSK: no joint swelling  NEUROLOGIC: Cranial nerves II through XII are grossly intact. No focal deficits  PSYCHIATRIC: Appropriate affect .  SKIN: No Rash      Vitals:    T(F): 97.8 (14 Dec 2020 11:05), Max: 97.8 (13 Dec 2020 22:04)  HR: 71 (14 Dec 2020 11:05)  BP: 103/68 (14 Dec 2020 11:05)  RR: 18 (14 Dec 2020 11:05)  SpO2: 98% (14 Dec 2020 11:05) (95% - 98%)  temp max in last 48H T(F): , Max: 97.8 (12-13-20 @ 22:04)      Current Antibiotics:  meropenem  IVPB 500 milliGRAM(s) IV Intermittent every 12 hours  trimethoprim  160 mG/sulfamethoxazole 800 mG 2 Tablet(s) Oral every 12 hours    Other medications:  allopurinol 300 milliGRAM(s) Oral daily  ascorbic acid 500 milliGRAM(s) Oral daily  aspirin  chewable 81 milliGRAM(s) Oral daily  atorvastatin 20 milliGRAM(s) Oral at bedtime  cadexomer iodine 0.9% Gel 1 Application(s) Topical every 24 hours  calcium carbonate 1250 mG  + Vitamin D (OsCal 500 + D) 1 Tablet(s) Oral daily  dextrose 40% Gel 15 Gram(s) Oral once  dextrose 5%. 1000 milliLiter(s) IV Continuous <Continuous>  dextrose 5%. 1000 milliLiter(s) IV Continuous <Continuous>  dextrose 50% Injectable 25 Gram(s) IV Push once  dextrose 50% Injectable 12.5 Gram(s) IV Push once  dextrose 50% Injectable 25 Gram(s) IV Push once  ferrous    sulfate 325 milliGRAM(s) Oral three times a day  finasteride 5 milliGRAM(s) Oral daily  glucagon  Injectable 1 milliGRAM(s) IntraMuscular once  heparin   Injectable 5000 Unit(s) SubCutaneous every 12 hours  insulin lispro (ADMELOG) corrective regimen sliding scale   SubCutaneous Before meals and at bedtime  levothyroxine 25 MICROGram(s) Oral daily  magnesium oxide 400 milliGRAM(s) Oral three times a day with meals  midodrine. 5 milliGRAM(s) Oral three times a day  multivitamin 1 Tablet(s) Oral daily  nystatin Powder 1 Application(s) Topical three times a day  saccharomyces boulardii 250 milliGRAM(s) Oral two times a day  sodium chloride 0.45%. 1000 milliLiter(s) IV Continuous <Continuous>  tamsulosin 0.4 milliGRAM(s) Oral at bedtime        Labs:                        8.1    5.12  )-----------( 79       ( 14 Dec 2020 07:04 )             27.5      12-14    149<H>  |  121<H>  |  44.0<H>  ----------------------------<  97  3.8   |  22.0  |  2.61<H>    Ca    8.3<L>      14 Dec 2020 07:04  Mg     2.0     12-14        Culture - Surgical Swab (collected 12-10-20 @ 22:00)  Source: .Surgical Swab R foot ulcer  Final Report (12-13-20 @ 16:43):    Numerous Providencia stuartii    Numerous Pseudomonas aeruginosa    Numerous Enterococcus faecalis    Few Proteus mirabilis ESBL    Numerous Stenotrophomonas maltophilia  Organism: Providencia stuartii  Pseudomonas aeruginosa  Enterococcus faecalis  Proteus mirabilis ESBL  Stenotrophomonas maltophilia (12-13-20 @ 16:43)  Organism: Stenotrophomonas maltophilia (12-13-20 @ 16:43)    Sensitivities:      -  Ceftazidime: S 8      -  Levofloxacin: S <=0.5      -  Trimethoprim/Sulfamethoxazole: S <=0.5/9.5      Method Type: VAMSI  Organism: Proteus mirabilis ESBL (12-13-20 @ 16:43)    Sensitivities:      -  Amikacin: S <=16      -  Amoxicillin/Clavulanic Acid: R >16/8      -  Ampicillin: R >16 These ampicillin results predict results for amoxicillin      -  Ampicillin/Sulbactam: R 16/8 Enterobacter, Citrobacter, and Serratia may develop resistance during prolonged therapy (3-4 days)      -  Aztreonam: R <=4      -  Cefazolin: R >16 Enterobacter, Citrobacter, and Serratia may develop resistance during prolonged therapy (3-4 days)      -  Cefepime: R 8      -  Cefoxitin: S <=8      -  Ceftriaxone: R 32 Enterobacter, Citrobacter, and Serratia may develop resistance during prolonged therapy      -  Ciprofloxacin: R >2      -  Ertapenem: S <=0.5      -  Gentamicin: R >8      -  Levofloxacin: R >4      -  Meropenem: S <=1      -  Piperacillin/Tazobactam: R <=8      -  Tobramycin: R >8      -  Trimethoprim/Sulfamethoxazole: R >2/38      Method Type: VAMSI  Organism: Enterococcus faecalis (12-13-20 @ 16:43)    Sensitivities:      -  Ampicillin: S <=2 Predicts results to ampicillin/sulbactam, amoxacillin-clavulanate and  piperacillin-tazobactam.      -  Tetra/Doxy: S <=1      -  Vancomycin: S 1      Method Type: VAMSI  Organism: Pseudomonas aeruginosa (12-13-20 @ 16:43)    Sensitivities:      -  Amikacin: S <=16      -  Aztreonam: S <=4      -  Cefepime: S <=2      -  Ceftazidime: S <=1      -  Ciprofloxacin: S <=0.25      -  Gentamicin: S 4      -  Imipenem: S <=1      -  Levofloxacin: S <=0.5      -  Meropenem: S <=1      -  Piperacillin/Tazobactam: S <=8      -  Tobramycin: S <=2      Method Type: VAMSI  Organism: Providencia stuartii (12-13-20 @ 16:43)    Sensitivities:      -  Amikacin: S <=16      -  Amoxicillin/Clavulanic Acid: R >16/8      -  Ampicillin: R 16 These ampicillin results predict results for amoxicillin      -  Ampicillin/Sulbactam: I 16/8 Enterobacter, Citrobacter, and Serratia may develop resistance during prolonged therapy (3-4 days)      -  Aztreonam: S <=4      -  Cefazolin: R >16 Enterobacter, Citrobacter, and Serratia may develop resistance during prolonged therapy (3-4 days)      -  Cefepime: S <=2      -  Cefoxitin: S <=8      -  Ceftriaxone: S <=1 Enterobacter, Citrobacter, and Serratia may develop resistance during prolonged therapy      -  Ciprofloxacin: R >2      -  Ertapenem: S <=0.5      -  Levofloxacin: R >4      -  Meropenem: S <=1      -  Piperacillin/Tazobactam: S <=8      -  Trimethoprim/Sulfamethoxazole: S <=0.5/9.5      Method Type: VAMSI    Culture - Urine (collected 12-08-20 @ 22:18)  Source: .Urine Catheterized  Final Report (12-09-20 @ 19:50):    <10,000 CFU/mL Normal Urogenital Nola    Culture - Blood (collected 12-08-20 @ 18:40)  Source: .Blood Blood-Venous  Final Report (12-13-20 @ 19:00):    No growth at 5 days.    Culture - Blood (collected 12-08-20 @ 18:40)  Source: .Blood Blood-Peripheral  Final Report (12-13-20 @ 19:00):    No growth at 5 days.      WBC Count: 5.12 K/uL (12-14-20 @ 07:04)  WBC Count: 4.62 K/uL (12-13-20 @ 07:15)  WBC Count: 5.57 K/uL (12-11-20 @ 06:43)  WBC Count: 6.43 K/uL (12-10-20 @ 07:23)    Creatinine, Serum: 2.61 mg/dL (12-14-20 @ 07:04)  Creatinine, Serum: 2.82 mg/dL (12-13-20 @ 07:15)  Creatinine, Serum: 3.22 mg/dL (12-12-20 @ 08:10)  Creatinine, Serum: 3.23 mg/dL (12-11-20 @ 06:43)  Creatinine, Serum: 3.87 mg/dL (12-10-20 @ 07:23)               COVID-19 PCR: NotDetec (12-08-20 @ 16:44)  COVID-19 PCR: NotDetec (11-18-20 @ 16:17)  COVID-19 IgG Antibody Interpretation: Positive (11-15-20 @ 08:23)  COVID-19 IgG Antibody Index: 1.48 Index (11-15-20 @ 08:23)  COVID-19 PCR: NotDetec (11-14-20 @ 19:24)

## 2020-12-14 NOTE — PHYSICAL THERAPY INITIAL EVALUATION ADULT - PERTINENT HX OF CURRENT PROBLEM, REHAB EVAL
pt presents to [facility] due to metabolic encephalopathy likely from sepsis due to 1st MPJ wound infection

## 2020-12-14 NOTE — PHYSICAL THERAPY INITIAL EVALUATION ADULT - ADDITIONAL COMMENTS
as per past medical chart: patient is a group home resident. pt is non ambulatory with amanda heel wounds. Pt is physically lifted by two people into the w/c chair or with the use of a mechanical lift. Pt reports requiring assistance with all ADLs.

## 2020-12-14 NOTE — CONSULT NOTE ADULT - ATTENDING COMMENTS
Patient was examined.  All documentation reviewed.  Discussed pathology and treatment plan with resident.  Reviewed written documentation and agreed with the above.  Patient will be follow while in house.
Needs CT  May need cysto.

## 2020-12-14 NOTE — PHYSICAL THERAPY INITIAL EVALUATION ADULT - GENERAL OBSERVATIONS, REHAB EVAL
pt received semi salcedo position in bed, NAD, agreeable to PT, amanda rodriguez, RN agreeable to PT

## 2020-12-15 NOTE — PROGRESS NOTE ADULT - SUBJECTIVE AND OBJECTIVE BOX
follow up for Hypothermia, UTI , ARF on CRF , sacral and foot wound     seen in am  confused , poor oral intake   he is asymtomatic  otherwise     diet changed to mechanical soft with nectar   d/w sister over the phone       Vital Signs Last 24 Hrs  T(C): 36.5 (15 Dec 2020 10:14), Max: 36.7 (15 Dec 2020 04:25)  T(F): 97.7 (15 Dec 2020 10:14), Max: 98 (15 Dec 2020 04:25)  HR: 84 (15 Dec 2020 10:14) (68 - 84)  BP: 100/68 (15 Dec 2020 10:14) (96/64 - 124/81)  BP(mean): --  RR: 18 (15 Dec 2020 10:14) (18 - 18)  SpO2: 98% (15 Dec 2020 10:14) (95% - 98%)      General: Elderly male sitting in bed, no distress   Chest: CTA anteriorly ,  no wheezing or rhonchi  Heart: S1 & S2 with irregular rhythm   Abdomen: Ventral hernia. Soft. Non-tender. Non-distended. + BS  Ext: Chronic skin changes on both lower legs, Varicose veins in left leg    : has rodriguez                               8.4    5.35  )-----------( 95       ( 15 Dec 2020 09:30 )             27.8   12-15    149<H>  |  119<H>  |  40.0<H>  ----------------------------<  88  4.2   |  21.0<L>  |  2.33<H>    Ca    8.5<L>      15 Dec 2020 09:27  Mg     2.0     12-14    TPro  5.7<L>  /  Alb  2.0<L>  /  TBili  0.3<L>  /  DBili  0.1  /  AST  23  /  ALT  16  /  AlkPhos  108  12-14                CAPILLARY BLOOD GLUCOSE      POCT Blood Glucose.: 113 mg/dL (15 Dec 2020 09:22)  POCT Blood Glucose.: 114 mg/dL (14 Dec 2020 22:11)  POCT Blood Glucose.: 188 mg/dL (14 Dec 2020 17:31)

## 2020-12-15 NOTE — PROGRESS NOTE ADULT - PROBLEM SELECTOR PLAN 4
Discussed case with RN Chastity caring for patient today  No acute distress/discomforts noted  Patient is tolerating diet - speech pathology/swallow evaluated this morning stating pharyngeal stage of swallow clinically unremarkable for puree, mech soft, soft & honey thick fluids  Dispo: Likely back to group home once medically stable for discharge  Social work of 5T to follow for discharge planning  - In regards to patient's OPWDD status and GOC:   Code Status: DNR DNI No Dialysis - All approved by OPWDD/[facility] April 17 2020  If patient's sister ever wanted hospice support in the future, this process would have to be approved through [facility] and [facility]  -At this time will sign off as GOC are established. If GOC change, please feel free to reconsult with our team    Total Time Spent_20___ minutes  This includes chart review, patient assessment, discussion and collaboration with interdisciplinary team members, ACP planning      Thank you for the opportunity to assist with the care of this patient.   [facility] [phone].

## 2020-12-15 NOTE — CONSULT NOTE ADULT - SUBJECTIVE AND OBJECTIVE BOX
[facility] DIVISION OF KIDNEY DISEASES AND HYPERTENSION -- INITIAL CONSULT NOTE  --------------------------------------------------------------------------------  HPI:        PAST HISTORY  --------------------------------------------------------------------------------  PAST MEDICAL & SURGICAL HISTORY:  Encounter for blood transfusion    Anemia    History of gross hematuria    History of orthostatic hypotension    Bacterial pneumonia    Muscle weakness (generalized)    2019 novel coronavirus disease (COVID-19)    Falls    Heart failure    Afib    CHF (congestive heart failure)    Gout    Hiatal hernia    BPH (benign prostatic hyperplasia)    Cognitive impairment    Cardiomyopathy    HTN (hypertension)    DM (diabetes mellitus)    S/P appendectomy      FAMILY HISTORY:  No pertinent family history in first degree relatives      PAST SOCIAL HISTORY:    ALLERGIES & MEDICATIONS  --------------------------------------------------------------------------------  Allergies    No Known Allergies    Intolerances      Standing Inpatient Medications  allopurinol 300 milliGRAM(s) Oral daily  ascorbic acid 500 milliGRAM(s) Oral daily  aspirin  chewable 81 milliGRAM(s) Oral daily  atorvastatin 20 milliGRAM(s) Oral at bedtime  cadexomer iodine 0.9% Gel 1 Application(s) Topical every 24 hours  calcium carbonate 1250 mG  + Vitamin D (OsCal 500 + D) 1 Tablet(s) Oral daily  dextrose 40% Gel 15 Gram(s) Oral once  dextrose 5%. 1000 milliLiter(s) IV Continuous <Continuous>  dextrose 5%. 1000 milliLiter(s) IV Continuous <Continuous>  dextrose 50% Injectable 25 Gram(s) IV Push once  dextrose 50% Injectable 12.5 Gram(s) IV Push once  dextrose 50% Injectable 25 Gram(s) IV Push once  epoetin liam-epbx (RETACRIT) Injectable 28494 Unit(s) SubCutaneous every 7 days  ferrous    sulfate 325 milliGRAM(s) Oral three times a day  finasteride 5 milliGRAM(s) Oral daily  glucagon  Injectable 1 milliGRAM(s) IntraMuscular once  insulin lispro (ADMELOG) corrective regimen sliding scale   SubCutaneous Before meals and at bedtime  levothyroxine 25 MICROGram(s) Oral daily  meropenem  IVPB 500 milliGRAM(s) IV Intermittent every 12 hours  midodrine. 5 milliGRAM(s) Oral three times a day  multivitamin 1 Tablet(s) Oral daily  nystatin Powder 1 Application(s) Topical three times a day  saccharomyces boulardii 250 milliGRAM(s) Oral two times a day  sodium chloride 0.45%. 1000 milliLiter(s) IV Continuous <Continuous>  tamsulosin 0.4 milliGRAM(s) Oral at bedtime  trimethoprim  160 mG/sulfamethoxazole 800 mG 2 Tablet(s) Oral every 12 hours    PRN Inpatient Medications  acetaminophen  Suppository .. 650 milliGRAM(s) Rectal every 6 hours PRN  haloperidol    Injectable 2 milliGRAM(s) IntraMuscular every 6 hours PRN  ondansetron Injectable 4 milliGRAM(s) IV Push every 6 hours PRN      REVIEW OF SYSTEMS  --------------------------------------------------------------------------------  Gen: No weight changes, fatigue, fevers/chills, weakness  Skin: No rashes  Head/Eyes/Ears/Mouth: No headache; Normal hearing; Normal vision w/o blurriness; No sinus pain/discomfort, sore throat  Respiratory: No dyspnea, cough, wheezing, hemoptysis  CV: No chest pain, PND, orthopnea  GI: No abdominal pain, diarrhea, constipation, nausea, vomiting, melena, hematochezia  : No increased frequency, dysuria, hematuria, nocturia  MSK: No joint pain/swelling; no back pain; no edema  Neuro: No dizziness/lightheadedness, weakness, seizures, numbness, tingling  Heme: No easy bruising or bleeding  Endo: No heat/cold intolerance  Psych: No significant nervousness, anxiety, stress, depression    All other systems were reviewed and are negative, except as noted.    VITALS/PHYSICAL EXAM  --------------------------------------------------------------------------------  T(C): 36.7 (12-15-20 @ 04:25), Max: 36.7 (12-15-20 @ 04:25)  HR: 70 (12-15-20 @ 04:25) (68 - 75)  BP: 124/81 (12-15-20 @ 04:25) (96/64 - 124/81)  RR: 18 (12-15-20 @ 04:25) (18 - 18)  SpO2: 97% (12-15-20 @ 04:25) (95% - 98%)  Wt(kg): --        12-14-20 @ 07:01  -  12-15-20 @ 07:00  --------------------------------------------------------  IN: 50 mL / OUT: 1850 mL / NET: -1800 mL      Physical Exam:  	Gen: NAD, well-appearing  	HEENT: PERRL, supple neck, clear oropharynx  	Pulm: CTA B/L  	CV: RRR, S1S2; no rub  	Back: No spinal or CVA tenderness; no sacral edema  	Abd: +BS, soft, nontender/nondistended  	: No suprapubic tenderness  	UE: Warm, FROM, no clubbing, intact strength; no edema; no asterixis  	LE: Warm, FROM, no clubbing, intact strength; no edema  	Neuro: No focal deficits, intact gait  	Psych: Normal affect and mood  	Skin: Warm, without rashes  	Vascular access:    LABS/STUDIES  --------------------------------------------------------------------------------              8.4    5.35  >-----------<  95       [12-15-20 @ 09:30]              27.8     149  |  119  |  40.0  ----------------------------<  88      [12-15-20 @ 09:27]  4.2   |  21.0  |  2.33        Ca     8.5     [12-15-20 @ 09:27]      Mg     2.0     [12-14-20 @ 07:04]    TPro  5.7  /  Alb  2.0  /  TBili  0.3  /  DBili  0.1  /  AST  23  /  ALT  16  /  AlkPhos  108  [12-14-20 @ 15:36]    PT/INR: PT 23.4 , INR 2.09       [12-15-20 @ 09:31]      Creatinine Trend:  SCr 2.33 [12-15 @ 09:27]  SCr 2.61 [12-14 @ 07:04]  SCr 2.82 [12-13 @ 07:15]  SCr 3.22 [12-12 @ 08:10]  SCr 3.23 [12-11 @ 06:43]    Urinalysis - [12-08-20 @ 18:05]      Color Yellow / Appearance Cloudy / SG 1.015 / pH 5.0      Gluc Negative / Ketone Negative  / Bili Negative / Urobili Negative       Blood Large / Protein 100 / Leuk Est Moderate / Nitrite Negative      RBC 6-10 / WBC >50 / Hyaline  / Gran  / Sq Epi  / Non Sq Epi Occasional / Bacteria Few      Iron 37, TIBC 193, %sat 19      [12-14-20 @ 15:36]  Ferritin 911      [12-14-20 @ 15:36]  TSH 3.02      [12-08-20 @ 15:41]    HCV 0.39, Nonreact      [04-16-20 @ 08:40]     [facility] DIVISION OF KIDNEY DISEASES AND HYPERTENSION -- INITIAL CONSULT NOTE  --------------------------------------------------------------------------------  HPI:  ''70 years old male with recurrent hospitalizations, Developmental Disability, Chronic A. Fib not on AC due to Hematuria/Anemia, Diastolic Heart Failure, CKD 3, HTN, HLD, DM 2, Hypothermia, Chronic Venous Stasis, LE Ulcers, Chronic Left Shoulder Dislocation, Dysphagia and Aspiration Pneumonia sent from [facility] as he was not doing well. He was lethargic, weak and his speech was slurred.   Patient is currently very sleepy and lethargic however wakes up on verbal stimuli. No active complaints. Speech is clear at this time.   He was code stroke in ER. No acute finding on CT. Found to be hypothermic. CXR with chronic small left pleural effusion. UA with UTI. ''     pt with new onset gross hematuria this am, pt was difficult rodriguez placement in ER secondary to altered anatomy,    PAST HISTORY  --------------------------------------------------------------------------------  PAST MEDICAL & SURGICAL HISTORY:  Encounter for blood transfusion    Anemia    History of gross hematuria    History of orthostatic hypotension    Bacterial pneumonia    Muscle weakness (generalized)    2019 novel coronavirus disease (COVID-19)    Falls    Heart failure    Afib    CHF (congestive heart failure)    Gout    Hiatal hernia    BPH (benign prostatic hyperplasia)    Cognitive impairment    Cardiomyopathy    HTN (hypertension)    DM (diabetes mellitus)    S/P appendectomy      FAMILY HISTORY:  No pertinent family history in first degree relatives      PAST SOCIAL HISTORY:    ALLERGIES & MEDICATIONS  --------------------------------------------------------------------------------  Allergies    No Known Allergies    Intolerances      Standing Inpatient Medications  allopurinol 300 milliGRAM(s) Oral daily  ascorbic acid 500 milliGRAM(s) Oral daily  aspirin  chewable 81 milliGRAM(s) Oral daily  atorvastatin 20 milliGRAM(s) Oral at bedtime  cadexomer iodine 0.9% Gel 1 Application(s) Topical every 24 hours  calcium carbonate 1250 mG  + Vitamin D (OsCal 500 + D) 1 Tablet(s) Oral daily  dextrose 40% Gel 15 Gram(s) Oral once  dextrose 5%. 1000 milliLiter(s) IV Continuous <Continuous>  dextrose 5%. 1000 milliLiter(s) IV Continuous <Continuous>  dextrose 50% Injectable 25 Gram(s) IV Push once  dextrose 50% Injectable 12.5 Gram(s) IV Push once  dextrose 50% Injectable 25 Gram(s) IV Push once  epoetin liam-epbx (RETACRIT) Injectable 07421 Unit(s) SubCutaneous every 7 days  ferrous    sulfate 325 milliGRAM(s) Oral three times a day  finasteride 5 milliGRAM(s) Oral daily  glucagon  Injectable 1 milliGRAM(s) IntraMuscular once  insulin lispro (ADMELOG) corrective regimen sliding scale   SubCutaneous Before meals and at bedtime  levothyroxine 25 MICROGram(s) Oral daily  meropenem  IVPB 500 milliGRAM(s) IV Intermittent every 12 hours  midodrine. 5 milliGRAM(s) Oral three times a day  multivitamin 1 Tablet(s) Oral daily  nystatin Powder 1 Application(s) Topical three times a day  saccharomyces boulardii 250 milliGRAM(s) Oral two times a day  sodium chloride 0.45%. 1000 milliLiter(s) IV Continuous <Continuous>  tamsulosin 0.4 milliGRAM(s) Oral at bedtime  trimethoprim  160 mG/sulfamethoxazole 800 mG 2 Tablet(s) Oral every 12 hours    PRN Inpatient Medications  acetaminophen  Suppository .. 650 milliGRAM(s) Rectal every 6 hours PRN  haloperidol    Injectable 2 milliGRAM(s) IntraMuscular every 6 hours PRN  ondansetron Injectable 4 milliGRAM(s) IV Push every 6 hours PRN      REVIEW OF SYSTEMS  --------------------------------------------------------------------------------  Gen: No weight changes, fatigue, fevers/chills, weakness  Skin: No rashes  Head/Eyes/Ears/Mouth: No headache; Normal hearing; Normal vision w/o blurriness; No sinus pain/discomfort, sore throat  Respiratory: No dyspnea, cough, wheezing, hemoptysis  CV: No chest pain, PND, orthopnea  GI: No abdominal pain, diarrhea, constipation, nausea, vomiting, melena, hematochezia  : No increased frequency, dysuria, hematuria, nocturia  MSK: No joint pain/swelling; no back pain; no edema  Neuro: No dizziness/lightheadedness, weakness, seizures, numbness, tingling  Heme: No easy bruising or bleeding  Endo: No heat/cold intolerance  Psych: No significant nervousness, anxiety, stress, depression    All other systems were reviewed and are negative, except as noted.    VITALS/PHYSICAL EXAM  --------------------------------------------------------------------------------  T(C): 36.7 (12-15-20 @ 04:25), Max: 36.7 (12-15-20 @ 04:25)  HR: 70 (12-15-20 @ 04:25) (68 - 75)  BP: 124/81 (12-15-20 @ 04:25) (96/64 - 124/81)  RR: 18 (12-15-20 @ 04:25) (18 - 18)  SpO2: 97% (12-15-20 @ 04:25) (95% - 98%)  Wt(kg): --        12-14-20 @ 07:01  -  12-15-20 @ 07:00  --------------------------------------------------------  IN: 50 mL / OUT: 1850 mL / NET: -1800 mL      Physical Exam:  	Gen: NAD, well-appearing  	HEENT: PERRL, supple neck, clear oropharynx  	Pulm: CTA B/L  	CV: RRR, S1S2; no rub  	Back: No spinal or CVA tenderness; no sacral edema  	Abd: +BS, soft, nontender/nondistended  	: No suprapubic tenderness  	UE: Warm, FROM, no clubbing, intact strength; no edema; no asterixis  	LE: Warm, FROM, no clubbing, intact strength; no edema  	Neuro: No focal deficits, intact gait  	Psych: Normal affect and mood  	Skin: Warm, without rashes  	Vascular access:    LABS/STUDIES  --------------------------------------------------------------------------------              8.4    5.35  >-----------<  95       [12-15-20 @ 09:30]              27.8     149  |  119  |  40.0  ----------------------------<  88      [12-15-20 @ 09:27]  4.2   |  21.0  |  2.33        Ca     8.5     [12-15-20 @ 09:27]      Mg     2.0     [12-14-20 @ 07:04]    TPro  5.7  /  Alb  2.0  /  TBili  0.3  /  DBili  0.1  /  AST  23  /  ALT  16  /  AlkPhos  108  [12-14-20 @ 15:36]    PT/INR: PT 23.4 , INR 2.09       [12-15-20 @ 09:31]      Creatinine Trend:  SCr 2.33 [12-15 @ 09:27]  SCr 2.61 [12-14 @ 07:04]  SCr 2.82 [12-13 @ 07:15]  SCr 3.22 [12-12 @ 08:10]  SCr 3.23 [12-11 @ 06:43]    Urinalysis - [12-08-20 @ 18:05]      Color Yellow / Appearance Cloudy / SG 1.015 / pH 5.0      Gluc Negative / Ketone Negative  / Bili Negative / Urobili Negative       Blood Large / Protein 100 / Leuk Est Moderate / Nitrite Negative      RBC 6-10 / WBC >50 / Hyaline  / Gran  / Sq Epi  / Non Sq Epi Occasional / Bacteria Few      Iron 37, TIBC 193, %sat 19      [12-14-20 @ 15:36]  Ferritin 911      [12-14-20 @ 15:36]  TSH 3.02      [12-08-20 @ 15:41]    HCV 0.39, Nonreact      [04-16-20 @ 08:40]

## 2020-12-15 NOTE — PROGRESS NOTE ADULT - PROBLEM SELECTOR PLAN 2
Continue with IV abx per ID recommendations, UA +  On Meropenem IVPB for pyelonephritis  Podiatry following for Right 1st MPJ ulcer  Bilateral unstageable heel ulcers  Pt not a surgical candidate. Will likely need long term IV antibiotics  Continue local wound care  Monitor for fevers.

## 2020-12-15 NOTE — CONSULT NOTE ADULT - ASSESSMENT
This is a 70 year old male from [facility], PMHx of recurrent hospitalizations, Developmental Disability, Chronic A. Fib not on AC due to Hematuria/Anemia, Diastolic Heart Failure, CKD 3, HTN, HLD, DM 2, Hypothermia, Chronic Venous Stasis, LE Ulcers, Chronic Left Shoulder Dislocation, Dysphagia and Aspiration Pneumonia admitted with lethargy weakness and slurred speech. In ED patient was lethargic but would awaken to verbal stimuli. Code stroke called in ED. CT head negative at that time. Hypothermic in ED (per hospitalist team this is a chronic hypothermia issue) CXR signficiant for chronic small left pleural effusion. Patient admitted with sepsis. Called for palliative care consult due to patients lethargy and inability to pass swallow evaluation. Today patient is awake and alert, requesting PO intake. Code status:DNR DNI. NO HD.
1) Petey on CKD stage 3; baseline Scr 1.5 in November US with mild right hydronephrosis, L kidney poorly seen  CT abdomen with KIDNEYS/URETERS: Improved mild bilateral hydroureteronephrosis. Interval migration of a 5 mm calculus into the left distal ureter. Additional nonobstructing left renal calculi.  Scr 3.6 on presentation--> now improving to 2.3 with IV hydration  s/p traumatic rodriguez- planned for cystoscopy as per urology      2) Sepsis; Wound infection  negative blood+ UCx  Wound culture with Providencia, Pseudomonas, Enterococcus, ESBL Proteus and Stenotrophomonas - on Merrem and Bactrim      3) Anemia of chronic disease  Ferririn high likely in setting of infection  Iron sats are low; start po iron  BEN    4) Hypernatremia  Resume gentle hydration with 1/2 NS  Encourage free water intake    Monitor Scr, lytes, UOp    will follow      
70 years old male with recurrent hospitalizations, Developmental Disability, Chronic A. Fib not on AC due to Hematuria/Anemia, Diastolic Heart Failure, CKD 3, HTN, HLD, DM 2, Hypothermia, Chronic Venous Stasis, LE Ulcers, Chronic Left Shoulder Dislocation, Dysphagia and Aspiration Pneumonia     rodriguez placed on 12/8 in ER with return of cloudy urine    urine/blood cx neg    now with gross hematuria    INR elevated    creat elevated compared to 11/10/20    US with mild right hydronephrosis, L kidney poorly seen    
70 years old male with recurrent hospitalizations, Developmental Disability, Chronic A. Fib not on AC due to Hematuria/Anemia, Diastolic Heart Failure, CKD 3, HTN, HLD, DM 2, Hypothermia, Chronic Venous Stasis, LE Ulcers, Chronic Left Shoulder Dislocation, Dysphagia and Aspiration [facility]neumonia [facility] as [facility] not [facility] well. He was lethargic, weak and his speech was slurred. Code stroke in Ed but Ct head wnl. Found to be hypothermic (which he is at baseline) cloudy urine sent for urinalysis with concern for UTI    - f/u BCX  - UA +, f/u ucx  - covid pcr (-)  - dc zosyn start meropenem adjusted for renal function due to prior h/o ESBL proteus in urine  - Trend Fever  - Trend Leukocytosis  - will eval LE ulcers with next dressing change      Will Follow d/w Dr Loza

## 2020-12-15 NOTE — PROGRESS NOTE ADULT - ASSESSMENT
70 years old male with recurrent hospitalizations, Developmental Disability, Chronic A. Fib not on AC due to Hematuria/Anemia, Diastolic Heart Failure, CKD 3, HTN, HLD, DM 2, Hypothermia, Chronic Venous Stasis, LE Ulcers, Chronic Left Shoulder Dislocation, Dysphagia and Aspiration Pneumonia     rodriguez placed on 12/8 in ER with return of cloudy urine    urine/blood cx neg    now with gross hematuria resolving    INR elevated    creat elevated compared to 11/10/20 but trending down    US with mild right hydronephrosis, L kidney poorly seen      CT: Improved mild bilateral hydroureteronephrosis. Interval migration of a 5 mm calculus into the left distal ureter. Additional nonobstructing left renal calculi. 70 years old male with recurrent hospitalizations, Developmental Disability, Chronic A. Fib not on AC due to Hematuria/Anemia, Diastolic Heart Failure, CKD 3, HTN, HLD, DM 2, Hypothermia, Chronic Venous Stasis, LE Ulcers, Chronic Left Shoulder Dislocation, Dysphagia and Aspiration Pneumonia     rodriguez placed on 12/8 in ER with return of cloudy urine    urine/blood cx neg    now with gross hematuria resolving - off Sub Q heparin, only on PO ASA    INR elevated    creat elevated compared to 11/10/20 but trending down    US with mild right hydronephrosis, L kidney poorly seen      CT: Improved mild bilateral hydroureteronephrosis. Interval migration of a 5 mm calculus into the left distal ureter. Additional nonobstructing left renal calculi.

## 2020-12-15 NOTE — PROGRESS NOTE ADULT - PROBLEM SELECTOR PLAN 1
Now tolerating diet  Per speech pathology/swallow eval- 'Pharyngeal stage of swallow clinically unremarkable for puree, mech soft, soft & honey thick fluids.'

## 2020-12-15 NOTE — PROGRESS NOTE ADULT - ASSESSMENT
This is a 70 year old male from [facility], PMHx of recurrent hospitalizations, Developmental Disability, Chronic A. Fib not on AC due to Hematuria/Anemia, Diastolic Heart Failure, CKD 3, HTN, HLD, DM 2, Hypothermia, Chronic Venous Stasis, LE Ulcers, Chronic Left Shoulder Dislocation, Dysphagia and Aspiration Pneumonia admitted with lethargy weakness and slurred speech. In ED patient was lethargic but would awaken to verbal stimuli. Code stroke called in ED. CT head negative at that time. Hypothermic in ED (per hospitalist team this is a chronic hypothermia issue) CXR signficiant for chronic small left pleural effusion. Patient admitted with sepsis. Called for palliative care consult due to patients lethargy and inability to pass swallow evaluation. Today patient is awake and alert, requesting PO intake. Code status:DNR DNI. NO HD.

## 2020-12-15 NOTE — PROGRESS NOTE ADULT - SUBJECTIVE AND OBJECTIVE BOX
Palliative Followup  No acute overnight events                                     Admitted from:  senior living    Surrogate/HCP/Guardian: Phone#: Avani Perez Sister [phone]    FAMILY HISTORY:  No family history related to admission diagonsis    Baseline ADLs (prior to admission):  Dependent      Allergies  No Known Allergies    Present Symptoms:   Dyspnea: 0   Nausea/Vomiting: Unable to obtain due to poor mentation   Anxiety:  Unable to obtain due to poor mentation   Depression: Unable to obtain due to poor mentation   Fatigue: No  Loss of appetite: No    Pain: Non verbal cues of pain absent            Character-            Duration-            Effect-            Factors-            Frequency-            Location-            Severity-    Review of Systems: Reviewed  Per HPI, all other ROS negative  Unable to obtain due to poor mentation     MEDICATIONS  (STANDING):  allopurinol 300 milliGRAM(s) Oral daily  ascorbic acid 500 milliGRAM(s) Oral two times a day  aspirin  chewable 81 milliGRAM(s) Oral daily  atorvastatin 20 milliGRAM(s) Oral at bedtime  cadexomer iodine 0.9% Gel 1 Application(s) Topical every 24 hours  calcium carbonate 1250 mG  + Vitamin D (OsCal 500 + D) 1 Tablet(s) Oral daily  dextrose 40% Gel 15 Gram(s) Oral once  dextrose 5%. 1000 milliLiter(s) (50 mL/Hr) IV Continuous <Continuous>  dextrose 5%. 1000 milliLiter(s) (100 mL/Hr) IV Continuous <Continuous>  dextrose 50% Injectable 25 Gram(s) IV Push once  dextrose 50% Injectable 12.5 Gram(s) IV Push once  dextrose 50% Injectable 25 Gram(s) IV Push once  epoetin liam-epbx (RETACRIT) Injectable 38016 Unit(s) SubCutaneous <User Schedule>  ferrous    sulfate 325 milliGRAM(s) Oral three times a day  finasteride 5 milliGRAM(s) Oral daily  folic acid 1 milliGRAM(s) Oral daily  glucagon  Injectable 1 milliGRAM(s) IntraMuscular once  insulin lispro (ADMELOG) corrective regimen sliding scale   SubCutaneous Before meals and at bedtime  levothyroxine 25 MICROGram(s) Oral daily  meropenem  IVPB 500 milliGRAM(s) IV Intermittent every 12 hours  midodrine. 5 milliGRAM(s) Oral three times a day  multivitamin 1 Tablet(s) Oral daily  nystatin Powder 1 Application(s) Topical three times a day  saccharomyces boulardii 250 milliGRAM(s) Oral two times a day  sodium chloride 0.45%. 1000 milliLiter(s) (75 mL/Hr) IV Continuous <Continuous>  tamsulosin 0.4 milliGRAM(s) Oral at bedtime  trimethoprim  160 mG/sulfamethoxazole 800 mG 2 Tablet(s) Oral every 12 hours    MEDICATIONS  (PRN):  acetaminophen  Suppository .. 650 milliGRAM(s) Rectal every 6 hours PRN Temp greater or equal to 38C (100.4F), Mild Pain (1 - 3), Moderate Pain (4 - 6)  haloperidol    Injectable 2 milliGRAM(s) IntraMuscular every 6 hours PRN Agitation  ondansetron Injectable 4 milliGRAM(s) IV Push every 6 hours PRN Nausea and/or Vomiting    General: alert  , awake    Karnofsky:  %__30-40    HEENT: dry mouth      Lungs: comfortable     CV: normal      GI: now tolerating diet    : incontinent       MSK: weakness    Skin: thin frail skin    LABS:      Basic Metabolic Panel in AM (12.15.20 @ 09:27)    Sodium, Serum: 149 mmol/L    Potassium, Serum: 4.2 mmol/L    Chloride, Serum: 119 mmol/L    Carbon Dioxide, Serum: 21.0 mmol/L    Anion Gap, Serum: 9 mmol/L    Blood Urea Nitrogen, Serum: 40.0 mg/dL    Creatinine, Serum: 2.33 mg/dL    Glucose, Serum: 88 mg/dL    Calcium, Total Serum: 8.5 mg/dL    eGFR if Non : 27: Interpretative comment  The units for eGFR are mL/min/1.73M2     RADIOLOGY & ADDITIONAL STUDIES:  CT abd 12.14.20  IMPRESSION:  Improved mild bilateral hydroureteronephrosis. Interval migration of a 5 mm calculus into the left distal ureter. Additional nonobstructing left renal calculi.    Xray foot ANNE 12.11.20  IMPRESSION:  Pes planus deformity. Osteoarthritis. Heel ulcer without radiographic evidence of osteomyelitis  If osteomyelitis is considered, despite conservative therapy, and soft tissue / bone infection requires further assessment, follow-up MRI recommended.    CXR 12.08.20  IMPRESSION: Small left sided pleural effusion. Unchanged cardiomegaly.  Chronic anterior dislocation of the left shoulder.    CT head 12.08.20  IMPRESSION  1. Calcified plaque in both carotid bulbs without significant narrowing. Both internal carotid arteries are widely patent without dissection.  2.Left vertebral artery is well visualized and is widely patent. There is only intermittent faint visualization of the right vertebral artery which is not continuous.  3. Intracranially, the anterior cerebral circulation is markedly redundant but is widely patent.  4. The posterior cerebral circulation is fed by the left vertebral artery, and the basilar artery is widely patent. Both posterior cerebral arteries are widely patent.    ADVANCE DIRECTIVES:   DNR DNI

## 2020-12-15 NOTE — DISCHARGE NOTE NURSING/CASE MANAGEMENT/SOCIAL WORK - NSDCFUADDAPPT_GEN_ALL_CORE_FT
PT FROM A GROUP HOME  STAFF AT THE [facility] WILL MAKE ARRANGEMENT FOR F/U APPT W/ DR. FALCON   MEDS TO BED NOT APPLIACBLE;GROUP HOME MANAGES PT'S MEDS Pt has been on and off the STAR/LIST thru this admission, he does not have a dx related to STAR/LACE  protocol, I discussed this with Corinne and she has agreed he should not be on the list    He has no issues with meds, and will require LURDES this admission

## 2020-12-15 NOTE — PROGRESS NOTE ADULT - ASSESSMENT
70 years old male with recurrent hospitalizations, Developmental Disability, Chronic A. Fib not on AC due to Hematuria/Anemia, Diastolic Heart Failure, CKD 3, HTN, HLD, DM 2, Hypothermia, Chronic Venous Stasis, LE Ulcers, Chronic Left Shoulder Dislocation, Dysphagia and Aspiration Pneumonia sent from residential as he was not doing well. He was lethargic, weak and his speech was slurred.   Patient is currently very sleepy and lethargic however wakes up on verbal stimuli. No active complaints. Speech is clear at this time.   He was code stroke in ER. No acute finding on CT. Found to be hypothermic. CXR with chronic small left pleural effusion. UA with UTI , on iv abx for foot wound , cx from wound multi bacterial     1) Sepsis with metabolic encephalopathy  due to wound infection (Right 1st MPJ) likely   - Urine and blood cultures negative   - Wound culture with Providencia, Pseudomonas, Enterococcus, ESBL Proteus and Stenotrophomonas     - Continue Meropenem   - Bactrim DS was added   ID input appreciated , rec to do CT of foot r/o bone infection   Pt had refused MR     2) LE Ulcers (Right 1st MPJ and B/L Heels)  - Wound care  - Podiatry follow up noted: not a surgical candidate    3- ARF with CRF stage 3   cr 1.57 in nov 2020 records reviewed   improving   renal consult appreciated   avoid nephrotoxic  agents     4)Cellulitis both thighs  on Antibiotics as above  - Nystatin Powder for fungal skin infection on     5) Decubitus Ulcers  - Wound care  - Change position every 2 hours  - Clinitron bed pending     6) Hypothermia, recurrent cronic   - had extensive work up in past  - Likely due to recurrent infections and environmental   - Hyperthermia blanket as needed  resolved     7) DM 2  - HbA1c 6.1   - Accu checks and ISS    8) Chronic Diastolic Heart Failure  - No signs of volume overload     9) Chronic A. Fib  - Rate controlled  - Not on AC due to hematuria / anemia    10) Anemia, cronic illness kidney disease   - Low iron stores as per previous studies, s/p Venofer 200 mg x 5  repeat vit b 12, iron studies    - s/p 2 units of PRBCs   add epogen weekly for renal dx       11) Hypernatremia  on IVF for 1/2 NS   improved     DVT Prophylaxis -- Heparin SQ    GOC: DNR/I. Patient is under OPWDD.   Palliative Consult appreciated   Prognosis extremely guarded.     Dispo: Group Home once stable

## 2020-12-15 NOTE — PROGRESS NOTE ADULT - PROBLEM SELECTOR PLAN 1
leave rodriguez indwelling for urinary diversion to expedite local skin care, irrigate rodriguez prn Q 8 hr to maintain patency, chk UA, rec. treatment for moderate fecal retention of colon, plan per Dr Pulido leave rodriguez indwelling for urinary diversion to expedite local skin care, irrigate rodriguez prn Q 8 hr to maintain patency, chk UA, rec. treatment for moderate fecal retention of colon, Nephrology consult,  hold sub q  heparin if gross hematuria worsens, send Urine for Cytology, chk PSA, plan per Dr Pulido leave rodriguez indwelling for urinary diversion to expedite local skin care, irrigate rodriguez prn Q 8 hr to maintain patency, chk UA, rec. treatment for moderate fecal retention of colon, Nephrology consult,  cont to hold sub q  heparin if possible, send Urine for Cytology, chk PSA, plan per Dr Pulido

## 2020-12-15 NOTE — PROGRESS NOTE ADULT - SUBJECTIVE AND OBJECTIVE BOX
SURGICAL PA NOTE: Urology PA note    STATUS POST:      POST OPERATIVE DAY #:     Vital Signs Last 24 Hrs  T(C): 36.5 (15 Dec 2020 10:14), Max: 36.7 (15 Dec 2020 04:25)  T(F): 97.7 (15 Dec 2020 10:14), Max: 98 (15 Dec 2020 04:25)  HR: 84 (15 Dec 2020 10:14) (68 - 84)  BP: 100/68 (15 Dec 2020 10:14) (96/64 - 124/81)  BP(mean): --  RR: 18 (15 Dec 2020 10:14) (18 - 18)  SpO2: 98% (15 Dec 2020 10:14) (95% - 98%)    HPI:  History was taken from charts as Aide from intermediate gone by the time patient was admitted.    70 years old male with recurrent hospitalizations, Developmental Disability, Chronic A. Fib not on AC due to Hematuria/Anemia, Diastolic Heart Failure, CKD 3, HTN, HLD, DM 2, Hypothermia, Chronic Venous Stasis, LE Ulcers, Chronic Left Shoulder Dislocation, Dysphagia and Aspiration Pneumonia sent from long term as he was not doing well. He was lethargic, weak and his speech was slurred.   Patient is currently very sleepy and lethargic however wakes up on verbal stimuli. No active complaints. Speech is clear at this time.   He was code stroke in ER. No acute finding on CT. Found to be hypothermic. CXR with chronic small left pleural effusion. Urine was just sent and as per RN it was very cloudy.            (08 Dec 2020 18:11)      Hypothermia, initial encounter    Yes    No pertinent family history in first degree relatives    No pertinent family history in first degree relatives    Handoff    MEWS Score    Encounter for blood transfusion    Anemia    History of gross hematuria    History of orthostatic hypotension    Bacterial pneumonia    Muscle weakness (generalized)    2019 novel coronavirus disease (COVID-19)    Falls    Heart failure    Afib    CHF (congestive heart failure)    Gout    Hiatal hernia    BPH (benign prostatic hyperplasia)    Cognitive impairment    Cardiomyopathy    HTN (hypertension)    DM (diabetes mellitus)    Hypothermia    Gross hematuria    Insertion of Rodriguez catheter    S/P appendectomy    No significant past surgical history    WEAKNESS    20    Sepsis    Acute kidney injury    [email or web address]        SUBJECTIVE: Pt seen lying supine with HOB up, awake and alert, in NAD, rodriguez to BSD    Diet:    Activity:     Fevers: [ ]Yes [ ]NO  Chills: [ ] Yes [ ] NO  SOB:  [ ] YES [ ] NO  Dyspnea: [ ]YES [ ]NO  Chest Discomfort: [ ] YES [ ] NO    Nausea: [ ] YES [ ] NO           Vomiting: [ ] YES [ ] NO  Flatus: [ ] YES [ ] NO             Bowel Movement: [ ] YES [ ] NO  Diarrhea: [ ] YES [ ] NO         Void: [ ]YES [ ]No  Constipation: [ ] YES [ ] NO       Pain (0-10):              Pain Control Adequate: [ ] YES [ ] NO    Rodriguez:    NGT:      I&O's Detail    14 Dec 2020 07:01  -  15 Dec 2020 07:00  --------------------------------------------------------  IN:    IV PiggyBack: 50 mL  Total IN: 50 mL    OUT:    Indwelling Catheter - Urethral (mL): 1850 mL  Total OUT: 1850 mL    Total NET: -1800 mL        I&O's Summary    14 Dec 2020 07:01  -  15 Dec 2020 07:00  --------------------------------------------------------  IN: 50 mL / OUT: 1850 mL / NET: -1800 mL          MEDICATIONS  (STANDING):  allopurinol 300 milliGRAM(s) Oral daily  ascorbic acid 500 milliGRAM(s) Oral daily  aspirin  chewable 81 milliGRAM(s) Oral daily  atorvastatin 20 milliGRAM(s) Oral at bedtime  cadexomer iodine 0.9% Gel 1 Application(s) Topical every 24 hours  calcium carbonate 1250 mG  + Vitamin D (OsCal 500 + D) 1 Tablet(s) Oral daily  dextrose 40% Gel 15 Gram(s) Oral once  dextrose 5%. 1000 milliLiter(s) (50 mL/Hr) IV Continuous <Continuous>  dextrose 5%. 1000 milliLiter(s) (100 mL/Hr) IV Continuous <Continuous>  dextrose 50% Injectable 25 Gram(s) IV Push once  dextrose 50% Injectable 12.5 Gram(s) IV Push once  dextrose 50% Injectable 25 Gram(s) IV Push once  epoetin liam-epbx (RETACRIT) Injectable 50821 Unit(s) SubCutaneous every 7 days  ferrous    sulfate 325 milliGRAM(s) Oral three times a day  finasteride 5 milliGRAM(s) Oral daily  glucagon  Injectable 1 milliGRAM(s) IntraMuscular once  insulin lispro (ADMELOG) corrective regimen sliding scale   SubCutaneous Before meals and at bedtime  levothyroxine 25 MICROGram(s) Oral daily  meropenem  IVPB 500 milliGRAM(s) IV Intermittent every 12 hours  midodrine. 5 milliGRAM(s) Oral three times a day  multivitamin 1 Tablet(s) Oral daily  nystatin Powder 1 Application(s) Topical three times a day  saccharomyces boulardii 250 milliGRAM(s) Oral two times a day  sodium chloride 0.45%. 1000 milliLiter(s) (60 mL/Hr) IV Continuous <Continuous>  tamsulosin 0.4 milliGRAM(s) Oral at bedtime  trimethoprim  160 mG/sulfamethoxazole 800 mG 2 Tablet(s) Oral every 12 hours    MEDICATIONS  (PRN):  acetaminophen  Suppository .. 650 milliGRAM(s) Rectal every 6 hours PRN Temp greater or equal to 38C (100.4F), Mild Pain (1 - 3), Moderate Pain (4 - 6)  haloperidol    Injectable 2 milliGRAM(s) IntraMuscular every 6 hours PRN Agitation  ondansetron Injectable 4 milliGRAM(s) IV Push every 6 hours PRN Nausea and/or Vomiting      LABS:                        8.4    5.35  )-----------( 95       ( 15 Dec 2020 09:30 )             27.8     12-15    149<H>  |  119<H>  |  40.0<H>  ----------------------------<  88  4.2   |  21.0<L>  |  2.33<H>    Ca    8.5<L>      15 Dec 2020 09:27  Mg     2.0     12-14    TPro  5.7<L>  /  Alb  2.0<L>  /  TBili  0.3<L>  /  DBili  0.1  /  AST  23  /  ALT  16  /  AlkPhos  108  12-14    PT/INR - ( 15 Dec 2020 09:31 )   PT: 23.4 sec;   INR: 2.09 ratio         RADIOLOGY & ADDITIONAL STUDIES:       EXAM:  CT ABDOMEN AND PELVIS                          PROCEDURE DATE:  12/14/2020          INTERPRETATION:  CT ABDOMEN AND PELVIS    CLINICAL INFORMATION: Hematuria with history of hydronephrosis    COMPARISON: CT abdomen and pelvis 10/29/2020    PROCEDURE:  CT of the Abdomen and Pelvis was performed without intravenous contrast.  Intravenous contrast: None.  Oral contrast: None.  Sagittal and coronal reformats were performed.    FINDINGS:  LOWER CHEST: Small bilateral pleural effusions and bibasilar atelectasis.    LIVER: Normal.  BILE DUCTS: Nondilated.  GALLBLADDER: Gallstones.  SPLEEN: Normal.  PANCREAS: Diffuse atrophy.  ADRENALS: Normal.  KIDNEYS/URETERS: Improved mild bilateral hydroureteronephrosis. Interval migration of a 5 mm calculus into the left distal ureter. Additional nonobstructing left renal calculi.    BLADDER: Collapsed around a Rodriguez catheter balloon.  REPRODUCTIVE ORGANS: Nonenlarged.    BOWEL: Moderate fecal retention to the colon. No obstruction.  PERITONEUM: Small ascites. No free air.  VESSELS:  Aortoiliac atherosclerosis without aneurysm.  RETROPERITONEUM/LYMPH NODES: No adenopathy.  ABDOMINAL WALL: Diffuse body wall edema.  BONES: No acute bony abnormality.    IMPRESSION:    Improved mild bilateral hydroureteronephrosis. Interval migration of a 5 mm calculus into the left distal ureter. Additional nonobstructing left renal calculi.              DEEPAK OAKES MD; Attending Radiologist  This document has been electronically signed. Dec 14 2020  7:49PM

## 2020-12-15 NOTE — DISCHARGE NOTE NURSING/CASE MANAGEMENT/SOCIAL WORK - PATIENT PORTAL LINK FT
You can access the FollowMyHealth Patient Portal offered by [facility] by registering at the following website: [email or web address]. By joining [facility]’s FollowMyHealth portal, you will also be able to view your health information using other applications (apps) compatible with our system.

## 2020-12-16 NOTE — PROGRESS NOTE ADULT - SUBJECTIVE AND OBJECTIVE BOX
[facility]  INFECTIOUS DISEASES AND INTERNAL MEDICINE at [place]  =======================================================  Zach Chew MD  Diplomates American Board of Internal Medicine and Infectious Diseases  Tel: [phone]      Fax: [phone]  =======================================================    KIMBERLY MARKS [id number]    Follow up: right foot wound infection      Allergies:  No Known Allergies           REVIEW OF SYSTEMS:  CONSTITUTIONAL:  No Fever or chills  HEENT:   No diplopia or blurred vision.  No earache, sore throat or runny nose.  CARDIOVASCULAR:  No pressure, squeezing, strangling, tightness, heaviness or aching about the chest, neck, axilla or epigastrium.  RESPIRATORY:  No cough, shortness of breath  GASTROINTESTINAL:  No nausea, vomiting or diarrhea.  GENITOURINARY:  No dysuria, frequency or urgency. No Blood in urine  MUSCULOSKELETAL:  no joint aches, no muscle pain  SKIN:  No change in skin, hair or nails.  NEUROLOGIC:  No Headaches, seizures or weakness.  PSYCHIATRIC:  No disorder of thought or mood.  ENDOCRINE:  No heat or cold intolerance  HEMATOLOGICAL:  No easy bruising or bleeding.       Physical Exam:  GEN: NAD, pleasant  HEENT: normocephalic and atraumatic. EOMI. PERRL.  Anicteric   NECK: Supple.   LUNGS: Clear to auscultation.  HEART: Regular rate and rhythm without murmur.  ABDOMEN: Soft, nontender, and nondistended.  Positive bowel sounds.    : No CVA tenderness  EXTREMITIES: Without any edema. right dorsum wound drainage improved no erythema  MSK: no joint swelling  NEUROLOGIC: Cranial nerves II through XII are grossly intact. No focal deficits  PSYCHIATRIC: Appropriate affect .  SKIN: No Rash      Vitals:    T(F): 97.4 (16 Dec 2020 18:02), Max: 98.2 (16 Dec 2020 16:29)  HR: 85 (16 Dec 2020 18:02)  BP: 119/78 (16 Dec 2020 18:02)  RR: 18 (16 Dec 2020 18:02)  SpO2: 98% (16 Dec 2020 18:02) (95% - 98%)  temp max in last 48H T(F): , Max: 98.2 (12-16-20 @ 16:29)      Current Antibiotics:  meropenem  IVPB 500 milliGRAM(s) IV Intermittent every 12 hours  trimethoprim  160 mG/sulfamethoxazole 800 mG 2 Tablet(s) Oral every 12 hours    Other medications:  allopurinol 300 milliGRAM(s) Oral daily  ascorbic acid 500 milliGRAM(s) Oral two times a day  aspirin  chewable 81 milliGRAM(s) Oral daily  atorvastatin 20 milliGRAM(s) Oral at bedtime  cadexomer iodine 0.9% Gel 1 Application(s) Topical every 24 hours  calcium carbonate 1250 mG  + Vitamin D (OsCal 500 + D) 1 Tablet(s) Oral daily  dextrose 40% Gel 15 Gram(s) Oral once  dextrose 5%. 1000 milliLiter(s) IV Continuous <Continuous>  dextrose 5%. 1000 milliLiter(s) IV Continuous <Continuous>  dextrose 50% Injectable 25 Gram(s) IV Push once  dextrose 50% Injectable 12.5 Gram(s) IV Push once  dextrose 50% Injectable 25 Gram(s) IV Push once  epoetin liam-epbx (RETACRIT) Injectable 20100 Unit(s) SubCutaneous <User Schedule>  ferrous    sulfate 325 milliGRAM(s) Oral three times a day  finasteride 5 milliGRAM(s) Oral daily  folic acid 1 milliGRAM(s) Oral daily  glucagon  Injectable 1 milliGRAM(s) IntraMuscular once  insulin lispro (ADMELOG) corrective regimen sliding scale   SubCutaneous Before meals and at bedtime  lactulose Syrup 20 Gram(s) Oral two times a day  levothyroxine 25 MICROGram(s) Oral daily  midodrine. 5 milliGRAM(s) Oral three times a day  multivitamin 1 Tablet(s) Oral daily  nystatin Powder 1 Application(s) Topical three times a day  polyethylene glycol 3350 17 Gram(s) Oral daily  saccharomyces boulardii 250 milliGRAM(s) Oral two times a day  senna 2 Tablet(s) Oral at bedtime  sodium chloride 0.45%. 1000 milliLiter(s) IV Continuous <Continuous>  tamsulosin 0.4 milliGRAM(s) Oral at bedtime        Labs:                        8.4    5.35  )-----------( 95       ( 15 Dec 2020 09:30 )             27.8      12-15    149<H>  |  119<H>  |  40.0<H>  ----------------------------<  88  4.2   |  21.0<L>  |  2.33<H>    Ca    8.5<L>      15 Dec 2020 09:27        Culture - Surgical Swab (collected 12-10-20 @ 22:00)  Source: .Surgical Swab R foot ulcer  Final Report (12-13-20 @ 16:43):    Numerous Providencia stuartii    Numerous Pseudomonas aeruginosa    Numerous Enterococcus faecalis    Few Proteus mirabilis ESBL    Numerous Stenotrophomonas maltophilia  Organism: Providencia stuartii  Pseudomonas aeruginosa  Enterococcus faecalis  Proteus mirabilis ESBL  Stenotrophomonas maltophilia (12-13-20 @ 16:43)  Organism: Stenotrophomonas maltophilia (12-13-20 @ 16:43)    Sensitivities:      -  Ceftazidime: S 8      -  Levofloxacin: S <=0.5      -  Trimethoprim/Sulfamethoxazole: S <=0.5/9.5      Method Type: VAMSI  Organism: Proteus mirabilis ESBL (12-13-20 @ 16:43)    Sensitivities:      -  Amikacin: S <=16      -  Amoxicillin/Clavulanic Acid: R >16/8      -  Ampicillin: R >16 These ampicillin results predict results for amoxicillin      -  Ampicillin/Sulbactam: R 16/8 Enterobacter, Citrobacter, and Serratia may develop resistance during prolonged therapy (3-4 days)      -  Aztreonam: R <=4      -  Cefazolin: R >16 Enterobacter, Citrobacter, and Serratia may develop resistance during prolonged therapy (3-4 days)      -  Cefepime: R 8      -  Cefoxitin: S <=8      -  Ceftriaxone: R 32 Enterobacter, Citrobacter, and Serratia may develop resistance during prolonged therapy      -  Ciprofloxacin: R >2      -  Ertapenem: S <=0.5      -  Gentamicin: R >8      -  Levofloxacin: R >4      -  Meropenem: S <=1      -  Piperacillin/Tazobactam: R <=8      -  Tobramycin: R >8      -  Trimethoprim/Sulfamethoxazole: R >2/38      Method Type: VAMSI  Organism: Enterococcus faecalis (12-13-20 @ 16:43)    Sensitivities:      -  Ampicillin: S <=2 Predicts results to ampicillin/sulbactam, amoxacillin-clavulanate and  piperacillin-tazobactam.      -  Tetra/Doxy: S <=1      -  Vancomycin: S 1      Method Type: VAMSI  Organism: Pseudomonas aeruginosa (12-13-20 @ 16:43)    Sensitivities:      -  Amikacin: S <=16      -  Aztreonam: S <=4      -  Cefepime: S <=2      -  Ceftazidime: S <=1      -  Ciprofloxacin: S <=0.25      -  Gentamicin: S 4      -  Imipenem: S <=1      -  Levofloxacin: S <=0.5      -  Meropenem: S <=1      -  Piperacillin/Tazobactam: S <=8      -  Tobramycin: S <=2      Method Type: VAMSI  Organism: Providencia stuartii (12-13-20 @ 16:43)    Sensitivities:      -  Amikacin: S <=16      -  Amoxicillin/Clavulanic Acid: R >16/8      -  Ampicillin: R 16 These ampicillin results predict results for amoxicillin      -  Ampicillin/Sulbactam: I 16/8 Enterobacter, Citrobacter, and Serratia may develop resistance during prolonged therapy (3-4 days)      -  Aztreonam: S <=4      -  Cefazolin: R >16 Enterobacter, Citrobacter, and Serratia may develop resistance during prolonged therapy (3-4 days)      -  Cefepime: S <=2      -  Cefoxitin: S <=8      -  Ceftriaxone: S <=1 Enterobacter, Citrobacter, and Serratia may develop resistance during prolonged therapy      -  Ciprofloxacin: R >2      -  Ertapenem: S <=0.5      -  Levofloxacin: R >4      -  Meropenem: S <=1      -  Piperacillin/Tazobactam: S <=8      -  Trimethoprim/Sulfamethoxazole: S <=0.5/9.5      Method Type: VAMSI    Culture - Urine (collected 12-08-20 @ 22:18)  Source: .Urine Catheterized  Final Report (12-09-20 @ 19:50):    <10,000 CFU/mL Normal Urogenital Nola    Culture - Blood (collected 12-08-20 @ 18:40)  Source: .Blood Blood-Venous  Final Report (12-13-20 @ 19:00):    No growth at 5 days.    Culture - Blood (collected 12-08-20 @ 18:40)  Source: .Blood Blood-Peripheral  Final Report (12-13-20 @ 19:00):    No growth at 5 days.      WBC Count: 5.35 K/uL (12-15-20 @ 09:30)  WBC Count: 5.12 K/uL (12-14-20 @ 07:04)  WBC Count: 4.62 K/uL (12-13-20 @ 07:15)    Creatinine, Serum: 2.33 mg/dL (12-15-20 @ 09:27)  Creatinine, Serum: 2.61 mg/dL (12-14-20 @ 07:04)  Creatinine, Serum: 2.82 mg/dL (12-13-20 @ 07:15)  Creatinine, Serum: 3.22 mg/dL (12-12-20 @ 08:10)      Ferritin, Serum: 911 ng/mL (12-14-20 @ 15:36)           COVID-19 PCR: NotDetec (12-08-20 @ 16:44)  COVID-19 PCR: NotDetec (11-18-20 @ 16:17)

## 2020-12-16 NOTE — PROGRESS NOTE ADULT - ASSESSMENT
1) Petey on CKD stage 3; baseline Scr 1.5 in November US with mild right hydronephrosis, L kidney poorly seen  CT abdomen with KIDNEYS/URETERS: Improved mild bilateral hydroureteronephrosis. Interval migration of a 5 mm calculus into the left distal ureter. Additional nonobstructing left renal calculi.  Scr 3.6 on presentation--> now improving to 2.3 with IV hydration      2) Sepsis; Wound infection  negative blood+ UCx  Wound culture with Providencia, Pseudomonas, Enterococcus, ESBL Proteus and Stenotrophomonas - on Merrem and Bactrim      3) Anemia of chronic disease  Ferririn high likely in setting of infection  Iron sats are low; start po iron  BEN    4) Hypernatremia  cw gentle hydration with 1/2 NS  Encourage free water intake    Monitor Scr, HEMAL burch Dr

## 2020-12-16 NOTE — PROGRESS NOTE ADULT - ASSESSMENT
70 years old male with recurrent hospitalizations, Developmental Disability, Chronic A. Fib not on AC due to Hematuria/Anemia, Diastolic Heart Failure, CKD 3, HTN, HLD, DM 2, Hypothermia, Chronic Venous Stasis, LE Ulcers, Chronic Left Shoulder Dislocation, Dysphagia and Aspiration Pneumonia     rodriguez placed on 12/8 in ER with return of cloudy urine    urine/blood cx neg    now with gross hematuria resolving - off Sub Q heparin, only on PO ASA    INR elevated    creat elevated compared to 11/10/20 but trending down    US with mild right hydronephrosis, L kidney poorly seen      CT: Improved mild bilateral hydroureteronephrosis. Interval migration of a 5 mm calculus into the left distal ureter. Additional nonobstructing left renal calculi.

## 2020-12-16 NOTE — PROGRESS NOTE ADULT - ASSESSMENT
70 years old male with recurrent hospitalizations, Developmental Disability, Chronic A. Fib not on AC due to Hematuria/Anemia, Diastolic Heart Failure, CKD 3, HTN, HLD, DM 2, Hypothermia, Chronic Venous Stasis, LE Ulcers, Chronic Left Shoulder Dislocation, Dysphagia and Aspiration [facility] as [facility] not [facility]. He was lethargic, weak and his speech was slurred.   Patient is currently very sleepy and lethargic however wakes up on verbal stimuli. No active complaints. Speech is clear at this time.   He was code stroke in ER. No acute finding on CT. Found to be hypothermic. CXR with chronic small left pleural effusion. UA with UTI , on iv abx for foot wound , cx from wound multi bacterial , refused MR of foot to evaluate for OM , CT ordered , seen by nephrology for ARF , rodriguez in place for retention wound healing     1) Sepsis with metabolic encephalopathy  due to LLE heels ulcer and wound of  (Right 1st MPJ)   improved   - Wound culture with Providencia, Pseudomonas, Enterococcus, ESBL Proteus and Stenotrophomonas     - Continue Meropenem  and bactrim   ID input re duration of abx treatment   pending CT of foot to evaluate for OM   cont wound care per podiatry recommandation     2- ARF with CRF stage 3   cr 1.57 in nov 2020   Nephrology consult appreciated     3- Hypernatremia   iv fluid added   repeat lytes in am  trend NA     4-Cellulitis both thighs/ rash   improved on Nystatin Powder for fungal skin infection     5) Decubitus Ulcers  - Wound care  - Change position every 2 hours  - Clinitron bed     6) Hypothermia, recurrent cronic   - had extensive work up in past  - Likely due to recurrent infections and environmental   stable     7) DM 2, controlled BG   cont Accu checks and ISS    8) Chronic Diastolic Heart Failure  - No signs of volume overload   stable     9) Chronic A. Fib  - Rate controlled  - Not on AC due to hematuria / anemia    10) Anemia, cronic kidney disease   - Low iron s/p Venofer 200 mg x 5  repeat vit b 12, iron studies    - s/p 2 units of PRBCs   cont  epogen weekly for renal dx and oral ferrous sulfate        DVT Prophylaxis -- Heparin SQ    GOC: DNR/I. Patient is under OPWDD    Palliative Consulted    Prognosis extremely guarded.     Dispo: Group Home once stable   d/w his sister over the phone    70 years old male with recurrent hospitalizations, Developmental Disability, Chronic A. Fib not on AC due to Hematuria/Anemia, Diastolic Heart Failure, CKD 3, HTN, HLD, DM 2, Hypothermia, Chronic Venous Stasis, LE Ulcers, Chronic Left Shoulder Dislocation, Dysphagia and Aspiration Pneumonia sent from retirement as he was not doing well. He was lethargic, weak and his speech was slurred.   Patient is currently very sleepy and lethargic however wakes up on verbal stimuli. No active complaints. Speech is clear at this time.   He was code stroke in ER. No acute finding on CT. Found to be hypothermic. CXR with chronic small left pleural effusion. UA with UTI , on iv abx for foot wound , cx from wound multi bacterial , refused MR of foot to evaluate for OM , CT ordered , seen by nephrology for ARF , rodriguez in place for retention wound healing     1) Sepsis with metabolic encephalopathy  due to LLE heels ulcer and wound of  (Right 1st MPJ)   improved   - Wound culture with Providencia, Pseudomonas, Enterococcus, ESBL Proteus and Stenotrophomonas     - Continue Meropenem  and bactrim   ID input re duration of abx treatment   pending CT of foot to evaluate for OM   cont wound care per podiatry recommandation     2- ARF with CRF stage 3   cr 1.57 in nov 2020   Nephrology consult appreciated     3- Hypernatremia   iv fluid added   repeat lytes in am  trend NA     4-Cellulitis both thighs/ rash   improved on Nystatin Powder for fungal skin infection     5) Decubitus Ulcers  - Wound care  - Change position every 2 hours  - Clinitron bed     6) Hypothermia, recurrent cronic   - had extensive work up in past  - Likely due to recurrent infections and environmental   stable     7) DM 2, controlled BG   cont Accu checks and ISS    8) Chronic Diastolic Heart Failure  - No signs of volume overload   stable     9) Chronic A. Fib  - Rate controlled  - Not on AC due to hematuria / anemia    10) Anemia, cronic kidney disease   - Low iron s/p Venofer 200 mg x 5  repeat vit b 12, iron studies    - s/p 2 units of PRBCs   cont  epogen weekly for renal dx and oral ferrous sulfate        DVT Prophylaxis -- Heparin SQ    GOC: DNR/I. Patient is under OPWDD    Palliative Consulted    Prognosis extremely guarded.     Dispo: probable [facility]   d/w his sister over the phone

## 2020-12-16 NOTE — PROGRESS NOTE ADULT - SUBJECTIVE AND OBJECTIVE BOX
SURGICAL PA NOTE: Urology PA note    STATUS POST:      POST OPERATIVE DAY #:     Vital Signs Last 24 Hrs  T(C): 36.6 (16 Dec 2020 10:30), Max: 36.6 (15 Dec 2020 16:45)  T(F): 97.8 (16 Dec 2020 10:30), Max: 97.9 (15 Dec 2020 16:45)  HR: 86 (16 Dec 2020 10:30) (71 - 86)  BP: 102/64 (16 Dec 2020 10:30) (99/60 - 102/64)  BP(mean): --  RR: 19 (16 Dec 2020 10:30) (18 - 19)  SpO2: 97% (16 Dec 2020 10:30) (95% - 97%)    HPI:  History was taken from charts as Aide from half-way gone by the time patient was admitted.    70 years old male with recurrent hospitalizations, Developmental Disability, Chronic A. Fib not on AC due to Hematuria/Anemia, Diastolic Heart Failure, CKD 3, HTN, HLD, DM 2, Hypothermia, Chronic Venous Stasis, LE Ulcers, Chronic Left Shoulder Dislocation, Dysphagia and Aspiration Pneumonia [facility] as he was not doing well. He was lethargic, weak and his speech was slurred.   Patient is currently very sleepy and lethargic however wakes up on verbal stimuli. No active complaints. Speech is clear at this time.   He was code stroke in ER. No acute finding on CT. Found to be hypothermic. CXR with chronic small left pleural effusion. Urine was just sent and as per RN it was very cloudy.            (08 Dec 2020 18:11)      Hypothermia, initial encounter    Yes    No pertinent family history in first degree relatives    No pertinent family history in first degree relatives    Handoff    MEWS Score    Encounter for blood transfusion    Anemia    History of gross hematuria    History of orthostatic hypotension    Bacterial pneumonia    Muscle weakness (generalized)    2019 novel coronavirus disease (COVID-19)    Falls    Heart failure    Afib    CHF (congestive heart failure)    Gout    Hiatal hernia    BPH (benign prostatic hyperplasia)    Cognitive impairment    Cardiomyopathy    HTN (hypertension)    DM (diabetes mellitus)    Hypothermia    Gross hematuria    Insertion of Rodriguez catheter    S/P appendectomy    No significant past surgical history    WEAKNESS    20    Sepsis    Acute kidney injury    [email or web address]        SUBJECTIVE: Pt seen lying supine with HOB up, resting comfortably, rodriguez to BSD    Diet:    Activity:     Fevers: [ ]Yes [ ]NO  Chills: [ ] Yes [ ] NO  SOB:  [ ] YES [ ] NO  Dyspnea: [ ]YES [ ]NO  Chest Discomfort: [ ] YES [ ] NO    Nausea: [ ] YES [ ] NO           Vomiting: [ ] YES [ ] NO  Flatus: [ ] YES [ ] NO             Bowel Movement: [ ] YES [ ] NO  Diarrhea: [ ] YES [ ] NO         Void: [ ]YES [ ]No  Constipation: [ ] YES [ ] NO       Pain (0-10):              Pain Control Adequate: [ ] YES [ ] NO    Miguel:    FELIPET:      I&O's Detail    15 Dec 2020 07:  -  16 Dec 2020 07:00  --------------------------------------------------------  IN:  Total IN: 0 mL    OUT:    Indwelling Catheter - Urethral (mL): 1300 mL  Total OUT: 1300 mL    Total NET: -1300 mL      16 Dec 2020 07:01  -  16 Dec 2020 14:41  --------------------------------------------------------  IN:  Total IN: 0 mL    OUT:    Voided (mL): 500 mL  Total OUT: 500 mL    Total NET: -500 mL        I&O's Summary    15 Dec 2020 07:01  -  16 Dec 2020 07:00  --------------------------------------------------------  IN: 0 mL / OUT: 1300 mL / NET: -1300 mL    16 Dec 2020 07:01  -  16 Dec 2020 14:41  --------------------------------------------------------  IN: 0 mL / OUT: 500 mL / NET: -500 mL          MEDICATIONS  (STANDING):  allopurinol 300 milliGRAM(s) Oral daily  ascorbic acid 500 milliGRAM(s) Oral two times a day  aspirin  chewable 81 milliGRAM(s) Oral daily  atorvastatin 20 milliGRAM(s) Oral at bedtime  cadexomer iodine 0.9% Gel 1 Application(s) Topical every 24 hours  calcium carbonate 1250 mG  + Vitamin D (OsCal 500 + D) 1 Tablet(s) Oral daily  dextrose 40% Gel 15 Gram(s) Oral once  dextrose 5%. 1000 milliLiter(s) (100 mL/Hr) IV Continuous <Continuous>  dextrose 5%. 1000 milliLiter(s) (50 mL/Hr) IV Continuous <Continuous>  dextrose 50% Injectable 25 Gram(s) IV Push once  dextrose 50% Injectable 12.5 Gram(s) IV Push once  dextrose 50% Injectable 25 Gram(s) IV Push once  epoetin liam-epbx (RETACRIT) Injectable 25111 Unit(s) SubCutaneous <User Schedule>  ferrous    sulfate 325 milliGRAM(s) Oral three times a day  finasteride 5 milliGRAM(s) Oral daily  folic acid 1 milliGRAM(s) Oral daily  glucagon  Injectable 1 milliGRAM(s) IntraMuscular once  insulin lispro (ADMELOG) corrective regimen sliding scale   SubCutaneous Before meals and at bedtime  levothyroxine 25 MICROGram(s) Oral daily  meropenem  IVPB 500 milliGRAM(s) IV Intermittent every 12 hours  midodrine. 5 milliGRAM(s) Oral three times a day  multivitamin 1 Tablet(s) Oral daily  nystatin Powder 1 Application(s) Topical three times a day  polyethylene glycol 3350 17 Gram(s) Oral daily  saccharomyces boulardii 250 milliGRAM(s) Oral two times a day  senna 2 Tablet(s) Oral at bedtime  sodium chloride 0.45%. 1000 milliLiter(s) (75 mL/Hr) IV Continuous <Continuous>  tamsulosin 0.4 milliGRAM(s) Oral at bedtime  trimethoprim  160 mG/sulfamethoxazole 800 mG 2 Tablet(s) Oral every 12 hours    MEDICATIONS  (PRN):  acetaminophen  Suppository .. 650 milliGRAM(s) Rectal every 6 hours PRN Temp greater or equal to 38C (100.4F), Mild Pain (1 - 3), Moderate Pain (4 - 6)  haloperidol    Injectable 2 milliGRAM(s) IntraMuscular every 6 hours PRN Agitation  ondansetron Injectable 4 milliGRAM(s) IV Push every 6 hours PRN Nausea and/or Vomiting      LABS:                        8.4    5.35  )-----------( 95       ( 15 Dec 2020 09:30 )             27.8     12-15    149<H>  |  119<H>  |  40.0<H>  ----------------------------<  88  4.2   |  21.0<L>  |  2.33<H>    Ca    8.5<L>      15 Dec 2020 09:27    TPro  5.7<L>  /  Alb  2.0<L>  /  TBili  0.3<L>  /  DBili  0.1  /  AST  23  /  ALT  16  /  AlkPhos  108  12-14    PT/INR - ( 15 Dec 2020 09:31 )   PT: 23.4 sec;   INR: 2.09 ratio           Urinalysis Basic - ( 15 Dec 2020 11:34 )    Color: Red / Appearance: Cloudy / S.015 / pH: x  Gluc: x / Ketone: Trace  / Bili: Negative / Urobili: Negative mg/dL   Blood: x / Protein: 100 mg/dL / Nitrite: Negative   Leuk Esterase: Small / RBC: >50 /HPF / WBC 6-10   Sq Epi: x / Non Sq Epi: Negative / Bacteria: Occasional          RADIOLOGY & ADDITIONAL STUDIES:

## 2020-12-16 NOTE — PROGRESS NOTE ADULT - ASSESSMENT
70 years old male with recurrent hospitalizations, Developmental Disability, Chronic A. Fib not on AC due to Hematuria/Anemia, Diastolic Heart Failure, CKD 3, HTN, HLD, DM 2, Hypothermia, Chronic Venous Stasis, LE Ulcers, Chronic Left Shoulder Dislocation, Dysphagia and Aspiration Pneumonia sent from long term as he was not doing well. He was lethargic, weak and his speech was slurred. Code stroke in Ed but Ct head wnl. Found to be hypothermic (which he is at baseline) cloudy urine sent for urinalysis with concern for UTI and infection of ulcers of feet    - f/u BCX  - UA +, ucx NF  - covid pcr (-)  - c/w meropenem adjusted for renal function   - wound cx is polymicrobial, added bactrim for stenotrophomonas  - per podiatry not a surgical candidate  - x ray right foot as above, would check CT right foot to help guide duration of abx as patient will not agree to MR  - possible cystoscopy for hematuria  - Trend Fever  - Trend Leukocytosis        Will Follow

## 2020-12-16 NOTE — PROGRESS NOTE ADULT - SUBJECTIVE AND OBJECTIVE BOX
[facility] DIVISION OF KIDNEY DISEASES AND HYPERTENSION -- FOLLOW UP NOTE  --------------------------------------------------------------------------------  Chief Complaint:  MARSHAL    24 hour events/subjective:  Seen/examined this AM  SCr worsening; BL hydro; stone;      PAST HISTORY  --------------------------------------------------------------------------------  No significant changes to PMH, PSH, FHx, SHx, unless otherwise noted    ALLERGIES & MEDICATIONS  --------------------------------------------------------------------------------  Allergies    No Known Allergies    Intolerances      Standing Inpatient Medications  allopurinol 300 milliGRAM(s) Oral daily  ascorbic acid 500 milliGRAM(s) Oral two times a day  aspirin  chewable 81 milliGRAM(s) Oral daily  atorvastatin 20 milliGRAM(s) Oral at bedtime  cadexomer iodine 0.9% Gel 1 Application(s) Topical every 24 hours  calcium carbonate 1250 mG  + Vitamin D (OsCal 500 + D) 1 Tablet(s) Oral daily  dextrose 40% Gel 15 Gram(s) Oral once  dextrose 5%. 1000 milliLiter(s) IV Continuous <Continuous>  dextrose 5%. 1000 milliLiter(s) IV Continuous <Continuous>  dextrose 50% Injectable 25 Gram(s) IV Push once  dextrose 50% Injectable 12.5 Gram(s) IV Push once  dextrose 50% Injectable 25 Gram(s) IV Push once  epoetin liam-epbx (RETACRIT) Injectable 09671 Unit(s) SubCutaneous <User Schedule>  ferrous    sulfate 325 milliGRAM(s) Oral three times a day  finasteride 5 milliGRAM(s) Oral daily  folic acid 1 milliGRAM(s) Oral daily  glucagon  Injectable 1 milliGRAM(s) IntraMuscular once  insulin lispro (ADMELOG) corrective regimen sliding scale   SubCutaneous Before meals and at bedtime  levothyroxine 25 MICROGram(s) Oral daily  meropenem  IVPB 500 milliGRAM(s) IV Intermittent every 12 hours  midodrine. 5 milliGRAM(s) Oral three times a day  multivitamin 1 Tablet(s) Oral daily  nystatin Powder 1 Application(s) Topical three times a day  polyethylene glycol 3350 17 Gram(s) Oral daily  saccharomyces boulardii 250 milliGRAM(s) Oral two times a day  senna 2 Tablet(s) Oral at bedtime  sodium chloride 0.45%. 1000 milliLiter(s) IV Continuous <Continuous>  tamsulosin 0.4 milliGRAM(s) Oral at bedtime  trimethoprim  160 mG/sulfamethoxazole 800 mG 2 Tablet(s) Oral every 12 hours    PRN Inpatient Medications  acetaminophen  Suppository .. 650 milliGRAM(s) Rectal every 6 hours PRN  haloperidol    Injectable 2 milliGRAM(s) IntraMuscular every 6 hours PRN  ondansetron Injectable 4 milliGRAM(s) IV Push every 6 hours PRN      REVIEW OF SYSTEMS  --------------------------------------------------------------------------------  Gen: No weight changes, fatigue, fevers/chills, weakness  Skin: No rashes  Head/Eyes/Ears/Mouth: No headache; Normal hearing; Normal vision w/o blurriness; No sinus pain/discomfort, sore throat  Respiratory: No dyspnea, cough, wheezing, hemoptysis  CV: No chest pain, PND, orthopnea  GI: No abdominal pain, diarrhea, constipation, nausea, vomiting, melena, hematochezia  : No increased frequency, dysuria, hematuria, nocturia  MSK: No joint pain/swelling; no back pain; no edema  Neuro: No dizziness/lightheadedness, weakness, seizures, numbness, tingling  Heme: No easy bruising or bleeding  Endo: No heat/cold intolerance  Psych: No significant nervousness, anxiety, stress, depression    All other systems were reviewed and are negative, except as noted.    VITALS/PHYSICAL EXAM  --------------------------------------------------------------------------------  T(C): 36.6 (12-16-20 @ 10:30), Max: 36.6 (12-15-20 @ 16:45)  HR: 86 (12-16-20 @ 10:30) (71 - 86)  BP: 102/64 (12-16-20 @ 10:30) (99/60 - 102/64)  RR: 19 (12-16-20 @ 10:30) (18 - 19)  SpO2: 97% (12-16-20 @ 10:30) (95% - 97%)  Wt(kg): --        12-15-20 @ 07:01  -  12-16-20 @ 07:00  --------------------------------------------------------  IN: 0 mL / OUT: 1300 mL / NET: -1300 mL      Physical Exam:  	Gen: NAD, well-appearing  	HEENT: PERRL, supple neck, clear oropharynx  	Pulm: CTA B/L  	CV: RRR, S1S2; no rub  	Back: No spinal or CVA tenderness; no sacral edema  	Abd: +BS, soft, nontender/nondistended  	: No suprapubic tenderness  	UE: Warm, FROM, no clubbing, intact strength; no edema; no asterixis  	LE: Warm, FROM, no clubbing, intact strength; no edema  	Neuro: No focal deficits, intact gait  	Psych: Normal affect and mood  	Skin: Warm, without rashes    LABS/STUDIES  --------------------------------------------------------------------------------              8.4    5.35  >-----------<  95       [12-15-20 @ 09:30]              27.8     149  |  119  |  40.0  ----------------------------<  88      [12-15-20 @ 09:27]  4.2   |  21.0  |  2.33        Ca     8.5     [12-15-20 @ 09:27]    TPro  5.7  /  Alb  2.0  /  TBili  0.3  /  DBili  0.1  /  AST  23  /  ALT  16  /  AlkPhos  108  [12-14-20 @ 15:36]    PT/INR: PT 23.4 , INR 2.09       [12-15-20 @ 09:31]      Creatinine Trend:  SCr 2.33 [12-15 @ 09:27]  SCr 2.61 [12-14 @ 07:04]  SCr 2.82 [12-13 @ 07:15]  SCr 3.22 [12-12 @ 08:10]  SCr 3.23 [12-11 @ 06:43]    Urinalysis - [12-15-20 @ 11:34]      Color Red / Appearance Cloudy / SG 1.015 / pH 6.0      Gluc Negative / Ketone Trace  / Bili Negative / Urobili Negative       Blood Large / Protein 100 / Leuk Est Small / Nitrite Negative      RBC >50 / WBC 6-10 / Hyaline  / Gran  / Sq Epi  / Non Sq Epi Negative / Bacteria Occasional      Iron 37, TIBC 193, %sat 19      [12-14-20 @ 15:36]  Ferritin 911      [12-14-20 @ 15:36]  TSH 3.02      [12-08-20 @ 15:41]

## 2020-12-16 NOTE — CHART NOTE - NSCHARTNOTEFT_GEN_A_CORE
Source: Patient [ x]  Family [ ]   other [x ]EMR    Current Diet: Mech soft, consistent CHO with Ensure TID      PO intake:  < 50% [x ]   50-75%  [ ]   %  [ ]  other : Pt states good po intake ?, EMR states differently.     Source for PO intake [x ] Patient [ ] family [x ] chart [ ] staff [ ] other    Enteral /Parenteral Nutrition:     Current Weight: no weight to assess    % Weight Change     Pertinent Medications: MEDICATIONS  (STANDING):  allopurinol 300 milliGRAM(s) Oral daily  ascorbic acid 500 milliGRAM(s) Oral two times a day  aspirin  chewable 81 milliGRAM(s) Oral daily  atorvastatin 20 milliGRAM(s) Oral at bedtime  cadexomer iodine 0.9% Gel 1 Application(s) Topical every 24 hours  calcium carbonate 1250 mG  + Vitamin D (OsCal 500 + D) 1 Tablet(s) Oral daily  dextrose 40% Gel 15 Gram(s) Oral once  dextrose 5%. 1000 milliLiter(s) (50 mL/Hr) IV Continuous <Continuous>  dextrose 5%. 1000 milliLiter(s) (100 mL/Hr) IV Continuous <Continuous>  dextrose 50% Injectable 25 Gram(s) IV Push once  dextrose 50% Injectable 12.5 Gram(s) IV Push once  dextrose 50% Injectable 25 Gram(s) IV Push once  epoetin liam-epbx (RETACRIT) Injectable 48273 Unit(s) SubCutaneous <User Schedule>  ferrous    sulfate 325 milliGRAM(s) Oral three times a day  finasteride 5 milliGRAM(s) Oral daily  folic acid 1 milliGRAM(s) Oral daily  glucagon  Injectable 1 milliGRAM(s) IntraMuscular once  insulin lispro (ADMELOG) corrective regimen sliding scale   SubCutaneous Before meals and at bedtime  levothyroxine 25 MICROGram(s) Oral daily  meropenem  IVPB 500 milliGRAM(s) IV Intermittent every 12 hours  midodrine. 5 milliGRAM(s) Oral three times a day  multivitamin 1 Tablet(s) Oral daily  nystatin Powder 1 Application(s) Topical three times a day  polyethylene glycol 3350 17 Gram(s) Oral daily  saccharomyces boulardii 250 milliGRAM(s) Oral two times a day  senna 2 Tablet(s) Oral at bedtime  sodium chloride 0.45%. 1000 milliLiter(s) (75 mL/Hr) IV Continuous <Continuous>  tamsulosin 0.4 milliGRAM(s) Oral at bedtime  trimethoprim  160 mG/sulfamethoxazole 800 mG 2 Tablet(s) Oral every 12 hours    MEDICATIONS  (PRN):  acetaminophen  Suppository .. 650 milliGRAM(s) Rectal every 6 hours PRN Temp greater or equal to 38C (100.4F), Mild Pain (1 - 3), Moderate Pain (4 - 6)  haloperidol    Injectable 2 milliGRAM(s) IntraMuscular every 6 hours PRN Agitation  ondansetron Injectable 4 milliGRAM(s) IV Push every 6 hours PRN Nausea and/or Vomiting    Pertinent Labs: CBC Full  -  ( 15 Dec 2020 09:30 )  WBC Count : 5.35 K/uL  RBC Count : 3.17 M/uL  Hemoglobin : 8.4 g/dL  Hematocrit : 27.8 %  Platelet Count - Automated : 95 K/uL  Mean Cell Volume : 87.7 fl  Mean Cell Hemoglobin : 26.5 pg  Mean Cell Hemoglobin Concentration : 30.2 gm/dL  Auto Neutrophil # : x  Auto Lymphocyte # : x  Auto Monocyte # : x  Auto Eosinophil # : x  Auto Basophil # : x  Auto Neutrophil % : x  Auto Lymphocyte % : x  Auto Monocyte % : x  Auto Eosinophil % : x  Auto Basophil % : x    12-15 Na149 mmol/L<H> Glu 88 mg/dL K+ 4.2 mmol/L Cr  2.33 mg/dL<H> BUN 40.0 mg/dL<H> Phos n/a   Alb n/a   PAB n/a             Skin: unstageable b/l heel ulcers, stage 2 butt    Nutrition focused physical exam conducted - found signs of malnutrition [ ]absent [ x]present    Subcutaneous fat loss: [x ] Orbital fat pads region, [ x]Buccal fat region, [ ]Triceps region,  [ ]Ribs region    Muscle wasting: [x ]Temples region, [x ]Clavicle region, [x ]Shoulder region, [ ]Scapula region, [ ]Interosseous region,  [ ]thigh region, [ ]Calf region    Estimated Needs:   [ x] no change since previous assessment  [ ] recalculated:     Current Nutrition Diagnosis: Malnutrition severe chronic related to inadequate protein calorie intake as evidenced by PO<50% est needs, 18.8% weight loss, and physical findings. Pt with poor to fair po intake.      Recommendations: Rec Ensure Pudding TID. Continue Vit C, MVI, folic acid    Monitoring and Evaluation:   [x ] PO intake [x ] Tolerance to diet prescription [X] Weights  [X] Follow up per protocol [X] Labs:

## 2020-12-16 NOTE — PROGRESS NOTE ADULT - SUBJECTIVE AND OBJECTIVE BOX
follow up for  UTI , ARF on CRF , sacral and foot wound     seen in am  , awake alert , no distress   denied any pain   no overnight events reported       Vital Signs Last 24 Hrs  T(C): 36.6 (16 Dec 2020 10:30), Max: 36.6 (15 Dec 2020 16:45)  T(F): 97.8 (16 Dec 2020 10:30), Max: 97.9 (15 Dec 2020 16:45)  HR: 86 (16 Dec 2020 10:30) (71 - 86)  BP: 102/64 (16 Dec 2020 10:30) (99/60 - 102/64)  BP(mean): --  RR: 19 (16 Dec 2020 10:30) (18 - 19)  SpO2: 97% (16 Dec 2020 10:30) (95% - 97%)      General: Elderly male lying in the bed   Chest: CTA anteriorly ,  no wheezing or rhonchi  Heart: S1 & S2 with irregular rhythm   Abdomen:  Soft. Non-tender. Non-distended. + BS  Ext: Chronic skin changes on both lower legs, heels boots in place , wound dressing intact    : has rodriguez cathater                                     CAPILLARY BLOOD GLUCOSE      POCT Blood Glucose.: 110 mg/dL (16 Dec 2020 12:18)  POCT Blood Glucose.: 105 mg/dL (16 Dec 2020 08:46)  POCT Blood Glucose.: 113 mg/dL (15 Dec 2020 22:12)  POCT Blood Glucose.: 137 mg/dL (15 Dec 2020 17:12)               8.4    5.35  )-----------( 95       ( 15 Dec 2020 09:30 )             27.8   12-15    149<H>  |  119<H>  |  40.0<H>  ----------------------------<  88  4.2   |  21.0<L>  |  2.33<H>    Ca    8.5<L>      15 Dec 2020 09:27    TPro  5.7<L>  /  Alb  2.0<L>  /  TBili  0.3<L>  /  DBili  0.1  /  AST  23  /  ALT  16  /  AlkPhos  108  12-14

## 2020-12-16 NOTE — PROGRESS NOTE ADULT - PROBLEM SELECTOR PLAN 1
leave rodriguez indwelling for urinary diversion to expedite local skin care, irrigate rodriguez prn Q 8 hr to maintain patency, rec. treatment for moderate fecal retention of colon, cont to hold sub q  heparin if possible, Urine for Cytology sent - [facility] pathology, [facility] PSA, plan per Dr Pulido - possible cystoscopy

## 2020-12-16 NOTE — CHART NOTE - NSCHARTNOTEFT_GEN_A_CORE
CT of abd 12/14 moderate fecal retention in colon   will add lactulose bid for constipation , cont senna , miralax   given enema earlier by nurse martell CADET

## 2020-12-17 NOTE — PROGRESS NOTE ADULT - ASSESSMENT
70 years old male with recurrent hospitalizations, Developmental Disability, Chronic A. Fib not on AC due to Hematuria/Anemia, Diastolic Heart Failure, CKD 3, HTN, HLD, DM 2, Hypothermia, Chronic Venous Stasis, LE Ulcers, Chronic Left Shoulder Dislocation, Dysphagia and Aspiration Pneumonia sent from [facility] as he was not doing well. He was lethargic, weak and his speech was slurred.   Patient is currently very sleepy and lethargic however wakes up on verbal stimuli. No active complaints. Speech is clear at this time.   He was code stroke in ER. No acute finding on CT. Found to be hypothermic. CXR with chronic small left pleural effusion. UA with UTI , on iv abx for foot wound , cx from wound multi bacterial , refused MR of foot to evaluate for OM , CT ordered , seen by nephrology for ARF , rodriguez in place for retention wound healing     1) Sepsis with metabolic encephalopathy  due to LLE heels ulcer and wound of  (Right 1st MPJ)   improved   - Wound culture with Providencia, Pseudomonas, Enterococcus, ESBL Proteus and Stenotrophomonas     - Continue Meropenem  and bactrim   ID input re duration of abx treatment   pending CT of foot to evaluate for OM   cont wound care per podiatry recommandation     2- ARF with CRF stage 3   cr 1.57 in nov 2020   Nephrology consult appreciated     3- Hypernatremia   iv fluid added   repeat lytes in am  trend NA     4-Cellulitis both thighs/ rash   improved on Nystatin Powder for fungal skin infection     5) Decubitus Ulcers  - Wound care  - Change position every 2 hours  - Clinitron bed     6) Hypothermia, recurrent cronic   - had extensive work up in past  - Likely due to recurrent infections and environmental   stable     7) DM 2, controlled BG   cont Accu checks and ISS    8) Chronic Diastolic Heart Failure  - No signs of volume overload   stable     9) Chronic A. Fib  - Rate controlled  - Not on AC due to hematuria / anemia    10) Anemia, cronic kidney disease   - Low iron s/p Venofer 200 mg x 5  repeat vit b 12, iron studies    - s/p 2 units of PRBCs   cont  epogen weekly for renal dx and oral ferrous sulfate        DVT Prophylaxis -- Heparin SQ    GOC: DNR/I. Patient is under OPWDD    Palliative Consulted    Prognosis extremely guarded.     Dispo: probable [facility]   d/w his sister over the phone    70 years old male with recurrent hospitalizations, Developmental Disability, Chronic A. Fib not on AC due to Hematuria/Anemia, Diastolic Heart Failure, CKD 3, HTN, HLD, DM 2, Hypothermia, Chronic Venous Stasis, LE Ulcers, Chronic Left Shoulder Dislocation, Dysphagia and Aspiration [facility] as [facility] not [facility]. He was lethargic, weak and his speech was slurred.   He was code stroke in ER. No acute finding on CT. Found to be hypothermic. CXR with chronic small left pleural effusion. UA with UTI , on iv abx for foot wound , cx from wound multi bacterial , refused MR of foot to evaluate for OM , CT ordered , seen by nephrology for ARF , rodriguez in place for retention wound healing     # Sepsis with metabolic encephalopathy  due to LLE heels ulcer and wound of  (Right 1st MPJ), improved   - Wound culture with Providencia, Pseudomonas, Enterococcus, ESBL Proteus and Stenotrophomonas     - Continue Meropenem  and bactrim   pending CT of foot to evaluate for OM   cont wound care per podiatry recommendation     # ARF with CRF stage 3 , remaining elevated but stable  cr 1.57 in nov 2020   Nephrology consult appreciated     # Hypernatremia , resolved     # Decubitus Ulcers, poa  - Wound care  - Change position every 2 hours  - Clinitron bed     # Anemia, cronic kidney disease   - Low iron s/p Venofer 200 mg x 5  - s/p 2 units of PRBCs   - cont  epogen weekly for renal dx and oral ferrous sulfate     #constipation  - bowel regimen    # severe chronic malnutrition  poor intake    DVT Prophylaxis -- Heparin SQ    GOC: DNR/I. Patient is under OPWDD    Palliative Consulted    Prognosis extremely guarded.     Dispo: probable [facility] once medically stable  d/w his sister over the phone

## 2020-12-17 NOTE — PROGRESS NOTE ADULT - ASSESSMENT
70 years old male with recurrent hospitalizations, Developmental Disability, Chronic A. Fib not on AC due to Hematuria/Anemia, Diastolic Heart Failure, CKD 3, HTN, HLD, DM 2, Hypothermia, Chronic Venous Stasis, LE Ulcers, Chronic Left Shoulder Dislocation, Dysphagia and Aspiration Pneumonia sent from [facility] as he was not doing well. He was lethargic, weak and his speech was slurred. Code stroke in Ed but Ct head wnl. Found to be hypothermic (which he is at baseline) cloudy urine sent for urinalysis with concern for UTI and infection of ulcers of feet    - bcx ngtd  - UA +, ucx NF  - covid pcr (-)  - on mildred since 12/9  - wound cx is polymicrobial, added bactrim for stenotrophomonas  - per podiatry not a surgical candidate  - Ct right foot no om/abscess  - dc mildred  - complete bactrim x 7 days then stop  - possible cystoscopy for hematuria  - Trend Fever  - Trend Leukocytosis        signing off

## 2020-12-17 NOTE — PROGRESS NOTE ADULT - SUBJECTIVE AND OBJECTIVE BOX
[facility]  INFECTIOUS DISEASES AND INTERNAL MEDICINE at [place]  =======================================================  Zach Chew MD  Diplomates American Board of Internal Medicine and Infectious Diseases  Tel: [phone]      Fax: [phone]  =======================================================    KIMBERLY MARKS [id number]    Follow up: RLE ulcer  feels well      Allergies:  No Known Allergies           REVIEW OF SYSTEMS:  CONSTITUTIONAL:  No Fever or chills  HEENT:   No diplopia or blurred vision.  No earache, sore throat or runny nose.  CARDIOVASCULAR:  No pressure, squeezing, strangling, tightness, heaviness or aching about the chest, neck, axilla or epigastrium.  RESPIRATORY:  No cough, shortness of breath  GASTROINTESTINAL:  No nausea, vomiting or diarrhea.  GENITOURINARY:  No dysuria, frequency or urgency. No Blood in urine  MUSCULOSKELETAL:  no joint aches, no muscle pain  SKIN:  No change in skin, hair or nails.  NEUROLOGIC:  No Headaches, seizures or weakness.  PSYCHIATRIC:  No disorder of thought or mood.  ENDOCRINE:  No heat or cold intolerance  HEMATOLOGICAL:  No easy bruising or bleeding.       Physical Exam:  GEN: NAD, pleasant  HEENT: normocephalic and atraumatic. EOMI. PERRL.  Anicteric   NECK: Supple.   LUNGS: Clear to auscultation.  HEART: Regular rate and rhythm without murmur.  ABDOMEN: Soft, nontender, and nondistended.  Positive bowel sounds.    : No CVA tenderness rodriguez dark urine  EXTREMITIES: Without any edema.  MSK: no joint swelling  NEUROLOGIC: Cranial nerves II through XII are grossly intact. No focal deficits  PSYCHIATRIC: Appropriate affect .  SKIN: No Rash b/l chronic venous stasis, right foot dosum ulcer healed no further drainage no cellultiis      Vitals:    T(F): 97.6 (17 Dec 2020 16:12), Max: 97.6 (17 Dec 2020 16:12)  HR: 87 (17 Dec 2020 16:12)  BP: 126/76 (17 Dec 2020 16:12)  RR: 20 (17 Dec 2020 16:12)  SpO2: 94% (17 Dec 2020 09:30) (94% - 94%)  temp max in last 48H T(F): , Max: 98.2 (12-16-20 @ 16:29)      Current Antibiotics:  trimethoprim  160 mG/sulfamethoxazole 800 mG 2 Tablet(s) Oral every 12 hours    Other medications:  allopurinol 300 milliGRAM(s) Oral daily  ascorbic acid 500 milliGRAM(s) Oral two times a day  aspirin  chewable 81 milliGRAM(s) Oral daily  atorvastatin 20 milliGRAM(s) Oral at bedtime  cadexomer iodine 0.9% Gel 1 Application(s) Topical every 24 hours  calcium carbonate 1250 mG  + Vitamin D (OsCal 500 + D) 1 Tablet(s) Oral daily  dextrose 40% Gel 15 Gram(s) Oral once  dextrose 5% + lactated ringers. 1000 milliLiter(s) IV Continuous <Continuous>  dextrose 5%. 1000 milliLiter(s) IV Continuous <Continuous>  dextrose 5%. 1000 milliLiter(s) IV Continuous <Continuous>  dextrose 50% Injectable 25 Gram(s) IV Push once  dextrose 50% Injectable 12.5 Gram(s) IV Push once  dextrose 50% Injectable 25 Gram(s) IV Push once  epoetin liam-epbx (RETACRIT) Injectable 75005 Unit(s) SubCutaneous <User Schedule>  ferrous    sulfate 325 milliGRAM(s) Oral three times a day  finasteride 5 milliGRAM(s) Oral daily  folic acid 1 milliGRAM(s) Oral daily  glucagon  Injectable 1 milliGRAM(s) IntraMuscular once  insulin lispro (ADMELOG) corrective regimen sliding scale   SubCutaneous Before meals and at bedtime  lactulose Syrup 20 Gram(s) Oral two times a day  levothyroxine 25 MICROGram(s) Oral daily  midodrine. 5 milliGRAM(s) Oral three times a day  multivitamin 1 Tablet(s) Oral daily  nystatin Powder 1 Application(s) Topical three times a day  polyethylene glycol 3350 17 Gram(s) Oral daily  saccharomyces boulardii 250 milliGRAM(s) Oral two times a day  senna 2 Tablet(s) Oral at bedtime  tamsulosin 0.4 milliGRAM(s) Oral at bedtime        Labs:                        8.3    6.07  )-----------( 91       ( 17 Dec 2020 07:35 )             27.7      12-17    145  |  115<H>  |  38.0<H>  ----------------------------<  78  4.4   |  20.0<L>  |  2.38<H>    Ca    8.4<L>      17 Dec 2020 07:35  Phos  4.6     12-17  Mg     1.9     12-17        Culture - Surgical Swab (collected 12-10-20 @ 22:00)  Source: .Surgical Swab R foot ulcer  Final Report (12-13-20 @ 16:43):    Numerous Providencia stuartii    Numerous Pseudomonas aeruginosa    Numerous Enterococcus faecalis    Few Proteus mirabilis ESBL    Numerous Stenotrophomonas maltophilia  Organism: Providencia stuartii  Pseudomonas aeruginosa  Enterococcus faecalis  Proteus mirabilis ESBL  Stenotrophomonas maltophilia (12-13-20 @ 16:43)  Organism: Stenotrophomonas maltophilia (12-13-20 @ 16:43)    Sensitivities:      -  Ceftazidime: S 8      -  Levofloxacin: S <=0.5      -  Trimethoprim/Sulfamethoxazole: S <=0.5/9.5      Method Type: VAMSI  Organism: Proteus mirabilis ESBL (12-13-20 @ 16:43)    Sensitivities:      -  Amikacin: S <=16      -  Amoxicillin/Clavulanic Acid: R >16/8      -  Ampicillin: R >16 These ampicillin results predict results for amoxicillin      -  Ampicillin/Sulbactam: R 16/8 Enterobacter, Citrobacter, and Serratia may develop resistance during prolonged therapy (3-4 days)      -  Aztreonam: R <=4      -  Cefazolin: R >16 Enterobacter, Citrobacter, and Serratia may develop resistance during prolonged therapy (3-4 days)      -  Cefepime: R 8      -  Cefoxitin: S <=8      -  Ceftriaxone: R 32 Enterobacter, Citrobacter, and Serratia may develop resistance during prolonged therapy      -  Ciprofloxacin: R >2      -  Ertapenem: S <=0.5      -  Gentamicin: R >8      -  Levofloxacin: R >4      -  Meropenem: S <=1      -  Piperacillin/Tazobactam: R <=8      -  Tobramycin: R >8      -  Trimethoprim/Sulfamethoxazole: R >2/38      Method Type: VAMSI  Organism: Enterococcus faecalis (12-13-20 @ 16:43)    Sensitivities:      -  Ampicillin: S <=2 Predicts results to ampicillin/sulbactam, amoxacillin-clavulanate and  piperacillin-tazobactam.      -  Tetra/Doxy: S <=1      -  Vancomycin: S 1      Method Type: VAMSI  Organism: Pseudomonas aeruginosa (12-13-20 @ 16:43)    Sensitivities:      -  Amikacin: S <=16      -  Aztreonam: S <=4      -  Cefepime: S <=2      -  Ceftazidime: S <=1      -  Ciprofloxacin: S <=0.25      -  Gentamicin: S 4      -  Imipenem: S <=1      -  Levofloxacin: S <=0.5      -  Meropenem: S <=1      -  Piperacillin/Tazobactam: S <=8      -  Tobramycin: S <=2      Method Type: VAMSI  Organism: Providencia stuartii (12-13-20 @ 16:43)    Sensitivities:      -  Amikacin: S <=16      -  Amoxicillin/Clavulanic Acid: R >16/8      -  Ampicillin: R 16 These ampicillin results predict results for amoxicillin      -  Ampicillin/Sulbactam: I 16/8 Enterobacter, Citrobacter, and Serratia may develop resistance during prolonged therapy (3-4 days)      -  Aztreonam: S <=4      -  Cefazolin: R >16 Enterobacter, Citrobacter, and Serratia may develop resistance during prolonged therapy (3-4 days)      -  Cefepime: S <=2      -  Cefoxitin: S <=8      -  Ceftriaxone: S <=1 Enterobacter, Citrobacter, and Serratia may develop resistance during prolonged therapy      -  Ciprofloxacin: R >2      -  Ertapenem: S <=0.5      -  Levofloxacin: R >4      -  Meropenem: S <=1      -  Piperacillin/Tazobactam: S <=8      -  Trimethoprim/Sulfamethoxazole: S <=0.5/9.5      Method Type: VAMSI    Culture - Urine (collected 12-08-20 @ 22:18)  Source: .Urine Catheterized  Final Report (12-09-20 @ 19:50):    <10,000 CFU/mL Normal Urogenital Nola    Culture - Blood (collected 12-08-20 @ 18:40)  Source: .Blood Blood-Venous  Final Report (12-13-20 @ 19:00):    No growth at 5 days.    Culture - Blood (collected 12-08-20 @ 18:40)  Source: .Blood Blood-Peripheral  Final Report (12-13-20 @ 19:00):    No growth at 5 days.      WBC Count: 6.07 K/uL (12-17-20 @ 07:35)  WBC Count: 5.35 K/uL (12-15-20 @ 09:30)  WBC Count: 5.12 K/uL (12-14-20 @ 07:04)  WBC Count: 4.62 K/uL (12-13-20 @ 07:15)    Creatinine, Serum: 2.38 mg/dL (12-17-20 @ 07:35)  Creatinine, Serum: 2.33 mg/dL (12-15-20 @ 09:27)  Creatinine, Serum: 2.61 mg/dL (12-14-20 @ 07:04)  Creatinine, Serum: 2.82 mg/dL (12-13-20 @ 07:15)      Ferritin, Serum: 911 ng/mL (12-14-20 @ 15:36)           COVID-19 PCR: NotDetec (12-08-20 @ 16:44)  COVID-19 PCR: NotDetec (11-18-20 @ 16:17)      < from: CT Foot No Cont, Right (12.17.20 @ 11:43) >  IMPRESSION:  1.  Cellulitis without drainable fluid collection.  2.  No evidence of advanced osteomyelitis.        < end of copied text >    < from: CT Abdomen and Pelvis No Cont (12.14.20 @ 19:18) >  IMPRESSION:    Improved mild bilateral hydroureteronephrosis. Interval migration of a 5 mm calculus into the left distal ureter. Additional nonobstructing left renal calculi.        < end of copied text >

## 2020-12-17 NOTE — PROGRESS NOTE ADULT - SUBJECTIVE AND OBJECTIVE BOX
CC:    Patient seen and examined at the bedside. No acute overnight events. - yesterday. Complaining of - today. Denies fever/chills, headache, lightheadedness, dizziness, chest pain, palpitations, shortness of breath, cough, abd pain, nausea/vomiting/diarrhea, muscle pain.      =========================================================================================  T(C): 36.3 (20 @ 18:02), Max: 36.8 (20 @ 16:29)  HR: 85 (20 @ 18:02) (63 - 86)  BP: 119/78 (20 @ 18:02) (101/71 - 119/78)  RR: 18 (20 @ 18:02) (18 - 19)  SpO2: 98% (20 @ 18:02) (96% - 98%)    PHYSICAL EXAM.    GEN - appears age appropriate. well nourished. pleasant. no distress.   HEENT - NCAT, EOMI, LOWELL  RESP - CTA BL, no wheeze/stridor/rhonchi/crackles. not on supplemental O2. able to speak in full sentences without distress. no accessory muscle use.  CARDIO - NS1S2, RRR.   ABD - Soft/Non tender/Non distended. Normal BS x4 quadrants. no guarding/rebound tenderness.  Ext - No LADAN.  MSK - BL 5/5 strength on upper and lower extremities.   Neuro -  no gross neuro deficits noted. no focal sensory abnormalities. no tremor/seizure  Psych - normal affect. AAOx3.  Skin - c/d/i. no rashes/lesions      I&O's Summary    16 Dec 2020 07:01  -  17 Dec 2020 07:00  --------------------------------------------------------  IN: 450 mL / OUT: 1500 mL / NET: -1050 mL      Daily     Daily     =========================================================================================  LABS.    15    149<H>  |  119<H>  |  40.0<H>  ----------------------------<  88  4.2   |  21.0<L>  |  2.33<H>    Ca    8.5<L>      15 Dec 2020 09:27                            8.3    6.07  )-----------( 91       ( 17 Dec 2020 07:35 )             27.7       PT/INR - ( 17 Dec 2020 07:35 )   PT: 19.8 sec;   INR: 1.75 ratio               Urinalysis Basic - ( 15 Dec 2020 11:34 )    Color: Red / Appearance: Cloudy / S.015 / pH: x  Gluc: x / Ketone: Trace  / Bili: Negative / Urobili: Negative mg/dL   Blood: x / Protein: 100 mg/dL / Nitrite: Negative   Leuk Esterase: Small / RBC: >50 /HPF / WBC 6-10   Sq Epi: x / Non Sq Epi: Negative / Bacteria: Occasional        COVID-19 PCR: NotDetec (08 Dec 2020 16:44)  COVID-19 PCR: NotDetec (2020 16:17)  COVID-19 PCR: NotDetec (2020 19:24)  COVID-19 PCR: NotDetec (2020 11:17)  COVID-19 PCR: NotDetec (10 Nov 2020 11:13)  COVID-19 PCR: NotDetec (2020 12:33)  COVID-19 PCR: NotDetec (2020 11:05)  COVID-19 PCR: NotDetec (27 Oct 2020 15:04)  COVID-19 PCR: NotDetec (12 Oct 2020 22:00)  COVID-19 PCR: NotDetec (27 Sep 2020 04:31)  COVID-19 PCR: NotDetec (17 Sep 2020 15:13)  COVID-19 PCR: NotDetec (03 Sep 2020 10:39)  COVID-19 PCR: NotDetec (28 Aug 2020 11:32)  COVID-19 PCR: NotDetec (20 Aug 2020 21:00)  COVID-19 PCR: NotDetec (2020 11:00)    =========================================================================================  IMAGING.     =========================================================================================    DIET.    Diet, Dysphagia 2 Mechanical Soft-Nectar Consistency Fluid:   Consistent Carbohydrate Evening Snack (CSTCHOSN)  Supplement Feeding Modality:  Oral  Ensure Enlive Servings Per Day:  1       Frequency:  Three Times a day (12-15-20 @ 11:39)      =========================================================================================    HOME MEDS.    Home Medications:  allopurinol 300 mg oral tablet: 1 tab(s) orally once a day (2020 13:53)  aspirin 81 mg oral delayed release tablet: 1 tab(s) orally once a day (2020 11:38)  dutasteride 0.5 mg oral capsule: 1 cap(s) orally once a day (2020 13:53)  Januvia 100 mg oral tablet: 1 tab(s) orally once a day (2020 13:53)  Multiple Vitamins oral tablet: 1 tab(s) orally once a day (2020 13:53)  oxybutynin 10 mg/24 hr oral tablet, extended release: 1 tab(s) orally once a day (2020 13:53)  tamsulosin 0.4 mg oral capsule: 1 cap(s) orally once a day (at bedtime) (2020 11:34)  Vitamin C 250 mg oral tablet: 1 tab(s) orally once a day (2020 13:53)  zinc sulfate 220 mg oral capsule: 1 cap(s) orally once a day (2020 13:53)      =========================================================================================    HOSPITAL MEDS.    MEDICATIONS  (STANDING):  allopurinol 300 milliGRAM(s) Oral daily  ascorbic acid 500 milliGRAM(s) Oral two times a day  aspirin  chewable 81 milliGRAM(s) Oral daily  atorvastatin 20 milliGRAM(s) Oral at bedtime  cadexomer iodine 0.9% Gel 1 Application(s) Topical every 24 hours  calcium carbonate 1250 mG  + Vitamin D (OsCal 500 + D) 1 Tablet(s) Oral daily  dextrose 40% Gel 15 Gram(s) Oral once  dextrose 5%. 1000 milliLiter(s) (50 mL/Hr) IV Continuous <Continuous>  dextrose 5%. 1000 milliLiter(s) (100 mL/Hr) IV Continuous <Continuous>  dextrose 50% Injectable 25 Gram(s) IV Push once  dextrose 50% Injectable 12.5 Gram(s) IV Push once  dextrose 50% Injectable 25 Gram(s) IV Push once  epoetin liam-epbx (RETACRIT) Injectable 27110 Unit(s) SubCutaneous <User Schedule>  ferrous    sulfate 325 milliGRAM(s) Oral three times a day  finasteride 5 milliGRAM(s) Oral daily  folic acid 1 milliGRAM(s) Oral daily  glucagon  Injectable 1 milliGRAM(s) IntraMuscular once  insulin lispro (ADMELOG) corrective regimen sliding scale   SubCutaneous Before meals and at bedtime  lactulose Syrup 20 Gram(s) Oral two times a day  levothyroxine 25 MICROGram(s) Oral daily  meropenem  IVPB 500 milliGRAM(s) IV Intermittent every 12 hours  midodrine. 5 milliGRAM(s) Oral three times a day  multivitamin 1 Tablet(s) Oral daily  nystatin Powder 1 Application(s) Topical three times a day  polyethylene glycol 3350 17 Gram(s) Oral daily  saccharomyces boulardii 250 milliGRAM(s) Oral two times a day  senna 2 Tablet(s) Oral at bedtime  sodium chloride 0.45%. 1000 milliLiter(s) (75 mL/Hr) IV Continuous <Continuous>  tamsulosin 0.4 milliGRAM(s) Oral at bedtime  trimethoprim  160 mG/sulfamethoxazole 800 mG 2 Tablet(s) Oral every 12 hours    MEDICATIONS  (PRN):  acetaminophen  Suppository .. 650 milliGRAM(s) Rectal every 6 hours PRN Temp greater or equal to 38C (100.4F), Mild Pain (1 - 3), Moderate Pain (4 - 6)  bisacodyl Suppository 10 milliGRAM(s) Rectal every 24 hours PRN Constipation  haloperidol    Injectable 2 milliGRAM(s) IntraMuscular every 6 hours PRN Agitation  ondansetron Injectable 4 milliGRAM(s) IV Push every 6 hours PRN Nausea and/or Vomiting   CC: foot ulcer    Patient seen and examined at the bedside. No acute overnight events. no events yesterday. Complaining of nothing today. Denies fever/chills, headache, lightheadedness, dizziness, chest pain, palpitations, shortness of breath, cough, abd pain, nausea/vomiting/diarrhea, muscle pain.      =========================================================================================  T(C): 36.3 (20 @ 18:02), Max: 36.8 (20 @ 16:29)  HR: 85 (20 @ 18:02) (63 - 86)  BP: 119/78 (20 @ 18:02) (101/71 - 119/78)  RR: 18 (20 @ 18:02) (18 - 19)  SpO2: 98% (20 @ 18:02) (96% - 98%)    PHYSICAL EXAM.    GEN - appears age appropriate. well nourished. pleasant. no distress.   HEENT - NCAT, EOMI, LOWELL  RESP - CTA BL, no wheeze/stridor/rhonchi/crackles. not on supplemental O2. able to speak in full sentences without distress. no accessory muscle use.  CARDIO - NS1S2, RRR.   ABD - Soft/Non tender/Non distended. Normal BS x4 quadrants. no guarding/rebound tenderness.  Ext - No LADAN.  MSK - BL 5/5 strength on upper and lower extremities.   Neuro -  no gross neuro deficits noted. no focal sensory abnormalities. no tremor/seizure  Psych - flat affect. AAOx2 (person + place, not to time or occasion).  Skin - RT foot lesion on medial aspect of base of the 1st digit - hard eschar overlying. no active bleeding/pus drainage.        I&O's Summary    16 Dec 2020 07:01  -  17 Dec 2020 07:00  --------------------------------------------------------  IN: 450 mL / OUT: 1500 mL / NET: -1050 mL      Daily     Daily     =========================================================================================  LABS.    15    149<H>  |  119<H>  |  40.0<H>  ----------------------------<  88  4.2   |  21.0<L>  |  2.33<H>    Ca    8.5<L>      15 Dec 2020 09:27                            8.3    6.07  )-----------( 91       ( 17 Dec 2020 07:35 )             27.7       PT/INR - ( 17 Dec 2020 07:35 )   PT: 19.8 sec;   INR: 1.75 ratio               Urinalysis Basic - ( 15 Dec 2020 11:34 )    Color: Red / Appearance: Cloudy / S.015 / pH: x  Gluc: x / Ketone: Trace  / Bili: Negative / Urobili: Negative mg/dL   Blood: x / Protein: 100 mg/dL / Nitrite: Negative   Leuk Esterase: Small / RBC: >50 /HPF / WBC 6-10   Sq Epi: x / Non Sq Epi: Negative / Bacteria: Occasional        COVID-19 PCR: NotDetec (08 Dec 2020 16:44)  COVID-19 PCR: NotDetec (2020 16:17)  COVID-19 PCR: NotDetec (2020 19:24)  COVID-19 PCR: NotDetec (2020 11:17)  COVID-19 PCR: NotDetec (10 Nov 2020 11:13)  COVID-19 PCR: NotDetec (2020 12:33)  COVID-19 PCR: NotDetec (2020 11:05)  COVID-19 PCR: NotDetec (27 Oct 2020 15:04)  COVID-19 PCR: NotDetec (12 Oct 2020 22:00)  COVID-19 PCR: NotDetec (27 Sep 2020 04:31)  COVID-19 PCR: NotDetec (17 Sep 2020 15:13)  COVID-19 PCR: NotDetec (03 Sep 2020 10:39)  COVID-19 PCR: NotDetec (28 Aug 2020 11:32)  COVID-19 PCR: NotDetec (20 Aug 2020 21:00)  COVID-19 PCR: NotDetec (2020 11:00)    =========================================================================================  IMAGING.     =========================================================================================    DIET.    Diet, Dysphagia 2 Mechanical Soft-Nectar Consistency Fluid:   Consistent Carbohydrate Evening Snack (CSTCHOSN)  Supplement Feeding Modality:  Oral  Ensure Enlive Servings Per Day:  1       Frequency:  Three Times a day (12-15-20 @ 11:39)      =========================================================================================    HOME MEDS.    Home Medications:  allopurinol 300 mg oral tablet: 1 tab(s) orally once a day (2020 13:53)  aspirin 81 mg oral delayed release tablet: 1 tab(s) orally once a day (2020 11:38)  dutasteride 0.5 mg oral capsule: 1 cap(s) orally once a day (:53)  Januvia 100 mg oral tablet: 1 tab(s) orally once a day (:53)  Multiple Vitamins oral tablet: 1 tab(s) orally once a day (:53)  oxybutynin 10 mg/24 hr oral tablet, extended release: 1 tab(s) orally once a day (:53)  tamsulosin 0.4 mg oral capsule: 1 cap(s) orally once a day (at bedtime) (2020 11:34)  Vitamin C 250 mg oral tablet: 1 tab(s) orally once a day (53)  zinc sulfate 220 mg oral capsule: 1 cap(s) orally once a day (:)      =========================================================================================    HOSPITAL MEDS.    MEDICATIONS  (STANDING):  allopurinol 300 milliGRAM(s) Oral daily  ascorbic acid 500 milliGRAM(s) Oral two times a day  aspirin  chewable 81 milliGRAM(s) Oral daily  atorvastatin 20 milliGRAM(s) Oral at bedtime  cadexomer iodine 0.9% Gel 1 Application(s) Topical every 24 hours  calcium carbonate 1250 mG  + Vitamin D (OsCal 500 + D) 1 Tablet(s) Oral daily  dextrose 40% Gel 15 Gram(s) Oral once  dextrose 5%. 1000 milliLiter(s) (50 mL/Hr) IV Continuous <Continuous>  dextrose 5%. 1000 milliLiter(s) (100 mL/Hr) IV Continuous <Continuous>  dextrose 50% Injectable 25 Gram(s) IV Push once  dextrose 50% Injectable 12.5 Gram(s) IV Push once  dextrose 50% Injectable 25 Gram(s) IV Push once  epoetin liam-epbx (RETACRIT) Injectable 22362 Unit(s) SubCutaneous <User Schedule>  ferrous    sulfate 325 milliGRAM(s) Oral three times a day  finasteride 5 milliGRAM(s) Oral daily  folic acid 1 milliGRAM(s) Oral daily  glucagon  Injectable 1 milliGRAM(s) IntraMuscular once  insulin lispro (ADMELOG) corrective regimen sliding scale   SubCutaneous Before meals and at bedtime  lactulose Syrup 20 Gram(s) Oral two times a day  levothyroxine 25 MICROGram(s) Oral daily  meropenem  IVPB 500 milliGRAM(s) IV Intermittent every 12 hours  midodrine. 5 milliGRAM(s) Oral three times a day  multivitamin 1 Tablet(s) Oral daily  nystatin Powder 1 Application(s) Topical three times a day  polyethylene glycol 3350 17 Gram(s) Oral daily  saccharomyces boulardii 250 milliGRAM(s) Oral two times a day  senna 2 Tablet(s) Oral at bedtime  sodium chloride 0.45%. 1000 milliLiter(s) (75 mL/Hr) IV Continuous <Continuous>  tamsulosin 0.4 milliGRAM(s) Oral at bedtime  trimethoprim  160 mG/sulfamethoxazole 800 mG 2 Tablet(s) Oral every 12 hours    MEDICATIONS  (PRN):  acetaminophen  Suppository .. 650 milliGRAM(s) Rectal every 6 hours PRN Temp greater or equal to 38C (100.4F), Mild Pain (1 - 3), Moderate Pain (4 - 6)  bisacodyl Suppository 10 milliGRAM(s) Rectal every 24 hours PRN Constipation  haloperidol    Injectable 2 milliGRAM(s) IntraMuscular every 6 hours PRN Agitation  ondansetron Injectable 4 milliGRAM(s) IV Push every 6 hours PRN Nausea and/or Vomiting

## 2020-12-18 NOTE — PROGRESS NOTE ADULT - SUBJECTIVE AND OBJECTIVE BOX
CC: foot ulcer    Patient seen and examined at the bedside. No acute overnight events. no events yesterday. Complaining of nothing today. Denies fever/chills, headache, lightheadedness, dizziness, chest pain, palpitations, shortness of breath, cough, abd pain, nausea/vomiting/diarrhea, muscle pain.      =========================================================================================    PHYSICAL EXAM.    GEN - appears age appropriate. well nourished. pleasant. no distress.   HEENT - NCAT, EOMI, LOWELL  RESP - CTA BL, no wheeze/stridor/rhonchi/crackles. not on supplemental O2. able to speak in full sentences without distress. no accessory muscle use.  CARDIO - NS1S2, RRR.   ABD - Soft/Non tender/Non distended. Normal BS x4 quadrants. no guarding/rebound tenderness.  Ext - No LADAN.  MSK - BL 5/5 strength on upper and lower extremities.   Neuro -  no gross neuro deficits noted. no focal sensory abnormalities. no tremor/seizure  Psych - flat affect. AAOx2 (person + place, not to time or occasion).  Skin - RT foot lesion on medial aspect of base of the 1st digit - hard eschar overlying. no active bleeding/pus drainage.        VITAL SIGNS.    Vital Signs Last 24 Hrs  T(C): 36.4 (18 Dec 2020 16:07), Max: 36.8 (18 Dec 2020 08:15)  T(F): 97.5 (18 Dec 2020 16:07), Max: 98.3 (18 Dec 2020 08:15)  HR: 70 (18 Dec 2020 16:07) (70 - 70)  BP: 101/69 (18 Dec 2020 16:07) (101/69 - 110/71)  BP(mean): --  RR: 18 (18 Dec 2020 16:07) (18 - 19)  SpO2: 97% (18 Dec 2020 16:07) (97% - 97%)        =================================================    LABS.                          7.4    8.10  )-----------( 119      ( 18 Dec 2020 08:52 )             24.5     12-18    142  |  113<H>  |  37.0<H>  ----------------------------<  105<H>  4.3   |  21.0<L>  |  2.44<H>    Ca    8.0<L>      18 Dec 2020 08:52  Phos  3.7     12-18  Mg     1.8     12-18        PT/INR - ( 17 Dec 2020 07:35 )   PT: 19.8 sec;   INR: 1.75 ratio           I&O's Summary    17 Dec 2020 07:01  -  18 Dec 2020 07:00  --------------------------------------------------------  IN: 332 mL / OUT: 675 mL / NET: -343 mL    18 Dec 2020 07:01  -  18 Dec 2020 23:38  --------------------------------------------------------  IN: 0 mL / OUT: 300 mL / NET: -300 mL          COVID-19 PCR: NotDetec (08 Dec 2020 16:44)  COVID-19 PCR: NotDetec (18 Nov 2020 16:17)  COVID-19 PCR: NotDetec (14 Nov 2020 19:24)  COVID-19 PCR: NotDetec (12 Nov 2020 11:17)  COVID-19 PCR: NotDetec (10 Nov 2020 11:13)  COVID-19 PCR: NotDetec (05 Nov 2020 12:33)  COVID-19 PCR: NotDetec (02 Nov 2020 11:05)  COVID-19 PCR: NotDetec (27 Oct 2020 15:04)  COVID-19 PCR: NotDetec (12 Oct 2020 22:00)  COVID-19 PCR: NotDetec (27 Sep 2020 04:31)  COVID-19 PCR: NotDetec (17 Sep 2020 15:13)  COVID-19 PCR: NotDetec (03 Sep 2020 10:39)  COVID-19 PCR: NotDetec (28 Aug 2020 11:32)  COVID-19 PCR: NotDetec (20 Aug 2020 21:00)  COVID-19 PCR: NotDetec (17 Jul 2020 11:00)      ================================================    IMAGING.    ================================================    DIET.    Diet, Dysphagia 2 Mechanical Soft-Nectar Consistency Fluid:   Consistent Carbohydrate Evening Snack (CSTCHOSN)  Supplement Feeding Modality:  Oral  Ensure Enlive Servings Per Day:  1       Frequency:  Three Times a day (12-15-20 @ 11:39)    ================================================    HOME MEDS.    Home Medications:  allopurinol 300 mg oral tablet: 1 tab(s) orally once a day (17 Nov 2020 13:53)  aspirin 81 mg oral delayed release tablet: 1 tab(s) orally once a day (18 Nov 2020 11:38)  dutasteride 0.5 mg oral capsule: 1 cap(s) orally once a day (17 Nov 2020 13:53)  Januvia 100 mg oral tablet: 1 tab(s) orally once a day (17 Nov 2020 13:53)  Multiple Vitamins oral tablet: 1 tab(s) orally once a day (17 Nov 2020 13:53)  oxybutynin 10 mg/24 hr oral tablet, extended release: 1 tab(s) orally once a day (17 Nov 2020 13:53)  tamsulosin 0.4 mg oral capsule: 1 cap(s) orally once a day (at bedtime) (18 Nov 2020 11:34)  Vitamin C 250 mg oral tablet: 1 tab(s) orally once a day (17 Nov 2020 13:53)  zinc sulfate 220 mg oral capsule: 1 cap(s) orally once a day (17 Nov 2020 13:53)      ================================================    HOSPITAL MEDS.    MEDICATIONS  (STANDING):  allopurinol 300 milliGRAM(s) Oral daily  ascorbic acid 500 milliGRAM(s) Oral two times a day  aspirin  chewable 81 milliGRAM(s) Oral daily  atorvastatin 20 milliGRAM(s) Oral at bedtime  cadexomer iodine 0.9% Gel 1 Application(s) Topical every 24 hours  calcium carbonate 1250 mG  + Vitamin D (OsCal 500 + D) 1 Tablet(s) Oral daily  dextrose 40% Gel 15 Gram(s) Oral once  dextrose 5% + lactated ringers. 1000 milliLiter(s) (83 mL/Hr) IV Continuous <Continuous>  dextrose 5%. 1000 milliLiter(s) (100 mL/Hr) IV Continuous <Continuous>  dextrose 5%. 1000 milliLiter(s) (50 mL/Hr) IV Continuous <Continuous>  dextrose 50% Injectable 25 Gram(s) IV Push once  dextrose 50% Injectable 12.5 Gram(s) IV Push once  dextrose 50% Injectable 25 Gram(s) IV Push once  epoetin liam-epbx (RETACRIT) Injectable 63873 Unit(s) SubCutaneous <User Schedule>  ferrous    sulfate 325 milliGRAM(s) Oral three times a day  finasteride 5 milliGRAM(s) Oral daily  folic acid 1 milliGRAM(s) Oral daily  glucagon  Injectable 1 milliGRAM(s) IntraMuscular once  insulin lispro (ADMELOG) corrective regimen sliding scale   SubCutaneous Before meals and at bedtime  lactulose Syrup 20 Gram(s) Oral two times a day  levothyroxine 25 MICROGram(s) Oral daily  midodrine. 5 milliGRAM(s) Oral three times a day  multivitamin 1 Tablet(s) Oral daily  nystatin Powder 1 Application(s) Topical three times a day  polyethylene glycol 3350 17 Gram(s) Oral daily  saccharomyces boulardii 250 milliGRAM(s) Oral two times a day  senna 2 Tablet(s) Oral at bedtime  tamsulosin 0.4 milliGRAM(s) Oral at bedtime  trimethoprim  160 mG/sulfamethoxazole 800 mG 2 Tablet(s) Oral every 12 hours    MEDICATIONS  (PRN):  acetaminophen  Suppository .. 650 milliGRAM(s) Rectal every 6 hours PRN Temp greater or equal to 38C (100.4F), Mild Pain (1 - 3), Moderate Pain (4 - 6)  bisacodyl Suppository 10 milliGRAM(s) Rectal every 24 hours PRN Constipation  haloperidol    Injectable 2 milliGRAM(s) IntraMuscular every 6 hours PRN Agitation  ondansetron Injectable 4 milliGRAM(s) IV Push every 6 hours PRN Nausea and/or Vomiting       CC: foot ulcer    Patient seen and examined at the bedside. self resolving ep epistaxis overnight. no events yesterday. Complaining of fatigue after not sleeping well today. Denies fever/chills, headache, lightheadedness, dizziness, chest pain, palpitations, shortness of breath, cough, abd pain, nausea/vomiting/diarrhea, muscle pain.      =========================================================================================    PHYSICAL EXAM.    GEN - appears age appropriate. well nourished. pleasant. no distress.   HEENT - NCAT, EOMI, LOWELL  RESP - CTA BL, no wheeze/stridor/rhonchi/crackles. not on supplemental O2. able to speak in full sentences without distress. no accessory muscle use.  CARDIO - NS1S2, RRR.   ABD - Soft/Non tender/Non distended. Normal BS x4 quadrants. no guarding/rebound tenderness.  Ext - No LADAN.  MSK - BL 4/5 strength on upper and lower extremities.   Neuro -  no gross neuro deficits noted. no focal sensory abnormalities. no tremor/seizure  Psych - flat affect. AAOx2 (person + place, not to time or occasion).  Skin - RT foot lesion on medial aspect of base of the 1st digit - hard eschar overlying. no active bleeding/pus drainage.        VITAL SIGNS.    Vital Signs Last 24 Hrs  T(C): 36.4 (18 Dec 2020 16:07), Max: 36.8 (18 Dec 2020 08:15)  T(F): 97.5 (18 Dec 2020 16:07), Max: 98.3 (18 Dec 2020 08:15)  HR: 70 (18 Dec 2020 16:07) (70 - 70)  BP: 101/69 (18 Dec 2020 16:07) (101/69 - 110/71)  BP(mean): --  RR: 18 (18 Dec 2020 16:07) (18 - 19)  SpO2: 97% (18 Dec 2020 16:07) (97% - 97%)        =================================================    LABS.                          7.4    8.10  )-----------( 119      ( 18 Dec 2020 08:52 )             24.5     12-18    142  |  113<H>  |  37.0<H>  ----------------------------<  105<H>  4.3   |  21.0<L>  |  2.44<H>    Ca    8.0<L>      18 Dec 2020 08:52  Phos  3.7     12-18  Mg     1.8     12-18        PT/INR - ( 17 Dec 2020 07:35 )   PT: 19.8 sec;   INR: 1.75 ratio           I&O's Summary    17 Dec 2020 07:01  -  18 Dec 2020 07:00  --------------------------------------------------------  IN: 332 mL / OUT: 675 mL / NET: -343 mL    18 Dec 2020 07:01  -  18 Dec 2020 23:38  --------------------------------------------------------  IN: 0 mL / OUT: 300 mL / NET: -300 mL          COVID-19 PCR: NotDetec (08 Dec 2020 16:44)  COVID-19 PCR: NotDetec (18 Nov 2020 16:17)  COVID-19 PCR: NotDetec (14 Nov 2020 19:24)  COVID-19 PCR: NotDetec (12 Nov 2020 11:17)  COVID-19 PCR: NotDetec (10 Nov 2020 11:13)  COVID-19 PCR: NotDetec (05 Nov 2020 12:33)  COVID-19 PCR: NotDetec (02 Nov 2020 11:05)  COVID-19 PCR: NotDetec (27 Oct 2020 15:04)  COVID-19 PCR: NotDetec (12 Oct 2020 22:00)  COVID-19 PCR: NotDetec (27 Sep 2020 04:31)  COVID-19 PCR: NotDetec (17 Sep 2020 15:13)  COVID-19 PCR: NotDetec (03 Sep 2020 10:39)  COVID-19 PCR: NotDetec (28 Aug 2020 11:32)  COVID-19 PCR: NotDetec (20 Aug 2020 21:00)  COVID-19 PCR: NotDetec (17 Jul 2020 11:00)      ================================================    IMAGING.    ================================================    DIET.    Diet, Dysphagia 2 Mechanical Soft-Nectar Consistency Fluid:   Consistent Carbohydrate Evening Snack (CSTCHOSN)  Supplement Feeding Modality:  Oral  Ensure Enlive Servings Per Day:  1       Frequency:  Three Times a day (12-15-20 @ 11:39)    ================================================    HOME MEDS.    Home Medications:  allopurinol 300 mg oral tablet: 1 tab(s) orally once a day (17 Nov 2020 13:53)  aspirin 81 mg oral delayed release tablet: 1 tab(s) orally once a day (18 Nov 2020 11:38)  dutasteride 0.5 mg oral capsule: 1 cap(s) orally once a day (17 Nov 2020 13:53)  Januvia 100 mg oral tablet: 1 tab(s) orally once a day (17 Nov 2020 13:53)  Multiple Vitamins oral tablet: 1 tab(s) orally once a day (17 Nov 2020 13:53)  oxybutynin 10 mg/24 hr oral tablet, extended release: 1 tab(s) orally once a day (17 Nov 2020 13:53)  tamsulosin 0.4 mg oral capsule: 1 cap(s) orally once a day (at bedtime) (18 Nov 2020 11:34)  Vitamin C 250 mg oral tablet: 1 tab(s) orally once a day (17 Nov 2020 13:53)  zinc sulfate 220 mg oral capsule: 1 cap(s) orally once a day (17 Nov 2020 13:53)      ================================================    HOSPITAL MEDS.    MEDICATIONS  (STANDING):  allopurinol 300 milliGRAM(s) Oral daily  ascorbic acid 500 milliGRAM(s) Oral two times a day  aspirin  chewable 81 milliGRAM(s) Oral daily  atorvastatin 20 milliGRAM(s) Oral at bedtime  cadexomer iodine 0.9% Gel 1 Application(s) Topical every 24 hours  calcium carbonate 1250 mG  + Vitamin D (OsCal 500 + D) 1 Tablet(s) Oral daily  dextrose 40% Gel 15 Gram(s) Oral once  dextrose 5% + lactated ringers. 1000 milliLiter(s) (83 mL/Hr) IV Continuous <Continuous>  dextrose 5%. 1000 milliLiter(s) (100 mL/Hr) IV Continuous <Continuous>  dextrose 5%. 1000 milliLiter(s) (50 mL/Hr) IV Continuous <Continuous>  dextrose 50% Injectable 25 Gram(s) IV Push once  dextrose 50% Injectable 12.5 Gram(s) IV Push once  dextrose 50% Injectable 25 Gram(s) IV Push once  epoetin liam-epbx (RETACRIT) Injectable 79629 Unit(s) SubCutaneous <User Schedule>  ferrous    sulfate 325 milliGRAM(s) Oral three times a day  finasteride 5 milliGRAM(s) Oral daily  folic acid 1 milliGRAM(s) Oral daily  glucagon  Injectable 1 milliGRAM(s) IntraMuscular once  insulin lispro (ADMELOG) corrective regimen sliding scale   SubCutaneous Before meals and at bedtime  lactulose Syrup 20 Gram(s) Oral two times a day  levothyroxine 25 MICROGram(s) Oral daily  midodrine. 5 milliGRAM(s) Oral three times a day  multivitamin 1 Tablet(s) Oral daily  nystatin Powder 1 Application(s) Topical three times a day  polyethylene glycol 3350 17 Gram(s) Oral daily  saccharomyces boulardii 250 milliGRAM(s) Oral two times a day  senna 2 Tablet(s) Oral at bedtime  tamsulosin 0.4 milliGRAM(s) Oral at bedtime  trimethoprim  160 mG/sulfamethoxazole 800 mG 2 Tablet(s) Oral every 12 hours    MEDICATIONS  (PRN):  acetaminophen  Suppository .. 650 milliGRAM(s) Rectal every 6 hours PRN Temp greater or equal to 38C (100.4F), Mild Pain (1 - 3), Moderate Pain (4 - 6)  bisacodyl Suppository 10 milliGRAM(s) Rectal every 24 hours PRN Constipation  haloperidol    Injectable 2 milliGRAM(s) IntraMuscular every 6 hours PRN Agitation  ondansetron Injectable 4 milliGRAM(s) IV Push every 6 hours PRN Nausea and/or Vomiting

## 2020-12-18 NOTE — PROGRESS NOTE ADULT - ASSESSMENT
1) Petey on CKD stage 3; baseline Scr 1.5 in November US with mild right hydronephrosis, L kidney poorly seen  CT abdomen with KIDNEYS/URETERS: Improved mild bilateral hydroureteronephrosis. Interval migration of a 5 mm calculus into the left distal ureter. Additional nonobstructing left renal calculi.  Scr 3.6 on presentation--> now improving to 2.3 with IV hydration      2) Sepsis; Wound infection  negative blood+ UCx  Wound culture with Providencia, Pseudomonas, Enterococcus, ESBL Proteus and Stenotrophomonas - on Merrem and Bactrim      3) Anemia of chronic disease  Ferririn high likely in setting of infection  Iron sats are low; start po iron  BEN    4) Hypernatremia  cw gentle hydration with 1/2 NS  Encourage free water intake    Monitor Scr, lytes, UOP    Signing off  Please recall if needed    dw Dr Hankins

## 2020-12-18 NOTE — PROGRESS NOTE ADULT - SUBJECTIVE AND OBJECTIVE BOX
SURGICAL PA NOTE: Urology PA note    STATUS POST:      POST OPERATIVE DAY #:     Vital Signs Last 24 Hrs  T(C): 36.8 (18 Dec 2020 08:15), Max: 36.8 (18 Dec 2020 08:15)  T(F): 98.3 (18 Dec 2020 08:15), Max: 98.3 (18 Dec 2020 08:15)  HR: 70 (18 Dec 2020 08:15) (70 - 88)  BP: 110/71 (18 Dec 2020 08:15) (110/71 - 126/76)  BP(mean): --  RR: 19 (18 Dec 2020 08:15) (18 - 20)  SpO2: 96% (17 Dec 2020 23:35) (96% - 96%)    HPI:  History was taken from charts as Aide from [facility] gone by the time patient was admitted.    70 years old male with recurrent hospitalizations, Developmental Disability, Chronic A. Fib not on AC due to Hematuria/Anemia, Diastolic Heart Failure, CKD 3, HTN, HLD, DM 2, Hypothermia, Chronic Venous Stasis, LE Ulcers, Chronic Left Shoulder Dislocation, Dysphagia and Aspiration Pneumonia sent from care home as he was not doing well. He was lethargic, weak and his speech was slurred.   Patient is currently very sleepy and lethargic however wakes up on verbal stimuli. No active complaints. Speech is clear at this time.   He was code stroke in ER. No acute finding on CT. Found to be hypothermic. CXR with chronic small left pleural effusion. Urine was just sent and as per RN it was very cloudy.            (08 Dec 2020 18:11)      Hypothermia, initial encounter    Yes    No pertinent family history in first degree relatives    No pertinent family history in first degree relatives    Handoff    MEWS Score    Encounter for blood transfusion    Anemia    History of gross hematuria    History of orthostatic hypotension    Bacterial pneumonia    Muscle weakness (generalized)    2019 novel coronavirus disease (COVID-19)    Falls    Heart failure    Afib    CHF (congestive heart failure)    Gout    Hiatal hernia    BPH (benign prostatic hyperplasia)    Cognitive impairment    Cardiomyopathy    HTN (hypertension)    DM (diabetes mellitus)    Hypothermia    Gross hematuria    Insertion of Rodriguez catheter    S/P appendectomy    No significant past surgical history    WEAKNESS    20    Sepsis    Acute kidney injury    [email or web address]        SUBJECTIVE: Pt seen lying supine with HOB up, awake and alert, rodriguez to BSD     Diet:    Activity:     Fevers: [ ]Yes [ ]NO  Chills: [ ] Yes [ ] NO  SOB:  [ ] YES [ ] NO  Dyspnea: [ ]YES [ ]NO  Chest Discomfort: [ ] YES [ ] NO    Nausea: [ ] YES [ ] NO           Vomiting: [ ] YES [ ] NO  Flatus: [ ] YES [ ] NO             Bowel Movement: [ ] YES [ ] NO  Diarrhea: [ ] YES [ ] NO         Void: [ ]YES [ ]No  Constipation: [ ] YES [ ] NO       Pain (0-10):              Pain Control Adequate: [ ] YES [ ] NO    Rodriguez:    NGT:      I&O's Detail    17 Dec 2020 07:01  -  18 Dec 2020 07:00  --------------------------------------------------------  IN:    sodium chloride 0.9%: 332 mL  Total IN: 332 mL    OUT:    Indwelling Catheter - Urethral (mL): 250 mL    Voided (mL): 425 mL  Total OUT: 675 mL    Total NET: -343 mL        I&O's Summary    17 Dec 2020 07:01  -  18 Dec 2020 07:00  --------------------------------------------------------  IN: 332 mL / OUT: 675 mL / NET: -343 mL          MEDICATIONS  (STANDING):  allopurinol 300 milliGRAM(s) Oral daily  ascorbic acid 500 milliGRAM(s) Oral two times a day  aspirin  chewable 81 milliGRAM(s) Oral daily  atorvastatin 20 milliGRAM(s) Oral at bedtime  cadexomer iodine 0.9% Gel 1 Application(s) Topical every 24 hours  calcium carbonate 1250 mG  + Vitamin D (OsCal 500 + D) 1 Tablet(s) Oral daily  dextrose 40% Gel 15 Gram(s) Oral once  dextrose 5% + lactated ringers. 1000 milliLiter(s) (83 mL/Hr) IV Continuous <Continuous>  dextrose 5%. 1000 milliLiter(s) (100 mL/Hr) IV Continuous <Continuous>  dextrose 5%. 1000 milliLiter(s) (50 mL/Hr) IV Continuous <Continuous>  dextrose 50% Injectable 25 Gram(s) IV Push once  dextrose 50% Injectable 12.5 Gram(s) IV Push once  dextrose 50% Injectable 25 Gram(s) IV Push once  epoetin liam-epbx (RETACRIT) Injectable 60164 Unit(s) SubCutaneous <User Schedule>  ferrous    sulfate 325 milliGRAM(s) Oral three times a day  finasteride 5 milliGRAM(s) Oral daily  folic acid 1 milliGRAM(s) Oral daily  glucagon  Injectable 1 milliGRAM(s) IntraMuscular once  insulin lispro (ADMELOG) corrective regimen sliding scale   SubCutaneous Before meals and at bedtime  lactulose Syrup 20 Gram(s) Oral two times a day  levothyroxine 25 MICROGram(s) Oral daily  midodrine. 5 milliGRAM(s) Oral three times a day  multivitamin 1 Tablet(s) Oral daily  nystatin Powder 1 Application(s) Topical three times a day  polyethylene glycol 3350 17 Gram(s) Oral daily  saccharomyces boulardii 250 milliGRAM(s) Oral two times a day  senna 2 Tablet(s) Oral at bedtime  tamsulosin 0.4 milliGRAM(s) Oral at bedtime  trimethoprim  160 mG/sulfamethoxazole 800 mG 2 Tablet(s) Oral every 12 hours    MEDICATIONS  (PRN):  acetaminophen  Suppository .. 650 milliGRAM(s) Rectal every 6 hours PRN Temp greater or equal to 38C (100.4F), Mild Pain (1 - 3), Moderate Pain (4 - 6)  bisacodyl Suppository 10 milliGRAM(s) Rectal every 24 hours PRN Constipation  haloperidol    Injectable 2 milliGRAM(s) IntraMuscular every 6 hours PRN Agitation  ondansetron Injectable 4 milliGRAM(s) IV Push every 6 hours PRN Nausea and/or Vomiting      LABS:                        7.4    8.10  )-----------( 119      ( 18 Dec 2020 08:52 )             24.5     12-18    142  |  113<H>  |  37.0<H>  ----------------------------<  105<H>  4.3   |  21.0<L>  |  2.44<H>    Ca    8.0<L>      18 Dec 2020 08:52  Phos  3.7     12-18  Mg     1.8     12-18      PT/INR - ( 17 Dec 2020 07:35 )   PT: 19.8 sec;   INR: 1.75 ratio                 RADIOLOGY & ADDITIONAL STUDIES:       EXAM:  CT ABDOMEN AND PELVIS                          PROCEDURE DATE:  12/14/2020          INTERPRETATION:  CT ABDOMEN AND PELVIS    CLINICAL INFORMATION: Hematuria with history of hydronephrosis    COMPARISON: CT abdomen and pelvis 10/29/2020    PROCEDURE:  CT of the Abdomen and Pelvis was performed without intravenous contrast.  Intravenous contrast: None.  Oral contrast: None.  Sagittal and coronal reformats were performed.    FINDINGS:  LOWER CHEST: Small bilateral pleural effusions and bibasilar atelectasis.    LIVER: Normal.  BILE DUCTS: Nondilated.  GALLBLADDER: Gallstones.  SPLEEN: Normal.  PANCREAS: Diffuse atrophy.  ADRENALS: Normal.  KIDNEYS/URETERS: Improved mild bilateral hydroureteronephrosis. Interval migration of a 5 mm calculus into the left distal ureter. Additional nonobstructing left renal calculi.    BLADDER: Collapsed around a Rodriguez catheter balloon.  REPRODUCTIVE ORGANS: Nonenlarged.    BOWEL: Moderate fecal retention to the colon. No obstruction.  PERITONEUM: Small ascites. No free air.  VESSELS:  Aortoiliac atherosclerosis without aneurysm.  RETROPERITONEUM/LYMPH NODES: No adenopathy.  ABDOMINAL WALL: Diffuse body wall edema.  BONES: No acute bony abnormality.    IMPRESSION:    Improved mild bilateral hydroureteronephrosis. Interval migration of a 5 mm calculus into the left distal ureter. Additional nonobstructing left renal calculi.              DEEPAK OAKES MD; Attending Radiologist  This document has been electronically signed. Dec 14 2020  7:49PM

## 2020-12-18 NOTE — PROGRESS NOTE ADULT - SUBJECTIVE AND OBJECTIVE BOX
[facility] DIVISION OF KIDNEY DISEASES AND HYPERTENSION -- FOLLOW UP NOTE  --------------------------------------------------------------------------------  Chief Complaint:  MARSHAL    24 hour events/subjective:  Seen/examined this AM      PAST HISTORY  --------------------------------------------------------------------------------  No significant changes to PMH, PSH, FHx, SHx, unless otherwise noted    ALLERGIES & MEDICATIONS  --------------------------------------------------------------------------------  Allergies    No Known Allergies    Intolerances      Standing Inpatient Medications  allopurinol 300 milliGRAM(s) Oral daily  ascorbic acid 500 milliGRAM(s) Oral two times a day  aspirin  chewable 81 milliGRAM(s) Oral daily  atorvastatin 20 milliGRAM(s) Oral at bedtime  cadexomer iodine 0.9% Gel 1 Application(s) Topical every 24 hours  calcium carbonate 1250 mG  + Vitamin D (OsCal 500 + D) 1 Tablet(s) Oral daily  dextrose 40% Gel 15 Gram(s) Oral once  dextrose 5% + lactated ringers. 1000 milliLiter(s) IV Continuous <Continuous>  dextrose 5%. 1000 milliLiter(s) IV Continuous <Continuous>  dextrose 5%. 1000 milliLiter(s) IV Continuous <Continuous>  dextrose 50% Injectable 25 Gram(s) IV Push once  dextrose 50% Injectable 12.5 Gram(s) IV Push once  dextrose 50% Injectable 25 Gram(s) IV Push once  epoetin liam-epbx (RETACRIT) Injectable 98623 Unit(s) SubCutaneous <User Schedule>  ferrous    sulfate 325 milliGRAM(s) Oral three times a day  finasteride 5 milliGRAM(s) Oral daily  folic acid 1 milliGRAM(s) Oral daily  glucagon  Injectable 1 milliGRAM(s) IntraMuscular once  insulin lispro (ADMELOG) corrective regimen sliding scale   SubCutaneous Before meals and at bedtime  lactulose Syrup 20 Gram(s) Oral two times a day  levothyroxine 25 MICROGram(s) Oral daily  midodrine. 5 milliGRAM(s) Oral three times a day  multivitamin 1 Tablet(s) Oral daily  nystatin Powder 1 Application(s) Topical three times a day  polyethylene glycol 3350 17 Gram(s) Oral daily  saccharomyces boulardii 250 milliGRAM(s) Oral two times a day  senna 2 Tablet(s) Oral at bedtime  tamsulosin 0.4 milliGRAM(s) Oral at bedtime  trimethoprim  160 mG/sulfamethoxazole 800 mG 2 Tablet(s) Oral every 12 hours    PRN Inpatient Medications  acetaminophen  Suppository .. 650 milliGRAM(s) Rectal every 6 hours PRN  bisacodyl Suppository 10 milliGRAM(s) Rectal every 24 hours PRN  haloperidol    Injectable 2 milliGRAM(s) IntraMuscular every 6 hours PRN  ondansetron Injectable 4 milliGRAM(s) IV Push every 6 hours PRN      REVIEW OF SYSTEMS  --------------------------------------------------------------------------------  Gen: No weight changes, fatigue, fevers/chills, weakness  Skin: No rashes  Head/Eyes/Ears/Mouth: No headache; Normal hearing; Normal vision w/o blurriness; No sinus pain/discomfort, sore throat  Respiratory: No dyspnea, cough, wheezing, hemoptysis  CV: No chest pain, PND, orthopnea  GI: No abdominal pain, diarrhea, constipation, nausea, vomiting, melena, hematochezia  : No increased frequency, dysuria, hematuria, nocturia  MSK: No joint pain/swelling; no back pain; no edema  Neuro: No dizziness/lightheadedness, weakness, seizures, numbness, tingling  Heme: No easy bruising or bleeding  Endo: No heat/cold intolerance  Psych: No significant nervousness, anxiety, stress, depression    All other systems were reviewed and are negative, except as noted.    VITALS/PHYSICAL EXAM  --------------------------------------------------------------------------------  T(C): 36.8 (12-18-20 @ 08:15), Max: 36.8 (12-18-20 @ 08:15)  HR: 70 (12-18-20 @ 08:15) (70 - 88)  BP: 110/71 (12-18-20 @ 08:15) (110/71 - 126/76)  RR: 19 (12-18-20 @ 08:15) (18 - 20)  SpO2: 96% (12-17-20 @ 23:35) (96% - 96%)  Wt(kg): --        12-17-20 @ 07:01  -  12-18-20 @ 07:00  --------------------------------------------------------  IN: 332 mL / OUT: 675 mL / NET: -343 mL      Physical Exam:  	Gen: NAD, well-appearing  	HEENT: PERRL, supple neck, clear oropharynx  	Pulm: CTA B/L  	CV: RRR, S1S2; no rub  	Back: No spinal or CVA tenderness; no sacral edema  	Abd: +BS, soft, nontender/nondistended  	: No suprapubic tenderness  	UE: Warm, FROM, no clubbing, intact strength; no edema; no asterixis  	LE: Warm, FROM, no clubbing, intact strength; no edema  	Neuro: No focal deficits, intact gait  	Psych: Normal affect and mood  	Skin: Warm, without rashes    LABS/STUDIES  --------------------------------------------------------------------------------              7.4    8.10  >-----------<  119      [12-18-20 @ 08:52]              24.5     142  |  113  |  37.0  ----------------------------<  105      [12-18-20 @ 08:52]  4.3   |  21.0  |  2.44        Ca     8.0     [12-18-20 @ 08:52]      Mg     1.8     [12-18-20 @ 08:52]      Phos  3.7     [12-18-20 @ 08:52]      PT/INR: PT 19.8 , INR 1.75       [12-17-20 @ 07:35]      Creatinine Trend:  SCr 2.44 [12-18 @ 08:52]  SCr 2.38 [12-17 @ 07:35]  SCr 2.33 [12-15 @ 09:27]  SCr 2.61 [12-14 @ 07:04]  SCr 2.82 [12-13 @ 07:15]    Urinalysis - [12-15-20 @ 11:34]      Color Red / Appearance Cloudy / SG 1.015 / pH 6.0      Gluc Negative / Ketone Trace  / Bili Negative / Urobili Negative       Blood Large / Protein 100 / Leuk Est Small / Nitrite Negative      RBC >50 / WBC 6-10 / Hyaline  / Gran  / Sq Epi  / Non Sq Epi Negative / Bacteria Occasional      Iron 37, TIBC 193, %sat 19      [12-14-20 @ 15:36]  Ferritin 911      [12-14-20 @ 15:36]  TSH 3.02      [12-08-20 @ 15:41]

## 2020-12-18 NOTE — PROGRESS NOTE ADULT - ASSESSMENT
Returned call to pt and rescheduled Rtiuxan infusion at pt's request.    70 years old male with recurrent hospitalizations, Developmental Disability, Chronic A. Fib not on AC due to Hematuria/Anemia, Diastolic Heart Failure, CKD 3, HTN, HLD, DM 2, Hypothermia, Chronic Venous Stasis, LE Ulcers, Chronic Left Shoulder Dislocation, Dysphagia and Aspiration Pneumonia     rodriguez placed on 12/8 in ER with return of cloudy urine    urine/blood cx neg    now with gross hematuria resolving - off Sub Q heparin, only on PO ASA    INR elevated but trending down    creat satble    US with mild right hydronephrosis, L kidney poorly seen      CT: Improved mild bilateral hydroureteronephrosis. Interval migration of a 5 mm calculus into the left distal ureter. Additional nonobstructing left renal calculi.

## 2020-12-18 NOTE — PROGRESS NOTE ADULT - ASSESSMENT
70 years old male with recurrent hospitalizations, Developmental Disability, Chronic A. Fib not on AC due to Hematuria/Anemia, Diastolic Heart Failure, CKD 3, HTN, HLD, DM 2, Hypothermia, Chronic Venous Stasis, LE Ulcers, Chronic Left Shoulder Dislocation, Dysphagia and Aspiration Pneumonia sent from [facility] as he was not doing well. He was lethargic, weak and his speech was slurred.   He was code stroke in ER. No acute finding on CT. Found to be hypothermic. CXR with chronic small left pleural effusion. UA with UTI , on iv abx for foot wound , cx from wound multi bacterial , refused MR of foot to evaluate for OM , CT ordered , seen by nephrology for ARF , rodriguez in place for retention wound healing     # Sepsis with metabolic encephalopathy  due to LLE heels ulcer and wound of  (Right 1st MPJ), improved   - Wound culture with Providencia, Pseudomonas, Enterococcus, ESBL Proteus and Stenotrophomonas     - Continue Meropenem  and bactrim   pending CT of foot to evaluate for OM   cont wound care per podiatry recommendation     # ARF with CRF stage 3 , remaining elevated but stable  cr 1.57 in nov 2020   Nephrology consult appreciated     # Hypernatremia , resolved     # Decubitus Ulcers, poa  - Wound care  - Change position every 2 hours  - Cl[place]ron bed     # Anemia, cronic kidney disease   - Low iron s/p Venofer 200 mg x 5  - s/p 2 units of PRBCs   - cont  epogen weekly for renal dx and oral ferrous sulfate     #constipation  - bowel regimen    # severe chronic malnutrition  poor intake    DVT Prophylaxis -- Heparin SQ    GOC: DNR/I. Patient is under OPWDD    Palliative Consulted    Prognosis extremely guarded.     Dispo: probable [facility] once medically stable  d/w his sister over the phone    70 years old male with recurrent hospitalizations, Developmental Disability, Chronic A. Fib not on AC due to Hematuria/Anemia, Diastolic Heart Failure, CKD 3, HTN, HLD, DM 2, Hypothermia, Chronic Venous Stasis, LE Ulcers, Chronic Left Shoulder Dislocation, Dysphagia and Aspiration Pneumonia sent from [facility] as he was not doing well. He was lethargic, weak and his speech was slurred.   He was code stroke in ER. No acute finding on CT. Found to be hypothermic. CXR with chronic small left pleural effusion. UA with UTI , on iv abx for foot wound , cx from wound multi bacterial , refused MR of foot to evaluate for OM , CT ordered , seen by nephrology for ARF , rodriguez in place for retention wound healing     # Sepsis with metabolic encephalopathy  due to LLE heels ulcer and wound of  (Right 1st MPJ), improved   - sepsis resolved  - Wound culture with Providencia, Pseudomonas, Enterococcus, ESBL Proteus and Stenotrophomonas     - stop Meropenem, cont bactrim until 12/24  - no OM on CT of foot 12/17, noted cellulitis w/o abscess  - cont wound care per podiatry recommendation     #hematuria, onset during admit, improving  - rodriguez  - no dvt ppx  - cs appreciated: urology dr Pulido - pending final plan re: cystoscopy, poss inpt vs outpatient    # ARF with CRF stage 3 , remaining elevated but stable  cr 1.57 in nov 2020   Nephrology consult appreciated     # Hypernatremia , resolved     # Decubitus Ulcers, poa  - Wound care  - Change position every 2 hours    # Anemia, chronic kidney disease   - Low iron s/p Venofer 200 mg x 5  - s/p 2 units of PRBCs   - epogen per nephro, oral ferrous sulfate     # severe chronic malnutrition  poor intake, supplement, encourage po    DVT Prophylaxis -- held due to hematuria    GOC: DNR/I. Patient is under OPWDD    Palliative Consulted    Prognosis extremely guarded.     Dispo: PT appreciated, group home not accepting patient w/ rodriguez and unable to care for pt due to wound - [facility] once medically ready. pending decision from urology regarding cystoscopy, if not need inpatient likely stable for [facility] transfer Monday and should have repeat covid testing sunday

## 2020-12-18 NOTE — PROGRESS NOTE ADULT - PROBLEM SELECTOR PLAN 1
leave rodriguez indwelling for urinary diversion to expedite local skin care, irrigate rodriguez prn Q 8 hr as needed to maintain patency, cont to hold sub q  heparin if possible, recheck INR to see if trending down continues, Please send Urine for Cytology TODAY (order placed), plan per Dr Pulido leave rodriguez indwelling for urinary diversion to expedite local skin care, irrigate rodriguez prn Q 8 hr as needed to maintain patency, cont to hold sub q  heparin if possible, check INR daily to see if trending down continues, Please send Urine for Cytology TODAY (order placed), plan per Dr Pulido

## 2020-12-19 NOTE — PROGRESS NOTE ADULT - ASSESSMENT
The patient is a 70 year old male with a history of developmental disability, chronic afib ( not on AC due to hematuria/anemia) diastolic CHF, CKD 3, hypertension, hyperlipidemia, diabetes mellitus type 2, chronic venous stasis, lower extremity ulcers and dysphagia sent from the sn[facility] after being found lethargic with slurred speech. Hypothermic in the ER, Code stroke called [facility] of 2. CT head was negative and stroke ruled out. Chest xray consistent wiht small left pleural effusion. UA was positive; started on IV antibiotics. Noted to have foot ulcerations. Patient refused MRI; CT of the foot negative for OM. Rivera was placed for urinary retention and MARSHAL.    Assessment/plan:    1. Sepsis present on admission with metabolic encephalopathy secondary to lower extremity wounds: Sepsis resolved  Wound culture- Providencia, Pseudomonas, Enterococcus, ESBL Proteus and Stenotrophomonas     PO bactrim until 12/24  No OM on CT foot; refused MRI to rule out OM  Wound care per podiatry    2. Hematuria: Improving  Rivera in place per urology  Irrigate Q8 hours    3. Anemia; Worsening anemia of chronic disease suspected secondary to hematuria:  Transfuse 1 unit of PRBC today  Monitor Hb/hct  On Epogen    4. MARSHAL with CKD Stage 3 ( Crt 1/57 in November 2020)    5. Hypernatremia: resolved  D/c iv fluids  MOnitor BMP    6.  Decubitus heel ulcers: Local wound care  POA    7. Severe protein calorie malnutrition    VTE- Held due to hematuria     GOC:  DNR/I. Patient is under OPWDD    Palliative Following    Prognosis extremely guarded.     Discharge disposition: [facility] when medically stable.  The patient is a 70 year old male with a history of developmental disability, chronic afib ( not on AC due to hematuria/anemia) diastolic CHF, CKD 3, hypertension, hyperlipidemia, diabetes mellitus type 2, chronic venous stasis, lower extremity ulcers and dysphagia sent from the skilled nursing after being found lethargic with slurred speech. Hypothermic in the ER, Code stroke called [facility] of 2. CT head was negative and stroke ruled out. Chest xray consistent wiht small left pleural effusion. UA was positive; started on IV antibiotics. Noted to have foot ulcerations. Patient refused MRI; CT of the foot negative for OM. Rivera was placed for urinary retention and MARSHAL.    Assessment/plan:    1. Sepsis present on admission with metabolic encephalopathy secondary to lower extremity wounds: Sepsis resolved  Wound culture- Providencia, Pseudomonas, Enterococcus, ESBL Proteus and Stenotrophomonas     PO bactrim until 12/24  No OM on CT foot; refused MRI to rule out OM  Wound care per podiatry    2. Hematuria: Improving  Rivera in place per urology  Irrigate Q8 hours    3. Anemia; Worsening anemia of chronic disease suspected secondary to hematuria:  Transfuse 1 unit of PRBC today  Monitor Hb/hct  On Epogen    4. MARSHAL with CKD Stage 3 ( Crt 1/57 in November 2020)    5. Hypernatremia: resolved  D/c iv fluids  MOnitor BMP    6.  Decubitus heel ulcers: Local wound care  POA    7. Severe protein calorie malnutrition: encourage oral intake of fluids and supplementation  MV      VTE- Held due to hematuria     GOC:  DNR/I. Patient is under OPWDD    Palliative Following    Prognosis extremely guarded.     Discharge disposition: [facility] when medically stable.

## 2020-12-19 NOTE — PROGRESS NOTE ADULT - SUBJECTIVE AND OBJECTIVE BOX
CC: Follow up     INTERVAL HPI/OVERNIGHT EVENTS: Patient seen and examined,       Vital Signs Last 24 Hrs  T(C): 36.7 (19 Dec 2020 07:31), Max: 36.7 (19 Dec 2020 00:24)  T(F): 98.1 (19 Dec 2020 07:31), Max: 98.1 (19 Dec 2020 07:31)  HR: 69 (19 Dec 2020 07:31) (69 - 96)  BP: 95/60 (19 Dec 2020 07:31) (95/60 - 101/69)  BP(mean): --  RR: 18 (19 Dec 2020 07:31) (18 - 18)  SpO2: 100% (19 Dec 2020 07:31) (97% - 100%)    PHYSICAL EXAM:    GENERAL: NAD  HEAD:  Atraumatic, Normocephalic  EYES: EOMI, PERRLA, conjunctiva and sclera clear  ENMT: Moist mucous membranes  NECK: Supple, No JVD  CHEST/LUNG: Clear to auscultation bilaterally; No rales, rhonchi, wheezing, or rubs  HEART: Regular rate and rhythm; No murmurs, rubs, or gallops  ABDOMEN: Soft, Nontender, Nondistended; Bowel sounds present  ++ Rivera   EXTREMITIES:         MEDICATIONS  (STANDING):  allopurinol 300 milliGRAM(s) Oral daily  ascorbic acid 500 milliGRAM(s) Oral two times a day  aspirin  chewable 81 milliGRAM(s) Oral daily  atorvastatin 20 milliGRAM(s) Oral at bedtime  cadexomer iodine 0.9% Gel 1 Application(s) Topical every 24 hours  calcium carbonate 1250 mG  + Vitamin D (OsCal 500 + D) 1 Tablet(s) Oral daily  dextrose 40% Gel 15 Gram(s) Oral once  dextrose 5%. 1000 milliLiter(s) (100 mL/Hr) IV Continuous <Continuous>  dextrose 5%. 1000 milliLiter(s) (50 mL/Hr) IV Continuous <Continuous>  dextrose 50% Injectable 25 Gram(s) IV Push once  dextrose 50% Injectable 12.5 Gram(s) IV Push once  dextrose 50% Injectable 25 Gram(s) IV Push once  epoetin liam-epbx (RETACRIT) Injectable 06811 Unit(s) SubCutaneous <User Schedule>  ferrous    sulfate 325 milliGRAM(s) Oral three times a day  finasteride 5 milliGRAM(s) Oral daily  folic acid 1 milliGRAM(s) Oral daily  glucagon  Injectable 1 milliGRAM(s) IntraMuscular once  insulin lispro (ADMELOG) corrective regimen sliding scale   SubCutaneous Before meals and at bedtime  lactulose Syrup 20 Gram(s) Oral two times a day  levothyroxine 25 MICROGram(s) Oral daily  midodrine. 5 milliGRAM(s) Oral three times a day  multivitamin 1 Tablet(s) Oral daily  nystatin Powder 1 Application(s) Topical three times a day  polyethylene glycol 3350 17 Gram(s) Oral daily  saccharomyces boulardii 250 milliGRAM(s) Oral two times a day  senna 2 Tablet(s) Oral at bedtime  tamsulosin 0.4 milliGRAM(s) Oral at bedtime  trimethoprim  160 mG/sulfamethoxazole 800 mG 2 Tablet(s) Oral every 12 hours    MEDICATIONS  (PRN):  acetaminophen  Suppository .. 650 milliGRAM(s) Rectal every 6 hours PRN Temp greater or equal to 38C (100.4F), Mild Pain (1 - 3), Moderate Pain (4 - 6)  bisacodyl Suppository 10 milliGRAM(s) Rectal every 24 hours PRN Constipation  haloperidol    Injectable 2 milliGRAM(s) IntraMuscular every 6 hours PRN Agitation  ondansetron Injectable 4 milliGRAM(s) IV Push every 6 hours PRN Nausea and/or Vomiting      Allergies    No Known Allergies    Intolerances          LABS:                          7.2    7.93  )-----------( 127      ( 19 Dec 2020 07:12 )             25.0     12-19    143  |  116<H>  |  37.0<H>  ----------------------------<  89  4.7   |  19.0<L>  |  2.53<H>    Ca    8.2<L>      19 Dec 2020 07:12  Phos  3.7     12-18  Mg     1.8     12-18            RADIOLOGY & ADDITIONAL TESTS:   CC: Follow up     INTERVAL HPI/OVERNIGHT EVENTS: Patient seen and examined, poor oral intake of fluids. Refusing honey thick fluids; requesting soda and juice without thickener    Vital Signs Last 24 Hrs  T(C): 36.7 (19 Dec 2020 07:31), Max: 36.7 (19 Dec 2020 00:24)  T(F): 98.1 (19 Dec 2020 07:31), Max: 98.1 (19 Dec 2020 07:31)  HR: 69 (19 Dec 2020 07:31) (69 - 96)  BP: 95/60 (19 Dec 2020 07:31) (95/60 - 101/69)  BP(mean): --  RR: 18 (19 Dec 2020 07:31) (18 - 18)  SpO2: 100% (19 Dec 2020 07:31) (97% - 100%)    PHYSICAL EXAM:    GENERAL: NAD  ENMT: Moist mucous membranes  NECK: Supple, No JVD  CHEST/LUNG: Clear to auscultation bilaterally; No rales, rhonchi, wheezing, or rubs  HEART: Regular rate and rhythm; No murmurs, rubs, or gallops  ABDOMEN: Soft, Nontender, Nondistended; Bowel sounds present  ++ Rivera   EXTREMITIES: bilateral heel ulcerations noted  Ulceration on the lateral aspect of large left toe  SKIN; Groin intertrigo prsent         MEDICATIONS  (STANDING):  allopurinol 300 milliGRAM(s) Oral daily  ascorbic acid 500 milliGRAM(s) Oral two times a day  aspirin  chewable 81 milliGRAM(s) Oral daily  atorvastatin 20 milliGRAM(s) Oral at bedtime  cadexomer iodine 0.9% Gel 1 Application(s) Topical every 24 hours  calcium carbonate 1250 mG  + Vitamin D (OsCal 500 + D) 1 Tablet(s) Oral daily  dextrose 40% Gel 15 Gram(s) Oral once  dextrose 5%. 1000 milliLiter(s) (100 mL/Hr) IV Continuous <Continuous>  dextrose 5%. 1000 milliLiter(s) (50 mL/Hr) IV Continuous <Continuous>  dextrose 50% Injectable 25 Gram(s) IV Push once  dextrose 50% Injectable 12.5 Gram(s) IV Push once  dextrose 50% Injectable 25 Gram(s) IV Push once  epoetin liam-epbx (RETACRIT) Injectable 13502 Unit(s) SubCutaneous <User Schedule>  ferrous    sulfate 325 milliGRAM(s) Oral three times a day  finasteride 5 milliGRAM(s) Oral daily  folic acid 1 milliGRAM(s) Oral daily  glucagon  Injectable 1 milliGRAM(s) IntraMuscular once  insulin lispro (ADMELOG) corrective regimen sliding scale   SubCutaneous Before meals and at bedtime  lactulose Syrup 20 Gram(s) Oral two times a day  levothyroxine 25 MICROGram(s) Oral daily  midodrine. 5 milliGRAM(s) Oral three times a day  multivitamin 1 Tablet(s) Oral daily  nystatin Powder 1 Application(s) Topical three times a day  polyethylene glycol 3350 17 Gram(s) Oral daily  saccharomyces boulardii 250 milliGRAM(s) Oral two times a day  senna 2 Tablet(s) Oral at bedtime  tamsulosin 0.4 milliGRAM(s) Oral at bedtime  trimethoprim  160 mG/sulfamethoxazole 800 mG 2 Tablet(s) Oral every 12 hours    MEDICATIONS  (PRN):  acetaminophen  Suppository .. 650 milliGRAM(s) Rectal every 6 hours PRN Temp greater or equal to 38C (100.4F), Mild Pain (1 - 3), Moderate Pain (4 - 6)  bisacodyl Suppository 10 milliGRAM(s) Rectal every 24 hours PRN Constipation  haloperidol    Injectable 2 milliGRAM(s) IntraMuscular every 6 hours PRN Agitation  ondansetron Injectable 4 milliGRAM(s) IV Push every 6 hours PRN Nausea and/or Vomiting      Allergies    No Known Allergies    Intolerances          LABS:                          7.2    7.93  )-----------( 127      ( 19 Dec 2020 07:12 )             25.0     12-19    143  |  116<H>  |  37.0<H>  ----------------------------<  89  4.7   |  19.0<L>  |  2.53<H>    Ca    8.2<L>      19 Dec 2020 07:12  Phos  3.7     12-18  Mg     1.8     12-18            RADIOLOGY & ADDITIONAL TESTS:

## 2020-12-20 NOTE — PROGRESS NOTE ADULT - ASSESSMENT
The patient is a 70 year old male with a history of developmental disability, chronic afib ( not on AC due to hematuria/anemia) diastolic CHF, CKD 3, hypertension, hyperlipidemia, diabetes mellitus type 2, chronic venous stasis, lower extremity ulcers and dysphagia sent from the shelter after being found lethargic with slurred speech. Hypothermic in the ER, Code stroke called [facility] of 2. CT head was negative and stroke ruled out. Chest xray consistent wiht small left pleural effusion. UA was positive; started on IV antibiotics. Noted to have foot ulcerations. Patient refused MRI; CT of the foot negative for OM. Rivera was placed for urinary retention and MARSHAL.    Assessment/plan:    1. Sepsis present on admission with metabolic encephalopathy secondary to lower extremity wounds: Sepsis resolved  Wound culture- Providencia, Pseudomonas, Enterococcus, ESBL Proteus and Stenotrophomonas     PO bactrim until 12/24  No OM on CT foot; refused MRI to rule out OM  Wound care per podiatry    2. Hematuria: Improving  Rivera in place per urology  Irrigate Q8 hours    3. Anemia; Worsening anemia of chronic disease suspected secondary to hematuria:  transfused 1 unit yesterday   Monitor Hb/hct  On Epogen    4. MARSHAL with CKD Stage 3 ( Crt 1.57 in November 2020)    5. Hypernatremia: resolved  D/c iv fluids  MOnitor BMP    6.  Decubitus heel ulcers: Local wound care  POA    7. Severe protein calorie malnutrition: encourage oral intake of fluids and supplementation  MV      VTE- Held due to hematuria     GOC:  DNR/I. Patient is under OPWDD    Palliative Following    Prognosis extremely guarded.     Discharge disposition: [facility] when medically stable.

## 2020-12-20 NOTE — PROGRESS NOTE ADULT - SUBJECTIVE AND OBJECTIVE BOX
CC: Follow up    NTERVAL HPI/OVERNIGHT EVENTS: Patient seen and examined, no acute events overnight. Poor oral intake       Vital Signs Last 24 Hrs  T(C): 36.3 (20 Dec 2020 07:32), Max: 36.6 (19 Dec 2020 23:08)  T(F): 97.4 (20 Dec 2020 07:32), Max: 97.8 (19 Dec 2020 23:08)  HR: 74 (20 Dec 2020 07:32) (69 - 99)  BP: 93/59 (20 Dec 2020 07:32) (93/59 - 139/82)  BP(mean): --  RR: 18 (20 Dec 2020 07:32) (18 - 18)  SpO2: 95% (20 Dec 2020 07:32) (94% - 98%)    PHYSICAL EXAM:    GENERAL: NAD  ENMT: Moist mucous membranes  NECK: Supple, No JVD  CHEST/LUNG: Clear to auscultation bilaterally; No rales, rhonchi, wheezing, or rubs  HEART: Regular rate and rhythm; No murmurs, rubs, or gallops  ABDOMEN: Soft, Nontender, Nondistended; Bowel sounds present  ++ Rivera   EXTREMITIES: bilateral heel ulcerations noted  Ulceration on the lateral aspect of large left toe  SKIN; Groin intertrigo prsent           MEDICATIONS  (STANDING):  allopurinol 300 milliGRAM(s) Oral daily  ascorbic acid 500 milliGRAM(s) Oral two times a day  aspirin  chewable 81 milliGRAM(s) Oral daily  atorvastatin 20 milliGRAM(s) Oral at bedtime  cadexomer iodine 0.9% Gel 1 Application(s) Topical every 24 hours  calcium carbonate 1250 mG  + Vitamin D (OsCal 500 + D) 1 Tablet(s) Oral daily  dextrose 40% Gel 15 Gram(s) Oral once  dextrose 5%. 1000 milliLiter(s) (100 mL/Hr) IV Continuous <Continuous>  dextrose 5%. 1000 milliLiter(s) (50 mL/Hr) IV Continuous <Continuous>  dextrose 50% Injectable 25 Gram(s) IV Push once  dextrose 50% Injectable 12.5 Gram(s) IV Push once  dextrose 50% Injectable 25 Gram(s) IV Push once  epoetin liam-epbx (RETACRIT) Injectable 76607 Unit(s) SubCutaneous <User Schedule>  ferrous    sulfate 325 milliGRAM(s) Oral three times a day  finasteride 5 milliGRAM(s) Oral daily  folic acid 1 milliGRAM(s) Oral daily  glucagon  Injectable 1 milliGRAM(s) IntraMuscular once  insulin lispro (ADMELOG) corrective regimen sliding scale   SubCutaneous Before meals and at bedtime  lactulose Syrup 20 Gram(s) Oral two times a day  levothyroxine 25 MICROGram(s) Oral daily  midodrine. 5 milliGRAM(s) Oral three times a day  multivitamin 1 Tablet(s) Oral daily  nystatin Powder 1 Application(s) Topical three times a day  polyethylene glycol 3350 17 Gram(s) Oral daily  saccharomyces boulardii 250 milliGRAM(s) Oral two times a day  senna 2 Tablet(s) Oral at bedtime  tamsulosin 0.4 milliGRAM(s) Oral at bedtime  trimethoprim  160 mG/sulfamethoxazole 800 mG 2 Tablet(s) Oral every 12 hours    MEDICATIONS  (PRN):  acetaminophen  Suppository .. 650 milliGRAM(s) Rectal every 6 hours PRN Temp greater or equal to 38C (100.4F), Mild Pain (1 - 3), Moderate Pain (4 - 6)  bisacodyl Suppository 10 milliGRAM(s) Rectal every 24 hours PRN Constipation  haloperidol    Injectable 2 milliGRAM(s) IntraMuscular every 6 hours PRN Agitation  ondansetron Injectable 4 milliGRAM(s) IV Push every 6 hours PRN Nausea and/or Vomiting      Allergies    No Known Allergies    Intolerances          LABS:                          7.9    7.12  )-----------( 156      ( 20 Dec 2020 07:30 )             26.2     12-20    144  |  116<H>  |  38.0<H>  ----------------------------<  70  4.5   |  20.0<L>  |  2.31<H>    Ca    8.4<L>      20 Dec 2020 07:30            RADIOLOGY & ADDITIONAL TESTS:

## 2020-12-21 PROBLEM — Z87.09 HISTORY OF INFLUENZA: Status: RESOLVED | Noted: 2019-03-11 | Resolved: 2020-01-01

## 2020-12-21 NOTE — DISCHARGE NOTE PROVIDER - NSDCCPCAREPLAN_GEN_ALL_CORE_FT
PRINCIPAL DISCHARGE DIAGNOSIS  Diagnosis: Sepsis  Assessment and Plan of Treatment: Secondary to aspiration pneumonia  Resolved  Wean midodrine as tolerated      SECONDARY DISCHARGE DIAGNOSES  Diagnosis: Pneumonia  Assessment and Plan of Treatment: Complete PO bactrim with florastor on 12/24/20    Diagnosis: Urinary retention  Assessment and Plan of Treatment: Continue finasteride and flomax  follow up with urology in 7-10 days    Diagnosis: Acute kidney injury superimposed on CKD  Assessment and Plan of Treatment: Improving  Monitor BMP  Follow up with nephrology in 2 weeks    Diagnosis: Anemia due to stage 3 chronic kidney disease, unspecified whether stage 3a or 3b CKD  Assessment and Plan of Treatment: Status post transfusion  Continue epogen and ferrous sulfate  MOnitor CBC     PRINCIPAL DISCHARGE DIAGNOSIS  Diagnosis: Sepsis  Assessment and Plan of Treatment: sepsis resolved  likley Secondary to lower extermity wounds  follow up with podiatry ( Dr. Blank) outpatient  Wound care instructions:  Clean ulcer sites with sterile saline  Apply Iodosorb to bilateral heels and Right 1st MPJ wound, cover with Allevyn pads  Change dressing every other day      SECONDARY DISCHARGE DIAGNOSES  Diagnosis: Pneumonia  Assessment and Plan of Treatment: Complete PO bactrim with florastor on 12/24/20    Diagnosis: Urinary retention  Assessment and Plan of Treatment: Continue finasteride and flomax  follow up with urology in 7-10 days    Diagnosis: Acute kidney injury superimposed on CKD  Assessment and Plan of Treatment: Improving  Monitor BMP  Follow up with nephrology in 2 weeks    Diagnosis: Anemia due to stage 3 chronic kidney disease, unspecified whether stage 3a or 3b CKD  Assessment and Plan of Treatment: Status post transfusion  Continue epogen and ferrous sulfate  Monitor CBC

## 2020-12-21 NOTE — DISCHARGE NOTE PROVIDER - NSDCFUADDINST_GEN_ALL_CORE_FT
Wound care instructions:  Clean ulcer sites with sterile saline  Apply Iodosorb to b/l heel and Right 1st MPJ wound, cover with Allevyn pads  Change dressing every other day

## 2020-12-21 NOTE — DISCHARGE NOTE PROVIDER - NSDCFUADDAPPT_GEN_ALL_CORE_FT
PT FROM A [facility]  STAFF AT THE [facility] WILL MAKE ARRANGEMENT FOR F/U APPT W/ DR. FALCON   MEDS TO BED NOT APPLIACBLE;[facility] MANAGES PT'S MEDS

## 2020-12-21 NOTE — DISCHARGE NOTE PROVIDER - PROVIDER TOKENS
PROVIDER:[TOKEN:[[id number]]],PROVIDER:[TOKEN:[[id number]]] PROVIDER:[TOKEN:[[id number]]],PROVIDER:[TOKEN:[[id number]]],PROVIDER:[TOKEN:[[id number]]]

## 2020-12-21 NOTE — PROGRESS NOTE ADULT - SUBJECTIVE AND OBJECTIVE BOX
Podiatry Interval HPI: Patient seen and examined at bedside, resting comfortably. No acute event overnight. Dressing intact to b/l feet with offloading boots. Denies any new pedal complaints.       HPI:  History was taken from charts as Aide from longterm gone by the time patient was admitted.    70 years old male with recurrent hospitalizations, Developmental Disability, Chronic A. Fib not on AC due to Hematuria/Anemia, Diastolic Heart Failure, CKD 3, HTN, HLD, DM 2, Hypothermia, Chronic Venous Stasis, LE Ulcers, Chronic Left Shoulder Dislocation, Dysphagia and Aspiration Pneumonia sent from detention as he was not doing well. He was lethargic, weak and his speech was slurred.   Patient is currently very sleepy and lethargic however wakes up on verbal stimuli. No active complaints. Speech is clear at this time.   He was code stroke in ER. No acute finding on CT. Found to be hypothermic. CXR with chronic small left pleural effusion. Urine was just sent and as per RN it was very cloudy.     Pt seen bedside. Pt is lethargic, unable to provide any history. Podiatry consulted for BLE ulcers. Pt has unna boots on for unknown amount of time.       PAST MEDICAL & SURGICAL HISTORY:  Encounter for blood transfusion    Anemia    History of gross hematuria    History of orthostatic hypotension    Bacterial pneumonia    Muscle weakness (generalized)    2019 novel coronavirus disease (COVID-19)    Falls    Heart failure    Afib    CHF (congestive heart failure)    Gout    Hiatal hernia    BPH (benign prostatic hyperplasia)    Cognitive impairment    Cardiomyopathy    HTN (hypertension)    DM (diabetes mellitus)    S/P appendectomy      Allergies: No Known Allergies    Medications acetaminophen  Suppository .. 650 milliGRAM(s) Rectal every 6 hours PRN  dextrose 40% Gel 15 Gram(s) Oral once  dextrose 5% + sodium chloride 0.45%. 1000 milliLiter(s) IV Continuous <Continuous>  dextrose 5%. 1000 milliLiter(s) IV Continuous <Continuous>  dextrose 5%. 1000 milliLiter(s) IV Continuous <Continuous>  dextrose 50% Injectable 25 Gram(s) IV Push once  dextrose 50% Injectable 12.5 Gram(s) IV Push once  dextrose 50% Injectable 25 Gram(s) IV Push once  glucagon  Injectable 1 milliGRAM(s) IntraMuscular once  haloperidol    Injectable 2 milliGRAM(s) IntraMuscular every 6 hours PRN  insulin lispro (ADMELOG) corrective regimen sliding scale   SubCutaneous every 6 hours  iron sucrose IVPB 200 milliGRAM(s) IV Intermittent every 24 hours  levothyroxine Injectable 12.5 MICROGram(s) IV Push at bedtime  meropenem  IVPB 500 milliGRAM(s) IV Intermittent every 12 hours  nystatin Powder 1 Application(s) Topical three times a day  ondansetron Injectable 4 milliGRAM(s) IV Push every 6 hours PRN  vancomycin  IVPB 1000 milliGRAM(s) IV Intermittent once    FHNo pertinent family history in first degree relatives    No pertinent family history in first degree relatives    ,   PMHEncounter for blood transfusion    Anemia    History of gross hematuria    History of orthostatic hypotension    Bacterial pneumonia    Muscle weakness (generalized)    2019 novel coronavirus disease (COVID-19)    Falls    Heart failure    Afib    CHF (congestive heart failure)    Gout    Hiatal hernia    BPH (benign prostatic hyperplasia)    Cognitive impairment    Cardiomyopathy    HTN (hypertension)    DM (diabetes mellitus)       PSHS/P appendectomy    No significant past surgical history        Labs                        7.7    5.59  )-----------( 179      ( 21 Dec 2020 07:56 )             25.1     12-21    145  |  115<H>  |  38.0<H>  ----------------------------<  101<H>  4.5   |  21.0<L>  |  2.36<H>    Ca    8.5<L>      21 Dec 2020 07:56    Vital Signs Last 24 Hrs  T(C): 36.4 (21 Dec 2020 07:58), Max: 36.6 (20 Dec 2020 23:21)  T(F): 97.6 (21 Dec 2020 07:58), Max: 97.8 (20 Dec 2020 23:21)  HR: 56 (21 Dec 2020 07:58) (56 - 77)  BP: 99/76 (21 Dec 2020 07:58) (93/61 - 99/76)  BP(mean): --  RR: 20 (21 Dec 2020 07:58) (18 - 20)  SpO2: 98% (21 Dec 2020 07:58) (95% - 98%)      PHYSICAL EXAM  LE Focused:    Vasc: DP/PT pulses non palpable, cap refill delayed, edema noted to Right first MPJ    Derm: Right medial 1st MPJ ulcer measuring ~2.5 x 2.0 x 0.4 cm, fibrogranular base with exposed capsule, + periwound erythema, no malodor, - probe to bone. Bilateral heel with unstagable ulcers  Neuro: Unable to assess  MSK: Unable to assess    Imaging:  Xray no erosive changes, calcific vessels    EXAM:  FOOT-BILATERAL                        PROCEDURE DATE:  12/11/2020      INTERPRETATION:  RIGHT foot    CLINICAL INFORMATION: Heel ulceration. Assess for osteomyelitis..    TECHNIQUE .  AP,lateral and oblique views.    FINDINGS:  Posterior heel ulceration NOTED without radiographic evidence of osteomyelitis/osteolysis.  There is a pes planus deformity with degenerative a narrowing of the first metacarpal tarsal pharyngeal joint and tarsal bones. No acute fracture.  There are diffusevascular arterial calcifications.    IMPRESSION:    Pes planus deformity. Osteoarthritis. Heel ulcer without radiographic evidence of osteomyelitis.    LEFT foot    CLINICAL INFORMATION: Heel ulceration. Assess for osteoarthritis.    TECHNIQUE: AP,lateral and oblique views.    FINDINGS:    Posterior heel ulceration NOTED without radiographic evidence of osteomyelitis/osteolysis.  There is a pes planus deformity with degenerative a narrowing of the first metacarpal tarsal pharyngeal joint andtarsal bones. No acute fracture.  There are diffuse vascular arterial calcifications.    .  IMPRESSION:    Pes planus deformity. Osteoarthritis. Heel ulcer without radiographic evidence of osteomyelitis    If osteomyelitis is considered, despite conservative therapy, and soft tissue / bone infection requires further assessment, follow-up MRI recommended.      SANFORD VIDES MD; Attending Radiologist  This document has been electronically signed. Dec 11 2020  2:11PM    Culture - Surgical Swab (12.10.20 @ 22:00)    -  Amikacin: S <=16    -  Amikacin: S <=16    -  Amikacin: S <=16    -  Amoxicillin/Clavulanic Acid: R >16/8    -  Amoxicillin/Clavulanic Acid: R >16/8    -  Ampicillin: R 16 These ampicillin results predict results for amoxicillin    -  Ampicillin: R >16 These ampicillin results predict results for amoxicillin    -  Ampicillin: S <=2 Predicts results to ampicillin/sulbactam, amoxacillin-clavulanate and  piperacillin-tazobactam.    -  Ampicillin/Sulbactam: R 16/8 Enterobacter, Citrobacter, and Serratia may develop resistance during prolonged therapy (3-4 days)    -  Ampicillin/Sulbactam: I 16/8 Enterobacter, Citrobacter, and Serratia may develop resistance during prolonged therapy (3-4 days)    -  Aztreonam: S <=4    -  Aztreonam: R <=4    -  Aztreonam: S <=4    -  Cefazolin: R >16 Enterobacter, Citrobacter, and Serratia may develop resistance during prolonged therapy (3-4 days)    -  Cefazolin: R >16 Enterobacter, Citrobacter, and Serratia may develop resistance during prolonged therapy (3-4 days)    -  Cefepime: S <=2    -  Cefepime: R 8    -  Cefepime: S <=2    -  Cefoxitin: S <=8    -  Cefoxitin: S <=8    -  Ceftazidime: S <=1    -  Ceftazidime: S 8    -  Ceftriaxone: R 32 Enterobacter, Citrobacter, and Serratia may develop resistance during prolonged therapy    -  Ceftriaxone: S <=1 Enterobacter, Citrobacter, and Serratia may develop resistance during prolonged therapy    -  Ciprofloxacin: R >2    -  Ciprofloxacin: R >2    -  Ciprofloxacin: S <=0.25    -  Ertapenem: S <=0.5    -  Ertapenem: S <=0.5    -  Levofloxacin: S <=0.5    -  Levofloxacin: R >4    -  Levofloxacin: S <=0.5    -  Levofloxacin: R >4    -  Meropenem: S <=1    -  Meropenem: S <=1    -  Meropenem: S <=1    -  Gentamicin: R >8    -  Gentamicin: S 4    -  Imipenem: S <=1    -  Piperacillin/Tazobactam: S <=8    -  Piperacillin/Tazobactam: R <=8    -  Piperacillin/Tazobactam: S <=8    -  Tetra/Doxy: S <=1    -  Tobramycin: S <=2    -  Tobramycin: R >8    -  Trimethoprim/Sulfamethoxazole: R >2/38    -  Trimethoprim/Sulfamethoxazole: S <=0.5/9.5    -  Trimethoprim/Sulfamethoxazole: S <=0.5/9.5    -  Vancomycin: S 1    Specimen Source: .Surgical Swab R foot ulcer    Culture Results:   Numerous Providencia stuartii  Numerous Pseudomonas aeruginosa  Numerous Enterococcus faecalis  Few Proteus mirabilis ESBL  Numerous Stenotrophomonas maltophilia    Organism Identification: Providencia stuartii  Pseudomonas aeruginosa  Enterococcus faecalis  Proteus mirabilis ESBL  Stenotrophomonas maltophilia    Organism: Providencia stuartii    Organism: Pseudomonas aeruginosa    Organism: Enterococcus faecalis    Organism: Proteus mirabilis ESBL    Organism: Stenotrophomonas maltophilia    Method Type: VAMSI    Method Type: VAMSI    Method Type: VAMSI    Method Type: VAMSI    Method Type: VAMSI      A: Right 1st MPJ ulcer  Bilateral unstageable heel ulcers    P:  Patient evaluated, chart reviewed  Xrays reviewed  IV antibiotics per ID  Pt not a surgical candidate  Continue local wound care  Applied iodosorb/DSD with allevyn pad for offloading  Offloading to BLE while in bed  Pt stable for discharge from podiatry standpoint. Pt to follow up with Dr. Dumont/Abhay upon discharge  Discussed with Dr. Blank    Wound care instructions:  Clean ulcer sites with sterile saline  Apply iodosorb to b/l heel and Right 1st MPJ wound, cover with Allevyn pads  Change dressing every other day

## 2020-12-21 NOTE — PROGRESS NOTE ADULT - GENITOURINARY COMMENTS
rodriguez cath in place
rodriguez to BSD - gross hematuria resolving, very light blood tinged urine without clots
rodriguez to BSD - dark tea colored with small clots noted in rodriguez tubing
rodriguez to BSD - tea colored urine, no clots noted

## 2020-12-21 NOTE — DISCHARGE NOTE PROVIDER - CARE PROVIDER_API CALL
Franklin Murrieta (DO)  Internal Medicine  [address] [facility], [address] [place] [address]  Phone: [phone]  Fax: [phone]  Follow Up Time:     Trenton Piña  UROLOGY  [address], [address]  [place] [address]  Phone: [phone]  Fax: [phone]  Follow Up Time:    Franklin Murrieta (DO)  Internal Medicine  [address] [facility], [address]nd [place] [address]  Phone: [phone]  Fax: [phone]  Follow Up Time:     Trenton Piña  UROLOGY  [address], [address]  [place] [address]  Phone: [phone]  Fax: [phone]  Follow Up Time:     Devang Blank (DPM)  Surgery  [address] [place] [address]  Phone: [phone]  Fax: [phone]  Follow Up Time:

## 2020-12-21 NOTE — DISCHARGE NOTE PROVIDER - DID THE PATIENT PRESENT WITH OR WAS TREATED FOR MALNUTRITION DURING THIS ADMISSION
Detail Level: Detailed Additional Notes: Alternating CLn wash and Nizoral shampoo was recommended. Yes...

## 2020-12-21 NOTE — DISCHARGE NOTE PROVIDER - HOSPITAL COURSE
The patient is a 70 year old male with a history of developmental disability, chronic afib ( not on AC due to hematuria/anemia) diastolic CHF, CKD 3, hypertension, hyperlipidemia, diabetes mellitus type 2, chronic venous stasis, lower extremity ulcers and [facility] being found lethargic with slurred speech. Hypothermic in the ER, Code stroke called [facility] of 2. CT head was negative and stroke ruled out. Chest xray consistent wiht small left pleural effusion. UA was positive; started on IV antibiotics. Noted to have foot ulcerations. Patient refused MRI; CT of the foot negative for OM. Rivera was placed for urinary retention and MARSHAL.    Assessment/plan:    1. Sepsis present on admission with metabolic encephalopathy secondary to lower extremity wounds: Sepsis resolved  Wound culture- Providencia, Pseudomonas, Enterococcus, ESBL Proteus and Stenotrophomonas     PO bactrim until 12/24  No OM on CT foot; refused MRI to rule out OM  Wound care per podiatry    2. Hematuria: Improving  Rivera in place per urology  Irrigate Q8 hours    3. Anemia; Worsening anemia of chronic disease suspected secondary to hematuria:  transfused 1 unit yesterday   Monitor Hb/hct  On Epogen    4. MARSHAL with CKD Stage 3 ( Crt 1.57 in November 2020)    5. Hypernatremia: resolved  D/c iv fluids  MOnitor BMP    6.  Decubitus heel ulcers: Local wound care  POA    7. Severe protein calorie malnutrition: encourage oral intake of fluids and supplementation  MV      VTE- Held due to hematuria     GOC:  DNR/I. Patient is under OPWDD    Palliative Following    Prognosis extremely guarded.     Discharge disposition: [facility] when medically stable.      The patient is a 70 year old male with a history of developmental disability, chronic afib ( not on AC due to hematuria/anemia) diastolic CHF, CKD 3, hypertension, hyperlipidemia, diabetes mellitus type 2, chronic venous stasis, lower extremity ulcers and dysphagia sent from the senior living after being found lethargic with slurred speech. Hypothermic in the ER, Code stroke called [facility] of 2. CT head was negative and stroke ruled out. Chest x-ray consistent with small left pleural effusion. UA was positive; started on IV antibiotics. Noted to have foot ulcerations. Wound cultures + for providencia, pseduomonas, enterococcus, ESBL proteus and stentophomonas.  patient completed 8 days of merropenem, 2 doses of vancomycin and 11 days of bactrim as of 12/24/20. Patient refused MRI; CT of the foot negative for OM. Rodriguez was placed for urinary retention and MARSHAL. Seen by urology, TOV was successful and hematuria resolved. Patient will need LURDES . Sister agreeable to LURDES, as patient needs wound care which group [facility] is not able to support. SW discussed with for LURDES placement. Repeat Covid  on 12/27 negative.      1. Sepsis present on admission with metabolic encephalopathy secondary to lower extremity wounds: Sepsis resolved  - Mentation at baseline   - Wound culture- Providencia, Pseudomonas, Enterococcus, ESBL Proteus and Stenotrophomonas     - PO bactrim completed on 12/24   - No OM on CT foot; refused MRI to rule out OM    Wound care instructions:  Clean ulcer sites with sterile saline  Apply Iodosorb to b/l heel and Right 1st MPJ wound, cover with Allevyn pads  Change dressing every other day    2. Hematuria: Resolved  - s/p rodriguez removal on 12/21. passed TOV  - no difficulties voiding     3. Anemia; Worsening anemia of chronic disease suspected secondary to hematuria:  - + FOBT on 12/21: GI was consulted; Increased PO Protonix to BID; heparin SQ held  - keep Hgb >7  - pt refused transfusion on 12/25 when hgb 7.2.  - c/w epogen    4. MARSHAL with CKD Stage 3 ( Crt 1.57 in November 2020)  -  SCr uptrending likely 2/2 poor po intake  - gentle IVF  -Continue to monitor renal function     5. Hypernatremia: normalized  - Encourage PO intake  - Monitor BMP    6.  Decubitus heel ulcers  - present on admission  - continue local wound care per podiatry    7. Severe protein calorie malnutrition:   - encourage oral intake of fluids and   - supplementation of MV    8. Dysphagia:   -Soft diet with nectar thick liquids  -tolerating diet well     VTE ppx-  Held 2/2 to +FOBT and anemia      GOC:  DNR/I. Patient is under OPWDD       Length of discharge 35 mins

## 2020-12-21 NOTE — CHART NOTE - NSCHARTNOTEFT_GEN_A_CORE
Source: Patient [x]  Family [ ]   other [ ] EMR    Current Diet:   Diet, Soft:   Dysphagia 3, Soft, Nectar Consistency Fluid (DYS3NC)  Supplement Feeding Modality:  Oral  Ensure Enlive Cans or Servings Per Day:  1       Frequency:  Three Times a day (12-21-20 @ 14:34)    Patient reports [ ] nausea  [ ] vomiting [ ] diarrhea [ ] constipation  [x]chewing problems [ ] swallowing issues  [ ] other:     PO intake:  < 50% [ ]   50-75%  [ ]   %  [ ]  other : varied PO intake    Source for PO intake [x] Patient [ ] family [x] chart [ ] staff [ ] other    Current Weight:   (12/21)   192 lbs    % Weight Change: No weight to compare    Pertinent Medications: MEDICATIONS  (STANDING):  allopurinol 300 milliGRAM(s) Oral daily  ascorbic acid 500 milliGRAM(s) Oral two times a day  aspirin  chewable 81 milliGRAM(s) Oral daily  atorvastatin 20 milliGRAM(s) Oral at bedtime  cadexomer iodine 0.9% Gel 1 Application(s) Topical every 24 hours  calcium carbonate 1250 mG  + Vitamin D (OsCal 500 + D) 1 Tablet(s) Oral daily  dextrose 40% Gel 15 Gram(s) Oral once  dextrose 5%. 1000 milliLiter(s) (100 mL/Hr) IV Continuous <Continuous>  dextrose 5%. 1000 milliLiter(s) (50 mL/Hr) IV Continuous <Continuous>  dextrose 50% Injectable 25 Gram(s) IV Push once  dextrose 50% Injectable 12.5 Gram(s) IV Push once  dextrose 50% Injectable 25 Gram(s) IV Push once  epoetin liam-epbx (RETACRIT) Injectable 05368 Unit(s) SubCutaneous <User Schedule>  ferrous    sulfate 325 milliGRAM(s) Oral three times a day  finasteride 5 milliGRAM(s) Oral daily  folic acid 1 milliGRAM(s) Oral daily  glucagon  Injectable 1 milliGRAM(s) IntraMuscular once  heparin   Injectable 5000 Unit(s) SubCutaneous every 12 hours  insulin lispro (ADMELOG) corrective regimen sliding scale   SubCutaneous Before meals and at bedtime  lactulose Syrup 20 Gram(s) Oral two times a day  levothyroxine 25 MICROGram(s) Oral daily  midodrine. 5 milliGRAM(s) Oral three times a day  multivitamin 1 Tablet(s) Oral daily  nystatin Powder 1 Application(s) Topical three times a day  pantoprazole    Tablet 40 milliGRAM(s) Oral before breakfast  polyethylene glycol 3350 17 Gram(s) Oral daily  saccharomyces boulardii 250 milliGRAM(s) Oral two times a day  senna 2 Tablet(s) Oral at bedtime  tamsulosin 0.4 milliGRAM(s) Oral at bedtime  trimethoprim  160 mG/sulfamethoxazole 800 mG 2 Tablet(s) Oral every 12 hours    MEDICATIONS  (PRN):  acetaminophen  Suppository .. 650 milliGRAM(s) Rectal every 6 hours PRN Temp greater or equal to 38C (100.4F), Mild Pain (1 - 3), Moderate Pain (4 - 6)  bisacodyl Suppository 10 milliGRAM(s) Rectal every 24 hours PRN Constipation  haloperidol    Injectable 2 milliGRAM(s) IntraMuscular every 6 hours PRN Agitation  ondansetron Injectable 4 milliGRAM(s) IV Push every 6 hours PRN Nausea and/or Vomiting    Pertinent Labs: CBC Full  -  ( 21 Dec 2020 07:56 )  WBC Count : 5.59 K/uL  RBC Count : 2.81 M/uL  Hemoglobin : 7.7 g/dL  Hematocrit : 25.1 %  Platelet Count - Automated : 179 K/uL  Mean Cell Volume : 89.3 fl  Mean Cell Hemoglobin : 27.4 pg  Mean Cell Hemoglobin Concentration : 30.7 gm/dL  12-21 Na145 mmol/L Glu 101 mg/dL<H> K+ 4.5 mmol/L Cr  2.36 mg/dL<H> BUN 38.0 mg/dL<H> Phos n/a   Alb n/a   PAB n/a       Skin: L, heel unstageable, R. 1st toe unstageable, R. heel unstageble    Nutrition focused physical exam conducted - found signs of malnutrition [ ]absent [ x]present    Subcutaneous fat loss: [x ] Orbital fat pads region, [ x]Buccal fat region, [ ]Triceps region,  [ ]Ribs region    Muscle wasting: [x ]Temples region, [x ]Clavicle region, [x ]Shoulder region, [ ]Scapula region, [ ]Interosseous region,  [ ]thigh region, [ ]Calf region    Estimated Needs:   [x] no change since previous assessment  [ ] recalculated:     Current Nutrition Diagnosis: Pt remains at high nutrition risk secondary to malnutrition (severe chronic) related to inadequate protein calorie intake as evidenced by PO<50% est needs, 18.8% weight loss, and physical findings. Pt with varied PO intake with assistance, doesn't like the Ensure. refusing to try other supplements.     Recommendations:   1) Continue MVI, thiamine, and folic acid  2) Continue to provide encouragement/assistance as needed during mealtimes to inc PO     Monitoring and Evaluation:   [x] PO intake [x] Tolerance to diet prescription [X] Weights  [X] Follow up per protocol [X] Labs: Source: Patient [x]  Family [ ]   other [ ] EMR    Current Diet:   Diet, Soft:   Dysphagia 3, Soft, Nectar Consistency Fluid (DYS3NC)  Supplement Feeding Modality:  Oral  Ensure Enlive Cans or Servings Per Day:  1       Frequency:  Three Times a day (12-21-20 @ 14:34)    Patient reports [ ] nausea  [ ] vomiting [ ] diarrhea [ ] constipation  [x]chewing problems [ ] swallowing issues  [ ] other:     PO intake:  < 50% [ ]   50-75%  [ ]   %  [ ]  other : varied PO intake    Source for PO intake [x] Patient [ ] family [x] chart [ ] staff [ ] other    Current Weight:   (12/21)   192 lbs    % Weight Change: No weight to compare    Pertinent Medications: MEDICATIONS  (STANDING):  allopurinol 300 milliGRAM(s) Oral daily  ascorbic acid 500 milliGRAM(s) Oral two times a day  aspirin  chewable 81 milliGRAM(s) Oral daily  atorvastatin 20 milliGRAM(s) Oral at bedtime  cadexomer iodine 0.9% Gel 1 Application(s) Topical every 24 hours  calcium carbonate 1250 mG  + Vitamin D (OsCal 500 + D) 1 Tablet(s) Oral daily  dextrose 40% Gel 15 Gram(s) Oral once  dextrose 5%. 1000 milliLiter(s) (100 mL/Hr) IV Continuous <Continuous>  dextrose 5%. 1000 milliLiter(s) (50 mL/Hr) IV Continuous <Continuous>  dextrose 50% Injectable 25 Gram(s) IV Push once  dextrose 50% Injectable 12.5 Gram(s) IV Push once  dextrose 50% Injectable 25 Gram(s) IV Push once  epoetin liam-epbx (RETACRIT) Injectable 86122 Unit(s) SubCutaneous <User Schedule>  ferrous    sulfate 325 milliGRAM(s) Oral three times a day  finasteride 5 milliGRAM(s) Oral daily  folic acid 1 milliGRAM(s) Oral daily  glucagon  Injectable 1 milliGRAM(s) IntraMuscular once  heparin   Injectable 5000 Unit(s) SubCutaneous every 12 hours  insulin lispro (ADMELOG) corrective regimen sliding scale   SubCutaneous Before meals and at bedtime  lactulose Syrup 20 Gram(s) Oral two times a day  levothyroxine 25 MICROGram(s) Oral daily  midodrine. 5 milliGRAM(s) Oral three times a day  multivitamin 1 Tablet(s) Oral daily  nystatin Powder 1 Application(s) Topical three times a day  pantoprazole    Tablet 40 milliGRAM(s) Oral before breakfast  polyethylene glycol 3350 17 Gram(s) Oral daily  saccharomyces boulardii 250 milliGRAM(s) Oral two times a day  senna 2 Tablet(s) Oral at bedtime  tamsulosin 0.4 milliGRAM(s) Oral at bedtime  trimethoprim  160 mG/sulfamethoxazole 800 mG 2 Tablet(s) Oral every 12 hours    MEDICATIONS  (PRN):  acetaminophen  Suppository .. 650 milliGRAM(s) Rectal every 6 hours PRN Temp greater or equal to 38C (100.4F), Mild Pain (1 - 3), Moderate Pain (4 - 6)  bisacodyl Suppository 10 milliGRAM(s) Rectal every 24 hours PRN Constipation  haloperidol    Injectable 2 milliGRAM(s) IntraMuscular every 6 hours PRN Agitation  ondansetron Injectable 4 milliGRAM(s) IV Push every 6 hours PRN Nausea and/or Vomiting    Pertinent Labs: CBC Full  -  ( 21 Dec 2020 07:56 )  WBC Count : 5.59 K/uL  RBC Count : 2.81 M/uL  Hemoglobin : 7.7 g/dL  Hematocrit : 25.1 %  Platelet Count - Automated : 179 K/uL  Mean Cell Volume : 89.3 fl  Mean Cell Hemoglobin : 27.4 pg  Mean Cell Hemoglobin Concentration : 30.7 gm/dL  12-21 Na145 mmol/L Glu 101 mg/dL<H> K+ 4.5 mmol/L Cr  2.36 mg/dL<H> BUN 38.0 mg/dL<H> Phos n/a   Alb n/a   PAB n/a       Skin: L, heel unstageable, R. 1st toe unstageable, R. heel unstageble    Nutrition focused physical exam conducted - found signs of malnutrition [ ]absent [ x]present    Subcutaneous fat loss: [x ] Orbital fat pads region, [ x]Buccal fat region, [ ]Triceps region,  [ ]Ribs region    Muscle wasting: [x ]Temples region, [x ]Clavicle region, [x ]Shoulder region, [ ]Scapula region, [ ]Interosseous region,  [ ]thigh region, [ ]Calf region    Estimated Needs:   [x] no change since previous assessment  [ ] recalculated:     Current Nutrition Diagnosis: Pt remains at high nutrition risk secondary to malnutrition (severe chronic) related to inadequate protein calorie intake as evidenced by PO<50% est needs, 18.8% weight loss, and physical findings. Pt with varied PO intake with assistance, doesn't like the Ensure. refusing to try other supplements. Aware d/c pending bladder emptying.     Recommendations:   1) Continue MVI, thiamine, and folic acid  2) Continue to provide encouragement/assistance as needed during mealtimes to inc PO     Monitoring and Evaluation:   [x] PO intake [x] Tolerance to diet prescription [X] Weights  [X] Follow up per protocol [X] Labs:

## 2020-12-21 NOTE — PROGRESS NOTE ADULT - ASSESSMENT
71 yo male with resolved hematuria  - cont rodriguez cath for UO montioring  - creatinine slightly improved  - f/u urine cytology  - may start VTE prophylaxis

## 2020-12-21 NOTE — PROGRESS NOTE ADULT - ASSESSMENT
The patient is a 70 year old male with a history of developmental disability, chronic afib ( not on AC due to hematuria/anemia) diastolic CHF, CKD 3, hypertension, hyperlipidemia, diabetes mellitus type 2, chronic venous stasis, lower extremity ulcers and dysphagia sent from the [facility] after being found lethargic with slurred speech. Hypothermic in the ER, Code stroke called [facility] of 2. CT head was negative and stroke ruled out. Chest xray consistent wiht small left pleural effusion. UA was positive; started on IV antibiotics. Noted to have foot ulcerations. Patient refused MRI; CT of the foot negative for OM. Rodriguez was placed for urinary retention and MARSHAL.    Assessment/plan:    1. Sepsis present on admission with metabolic encephalopathy secondary to lower extremity wounds: Sepsis resolved  Wound culture- Providencia, Pseudomonas, Enterococcus, ESBL Proteus and Stenotrophomonas     PO bactrim until 12/24  No OM on CT foot; refused MRI to rule out OM  Wound care per podiatry    2. Hematuria: Resovled  Rodriguez in place per urology  Irrigate Q8 hours    3. Anemia; Worsening anemia of chronic disease suspected secondary to hematuria:  Monitor Hb/hct  On Epogen    4. MARSHAL with CKD Stage 3 ( Crt 1.57 in November 2020)    5. Hypernatremia: resolved  D/c iv fluids  MOnitor BMP    6.  Decubitus heel ulcers: Local wound care  POA    7. Severe protein calorie malnutrition: encourage oral intake of fluids and supplementation  MV      VTE- Resume heparin subcut      GOC:  DNR/I. Patient is under OPWDD    Palliative Following    Prognosis extremely guarded.     Discharge disposition:  Pending recommendations by urology regarding rodriguez

## 2020-12-21 NOTE — PROGRESS NOTE ADULT - SUBJECTIVE AND OBJECTIVE BOX
CC: Follow up    INTERVAL HPI/OVERNIGHT EVENTS: Patient seen and examined, no acute events overnight. poor oral and fluid intake. Hematuria resolved       Vital Signs Last 24 Hrs  T(C): 36.4 (21 Dec 2020 07:58), Max: 36.6 (20 Dec 2020 23:21)  T(F): 97.6 (21 Dec 2020 07:58), Max: 97.8 (20 Dec 2020 23:21)  HR: 56 (21 Dec 2020 07:58) (56 - 77)  BP: 99/76 (21 Dec 2020 07:58) (93/61 - 99/76)  BP(mean): --  RR: 20 (21 Dec 2020 07:58) (18 - 20)  SpO2: 98% (21 Dec 2020 07:58) (95% - 98%)    PHYSICAL EXAM:    GENERAL: NAD  ENMT: Moist mucous membranes  NECK: Supple, No JVD  CHEST/LUNG: Clear to auscultation bilaterally; No rales, rhonchi, wheezing, or rubs  HEART: Regular rate and rhythm; No murmurs, rubs, or gallops  ABDOMEN: Soft, Nontender, Nondistended; Bowel sounds present  ++ Rivera   EXTREMITIES: bilateral heel ulcerations noted  Ulceration on the lateral aspect of large left toe  SKIN; Groin intertrigo prsent         MEDICATIONS  (STANDING):  allopurinol 300 milliGRAM(s) Oral daily  ascorbic acid 500 milliGRAM(s) Oral two times a day  aspirin  chewable 81 milliGRAM(s) Oral daily  atorvastatin 20 milliGRAM(s) Oral at bedtime  cadexomer iodine 0.9% Gel 1 Application(s) Topical every 24 hours  calcium carbonate 1250 mG  + Vitamin D (OsCal 500 + D) 1 Tablet(s) Oral daily  dextrose 40% Gel 15 Gram(s) Oral once  dextrose 5%. 1000 milliLiter(s) (100 mL/Hr) IV Continuous <Continuous>  dextrose 5%. 1000 milliLiter(s) (50 mL/Hr) IV Continuous <Continuous>  dextrose 50% Injectable 25 Gram(s) IV Push once  dextrose 50% Injectable 12.5 Gram(s) IV Push once  dextrose 50% Injectable 25 Gram(s) IV Push once  epoetin liam-epbx (RETACRIT) Injectable 22110 Unit(s) SubCutaneous <User Schedule>  ferrous    sulfate 325 milliGRAM(s) Oral three times a day  finasteride 5 milliGRAM(s) Oral daily  folic acid 1 milliGRAM(s) Oral daily  glucagon  Injectable 1 milliGRAM(s) IntraMuscular once  heparin   Injectable 5000 Unit(s) SubCutaneous every 12 hours  insulin lispro (ADMELOG) corrective regimen sliding scale   SubCutaneous Before meals and at bedtime  lactulose Syrup 20 Gram(s) Oral two times a day  levothyroxine 25 MICROGram(s) Oral daily  midodrine. 5 milliGRAM(s) Oral three times a day  multivitamin 1 Tablet(s) Oral daily  nystatin Powder 1 Application(s) Topical three times a day  polyethylene glycol 3350 17 Gram(s) Oral daily  saccharomyces boulardii 250 milliGRAM(s) Oral two times a day  senna 2 Tablet(s) Oral at bedtime  tamsulosin 0.4 milliGRAM(s) Oral at bedtime  trimethoprim  160 mG/sulfamethoxazole 800 mG 2 Tablet(s) Oral every 12 hours    MEDICATIONS  (PRN):  acetaminophen  Suppository .. 650 milliGRAM(s) Rectal every 6 hours PRN Temp greater or equal to 38C (100.4F), Mild Pain (1 - 3), Moderate Pain (4 - 6)  bisacodyl Suppository 10 milliGRAM(s) Rectal every 24 hours PRN Constipation  haloperidol    Injectable 2 milliGRAM(s) IntraMuscular every 6 hours PRN Agitation  ondansetron Injectable 4 milliGRAM(s) IV Push every 6 hours PRN Nausea and/or Vomiting      Allergies    No Known Allergies    Intolerances          LABS:                          7.7    5.59  )-----------( 179      ( 21 Dec 2020 07:56 )             25.1     12-21    145  |  115<H>  |  38.0<H>  ----------------------------<  101<H>  4.5   |  21.0<L>  |  2.36<H>    Ca    8.5<L>      21 Dec 2020 07:56            RADIOLOGY & ADDITIONAL TESTS:

## 2020-12-21 NOTE — DISCHARGE NOTE PROVIDER - NSDCMRMEDTOKEN_GEN_ALL_CORE_FT
allopurinol 300 mg oral tablet: 1 tab(s) orally once a day  aspirin 81 mg oral delayed release tablet: 1 tab(s) orally once a day  atorvastatin 20 mg oral tablet: 1 tab(s) orally once a day (at bedtime)  cadexomer iodine 0.9% topical gel: 1 application topically every 24 hours  Caltrate 600+D oral tablet, chewable: 1 tab(s) chewed once a day   dutasteride 0.5 mg oral capsule: 1 cap(s) orally once a day  ferrous sulfate 325 mg (65 mg elemental iron) oral tablet: 1 tab(s) orally 3 times a day  finasteride 5 mg oral tablet: 1 tab(s) orally once a day  folic acid 1 mg oral tablet: 1 tab(s) orally once a day  furosemide 40 mg oral tablet: 1 tab(s) orally once a day  insulin lispro 100 units/mL injectable solution: Sliding scale three times per day before meals   Januvia 100 mg oral tablet: 1 tab(s) orally once a day  lactulose 10 g/15 mL oral syrup: 30 milliliter(s) orally 2 times a day, As Needed constipation   levothyroxine 25 mcg (0.025 mg) oral tablet: 1 tab(s) orally once a day  midodrine 5 mg oral tablet: 1 tab(s) orally 3 times a day  Multiple Vitamins oral tablet: 1 tab(s) orally once a day  nystatin 100,000 units/g topical powder: Apply topically to groin and abdominal fold 2 times a day as needed for redness  oxybutynin 10 mg/24 hr oral tablet, extended release: 1 tab(s) orally once a day  polyethylene glycol 3350 oral powder for reconstitution: 17 gram(s) orally once a day, As needed for  Constipation if no BM in one day, discontinue for loose stool  saccharomyces boulardii lyo 250 mg oral capsule: 1 cap(s) orally 2 times a day  senna oral tablet: 2 tab(s) orally once a day (at bedtime)  sulfamethoxazole-trimethoprim 800 mg-160 mg oral tablet: 2 tab(s) orally every 12 hours  end date 12/24/20  tamsulosin 0.4 mg oral capsule: 1 cap(s) orally once a day (at bedtime)  Vitamin C 250 mg oral tablet: 1 tab(s) orally once a day  zinc sulfate 220 mg oral capsule: 1 cap(s) orally once a day   allopurinol 300 mg oral tablet: 1 tab(s) orally once a day  aspirin 81 mg oral delayed release tablet: 1 tab(s) orally once a day  atorvastatin 20 mg oral tablet: 1 tab(s) orally once a day (at bedtime)  cadexomer iodine 0.9% topical gel: 1 application topically every 24 hours  Caltrate 600+D oral tablet, chewable: 1 tab(s) chewed once a day   ferrous sulfate 325 mg (65 mg elemental iron) oral tablet: 1 tab(s) orally 3 times a day  finasteride 5 mg oral tablet: 1 tab(s) orally once a day  folic acid 1 mg oral tablet: 1 tab(s) orally once a day  furosemide 40 mg oral tablet: 1 tab(s) orally once a day  insulin lispro 100 units/mL injectable solution: Sliding scale three times per day before meals   Januvia 100 mg oral tablet: 1 tab(s) orally once a day  lactulose 10 g/15 mL oral syrup: 30 milliliter(s) orally 2 times a day, As Needed constipation   levothyroxine 25 mcg (0.025 mg) oral tablet: 1 tab(s) orally once a day  midodrine 5 mg oral tablet: 1 tab(s) orally 3 times a day  Multiple Vitamins oral tablet: 1 tab(s) orally once a day  nystatin 100,000 units/g topical powder: Apply topically to groin and abdominal fold 2 times a day as needed for redness  oxybutynin 10 mg/24 hr oral tablet, extended release: 1 tab(s) orally once a day  polyethylene glycol 3350 oral powder for reconstitution: 17 gram(s) orally once a day, As needed for  Constipation if no BM in one day, discontinue for loose stool  saccharomyces boulardii lyo 250 mg oral capsule: 1 cap(s) orally 2 times a day  senna oral tablet: 2 tab(s) orally once a day (at bedtime)  sulfamethoxazole-trimethoprim 800 mg-160 mg oral tablet: 2 tab(s) orally every 12 hours  end date 12/24/20  tamsulosin 0.4 mg oral capsule: 1 cap(s) orally once a day (at bedtime)  Vitamin C 250 mg oral tablet: 1 tab(s) orally once a day  zinc sulfate 220 mg oral capsule: 1 cap(s) orally once a day   allopurinol 300 mg oral tablet: 1 tab(s) orally once a day  aspirin 81 mg oral delayed release tablet: 1 tab(s) orally once a day  atorvastatin 20 mg oral tablet: 1 tab(s) orally once a day (at bedtime)  cadexomer iodine 0.9% topical gel: 1 application topically every 24 hours  Caltrate 600+D oral tablet, chewable: 1 tab(s) chewed once a day   ferrous sulfate 325 mg (65 mg elemental iron) oral tablet: 1 tab(s) orally 3 times a day  finasteride 5 mg oral tablet: 1 tab(s) orally once a day  folic acid 1 mg oral tablet: 1 tab(s) orally once a day  furosemide 40 mg oral tablet: 1 tab(s) orally once a day  Januvia 100 mg oral tablet: 1 tab(s) orally once a day  levothyroxine 25 mcg (0.025 mg) oral tablet: 1 tab(s) orally once a day  midodrine 5 mg oral tablet: 1 tab(s) orally 3 times a day  Multiple Vitamins oral tablet: 1 tab(s) orally once a day  nystatin 100,000 units/g topical powder: Apply topically to groin and abdominal fold 2 times a day as needed for redness  polyethylene glycol 3350 oral powder for reconstitution: 17 gram(s) orally once a day, As needed for  Constipation if no BM in one day, discontinue for loose stool  tamsulosin 0.4 mg oral capsule: 1 cap(s) orally once a day (at bedtime)  Vitamin C 250 mg oral tablet: 1 tab(s) orally once a day  zinc sulfate 220 mg oral capsule: 1 cap(s) orally once a day

## 2020-12-21 NOTE — CHART NOTE - NSCHARTNOTEFT_GEN_A_CORE
Pt resting comfortably  urine remains clear, yellow  May d/c rodriguez now  bladder scan in 6hrs or after 1st void and record amount to ensure bladder emptying

## 2020-12-21 NOTE — PROGRESS NOTE ADULT - SUBJECTIVE AND OBJECTIVE BOX
Subjective:70yMale with gross hematuria on admission.  pt resting comfortably in bed.  Rivera at in place, draining well.  Urine yellow.    Rivera: clear, yellow    Vital Signs Last 24 Hrs  T(C): 36.4 (21 Dec 2020 07:58), Max: 36.6 (20 Dec 2020 23:21)  T(F): 97.6 (21 Dec 2020 07:58), Max: 97.8 (20 Dec 2020 23:21)  HR: 56 (21 Dec 2020 07:58) (56 - 77)  BP: 99/76 (21 Dec 2020 07:58) (93/61 - 99/76)  BP(mean): --  RR: 20 (21 Dec 2020 07:58) (18 - 20)  SpO2: 98% (21 Dec 2020 07:58) (95% - 98%)  I&O's Detail    20 Dec 2020 07:01  -  21 Dec 2020 07:00  --------------------------------------------------------  IN:  Total IN: 0 mL    OUT:    Voided (mL): 1200 mL  Total OUT: 1200 mL    Total NET: -1200 mL          Labs:                        7.7    5.59  )-----------( 179      ( 21 Dec 2020 07:56 )             25.1     12-21    145  |  115<H>  |  38.0<H>  ----------------------------<  101<H>  4.5   |  21.0<L>  |  2.36<H>    Ca    8.5<L>      21 Dec 2020 07:56

## 2020-12-21 NOTE — DISCHARGE NOTE PROVIDER - CARE PROVIDERS DIRECT ADDRESSES
[email or web address]

## 2020-12-21 NOTE — DISCHARGE NOTE PROVIDER - DETAILS OF MALNUTRITION DIAGNOSIS/DIAGNOSES
This patient has been assessed with a concern for Malnutrition and was treated during this hospitalization for the following Nutrition diagnosis/diagnoses:     -  12/13/2020: Severe protein-calorie malnutrition   This patient has been assessed with a concern for Malnutrition and was treated during this hospitalization for the following Nutrition diagnosis/diagnoses:     -  12/13/2020: Severe protein-calorie malnutrition    This patient has been assessed with a concern for Malnutrition and was treated during this hospitalization for the following Nutrition diagnosis/diagnoses:     -  12/13/2020: Severe protein-calorie malnutrition   This patient has been assessed with a concern for Malnutrition and was treated during this hospitalization for the following Nutrition diagnosis/diagnoses:     -  12/13/2020: Severe protein-calorie malnutrition    This patient has been assessed with a concern for Malnutrition and was treated during this hospitalization for the following Nutrition diagnosis/diagnoses:     -  12/13/2020: Severe protein-calorie malnutrition    This patient has been assessed with a concern for Malnutrition and was treated during this hospitalization for the following Nutrition diagnosis/diagnoses:     -  12/13/2020: Severe protein-calorie malnutrition

## 2020-12-22 NOTE — PROGRESS NOTE ADULT - ASSESSMENT
The patient is a 70 year old male with a history of developmental disability, chronic afib ( not on AC due to hematuria/anemia) diastolic CHF, CKD 3, hypertension, hyperlipidemia, diabetes mellitus type 2, chronic venous stasis, lower extremity ulcers and dysphagia sent from the senior care after being found lethargic with slurred speech. Hypothermic in the ER, Code stroke called NIH of 2. CT head was negative and stroke ruled out. Chest xray consistent wiht small left pleural effusion. UA was positive; started on IV antibiotics. Noted to have foot ulcerations. Patient refused MRI; CT of the foot negative for OM. Rivera was placed for urinary retention and MARSHAL.    Assessment/plan:    1. Sepsis present on admission with metabolic encephalopathy secondary to lower extremity wounds: Sepsis resolved  Wound culture- Providencia, Pseudomonas, Enterococcus, ESBL Proteus and Stenotrophomonas     PO bactrim until 12/24  No OM on CT foot; refused MRI to rule out OM   Wound care instructions:  Clean ulcer sites with sterile saline  Apply iodosorb to b/l heel and Right 1st MPJ wound, cover with Allevyn pads  Change dressing every other day    2. Hematuria: Resovled  Rivera was removed for TOV on 12/21  Voiding well     3. Anemia; Worsening anemia of chronic disease suspected secondary to hematuria:  Monitor Hb/hct  On Epogen  FOBT positive- started on PO protonix    4. MARSHAL with CKD Stage 3 ( Crt 1.57 in November 2020)- Follow up BMP     5. Hypernatremia: resolved  D/c iv fluids  MOnitor BMP    6.  Decubitus heel ulcers: Local wound care  POA    7. Severe protein calorie malnutrition: encourage oral intake of fluids and supplementation  MV    8. Dysphagia: Patient has dysphagia as demonstrated on previous MBS and repeat bedside assessments by speech therapy. He was upgraded to Soft with nectar thick liquids. Patient is not safe for thin liquids as he has shown signs of aspiration on MBS. Patient does not comprehend risk.       VTE- Heparin subcut      GOC:  DNR/I. Patient is under OPWDD   Palliative Following    Prognosis extremely guarded.     Discharge disposition:  Patient needs [facility] for wound care as group home is refusing. Attempting to reach out to patient's sister to discuss plan of care.

## 2020-12-22 NOTE — PROGRESS NOTE ADULT - SUBJECTIVE AND OBJECTIVE BOX
CC: Follow up    INTERVAL HPI/OVERNIGHT EVENTS: Patient seen and examined, he is very upset he is unable to drink soda/seltzer without thicker. He does not understand he is at risk for aspiration/choking with regular liquids. Remains very adamant and upset; refusing to eat or drink anything.       Vital Signs Last 24 Hrs  T(C): 36.8 (22 Dec 2020 07:51), Max: 36.8 (22 Dec 2020 07:51)  T(F): 98.3 (22 Dec 2020 07:51), Max: 98.3 (22 Dec 2020 07:51)  HR: 83 (22 Dec 2020 07:51) (58 - 83)  BP: 98/68 (22 Dec 2020 07:51) (97/64 - 102/70)  BP(mean): --  RR: 20 (22 Dec 2020 07:51) (18 - 20)  SpO2: 94% (22 Dec 2020 07:51) (94% - 96%)    PHYSICAL EXAM:    GENERAL: NAD, AOX 2  ENMT: Moist mucous membranes  NECK: Supple, No JVD  CHEST/LUNG: Clear to auscultation bilaterally; No rales, rhonchi, wheezing, or rubs  HEART: Regular rate and rhythm; No murmurs, rubs, or gallops  ABDOMEN: Soft, Nontender, Nondistended; Bowel sounds present  ++ Rivera   EXTREMITIES: bilateral heel ulcerations noted  Ulceration on the lateral aspect of large left toe  SKIN; Groin intertrigo prsent           MEDICATIONS  (STANDING):  allopurinol 300 milliGRAM(s) Oral daily  ascorbic acid 500 milliGRAM(s) Oral two times a day  aspirin  chewable 81 milliGRAM(s) Oral daily  atorvastatin 20 milliGRAM(s) Oral at bedtime  cadexomer iodine 0.9% Gel 1 Application(s) Topical every 24 hours  calcium carbonate 1250 mG  + Vitamin D (OsCal 500 + D) 1 Tablet(s) Oral daily  dextrose 40% Gel 15 Gram(s) Oral once  dextrose 5%. 1000 milliLiter(s) (100 mL/Hr) IV Continuous <Continuous>  dextrose 5%. 1000 milliLiter(s) (50 mL/Hr) IV Continuous <Continuous>  dextrose 50% Injectable 25 Gram(s) IV Push once  dextrose 50% Injectable 12.5 Gram(s) IV Push once  dextrose 50% Injectable 25 Gram(s) IV Push once  epoetin liam-epbx (RETACRIT) Injectable 39468 Unit(s) SubCutaneous <User Schedule>  ferrous    sulfate 325 milliGRAM(s) Oral three times a day  finasteride 5 milliGRAM(s) Oral daily  folic acid 1 milliGRAM(s) Oral daily  glucagon  Injectable 1 milliGRAM(s) IntraMuscular once  heparin   Injectable 5000 Unit(s) SubCutaneous every 12 hours  insulin lispro (ADMELOG) corrective regimen sliding scale   SubCutaneous Before meals and at bedtime  lactulose Syrup 20 Gram(s) Oral two times a day  levothyroxine 25 MICROGram(s) Oral daily  midodrine. 5 milliGRAM(s) Oral three times a day  multivitamin 1 Tablet(s) Oral daily  nystatin Powder 1 Application(s) Topical three times a day  pantoprazole    Tablet 40 milliGRAM(s) Oral before breakfast  polyethylene glycol 3350 17 Gram(s) Oral daily  saccharomyces boulardii 250 milliGRAM(s) Oral two times a day  senna 2 Tablet(s) Oral at bedtime  tamsulosin 0.4 milliGRAM(s) Oral at bedtime  trimethoprim  160 mG/sulfamethoxazole 800 mG 2 Tablet(s) Oral every 12 hours    MEDICATIONS  (PRN):  acetaminophen  Suppository .. 650 milliGRAM(s) Rectal every 6 hours PRN Temp greater or equal to 38C (100.4F), Mild Pain (1 - 3), Moderate Pain (4 - 6)  bisacodyl Suppository 10 milliGRAM(s) Rectal every 24 hours PRN Constipation  haloperidol    Injectable 2 milliGRAM(s) IntraMuscular every 6 hours PRN Agitation  ondansetron Injectable 4 milliGRAM(s) IV Push every 6 hours PRN Nausea and/or Vomiting      Allergies    No Known Allergies    Intolerances          LABS:                          7.8    4.45  )-----------( 203      ( 22 Dec 2020 10:51 )             25.6     12-21    145  |  115<H>  |  38.0<H>  ----------------------------<  101<H>  4.5   |  21.0<L>  |  2.36<H>    Ca    8.5<L>      21 Dec 2020 07:56            RADIOLOGY & ADDITIONAL TESTS:

## 2020-12-22 NOTE — CHART NOTE - NSCHARTNOTEFT_GEN_A_CORE
Pt passed voiding trial yesterday    Urine Cytology noted below  d/c planning as per primary team    Cytopathology - Non Gyn Report (12.20.20 @ 18:00)   :   ACCESSION No:  [id number]  KIMBERLY MARKS                       1  Cytopathology Report  Specimen(s) Submitted  URINE    Clinical History  None provided      Gross Description  Received: 70  ml of yellow fluid in CytoLyt  Prepared: 1 ThinPrep slide      Final Diagnosis  URINE  NEGATIVE FOR HIGH GRADE UROTHELIAL CARCINOMA  Cytology specimen consists of reactive urothelial and squamous  cells with acute inflammation and scattered red blood cells. Rare  fungal organisms morphologically consistent with Candida species  also seen.  Please also refer to specimen number [id number]    Screened by: America WOOTEN(ASCP)  Verified by: ALICE AQUINO MD Pathologist  (Electronic Signature)  Reported on: 12/22/20 12:23 EST, [address] [address]  Phone: [phone]   Fax: [phone]  Cytology technical processing performed at [address] [place] [address]  _________________________________________________________________

## 2020-12-22 NOTE — PHARMACOTHERAPY INTERVENTION NOTE - COMMENTS
updated based on transfer lists. s/w MD regarding bph meds updated based on transfer lists. s/w MD regarding bph meds    [facility] list

## 2020-12-23 NOTE — PROGRESS NOTE ADULT - ASSESSMENT
The patient is a 70 year old male with a history of developmental disability, chronic afib ( not on AC due to hematuria/anemia) diastolic CHF, CKD 3, hypertension, hyperlipidemia, diabetes mellitus type 2, chronic venous stasis, lower extremity ulcers and dysphagia sent from the [facility] after being found lethargic with slurred speech. Hypothermic in the ER, Code stroke called NIH of 2. CT head was negative and stroke ruled out. Chest x-ray consistent with small left pleural effusion. UA was positive; started on IV antibiotics. Noted to have foot ulcerations. Patient refused MRI; CT of the foot negative for OM. Rivera was placed for urinary retention and MARSHAL.    Assessment/plan:    1. Sepsis present on admission with metabolic encephalopathy secondary to lower extremity wounds: Sepsis resolved  Wound culture- Providencia, Pseudomonas, Enterococcus, ESBL Proteus and Stenotrophomonas     PO bactrim until 12/24  No OM on CT foot; refused MRI to rule out OM   Wound care instructions:  Clean ulcer sites with sterile saline  Apply Iodosorb to b/l heel and Right 1st MPJ wound, cover with Allevyn pads  Change dressing every other day    2. Hematuria: Resolved  Rivera was removed for TOV on 12/21  Voiding well     3. Anemia; Worsening anemia of chronic disease suspected secondary to hematuria:   Monitor Hb/hct  On Epogen  FOBT positive- started on PO protonix    4. MARSHAL with CKD Stage 3 ( Crt 1.57 in November 2020)- Follow up BMP     5. Hypernatremia: Patient is refusing to drink fluid if it is thickened   Resume IV hydration   MOnitor BMP    6.  Decubitus heel ulcers: Local wound care  POA    7. Severe protein calorie malnutrition: encourage oral intake of fluids and supplementation  MV    8. Dysphagia: Patient has dysphagia as demonstrated on previous MBS and repeat bedside assessments by speech therapy. He was upgraded to Soft with nectar thick liquids. Patient is not safe for thin liquids as he has shown signs of aspiration on MBS. Patient does not comprehend risk. I spoke at length with the patient's sister Avani. All of this was discussed in detail. Discussed possibility of  pleasure feeds with the risk of aspiration and tube feedings for liquids- she refused both. States she will speak to her brother and encourage him       VTE- Heparin subcut      GOC:  DNR/I. Patient is under OPWDD   Palliative Following    Prognosis extremely guarded.     Discharge disposition:  Patient needs [facility] for wound care as [facility] is not able to care for patient's wounds. I explained the scenario to Avani but she does not want the patient at [facility]. SW aware

## 2020-12-23 NOTE — PROGRESS NOTE ADULT - SUBJECTIVE AND OBJECTIVE BOX
CC: Follow up    INTERVAL HPI/OVERNIGHT EVENTS: Patient seen and examined, patient refuses thickened liquids. Does not understand the risks involved as far as choking/aspiraiton.        Vital Signs Last 24 Hrs  T(C): 36.3 (23 Dec 2020 07:46), Max: 36.7 (22 Dec 2020 23:28)  T(F): 97.4 (23 Dec 2020 07:46), Max: 98 (22 Dec 2020 23:28)  HR: 75 (23 Dec 2020 07:46) (75 - 84)  BP: 96/62 (23 Dec 2020 07:46) (96/62 - 97/64)  BP(mean): --  RR: 18 (23 Dec 2020 07:46) (18 - 19)  SpO2: 94% (23 Dec 2020 07:46) (92% - 95%)    PHYSICAL EXAM:    GENERAL: NAD, AOX2  HEAD:  Atraumatic, Normocephalic  EYES: EOMI, PERRLA, conjunctiva and sclera clear  ENMT: Moist mucous membranes  CHEST/LUNG: Clear to auscultation bilaterally; No rales, rhonchi, wheezing, or rubs  HEART: Regular rate and rhythm; No murmurs, rubs, or gallops  ABDOMEN: Soft, Nontender, Nondistended; Bowel sounds present  EXTREMITIES: Bilateral lower extremity dressings in place  Groing fungal rash         MEDICATIONS  (STANDING):  allopurinol 300 milliGRAM(s) Oral daily  ascorbic acid 500 milliGRAM(s) Oral two times a day  aspirin  chewable 81 milliGRAM(s) Oral daily  atorvastatin 20 milliGRAM(s) Oral at bedtime  cadexomer iodine 0.9% Gel 1 Application(s) Topical every 24 hours  calcium carbonate 1250 mG  + Vitamin D (OsCal 500 + D) 1 Tablet(s) Oral daily  dextrose 40% Gel 15 Gram(s) Oral once  dextrose 5%. 1000 milliLiter(s) (100 mL/Hr) IV Continuous <Continuous>  dextrose 5%. 1000 milliLiter(s) (50 mL/Hr) IV Continuous <Continuous>  dextrose 50% Injectable 25 Gram(s) IV Push once  dextrose 50% Injectable 12.5 Gram(s) IV Push once  dextrose 50% Injectable 25 Gram(s) IV Push once  epoetin liam-epbx (RETACRIT) Injectable 56416 Unit(s) SubCutaneous <User Schedule>  ferrous    sulfate 325 milliGRAM(s) Oral three times a day  finasteride 5 milliGRAM(s) Oral daily  folic acid 1 milliGRAM(s) Oral daily  glucagon  Injectable 1 milliGRAM(s) IntraMuscular once  heparin   Injectable 5000 Unit(s) SubCutaneous every 12 hours  insulin lispro (ADMELOG) corrective regimen sliding scale   SubCutaneous Before meals and at bedtime  lactulose Syrup 20 Gram(s) Oral two times a day  levothyroxine 25 MICROGram(s) Oral daily  midodrine. 5 milliGRAM(s) Oral three times a day  multivitamin 1 Tablet(s) Oral daily  nystatin Powder 1 Application(s) Topical three times a day  pantoprazole    Tablet 40 milliGRAM(s) Oral before breakfast  polyethylene glycol 3350 17 Gram(s) Oral daily  saccharomyces boulardii 250 milliGRAM(s) Oral two times a day  senna 2 Tablet(s) Oral at bedtime  tamsulosin 0.4 milliGRAM(s) Oral at bedtime  trimethoprim  160 mG/sulfamethoxazole 800 mG 2 Tablet(s) Oral every 12 hours    MEDICATIONS  (PRN):  acetaminophen  Suppository .. 650 milliGRAM(s) Rectal every 6 hours PRN Temp greater or equal to 38C (100.4F), Mild Pain (1 - 3), Moderate Pain (4 - 6)  bisacodyl Suppository 10 milliGRAM(s) Rectal every 24 hours PRN Constipation  haloperidol    Injectable 2 milliGRAM(s) IntraMuscular every 6 hours PRN Agitation  ondansetron Injectable 4 milliGRAM(s) IV Push every 6 hours PRN Nausea and/or Vomiting      Allergies    No Known Allergies    Intolerances          LABS:                          7.6    4.99  )-----------( 196      ( 23 Dec 2020 09:58 )             25.5     12-23    146<H>  |  115<H>  |  37.0<H>  ----------------------------<  68<L>  4.6   |  20.0<L>  |  2.29<H>    Ca    8.6      23 Dec 2020 09:58            RADIOLOGY & ADDITIONAL TESTS:

## 2020-12-24 NOTE — PROGRESS NOTE ADULT - ASSESSMENT
The patient is a 70 year old male with a history of developmental disability, chronic afib ( not on AC due to hematuria/anemia) diastolic CHF, CKD 3, hypertension, hyperlipidemia, diabetes mellitus type 2, chronic venous stasis, lower extremity ulcers and dysphagia sent from the [facility] after being found lethargic with slurred speech. Hypothermic in the ER, Code stroke called NIH of 2. CT head was negative and stroke ruled out. Chest x-ray consistent with small left pleural effusion. UA was positive; started on IV antibiotics. Noted to have foot ulcerations. Patient refused MRI; CT of the foot negative for OM. Rivera was placed for urinary retention and MARSHAL.    Assessment/plan:    1. Sepsis present on admission with metabolic encephalopathy secondary to lower extremity wounds: Sepsis resolved  Wound culture- Providencia, Pseudomonas, Enterococcus, ESBL Proteus and Stenotrophomonas     PO bactrim until 12/24  No OM on CT foot; refused MRI to rule out OM   Wound care instructions:  Clean ulcer sites with sterile saline  Apply Iodosorb to b/l heel and Right 1st MPJ wound, cover with Allevyn pads  Change dressing every other day    2. Hematuria: Resolved  Rivera was removed for TOV on 12/21  Voiding well     3. Anemia; Worsening anemia of chronic disease suspected secondary to hematuria:   Monitor Hb/hct  On Epogen  FOBT positive- started on PO Protonix; hb/hct dropping- will consult Gi for recommendations     4. MARSHAL with CKD Stage 3 ( Crt 1.57 in November 2020)- Follow up BMP     5. Hypernatremia: Improved with IV hydration  Encourage PO intake  MOnitor BMP    6.  Decubitus heel ulcers: Local wound care  POA    7. Severe protein calorie malnutrition: encourage oral intake of fluids and supplementation  MV    8. Dysphagia: On soft with nectar thick fluid     VTE- Heparin subcut      GOC:  DNR/I. Patient is under OPWDD   Palliative Following    Prognosis extremely guarded.     Discharge disposition:  Patient needs [facility] for wound care as [facility] is not able to care for patient's wounds.

## 2020-12-24 NOTE — CONSULT NOTE ADULT - CONSULT REQUESTED DATE/TIME
09-Dec-2020 15:22
10-Dec-2020 18:15
11-Dec-2020 14:39
14-Dec-2020 14:03
15-Dec-2020 10:05
24-Dec-2020 13:53

## 2020-12-24 NOTE — CONSULT NOTE ADULT - PROBLEM SELECTOR RECOMMENDATION 9
probably multifactorial with hematuria and nosebleed, chronic disease with kidney disease. No overt GI bleeding but cant exclude occasional low volume bleeding. Just placed on pantoprazole and suggest increase to BID and moniter H/H. Poor candidate for EGD or colonoscopy. Transfuse to hgb of 8 if hgb drops below 7. probably multifactorial with hematuria and nosebleed, chronic disease with kidney disease. No overt GI bleeding but cant exclude occasional low volume bleeding. Just placed on pantoprazole and suggest increase to BID and moniter H/H. would also hold lovenox for the present.  Poor candidate for EGD or colonoscopy. Transfuse to hgb of 8 if hgb drops below 7. probably multifactorial with hematuria and nosebleed, chronic disease with kidney disease. No overt GI bleeding but cant exclude occasional low volume bleeding. Just placed on pantoprazole on 12/21 and suggest increase to BID and moniter H/H. would also hold lovenox for the present.  Poor candidate for EGD or colonoscopy. Transfuse to hgb of 8 if hgb drops below 7. probably multifactorial with hematuria and nosebleed, chronic disease with kidney disease. No overt GI bleeding but cant exclude occasional low volume bleeding. Just placed on pantoprazole on 12/21 and suggest increase to BID and moniter H/H. would also hold Lovenox for the present.  He is also obstipated, probably due to oral iron and will increase the lactulose and continue miralax. Poor candidate for EGD or colonoscopy. Transfuse to hgb of 8 if hgb drops below 7.

## 2020-12-24 NOTE — CONSULT NOTE ADULT - PROBLEM SELECTOR RECOMMENDATION 2
Continue with IV abx per ID recommendations, UA +  On Meropenem IVPB for pyelonephritis  Vancomycin IVPB ordered as well  Podiatry following for Right 1st MPJ ulcer  Bilateral unstageable heel ulcers  Pt not a surgical candidate. Will likely need long term IV antibiotics  Continue local wound care  Monitor for fevers
could easily be due to nosebleed.

## 2020-12-24 NOTE — CONSULT NOTE ADULT - SUBJECTIVE AND OBJECTIVE BOX
Patient is a 70y old  Male who presents with a chief complaint of Not doing well (24 Dec 2020 13:15)      HPI:  History was taken from charts as Aide from nursing home gone by the time patient was admitted.    70 years old male with recurrent hospitalizations, Developmental Disability, Chronic A. Fib not on AC due to Hematuria/Anemia, Diastolic Heart Failure, CKD 3, HTN, HLD, DM 2, Hypothermia, Chronic Venous Stasis, LE Ulcers, Chronic Left Shoulder Dislocation, Dysphagia and Aspiration Pneumonia sent from retirement as he was not doing well. He was lethargic, weak and his speech was slurred.   Patient is currently very sleepy and lethargic however wakes up on verbal stimuli. No active complaints. Speech is clear at this time.   He was code stroke in ER. No acute finding on CT. Found to be hypothermic. CXR with chronic small left pleural effusion. Urine was just sent and as per RN it was very cloudy.    We are asked to see the patient 16 days after admission due to recurrent drop in Hgb. On admission hemoglobin was 6.8 and given 2 units blood and increased to 8. On 12/19 the hemoglobin trending gradually down to 7.2 and given another unit of blood with hgb up to 8. Now hgb as again slowly dropped back down to 7.2. Patient is on oroal iron TID. One week ago he had a nosebleed and has had ongoing hematuria until recently. He denies any abdominal pain, nausea or vomiting. No rectal bleeding but his stools are dark and OB positive (in setting of recent nosebleed of no significance) His admission picture was felt due to infected ulcers on his LEs for which he has been on antibiotics with resolution of encsphalopathy.            (08 Dec 2020 18:11)      REVIEW OF SYSTEMS:    CONSTITUTIONAL: No fever, weight loss, or fatigue  EYES: No eye pain, visual disturbances, or discharge  ENMT:  No difficulty hearing, tinnitus, vertigo; No sinus or throat pain  NECK: No pain or stiffness  RESPIRATORY: No cough, wheezing, chills or hemoptysis; No shortness of breath  CARDIOVASCULAR: No chest pain, palpitations, dizziness, or leg swelling  GASTROINTESTINAL: No abdominal or epigastric pain. No nausea, vomiting, or hematemesis; No diarrhea or constipation. No melena or hematochezia.  NEUROLOGICAL: No headaches, memory loss, loss of strength, numbness, or tremors  SKIN: No itching, burning, rashes, or lesions   LYMPH NODES: No enlarged glands  MUSCULOSKELETAL: No joint pain or swelling; No muscle, back, or extremity pain  PSYCHIATRIC: No depression, anxiety, mood swings, or difficulty sleeping  HEME/LYMPH: No easy bruising, or bleeding gums  ALLERY AND IMMUNOLOGIC: No hives or eczema      PAST MEDICAL & SURGICAL HISTORY:  Encounter for blood transfusion    Anemia    History of gross hematuria    History of orthostatic hypotension    Bacterial pneumonia    Muscle weakness (generalized)    2019 novel coronavirus disease (COVID-19)    Falls    Heart failure    Afib    CHF (congestive heart failure)    Gout    Hiatal hernia    BPH (benign prostatic hyperplasia)    Cognitive impairment    Cardiomyopathy    HTN (hypertension)    DM (diabetes mellitus)    S/P appendectomy        FAMILY HISTORY:  No pertinent family history in first degree relatives        SOCIAL HISTORY:  Smoking Status: [ ] Current, [ ] Former, [ ] Never  Pack Years:  Alcohol Use:    Home Medications:  allopurinol 300 mg oral tablet: 1 tab(s) orally once a day (22 Dec 2020 08:47)  aspirin 81 mg oral delayed release tablet: 1 tab(s) orally once a day (22 Dec 2020 08:47)  cadexomer iodine 0.9% topical gel: 1 application topically every 24 hours (22 Dec 2020 08:47)  finasteride 5 mg oral tablet: 1 tab(s) orally once a day (22 Dec 2020 08:47)  folic acid 1 mg oral tablet: 1 tab(s) orally once a day (22 Dec 2020 08:47)  insulin lispro 100 units/mL injectable solution: Sliding scale three times per day before meals  (22 Dec 2020 08:47)  Januvia 100 mg oral tablet: 1 tab(s) orally once a day (22 Dec 2020 08:47)  lactulose 10 g/15 mL oral syrup: 30 milliliter(s) orally 2 times a day, As Needed constipation  (22 Dec 2020 08:47)  midodrine 5 mg oral tablet: 1 tab(s) orally 3 times a day (22 Dec 2020 08:47)  Multiple Vitamins oral tablet: 1 tab(s) orally once a day (22 Dec 2020 08:47)  oxybutynin 10 mg/24 hr oral tablet, extended release: 1 tab(s) orally once a day (22 Dec 2020 08:47)  saccharomyces boulardii lyo 250 mg oral capsule: 1 cap(s) orally 2 times a day (22 Dec 2020 08:47)  senna oral tablet: 2 tab(s) orally once a day (at bedtime) (22 Dec 2020 08:47)  sulfamethoxazole-trimethoprim 800 mg-160 mg oral tablet: 2 tab(s) orally every 12 hours  end date 12/24/20 (22 Dec 2020 08:47)  tamsulosin 0.4 mg oral capsule: 1 cap(s) orally once a day (at bedtime) (22 Dec 2020 08:47)  Vitamin C 250 mg oral tablet: 1 tab(s) orally once a day (22 Dec 2020 08:47)  zinc sulfate 220 mg oral capsule: 1 cap(s) orally once a day (22 Dec 2020 08:47)      MEDICATIONS:  MEDICATIONS  (STANDING):  allopurinol 300 milliGRAM(s) Oral daily  ascorbic acid 500 milliGRAM(s) Oral two times a day  aspirin  chewable 81 milliGRAM(s) Oral daily  atorvastatin 20 milliGRAM(s) Oral at bedtime  cadexomer iodine 0.9% Gel 1 Application(s) Topical every 24 hours  calcium carbonate 1250 mG  + Vitamin D (OsCal 500 + D) 1 Tablet(s) Oral daily  dextrose 40% Gel 15 Gram(s) Oral once  dextrose 5%. 1000 milliLiter(s) (100 mL/Hr) IV Continuous <Continuous>  dextrose 5%. 1000 milliLiter(s) (50 mL/Hr) IV Continuous <Continuous>  dextrose 50% Injectable 25 Gram(s) IV Push once  dextrose 50% Injectable 12.5 Gram(s) IV Push once  dextrose 50% Injectable 25 Gram(s) IV Push once  epoetin liam-epbx (RETACRIT) Injectable 40953 Unit(s) SubCutaneous <User Schedule>  ferrous    sulfate 325 milliGRAM(s) Oral three times a day  finasteride 5 milliGRAM(s) Oral daily  folic acid 1 milliGRAM(s) Oral daily  glucagon  Injectable 1 milliGRAM(s) IntraMuscular once  heparin   Injectable 5000 Unit(s) SubCutaneous every 12 hours  insulin lispro (ADMELOG) corrective regimen sliding scale   SubCutaneous Before meals and at bedtime  lactulose Syrup 20 Gram(s) Oral two times a day  levothyroxine 25 MICROGram(s) Oral daily  midodrine. 5 milliGRAM(s) Oral three times a day  multivitamin 1 Tablet(s) Oral daily  nystatin Powder 1 Application(s) Topical three times a day  pantoprazole    Tablet 40 milliGRAM(s) Oral before breakfast  polyethylene glycol 3350 17 Gram(s) Oral daily  saccharomyces boulardii 250 milliGRAM(s) Oral two times a day  senna 2 Tablet(s) Oral at bedtime  tamsulosin 0.4 milliGRAM(s) Oral at bedtime  trimethoprim  160 mG/sulfamethoxazole 800 mG 2 Tablet(s) Oral every 12 hours    MEDICATIONS  (PRN):  acetaminophen  Suppository .. 650 milliGRAM(s) Rectal every 6 hours PRN Temp greater or equal to 38C (100.4F), Mild Pain (1 - 3), Moderate Pain (4 - 6)  bisacodyl Suppository 10 milliGRAM(s) Rectal every 24 hours PRN Constipation  haloperidol    Injectable 2 milliGRAM(s) IntraMuscular every 6 hours PRN Agitation  ondansetron Injectable 4 milliGRAM(s) IV Push every 6 hours PRN Nausea and/or Vomiting      Allergies    No Known Allergies    Intolerances        Vital Signs Last 24 Hrs  T(C): 36.3 (24 Dec 2020 08:08), Max: 36.4 (23 Dec 2020 16:12)  T(F): 97.4 (24 Dec 2020 08:08), Max: 97.5 (23 Dec 2020 16:12)  HR: 78 (24 Dec 2020 09:52) (64 - 78)  BP: 108/68 (24 Dec 2020 09:52) (90/58 - 118/75)  BP(mean): --  RR: 20 (24 Dec 2020 08:08) (18 - 20)  SpO2: 94% (24 Dec 2020 08:08) (94% - 96%)        PHYSICAL EXAM:    General: elderly male,  well nourished; in no acute distress  HEENT: MMM, conjunctiva and sclera clear  Lungs: Clear  Heart: Rhythm irregular and sounds very distant, No Murmurs  Gastrointestinal: Soft, non-tender non-distended; Normal bowel sounds; No rebound or guarding; No Organomegaly & No Masses, umbilical hernia is present  Extremities: Normal range of motion, markewd stasis changes over LE's with varicosities and ulcerations with trace pedal edema  Neurological: Alert and oriented x3, Non-focal  Skin: Warm and dry. No obvious rash  Rectal: No Masses, large amount of soft very dark stool      LABS:                        7.2    4.69  )-----------( 195      ( 24 Dec 2020 10:12 )             24.5     12-24    144  |  114<H>  |  36.0<H>  ----------------------------<  123<H>  4.4   |  21.0<L>  |  2.37<H>    Ca    8.6      24 Dec 2020 10:12     Patient is a 70y old  Male who presents with a chief complaint of Not doing well (24 Dec 2020 13:15)      HPI:  History was taken from charts as Aide from [facility] gone by the time patient was admitted.    70 years old male with recurrent hospitalizations, Developmental Disability, Chronic A. Fib not on AC due to Hematuria/Anemia, Diastolic Heart Failure, CKD 3, HTN, HLD, DM 2, Hypothermia, Chronic Venous Stasis, LE Ulcers, Chronic Left Shoulder Dislocation, Dysphagia and Aspiration Pneumonia sent from senior care as he was not doing well. He was lethargic, weak and his speech was slurred.   Patient is currently very sleepy and lethargic however wakes up on verbal stimuli. No active complaints. Speech is clear at this time.   He was code stroke in ER. No acute finding on CT. Found to be hypothermic. CXR with chronic small left pleural effusion. Urine was just sent and as per RN it was very cloudy.    We are asked to see the patient 16 days after admission due to recurrent drop in Hgb. On admission hemoglobin was 6.8 and given 2 units blood and increased to 8. On 12/19 the hemoglobin trending gradually down to 7.2 and given another unit of blood with hgb up to 8. Now hgb as again slowly dropped back down to 7.2. Patient is on oroal iron TID. One week ago he had a nosebleed and has had ongoing hematuria until recently. He denies any abdominal pain, nausea or vomiting. No rectal bleeding but his stools are dark and OB positive (in setting of recent nosebleed of no significance) His admission picture was felt due to infected ulcers on his LEs for which he has been on antibiotics with resolution of encephalopathy.            (08 Dec 2020 18:11)      REVIEW OF SYSTEMS:    CONSTITUTIONAL: No fever, weight loss, or fatigue  EYES: No eye pain, visual disturbances, or discharge  ENMT:  No difficulty hearing, tinnitus, vertigo; No sinus or throat pain  NECK: No pain or stiffness  RESPIRATORY: No cough, wheezing, chills or hemoptysis; No shortness of breath  CARDIOVASCULAR: No chest pain, palpitations, dizziness, or leg swelling  GASTROINTESTINAL: No abdominal or epigastric pain. No nausea, vomiting, or hematemesis; No diarrhea or constipation. No melena or hematochezia.  NEUROLOGICAL: No headaches, memory loss, loss of strength, numbness, or tremors  SKIN: No itching, burning, rashes, or lesions   LYMPH NODES: No enlarged glands  MUSCULOSKELETAL: No joint pain or swelling; No muscle, back, or extremity pain  PSYCHIATRIC: No depression, anxiety, mood swings, or difficulty sleeping  HEME/LYMPH: No easy bruising, or bleeding gums  ALLERY AND IMMUNOLOGIC: No hives or eczema      PAST MEDICAL & SURGICAL HISTORY:  Encounter for blood transfusion    Anemia    History of gross hematuria    History of orthostatic hypotension    Bacterial pneumonia    Muscle weakness (generalized)    2019 novel coronavirus disease (COVID-19)    Falls    Heart failure    Afib    CHF (congestive heart failure)    Gout    Hiatal hernia    BPH (benign prostatic hyperplasia)    Cognitive impairment    Cardiomyopathy    HTN (hypertension)    DM (diabetes mellitus)    S/P appendectomy        FAMILY HISTORY:  No pertinent family history in first degree relatives        SOCIAL HISTORY:  Smoking Status: [ ] Current, [ ] Former, [ ] Never  Pack Years:  Alcohol Use:    Home Medications:  allopurinol 300 mg oral tablet: 1 tab(s) orally once a day (22 Dec 2020 08:47)  aspirin 81 mg oral delayed release tablet: 1 tab(s) orally once a day (22 Dec 2020 08:47)  cadexomer iodine 0.9% topical gel: 1 application topically every 24 hours (22 Dec 2020 08:47)  finasteride 5 mg oral tablet: 1 tab(s) orally once a day (22 Dec 2020 08:47)  folic acid 1 mg oral tablet: 1 tab(s) orally once a day (22 Dec 2020 08:47)  insulin lispro 100 units/mL injectable solution: Sliding scale three times per day before meals  (22 Dec 2020 08:47)  Januvia 100 mg oral tablet: 1 tab(s) orally once a day (22 Dec 2020 08:47)  lactulose 10 g/15 mL oral syrup: 30 milliliter(s) orally 2 times a day, As Needed constipation  (22 Dec 2020 08:47)  midodrine 5 mg oral tablet: 1 tab(s) orally 3 times a day (22 Dec 2020 08:47)  Multiple Vitamins oral tablet: 1 tab(s) orally once a day (22 Dec 2020 08:47)  oxybutynin 10 mg/24 hr oral tablet, extended release: 1 tab(s) orally once a day (22 Dec 2020 08:47)  saccharomyces boulardii lyo 250 mg oral capsule: 1 cap(s) orally 2 times a day (22 Dec 2020 08:47)  senna oral tablet: 2 tab(s) orally once a day (at bedtime) (22 Dec 2020 08:47)  sulfamethoxazole-trimethoprim 800 mg-160 mg oral tablet: 2 tab(s) orally every 12 hours  end date 12/24/20 (22 Dec 2020 08:47)  tamsulosin 0.4 mg oral capsule: 1 cap(s) orally once a day (at bedtime) (22 Dec 2020 08:47)  Vitamin C 250 mg oral tablet: 1 tab(s) orally once a day (22 Dec 2020 08:47)  zinc sulfate 220 mg oral capsule: 1 cap(s) orally once a day (22 Dec 2020 08:47)      MEDICATIONS:  MEDICATIONS  (STANDING):  allopurinol 300 milliGRAM(s) Oral daily  ascorbic acid 500 milliGRAM(s) Oral two times a day  aspirin  chewable 81 milliGRAM(s) Oral daily  atorvastatin 20 milliGRAM(s) Oral at bedtime  cadexomer iodine 0.9% Gel 1 Application(s) Topical every 24 hours  calcium carbonate 1250 mG  + Vitamin D (OsCal 500 + D) 1 Tablet(s) Oral daily  dextrose 40% Gel 15 Gram(s) Oral once  dextrose 5%. 1000 milliLiter(s) (100 mL/Hr) IV Continuous <Continuous>  dextrose 5%. 1000 milliLiter(s) (50 mL/Hr) IV Continuous <Continuous>  dextrose 50% Injectable 25 Gram(s) IV Push once  dextrose 50% Injectable 12.5 Gram(s) IV Push once  dextrose 50% Injectable 25 Gram(s) IV Push once  epoetin liam-epbx (RETACRIT) Injectable 25878 Unit(s) SubCutaneous <User Schedule>  ferrous    sulfate 325 milliGRAM(s) Oral three times a day  finasteride 5 milliGRAM(s) Oral daily  folic acid 1 milliGRAM(s) Oral daily  glucagon  Injectable 1 milliGRAM(s) IntraMuscular once  heparin   Injectable 5000 Unit(s) SubCutaneous every 12 hours  insulin lispro (ADMELOG) corrective regimen sliding scale   SubCutaneous Before meals and at bedtime  lactulose Syrup 20 Gram(s) Oral two times a day  levothyroxine 25 MICROGram(s) Oral daily  midodrine. 5 milliGRAM(s) Oral three times a day  multivitamin 1 Tablet(s) Oral daily  nystatin Powder 1 Application(s) Topical three times a day  pantoprazole    Tablet 40 milliGRAM(s) Oral before breakfast  polyethylene glycol 3350 17 Gram(s) Oral daily  saccharomyces boulardii 250 milliGRAM(s) Oral two times a day  senna 2 Tablet(s) Oral at bedtime  tamsulosin 0.4 milliGRAM(s) Oral at bedtime  trimethoprim  160 mG/sulfamethoxazole 800 mG 2 Tablet(s) Oral every 12 hours    MEDICATIONS  (PRN):  acetaminophen  Suppository .. 650 milliGRAM(s) Rectal every 6 hours PRN Temp greater or equal to 38C (100.4F), Mild Pain (1 - 3), Moderate Pain (4 - 6)  bisacodyl Suppository 10 milliGRAM(s) Rectal every 24 hours PRN Constipation  haloperidol    Injectable 2 milliGRAM(s) IntraMuscular every 6 hours PRN Agitation  ondansetron Injectable 4 milliGRAM(s) IV Push every 6 hours PRN Nausea and/or Vomiting      Allergies    No Known Allergies    Intolerances        Vital Signs Last 24 Hrs  T(C): 36.3 (24 Dec 2020 08:08), Max: 36.4 (23 Dec 2020 16:12)  T(F): 97.4 (24 Dec 2020 08:08), Max: 97.5 (23 Dec 2020 16:12)  HR: 78 (24 Dec 2020 09:52) (64 - 78)  BP: 108/68 (24 Dec 2020 09:52) (90/58 - 118/75)  BP(mean): --  RR: 20 (24 Dec 2020 08:08) (18 - 20)  SpO2: 94% (24 Dec 2020 08:08) (94% - 96%)        PHYSICAL EXAM:    General: elderly male,  well nourished; in no acute distress  HEENT: MMM, conjunctiva and sclera clear  Lungs: Clear  Heart: Rhythm irregular and sounds very distant, No Murmurs  Gastrointestinal: Soft, non-tender non-distended; Normal bowel sounds; No rebound or guarding; No Organomegaly & No Masses, umbilical hernia is present  Extremities: Normal range of motion, markewd stasis changes over LE's with varicosities and ulcerations with trace pedal edema  Neurological: Alert and oriented x3, Non-focal  Skin: Warm and dry. No obvious rash  Rectal: No Masses, large amount of soft very dark stool      LABS:                        7.2    4.69  )-----------( 195      ( 24 Dec 2020 10:12 )             24.5     12-24    144  |  114<H>  |  36.0<H>  ----------------------------<  123<H>  4.4   |  21.0<L>  |  2.37<H>    Ca    8.6      24 Dec 2020 10:12     Patient is a 70y old  Male who presents with a chief complaint of Not doing well (24 Dec 2020 13:15)      HPI:  History was taken from [facility] as [facility] from [facility] gone by the time patient was admitted.    70 years old male with recurrent hospitalizations, Developmental Disability, Chronic A. Fib not on AC due to Hematuria/Anemia, Diastolic Heart Failure, CKD 3, HTN, HLD, DM 2, Hypothermia, Chronic Venous Stasis, LE Ulcers, Chronic Left Shoulder Dislocation, Dysphagia and Aspiration Pneumonia sent from senior care as he was not doing well. He was lethargic, weak and his speech was slurred.   Patient is currently very sleepy and lethargic however wakes up on verbal stimuli. No active complaints. Speech is clear at this time.   He was code stroke in ER. No acute finding on CT. Found to be hypothermic. CXR with chronic small left pleural effusion. Urine was just sent and as per RN it was very cloudy.    We are asked to see the patient 16 days after admission due to recurrent drop in Hgb. On admission hemoglobin was 6.8 and given 2 units blood and increased to 8. On 12/19 the hemoglobin trending gradually down to 7.2 and given another unit of blood with hgb up to 8. Now hgb as again slowly dropped back down to 7.2. Patient is on oroal iron TID. One week ago he had a nosebleed and has had ongoing hematuria until recently. He denies any abdominal pain, nausea or vomiting. No rectal bleeding but his stools are dark and OB positive (in setting of recent nosebleed of no significance) His admission picture was felt due to infected ulcers on his LEs for which he has been on antibiotics with resolution of encephalopathy.Sue stides with low iron. low FeBC but normal saturation and high ferritin c/w anemia of chronic disease.            (08 Dec 2020 18:11)      REVIEW OF SYSTEMS:    CONSTITUTIONAL: No fever, weight loss, or fatigue  EYES: No eye pain, visual disturbances, or discharge  ENMT:  No difficulty hearing, tinnitus, vertigo; No sinus or throat pain  NECK: No pain or stiffness  RESPIRATORY: No cough, wheezing, chills or hemoptysis; No shortness of breath  CARDIOVASCULAR: No chest pain, palpitations, dizziness, or leg swelling  GASTROINTESTINAL: No abdominal or epigastric pain. No nausea, vomiting, or hematemesis; No diarrhea or constipation. No melena or hematochezia.  NEUROLOGICAL: No headaches, memory loss, loss of strength, numbness, or tremors  SKIN: No itching, burning, rashes, or lesions   LYMPH NODES: No enlarged glands  MUSCULOSKELETAL: No joint pain or swelling; No muscle, back, or extremity pain  PSYCHIATRIC: No depression, anxiety, mood swings, or difficulty sleeping  HEME/LYMPH: No easy bruising, or bleeding gums  ALLERY AND IMMUNOLOGIC: No hives or eczema      PAST MEDICAL & SURGICAL HISTORY:  Encounter for blood transfusion    Anemia    History of gross hematuria    History of orthostatic hypotension    Bacterial pneumonia    Muscle weakness (generalized)    2019 novel coronavirus disease (COVID-19)    Falls    Heart failure    Afib    CHF (congestive heart failure)    Gout    Hiatal hernia    BPH (benign prostatic hyperplasia)    Cognitive impairment    Cardiomyopathy    HTN (hypertension)    DM (diabetes mellitus)    S/P appendectomy        FAMILY HISTORY:  No pertinent family history in first degree relatives        SOCIAL HISTORY:  Smoking Status: [ ] Current, [ ] Former, [ ] Never  Pack Years:  Alcohol Use:    Home Medications:  allopurinol 300 mg oral tablet: 1 tab(s) orally once a day (22 Dec 2020 08:47)  aspirin 81 mg oral delayed release tablet: 1 tab(s) orally once a day (22 Dec 2020 08:47)  cadexomer iodine 0.9% topical gel: 1 application topically every 24 hours (22 Dec 2020 08:47)  finasteride 5 mg oral tablet: 1 tab(s) orally once a day (22 Dec 2020 08:47)  folic acid 1 mg oral tablet: 1 tab(s) orally once a day (22 Dec 2020 08:47)  insulin lispro 100 units/mL injectable solution: Sliding scale three times per day before meals  (22 Dec 2020 08:47)  Januvia 100 mg oral tablet: 1 tab(s) orally once a day (22 Dec 2020 08:47)  lactulose 10 g/15 mL oral syrup: 30 milliliter(s) orally 2 times a day, As Needed constipation  (22 Dec 2020 08:47)  midodrine 5 mg oral tablet: 1 tab(s) orally 3 times a day (22 Dec 2020 08:47)  Multiple Vitamins oral tablet: 1 tab(s) orally once a day (22 Dec 2020 08:47)  oxybutynin 10 mg/24 hr oral tablet, extended release: 1 tab(s) orally once a day (22 Dec 2020 08:47)  saccharomyces boulardii lyo 250 mg oral capsule: 1 cap(s) orally 2 times a day (22 Dec 2020 08:47)  senna oral tablet: 2 tab(s) orally once a day (at bedtime) (22 Dec 2020 08:47)  sulfamethoxazole-trimethoprim 800 mg-160 mg oral tablet: 2 tab(s) orally every 12 hours  end date 12/24/20 (22 Dec 2020 08:47)  tamsulosin 0.4 mg oral capsule: 1 cap(s) orally once a day (at bedtime) (22 Dec 2020 08:47)  Vitamin C 250 mg oral tablet: 1 tab(s) orally once a day (22 Dec 2020 08:47)  zinc sulfate 220 mg oral capsule: 1 cap(s) orally once a day (22 Dec 2020 08:47)      MEDICATIONS:  MEDICATIONS  (STANDING):  allopurinol 300 milliGRAM(s) Oral daily  ascorbic acid 500 milliGRAM(s) Oral two times a day  aspirin  chewable 81 milliGRAM(s) Oral daily  atorvastatin 20 milliGRAM(s) Oral at bedtime  cadexomer iodine 0.9% Gel 1 Application(s) Topical every 24 hours  calcium carbonate 1250 mG  + Vitamin D (OsCal 500 + D) 1 Tablet(s) Oral daily  dextrose 40% Gel 15 Gram(s) Oral once  dextrose 5%. 1000 milliLiter(s) (100 mL/Hr) IV Continuous <Continuous>  dextrose 5%. 1000 milliLiter(s) (50 mL/Hr) IV Continuous <Continuous>  dextrose 50% Injectable 25 Gram(s) IV Push once  dextrose 50% Injectable 12.5 Gram(s) IV Push once  dextrose 50% Injectable 25 Gram(s) IV Push once  epoetin liam-epbx (RETACRIT) Injectable 23411 Unit(s) SubCutaneous <User Schedule>  ferrous    sulfate 325 milliGRAM(s) Oral three times a day  finasteride 5 milliGRAM(s) Oral daily  folic acid 1 milliGRAM(s) Oral daily  glucagon  Injectable 1 milliGRAM(s) IntraMuscular once  heparin   Injectable 5000 Unit(s) SubCutaneous every 12 hours  insulin lispro (ADMELOG) corrective regimen sliding scale   SubCutaneous Before meals and at bedtime  lactulose Syrup 20 Gram(s) Oral two times a day  levothyroxine 25 MICROGram(s) Oral daily  midodrine. 5 milliGRAM(s) Oral three times a day  multivitamin 1 Tablet(s) Oral daily  nystatin Powder 1 Application(s) Topical three times a day  pantoprazole    Tablet 40 milliGRAM(s) Oral before breakfast  polyethylene glycol 3350 17 Gram(s) Oral daily  saccharomyces boulardii 250 milliGRAM(s) Oral two times a day  senna 2 Tablet(s) Oral at bedtime  tamsulosin 0.4 milliGRAM(s) Oral at bedtime  trimethoprim  160 mG/sulfamethoxazole 800 mG 2 Tablet(s) Oral every 12 hours    MEDICATIONS  (PRN):  acetaminophen  Suppository .. 650 milliGRAM(s) Rectal every 6 hours PRN Temp greater or equal to 38C (100.4F), Mild Pain (1 - 3), Moderate Pain (4 - 6)  bisacodyl Suppository 10 milliGRAM(s) Rectal every 24 hours PRN Constipation  haloperidol    Injectable 2 milliGRAM(s) IntraMuscular every 6 hours PRN Agitation  ondansetron Injectable 4 milliGRAM(s) IV Push every 6 hours PRN Nausea and/or Vomiting      Allergies    No Known Allergies    Intolerances        Vital Signs Last 24 Hrs  T(C): 36.3 (24 Dec 2020 08:08), Max: 36.4 (23 Dec 2020 16:12)  T(F): 97.4 (24 Dec 2020 08:08), Max: 97.5 (23 Dec 2020 16:12)  HR: 78 (24 Dec 2020 09:52) (64 - 78)  BP: 108/68 (24 Dec 2020 09:52) (90/58 - 118/75)  BP(mean): --  RR: 20 (24 Dec 2020 08:08) (18 - 20)  SpO2: 94% (24 Dec 2020 08:08) (94% - 96%)        PHYSICAL EXAM:    General: elderly male,  well nourished; in no acute distress  HEENT: MMM, conjunctiva and sclera clear  Lungs: Clear  Heart: Rhythm irregular and sounds very distant, No Murmurs  Gastrointestinal: Soft, non-tender non-distended; Normal bowel sounds; No rebound or guarding; No Organomegaly & No Masses, umbilical hernia is present  Extremities: Normal range of motion, markewd stasis changes over LE's with varicosities and ulcerations with trace pedal edema  Neurological: Alert and oriented x3, Non-focal  Skin: Warm and dry. No obvious rash  Rectal: No Masses, large amount of soft very dark stool      LABS:                        7.2    4.69  )-----------( 195      ( 24 Dec 2020 10:12 )             24.5     12-24    144  |  114<H>  |  36.0<H>  ----------------------------<  123<H>  4.4   |  21.0<L>  |  2.37<H>    Ca    8.6      24 Dec 2020 10:12

## 2020-12-24 NOTE — PROGRESS NOTE ADULT - SUBJECTIVE AND OBJECTIVE BOX
CC: Follow up    INTERVAL HPI/OVERNIGHT EVENTS: Patient seen and examined, very upset about not being able to drink thin liquids.       Vital Signs Last 24 Hrs  T(C): 36.3 (24 Dec 2020 08:08), Max: 36.4 (23 Dec 2020 16:12)  T(F): 97.4 (24 Dec 2020 08:08), Max: 97.5 (23 Dec 2020 16:12)  HR: 78 (24 Dec 2020 09:52) (64 - 78)  BP: 108/68 (24 Dec 2020 09:52) (90/58 - 118/75)  BP(mean): --  RR: 20 (24 Dec 2020 08:08) (18 - 20)  SpO2: 94% (24 Dec 2020 08:08) (94% - 96%)    PHYSICAL EXAM:    GENERAL: NAD, AOX3  HEAD:  Atraumatic, Normocephalic  EYES: EOMI, PERRLA, conjunctiva and sclera clear  ENMT: Moist mucous membranes  HEART: Regular rate and rhythm; No murmurs, rubs, or gallops  ABDOMEN: Soft, Nontender, Nondistended; Bowel sounds present  EXTREMITIES:  + Bilateral Right 1st MPJ ulcer, bilateral unstageable heel ulcers       MEDICATIONS  (STANDING):  allopurinol 300 milliGRAM(s) Oral daily  ascorbic acid 500 milliGRAM(s) Oral two times a day  aspirin  chewable 81 milliGRAM(s) Oral daily  atorvastatin 20 milliGRAM(s) Oral at bedtime  cadexomer iodine 0.9% Gel 1 Application(s) Topical every 24 hours  calcium carbonate 1250 mG  + Vitamin D (OsCal 500 + D) 1 Tablet(s) Oral daily  dextrose 40% Gel 15 Gram(s) Oral once  dextrose 5%. 1000 milliLiter(s) (100 mL/Hr) IV Continuous <Continuous>  dextrose 5%. 1000 milliLiter(s) (50 mL/Hr) IV Continuous <Continuous>  dextrose 50% Injectable 25 Gram(s) IV Push once  dextrose 50% Injectable 12.5 Gram(s) IV Push once  dextrose 50% Injectable 25 Gram(s) IV Push once  epoetin liam-epbx (RETACRIT) Injectable 10126 Unit(s) SubCutaneous <User Schedule>  ferrous    sulfate 325 milliGRAM(s) Oral three times a day  finasteride 5 milliGRAM(s) Oral daily  folic acid 1 milliGRAM(s) Oral daily  glucagon  Injectable 1 milliGRAM(s) IntraMuscular once  heparin   Injectable 5000 Unit(s) SubCutaneous every 12 hours  insulin lispro (ADMELOG) corrective regimen sliding scale   SubCutaneous Before meals and at bedtime  lactulose Syrup 20 Gram(s) Oral two times a day  levothyroxine 25 MICROGram(s) Oral daily  midodrine. 5 milliGRAM(s) Oral three times a day  multivitamin 1 Tablet(s) Oral daily  nystatin Powder 1 Application(s) Topical three times a day  pantoprazole    Tablet 40 milliGRAM(s) Oral before breakfast  polyethylene glycol 3350 17 Gram(s) Oral daily  saccharomyces boulardii 250 milliGRAM(s) Oral two times a day  senna 2 Tablet(s) Oral at bedtime  tamsulosin 0.4 milliGRAM(s) Oral at bedtime  trimethoprim  160 mG/sulfamethoxazole 800 mG 2 Tablet(s) Oral every 12 hours    MEDICATIONS  (PRN):  acetaminophen  Suppository .. 650 milliGRAM(s) Rectal every 6 hours PRN Temp greater or equal to 38C (100.4F), Mild Pain (1 - 3), Moderate Pain (4 - 6)  bisacodyl Suppository 10 milliGRAM(s) Rectal every 24 hours PRN Constipation  haloperidol    Injectable 2 milliGRAM(s) IntraMuscular every 6 hours PRN Agitation  ondansetron Injectable 4 milliGRAM(s) IV Push every 6 hours PRN Nausea and/or Vomiting      Allergies    No Known Allergies    Intolerances          LABS:                          7.2    4.69  )-----------( 195      ( 24 Dec 2020 10:12 )             24.5     12-24    144  |  114<H>  |  36.0<H>  ----------------------------<  123<H>  4.4   |  21.0<L>  |  2.37<H>    Ca    8.6      24 Dec 2020 10:12            RADIOLOGY & ADDITIONAL TESTS:

## 2020-12-25 NOTE — CHART NOTE - NSCHARTNOTEFT_GEN_A_CORE
Patient refusing all medications and blood transfusion this morning.Very flat affect appears depressed. Spoke with Admin on call. Permission allowed for sister, Avani to visit patient as he is developmentally disabled. Spoke with Avani, visit limited to 30 mins at bedside. RN informed as well.

## 2020-12-25 NOTE — PROGRESS NOTE ADULT - ASSESSMENT
The patient is a 70 year old male with a history of developmental disability, chronic afib ( not on AC due to hematuria/anemia) diastolic CHF, CKD 3, hypertension, hyperlipidemia, diabetes mellitus type 2, chronic venous stasis, lower extremity ulcers and dysphagia sent from the [facility] after being found lethargic with slurred speech. Hypothermic in the ER, Code stroke called NIH of 2. CT head was negative and stroke ruled out. Chest x-ray consistent with small left pleural effusion. UA was positive; started on IV antibiotics. Noted to have foot ulcerations. Patient refused MRI; CT of the foot negative for OM. Rivera was placed for urinary retention and MARSHAL. Seen by urology, TOV was successful and hematuria resolved.     Assessment/plan:    1. Sepsis present on admission with metabolic encephalopathy secondary to lower extremity wounds: Sepsis resolved  Mentation at baseline   Wound culture- Providencia, Pseudomonas, Enterococcus, ESBL Proteus and Stenotrophomonas     PO bactrim completed on 12/24   No OM on CT foot; refused MRI to rule out OM    Wound care instructions:  Clean ulcer sites with sterile saline  Apply Iodosorb to b/l heel and Right 1st MPJ wound, cover with Allevyn pads  Change dressing every other day    2. Hematuria: Resolved  Rivera was removed for TOV on 12/21  Voiding well     3. Anemia; Worsening anemia of chronic disease suspected secondary to hematuria:  Positive FOBT on 12/21- GI was consulted; Increased PO Protonix to BID; heparin subcut held  Transfuse 1 unit PRBC; Maintain HB>8.0  On Epogen    4. MARSHAL with CKD Stage 3 ( Crt 1.57 in November 2020)- Improving  Continue to monitor renal function     5. Hypernatremia: Improved with IV hydration  Encourage PO intake  MOnitor BMP    6.  Decubitus heel ulcers: Local wound care  POA    7. Severe protein calorie malnutrition: encourage oral intake of fluids and supplementation  MV    8. Dysphagia: On soft with nectar thick fluid. Patient now reluctantly agreeable to thickened fluid.     VTE- Heparin subcut      GOC:  DNR/I. Patient is under OPWDD   Prognosis  guarded.     Discharge disposition:  Patient needs [facility] for wound care as [facility] is not able to care for patient's wounds. Sister now understands and agreeable to [facility]. SW working on placement> If medically stable discharge to [facility] on Monday    The patient is a 70 year old male with a history of developmental disability, chronic afib ( not on AC due to hematuria/anemia) diastolic CHF, CKD 3, hypertension, hyperlipidemia, diabetes mellitus type 2, chronic venous stasis, lower extremity ulcers and dysphagia sent from the [facility] after being found lethargic with slurred speech. Hypothermic in the ER, Code stroke called NIH of 2. CT head was negative and stroke ruled out. Chest x-ray consistent with small left pleural effusion. UA was positive; started on IV antibiotics. Noted to have foot ulcerations. Patient refused MRI; CT of the foot negative for OM. Rivera was placed for urinary retention and MARSHAL. Seen by urology, TOV was successful and hematuria resolved.     Assessment/plan:    1. Sepsis present on admission with metabolic encephalopathy secondary to lower extremity wounds: Sepsis resolved  Mentation at baseline   Wound culture- Providencia, Pseudomonas, Enterococcus, ESBL Proteus and Stenotrophomonas     PO bactrim completed on 12/24   No OM on CT foot; refused MRI to rule out OM    Wound care instructions:  Clean ulcer sites with sterile saline  Apply Iodosorb to b/l heel and Right 1st MPJ wound, cover with Allevyn pads  Change dressing every other day    2. Hematuria: Resolved  Rivera was removed for TOV on 12/21  Voiding well     3. Anemia; Worsening anemia of chronic disease suspected secondary to hematuria:  Positive FOBT on 12/21- GI was consulted; Increased PO Protonix to BID; heparin subcut held  Transfuse 1 unit PRBC; Maintain HB>8.0  On Epogen    4. MARSHAL with CKD Stage 3 ( Crt 1.57 in November 2020)- Improving  Continue to monitor renal function     5. Hypernatremia: Improved with IV hydration  Encourage PO intake  MOnitor BMP    6.  Decubitus heel ulcers: Local wound care  POA    7. Severe protein calorie malnutrition: encourage oral intake of fluids and supplementation  MV    8. Dysphagia: On soft with nectar thick fluid. Patient now reluctantly agreeable to thickened fluid.     VTE- Held for positive FOBT and anemia        GOC:  DNR/I. Patient is under OPWDD   Prognosis  guarded.     Discharge disposition:  Patient needs [facility] for wound care as [facility] is not able to care for patient's wounds. Sister now understands and agreeable to [facility]. SW working on placement> If medically stable discharge to [facility] on Monday

## 2020-12-25 NOTE — CHART NOTE - NSCHARTNOTESELECT_GEN_ALL_CORE
Malnutrition Notification
Event Note
Nutrition Services
Nutrition Services
Urology/Event Note

## 2020-12-25 NOTE — PROGRESS NOTE ADULT - SUBJECTIVE AND OBJECTIVE BOX
CC: Anemia     INTERVAL HPI/OVERNIGHT EVENTS: Patient seen and examined, no acute events overnight. Now is reluctantly drinking some soda with thickener it in.       Vital Signs Last 24 Hrs  T(C): 36.4 (25 Dec 2020 08:35), Max: 36.4 (24 Dec 2020 23:32)  T(F): 97.5 (25 Dec 2020 08:35), Max: 97.5 (24 Dec 2020 23:32)  HR: 71 (25 Dec 2020 08:35) (71 - 78)  BP: 97/63 (25 Dec 2020 08:35) (97/63 - 101/65)  BP(mean): --  RR: 20 (25 Dec 2020 08:35) (18 - 20)  SpO2: 96% (25 Dec 2020 08:35) (93% - 96%)    PHYSICAL EXAM:    GENERAL: NAD, AOX3  HEAD:  Atraumatic, Normocephalic  EYES:  conjunctiva and sclera clear  ENMT: Moist mucous membranes  CHEST/LUNG: Clear to auscultation bilaterally; No rales, rhonchi, wheezing, or rubs  HEART: Regular rate and rhythm; No murmurs, rubs, or gallops  ABDOMEN: Soft, Nontender, Nondistended; Bowel sounds present  EXTREMITIES:  Bilateral heel and left foot ulceration   SKIN: Groin intertrigo improving         MEDICATIONS  (STANDING):  allopurinol 300 milliGRAM(s) Oral daily  ascorbic acid 500 milliGRAM(s) Oral two times a day  aspirin  chewable 81 milliGRAM(s) Oral daily  atorvastatin 20 milliGRAM(s) Oral at bedtime  cadexomer iodine 0.9% Gel 1 Application(s) Topical every 24 hours  calcium carbonate 1250 mG  + Vitamin D (OsCal 500 + D) 1 Tablet(s) Oral daily  dextrose 40% Gel 15 Gram(s) Oral once  dextrose 5%. 1000 milliLiter(s) (100 mL/Hr) IV Continuous <Continuous>  dextrose 5%. 1000 milliLiter(s) (50 mL/Hr) IV Continuous <Continuous>  dextrose 50% Injectable 25 Gram(s) IV Push once  dextrose 50% Injectable 12.5 Gram(s) IV Push once  dextrose 50% Injectable 25 Gram(s) IV Push once  epoetin liam-epbx (RETACRIT) Injectable 23632 Unit(s) SubCutaneous <User Schedule>  ferrous    sulfate 325 milliGRAM(s) Oral three times a day  finasteride 5 milliGRAM(s) Oral daily  folic acid 1 milliGRAM(s) Oral daily  glucagon  Injectable 1 milliGRAM(s) IntraMuscular once  insulin lispro (ADMELOG) corrective regimen sliding scale   SubCutaneous Before meals and at bedtime  lactulose Syrup 30 Gram(s) Oral two times a day  levothyroxine 25 MICROGram(s) Oral daily  midodrine. 5 milliGRAM(s) Oral three times a day  multivitamin 1 Tablet(s) Oral daily  nystatin Powder 1 Application(s) Topical three times a day  pantoprazole    Tablet 40 milliGRAM(s) Oral two times a day  polyethylene glycol 3350 17 Gram(s) Oral daily  saccharomyces boulardii 250 milliGRAM(s) Oral two times a day  senna 2 Tablet(s) Oral at bedtime  tamsulosin 0.4 milliGRAM(s) Oral at bedtime    MEDICATIONS  (PRN):  acetaminophen  Suppository .. 650 milliGRAM(s) Rectal every 6 hours PRN Temp greater or equal to 38C (100.4F), Mild Pain (1 - 3), Moderate Pain (4 - 6)  bisacodyl Suppository 10 milliGRAM(s) Rectal every 24 hours PRN Constipation  haloperidol    Injectable 2 milliGRAM(s) IntraMuscular every 6 hours PRN Agitation  ondansetron Injectable 4 milliGRAM(s) IV Push every 6 hours PRN Nausea and/or Vomiting      Allergies    No Known Allergies    Intolerances          LABS:                          7.2    4.34  )-----------( 205      ( 25 Dec 2020 08:58 )             23.8     12-25    144  |  113<H>  |  36.0<H>  ----------------------------<  66<L>  4.5   |  21.0<L>  |  2.17<H>    Ca    8.6      25 Dec 2020 08:58            RADIOLOGY & ADDITIONAL TESTS:   CC: Anemia     INTERVAL HPI/OVERNIGHT EVENTS: Patient seen and examined, no acute events overnight. Now is reluctantly drinking some soda with thickener it in.       Vital Signs Last 24 Hrs  T(C): 36.4 (25 Dec 2020 08:35), Max: 36.4 (24 Dec 2020 23:32)  T(F): 97.5 (25 Dec 2020 08:35), Max: 97.5 (24 Dec 2020 23:32)  HR: 71 (25 Dec 2020 08:35) (71 - 78)  BP: 97/63 (25 Dec 2020 08:35) (97/63 - 101/65)  BP(mean): --  RR: 20 (25 Dec 2020 08:35) (18 - 20)  SpO2: 96% (25 Dec 2020 08:35) (93% - 96%)    PHYSICAL EXAM:    GENERAL: NAD, AOX3  HEAD:  Atraumatic, Normocephalic  EYES:  conjunctiva and sclera clear  ENMT: Moist mucous membranes  CHEST/LUNG: Clear to auscultation bilaterally; No rales, rhonchi, wheezing, or rubs  HEART: Regular rate and rhythm; No murmurs, rubs, or gallops  ABDOMEN: Soft, Nontender, Nondistended; Bowel sounds present  EXTREMITIES:  Bilateral heel and left foot ulceration   SKIN: Groin intertrigo improving   Stage 2 sacrum and left buttocks         MEDICATIONS  (STANDING):  allopurinol 300 milliGRAM(s) Oral daily  ascorbic acid 500 milliGRAM(s) Oral two times a day  aspirin  chewable 81 milliGRAM(s) Oral daily  atorvastatin 20 milliGRAM(s) Oral at bedtime  cadexomer iodine 0.9% Gel 1 Application(s) Topical every 24 hours  calcium carbonate 1250 mG  + Vitamin D (OsCal 500 + D) 1 Tablet(s) Oral daily  dextrose 40% Gel 15 Gram(s) Oral once  dextrose 5%. 1000 milliLiter(s) (100 mL/Hr) IV Continuous <Continuous>  dextrose 5%. 1000 milliLiter(s) (50 mL/Hr) IV Continuous <Continuous>  dextrose 50% Injectable 25 Gram(s) IV Push once  dextrose 50% Injectable 12.5 Gram(s) IV Push once  dextrose 50% Injectable 25 Gram(s) IV Push once  epoetin liam-epbx (RETACRIT) Injectable 61093 Unit(s) SubCutaneous <User Schedule>  ferrous    sulfate 325 milliGRAM(s) Oral three times a day  finasteride 5 milliGRAM(s) Oral daily  folic acid 1 milliGRAM(s) Oral daily  glucagon  Injectable 1 milliGRAM(s) IntraMuscular once  insulin lispro (ADMELOG) corrective regimen sliding scale   SubCutaneous Before meals and at bedtime  lactulose Syrup 30 Gram(s) Oral two times a day  levothyroxine 25 MICROGram(s) Oral daily  midodrine. 5 milliGRAM(s) Oral three times a day  multivitamin 1 Tablet(s) Oral daily  nystatin Powder 1 Application(s) Topical three times a day  pantoprazole    Tablet 40 milliGRAM(s) Oral two times a day  polyethylene glycol 3350 17 Gram(s) Oral daily  saccharomyces boulardii 250 milliGRAM(s) Oral two times a day  senna 2 Tablet(s) Oral at bedtime  tamsulosin 0.4 milliGRAM(s) Oral at bedtime    MEDICATIONS  (PRN):  acetaminophen  Suppository .. 650 milliGRAM(s) Rectal every 6 hours PRN Temp greater or equal to 38C (100.4F), Mild Pain (1 - 3), Moderate Pain (4 - 6)  bisacodyl Suppository 10 milliGRAM(s) Rectal every 24 hours PRN Constipation  haloperidol    Injectable 2 milliGRAM(s) IntraMuscular every 6 hours PRN Agitation  ondansetron Injectable 4 milliGRAM(s) IV Push every 6 hours PRN Nausea and/or Vomiting      Allergies    No Known Allergies    Intolerances          LABS:                          7.2    4.34  )-----------( 205      ( 25 Dec 2020 08:58 )             23.8     12-25    144  |  113<H>  |  36.0<H>  ----------------------------<  66<L>  4.5   |  21.0<L>  |  2.17<H>    Ca    8.6      25 Dec 2020 08:58            RADIOLOGY & ADDITIONAL TESTS:

## 2020-12-26 NOTE — PROGRESS NOTE ADULT - ASSESSMENT
The patient is a 70 year old male with a history of developmental disability, chronic afib ( not on AC due to hematuria/anemia) diastolic CHF, CKD 3, hypertension, hyperlipidemia, diabetes mellitus type 2, chronic venous stasis, lower extremity ulcers and dysphagia sent from the [facility] after being found lethargic with slurred speech. Hypothermic in the ER, Code stroke called NIH of 2. CT head was negative and stroke ruled out. Chest x-ray consistent with small left pleural effusion. UA was positive; started on IV antibiotics. Noted to have foot ulcerations. Patient refused MRI; CT of the foot negative for OM. Rivera was placed for urinary retention and MARSHAL. Seen by urology, TOV was successful and hematuria resolved.     Assessment/plan:    1. Sepsis present on admission with metabolic encephalopathy secondary to lower extremity wounds: Sepsis resolved  Mentation at baseline   Wound culture- Providencia, Pseudomonas, Enterococcus, ESBL Proteus and Stenotrophomonas     PO bactrim completed on 12/24   No OM on CT foot; refused MRI to rule out OM    Wound care instructions:  Clean ulcer sites with sterile saline  Apply Iodosorb to b/l heel and Right 1st MPJ wound, cover with Allevyn pads  Change dressing every other day    2. Hematuria: Resolved  Rivera was removed for TOV on 12/21  Voiding well     3. Anemia; Worsening anemia of chronic disease suspected secondary to hematuria:  Positive FOBT on 12/21- GI was consulted; Increased PO Protonix to BID; heparin subcut held  keep Hb>7  pt refused transfusion  refused blood work this morning  On Epogen    4. MARSHAL with CKD Stage 3 ( Crt 1.57 in November 2020)- Improving  Continue to monitor renal function     5. Hypernatremia: Improved  Encourage PO intake  MOnitor BMP    6.  Decubitus heel ulcers: Local wound care  POA    7. Severe protein calorie malnutrition: encourage oral intake of fluids and supplementation  MV    8. Dysphagia: On soft with nectar thick fluid. Patient now reluctantly agreeable to thickened fluid.     VTE- Held for positive FOBT and anemia        GOC:  DNR/I. Patient is under OPWDD   Prognosis  guarded.     Discharge disposition:  Patient needs [facility] for wound care as [facility] is not able to care for patient's wounds. Sister now agreeable to [facility]. SW working on placement> If medically stable discharge to [facility] on Monday

## 2020-12-26 NOTE — PROGRESS NOTE ADULT - SUBJECTIVE AND OBJECTIVE BOX
Patient is a 70y old  Male who presents with a chief complaint of Not doing well (25 Dec 2020 10:06)      Patient seen and examined at bedside. no specific medical complaints, states that he will not eat because he doesn't like his diet. also refused blood work this morning. denies pain anywhere.    ALLERGIES:  No Known Allergies    MEDICATIONS  (STANDING):  allopurinol 300 milliGRAM(s) Oral daily  ascorbic acid 500 milliGRAM(s) Oral two times a day  aspirin  chewable 81 milliGRAM(s) Oral daily  atorvastatin 20 milliGRAM(s) Oral at bedtime  cadexomer iodine 0.9% Gel 1 Application(s) Topical every 24 hours  calcium carbonate 1250 mG  + Vitamin D (OsCal 500 + D) 1 Tablet(s) Oral daily  dextrose 40% Gel 15 Gram(s) Oral once  dextrose 5%. 1000 milliLiter(s) (100 mL/Hr) IV Continuous <Continuous>  dextrose 5%. 1000 milliLiter(s) (50 mL/Hr) IV Continuous <Continuous>  dextrose 50% Injectable 25 Gram(s) IV Push once  dextrose 50% Injectable 12.5 Gram(s) IV Push once  dextrose 50% Injectable 25 Gram(s) IV Push once  epoetin liam-epbx (RETACRIT) Injectable 32068 Unit(s) SubCutaneous <User Schedule>  ferrous    sulfate 325 milliGRAM(s) Oral three times a day  finasteride 5 milliGRAM(s) Oral daily  folic acid 1 milliGRAM(s) Oral daily  glucagon  Injectable 1 milliGRAM(s) IntraMuscular once  insulin lispro (ADMELOG) corrective regimen sliding scale   SubCutaneous Before meals and at bedtime  lactulose Syrup 30 Gram(s) Oral two times a day  levothyroxine 25 MICROGram(s) Oral daily  midodrine. 5 milliGRAM(s) Oral three times a day  multivitamin 1 Tablet(s) Oral daily  nystatin Powder 1 Application(s) Topical three times a day  pantoprazole    Tablet 40 milliGRAM(s) Oral two times a day  polyethylene glycol 3350 17 Gram(s) Oral daily  saccharomyces boulardii 250 milliGRAM(s) Oral two times a day  senna 2 Tablet(s) Oral at bedtime  tamsulosin 0.4 milliGRAM(s) Oral at bedtime    MEDICATIONS  (PRN):  acetaminophen  Suppository .. 650 milliGRAM(s) Rectal every 6 hours PRN Temp greater or equal to 38C (100.4F), Mild Pain (1 - 3), Moderate Pain (4 - 6)  bisacodyl Suppository 10 milliGRAM(s) Rectal every 24 hours PRN Constipation  haloperidol    Injectable 2 milliGRAM(s) IntraMuscular every 6 hours PRN Agitation  ondansetron Injectable 4 milliGRAM(s) IV Push every 6 hours PRN Nausea and/or Vomiting    Vital Signs Last 24 Hrs  T(F): 97.6 (26 Dec 2020 16:28), Max: 97.6 (26 Dec 2020 16:28)  HR: 81 (26 Dec 2020 16:28) (81 - 81)  BP: 116/74 (26 Dec 2020 16:28) (116/74 - 116/74)  RR: 19 (26 Dec 2020 16:28) (19 - 19)  SpO2: 98% (26 Dec 2020 16:28) (98% - 98%)  I&O's Summary    PHYSICAL EXAM:  General: NAD  ENT: MMM  Neck: Supple, No JVD  Lungs: Clear to auscultation bilaterally  Cardio: RRR, S1/S2, No murmurs  Abdomen: Soft, Nontender, Nondistended; Bowel sounds present  Extremities: No calf tenderness, No pitting edema. +chronic vascular changes and bilateral foot dressing    LABS:                        7.2    4.34  )-----------( 205      ( 25 Dec 2020 08:58 )             23.8     12-25    144  |  113  |  36.0  ----------------------------<  66  4.5   |  21.0  |  2.17    Ca    8.6      25 Dec 2020 08:58      eGFR if Non African American: 29 mL/min/1.73M2 (12-25-20 @ 08:58)  eGFR if : 34 mL/min/1.73M2 (12-25-20 @ 08:58)        POCT Blood Glucose.: 102 mg/dL (26 Dec 2020 17:01)  POCT Blood Glucose.: 162 mg/dL (26 Dec 2020 10:32)          RADIOLOGY & ADDITIONAL TESTS:    Care Discussed with Consultants/Other Providers:

## 2020-12-27 NOTE — PROGRESS NOTE ADULT - ASSESSMENT
The patient is a 70 year old male with a history of developmental disability, chronic afib ( not on AC due to hematuria/anemia) diastolic CHF, CKD 3, hypertension, hyperlipidemia, diabetes mellitus type 2, chronic venous stasis, lower extremity ulcers and dysphagia sent from the [facility] after being found lethargic with slurred speech. Hypothermic in the ER, Code stroke called NIH of 2. CT head was negative and stroke ruled out. Chest x-ray consistent with small left pleural effusion. UA was positive; started on IV antibiotics. Noted to have foot ulcerations. Patient refused MRI; CT of the foot negative for OM. Rivera was placed for urinary retention and MARSHAL. Seen by urology, TOV was successful and hematuria resolved.     Assessment/plan:    1. Sepsis present on admission with metabolic encephalopathy secondary to lower extremity wounds: Sepsis resolved  Mentation at baseline   Wound culture- Providencia, Pseudomonas, Enterococcus, ESBL Proteus and Stenotrophomonas     PO bactrim completed on 12/24   No OM on CT foot; refused MRI to rule out OM    Wound care instructions:  Clean ulcer sites with sterile saline  Apply Iodosorb to b/l heel and Right 1st MPJ wound, cover with Allevyn pads  Change dressing every other day    2. Hematuria: Resolved  Rivera was removed for TOV on 12/21  Voiding well     3. Anemia; Worsening anemia of chronic disease suspected secondary to hematuria:  Positive FOBT on 12/21- GI was consulted; Increased PO Protonix to BID; heparin subcut held  keep Hb>7  pt refused transfusion  On Epogen    4. MARSHAL with CKD Stage 3 ( Crt 1.57 in November 2020)-  improved initially but renal indexes going up again likely due to poor po intake  start gentle IVF for 24h  Continue to monitor renal function     5. Hypernatremia: Improved  Encourage PO intake  MOnitor BMP    6.  Decubitus heel ulcers: Local wound care  POA    7. Severe protein calorie malnutrition: encourage oral intake of fluids and supplementation  MV    8. Dysphagia: On soft with nectar thick fluid. Patient now reluctantly agreeable to thickened fluid.     VTE- Held for positive FOBT and anemia        GOC:  DNR/I. Patient is under OPWDD   Prognosis  guarded.     Discharge disposition:  Patient needs [facility] for wound care as [facility] is not able to care for patient's wounds. Sister now agreeable to [facility]. [facility]ADD working on placement> If medically stable discharge to [facility] on Monday

## 2020-12-27 NOTE — PROGRESS NOTE ADULT - SUBJECTIVE AND OBJECTIVE BOX
Patient is a 70y old  Male who presents with a chief complaint of Not doing well (26 Dec 2020 17:34)      Patient seen and examined at bedside. has no complaints    ALLERGIES:  No Known Allergies    MEDICATIONS  (STANDING):  allopurinol 300 milliGRAM(s) Oral daily  ascorbic acid 500 milliGRAM(s) Oral two times a day  aspirin  chewable 81 milliGRAM(s) Oral daily  atorvastatin 20 milliGRAM(s) Oral at bedtime  cadexomer iodine 0.9% Gel 1 Application(s) Topical every 24 hours  calcium carbonate 1250 mG  + Vitamin D (OsCal 500 + D) 1 Tablet(s) Oral daily  dextrose 40% Gel 15 Gram(s) Oral once  dextrose 5%. 1000 milliLiter(s) (100 mL/Hr) IV Continuous <Continuous>  dextrose 5%. 1000 milliLiter(s) (50 mL/Hr) IV Continuous <Continuous>  dextrose 50% Injectable 25 Gram(s) IV Push once  dextrose 50% Injectable 12.5 Gram(s) IV Push once  dextrose 50% Injectable 25 Gram(s) IV Push once  epoetin liam-epbx (RETACRIT) Injectable 49823 Unit(s) SubCutaneous <User Schedule>  ferrous    sulfate 325 milliGRAM(s) Oral three times a day  finasteride 5 milliGRAM(s) Oral daily  folic acid 1 milliGRAM(s) Oral daily  glucagon  Injectable 1 milliGRAM(s) IntraMuscular once  insulin lispro (ADMELOG) corrective regimen sliding scale   SubCutaneous Before meals and at bedtime  lactulose Syrup 30 Gram(s) Oral two times a day  levothyroxine 25 MICROGram(s) Oral daily  midodrine. 5 milliGRAM(s) Oral three times a day  multivitamin 1 Tablet(s) Oral daily  nystatin Powder 1 Application(s) Topical three times a day  pantoprazole    Tablet 40 milliGRAM(s) Oral two times a day  polyethylene glycol 3350 17 Gram(s) Oral daily  saccharomyces boulardii 250 milliGRAM(s) Oral two times a day  senna 2 Tablet(s) Oral at bedtime  sodium chloride 0.45%. 1000 milliLiter(s) (75 mL/Hr) IV Continuous <Continuous>  tamsulosin 0.4 milliGRAM(s) Oral at bedtime    MEDICATIONS  (PRN):  acetaminophen  Suppository .. 650 milliGRAM(s) Rectal every 6 hours PRN Temp greater or equal to 38C (100.4F), Mild Pain (1 - 3), Moderate Pain (4 - 6)  bisacodyl Suppository 10 milliGRAM(s) Rectal every 24 hours PRN Constipation  haloperidol    Injectable 2 milliGRAM(s) IntraMuscular every 6 hours PRN Agitation  ondansetron Injectable 4 milliGRAM(s) IV Push every 6 hours PRN Nausea and/or Vomiting    Vital Signs Last 24 Hrs  T(F): 97.7 (27 Dec 2020 15:30), Max: 97.7 (27 Dec 2020 15:30)  HR: 71 (27 Dec 2020 15:30) (71 - 93)  BP: 113/74 (27 Dec 2020 15:30) (101/61 - 115/75)  RR: 18 (27 Dec 2020 15:30) (18 - 18)  SpO2: 94% (27 Dec 2020 15:30) (94% - 96%)  I&O's Summary    PHYSICAL EXAM:  General: NAD  ENT: MMM  Neck: Supple, No JVD  Lungs: Clear to auscultation bilaterally  Cardio: RRR, S1/S2  Abdomen: Soft, Nontender, Nondistended; Bowel sounds present  Extremities: No calf tenderness, No pitting edema + chronic vascular changes + right foot dressing    LABS:                        7.7    3.94  )-----------( 223      ( 27 Dec 2020 12:16 )             25.8     12-27    144  |  113  |  34.0  ----------------------------<  77  4.4   |  23.0  |  2.44    Ca    8.5      27 Dec 2020 12:15      eGFR if Non African American: 25 mL/min/1.73M2 (12-27-20 @ 12:15)  eGFR if African American: 30 mL/min/1.73M2 (12-27-20 @ 12:15)                          POCT Blood Glucose.: 97 mg/dL (27 Dec 2020 11:37)  POCT Blood Glucose.: 123 mg/dL (27 Dec 2020 08:34)  POCT Blood Glucose.: 114 mg/dL (26 Dec 2020 22:18)  POCT Blood Glucose.: 102 mg/dL (26 Dec 2020 17:01)            RADIOLOGY & ADDITIONAL TESTS:    Care Discussed with Consultants/Other Providers:

## 2020-12-28 NOTE — PROGRESS NOTE ADULT - ATTENDING COMMENTS
Patient was examined.  All documentation reviewed.  Discussed pathology and treatment plan with resident.  Reviewed written documentation and agreed with the above.  Patient will be follow while in house.
Patient was examined.  All documentation reviewed.  Discussed pathology and treatment plan with resident.  Reviewed written documentation and agreed with the above.  Patient will be follow while in house.
I have personally seen and examined patient on the above date.  I discussed the case with JULIÁN Reyna and I agree with findings and plan as detailed per note above, which I have amended where appropriate.      Pt was seen and examined at bedside.  explained to pt importance of getting wound care at [facility], pt agreed  pending pt sister decision on location of [facility]

## 2020-12-28 NOTE — PROGRESS NOTE ADULT - NUTRITIONAL ASSESSMENT
This patient has been assessed with a concern for Malnutrition and has been determined to have a diagnosis/diagnoses of Severe protein-calorie malnutrition.    This patient is being managed with:   Diet Dysphagia 1 Pureed-No Liquids-  Consistent Carbohydrate {No Snacks} (CSTCHO)  DASH/TLC {Sodium & Cholesterol Restricted} (DASH)  Supplement Feeding Modality:  Oral  Ensure Pudding Cans or Servings Per Day:  1       Frequency:  Three Times a day  Entered: Dec 13 2020  2:56PM    
This patient has been assessed with a concern for Malnutrition and has been determined to have a diagnosis/diagnoses of Severe protein-calorie malnutrition.    This patient is being managed with:   Diet Soft-  Dysphagia 3 Soft Nectar Consistency Fluid (DYS3NC)  Supplement Feeding Modality:  Oral  Ensure Enlive Cans or Servings Per Day:  1       Frequency:  Three Times a day  Entered: Dec 21 2020  2:34PM    
This patient has been assessed with a concern for Malnutrition and has been determined to have a diagnosis/diagnoses of Severe protein-calorie malnutrition.    This patient is being managed with:   Diet Dysphagia 1 Pureed-No Liquids-  Consistent Carbohydrate {No Snacks} (CSTCHO)  DASH/TLC {Sodium & Cholesterol Restricted} (DASH)  Supplement Feeding Modality:  Oral  Ensure Pudding Cans or Servings Per Day:  1       Frequency:  Three Times a day  Entered: Dec 13 2020  2:56PM    

## 2020-12-28 NOTE — PROGRESS NOTE ADULT - ASSESSMENT
The patient is a 70 year old male with a history of developmental disability, chronic afib ( not on AC due to hematuria/anemia) diastolic CHF, CKD 3, hypertension, hyperlipidemia, diabetes mellitus type 2, chronic venous stasis, lower extremity ulcers and dysphagia sent from the [facility] after being found lethargic with slurred speech. Hypothermic in the ER, Code stroke called NIH of 2. CT head was negative and stroke ruled out. Chest x-ray consistent with small left pleural effusion. UA was positive; started on IV antibiotics. Noted to have foot ulcerations. Patient refused MRI; CT of the foot negative for OM. Rodriguez was placed for urinary retention and MARSHAL. Seen by urology, TOV was successful and hematuria resolved.     1. Sepsis present on admission with metabolic encephalopathy secondary to lower extremity wounds: Sepsis resolved  - Mentation at baseline   - Wound culture- Providencia, Pseudomonas, Enterococcus, ESBL Proteus and Stenotrophomonas     - PO bactrim completed on 12/24   - No OM on CT foot; refused MRI to rule out OM    Wound care instructions:  Clean ulcer sites with sterile saline  Apply Iodosorb to b/l heel and Right 1st MPJ wound, cover with Allevyn pads  Change dressing every other day    2. Hematuria: Resolved  - s/p rodriguez removal on 12/21. passed TOV  - no difficulties voiding     3. Anemia; Worsening anemia of chronic disease suspected secondary to hematuria:  - slowly improving, AM labs pending  - + FOBT on 12/21: GI was consulted; Increased PO Protonix to BID; heparin SQ held  - keep Hgb >7  - pt refused transfusion on 12/25 when hgb 7.2.  - c/w epogen    4. MARSHAL with CKD Stage 3 ( Crt 1.57 in November 2020)  -  SCr uptrending likely 2/2 poor po intake  - gentle IVF  -Continue to monitor renal function     5. Hypernatremia: normalized  - Encourage PO intake  - Monitor BMP    6.  Decubitus heel ulcers  - present on admission  - continue local wound care per podiatry    7. Severe protein calorie malnutrition:   - encourage oral intake of fluids and   - supplementation of MV    8. Dysphagia:   -On soft with nectar thick fluid. Patient now reluctantly agreeable to thickened fluid.     VTE- Held for positive FOBT and anemia        GOC:  DNR/I. Patient is under OPWDD   Prognosis  guarded.     Discharge disposition:  Patient needs [facility] for wound care as [facility] is not able to care for patient's wounds. Sister now agreeable to [facility]. [facility] working on placement> If medically stable discharge to [facility] on Monday    The patient is a 70 year old male with a history of developmental disability, chronic afib ( not on AC due to hematuria/anemia) diastolic CHF, CKD 3, hypertension, hyperlipidemia, diabetes mellitus type 2, chronic venous stasis, lower extremity ulcers and dysphagia sent [facility] the [facility]-term after being found lethargic with slurred speech. Hypothermic in the ER, Code stroke called NIH of 2. CT head was negative and stroke ruled out. Chest x-ray consistent with small left pleural effusion. UA was positive; started on IV antibiotics. Noted to have foot ulcerations. Patient refused MRI; CT of the foot negative for OM. Rodriguez was placed for urinary retention and MARSHAL. Seen by urology, TOV was successful and hematuria resolved.     1. Sepsis present on admission with metabolic encephalopathy secondary to lower extremity wounds: Sepsis resolved  - Mentation at baseline   - Wound culture- Providencia, Pseudomonas, Enterococcus, ESBL Proteus and Stenotrophomonas     - PO bactrim completed on 12/24   - No OM on CT foot; refused MRI to rule out OM    Wound care instructions:  Clean ulcer sites with sterile saline  Apply Iodosorb to b/l heel and Right 1st MPJ wound, cover with Allevyn pads  Change dressing every other day    2. Hematuria: Resolved  - s/p rodriguez removal on 12/21. passed TOV  - no difficulties voiding     3. Anemia; Worsening anemia of chronic disease suspected secondary to hematuria:  - slowly improving, AM labs pending  - + FOBT on 12/21: GI was consulted; Increased PO Protonix to BID; heparin SQ held  - keep Hgb >7  - pt refused transfusion on 12/25 when hgb 7.2.  - c/w epogen    4. MARSHAL with CKD Stage 3 ( Crt 1.57 in November 2020)  -  SCr uptrending likely 2/2 poor po intake  - gentle IVF  -Continue to monitor renal function     5. Hypernatremia: normalized  - Encourage PO intake  - Monitor BMP    6.  Decubitus heel ulcers  - present on admission  - continue local wound care per podiatry    7. Severe protein calorie malnutrition:   - encourage oral intake of fluids and   - supplementation of MV    8. Dysphagia:   -Soft diet with nectar thick liquids  -tolerating diet well     VTE ppx-  Held 2/2 to +FOBT and anemia        GOC:  DNR/I. Patient is under OPWDD   Prognosis  guarded.     Discharge disposition: Sister agreeable to [facility], as patient needs wound care which group home is not able to support. SW discussed with sister today and are awaiting final decision on which [facility] to send patient.

## 2020-12-28 NOTE — PROGRESS NOTE ADULT - PROVIDER SPECIALTY LIST ADULT
Hospitalist
Infectious Disease
Nephrology
Palliative Care
Podiatry
Urology
Urology
Hospitalist
Podiatry
Urology
Hospitalist
Nephrology
Urology
Infectious Disease

## 2020-12-28 NOTE — PROGRESS NOTE ADULT - REASON FOR ADMISSION
Not doing well

## 2021-02-10 ENCOUNTER — APPOINTMENT (OUTPATIENT)
Dept: NEPHROLOGY | Facility: CLINIC | Age: 71
End: 2021-02-10

## 2021-02-19 NOTE — SWALLOW BEDSIDE ASSESSMENT ADULT - SLP PATIENT PROFILE REVIEW
[address]        Pt Name: Jimmy Page  MRN: [id number]  [place] 1940  Date of evaluation: 2/18/2021  Provider: DARLIN Ward  PCP: Isabel Newman MD     I have seen and evaluated this patient with my supervising physician Dr. Caroline Castle. CHIEF COMPLAINT       Chief Complaint   Patient presents with    Abdominal Pain     Pt states hes having tenderness around stoma site. HISTORY OF PRESENT ILLNESS   (Location, Timing/Onset, Context/Setting, Quality, Duration, Modifying Factors, Severity, Associated Signs and Symptoms)  Note limiting factors. Jimmy Page is a 78 y.o. male past medical history of dementia, ileal conduit status post bladder cancer presents the emergency department for abdominal pain. Per son, patient has been complaining of abdominal pain for approximately 1 week. Had a CT scan on 2-11 without evidence of emergent findings. Increasing complaints of abdominal pain over the last 3 days. Patient believes his last bowel movement was yesterday. History from patient extremely limited due to his dementia. Son notes that no urine was noted in the urine collection bag when he saw him today. No history of vomiting, fever, cough. Family notes the patient is at his baseline mental status. Nursing Notes were all reviewed and agreed with or any disagreements were addressed in the HPI. REVIEW OF SYSTEMS    (2-9 systems for level 4, 10 or more for level 5)     Review of Systems   Unable to perform ROS: Dementia       Positives and Pertinent negatives as per HPI. Except as noted above in the ROS, all other systems were reviewed and negative.        PAST MEDICAL HISTORY     Past Medical History:   Diagnosis Date    Cancer [facility])     bladder    GASTRIC ULCER     Hypertension     Leukopenia          SURGICAL HISTORY     Past Surgical History:   Procedure Laterality Date    COLONOSCOPY      colo 2007, Dr Sheryl Cooks, MD.   [facility] yes CYSTOSCOPY      OTHER SURGICAL HISTORY  12/14/2016    RADICAL CYSTECTOMY, PROSTECTOMY ILEAL CONDUIT URINARY    TUNNELED VENOUS PORT PLACEMENT Right 06/06/2017    DR. BOND/IR POWER PORT         CURRENTMEDICATIONS       Current Discharge Medication List      CONTINUE these medications which have NOT CHANGED    Details   furosemide (LASIX) 20 MG tablet Take 1 tablet by mouth daily  Qty: 90 tablet, Refills: 1    Comments: Make a 3 month follow visit  Associated Diagnoses: Bilateral leg edema      potassium chloride (KLOR-CON M20) 20 MEQ extended release tablet Take 1 tablet by mouth daily Go to lab and hospital were orders are waiting for blood tests. Can get 90 day supply after blood tests  Qty: 14 tablet, Refills: 0    Associated Diagnoses: Bilateral edema of lower extremity      donepezil (ARICEPT) 5 MG tablet Take 1 tablet by mouth nightly  Qty: 30 tablet, Refills: 3    Associated Diagnoses: Cognitive impairment               ALLERGIES     Patient has no known allergies. FAMILYHISTORY     History reviewed. No pertinent family history. SOCIAL HISTORY       Social History     Tobacco Use    Smoking status: Never Smoker    Smokeless tobacco: Never Used   Substance Use Topics    Alcohol use: No    Drug use: No       SCREENINGS    [place] Coma Scale  Eye Opening: Spontaneous  Best Verbal Response: Oriented  Best Motor Response: Obeys commands  Seb Coma Scale Score: 15        PHYSICAL EXAM    (up to 7 for level 4, 8 or more for level 5)     ED Triage Vitals   BP Temp Temp Source Pulse Resp SpO2 Height Weight   02/18/21 1637 02/18/21 1637 02/18/21 1637 02/18/21 1637 02/18/21 1637 02/18/21 1637 02/18/21 1841 02/18/21 1637   138/89 97.6 °F (36.4 °C) Oral 101 25 96 % 5' 9\" (1.753 m) 190 lb 14.7 oz (86.6 kg)       Physical Exam  Vitals signs reviewed. Constitutional:       Appearance: He is not diaphoretic. HENT:      Nose: No congestion or rhinorrhea. Eyes:      General: No scleral icterus. following components:    Potassium reflex Magnesium 6.0 (*)     Glucose 174 (*)     BUN 46 (*)     CREATININE 2.5 (*)     GFR Non- 25 (*)     GFR  30 (*)     Total Bilirubin 1.2 (*)     AST 40 (*)     All other components within normal limits    Narrative:     Brian 195,  Chemistry results called to and read back by Joe Huston RN, 02/18/2021  18:48, by [facility]  Performed at:  [address] [address]   Phone [phone]   LACTIC ACID, PLASMA - Abnormal; Notable for the following components:    Lactic Acid 5.3 (*)     All other components within normal limits    Narrative:     Marycarmen Camp tel. [phone],  Chemistry results called to and read back by Padilla Reis ED, RN, 02/18/2021  18:36, by [facility]  Performed at:  [address] [employer] [address]   Phone [phone]   URINE RT REFLEX TO CULTURE - Abnormal; Notable for the following components:    Clarity, UA TURBID (*)     Blood, Urine LARGE (*)     Protein,  (*)     Leukocyte Esterase, Urine LARGE (*)     All other components within normal limits    Narrative:     Performed at:  [address] [address]   Phone [phone]   LACTATE, SEPSIS - Abnormal; Notable for the following components:    Lactic Acid, Sepsis 5.3 (*)     All other components within normal limits    Narrative:     Ber[address] 195,  Chemistry results called to and read back by Kellie Oseguera RN, 02/18/2021  21:15, by [facility]  Performed at:  [address] [employer] [address]   Phone [phone]   MICROSCOPIC URINALYSIS - Abnormal; Notable for the following components:    Bacteria, UA 4+ (*)     Hyaline Casts, UA 12 (*)     WBC,  (*)     RBC, UA 18 (*)     All other components within normal limits    Narrative:     Performed at:  [facility]  [address] [place] [address]   Phone [phone]   POCT GLUCOSE - Abnormal; Notable for the following components:    POC Glucose 150 (*)     All other components within normal limits    Narrative:     Performed at:  [facility]  [address] [facility] [address] [place] [address]   Phone [phone]   POCT GLUCOSE - Abnormal; Notable for the following components:    POC Glucose 138 (*)     All other components within normal limits    Narrative:     Performed at:  [facility]  [address] [facility] [place] [address]   Phone [phone] [phone]    Narrative:     Performed at:  [facility]  [address] [place] [address] [place] [address]   Phone [phone]   CULTURE, BLOOD 1   CULTURE, BLOOD 2   CULTURE, URINE   LIPASE    Narrative:     Woody Drought tel. [phone],  Chemistry results called to and read back by Rizwan Webber RN, 02/18/2021  18:48, by [facility]  Performed at:  [facility]  [address] [facility] [place] [address]   Phone [phone]   LACTATE, SEPSIS       All other labs were within normal range or not returned as of this dictation. EKG: All EKG's are interpreted by the Emergency Department Physician in the absence of a cardiologist.  Please see their note for interpretation of EKG.       RADIOLOGY:   Non-plain film images such as CT, Ultrasound and MRI are read by the radiologist. Plain radiographic images are visualized and preliminarily interpreted by the ED Provider with the below findings:        Interpretation per the Radiologist below, if available at the time of this note:    CT CHEST [address]   Final Result   Dilated loop of small bowel within a peristomal hernia and associated mild   hydroureter and hydronephrosis are concerning for obstruction of the ileal   conduit. Additionally, there are several loops of small bowel in the right   hemiabdomen which are fluid-filled and demonstrate mild wall thickening with   significant inflammatory stranding of the mesentery which has a somewhat   swirled appearance. Findings are somewhat concerning for an internal hernia. Surgical evaluation is suggested. XR CHEST PORTABLE   Final Result   No acute cardiopulmonary disease. Xr Chest Portable    Result Date: 2/18/2021  EXAMINATION: ONE XRAY VIEW OF THE CHEST 2/18/2021 4:55 pm COMPARISON: 05/03/2017. HISTORY: ORDERING SYSTEM PROVIDED HISTORY: Abdominal pain TECHNOLOGIST PROVIDED HISTORY: Reason for exam:->Abdominal pain FINDINGS: The cardiomediastinal silhouette is unremarkable. Aortic vascular calcification. The lungs are clear. No infiltrate, pleural fluid or evidence of overt failure. Right-sided venous access port. Degenerative changes at the shoulders bilaterally. No acute cardiopulmonary disease. Ct Chest Abdomen Pelvis Wo Contrast    Result Date: 2/18/2021  EXAMINATION: CT OF THE CHEST, ABDOMEN, AND PELVIS WITHOUT CONTRAST 2/18/2021 6:02 pm TECHNIQUE: CT of the chest, abdomen and pelvis was performed without the administration of intravenous contrast. Multiplanar reformatted images are provided for review. Dose modulation, iterative reconstruction, and/or weight based adjustment of the mA/kV was utilized to reduce the radiation dose to as low as reasonably achievable. COMPARISON: Chest CT 02/11/2021 HISTORY: ORDERING SYSTEM PROVIDED HISTORY: Abdominal pain TECHNOLOGIST PROVIDED HISTORY: Reason for exam:->Abdominal pain Additional Contrast?->None Reason for Exam: swollen legs & Abdominal Pain (Pt states hes having tenderness around stoma site. ) Acuity: Acute Type of Exam: Initial Relevant Medical/Surgical History: hx bladder ca CHEST: Lungs/pleura: The central airways are patent. There are no suspicious pulmonary nodules Mediastinum: There is no acute mediastinal abnormality Soft Tissues/Bones: No suspicious osteolytic or osteoblastic lesions Abdomen/Pelvis: There is mild bilateral hydronephrosis and hydroureter. There is a right lower quadrant ileostomy. There is a peristomal hernia which contains a dilated loop of small bowel. Multiple loops of small bowel just adjacent are mildly dilated with thickened wall and significant stranding of the mesentery. There is gas and stool throughout the colon. The remaining solid and hollow viscera are unremarkable. There is gas and stool throughout the colon. Dilated loop of small bowel within a peristomal hernia and associated mild hydroureter and hydronephrosis are concerning for obstruction of the ileal conduit. Additionally, there are several loops of small bowel in the right hemiabdomen which are fluid-filled and demonstrate mild wall thickening with significant inflammatory stranding of the mesentery which has a somewhat swirled appearance. Findings are somewhat concerning for an internal hernia. Surgical evaluation is suggested. PROCEDURES   Unless otherwise noted below, none     Procedures    CRITICAL CARE TIME   Due to the immediate potential for life-threatening deterioration due to bowel obstruction, renal failure, urinary obstruction, SIRS, I spent 75 minutes providing critical care. Rationale was acute risk for hemodynamic compromise, cardiac dysrhythmia from hyperkalemia. Critical interventions included treatment of hyperkalemia with insulin and dextrose, calcium gluconate. , albuterol, IV antibiotics and fluid administration, obtaining history and care planning with patient's family, consultation with general surgery, urology, nephrology. This time is excluding time spent performing procedures.     CONSULTS:  IP CONSULT TO GENERAL SURGERY  IP CONSULT TO UROLOGY  IP CONSULT TO NEPHROLOGY  IP CONSULT TO RADIOLOGY      EMERGENCY DEPARTMENT COURSE and DIFFERENTIAL DIAGNOSIS/MDM:   Vitals:    Vitals:    02/18/21 2031 02/18/21 2037 02/18/21 2046 02/18/21 2116   BP: (!) 152/76  (!) 147/76 (!) 152/79   Pulse: 92  103 97   Resp: 28 22 25 22   Temp:       TempSrc:       SpO2: 98% 93% 98% 100%   Weight:       Height:           Patient was given the following medications:  Medications   0.9 % sodium chloride bolus (1,000 mLs Intravenous New Bag 2/18/21 2037)   cefTRIAXone (ROCEPHIN) 1000 mg IVPB in 50 mL D5W minibag (has no administration in time range)   albuterol (PROVENTIL) nebulizer solution 10 mg (10 mg Nebulization New Bag 2/18/21 2002)   metronidazole (FLAGYL) 500 mg in NaCl 100 mL IVPB premix (500 mg Intravenous New Bag 2/18/21 2050)   lactated ringers infusion 1,000 mL (0 mLs Intravenous Stopped 2/18/21 2012)   ondansetron (ZOFRAN) injection 4 mg (4 mg Intravenous Given 2/18/21 1751)   ketorolac (TORADOL) injection 15 mg (15 mg Intravenous Given 2/18/21 1752)   0.9 % sodium chloride infusion ( Intravenous New Bag 2/18/21 2046)   insulin regular (HUMULIN R;NOVOLIN R) injection 5 Units (5 Units Intravenous Given 2/18/21 2033)   dextrose 50 % IV solution (25 g Intravenous Given 2/18/21 2030)   calcium gluconate 10 % injection 1,000 mg (1,000 mg Intravenous Given 2/18/21 2022)           64-year-old male presents the emergency department for abdominal pain. History very limited due to patient's dementia, has been complaining of abdominal pain for approximately 1 week. Exam is notable for abdominal tenderness without rebound or guarding. There was firm swelling around the ileal conduit site, I was concerned for the possibility of hernia or other intra-abdominal infection. There was no urine draining from the ileal conduit site, it is unclear when the last urine output was. Labs notable for elevated lactic acid, leukocytosis, KEENAN with hyperkalemia 6.0.   EKG does not show evidence of changes from hyperkalemia, though as patient does not have urine output medications were given for hyperkalemia including calcium gluconate, albuterol, IV insulin and dextrose. I do favor that his lactic acid is elevated due to obstructive uropathy, cannot completely rule out sepsis and therefore I have ordered broad-spectrum antibiotics with IV Rocephin and Flagyl. CT scan notable for small bowel obstruction as well as hydronephrosis and hydroureter concerning for obstruction of ileal conduit. Consultation with general surgeon Dr. Mery Roger, he has taken to the patient to the operating room for surgical decompression and exploration. Discussed the case with on-call nephrologist Dr. Rasta Tirado, recommended that dialysis catheter placement decision for dialysis can be deferred until the possibility that his ileal conduit obstruction is not able to be relieved. Discussed with on-call urologist Dr. Moises Leroy who recommended a catheter could be placed into the urostomy site, I plan to do this in the emergency room by Dr. Mery Roger as he will be able to place a coleman catheter in the operating room. Discussed with both Dr. Moises Leroy and Dr. Kiran Guerra that if obstruction is not able to be resolved or if Coleman catheter unable to be placed successfully, that interventional radiology should be consulted for nephrostomy tube placement. I did place a call to interventional radiology to make them aware of the patient. Prior to going to surgery, patient did begin to have approximately 200 mL urine output. Multiple discussions with family, they are aware of the plan for the patient I would like for him to remain full code. FINAL IMPRESSION      1. Small bowel obstruction ([address]    2. Status post ileal conduit ([address]    3. Urinary obstruction    4. Acute renal failure, unspecified acute renal failure type ([address]    5. Hyperkalemia    6. SIRS (systemic inflammatory response syndrome) (HCC)    7.  Dementia without behavioral disturbance, unspecified dementia type ([address] DISPOSITION/PLAN   DISPOSITION Decision To Admit 02/18/2021 09:13:47 PM      PATIENT REFERREDTO:  No follow-up provider specified.     DISCHARGE MEDICATIONS:  Current Discharge Medication List          DISCONTINUED MEDICATIONS:  Current Discharge Medication List      STOP taking these medications       cetirizine (ZYRTEC) 10 MG tablet Comments:   Reason for Stopping:         clotrimazole-betamethasone (LOTRISONE) 1-0.05 % cream Comments:   Reason for Stopping:         Nutritional Supplements (ENSURE ACTIVE HIGH PROTEIN) LIQD Comments:   Reason for Stopping:         prochlorperazine (COMPAZINE) 10 MG tablet Comments:   Reason for Stopping:                      (Please note that portions of this note were completed with a voice recognition program.  Efforts were made to edit the dictations but occasionally words are mis-transcribed.)    DARLIN Lerner (electronically signed)         DARLIN Lerner  02/18/21 7629

## 2021-07-07 NOTE — PHYSICAL THERAPY INITIAL EVALUATION ADULT - ADDITIONAL COMMENTS
Render Post-Care Instructions In Note?: no Duration Of Freeze Thaw-Cycle (Seconds): 0 Detail Level: Detailed Post-Care Instructions: I reviewed with the patient in detail post-care instructions. Patient is to wear sunprotection, and avoid picking at any of the treated lesions. Pt may apply Vaseline to crusted or scabbing areas. Consent: The patient's consent was obtained including but not limited to risks of crusting, scabbing, blistering. per patient he is a group home resident. pt is non ambulatory with amanda heel wounds. Pt is physically lifted by two people into the w/c chair or with the use of a mechanical lift. Pt reports requiring assistance with all ADLs.

## 2021-10-24 NOTE — DISCHARGE NOTE NURSING/CASE MANAGEMENT/SOCIAL WORK - NSDCPEPT PROEDHF_GEN_ALL_CORE
Call primary care provider for follow up after discharge/Report signs and symptoms to primary care provider/Activities as tolerated/Low salt diet/Monitor weight daily 35

## 2021-12-10 NOTE — PROGRESS NOTE ADULT - SUBJECTIVE AND OBJECTIVE BOX
Farzaneh Irwin RN 1 month ago     KE       SURGERY SCHEDULING REQUIREMENTS INCLUDE:  Facility: [facility]  Admission Type: Day Surgery   Time Needed: 15 minutes  Anesthesia: Local  If MAC: Preop with PCP required for SCS Perm implants only? no  COVID test required? no  MRSA SWAB required? no  NPO: No   Procedure: Bilateral  L4-5, L5-S1 MBB#1   Dx/CPT CODE: 69474, 16630, M47.817  Anticoagulation:   Is the patient on Coumadin/Warfarin, Lovenox, Plavix, or Aspirin/Excedrin? Yes,  Hold ASPIRIN for 6 days prior to procedure until 24 hours following the procedure.   OR NSAIDS (OTC products)? Hold for 24 hours  INR Check: no  Platelets WNL?      PLT (K/mcL)   Date Value   01/21/2021 206   Sleep Apnea: yes  Latex Allergy:no  Diabetic: No  Pacemaker: No  Heart/Lung issues: HTN (if yes need to verify approval for anesthesia with MD)  Stroke/MI in the past 6 months: No  Estimated body mass index is 42.53 kg/m² as calculated from the following:    Height as of 11/4/21: 5' 9\" (1.753 m).    Weight as of 11/4/21: 130.6 kg (288 lb).  Special Instructions: Under Fluoroscopy      Follow up: will call as f/u           Hypothermia, initial encounter    HPI:  History was taken from charts as Aide from nursing home gone by the time patient was admitted.    70 years old male with recurrent hospitalizations, Developmental Disability, Chronic A. Fib not on AC due to Hematuria/Anemia, Diastolic Heart Failure, CKD 3, HTN, HLD, DM 2, Hypothermia, Chronic Venous Stasis, LE Ulcers, Chronic Left Shoulder Dislocation, Dysphagia and Aspiration Pneumonia sent from [facility] as he was not doing well. He was lethargic, weak and his speech was slurred.   Patient is currently very sleepy and lethargic however wakes up on verbal stimuli. No active complaints. Speech is clear at this time.   He was code stroke in ER. No acute finding on CT. Found to be hypothermic. CXR with chronic small left pleural effusion. Urine was just sent and as per RN it was very cloudy.    Interval History:  Patient was seen and examined at bedside. More alert and awake. Feeling very weak.   Wants to eat.     ROS:  As per interval history otherwise unremarkable.    PHYSICAL EXAM:  Vital Signs   T(C): 34 (09 Dec 2020 16:30), Max: 91.2 (08 Dec 2020 22:33)  T(F): 93.2 (09 Dec 2020 16:30), Max: 196.1 (08 Dec 2020 22:33)  HR: 78 (09 Dec 2020 16:30) (70 - 82)  BP: 105/68 (09 Dec 2020 16:30) (84/59 - 121/55)  RR: 18 (09 Dec 2020 16:30) (18 - 18)  SpO2: 99% (09 Dec 2020 16:30) (93% - 100%)  General: Elderly male lying in bed comfortably. No acute distress   HEENT: PERRLA. EOMI. Clear conjunctivae. Moist mucus membrane  Neck: Supple.   Chest: Good air entry - no wheezing, rales or rhonchi. No chest wall tenderness.  Heart: S1 & S2 with irregular rhythm.   Abdomen: Ventral hernia. Soft. Non-tender. Non-distended. + BS  : Fungal rash in genital area and upper thighs.   Ext: Unna boots on both lower extremities. Ulcer on right first metatarsal with purulent discharge   Neuro: Awake and alert  Skin: Fungal rash in urogenital area and upper thighs. Pressure ulcers on both buttocks.   Psychiatry: Calm at the time of exam    MEDICATIONS  (STANDING):  dextrose 40% Gel 15 Gram(s) Oral once  dextrose 5% + sodium chloride 0.45%. 1000 milliLiter(s) (75 mL/Hr) IV Continuous <Continuous>  dextrose 5%. 1000 milliLiter(s) (100 mL/Hr) IV Continuous <Continuous>  dextrose 5%. 1000 milliLiter(s) (50 mL/Hr) IV Continuous <Continuous>  dextrose 50% Injectable 25 Gram(s) IV Push once  dextrose 50% Injectable 12.5 Gram(s) IV Push once  dextrose 50% Injectable 25 Gram(s) IV Push once  glucagon  Injectable 1 milliGRAM(s) IntraMuscular once  heparin   Injectable 5000 Unit(s) SubCutaneous every 8 hours  insulin lispro (ADMELOG) corrective regimen sliding scale   SubCutaneous every 6 hours  iron sucrose IVPB 200 milliGRAM(s) IV Intermittent every 24 hours  levothyroxine Injectable 12.5 MICROGram(s) IV Push at bedtime  meropenem  IVPB 500 milliGRAM(s) IV Intermittent every 12 hours  nystatin Powder 1 Application(s) Topical three times a day    MEDICATIONS  (PRN):  acetaminophen  Suppository .. 650 milliGRAM(s) Rectal every 6 hours PRN Temp greater or equal to 38C (100.4F), Mild Pain (1 - 3), Moderate Pain (4 - 6)  ondansetron Injectable 4 milliGRAM(s) IV Push every 6 hours PRN Nausea and/or Vomiting    LABS:  CAPILLARY BLOOD GLUCOSE  POCT Blood Glucose.: 128 mg/dL (09 Dec 2020 17:36)  POCT Blood Glucose.: 126 mg/dL (09 Dec 2020 12:34)                          6.8    8.49  )-----------( 69       ( 09 Dec 2020 11:49 )             21.8     1209    140  |  106  |  66.0<H>  ----------------------------<  103<H>  4.0   |  20.0<L>  |  3.90<H>    Ca    8.2<L>      09 Dec 2020 11:49  Mg     2.0     12    TPro  6.6  /  Alb  2.5<L>  /  TBili  0.3<L>  /  DBili  x   /  AST  42<H>  /  ALT  22  /  AlkPhos  109  1208    PT/INR - ( 08 Dec 2020 15:41 )   PT: 23.0 sec;   INR: 2.05 ratio         PTT - ( 08 Dec 2020 15:41 )  PTT:54.5 sec  Urinalysis Basic - ( 08 Dec 2020 18:05 )    Color: Yellow / Appearance: Cloudy / S.015 / pH: x  Gluc: x / Ketone: Negative  / Bili: Negative / Urobili: Negative mg/dL   Blood: x / Protein: 100 mg/dL / Nitrite: Negative   Leuk Esterase: Moderate / RBC: 6-10 /HPF / WBC >50   Sq Epi: x / Non Sq Epi: Occasional / Bacteria: Few    RADIOLOGY & ADDITIONAL STUDIES:  CT Brain Stroke Protocol (20 @ 16:00)  1)  no CT evidence of an acute infarct or hemorrhage.  2)  underlying involutional changes with volume loss.    CT Angio Neck w/ IV Cont (20 @ 16:01)   1. Calcified plaque in both carotid bulbs without significant narrowing. Both internal carotid arteries are widely patent without dissection.  2.Left vertebral artery is well visualized and is widely patent. There is only intermittent faint visualization of the right vertebral artery which is not continuous.  3. Intracranially, the anterior cerebral circulation is markedly redundant but is widely patent.  4. The posterior cerebral circulation is fed by the left vertebral artery, and the basilar artery is widely patent. Both posterior cerebral arteries are widely patent.    Xray Chest 1 View- PORTABLE-Urgent (20 @ 16:56)   Small left sided pleural effusion. Unchanged cardiomegaly.  Chronic anterior dislocation of the left shoulder.

## 2022-05-02 NOTE — ED ADULT NURSE NOTE - CHIEF COMPLAINT
LVM we will do echo the morning of cath and they will meet with the doctor prior to taking Natalya back for the cath procedure.  Call with any questions.    
The patient is a 70y Male complaining of slurred speech.

## 2022-09-24 NOTE — ED ADULT NURSE NOTE - NSIMPLEMENTINTERV_GEN_ALL_ED
Specific interventions were implemented: No-Patient/Caregiver offered and refused free interpretation services.

## 2022-10-17 NOTE — ED ADULT NURSE NOTE - NSFALLRSKASSESASSIST_ED_ALL_ED
Pt states her dog ran into her and she fell onto both knees. Pt states left knee is worse than the right.
yes

## 2022-10-28 NOTE — PHYSICAL THERAPY INITIAL EVALUATION ADULT - HEALTH SCREEN CRITERIA
Pts mother phoned in stating that the patient was having heart palpitations as well as panic attacks and trouble breathing  Writer sent a direct message to the provider via teams to inform her  Writer also advised going to the ER if the symptoms got to bad   Pts mother is requesting a return call at [phone]
yes

## 2023-01-23 NOTE — ED PROVIDER NOTE - RESPIRATORY NEGATIVE STATEMENT, MLM
Local Anesthesia Pre Procedure Assessment    Informed Consent:    Consent Obtained: Yes     Procedure Assessment:    Preop Diagnosis/Indications for Procedure: Pain  Planned Procedure: Bilateral L3-5 MBB #2  Planned Anesthetic: Local    Medical History/Comorbid Conditions:    Significant Medical/Surgical History: Yes (comment) (See H&P Note)  Normal Mental Status: Yes    Examination Pertinent to Procedure Being Performed:    Evaluation of Operative Site: Clean, no deformity, redness/warmth/swelling, no masses    Other Findings:    Reviewed Current Medications and Allergies: Yes    Pertinent Lab/Diagnostic Tests:    Pertinent Lab / Diagnostic Tests: Other (comment) (See Imaging section)    Thanh Greenberg, DO  Interventional Pain Management  [facility]  01/23/23    
no chest pain, no cough, and no shortness of breath.

## 2023-04-05 NOTE — PROGRESS NOTE ADULT - PROVIDER SPECIALTY LIST ADULT
Hospitalist
Infectious Disease
Infectious Disease
Nephrology
patient performed their own hair removal

## 2023-07-31 NOTE — PROGRESS NOTE ADULT - PROBLEM SELECTOR PLAN 1
- # Sepsis 2/2 UTI and ?superimposed infection on b/l  intertrigo in setting of chronic wound on R foot  - c/w Meropenem  - c/w Nystatin powed  - ID consult was called  - appreciate Podiatrist recommendations - would care as per Podiatry note  - gentle IV hydration 31-Jul-2023 18:03 - # Sepsis 2/2 UTI and ?superimposed infection on b/l  intertrigo in setting of chronic wound on R foot  - hx of ESBL before  - c/w Meropenem  - c/w Nystatin powder  - ID consult was called  - appreciate Podiatrist recommendations - would care as per Podiatry note  - gentle IV hydration

## 2023-12-31 PROBLEM — Z23 NEED FOR VACCINATION WITH 13-POLYVALENT PNEUMOCOCCAL CONJUGATE VACCINE: Status: ACTIVE | Noted: 2019-09-11

## 2024-01-01 NOTE — ED ADULT TRIAGE NOTE - HEART RATE (BEATS/MIN)
78
Check here if all serologies below were negative, non-reactive or immune. Otherwise select appropriate status.

## 2024-03-19 NOTE — DISCHARGE NOTE NURSING/CASE MANAGEMENT/SOCIAL WORK - PATIENT PORTAL LINK FT
You can access the FollowMyHealth Patient Portal offered by [facility] by registering at the following website: [email or web address]. By joining [employer]’s FollowMyHealth portal, you will also be able to view your health information using other applications (apps) compatible with our system. No

## 2024-09-02 NOTE — PHYSICAL THERAPY INITIAL EVALUATION ADULT - PASSIVE RANGE OF MOTION EXAMINATION, REHAB EVAL
bite, animal bilateral upper extremity Passive ROM was WNL (within normal limits)/bilateral lower extremity Passive ROM was WNL

## 2024-11-11 NOTE — DISCHARGE NOTE PROVIDER - NSDCQMSTAIRS_GEN_ALL_CORE
KANWAL Coyne was unable to make the 9:30 am offered appointment today at [place] and has issue with longer travel to [place] and [facility].    Is going to call back to confirm if has transportation for [place] location.    Referral of Traumatic amputation of tip of right thumb, initial encounter   Yes

## 2025-05-20 NOTE — PROGRESS NOTE ADULT - NSHPATTENDINGPLANDISCUSS_GEN_ALL_CORE
Dean Cedeno needs to be seen for an appointment before further refills are given.  
Patient is scheduled with [facility]  
medical team
patient's sister Avani
